# Patient Record
Sex: MALE | Race: WHITE
[De-identification: names, ages, dates, MRNs, and addresses within clinical notes are randomized per-mention and may not be internally consistent; named-entity substitution may affect disease eponyms.]

---

## 2017-01-05 ENCOUNTER — HOSPITAL ENCOUNTER (INPATIENT)
Dept: HOSPITAL 74 - YASAS | Age: 52
LOS: 4 days | Discharge: HOME | End: 2017-01-09
Attending: INTERNAL MEDICINE | Admitting: INTERNAL MEDICINE
Payer: COMMERCIAL

## 2017-01-05 VITALS — BODY MASS INDEX: 28.8 KG/M2

## 2017-01-05 DIAGNOSIS — F10.230: Primary | ICD-10-CM

## 2017-01-05 DIAGNOSIS — E78.00: ICD-10-CM

## 2017-01-05 DIAGNOSIS — F17.210: ICD-10-CM

## 2017-01-05 DIAGNOSIS — G47.00: ICD-10-CM

## 2017-01-05 DIAGNOSIS — F31.9: ICD-10-CM

## 2017-01-05 DIAGNOSIS — F12.20: ICD-10-CM

## 2017-01-05 LAB
APPEARANCE UR: CLEAR
BILIRUB UR STRIP.AUTO-MCNC: NEGATIVE MG/DL
COLOR UR: YELLOW
HIV 1 & 2 AB: NEGATIVE
HIV 1 AGP24: NEGATIVE
KETONES UR QL STRIP: NEGATIVE
LEUKOCYTE ESTERASE UR QL STRIP.AUTO: NEGATIVE
NITRITE UR QL STRIP: NEGATIVE
PH UR: 5 [PH] (ref 5–8)
PROT UR QL STRIP: NEGATIVE
PROT UR QL STRIP: NEGATIVE
RBC # UR STRIP: NEGATIVE /UL
SP GR UR: 1.03 (ref 1–1.03)
UROBILINOGEN UR STRIP-MCNC: NEGATIVE E.U./DL (ref 0.2–1)

## 2017-01-05 PROCEDURE — HZ2ZZZZ DETOXIFICATION SERVICES FOR SUBSTANCE ABUSE TREATMENT: ICD-10-PCS | Performed by: SURGERY

## 2017-01-05 RX ADMIN — Medication SCH MG: at 22:12

## 2017-01-05 RX ADMIN — ARIPIPRAZOLE SCH MG: 5 TABLET ORAL at 22:12

## 2017-01-05 RX ADMIN — NICOTINE SCH: 21 PATCH TRANSDERMAL at 13:50

## 2017-01-05 RX ADMIN — NICOTINE POLACRILEX PRN MG: 2 GUM, CHEWING ORAL at 20:36

## 2017-01-05 RX ADMIN — NICOTINE POLACRILEX PRN MG: 2 GUM, CHEWING ORAL at 13:50

## 2017-01-05 RX ADMIN — NICOTINE POLACRILEX PRN MG: 2 GUM, CHEWING ORAL at 17:20

## 2017-01-05 NOTE — CONSULT
[facility] Psychiatric Consult





- Data


Date of interview: 01/05/17


Admission source: [facility]


Identifying data: This is 51 years old male with history of Bip[olar disorder 

intpoxicated with:  Alcohol, Cannabis, Nicotine, history of Op[ipoids, Cocaine 

and Xanax abuse as well


Substance Abuse History: - Smoking Cessation.  Smoking history: Current every 

day smoker.  Have you smoked in the past 12 months: Yes.  Aproximately how many 

cigarettes per day: 10.  Hx Chewing Tobacco Use: No.  Initiated information on 

smoking cessation: Yes.  'Breaking Loose' booklet given: 01/05/17.  - Substance 

& Tx. History.  Hx Alcohol Use: Yes.  Hx Substance Use: Yes.  Substance Use Type

: Alcohol, Marijuana.  Hx Substance Use Treatment: Yes (DETOX,IOP).  - 

Substances Abused.  ** Alcohol.  Route: Oral.  Frequency: Daily.  Amount used: 

vodka(1 quart)?beer(5-6 cans cobra).  Age of first use: 11.  Date of Last Use: 

01/04/17.  ** Marijuana/Hashish.  Route: Smoking.  Frequency: Daily.  Amount 

used: $5.  Age of first use: 12.  Date of Last Use: 01/04/17


Medical History: Denies


Psychiatric History: P[atient reports to carry nBipolar disorder, reports no 

history of psychiatric admissions, reports currently stable on:  Cymbalta 60mg 

poqd.  Abilify 5mg po bid


Physical/Sexual Abuse/Trauma History: Denies


Additional Comment: Cymbalta 60mg poqd.  Abilify 5mg po bid





Mental Status Exam





- Mental Status Exam


Alert and Oriented to: Person


Cognitive Function: Fair


Patient Appearance: Well Groomed


Mood: Anxious


Affect: Mood Congruent, Normal Range


Patient Behavior: Cooperative


Speech Pattern: Appropriate


Voice Loudness: Normal


Thought Process: Goal Oriented


Thought Disorder: Being Controlled


Hallucinations: Denies


Suicidal Ideation: Denies


Homicidal Ideation: Denies


Insight/Judgement: Fair


Sleep: Difficulty falling asleep


Appetite: Fair


Muscle strength/Tone: Normal


Gait/Station: Normal


Additional Comments: Cymbalta 60mg poqd.  Abilify 5mg po bid





Psychiatric Findings





- Problem List (Axis 1, 2,3)


(1) Alcohol dependence with uncomplicated withdrawal


Current Visit: Yes   Status: Acute





(2) Cannabis dependence, uncomplicated


Current Visit: Yes   Status: Acute





(3) Cocaine dependence


Current Visit: No   Status: Acute   Qualifiers: 


     Substance use status: uncomplicated        Qualified Code(s): F14.20 - 

Cocaine dependence, uncomplicated  





(4) Crack cocaine use


Current Visit: No   Status: Acute





(5) Opioid abuse


Current Visit: No   Status: Acute





(6) Sedative abuse


Current Visit: No   Status: Acute





(7) Nicotine dependence


Current Visit: No   Status: Chronic   Qualifiers: 


     Nicotine product type: cigarettes     Substance use status: uncomplicated 

       Qualified Code(s): F17.210 - Nicotine dependence, cigarettes, 

uncomplicated  





(8) Bipolar disorder


Current Visit: No   Status: Suspected   








- Initial Treatment Plan


Initial Treatment Plan: Cymbalta 60mg poqd.  Abilify 5mg po bid

## 2017-01-05 NOTE — HP
CIWA Score





- CIWA Score


Nausea/Vomitin-Mild Nausea/No Vomiting (AND DIARRHEA)


Muscle Tremors: 4-Moderate,w/Arms Extend


Anxiety: 4-Mod. Anxious/Guarded


Agitation: 4-Moderately Restless


Paroxysmal Sweats: 3


Orientation: 0-Oriented


Tacttile Disturbances: 0-None


Auditory Disturbances: 0-None


Visual Disturbances: 0-None


Headache: 1-Very Mild


CIWA-Ar Total Score: 17





Admission ROS [facility]





- HPI


Chief Complaint: 


WITHDRAWAL SX.


Allergies/Adverse Reactions: 


 Allergies











Allergy/AdvReac Type Severity Reaction Status Date / Time


 


No Known Drug Allergies Allergy   Verified 17 10:20


 


onion Allergy  Difficulty Verified 17 10:20





   Breathing  


 


shellfish derived Allergy  Difficulty Verified 17 10:20





   Breathing  











History of Present Illness: 


50 Y/O MAN WITH A LONG HX. OF ALCOHOLISM IS ADMITTED FOR DETOX. PT. HAS BEEN IN 

PREVIOUS DETOX,


REPORTS 5 YRS. OF SOBRIETY.


Exam Limitations: No Limitations





- Ebola screening


Have you traveled outside of the country in the last 21 days: No


Have you had contact with anyone from an Ebola affected area: No


Have you been sick,other than usual withdrawal symptoms: No


Do you have a fever: No





- Review of Systems


Constitutional: Diaphoresis


EENT: reports: No Symptoms Reported


Respiratory: reports: Cough (NON-PRODUCTIVE)


Cardiac: reports: No Symptoms Reported


GI: reports: Diarrhea


: reports: No Symptoms Reported


Musculoskeletal: reports: No Symptoms Reported


Integumentary: reports: Sweating


Endocrine: reports: No Symptoms Reported


Hematology: reports: No Symptoms Reported


Psychiatric: reports: No Sypmtoms Reported


Other Systems: Reviewed and Negative





Patient History





- Patient Medical History


Hx Anemia: No


Hx Asthma: No


Hx Chronic Obstructive Pulmonary Disease (COPD): No


Hx Cancer: No


Hx Cardiac Disorders: No


Hx Congestive Heart Failure: No


Hx Hypertension: No


Hx Hypercholesterolemia: Yes (CRESTOR)


Hx Pacemaker: No


HX Cerebrovascular Accident: No


Hx Seizures: No


Hx Dementia: No


Hx Diabetes: No


Hx Gastrointestinal Disorders: No


Hx Liver Disease: No


Hx Genitourinary Disorders: No


Hx Sexually Transmitted Disorders: No


Hx Renal Disease (ESRD): No


Hx Thyroid Disease: No


Hx Human Immunodeficiency Virus (HIV): No


Hx Hepatitis C: No


Hx Depression: Yes


Hx Suicide Attempt: No


Hx Bipolar Disorder: Yes


Hx Schizophrenia: No





- Patient Surgical History


Past Surgical History: Yes


Hx Orthopedic Surgery: Yes (left knee torn ACL repair)


Anesthesia Reaction: No





- PPD History


Previous Implant?: Yes


Implanted On Prior [facility] Admission?: Yes


Date: 16


PPD to be Administered?: Yes





- Smoking Cessation


Smoking history: Current every day smoker


Have you smoked in the past 12 months: Yes


Aproximately how many cigarettes per day: 10


Hx Chewing Tobacco Use: No


Initiated information on smoking cessation: Yes


'Breaking Loose' booklet given: 17





- Substance & Tx. History


Hx Alcohol Use: Yes


Hx Substance Use: Yes


Substance Use Type: Alcohol, Marijuana


Hx Substance Use Treatment: Yes (DETOX,IOP)





- Substances Abused


  ** Alcohol


Route: Oral


Frequency: Daily


Amount used: vodka(1 quart)?beer(5-6 cans cobra)


Age of first use: 11


Date of Last Use: 17





  ** Marijuana/Hashish


Route: Smoking


Frequency: Daily


Amount used: $5


Age of first use: 12


Date of Last Use: 17





Family Disease History





- Family Disease History


Family Disease History: Diabetes: Sister, Heart Disease: Mother (, 

alcohol), CA: Father (prostate cancer, etoh, ), Other: Father, Mother





Admission Physical Exam [facility]





- Vital Signs


Vital Signs: 


 Vital Signs - 24 hr











  17





  08:55


 


Temperature 95.1 F L


 


Pulse Rate 71


 


Respiratory 18





Rate 


 


Blood Pressure 153/92














- Physical


General Appearance: Yes: Alcohol on Breath, Tremorous, Sweating, Anxious


HEENTM: Yes: Within Normal Limits


Respiratory: Yes: Chest Non-Tender, Lungs Clear, Normal Breath Sounds


Neck: Yes: Supple


Breast: Yes: Breast Exam Deferred


Cardiology: Yes: Regular Rhythm, Regular Rate, S1, S2


Abdominal: Yes: Normal Bowel Sounds, Non Tender, Soft


Genitourinary: Yes: Within Normal Limits


Back: Yes: Within Normal Limits


Musculoskeletal: Yes: Within Normal Limits


Extremities: Yes: Tremors


Neurological: Yes: Fully Oriented, Alert


Integumentary: Yes: Diaphoresis


Lymphatic: Yes: Within Normal Limits





- Diagnostic


(1) Alcohol dependence with uncomplicated withdrawal


Current Visit: Yes   Status: Acute





(2) Cannabis dependence, uncomplicated


Current Visit: Yes   Status: Acute








Cleared for Admission [facility]





- Detox or Rehab


[facility] Level of Care: Medically Managed


Detox Regimen/Protocol: Librium





[facility]HS Breath Alcohol Content


Breath Alcohol Content: 0





Urine Drug Screen





- Results


Drug Screen Negative: No


Urine Drug Screen Results: THC-Marijuana

## 2017-01-06 LAB
ALBUMIN SERPL-MCNC: 4.1 G/DL (ref 3.4–5)
ALP SERPL-CCNC: 109 U/L (ref 45–117)
ALT SERPL-CCNC: 45 U/L (ref 12–78)
ANION GAP SERPL CALC-SCNC: 9 MMOL/L (ref 8–16)
AST SERPL-CCNC: 29 U/L (ref 15–37)
BILIRUB SERPL-MCNC: 0.4 MG/DL (ref 0.2–1)
CALCIUM SERPL-MCNC: 8.9 MG/DL (ref 8.5–10.1)
CO2 SERPL-SCNC: 24 MMOL/L (ref 21–32)
CREAT SERPL-MCNC: 0.9 MG/DL (ref 0.7–1.3)
DEPRECATED RDW RBC AUTO: 13.7 % (ref 11.9–15.9)
GLUCOSE SERPL-MCNC: 106 MG/DL (ref 74–106)
MCH RBC QN AUTO: 31.4 PG (ref 25.7–33.7)
MCHC RBC AUTO-ENTMCNC: 34.1 G/DL (ref 32–35.9)
MCV RBC: 92 FL (ref 80–96)
PLATELET # BLD AUTO: 221 K/MM3 (ref 134–434)
PMV BLD: 9.5 FL (ref 7.5–11.1)
PROT SERPL-MCNC: 7.3 G/DL (ref 6.4–8.2)
WBC # BLD AUTO: 8.9 K/MM3 (ref 4–10)

## 2017-01-06 RX ADMIN — Medication SCH TAB: at 10:48

## 2017-01-06 RX ADMIN — ACETAMINOPHEN PRN MG: 325 TABLET ORAL at 13:05

## 2017-01-06 RX ADMIN — ARIPIPRAZOLE SCH MG: 5 TABLET ORAL at 10:46

## 2017-01-06 RX ADMIN — NICOTINE SCH MG: 21 PATCH TRANSDERMAL at 10:46

## 2017-01-06 RX ADMIN — Medication SCH MG: at 22:06

## 2017-01-06 RX ADMIN — ARIPIPRAZOLE SCH MG: 5 TABLET ORAL at 22:06

## 2017-01-06 RX ADMIN — DULOXETINE SCH MG: 60 CAPSULE, DELAYED RELEASE ORAL at 10:46

## 2017-01-06 RX ADMIN — NICOTINE POLACRILEX PRN MG: 2 GUM, CHEWING ORAL at 17:25

## 2017-01-06 NOTE — PN
S CIWA





- CIWA Score


Nausea/Vomiting: 3


Muscle Tremors: 3


Anxiety: 3


Agitation: 2


Paroxysmal Sweats: 1-Minimal Palms Moist


Orientation: 0-Oriented


Tacttile Disturbances: 1-Very Mild Itch/Numbness


Auditory Disturbances: 1-Very Mild


Visual Disturbances: 1-Very Mild Sensitivity


Headache: 2-Mild


CIWA-Ar Total Score: 17





BHS Progress Note (SOAP)


Subjective: 


alert,irritable,anxious,interrupted sleep,tremor


Objective: 


01/06/17 10:36


 Vital Signs











Temperature  98.5 F   01/06/17 09:56


 


Pulse Rate  81   01/06/17 09:56


 


Respiratory Rate  16   01/06/17 09:56


 


Blood Pressure  136/93   01/06/17 09:56


 


O2 Sat by Pulse Oximetry (%)      








ekg nsr,normal ecg


 Laboratory Last Values











Sodium  139 mmol/L (136-145)   01/06/17  06:00    


 


Potassium  3.6 mmol/L (3.5-5.1)   01/06/17  06:00    


 


Chloride  106 mmol/L ()   01/06/17  06:00    


 


Urine Color  Yellow   01/05/17  15:00    


 


Urine Appearance  Clear   01/05/17  15:00    


 


Urine pH  5.0  (5.0-8.0)   01/05/17  15:00    


 


Ur Specific Gravity  1.028  (1.001-1.035)   01/05/17  15:00    


 


Urine Protein  Negative  (NEGATIVE)   01/05/17  15:00    


 


Urine Glucose (UA)  Negative  (NEGATIVE)   01/05/17  15:00    


 


Urine Ketones  Negative  (NEGATIVE)   01/05/17  15:00    


 


Urine Blood  Negative  (NEGATIVE)   01/05/17  15:00    


 


Urine Nitrite  Negative  (NEGATIVE)   01/05/17  15:00    


 


Urine Bilirubin  Negative  (NEGATIVE)   01/05/17  15:00    


 


Urine Urobilinogen  Negative E.U./dl (0.2-1.0)   01/05/17  15:00    


 


Ur Leukocyte Esterase  Negative  (NEGATIVE)   01/05/17  15:00    


 


HIV 1&2 Antibody Screen  Negative   01/05/17  11:40    


 


HIV P24 Antigen  Negative   01/05/17  11:40    














01/06/17 10:38


labs pending


Assessment: 





01/06/17 10:38


withdrawal symptom


Plan: 


continue detox

## 2017-01-07 RX ADMIN — Medication SCH MG: at 22:17

## 2017-01-07 RX ADMIN — ACETAMINOPHEN PRN MG: 325 TABLET ORAL at 12:19

## 2017-01-07 RX ADMIN — NICOTINE SCH MG: 14 PATCH, EXTENDED RELEASE TRANSDERMAL at 17:27

## 2017-01-07 RX ADMIN — ARIPIPRAZOLE SCH MG: 5 TABLET ORAL at 10:32

## 2017-01-07 RX ADMIN — Medication SCH TAB: at 10:32

## 2017-01-07 RX ADMIN — NICOTINE SCH: 21 PATCH TRANSDERMAL at 10:32

## 2017-01-07 RX ADMIN — NICOTINE POLACRILEX PRN MG: 2 GUM, CHEWING ORAL at 10:35

## 2017-01-07 RX ADMIN — NICOTINE POLACRILEX PRN MG: 2 GUM, CHEWING ORAL at 15:40

## 2017-01-07 RX ADMIN — NICOTINE POLACRILEX PRN MG: 2 GUM, CHEWING ORAL at 22:49

## 2017-01-07 RX ADMIN — ARIPIPRAZOLE SCH MG: 5 TABLET ORAL at 22:17

## 2017-01-07 RX ADMIN — DULOXETINE SCH MG: 60 CAPSULE, DELAYED RELEASE ORAL at 10:32

## 2017-01-07 NOTE — PN
[id number]


3


Anxiety: 4-Mod. Anxious/Guarded


Agitation: 4-Moderately Restless


Paroxysmal Sweats: 3


Orientation: 0-Oriented


Tacttile Disturbances: 0-None


Auditory Disturbances: 0-None


Visual Disturbances: 0-None


Headache: 0-None Present


CIWA-Ar Total Score: 14





BHS Progress Note (SOAP)


Subjective: 


ANXIETY,TREMORS,SWEATING,INTERRUPTED SLEEP,RESTLESS.


Objective: 





01/07/17 22:04


 Vital Signs - 8 hr











  01/07/17 01/07/17





  14:26 20:03


 


Temperature 96.4 F L 96.4 F L


 


Pulse Rate 89 80


 


Respiratory 20 18





Rate  


 


Blood Pressure 152/92 121/77











Assessment: 





01/07/17 22:05


Withdrawal sx


Plan: 


CONTINUE DETOX

## 2017-01-08 RX ADMIN — NICOTINE SCH: 14 PATCH, EXTENDED RELEASE TRANSDERMAL at 10:09

## 2017-01-08 RX ADMIN — NICOTINE POLACRILEX PRN MG: 2 GUM, CHEWING ORAL at 05:16

## 2017-01-08 RX ADMIN — Medication SCH TAB: at 10:09

## 2017-01-08 RX ADMIN — ARIPIPRAZOLE SCH MG: 5 TABLET ORAL at 22:40

## 2017-01-08 RX ADMIN — NICOTINE POLACRILEX PRN MG: 2 GUM, CHEWING ORAL at 17:30

## 2017-01-08 RX ADMIN — Medication SCH MG: at 22:40

## 2017-01-08 RX ADMIN — DULOXETINE SCH MG: 60 CAPSULE, DELAYED RELEASE ORAL at 10:09

## 2017-01-08 RX ADMIN — NICOTINE POLACRILEX PRN MG: 2 GUM, CHEWING ORAL at 22:42

## 2017-01-08 RX ADMIN — ARIPIPRAZOLE SCH MG: 5 TABLET ORAL at 10:09

## 2017-01-08 NOTE — PN
BHS Progress Note (SOAP)


Subjective: 


Sweats, interrupted sleep (patient states benadryl ineffective), anxiety


Objective: 





01/08/17 15:05


 Last Vital Signs











Temp Pulse Resp BP Pulse Ox


 


 96.4 F L  91 H  18   123/84    


 


 01/08/17 13:36  01/08/17 13:36  01/08/17 13:36  01/08/17 13:36   








 Laboratory Tests











  01/05/17 01/05/17 01/06/17





  11:40 15:00 06:00


 


WBC    8.9  D


 


RBC    4.22


 


Hgb    13.2


 


Hct    38.8


 


MCV    92.0


 


MCHC    34.1


 


RDW    13.7


 


Plt Count    221  D


 


MPV    9.5


 


Sodium   


 


Potassium   


 


Chloride   


 


Carbon Dioxide   


 


Anion Gap   


 


BUN   


 


Creatinine   


 


Creat Clearance w eGFR   


 


Random Glucose   


 


Calcium   


 


Total Bilirubin   


 


AST   


 


ALT   


 


Alkaline Phosphatase   


 


Total Protein   


 


Albumin   


 


Urine Color   Yellow 


 


Urine Appearance   Clear 


 


Urine pH   5.0 


 


Ur Specific Gravity   1.028 


 


Urine Protein   Negative 


 


Urine Glucose (UA)   Negative 


 


Urine Ketones   Negative 


 


Urine Blood   Negative 


 


Urine Nitrite   Negative 


 


Urine Bilirubin   Negative 


 


Urine Urobilinogen   Negative 


 


Ur Leukocyte Esterase   Negative 


 


RPR Titer   


 


HIV 1&2 Antibody Screen  Negative  


 


HIV P24 Antigen  Negative  














  01/06/17 01/06/17





  06:00 06:00


 


WBC  


 


RBC  


 


Hgb  


 


Hct  


 


MCV  


 


MCHC  


 


RDW  


 


Plt Count  


 


MPV  


 


Sodium  139 


 


Potassium  3.6 


 


Chloride  106 


 


Carbon Dioxide  24 


 


Anion Gap  9 


 


BUN  21 H D 


 


Creatinine  0.9 


 


Creat Clearance w eGFR  > 60 


 


Random Glucose  106  D 


 


Calcium  8.9 


 


Total Bilirubin  0.4 


 


AST  29  D 


 


ALT  45 


 


Alkaline Phosphatase  109 


 


Total Protein  7.3 


 


Albumin  4.1 


 


Urine Color  


 


Urine Appearance  


 


Urine pH  


 


Ur Specific Gravity  


 


Urine Protein  


 


Urine Glucose (UA)  


 


Urine Ketones  


 


Urine Blood  


 


Urine Nitrite  


 


Urine Bilirubin  


 


Urine Urobilinogen  


 


Ur Leukocyte Esterase  


 


RPR Titer   Nonreactive


 


HIV 1&2 Antibody Screen  


 


HIV P24 Antigen  








Labs noted


Assessment: 





01/08/17 15:06


Withdrawal symptoms


Plan: 


Continue detox, ambien 10mg qhs prn for interrupted sleep

## 2017-01-09 VITALS — SYSTOLIC BLOOD PRESSURE: 133 MMHG | HEART RATE: 99 BPM | TEMPERATURE: 97.2 F | DIASTOLIC BLOOD PRESSURE: 91 MMHG

## 2017-01-09 NOTE — DS
B[facility] Detox Discharge Summary


Admission Date: 


01/05/17





Discharge Date: 01/09/17





- History


Present History: Alcohol Dependence, Cannabis Dependence


Additional Comments: 


follow up with after [facility] program as arrangement and pmd for medical problem


Pertinent Past History: 


insomnia





- Physical Exam Results


Vital Signs: 


 Vital Signs











Temperature  97.2 F L  01/09/17 06:12


 


Pulse Rate  99 H  01/09/17 06:12


 


Respiratory Rate  18   01/09/17 06:12


 


Blood Pressure  133/91   01/09/17 06:12


 


O2 Sat by Pulse Oximetry (%)      











Pertinent Admission Physical Exam Findings: 


withdrawal symptom





- Treatment


Hospital Course: Detox Protocol Followed, Detoxed Safely, Responded well, 

Discharged Condition Good, Rehab Referral Accepted


Patient [facility]ed [facility] Referral [facility]





- Medication


Discharge Medications: 


Ambulatory Orders





Aripiprazole [Abilify -] 5 mg PO DAILY 07/23/16 


Duloxetine HCl [Cymbalta -] 30 mg PO BID 07/23/16 


Aripiprazole [Abilify -] 5 mg PO BID #60 tablet 01/05/17 


Duloxetine HCl [Cymbalta -] 60 mg PO DAILY #30 capsule. 01/05/17 











- TODD


Did Patient Leave Against Medical Advice: No

## 2017-01-09 NOTE — PN
BHS Progress Note (SOAP)


Subjective: 


alert,no complaint


Objective: 





01/09/17 09:15


 Vital Signs











Temperature  97.2 F L  01/09/17 06:12


 


Pulse Rate  99 H  01/09/17 06:12


 


Respiratory Rate  18   01/09/17 06:12


 


Blood Pressure  133/91   01/09/17 06:12


 


O2 Sat by Pulse Oximetry (%)      











Assessment: 





01/09/17 09:15


detox completed,no withdrawal symptom


Plan: 


discharge today,follow up with after care program as arrangement

## 2017-01-11 NOTE — EKG
Test Reason : 

Blood Pressure : ***/*** mmHG

Vent. Rate : 085 BPM     Atrial Rate : 085 BPM

   P-R Int : 152 ms          QRS Dur : 102 ms

    QT Int : 372 ms       P-R-T Axes : -09 068 068 degrees

   QTc Int : 442 ms

 

NORMAL SINUS RHYTHM

NORMAL ECG

NO PREVIOUS ECGS AVAILABLE

Confirmed by HILDA ALVAREZ, ARUN ([id number]) on 1/11/2017 10:17:30 AM

 

Referred By: Gonzalez Gould           Confirmed By:ARUN STEVENS MD

## 2018-10-05 ENCOUNTER — EMERGENCY (EMERGENCY)
Facility: HOSPITAL | Age: 53
LOS: 1 days | Discharge: HOME | End: 2018-10-05
Attending: EMERGENCY MEDICINE | Admitting: EMERGENCY MEDICINE
Payer: SELF-PAY

## 2018-10-05 VITALS
WEIGHT: 190.04 LBS | HEART RATE: 88 BPM | SYSTOLIC BLOOD PRESSURE: 143 MMHG | RESPIRATION RATE: 18 BRPM | OXYGEN SATURATION: 97 % | DIASTOLIC BLOOD PRESSURE: 77 MMHG | TEMPERATURE: 98 F

## 2018-10-05 VITALS
SYSTOLIC BLOOD PRESSURE: 139 MMHG | OXYGEN SATURATION: 95 % | DIASTOLIC BLOOD PRESSURE: 60 MMHG | TEMPERATURE: 98 F | HEART RATE: 86 BPM | RESPIRATION RATE: 18 BRPM

## 2018-10-05 DIAGNOSIS — M79.672 PAIN IN LEFT FOOT: ICD-10-CM

## 2018-10-05 DIAGNOSIS — S83.512A SPRAIN OF ANTERIOR CRUCIATE LIGAMENT OF LEFT KNEE, INITIAL ENCOUNTER: Chronic | ICD-10-CM

## 2018-10-05 DIAGNOSIS — F12.10 CANNABIS ABUSE, UNCOMPLICATED: ICD-10-CM

## 2018-10-05 DIAGNOSIS — Z91.013 ALLERGY TO SEAFOOD: ICD-10-CM

## 2018-10-05 DIAGNOSIS — M72.2 PLANTAR FASCIAL FIBROMATOSIS: ICD-10-CM

## 2018-10-05 DIAGNOSIS — F17.200 NICOTINE DEPENDENCE, UNSPECIFIED, UNCOMPLICATED: ICD-10-CM

## 2018-10-05 LAB
ALBUMIN SERPL ELPH-MCNC: 4.3 G/DL — SIGNIFICANT CHANGE UP (ref 3.3–5)
ALP SERPL-CCNC: 80 U/L — SIGNIFICANT CHANGE UP (ref 40–120)
ALT FLD-CCNC: 28 U/L — SIGNIFICANT CHANGE UP (ref 10–45)
ANION GAP SERPL CALC-SCNC: 17 MMOL/L — SIGNIFICANT CHANGE UP (ref 5–17)
AST SERPL-CCNC: 25 U/L — SIGNIFICANT CHANGE UP (ref 10–40)
BASOPHILS NFR BLD AUTO: 0.2 % — SIGNIFICANT CHANGE UP (ref 0–2)
BILIRUB SERPL-MCNC: 0.3 MG/DL — SIGNIFICANT CHANGE UP (ref 0.2–1.2)
BUN SERPL-MCNC: 20 MG/DL — SIGNIFICANT CHANGE UP (ref 7–23)
CALCIUM SERPL-MCNC: 9.1 MG/DL — SIGNIFICANT CHANGE UP (ref 8.4–10.5)
CHLORIDE SERPL-SCNC: 101 MMOL/L — SIGNIFICANT CHANGE UP (ref 96–108)
CK MB CFR SERPL CALC: 5 NG/ML — SIGNIFICANT CHANGE UP (ref 0–6.7)
CK SERPL-CCNC: 522 U/L — HIGH (ref 30–200)
CO2 SERPL-SCNC: 22 MMOL/L — SIGNIFICANT CHANGE UP (ref 22–31)
CREAT SERPL-MCNC: 1.01 MG/DL — SIGNIFICANT CHANGE UP (ref 0.5–1.3)
EOSINOPHIL NFR BLD AUTO: 1.8 % — SIGNIFICANT CHANGE UP (ref 0–6)
ETHANOL SERPL-MCNC: 52 MG/DL — HIGH (ref 0–10)
GLUCOSE SERPL-MCNC: 154 MG/DL — HIGH (ref 70–99)
HCT VFR BLD CALC: 36.2 % — LOW (ref 39–50)
HGB BLD-MCNC: 12.2 G/DL — LOW (ref 13–17)
LYMPHOCYTES # BLD AUTO: 18.3 % — SIGNIFICANT CHANGE UP (ref 13–44)
MCHC RBC-ENTMCNC: 31.1 PG — SIGNIFICANT CHANGE UP (ref 27–34)
MCHC RBC-ENTMCNC: 33.7 G/DL — SIGNIFICANT CHANGE UP (ref 32–36)
MCV RBC AUTO: 92.3 FL — SIGNIFICANT CHANGE UP (ref 80–100)
MONOCYTES NFR BLD AUTO: 4.2 % — SIGNIFICANT CHANGE UP (ref 2–14)
NEUTROPHILS NFR BLD AUTO: 75.5 % — SIGNIFICANT CHANGE UP (ref 43–77)
PLATELET # BLD AUTO: 221 K/UL — SIGNIFICANT CHANGE UP (ref 150–400)
POTASSIUM SERPL-MCNC: 3.7 MMOL/L — SIGNIFICANT CHANGE UP (ref 3.5–5.3)
POTASSIUM SERPL-SCNC: 3.7 MMOL/L — SIGNIFICANT CHANGE UP (ref 3.5–5.3)
PROT SERPL-MCNC: 7 G/DL — SIGNIFICANT CHANGE UP (ref 6–8.3)
RBC # BLD: 3.92 M/UL — LOW (ref 4.2–5.8)
RBC # FLD: 13.1 % — SIGNIFICANT CHANGE UP (ref 10.3–16.9)
SODIUM SERPL-SCNC: 140 MMOL/L — SIGNIFICANT CHANGE UP (ref 135–145)
TROPONIN T SERPL-MCNC: <0.01 NG/ML — SIGNIFICANT CHANGE UP (ref 0–0.01)
WBC # BLD: 11.9 K/UL — HIGH (ref 3.8–10.5)
WBC # FLD AUTO: 11.9 K/UL — HIGH (ref 3.8–10.5)

## 2018-10-05 PROCEDURE — 73630 X-RAY EXAM OF FOOT: CPT

## 2018-10-05 PROCEDURE — 82553 CREATINE MB FRACTION: CPT

## 2018-10-05 PROCEDURE — 80053 COMPREHEN METABOLIC PANEL: CPT

## 2018-10-05 PROCEDURE — 93005 ELECTROCARDIOGRAM TRACING: CPT

## 2018-10-05 PROCEDURE — 99284 EMERGENCY DEPT VISIT MOD MDM: CPT | Mod: 25

## 2018-10-05 PROCEDURE — 36415 COLL VENOUS BLD VENIPUNCTURE: CPT

## 2018-10-05 PROCEDURE — 82550 ASSAY OF CK (CPK): CPT

## 2018-10-05 PROCEDURE — 84484 ASSAY OF TROPONIN QUANT: CPT

## 2018-10-05 PROCEDURE — 99283 EMERGENCY DEPT VISIT LOW MDM: CPT | Mod: 25

## 2018-10-05 PROCEDURE — 73630 X-RAY EXAM OF FOOT: CPT | Mod: 26

## 2018-10-05 PROCEDURE — 93010 ELECTROCARDIOGRAM REPORT: CPT

## 2018-10-05 PROCEDURE — 85025 COMPLETE CBC W/AUTO DIFF WBC: CPT

## 2018-10-05 PROCEDURE — 73630 X-RAY EXAM OF FOOT: CPT | Mod: 26,RT

## 2018-10-05 PROCEDURE — 80307 DRUG TEST PRSMV CHEM ANLYZR: CPT

## 2018-10-05 RX ORDER — SODIUM CHLORIDE 9 MG/ML
1000 INJECTION INTRAMUSCULAR; INTRAVENOUS; SUBCUTANEOUS ONCE
Qty: 0 | Refills: 0 | Status: COMPLETED | OUTPATIENT
Start: 2018-10-05 | End: 2018-10-05

## 2018-10-05 RX ADMIN — SODIUM CHLORIDE 1000 MILLILITER(S): 9 INJECTION INTRAMUSCULAR; INTRAVENOUS; SUBCUTANEOUS at 18:37

## 2018-10-05 NOTE — ED PROVIDER NOTE - MEDICAL DECISION MAKING DETAILS
Patient with plantar fasciitis chronically, suspect exacerbated this vs foot sprain  2, drug overdose, medically evaluated and stable for d/c, discussed drug cessation.

## 2018-10-05 NOTE — ED PROVIDER NOTE - OBJECTIVE STATEMENT
54 yo , 2 problems  1) yesterday was running and twisted foot, increased pain at bottom of L foot where he has chronic plantar fasciitis, pain worse w walking,   2) smoking K 2 today and was sitting on ground and reports passing out , no injury, felt unwell, drinking as well, had recently gotten out of rehab for alcohol

## 2018-10-05 NOTE — ED ADULT NURSE NOTE - OBJECTIVE STATEMENT
Pt presents to ED c/o R foot pain. Pt with PMH plantar fascitis, currently undomiciled, presents today c/o R foot pain. Pt states "I took K2 this morning and now I feel better, but when I was walking I felt a pop in my heel and now I can't walk. I'm on the street so I had to call the ambulance." On arrival to ED pt denies hallucinations/SI/HI, other drug use. Pt noted to be ambulating to and from the pantry, able to bear weight on bilateral LEs. Pt presents in NAD speaking full sentences via EMS stretcher through triage.

## 2018-10-05 NOTE — ED PROVIDER NOTE - CONSTITUTIONAL, MLM
normal... Well appearing, well nourished, awake, alert, oriented to person, place, time/situation and in no apparent distress. slightly intox clinically,

## 2018-10-05 NOTE — ED ADULT NURSE NOTE - NSIMPLEMENTINTERV_GEN_ALL_ED
Implemented All Universal Safety Interventions:  [place] to call system. Call bell, personal items and telephone within reach. Instruct patient to call for assistance. Room bathroom lighting operational. Non-slip footwear when patient is off stretcher. Physically safe environment: no spills, clutter or unnecessary equipment. Stretcher in lowest position, wheels locked, appropriate side rails in place.

## 2018-10-05 NOTE — ED PROVIDER NOTE - MUSCULOSKELETAL, MLM
Spine appears normal, range of motion is not limited, no muscle or joint tenderness exception: L  at arch, no deformity or swelling, ankle non tender, no prox leg tenderness.

## 2018-10-05 NOTE — ED ADULT NURSE REASSESSMENT NOTE - NS ED NURSE REASSESS COMMENT FT1
On evaluation with [facility] pt reports falling yesterday with LOC, returned to bedside for further questioning of patient. Pt states "I was at detox treatment yesterday [facility] and they discharged me, they gave me a medical transport back to [place]. I left the headquarters that they dropped me at and I walked out and I started drinking and then I went to sleep. I didn't fall down yesterday. Today I woke up and I drank 5 beers, then I did K2, and after I did the K2 I stumbled down. I didn't lose consciousness, I remember everything that happened." Pt denies dizziness/lightheadedness, CP/SOB prior to fall and at this time. At this time pt still denies hallucinations/SI/HI, only c/o R foot pain. IV placed, labs drawn and sent, NS bolus hung as ordered. Pt with EKG in progress, then pending foot XR. Will continue to monitor.

## 2018-12-20 ENCOUNTER — EMERGENCY (EMERGENCY)
Facility: HOSPITAL | Age: 53
LOS: 1 days | Discharge: HOME | End: 2018-12-20
Attending: EMERGENCY MEDICINE
Payer: SELF-PAY

## 2018-12-20 VITALS
SYSTOLIC BLOOD PRESSURE: 133 MMHG | HEART RATE: 85 BPM | OXYGEN SATURATION: 98 % | TEMPERATURE: 98 F | WEIGHT: 182.98 LBS | DIASTOLIC BLOOD PRESSURE: 78 MMHG | RESPIRATION RATE: 18 BRPM | HEIGHT: 69 IN

## 2018-12-20 DIAGNOSIS — S83.512A SPRAIN OF ANTERIOR CRUCIATE LIGAMENT OF LEFT KNEE, INITIAL ENCOUNTER: Chronic | ICD-10-CM

## 2018-12-20 PROCEDURE — 99284 EMERGENCY DEPT VISIT MOD MDM: CPT | Mod: 25

## 2018-12-20 PROCEDURE — 99053 MED SERV 10PM-8AM 24 HR FAC: CPT

## 2018-12-20 NOTE — ED ADULT TRIAGE NOTE - CHIEF COMPLAINT QUOTE
was IN [facility] in the [place] for 2.5 weeks for PN with chest tube, and now has a cough with weird sensation as per patient, and chest pain where tube was placed, lungs clear, on medications

## 2018-12-21 VITALS
DIASTOLIC BLOOD PRESSURE: 74 MMHG | OXYGEN SATURATION: 98 % | SYSTOLIC BLOOD PRESSURE: 133 MMHG | TEMPERATURE: 98 F | RESPIRATION RATE: 18 BRPM | HEART RATE: 74 BPM

## 2018-12-21 DIAGNOSIS — J85.0 GANGRENE AND NECROSIS OF LUNG: ICD-10-CM

## 2018-12-21 DIAGNOSIS — R05 COUGH: ICD-10-CM

## 2018-12-21 DIAGNOSIS — J86.9 PYOTHORAX WITHOUT FISTULA: ICD-10-CM

## 2018-12-21 LAB
ALBUMIN SERPL ELPH-MCNC: 3.4 G/DL — SIGNIFICANT CHANGE UP (ref 3.3–5.2)
ALP SERPL-CCNC: 152 U/L — HIGH (ref 40–120)
ALT FLD-CCNC: 25 U/L — SIGNIFICANT CHANGE UP
ANION GAP SERPL CALC-SCNC: 13 MMOL/L — SIGNIFICANT CHANGE UP (ref 5–17)
AST SERPL-CCNC: 14 U/L — SIGNIFICANT CHANGE UP
BASOPHILS # BLD AUTO: 0.1 K/UL — SIGNIFICANT CHANGE UP (ref 0–0.2)
BASOPHILS NFR BLD AUTO: 0.6 % — SIGNIFICANT CHANGE UP (ref 0–2)
BILIRUB SERPL-MCNC: 0.2 MG/DL — LOW (ref 0.4–2)
BUN SERPL-MCNC: 19 MG/DL — SIGNIFICANT CHANGE UP (ref 8–20)
CALCIUM SERPL-MCNC: 8.8 MG/DL — SIGNIFICANT CHANGE UP (ref 8.6–10.2)
CHLORIDE SERPL-SCNC: 104 MMOL/L — SIGNIFICANT CHANGE UP (ref 98–107)
CO2 SERPL-SCNC: 22 MMOL/L — SIGNIFICANT CHANGE UP (ref 22–29)
CREAT SERPL-MCNC: 0.97 MG/DL — SIGNIFICANT CHANGE UP (ref 0.5–1.3)
EOSINOPHIL # BLD AUTO: 1.7 K/UL — HIGH (ref 0–0.5)
EOSINOPHIL NFR BLD AUTO: 13.8 % — HIGH (ref 0–6)
GLUCOSE SERPL-MCNC: 103 MG/DL — SIGNIFICANT CHANGE UP (ref 70–115)
HCT VFR BLD CALC: 35.2 % — LOW (ref 42–52)
HGB BLD-MCNC: 11.2 G/DL — LOW (ref 14–18)
LYMPHOCYTES # BLD AUTO: 26.4 % — SIGNIFICANT CHANGE UP (ref 20–55)
LYMPHOCYTES # BLD AUTO: 3.3 K/UL — SIGNIFICANT CHANGE UP (ref 1–4.8)
MCHC RBC-ENTMCNC: 29.3 PG — SIGNIFICANT CHANGE UP (ref 27–31)
MCHC RBC-ENTMCNC: 31.8 G/DL — LOW (ref 32–36)
MCV RBC AUTO: 92.1 FL — SIGNIFICANT CHANGE UP (ref 80–94)
MONOCYTES # BLD AUTO: 1 K/UL — HIGH (ref 0–0.8)
MONOCYTES NFR BLD AUTO: 8.3 % — SIGNIFICANT CHANGE UP (ref 3–10)
NEUTROPHILS # BLD AUTO: 6.2 K/UL — SIGNIFICANT CHANGE UP (ref 1.8–8)
NEUTROPHILS NFR BLD AUTO: 50.3 % — SIGNIFICANT CHANGE UP (ref 37–73)
PLATELET # BLD AUTO: 558 K/UL — HIGH (ref 150–400)
POTASSIUM SERPL-MCNC: 4.4 MMOL/L — SIGNIFICANT CHANGE UP (ref 3.5–5.3)
POTASSIUM SERPL-SCNC: 4.4 MMOL/L — SIGNIFICANT CHANGE UP (ref 3.5–5.3)
PROCALCITONIN SERPL-MCNC: <0.05 NG/ML — SIGNIFICANT CHANGE UP (ref 0–0.04)
PROT SERPL-MCNC: 7.6 G/DL — SIGNIFICANT CHANGE UP (ref 6.6–8.7)
RBC # BLD: 3.82 M/UL — LOW (ref 4.6–6.2)
RBC # FLD: 12.9 % — SIGNIFICANT CHANGE UP (ref 11–15.6)
SODIUM SERPL-SCNC: 139 MMOL/L — SIGNIFICANT CHANGE UP (ref 135–145)
WBC # BLD: 12.4 K/UL — HIGH (ref 4.8–10.8)
WBC # FLD AUTO: 12.4 K/UL — HIGH (ref 4.8–10.8)

## 2018-12-21 PROCEDURE — 71250 CT THORAX DX C-: CPT | Mod: 26

## 2018-12-21 PROCEDURE — 84145 PROCALCITONIN (PCT): CPT

## 2018-12-21 PROCEDURE — 99284 EMERGENCY DEPT VISIT MOD MDM: CPT | Mod: 25

## 2018-12-21 PROCEDURE — 71250 CT THORAX DX C-: CPT

## 2018-12-21 PROCEDURE — 99284 EMERGENCY DEPT VISIT MOD MDM: CPT

## 2018-12-21 PROCEDURE — 71046 X-RAY EXAM CHEST 2 VIEWS: CPT

## 2018-12-21 PROCEDURE — 80053 COMPREHEN METABOLIC PANEL: CPT

## 2018-12-21 PROCEDURE — 36415 COLL VENOUS BLD VENIPUNCTURE: CPT

## 2018-12-21 PROCEDURE — 85027 COMPLETE CBC AUTOMATED: CPT

## 2018-12-21 PROCEDURE — 71046 X-RAY EXAM CHEST 2 VIEWS: CPT | Mod: 26

## 2018-12-21 PROCEDURE — 96374 THER/PROPH/DIAG INJ IV PUSH: CPT

## 2018-12-21 RX ORDER — KETOROLAC TROMETHAMINE 30 MG/ML
15 SYRINGE (ML) INJECTION ONCE
Qty: 0 | Refills: 0 | Status: DISCONTINUED | OUTPATIENT
Start: 2018-12-21 | End: 2018-12-21

## 2018-12-21 RX ADMIN — Medication 100 MILLIGRAM(S): at 16:09

## 2018-12-21 RX ADMIN — Medication 15 MILLIGRAM(S): at 04:02

## 2018-12-21 NOTE — CONSULT NOTE ADULT - ASSESSMENT
Complicated case,   hx necrotizing pneumonia, empyema with pleural drainage, TPA infusion, resistant pathogens  denies chest pain or dyspnea, has cough, mild leucocytosis  ? residual /resolving pleuroparenchymal infection with thick walled cavity, ? mycetoma , ? progression , minimal pleural fluid  few GG nodules upper lobes, that also need follow up in former smoker  Would contact [facility] / ID evaluation  Non toxic, check procalcitonin  needs comparison CT scan from [facility] and continued follow up CT scans  Discussed at length  with pt and ER, options discussed Complicated case,   hx necrotizing pneumonia, empyema with pleural drainage, TPA infusion, resistant pathogens  denies chest pain or dyspnea, has cough, mild leucocytosis  ? residual /resolving pleuroparenchymal infection with thick walled cavity, ? mycetoma , ? progression , minimal pleural fluid  few GG nodules upper lobes, that also need follow up in former smoker  Would contact [facility] / ID evaluation  Non toxic, check procalcitonin  eosinophilia ? drug related, ? allergic asthma component, needs rescue  inhaler, nebs and Pulmonary follow up  needs comparison CT scan from [facility] and continued follow up CT scans  Discussed at length  with pt and ER, options discussed

## 2018-12-21 NOTE — CONSULT NOTE ADULT - ASSESSMENT
52y/o  Male who was recently admitted to [facility] in the [place] with a necrotizing pneumonia, requiring catheter drainage of pleural fluid, TPA infusion, had resistant pathogens  and was discharged on Doxycycline and Levaquin to complete 30 days. Patient does not have records. Multiple attempts made by ED to obtain information from [facility] with no success. Patient reports he is well and taking his antibiotics, no fevers or chills. He had a coughing spell which woke him up at night so he came to the ER. In the ER patient was afebrile, leukocytosis to 12.4k. He was given cough suppressant. No tachypnea tachycardia hypotension or hypoxia. No O2 requirement.      Pneumonia  Former smoker    - Continue Doxycycline and Levaquin as prescribed by [facility]  - No need for IV antibiotics at this time  - CT chest with pneumonia, no prior imaging available fro [facility]  - Procalcitonin level not suggestive of infection  - Need to reimage in 6 weeks  - Should follow up with [facility] or here with records of [facility]    Thank you for allowing us to participate in your patient's care.

## 2018-12-21 NOTE — ED PROVIDER NOTE - OBJECTIVE STATEMENT
53M presents with cough that began this morning. Patient was just discharged from [facility] 2 days ago with "necrotizing pneumonia", was admitted for 2 weeks and had a pigtail in his R pleural cavity for ~1 week. Patient was discharged on clinda and levaquin. Patient notes he also has some chills today as well. The cough is non-productive, and has some pleuricity associated with it in the R side. Patient denies fevers, headaches, dizziness, lightheadedness, chest pain, shortness of breath, abdominal pain, nausea/vomiting, leg pain/swelling, hx of blood clots.

## 2018-12-21 NOTE — ED PROVIDER NOTE - PMH
Alcohol abuse    Anxiety    Depression    No pertinent past medical history    Overdose  heroine and xanax 3/10/2015

## 2018-12-21 NOTE — ED ADULT NURSE NOTE - CHIEF COMPLAINT QUOTE
was IN [facility] in the [place] for 2.5 weeks for PN with chest tube, and now has a cough with weird sensation as per patient, and chest pain where tube was placed, lungs clear, on medications

## 2018-12-21 NOTE — ED PROVIDER NOTE - ATTENDING CONTRIBUTION TO CARE
52 yo M presents to ED c/o increased non-prod cough x 1 day,.  Pt s/p recent admission to [facility] 2/1 weeks ago for pneumonia and subsequently developed necrotizing pneumonia requiring chest tube with drainage.  No recent tobacco or substance  use.  No fever, + chills.  On exam afbbrile in NAD, + occ cough, Cor Reg, Lungs with decreased RML/RLL, Abd soft, NT, Will check labs, CXR, and will prob require CT chest

## 2018-12-21 NOTE — ED PROVIDER NOTE - SKIN, MLM
Skin normal color for race, warm, dry and intact. No evidence of rash. Well healing incision site for R posterior rib cage pig-tail

## 2018-12-21 NOTE — ED PROVIDER NOTE - PROGRESS NOTE DETAILS
Kinza: patient continues to look well, non-toxic appearing; CT results show cavitary PNA, clinical picture suggests patient improving, however due to no hospital records or anything to compare to will get ID & pulm consults in the AM pt signed out to me awaiting infectious disease and pulmonary consult. It was agreed that pt looks clinically well and will continue the oral antibiotics and follow up at the clinic at [facility]

## 2018-12-21 NOTE — CONSULT NOTE ADULT - SUBJECTIVE AND OBJECTIVE BOX
[facility]  INFECTIOUS DISEASES AND INTERNAL MEDICINE at [place]  =======================================================  Mingo Dallas MD  Diplomates American Board of Internal Medicine and Infectious Diseases  =======================================================      [id number]  MOSES HANKINS     CC: Patient is a 53y old  Male who presents with a chief complaint of Cough    52y/o  Male who was recently admitted to [facility] in the [place] with a necrotizing pneumonia, requiring catheter drainage of pleural fluid, TPA infusion, had resistant pathogens  and was discharged on Doxycycline and Levaquin to complete 30 days. Patient does not have records. Multiple attempts made by ED to obtain information from [facility] with no success. Patient reports he is well and taking his antibiotics, no fevers or chills. He had a coughing spell which woke him up at night so he came to the ER. In the ER patient was afebrile, leukocytosis to 12.4k. He was given cough suppressant. No tachypnea tachycardia hypotension or hypoxia. No O2 requirement. ID input requested given recent pneumonia.         Past Medical & Surgical Hx:  No pertinent past medical history  Overdose: heroine and xanax 3/10/2015  Alcohol abuse  Anxiety  Depression  ACL (anterior cruciate ligament) tear, left, initial encounter      Social Hx:  Former smoker, recently quit      FAMILY HISTORY:  Father - Prostate Ca      Allergies  shellfish. (Hives)      ANTIBIOTICS:   Levaquin   Doxycycline       REVIEW OF SYSTEMS:  CONSTITUTIONAL:  No Fever or chills  HEENT:  No diplopia or blurred vision.  No earache, sore throat or runny nose.  CARDIOVASCULAR:  No pressure, squeezing, strangling, tightness, heaviness or aching about the chest, neck, axilla or epigastrium.  RESPIRATORY:  + cough, No shortness of breath  GASTROINTESTINAL:  No nausea, vomiting or diarrhea.  GENITOURINARY:  No dysuria, frequency or urgency.  MUSCULOSKELETAL:  no joint aches, no muscle pain  SKIN:  No change in skin, hair or nails.  NEUROLOGIC:  No paresthesias, fasciculations  PSYCHIATRIC:  No disorder of thought or mood.  ENDOCRINE:  No heat or cold intolerance  HEMATOLOGICAL:  No easy bruising or bleeding.       Physical Exam:  Vital Signs Last 24 Hrs  T(C): 36.8 (21 Dec 2018 07:31), Max: 36.8 (20 Dec 2018 23:43)  T(F): 98.3 (21 Dec 2018 07:31), Max: 98.3 (20 Dec 2018 23:43)  HR: 68 (21 Dec 2018 07:31) (68 - 85)  BP: 159/87 (21 Dec 2018 07:31) (133/78 - 159/87)  RR: 18 (21 Dec 2018 07:31) (18 - 18)  SpO2: 98% (21 Dec 2018 07:31) (97% - 98%)  Height (cm): 175.26 (12-20 @ 23:43)  Weight (kg): 83 (12-20 @ 23:43)  BMI (kg/m2): 27 (12-20 @ 23:43)  BSA (m2): 1.99 (12-20 @ 23:43)      GEN: NAD, pleasant  HEENT: normocephalic and atraumatic. EOMI. PERRL.    NECK: Supple.   LUNGS: Clear to auscultation.  HEART: Regular rate and rhythm  ABDOMEN: Soft, nontender, and nondistended.  Positive bowel sounds.    : No CVA tenderness  EXTREMITIES: Without any edema.  MSK: No joint swelling  NEUROLOGIC: No focal deficits   PSYCHIATRIC: Appropriate affect .  SKIN: No Rash      Labs:  12-21    139  |  104  |  19.0  ----------------------------<  103  4.4   |  22.0  |  0.97    Ca    8.8      21 Dec 2018 04:13    TPro  7.6  /  Alb  3.4  /  TBili  0.2<L>  /  DBili  x   /  AST  14  /  ALT  25  /  AlkPhos  152<H>  12-21                          11.2   12.4  )-----------( 558      ( 21 Dec 2018 04:13 )             35.2     LIVER FUNCTIONS - ( 21 Dec 2018 04:13 )  Alb: 3.4 g/dL / Pro: 7.6 g/dL / ALK PHOS: 152 U/L / ALT: 25 U/L / AST: 14 U/L / GGT: x             Procalcitonin, Serum (12.21.18 @ 12:52)    Procalcitonin, Serum: <0.05: Procalcitonin (PCT) Interpretation (ng/mL) - Diagnosis of systemic  bacterial infection/sepsis  PCT < 0.5: Systemic infection (sepsis) is not likely and risk for  progression to severe systemic infection is low. Local bacterial  infection is possible. If early sepsis is suspected clinically, PCT  should be re-assessed in 6-24 hours.  PCT >/= 0.5 but < 2.0: Systemic infection (sepsis) is possible, but other  conditions are known to elevate PCT as well. Moderate risk for  progression to severe systemic infection. The patient should be closely  monitored both clinically and by re-assessing PCT within 6-24 hours.  PCT >/= 2.0 but < 10.0: Systemic infection (sepsis) is likely, unless  other causes are known. High risk of progression to severe systemic  infection (severe sepsis/septic shock).  PCT >/= 10.0: Important systemic inflammatory response, almost  exclusively due to severe bacterial sepsis or septic shock. High  likelihood of severe sepsis or septic shock. ng/mL          EXAM:  CT CHEST                        PROCEDURE DATE:  12/21/2018    INTERPRETATION:  CLINICAL INFORMATION: Pleural effusion.  Cough.  Pneumonia.  TECHNIQUE:   Axial CT images were acquired through the chest.  Intravenous contrast: None.  Two-dimensional and MIP reformats were generated.  COMPARISON STUDY:Chest radiographs from 03/29/2011 and 12/21/2018.  FINDINGS:   Lungs: Elevation of the right hemidiaphragm.  Right lower lobe   consolidation with 6.2 x 2.6 cm area of cavitation in the right lung base   (3:130).  Nonspecific patchy groundglass opacity in the right upper lobe.    7 x 5 mm subsolid nodule in the left upper lobe (3:42).  Pleura: Right pleural effusion.  Airways: Central airways are clear.  Heart: Normalheart size.  No pericardial effusion.  Atherosclerotic   calcifications of the coronary arteries.  Mediastinum/Lexis: Nonspecific lymph nodes measure up to 1.1 cm short   access in the subcarinal region (3:89).  Nonspecific superior mediastinal   lymph nodes measure up to 9 x 6 mm in the right tracheoesophageal region   (3:32).  There are indeterminant nodular foci in the anterior mediastinum   measuring up to 14 x 9 mm (3:59).  Vasculature: Moderate atherosclerotic calcification of the thoracic aorta   and arch vessels.  Chest wall/lower neck: Within normal limits  Bones: Disc osteophyte complexes at C5-C6 and C6-C7.  Mild degenerative   changes and small Schmorl's nodes in the thoracic spine.  Visualized upper abdomen: Within normal limits.  IMPRESSION:    Elevation of the right hemidiaphragm.  Right lower lobe consolidation   with area of 6.2 x 2.6 cm area of cavitation.  Small right pleural   effusion.  The findings are suboptimally assessed without contrast but   likely represent cavitary pneumonia.  Follow-up chest CT with contrast is   recommended in approximately one month after antibiotic therapy and   resolution of acute symptoms.  Nonspecific patchy groundglass opacity in the right upper lobe.  7 x 5   subsolid nodule in the left upper lobe.  These findings can be reassessed   on follow-up chest CT scan.  Indeterminant nodular foci in the anterior mediastinum measuring 14 x 9   mm could represent lymph nodes, thymic hyperplasia or thymic malignancy.

## 2018-12-21 NOTE — ED ADULT NURSE REASSESSMENT NOTE - NS ED NURSE REASSESS COMMENT FT1
I&D at bedside, reports pt may continue on PO abx at home. MD Salcido aware, plan to discharge. Will continue to monitor and reassess.
Pt resting comfortably, pending I&D consult. Dr. Palomino at bedside for pulmonary eval. New order for procalcitonin level, will add onto labs, pending results. Pt updated on plan of care going forward, offers no further questions or complaints at this time.
Pt care assumed from off-going RN at 0715, rec'd pt sleeping on stretcher, easy to arouse to verbal and tactile stimuli, A&Ox3. Pt c/o continuous cough, states " the cough medicine I wearing off." Pt denies any pain or discomfort at this time, respirations are even and unlabored. Pt rec'd with #20g in place to left forearm, intact and patent with ns flush. Pt in no apparent distress, updated on plan of care going forward, awaiting I&D consult to determine further dispo. RN to continue to monitor and reassess closely.

## 2018-12-21 NOTE — CONSULT NOTE ADULT - SUBJECTIVE AND OBJECTIVE BOX
PULMONARY CONSULT NOTE      MOSES HANKINSN-[id number]    Patient is a 53y old  Male who presents with a chief complaint of   s/p prolonged   hospital admission [facility] for necrotizing pneumonia  empyema , requiring  catheter drainage , TPA infusion   had resistant pathogens , per pt followed by [facility] and [facility]  no fever, chest pain  denies any   HISTORY OF PRESENT ILLNESS:  had worsening cough  requesting cough medicine  no abd pain or diarrhea  no SOB  appetite good  in recovery ETOH per pt  quit smoking few weeks ago, denies any COPD/asthma  MEDICATIONS  (STANDING):      MEDICATIONS  (PRN):      Allergies    shellfish. (Hives)    Intolerances        PAST MEDICAL & SURGICAL HISTORY:  No pertinent past medical history  Overdose: heroine and xanax 3/10/2015  Alcohol abuse  Anxiety  Depression  ACL (anterior cruciate ligament) tear, left, initial encounter      FAMILY HISTORY:      SOCIAL HISTORY  Smoking History:     REVIEW OF SYSTEMS:    CONSTITUTIONAL:  No fevers, chills, sweats    HEENT:  Eyes:  No diplopia or blurred vision. ENT:  No earache, sore throat or runny nose.    CARDIOVASCULAR:  No pressure, squeezing, tightness, or heaviness about the chest; no palpitations.    RESPIRATORY:  see HPI    GASTROINTESTINAL:  No abdominal pain, nausea, vomiting or diarrhea.    GENITOURINARY:  No dysuria, frequency or urgency.    NEUROLOGIC:  No paresthesias, fasciculations, seizures or weakness.    PSYCHIATRIC:  No disorder of thought or mood.    Vital Signs Last 24 Hrs  T(C): 36.8 (21 Dec 2018 07:31), Max: 36.8 (20 Dec 2018 23:43)  T(F): 98.3 (21 Dec 2018 07:31), Max: 98.3 (20 Dec 2018 23:43)  HR: 68 (21 Dec 2018 07:31) (68 - 85)  BP: 159/87 (21 Dec 2018 07:31) (133/78 - 159/87)  BP(mean): --  RR: 18 (21 Dec 2018 07:31) (18 - 18)  SpO2: 98% (21 Dec 2018 07:31) (97% - 98%)    PHYSICAL EXAMINATION:    GENERAL: The patient is a well-developed, well-nourished in no apparent distress.     HEENT: Head is normocephalic and atraumatic. Extraocular muscles are intact. Mucous membranes are moist.     NECK: Supple.     LUNGS: moderate air entry bilat  without wheezing, rales, or rhonchi. Respirations unlabored    HEART: Regular rate and rhythm without murmur.    ABDOMEN: Soft, nontender, and nondistended.  No hepatosplenomegaly is noted.    EXTREMITIES: Without any cyanosis, clubbing, rash, lesions or edema.    NEUROLOGIC: Grossly intact.      LABS:                        11.2   12.4  )-----------( 558      ( 21 Dec 2018 04:13 )             35.2     12-21    139  |  104  |  19.0  ----------------------------<  103  4.4   |  22.0  |  0.97    Ca    8.8      21 Dec 2018 04:13    TPro  7.6  /  Alb  3.4  /  TBili  0.2<L>  /  DBili  x   /  AST  14  /  ALT  25  /  AlkPhos  152<H>  12-21                        MICROBIOLOGY:    RADIOLOGY & ADDITIONAL STUDIES:  CXR and CT scan reviewed

## 2019-02-02 ENCOUNTER — HOSPITAL ENCOUNTER (INPATIENT)
Dept: HOSPITAL 74 - YASAS | Age: 54
LOS: 5 days | Discharge: HOME | DRG: 773 | End: 2019-02-07
Attending: NEUROMUSCULOSKELETAL MEDICINE & OMM | Admitting: NEUROMUSCULOSKELETAL MEDICINE & OMM
Payer: COMMERCIAL

## 2019-02-02 VITALS — BODY MASS INDEX: 26.7 KG/M2

## 2019-02-02 DIAGNOSIS — F11.23: Primary | ICD-10-CM

## 2019-02-02 DIAGNOSIS — Z91.013: ICD-10-CM

## 2019-02-02 DIAGNOSIS — F19.24: ICD-10-CM

## 2019-02-02 DIAGNOSIS — F12.20: ICD-10-CM

## 2019-02-02 DIAGNOSIS — F14.20: ICD-10-CM

## 2019-02-02 DIAGNOSIS — F17.213: ICD-10-CM

## 2019-02-02 DIAGNOSIS — F10.230: ICD-10-CM

## 2019-02-02 PROCEDURE — HZ2ZZZZ DETOXIFICATION SERVICES FOR SUBSTANCE ABUSE TREATMENT: ICD-10-PCS | Performed by: SURGERY

## 2019-02-02 RX ADMIN — Medication SCH MG: at 22:22

## 2019-02-02 RX ADMIN — CYCLOBENZAPRINE HYDROCHLORIDE PRN MG: 10 TABLET, FILM COATED ORAL at 22:22

## 2019-02-02 NOTE — HP
COWS





- Scale


Resting Pulse: 0= [place] 80 or Below


Sweatin= Chills/Flushing


Restless Observation: 3= Extraneous Movement


Pupil Size: 1= Pupils >than Normal


Bone or Joint Aches: 2= Severe Diffuse Aches


Runny Nose/ Eye Tearin= Runny Nose/Eyes


GI Upset > 30mins: 3= Vomiting/Diarrhea


Tremor Observation: 2= Slight Tremor Visible


Yawning Observation: 2= >3x During Session


Anxiety or Irritability: 2=Irritable/Anxious


Goose Flesh Skin: 0=Smooth Skin


COWS Score: 18





CIWA Score


Nausea/Vomiting: 3


Muscle Tremors: 3


Anxiety: 3


Agitation: 2


Paroxysmal Sweats: 1-Minimal Palms Moist


Orientation: 0-Oriented


Tacttile Disturbances: 1-Very Mild Itch/Numbness


Auditory Disturbances: 1-Very Mild


Visual Disturbances: 0-None


Headache: 2-Mild


CIWA-Ar Total Score: 16





- Admission Criteria


[facility] Guidelines: Admission for Medically Managed Detox: 


Requires at least one of the followin. CIWA greater than 12


2. Seizures within the past 24 hours


3. Delirium tremens within the past 24 hours


4. Hallucinations within the past 24 hours


5. Acute intervention needed for co  occurring medical disorder


6. Acute intervention needed for co  occurring psychiatric disorder


7. Severe withdrawal that cannot be handled at a lower level of care (continued


    vomiting, continued diarrhea, abnormal vital signs) requiring intravenous


    medication and/or fluids


8. Pregnancy





Patient presents the following: CIWA greater than 12


Admission Criteria Met: Admission criteria met





Admission ROS S





- hospitals


Chief Complaint: 





i need help to stop using heroin and alcohol


Allergies/Adverse Reactions: 


 Allergies











Allergy/AdvReac Type Severity Reaction Status Date / Time


 


No Known Drug Allergies Allergy   Verified 17 10:20


 


onion Allergy  Difficulty Verified 17 10:20





   Breathing  


 


shellfish derived Allergy  Difficulty Verified 17 10:20





   Breathing  











History of Present Illness: 





this 53 years old male with heroin and alcohol dependence,seeking detox,

withdrawal symptom,last treatment [facility] 7 to 17


history of hypertension,no med


nicotine dependence


longest period of sobriety 5 years


seen in [facility] last night


history of frost bite 1 month still have numbness of finger tips


Exam Limitations: No Limitations





- Ebola screening


Have you traveled outside of the country in the last 21 days: No (N)


Have you had contact with anyone from an Ebola affected area: No


Have you been sick,other than usual withdrawal symptoms: No


Do you have a fever: No





- Review of Systems


Constitutional: Chills, Loss of Appetite, Malaise, Night Sweats, Changes in 

sleep, Weakness


EENT: reports: Tearing, Nose Congestion


Respiratory: reports: No Symptoms reported


Cardiac: reports: No Symptoms Reported


GI: reports: Diarrhea, Nausea, Vomiting, Abdominal cramping


: reports: No Symptoms Reported


Musculoskeletal: reports: Back Pain, Joint Pain, Muscle Pain, Joint Stiffness


Integumentary: reports: Dryness


Neuro: reports: Headache, Tremors


Endocrine: reports: No Symptoms Reported


Hematology: reports: No Symptoms Reported


Psychiatric: reports: No Sypmtoms Reported, Judgement Intact, Mood/Affect 

Appropiate, Orientated x3


Other Systems: Reviewed and Negative





Patient History





- Patient Medical History


Hx Anemia: No


Hx Asthma: No


Hx Chronic Obstructive Pulmonary Disease (COPD): No


Hx Cancer: No


Hx Cardiac Disorders: No


Hx Congestive Heart Failure: No


Hx Hypertension: No


Hx Hypercholesterolemia: Yes (no med)


Hx Pacemaker: No


HX Cerebrovascular Accident: No


Hx Seizures: No


Hx Dementia: No


Hx Diabetes: No


Hx Gastrointestinal Disorders: No


Hx Liver Disease: No


Hx Genitourinary Disorders: No


Hx Sexually Transmitted Disorders: No


Hx Renal Disease (ESRD): No


Hx Thyroid Disease: No


Hx Human Immunodeficiency Virus (HIV): No (last  negative)


Hx Hepatitis C: No


Hx Depression: Yes


Hx Suicide Attempt: No


Hx Bipolar Disorder: Yes (no meds)


Hx Schizophrenia: No


Other Medical History: no suicidal,no homicidal,





- Patient Surgical History


Past Surgical History: Yes


Hx Orthopedic Surgery: Yes (left knee torn ACL repair in  )


Anesthesia Reaction: No





- PPD History


Previous Implant?: Yes


Documented Results: Negative w/o proof


Date: 17


PPD to be Administered?: Yes





- Smoking Cessation


Smoking history: Current every day smoker


Have you smoked in the past 12 months: Yes


Aproximately how many cigarettes per day: 10


Hx Chewing Tobacco Use: No


Initiated information on smoking cessation: Yes


'Breaking Loose' booklet given: 19





- Substance & Tx. History


Hx Alcohol Use: Yes


Hx Substance Use: Yes


Substance Use Type: Alcohol, Cocaine, Heroin, Marijuana


Hx Substance Use Treatment: Yes ([facility] 17 to 17)





- Substances Abused


  ** Heroin


Route: Injection


Frequency: Daily


Amount used: 4 bags


Age of first use: 24


Date of Last Use: 19





  ** Alcohol


Route: Oral


Frequency: Daily


Amount used: 10 of 24 ozs of beer


Age of first use: 11


Date of Last Use: 19





  ** Cocaine


Route: Smoking


Frequency: Daily


Amount used: 100$


Age of first use: 24


Date of Last Use: 19





  ** Marijuana/Hashish


Route: Smoking


Frequency: 1-2 times per week


Amount used: 10$


Age of first use: 12


Date of Last Use: 19





Family Disease History





- Family Disease History


Family Disease History: Diabetes: Sister, Heart Disease: Mother (, 

alcohol), CA: Father (prostate cancer, etoh, ), Other: Father, Mother





Admission Physical Exam [facility]





- Vital Signs


Vital Signs: 


 Vital Signs - 24 hr











  19





  09:21


 


Temperature 97.0 F L


 


Pulse Rate 70


 


Respiratory 18





Rate 


 


Blood Pressure 147/86














- Physical


General Appearance: Yes: Moderate Distress, Tremorous, Irritable, Sweating, 

Anxious


HEENTM: Yes: Normal ENT Inspection, CELINA, Pharynx Normal


Respiratory: Yes: Within Normal Limits, Lungs Clear, Normal Breath Sounds


Neck: Yes: Within Normal Limits, Supple, Trachea in good position


Breast: Yes: Within Normal Limits


Cardiology: Yes: Within Normal Limits, Regular Rhythm, Regular Rate


Abdominal: Yes: Within Normal Limits, Normal Bowel Sounds, Non Tender, Soft


Genitourinary: Yes: Within Normal Limits


Back: Yes: Normal Inspection, Muscle Spasm


Musculoskeletal: Yes: Back pain, Muscle Pain


Extremities: Yes: Tremors


Neurological: Yes: CNs II-XII NML intact, Fully Oriented, Alert, Motor Strength 

5/5


Integumentary: Yes: Dry


Lymphatic: Yes: Within Normal Limits





- Diagnostic


(1) Opioid dependence with withdrawal


Current Visit: Yes   Status: Acute   





(2) Cannabis dependence, uncomplicated


Current Visit: No   Status: Acute   





(3) Cocaine dependence


Current Visit: No   Status: Acute   


Qualifiers: 


   Substance use status: uncomplicated   Qualified Code(s): F14.20 - Cocaine 

dependence, uncomplicated   





(4) Nicotine dependence


Current Visit: No   Status: Chronic   


Qualifiers: 


   Nicotine product type: cigarettes   Substance use status: uncomplicated   

Qualified Code(s): F17.210 - Nicotine dependence, cigarettes, uncomplicated   





(5) Alcohol dependence with uncomplicated withdrawal


Current Visit: No   Status: Acute   





(6) History of knee surgery


Current Visit: Yes   Status: Chronic   





Cleared for Admission [facility]





- Detox or Rehab


[facility] Level of Care: Medically Managed


Detox Regimen/Protocol: Methadone/Librium





[facility] Breath Alcohol Content


Breath Alcohol Content: 0.033





Urine Drug Screen





- Results


Drug Screen Negative: No


Urine Drug Screen Results: THC-Marijuana, JOSE-Cocaine, OPI-Opiates, MTD-

Methadone, OXY-Oxycodone

## 2019-02-03 LAB
ALBUMIN SERPL-MCNC: 3.2 G/DL (ref 3.4–5)
ALP SERPL-CCNC: 92 U/L (ref 45–117)
ALT SERPL-CCNC: 18 U/L (ref 13–61)
ANION GAP SERPL CALC-SCNC: 10 MMOL/L (ref 8–16)
AST SERPL-CCNC: 13 U/L (ref 15–37)
BILIRUB SERPL-MCNC: 0.3 MG/DL (ref 0.2–1)
BUN SERPL-MCNC: 15 MG/DL (ref 7–18)
CALCIUM SERPL-MCNC: 8.4 MG/DL (ref 8.5–10.1)
CHLORIDE SERPL-SCNC: 108 MMOL/L (ref 98–107)
CO2 SERPL-SCNC: 24 MMOL/L (ref 21–32)
CREAT SERPL-MCNC: 0.9 MG/DL (ref 0.55–1.3)
DEPRECATED RDW RBC AUTO: 16.8 % (ref 11.9–15.9)
GLUCOSE SERPL-MCNC: 121 MG/DL (ref 74–106)
HCT VFR BLD CALC: 36.3 % (ref 35.4–49)
HGB BLD-MCNC: 12.4 GM/DL (ref 11.7–16.9)
MCH RBC QN AUTO: 31.9 PG (ref 25.7–33.7)
MCHC RBC AUTO-ENTMCNC: 34.1 G/DL (ref 32–35.9)
MCV RBC: 93.5 FL (ref 80–96)
PLATELET # BLD AUTO: 262 K/MM3 (ref 134–434)
PMV BLD: 8.2 FL (ref 7.5–11.1)
POTASSIUM SERPLBLD-SCNC: 3.8 MMOL/L (ref 3.5–5.1)
PROT SERPL-MCNC: 6.4 G/DL (ref 6.4–8.2)
RBC # BLD AUTO: 3.89 M/MM3 (ref 4–5.6)
SODIUM SERPL-SCNC: 141 MMOL/L (ref 136–145)
WBC # BLD AUTO: 5.8 K/MM3 (ref 4–10)

## 2019-02-03 RX ADMIN — Medication SCH TAB: at 10:06

## 2019-02-03 RX ADMIN — CYCLOBENZAPRINE HYDROCHLORIDE PRN MG: 10 TABLET, FILM COATED ORAL at 22:04

## 2019-02-03 RX ADMIN — Medication SCH MG: at 22:04

## 2019-02-03 NOTE — PN
S CIWA





- CIWA Score


Nausea/Vomitin-Mild Nausea/No Vomiting


Muscle Tremors: 3


Anxiety: 2


Agitation: 2


Paroxysmal Sweats: 1-Minimal Palms Moist


Orientation: 1-Uncertain about Date


Tacttile Disturbances: 0-None


Auditory Disturbances: 0-None


Visual Disturbances: 0-None


Headache: 2-Mild


CIWA-Ar Total Score: 12





BHS COWS





- Scale


Resting Pulse: 0= [place] 80 or Below


Sweatin= Chills/Flushing


Restless Observation: 1= Difficult to Sit Still


Pupil Size: 0= Normal to Room Light


Bone or Joint Aches: 2= Severe Diffuse Aches


Runny Nose/ Eye Tearin= Nasal Congestion


GI Upset > 30mins: 2= Nausea/Diarrhea


Tremor Observation of Outstretched Hands: 2= Slight Tremor Visible


Yawning Observation: 1= 1-2x During Session


Anxiety or Irritability: 2=Irritable/Anxious


Goose Flesh Skin: 0=Smooth Skin


COWS Score: 12





BHS Progress Note (SOAP)


Subjective: 





body aches tremor joints pain sweating 


writer called [id number] last filled  no further information


discuss with the patient for recent pharmacy and/or provider location and/or 

name





Objective: 





19 11:17


 Vital Signs











Temperature  95.9 F L  19 09:27


 


Pulse Rate  82   19 09:27


 


Respiratory Rate  18   19 09:27


 


Blood Pressure  113/69   19 09:27


 


O2 Sat by Pulse Oximetry (%)      








 Laboratory Last Values











WBC  5.8 K/mm3 (4.0-10.0)   19  07:50    


 


RBC  3.89 M/mm3 (4.00-5.60)  L  19  07:50    


 


Hgb  12.4 GM/dL (11.7-16.9)   19  07:50    


 


Hct  36.3 % (35.4-49)   19  07:50    


 


MCV  93.5 fl (80-96)   19  07:50    


 


MCH  31.9 pg (25.7-33.7)   19  07:50    


 


MCHC  34.1 g/dl (32.0-35.9)   19  07:50    


 


RDW  16.8 % (11.9-15.9)  H  19  07:50    


 


Plt Count  262 K/MM3 (134-434)   19  07:50    


 


MPV  8.2 fl (7.5-11.1)  D 19  07:50    


 


Sodium  141 mmol/L (136-145)   19  07:50    


 


Potassium  3.8 mmol/L (3.5-5.1)   19  07:50    


 


Chloride  108 mmol/L ()  H  19  07:50    


 


Carbon Dioxide  24 mmol/L (21-32)   19  07:50    


 


Anion Gap  10 MMOL/L (8-16)   19  07:50    


 


BUN  15 mg/dL (7-18)   19  07:50    


 


Creatinine  0.9 mg/dL (0.55-1.3)   19  07:50    


 


Creat Clearance w eGFR  > 60  (>60)   19  07:50    


 


Random Glucose  121 mg/dL ()  H  19  07:50    


 


Calcium  8.4 mg/dL (8.5-10.1)  L  19  07:50    


 


Total Bilirubin  0.3 mg/dL (0.2-1)   19  07:50    


 


AST  13 U/L (15-37)  L  19  07:50    


 


ALT  18 U/L (13-61)   19  07:50    


 


Alkaline Phosphatase  92 U/L ()   19  07:50    


 


Total Protein  6.4 g/dl (6.4-8.2)   19  07:50    


 


Albumin  3.2 g/dl (3.4-5.0)  L  19  07:50    








lab noted


Assessment: 





19 11:17


withdrawal sx


Plan: 





continue detox

## 2019-02-04 LAB
APPEARANCE UR: CLEAR
BILIRUB UR STRIP.AUTO-MCNC: NEGATIVE MG/DL
COLOR UR: (no result)
KETONES UR QL STRIP: NEGATIVE
LEUKOCYTE ESTERASE UR QL STRIP.AUTO: NEGATIVE
NITRITE UR QL STRIP: NEGATIVE
PH UR: 7 [PH] (ref 5–8)
PROT UR QL STRIP: NEGATIVE
PROT UR QL STRIP: NEGATIVE
SP GR UR: 1.01 (ref 1.01–1.03)
UROBILINOGEN UR STRIP-MCNC: NEGATIVE MG/DL (ref 0.2–1)

## 2019-02-04 RX ADMIN — Medication PRN MG: at 22:28

## 2019-02-04 RX ADMIN — Medication SCH TAB: at 10:07

## 2019-02-04 RX ADMIN — Medication SCH MG: at 22:27

## 2019-02-04 RX ADMIN — METHADONE HYDROCHLORIDE SCH MG: 5 TABLET ORAL at 10:05

## 2019-02-04 NOTE — PN
[facility] CIWA





- CIWA Score


Nausea/Vomitin-No Nausea/No Vomiting


Muscle Tremors: 3


Anxiety: 2


Agitation: 2


Paroxysmal Sweats: 1-Minimal Palms Moist


Orientation: 0-Oriented


Tacttile Disturbances: 0-None


Auditory Disturbances: 0-None


Visual Disturbances: 0-None


Headache: 2-Mild


CIWA-Ar Total Score: 10





BHS COWS





- Scale


Resting Pulse: 0= [place] 80 or Below


Sweatin= Chills/Flushing


Restless Observation: 0= Sits Still


Pupil Size: 0= Normal to Room Light


Bone or Joint Aches: 1= Mild Discomfort


Runny Nose/ Eye Tearin= Nasal Congestion


GI Upset > 30mins: 1= Stomach Cramp


Tremor Observation of Outstretched Hands: 1= Tremor Felt, Not Seen


Yawning Observation: 1= 1-2x During Session


Anxiety or Irritability: 1=Feels Anxious/Irritable


Goose Flesh Skin: 0=Smooth Skin


COWS Score: 7





BHS Progress Note (SOAP)


Subjective: 





mild body aches less tremor otherwise feeling ok


Objective: 





19 10:04


 Vital Signs











Temperature  96.4 F L  19 09:04


 


Pulse Rate  64   19 09:04


 


Respiratory Rate  16   19 09:04


 


Blood Pressure  102/56 L  19 09:04


 


O2 Sat by Pulse Oximetry (%)      








 Laboratory Last Values











WBC  5.8 K/mm3 (4.0-10.0)   19  07:50    


 


RBC  3.89 M/mm3 (4.00-5.60)  L  19  07:50    


 


Hgb  12.4 GM/dL (11.7-16.9)   19  07:50    


 


Hct  36.3 % (35.4-49)   19  07:50    


 


MCV  93.5 fl (80-96)   19  07:50    


 


MCH  31.9 pg (25.7-33.7)   19  07:50    


 


MCHC  34.1 g/dl (32.0-35.9)   19  07:50    


 


RDW  16.8 % (11.9-15.9)  H  19  07:50    


 


Plt Count  262 K/MM3 (134-434)   19  07:50    


 


MPV  8.2 fl (7.5-11.1)  D 19  07:50    


 


Sodium  141 mmol/L (136-145)   19  07:50    


 


Potassium  3.8 mmol/L (3.5-5.1)   19  07:50    


 


Chloride  108 mmol/L ()  H  19  07:50    


 


Carbon Dioxide  24 mmol/L (21-32)   19  07:50    


 


Anion Gap  10 MMOL/L (8-16)   19  07:50    


 


BUN  15 mg/dL (7-18)   19  07:50    


 


Creatinine  0.9 mg/dL (0.55-1.3)   19  07:50    


 


Creat Clearance w eGFR  > 60  (>60)   19  07:50    


 


Random Glucose  121 mg/dL ()  H  19  07:50    


 


Calcium  8.4 mg/dL (8.5-10.1)  L  19  07:50    


 


Total Bilirubin  0.3 mg/dL (0.2-1)   19  07:50    


 


AST  13 U/L (15-37)  L  19  07:50    


 


ALT  18 U/L (13-61)   19  07:50    


 


Alkaline Phosphatase  92 U/L ()   19  07:50    


 


Total Protein  6.4 g/dl (6.4-8.2)   19  07:50    


 


Albumin  3.2 g/dl (3.4-5.0)  L  19  07:50    


 


RPR Titer  Nonreactive  (NONREACTIVE)   19  07:50    








lab noted


Assessment: 





19 10:05


withdrawal sx


Plan: 





continue detox

## 2019-02-05 RX ADMIN — Medication PRN MG: at 22:22

## 2019-02-05 RX ADMIN — METHADONE HYDROCHLORIDE SCH MG: 5 TABLET ORAL at 10:36

## 2019-02-05 RX ADMIN — Medication SCH MG: at 22:22

## 2019-02-05 RX ADMIN — Medication SCH TAB: at 10:36

## 2019-02-05 NOTE — PN
BHS Progress Note (SOAP)


Subjective: 





body aches muscle cramping tremor sweating 


Objective: 





02/05/19 14:41


 Vital Signs











Temperature  97.6 F   02/05/19 13:40


 


Pulse Rate  92 H  02/05/19 13:40


 


Respiratory Rate  18   02/05/19 13:40


 


Blood Pressure  119/70   02/05/19 13:40


 


O2 Sat by Pulse Oximetry (%)      








 Laboratory Last Values











WBC  5.8 K/mm3 (4.0-10.0)   02/03/19  07:50    


 


RBC  3.89 M/mm3 (4.00-5.60)  L  02/03/19  07:50    


 


Hgb  12.4 GM/dL (11.7-16.9)   02/03/19  07:50    


 


Hct  36.3 % (35.4-49)   02/03/19  07:50    


 


MCV  93.5 fl (80-96)   02/03/19  07:50    


 


MCH  31.9 pg (25.7-33.7)   02/03/19  07:50    


 


MCHC  34.1 g/dl (32.0-35.9)   02/03/19  07:50    


 


RDW  16.8 % (11.9-15.9)  H  02/03/19  07:50    


 


Plt Count  262 K/MM3 (134-434)   02/03/19  07:50    


 


MPV  8.2 fl (7.5-11.1)  D 02/03/19  07:50    


 


Sodium  141 mmol/L (136-145)   02/03/19  07:50    


 


Potassium  3.8 mmol/L (3.5-5.1)   02/03/19  07:50    


 


Chloride  108 mmol/L ()  H  02/03/19  07:50    


 


Carbon Dioxide  24 mmol/L (21-32)   02/03/19  07:50    


 


Anion Gap  10 MMOL/L (8-16)   02/03/19  07:50    


 


BUN  15 mg/dL (7-18)   02/03/19  07:50    


 


Creatinine  0.9 mg/dL (0.55-1.3)   02/03/19  07:50    


 


Creat Clearance w eGFR  > 60  (>60)   02/03/19  07:50    


 


Random Glucose  121 mg/dL ()  H  02/03/19  07:50    


 


Calcium  8.4 mg/dL (8.5-10.1)  L  02/03/19  07:50    


 


Total Bilirubin  0.3 mg/dL (0.2-1)   02/03/19  07:50    


 


AST  13 U/L (15-37)  L  02/03/19  07:50    


 


ALT  18 U/L (13-61)   02/03/19  07:50    


 


Alkaline Phosphatase  92 U/L ()   02/03/19  07:50    


 


Total Protein  6.4 g/dl (6.4-8.2)   02/03/19  07:50    


 


Albumin  3.2 g/dl (3.4-5.0)  L  02/03/19  07:50    


 


Urine Color  Straw   02/04/19  07:00    


 


Urine Appearance  Clear   02/04/19  07:00    


 


Urine pH  7.0  (5.0-8.0)  D 02/04/19  07:00    


 


Ur Specific Gravity  1.012  (1.010-1.035)   02/04/19  07:00    


 


Urine Protein  Negative  (NEGATIVE)   02/04/19  07:00    


 


Urine Glucose (UA)  Negative  (NEGATIVE)   02/04/19  07:00    


 


Urine Ketones  Negative  (NEGATIVE)   02/04/19  07:00    


 


Urine Blood  Negative  (NEGATIVE)   02/04/19  07:00    


 


Urine Nitrite  Negative  (NEGATIVE)   02/04/19  07:00    


 


Urine Bilirubin  Negative  (<2.0 mg/dL)   02/04/19  07:00    


 


Urine Urobilinogen  Negative mg/dL (0.2-1.0)   02/04/19  07:00    


 


Ur Leukocyte Esterase  Negative  (NEGATIVE)   02/04/19  07:00    


 


RPR Titer  Nonreactive  (NONREACTIVE)   02/03/19  07:50    








lab noted


Assessment: 





02/05/19 14:42


withdrawal sx


Plan: 





continue detox

## 2019-02-06 RX ADMIN — Medication PRN MG: at 22:12

## 2019-02-06 RX ADMIN — Medication SCH MG: at 22:12

## 2019-02-06 RX ADMIN — Medication SCH TAB: at 10:19

## 2019-02-06 NOTE — PN
[facility]HS Progress Note (SOAP)


Subjective: 





feeling better less tremor mild body aches sleep better at night


Objective: 





02/06/19 11:33


 Vital Signs











Temperature  96.8 F L  02/06/19 09:15


 


Pulse Rate  84   02/06/19 09:15


 


Respiratory Rate  18   02/06/19 09:15


 


Blood Pressure  117/79   02/06/19 09:15


 


O2 Sat by Pulse Oximetry (%)      








 Laboratory Last Values











WBC  5.8 K/mm3 (4.0-10.0)   02/03/19  07:50    


 


RBC  3.89 M/mm3 (4.00-5.60)  L  02/03/19  07:50    


 


Hgb  12.4 GM/dL (11.7-16.9)   02/03/19  07:50    


 


Hct  36.3 % (35.4-49)   02/03/19  07:50    


 


MCV  93.5 fl (80-96)   02/03/19  07:50    


 


MCH  31.9 pg (25.7-33.7)   02/03/19  07:50    


 


MCHC  34.1 g/dl (32.0-35.9)   02/03/19  07:50    


 


RDW  16.8 % (11.9-15.9)  H  02/03/19  07:50    


 


Plt Count  262 K/MM3 (134-434)   02/03/19  07:50    


 


MPV  8.2 fl (7.5-11.1)  D 02/03/19  07:50    


 


Sodium  141 mmol/L (136-145)   02/03/19  07:50    


 


Potassium  3.8 mmol/L (3.5-5.1)   02/03/19  07:50    


 


Chloride  108 mmol/L ()  H  02/03/19  07:50    


 


Carbon Dioxide  24 mmol/L (21-32)   02/03/19  07:50    


 


Anion Gap  10 MMOL/L (8-16)   02/03/19  07:50    


 


BUN  15 mg/dL (7-18)   02/03/19  07:50    


 


Creatinine  0.9 mg/dL (0.55-1.3)   02/03/19  07:50    


 


Creat Clearance w eGFR  > 60  (>60)   02/03/19  07:50    


 


Random Glucose  121 mg/dL ()  H  02/03/19  07:50    


 


Calcium  8.4 mg/dL (8.5-10.1)  L  02/03/19  07:50    


 


Total Bilirubin  0.3 mg/dL (0.2-1)   02/03/19  07:50    


 


AST  13 U/L (15-37)  L  02/03/19  07:50    


 


ALT  18 U/L (13-61)   02/03/19  07:50    


 


Alkaline Phosphatase  92 U/L ()   02/03/19  07:50    


 


Total Protein  6.4 g/dl (6.4-8.2)   02/03/19  07:50    


 


Albumin  3.2 g/dl (3.4-5.0)  L  02/03/19  07:50    


 


Urine Color  Straw   02/04/19  07:00    


 


Urine Appearance  Clear   02/04/19  07:00    


 


Urine pH  7.0  (5.0-8.0)  D 02/04/19  07:00    


 


Ur Specific Gravity  1.012  (1.010-1.035)   02/04/19  07:00    


 


Urine Protein  Negative  (NEGATIVE)   02/04/19  07:00    


 


Urine Glucose (UA)  Negative  (NEGATIVE)   02/04/19  07:00    


 


Urine Ketones  Negative  (NEGATIVE)   02/04/19  07:00    


 


Urine Blood  Negative  (NEGATIVE)   02/04/19  07:00    


 


Urine Nitrite  Negative  (NEGATIVE)   02/04/19  07:00    


 


Urine Bilirubin  Negative  (<2.0 mg/dL)   02/04/19  07:00    


 


Urine Urobilinogen  Negative mg/dL (0.2-1.0)   02/04/19  07:00    


 


Ur Leukocyte Esterase  Negative  (NEGATIVE)   02/04/19  07:00    


 


RPR Titer  Nonreactive  (NONREACTIVE)   02/03/19  07:50    








lab noted


Assessment: 





02/06/19 11:33


mild withdrawal sx


Plan: 





continue detox

## 2019-02-07 VITALS — DIASTOLIC BLOOD PRESSURE: 64 MMHG | HEART RATE: 68 BPM | TEMPERATURE: 97.8 F | SYSTOLIC BLOOD PRESSURE: 122 MMHG

## 2019-02-07 NOTE — DS
[facility] Detox Discharge Summary


Admission Date: 


02/02/19





Discharge Date: 02/07/19





- History


Present History: Alcohol Dependence, Opioid Dependence


Additional Comments: 





53 years old male admitted on 02/02/19 for alcohol and opiate withdrawal 

stabilization


completed alcohol and opiate detox regiment aftercare as per counselor 

arrangement


Pertinent Past History: 





discuss medication assisted treatment program and narcan kit  from 

pharmacy


encourage list of medication in wallet update the list when changes of 

medication


bring in bottles of medication to follow up and aftercare appointments





- Physical Exam Results


Vital Signs: 


 Vital Signs











Temperature  97.8 F   02/07/19 06:11


 


Pulse Rate  68   02/07/19 06:11


 


Respiratory Rate  18   02/07/19 06:30


 


Blood Pressure  122/64   02/07/19 06:11


 


O2 Sat by Pulse Oximetry (%)      











Pertinent Admission Physical Exam Findings: 





alcohol and opiate withdrawal sx


 Laboratory Last Values











WBC  5.8 K/mm3 (4.0-10.0)   02/03/19  07:50    


 


RBC  3.89 M/mm3 (4.00-5.60)  L  02/03/19  07:50    


 


Hgb  12.4 GM/dL (11.7-16.9)   02/03/19  07:50    


 


Hct  36.3 % (35.4-49)   02/03/19  07:50    


 


MCV  93.5 fl (80-96)   02/03/19  07:50    


 


MCH  31.9 pg (25.7-33.7)   02/03/19  07:50    


 


MCHC  34.1 g/dl (32.0-35.9)   02/03/19  07:50    


 


RDW  16.8 % (11.9-15.9)  H  02/03/19  07:50    


 


Plt Count  262 K/MM3 (134-434)   02/03/19  07:50    


 


MPV  8.2 fl (7.5-11.1)  D 02/03/19  07:50    


 


Sodium  141 mmol/L (136-145)   02/03/19  07:50    


 


Potassium  3.8 mmol/L (3.5-5.1)   02/03/19  07:50    


 


Chloride  108 mmol/L ()  H  02/03/19  07:50    


 


Carbon Dioxide  24 mmol/L (21-32)   02/03/19  07:50    


 


Anion Gap  10 MMOL/L (8-16)   02/03/19  07:50    


 


BUN  15 mg/dL (7-18)   02/03/19  07:50    


 


Creatinine  0.9 mg/dL (0.55-1.3)   02/03/19  07:50    


 


Creat Clearance w eGFR  > 60  (>60)   02/03/19  07:50    


 


Random Glucose  121 mg/dL ()  H  02/03/19  07:50    


 


Calcium  8.4 mg/dL (8.5-10.1)  L  02/03/19  07:50    


 


Total Bilirubin  0.3 mg/dL (0.2-1)   02/03/19  07:50    


 


AST  13 U/L (15-37)  L  02/03/19  07:50    


 


ALT  18 U/L (13-61)   02/03/19  07:50    


 


Alkaline Phosphatase  92 U/L ()   02/03/19  07:50    


 


Total Protein  6.4 g/dl (6.4-8.2)   02/03/19  07:50    


 


Albumin  3.2 g/dl (3.4-5.0)  L  02/03/19  07:50    


 


Urine Color  Straw   02/04/19  07:00    


 


Urine Appearance  Clear   02/04/19  07:00    


 


Urine pH  7.0  (5.0-8.0)  D 02/04/19  07:00    


 


Ur Specific Gravity  1.012  (1.010-1.035)   02/04/19  07:00    


 


Urine Protein  Negative  (NEGATIVE)   02/04/19  07:00    


 


Urine Glucose (UA)  Negative  (NEGATIVE)   02/04/19  07:00    


 


Urine Ketones  Negative  (NEGATIVE)   02/04/19  07:00    


 


Urine Blood  Negative  (NEGATIVE)   02/04/19  07:00    


 


Urine Nitrite  Negative  (NEGATIVE)   02/04/19  07:00    


 


Urine Bilirubin  Negative  (<2.0 mg/dL)   02/04/19  07:00    


 


Urine Urobilinogen  Negative mg/dL (0.2-1.0)   02/04/19  07:00    


 


Ur Leukocyte Esterase  Negative  (NEGATIVE)   02/04/19  07:00    


 


RPR Titer  Nonreactive  (NONREACTIVE)   02/03/19  07:50    








lab noted





- Treatment


Hospital Course: Detox Protocol Followed, Detoxed Safely, Responded well, 

Discharged Condition Good, Rehab Referral Accepted


Patient has Accepted a Rehab Referral to: as per counselor arrangement





- Medication


Discharge Medications: 


Ambulatory Orders





Aripiprazole [Abilify -] 5 mg PO DAILY 07/23/16 


Duloxetine HCl [Cymbalta -] 30 mg PO BID 07/23/16 


Aripiprazole [Abilify -] 5 mg PO BID #60 tablet 01/05/17 


Duloxetine HCl [Cymbalta -] 60 mg PO DAILY #30 capsule. 01/05/17 


Naloxone HCl [Narcan] 4 mg NS ASDIR PRN #1 spray 02/06/19 


cloNIDine HCL [Catapres -] 0.1 mg PO BID #30 tablet 02/06/19 











- Diagnosis


(1) Substance induced mood disorder


Status: Suspected   





(2) Alcohol dependence with uncomplicated withdrawal


Status: Acute   





(3) Opioid dependence with withdrawal


Status: Acute   





(4) Nicotine dependence


Status: Acute   


Qualifiers: 


   Nicotine product type: cigarettes   Substance use status: in withdrawal   

Qualified Code(s): F17.213 - Nicotine dependence, cigarettes, with withdrawal   





- AMA


Did Patient Leave Against Medical Advice: No

## 2019-10-28 NOTE — ED ADULT TRIAGE NOTE - AS TEMP SITE
Unna Boot Text: An Unna boot was placed to help immobilize the limb and facilitate more rapid healing. oral

## 2020-02-29 ENCOUNTER — EMERGENCY (EMERGENCY)
Facility: HOSPITAL | Age: 55
LOS: 1 days | Discharge: HOME | End: 2020-02-29
Attending: STUDENT IN AN ORGANIZED HEALTH CARE EDUCATION/TRAINING PROGRAM
Payer: MEDICAID

## 2020-02-29 VITALS
WEIGHT: 175.05 LBS | SYSTOLIC BLOOD PRESSURE: 120 MMHG | TEMPERATURE: 98 F | OXYGEN SATURATION: 97 % | RESPIRATION RATE: 16 BRPM | HEART RATE: 69 BPM | DIASTOLIC BLOOD PRESSURE: 60 MMHG | HEIGHT: 68 IN

## 2020-02-29 DIAGNOSIS — S83.512A SPRAIN OF ANTERIOR CRUCIATE LIGAMENT OF LEFT KNEE, INITIAL ENCOUNTER: Chronic | ICD-10-CM

## 2020-02-29 PROCEDURE — 73630 X-RAY EXAM OF FOOT: CPT

## 2020-02-29 PROCEDURE — 99284 EMERGENCY DEPT VISIT MOD MDM: CPT

## 2020-02-29 PROCEDURE — 90471 IMMUNIZATION ADMIN: CPT

## 2020-02-29 PROCEDURE — 90715 TDAP VACCINE 7 YRS/> IM: CPT

## 2020-02-29 PROCEDURE — 73630 X-RAY EXAM OF FOOT: CPT | Mod: 26,LT

## 2020-02-29 PROCEDURE — 99283 EMERGENCY DEPT VISIT LOW MDM: CPT | Mod: 25

## 2020-02-29 RX ORDER — CIPROFLOXACIN LACTATE 400MG/40ML
1 VIAL (ML) INTRAVENOUS
Qty: 10 | Refills: 0
Start: 2020-02-29 | End: 2020-03-04

## 2020-02-29 RX ORDER — CIPROFLOXACIN LACTATE 400MG/40ML
500 VIAL (ML) INTRAVENOUS ONCE
Refills: 0 | Status: COMPLETED | OUTPATIENT
Start: 2020-02-29 | End: 2020-02-29

## 2020-02-29 RX ORDER — TETANUS TOXOID, REDUCED DIPHTHERIA TOXOID AND ACELLULAR PERTUSSIS VACCINE, ADSORBED 5; 2.5; 8; 8; 2.5 [IU]/.5ML; [IU]/.5ML; UG/.5ML; UG/.5ML; UG/.5ML
0.5 SUSPENSION INTRAMUSCULAR ONCE
Refills: 0 | Status: COMPLETED | OUTPATIENT
Start: 2020-02-29 | End: 2020-02-29

## 2020-02-29 RX ADMIN — TETANUS TOXOID, REDUCED DIPHTHERIA TOXOID AND ACELLULAR PERTUSSIS VACCINE, ADSORBED 0.5 MILLILITER(S): 5; 2.5; 8; 8; 2.5 SUSPENSION INTRAMUSCULAR at 23:58

## 2020-02-29 RX ADMIN — Medication 500 MILLIGRAM(S): at 23:58

## 2020-02-29 NOTE — ED ADULT TRIAGE NOTE - CHIEF COMPLAINT QUOTE
Pt A&Ox4 states "I stepped on a anabel nail at work about 3 hours ago."  Patient has pain to left foot right in the middle of arch.

## 2020-02-29 NOTE — ED PROVIDER NOTE - ATTENDING CONTRIBUTION TO CARE
55 yo well appearing male presenting after acute puncture to foot after stepping on metal stick in his basement. I personally saw the patient with the PA, and completed the key components of the history and physical exam. I then discussed the management plan with the PA.

## 2020-02-29 NOTE — ED PROVIDER NOTE - PROGRESS NOTE DETAILS
area cleaned, local wound care performed. Xray negative for FB, stable for dc. Return precautions given

## 2020-02-29 NOTE — ED PROVIDER NOTE - NSFOLLOWUPINSTRUCTIONS_ED_ALL_ED_FT
- Follow up with your doctor within 2-3 days.   - Return to the ED for any new or worsening symptoms.   - Complete course of antibiotics as directed  - Keep area clean and dry  - Follow-up with Podiatry for further evaluation

## 2020-02-29 NOTE — ED PROVIDER NOTE - OBJECTIVE STATEMENT
53yo M pmhx anxiety, depression presents to ED c/o puncture wound to L foot. Pt states he was walking in his basement cleaning, wearing rubber soled slippers when he stepped on a metal object, punctured through slippers into plantar aspect of L foot. Pt removed object (has with him) and noted some rust on it so came to ED. Pt noting pain to area surrounding puncture site. No further complaints. Unsure of last tetanus, thinks more than 5 yrs ago. NKDA. Denies fever, chills, erythema, warmth, purulent drainage, numbness, tingling, difficulty ambulating.

## 2020-02-29 NOTE — ED PROVIDER NOTE - PHYSICAL EXAMINATION
Const: Awake, alert and oriented. In no acute distress. Well appearing.  Head: NC/AT  Cardiac: Regular rate and regular rhythm. +S1/S2. Peripheral pulses 2+ and symmetric.   Resp: Speaking in full sentences. No evidence of respiratory distress. No wheezes, rales or rhonchi.  MSK: No obvious deformity, FROM b/l LE. Minimal ttp over puncture site. Distal pulses 2+ b/l.   Skin: +Puncture wound to plantar aspect of mid L foot. No surrounding erythema, no warmth, no purulent drainage.   Neuro: Awake, alert & oriented x 3. Moves all extremities symmetrically. Sensorimotor intact x 4

## 2020-02-29 NOTE — ED PROVIDER NOTE - PATIENT PORTAL LINK FT
You can access the FollowMyHealth Patient Portal offered by [facility] by registering at the following website: [email or web address]. By joining [facility]’s FollowMyHealth portal, you will also be able to view your health information using other applications (apps) compatible with our system.

## 2020-02-29 NOTE — ED PROVIDER NOTE - CARE PROVIDER_API CALL
Gerry Roe (DPM)  Podiatric Medicine and Surgery  [address] [place] [address]  Phone: [phone]  Fax: [phone]  Follow Up Time:

## 2020-02-29 NOTE — ED PROVIDER NOTE - CLINICAL SUMMARY MEDICAL DECISION MAKING FREE TEXT BOX
55yo M presenting to ED after stepping on anabel metal object through rubber soled shoe. NO s/s cellulitis at this time. Will update tetanus, provide abx to prophylax against infection/pseudomonas, and check xray to eval FB. F/u podiatry, referral provided.

## 2020-06-05 ENCOUNTER — EMERGENCY (EMERGENCY)
Facility: HOSPITAL | Age: 55
LOS: 1 days | Discharge: OTHER INSTITUTION | End: 2020-06-05
Attending: EMERGENCY MEDICINE
Payer: COMMERCIAL

## 2020-06-05 VITALS
RESPIRATION RATE: 22 BRPM | OXYGEN SATURATION: 93 % | SYSTOLIC BLOOD PRESSURE: 130 MMHG | DIASTOLIC BLOOD PRESSURE: 56 MMHG | HEART RATE: 82 BPM | TEMPERATURE: 98 F | WEIGHT: 190.04 LBS | HEIGHT: 68 IN

## 2020-06-05 DIAGNOSIS — S83.512A SPRAIN OF ANTERIOR CRUCIATE LIGAMENT OF LEFT KNEE, INITIAL ENCOUNTER: Chronic | ICD-10-CM

## 2020-06-05 DIAGNOSIS — F19.10 OTHER PSYCHOACTIVE SUBSTANCE ABUSE, UNCOMPLICATED: ICD-10-CM

## 2020-06-05 DIAGNOSIS — F11.20 OPIOID DEPENDENCE, UNCOMPLICATED: ICD-10-CM

## 2020-06-05 DIAGNOSIS — F32.9 MAJOR DEPRESSIVE DISORDER, SINGLE EPISODE, UNSPECIFIED: ICD-10-CM

## 2020-06-05 LAB
ALBUMIN SERPL ELPH-MCNC: 3.6 G/DL — SIGNIFICANT CHANGE UP (ref 3.3–5.2)
ALP SERPL-CCNC: 96 U/L — SIGNIFICANT CHANGE UP (ref 40–120)
ALT FLD-CCNC: 21 U/L — SIGNIFICANT CHANGE UP
AMPHET UR-MCNC: NEGATIVE — SIGNIFICANT CHANGE UP
ANION GAP SERPL CALC-SCNC: 15 MMOL/L — SIGNIFICANT CHANGE UP (ref 5–17)
APAP SERPL-MCNC: <7.5 UG/ML — LOW (ref 10–26)
APPEARANCE UR: CLEAR — SIGNIFICANT CHANGE UP
AST SERPL-CCNC: 25 U/L — SIGNIFICANT CHANGE UP
BARBITURATES UR SCN-MCNC: NEGATIVE — SIGNIFICANT CHANGE UP
BASOPHILS # BLD AUTO: 0.04 K/UL — SIGNIFICANT CHANGE UP (ref 0–0.2)
BASOPHILS NFR BLD AUTO: 0.4 % — SIGNIFICANT CHANGE UP (ref 0–2)
BENZODIAZ UR-MCNC: POSITIVE
BILIRUB SERPL-MCNC: <0.2 MG/DL — LOW (ref 0.4–2)
BILIRUB UR-MCNC: NEGATIVE — SIGNIFICANT CHANGE UP
BUN SERPL-MCNC: 18 MG/DL — SIGNIFICANT CHANGE UP (ref 8–20)
CALCIUM SERPL-MCNC: 8.2 MG/DL — LOW (ref 8.6–10.2)
CHLORIDE SERPL-SCNC: 100 MMOL/L — SIGNIFICANT CHANGE UP (ref 98–107)
CO2 SERPL-SCNC: 25 MMOL/L — SIGNIFICANT CHANGE UP (ref 22–29)
COCAINE METAB.OTHER UR-MCNC: POSITIVE
COLOR SPEC: YELLOW — SIGNIFICANT CHANGE UP
CREAT SERPL-MCNC: 0.99 MG/DL — SIGNIFICANT CHANGE UP (ref 0.5–1.3)
DIFF PNL FLD: NEGATIVE — SIGNIFICANT CHANGE UP
EOSINOPHIL # BLD AUTO: 0.73 K/UL — HIGH (ref 0–0.5)
EOSINOPHIL NFR BLD AUTO: 7.6 % — HIGH (ref 0–6)
ETHANOL SERPL-MCNC: 88 MG/DL — SIGNIFICANT CHANGE UP
GLUCOSE SERPL-MCNC: 91 MG/DL — SIGNIFICANT CHANGE UP (ref 70–99)
GLUCOSE UR QL: NEGATIVE MG/DL — SIGNIFICANT CHANGE UP
HCT VFR BLD CALC: 34 % — LOW (ref 39–50)
HGB BLD-MCNC: 11.4 G/DL — LOW (ref 13–17)
IMM GRANULOCYTES NFR BLD AUTO: 0.4 % — SIGNIFICANT CHANGE UP (ref 0–1.5)
KETONES UR-MCNC: NEGATIVE — SIGNIFICANT CHANGE UP
LEUKOCYTE ESTERASE UR-ACNC: NEGATIVE — SIGNIFICANT CHANGE UP
LYMPHOCYTES # BLD AUTO: 2.22 K/UL — SIGNIFICANT CHANGE UP (ref 1–3.3)
LYMPHOCYTES # BLD AUTO: 23.1 % — SIGNIFICANT CHANGE UP (ref 13–44)
MCHC RBC-ENTMCNC: 31.1 PG — SIGNIFICANT CHANGE UP (ref 27–34)
MCHC RBC-ENTMCNC: 33.5 GM/DL — SIGNIFICANT CHANGE UP (ref 32–36)
MCV RBC AUTO: 92.9 FL — SIGNIFICANT CHANGE UP (ref 80–100)
METHADONE UR-MCNC: NEGATIVE — SIGNIFICANT CHANGE UP
MONOCYTES # BLD AUTO: 0.85 K/UL — SIGNIFICANT CHANGE UP (ref 0–0.9)
MONOCYTES NFR BLD AUTO: 8.8 % — SIGNIFICANT CHANGE UP (ref 2–14)
NEUTROPHILS # BLD AUTO: 5.74 K/UL — SIGNIFICANT CHANGE UP (ref 1.8–7.4)
NEUTROPHILS NFR BLD AUTO: 59.7 % — SIGNIFICANT CHANGE UP (ref 43–77)
NITRITE UR-MCNC: NEGATIVE — SIGNIFICANT CHANGE UP
OPIATES UR-MCNC: POSITIVE
PCP SPEC-MCNC: SIGNIFICANT CHANGE UP
PCP UR-MCNC: NEGATIVE — SIGNIFICANT CHANGE UP
PH UR: 6 — SIGNIFICANT CHANGE UP (ref 5–8)
PLATELET # BLD AUTO: 257 K/UL — SIGNIFICANT CHANGE UP (ref 150–400)
POTASSIUM SERPL-MCNC: 3.7 MMOL/L — SIGNIFICANT CHANGE UP (ref 3.5–5.3)
POTASSIUM SERPL-SCNC: 3.7 MMOL/L — SIGNIFICANT CHANGE UP (ref 3.5–5.3)
PROT SERPL-MCNC: 6.5 G/DL — LOW (ref 6.6–8.7)
PROT UR-MCNC: NEGATIVE MG/DL — SIGNIFICANT CHANGE UP
RBC # BLD: 3.66 M/UL — LOW (ref 4.2–5.8)
RBC # FLD: 13 % — SIGNIFICANT CHANGE UP (ref 10.3–14.5)
SALICYLATES SERPL-MCNC: 1.2 MG/DL — LOW (ref 10–20)
SODIUM SERPL-SCNC: 139 MMOL/L — SIGNIFICANT CHANGE UP (ref 135–145)
SP GR SPEC: 1.01 — SIGNIFICANT CHANGE UP (ref 1.01–1.02)
THC UR QL: NEGATIVE — SIGNIFICANT CHANGE UP
UROBILINOGEN FLD QL: NEGATIVE MG/DL — SIGNIFICANT CHANGE UP
WBC # BLD: 9.62 K/UL — SIGNIFICANT CHANGE UP (ref 3.8–10.5)
WBC # FLD AUTO: 9.62 K/UL — SIGNIFICANT CHANGE UP (ref 3.8–10.5)

## 2020-06-05 PROCEDURE — 99285 EMERGENCY DEPT VISIT HI MDM: CPT

## 2020-06-05 PROCEDURE — 93010 ELECTROCARDIOGRAM REPORT: CPT

## 2020-06-05 PROCEDURE — 72125 CT NECK SPINE W/O DYE: CPT | Mod: 26

## 2020-06-05 PROCEDURE — 71045 X-RAY EXAM CHEST 1 VIEW: CPT | Mod: 26

## 2020-06-05 PROCEDURE — 99284 EMERGENCY DEPT VISIT MOD MDM: CPT

## 2020-06-05 PROCEDURE — 70450 CT HEAD/BRAIN W/O DYE: CPT | Mod: 26

## 2020-06-05 RX ORDER — SODIUM CHLORIDE 9 MG/ML
1000 INJECTION INTRAMUSCULAR; INTRAVENOUS; SUBCUTANEOUS ONCE
Refills: 0 | Status: COMPLETED | OUTPATIENT
Start: 2020-06-05 | End: 2020-06-05

## 2020-06-05 RX ORDER — BUPRENORPHINE AND NALOXONE 2; .5 MG/1; MG/1
1 TABLET SUBLINGUAL ONCE
Refills: 0 | Status: DISCONTINUED | OUTPATIENT
Start: 2020-06-05 | End: 2020-06-05

## 2020-06-05 RX ORDER — FOLIC ACID 0.8 MG
1 TABLET ORAL DAILY
Refills: 0 | Status: DISCONTINUED | OUTPATIENT
Start: 2020-06-05 | End: 2020-06-10

## 2020-06-05 RX ORDER — OLANZAPINE 15 MG/1
10 TABLET, FILM COATED ORAL DAILY
Refills: 0 | Status: DISCONTINUED | OUTPATIENT
Start: 2020-06-05 | End: 2020-06-10

## 2020-06-05 RX ORDER — AZITHROMYCIN 500 MG/1
500 TABLET, FILM COATED ORAL ONCE
Refills: 0 | Status: COMPLETED | OUTPATIENT
Start: 2020-06-05 | End: 2020-06-05

## 2020-06-05 RX ORDER — THIAMINE MONONITRATE (VIT B1) 100 MG
100 TABLET ORAL ONCE
Refills: 0 | Status: COMPLETED | OUTPATIENT
Start: 2020-06-05 | End: 2020-06-05

## 2020-06-05 RX ADMIN — SODIUM CHLORIDE 1000 MILLILITER(S): 9 INJECTION INTRAMUSCULAR; INTRAVENOUS; SUBCUTANEOUS at 12:26

## 2020-06-05 RX ADMIN — AZITHROMYCIN 500 MILLIGRAM(S): 500 TABLET, FILM COATED ORAL at 14:27

## 2020-06-05 RX ADMIN — Medication 100 MILLIGRAM(S): at 11:52

## 2020-06-05 RX ADMIN — SODIUM CHLORIDE 1000 MILLILITER(S): 9 INJECTION INTRAMUSCULAR; INTRAVENOUS; SUBCUTANEOUS at 11:26

## 2020-06-05 RX ADMIN — Medication 1 MILLIGRAM(S): at 11:52

## 2020-06-05 RX ADMIN — AZITHROMYCIN 255 MILLIGRAM(S): 500 TABLET, FILM COATED ORAL at 13:27

## 2020-06-05 NOTE — ED BEHAVIORAL HEALTH ASSESSMENT NOTE - SUICIDE RISK FACTORS
Hopelessness or despair/Alcohol/Substance abuse disorders/History of abuse/trauma/Conduct problems current/past/Mood Disorder current/past/PTSD current/past/Current mood episode/Family History of psychiatric diagnoses requiring hospitalization

## 2020-06-05 NOTE — ED ADULT NURSE NOTE - NSIMPLEMENTINTERV_GEN_ALL_ED
Implemented All Fall Risk Interventions:  [place] to call system. Call bell, personal items and telephone within reach. Instruct patient to call for assistance. Room bathroom lighting operational. Non-slip footwear when patient is off stretcher. Physically safe environment: no spills, clutter or unnecessary equipment. Stretcher in lowest position, wheels locked, appropriate side rails in place. Provide visual cue, wrist band, yellow gown, etc. Monitor gait and stability. Monitor for mental status changes and reorient to person, place, and time. Review medications for side effects contributing to fall risk. Reinforce activity limits and safety measures with patient and family.

## 2020-06-05 NOTE — ED BEHAVIORAL HEALTH ASSESSMENT NOTE - RISK ASSESSMENT
Currently experiencing suicidal ideations  Past attempts at suicide with last attempt 3 weeks ago. Moderate Acute Suicide Risk

## 2020-06-05 NOTE — ED PROVIDER NOTE - ATTENDING CONTRIBUTION TO CARE
David: I performed a face to face bedside interview with patient regarding history of present illness, review of symptoms and past medical history. I completed an independent physical exam.  I have discussed patient's plan of care with advanced care provider.   I agree with note as stated above including HISTORY OF PRESENT ILLNESS, HIV, PAST MEDICAL/SURGICAL/FAMILY/SOCIAL HISTORY, ALLERGIES AND HOME MEDICATIONS, REVIEW OF SYSTEMS, PHYSICAL EXAM, MEDICAL DECISION MAKING and any PROGRESS NOTES during the time I functioned as the attending physician for this patient  unless otherwise noted. My brief assessment is as follows: pt with 1 week substance abuse binge,  found outside, ?+fall with loc. with some SI, plans to jump in front of car per pt. denies significant physicalc omplaints. slightly slurring words, ncat, no midline neck ttp, ctab nl rate/effort, rrr, abd benign, no gross deformity. 1:1, labs, ct head/cspine, medical clearance and then psych eval.

## 2020-06-05 NOTE — ED BEHAVIORAL HEALTH ASSESSMENT NOTE - SUMMARY
53 YO M w/ PMH of PTSD, anxiety and depression c/o hopelessness and thoughts of suicide. Pt admits to using cocaine, alcohol, heroin and benzodiazepines. Pt is presently homeless under influence of multiple substances and hoping to reside in [facility] soon. Reports that his medications have been stolen and is looking for a refill. Pt believes that his medication will help him to feel better right now. Will medicate and  monitor for response

## 2020-06-05 NOTE — ED BEHAVIORAL HEALTH ASSESSMENT NOTE - DESCRIPTION
Pt BIB EMS and told providers that he wants to jump in front of a car. Labs performed and found positive cocaine, benzodiazepines, and opiates. Physical exam was normal. Pt given IVF, thiamine and folic acid. Psych consult requested.  Vital Signs Last 24 Hrs  T(C): 36.7 (05 Jun 2020 10:55), Max: 36.7 (05 Jun 2020 10:55)  T(F): 98 (05 Jun 2020 10:55), Max: 98 (05 Jun 2020 10:55)  HR: 82 (05 Jun 2020 10:55) (82 - 82)  BP: 130/56 (05 Jun 2020 10:55) (130/56 - 130/56)  RR: 22 (05 Jun 2020 10:55) (22 - 22)  SpO2: 93% (05 Jun 2020 10:55) (93% - 93%) Diverticulitis Single, unemployed, currently homeless and hoping to be living at [facility] soon.

## 2020-06-05 NOTE — ED PROVIDER NOTE - OBJECTIVE STATEMENT
55 y/o male with PMHx substance abuse, PTSD, Bipolar, Anxiety, Depression (Zyprexa) BIBA after being found on the side of the road with + LOC. Patient unable to recall event and admits to being on a binge for the past week using crack, cocaine, heroin, K2, and drinking 1 quart of Vodka per day with the last drink 2 hours PTA. He admits to wanted to kill himself and has a plan stating "he will jump in front of a bus if he gets out of here". He was scheduled to [facility] [place]. His coordinator is Erika. He had completed an intake for [facility] yesterday. Denies any HI, HA, pain, chest pain, sob, n/v/d, fevers, hallucinations, changes in hearing, pruritis, rhinorrhea, tremors, or seizures in the past from ETOH withdrawal.

## 2020-06-05 NOTE — ED BEHAVIORAL HEALTH ASSESSMENT NOTE - DETAILS
Currently experiencing suicidal ideations. Threatened to throw himself in front of a bus. H/o suicidal attempts via OD in the past. Mother and father with anxiety and depression. 4 sisters with alcohol abuse, 1 sister passed from heroin overdose. Mother and father alcohol abuse trauma from 30 years in [employer] self NP aware

## 2020-06-05 NOTE — ED BEHAVIORAL HEALTH ASSESSMENT NOTE - ACTIVATING EVENTS/STRESSORS
Substance intoxication or withdrawal/Inadequate social supports/Non-compliant or not receiving treatment

## 2020-06-05 NOTE — ED PROVIDER NOTE - CLINICAL SUMMARY MEDICAL DECISION MAKING FREE TEXT BOX
53 y/o male with sig PMHx for bipolar, PTSD, depression, anxiety, substance abuse BIBA found outside on ground with +LOC on a 1 week binge of ETOH and drug use with SI. Will obtain CT head, C-Spine, U-tox, UA, cbc, cmp, cxray, ekg, and obtain  consult.

## 2020-06-05 NOTE — ED ADULT TRIAGE NOTE - CHIEF COMPLAINT QUOTE
found on street c/o drinking and taking heroin cocaine and crack since yesterday. pt states " Im going to kill myself by taking as many drugs as I can get". pt c/o LL back pain. respirations even unlabored. MD called to bedside

## 2020-06-05 NOTE — ED BEHAVIORAL HEALTH ASSESSMENT NOTE - DESCRIPTION (FIRST USE, LAST USE, QUANTITY, FREQUENCY, DURATION)
Last use today. Has been drinking about a pint of vodka every day this week Last use today. "$100 worth" Last use today. "a couple of bags" Believes it could have been in the cocaine

## 2020-06-05 NOTE — ED BEHAVIORAL HEALTH ASSESSMENT NOTE - OTHER PAST PSYCHIATRIC HISTORY (INCLUDE DETAILS REGARDING ONSET, COURSE OF ILLNESS, INPATIENT/OUTPATIENT TREATMENT)
Depression, anxiety and PTSD  Rx for Suboxone and Zyprexa  Admissions to [facility] addiction treatment center. Last admitted for 6 mos in 2017  [facility]  H/o suicidal attempts via overdose

## 2020-06-05 NOTE — ED BEHAVIORAL HEALTH ASSESSMENT NOTE - HPI (INCLUDE ILLNESS QUALITY, SEVERITY, DURATION, TIMING, CONTEXT, MODIFYING FACTORS, ASSOCIATED SIGNS AND SYMPTOMS)
Patient is a 55 YO M BIB EMS after being found on the side of the road. He is c/o feeling hopeless, depressed and suicidal. Patient states that he is ready to give up and end it all because he is "going nowhere in life". Pt admits to drinking half a pint of vodka a day for the past week, using a couple bags of heroin via inhalation today and smoking crack today. Pt believes that benzos could have been in the cocaine. He was previously living in [facility] sober house but recently left and is looking to move into the [facility]. Patient has been on [facility]one treatment [facility], but reports that his medication has been stolen. He is looking to receive his medication since he has not taken it today and typically feels a lot better on it. Pt denies any homicidal ideations or psychotic symptoms.  I-STOP query indicates he filled Rx for Suboxone on 6/2/2020 #[id number]

## 2020-06-05 NOTE — ED ADULT NURSE NOTE - OBJECTIVE STATEMENT
Patient with suicidal thoughts, multiple substance abuse, intox.  Patient changed into yellow gown, belongings out of reach. Patient on 1:1 observation.  Patient is alert and oriented with slurred speech.  C/o left lower back pain related to falling.

## 2020-06-06 VITALS
HEART RATE: 72 BPM | TEMPERATURE: 98 F | SYSTOLIC BLOOD PRESSURE: 152 MMHG | RESPIRATION RATE: 20 BRPM | DIASTOLIC BLOOD PRESSURE: 78 MMHG | OXYGEN SATURATION: 97 %

## 2020-06-06 LAB — SARS-COV-2 RNA SPEC QL NAA+PROBE: SIGNIFICANT CHANGE UP

## 2020-06-06 PROCEDURE — 99213 OFFICE O/P EST LOW 20 MIN: CPT

## 2020-06-06 PROCEDURE — 96361 HYDRATE IV INFUSION ADD-ON: CPT

## 2020-06-06 PROCEDURE — 85027 COMPLETE CBC AUTOMATED: CPT

## 2020-06-06 PROCEDURE — U0003: CPT

## 2020-06-06 PROCEDURE — 70450 CT HEAD/BRAIN W/O DYE: CPT

## 2020-06-06 PROCEDURE — 80053 COMPREHEN METABOLIC PANEL: CPT

## 2020-06-06 PROCEDURE — 99285 EMERGENCY DEPT VISIT HI MDM: CPT | Mod: 25

## 2020-06-06 PROCEDURE — 80307 DRUG TEST PRSMV CHEM ANLYZR: CPT

## 2020-06-06 PROCEDURE — 96365 THER/PROPH/DIAG IV INF INIT: CPT

## 2020-06-06 PROCEDURE — 36415 COLL VENOUS BLD VENIPUNCTURE: CPT

## 2020-06-06 PROCEDURE — 72125 CT NECK SPINE W/O DYE: CPT

## 2020-06-06 PROCEDURE — 71045 X-RAY EXAM CHEST 1 VIEW: CPT

## 2020-06-06 PROCEDURE — 81003 URINALYSIS AUTO W/O SCOPE: CPT

## 2020-06-06 PROCEDURE — 93005 ELECTROCARDIOGRAM TRACING: CPT

## 2020-06-06 RX ORDER — AZITHROMYCIN 500 MG/1
250 TABLET, FILM COATED ORAL ONCE
Refills: 0 | Status: COMPLETED | OUTPATIENT
Start: 2020-06-06 | End: 2020-06-06

## 2020-06-06 RX ADMIN — AZITHROMYCIN 250 MILLIGRAM(S): 500 TABLET, FILM COATED ORAL at 07:46

## 2020-06-06 RX ADMIN — Medication 1 MILLIGRAM(S): at 07:46

## 2020-06-06 RX ADMIN — OLANZAPINE 10 MILLIGRAM(S): 15 TABLET, FILM COATED ORAL at 07:46

## 2020-06-06 NOTE — ED ADULT NURSE REASSESSMENT NOTE - GENERAL PATIENT STATE
resting/sleeping
resting/sleeping
comfortable appearance/cooperative
resting/sleeping/comfortable appearance

## 2020-06-06 NOTE — CHART NOTE - NSCHARTNOTEFT_GEN_A_CORE
As per MD, patient is medically stable for discharge. As per Dr. Carey, patient is to attend inpatient psychiatry on 9.27 status. [facility] placed call to [place] and spoke with employee, Real. Real reported there is bed availability. Real requested [facility] fax BH assessment, labs, and meds to be reviewed by MD. [facility] completed fax and continues to follow for safe and appropriate discharge.

## 2020-06-06 NOTE — ED BEHAVIORAL HEALTH NOTE - BEHAVIORAL HEALTH NOTE
PROGRESS NOTE: 20 @ 08:51  	  • Reason for Ongoing Consultation: 	    ID: [id number] Male with HEALTH ISSUES - PROBLEM Dx:  Opioid use disorder, severe, on maintenance therapy  Polysubstance abuse  Depression with suicidal ideation      INTERVAL DATA:   • Interval Chief Complaint: things haven't been good   • Interval History:   Patient is a 53 YO M BIB EMS after being found on the side of the road. He is c/o feeling hopeless, depressed and suicidal  Patient seen and re evaluated today and found to be calm and cooperative. Patient states he has not been doing well elaborating by saying he is sick of life and not working, not having money and has been having thoughts to cut his wrist or jump in front of a truck. Patient states he is not sure if he could do it sober but knows if he has a few drinks, he could do it. Patient denies any homicidal ideation as well as any A/ V hallucinations.       REVIEW OF SYSTEMS:   • Constitutional Symptoms	No complaints  • Eyes	No complaints  • Ears / Nose / Throat / Mouth	No complaints  • Cardiovascular	No complaints  • Respiratory	No complaints  • Gastrointestinal	No complaints  • Genitourinary	No complaints  • Musculoskeletal	No complaints  • Skin	No complaints  • Neurological	No complaints  • Psychiatric (see HPI)	See HPI  • Endocrine	No complaints  • Hematologic / Lymphatic	No complaints  • Allergic / Immunologic	No complaints    REVIEW OF VITALS/LABS/IMAGING/INVESTIGATIONS:   • Vital signs reviewed: Yes  • Vital Signs:	    T(C): 36.5 (20 @ 07:31), Max: 36.9 (20 @ 17:27)  HR: 78 (20 @ 07:31) (71 - 82)  BP: 166/104 (20 @ 07:31) (112/76 - 166/104)  RR: 20 (20 @ 07:31) (17 - 22)  SpO2: 96% (20 @ 07:31) (92% - 98%)    • Available labs reviewed: Yes  • Available Lab Results:                           11.4   9.62  )-----------( 257      ( 2020 11:29 )             34.0     -    139  |  100  |  18.0  ----------------------------<  91  3.7   |  25.0  |  0.99    Ca    8.2<L>      2020 11:29    TPro  6.5<L>  /  Alb  3.6  /  TBili  <0.2<L>  /  DBili  x   /  AST  25  /  ALT  21  /  AlkPhos  96  06-05    LIVER FUNCTIONS - ( 2020 11:29 )  Alb: 3.6 g/dL / Pro: 6.5 g/dL / ALK PHOS: 96 U/L / ALT: 21 U/L / AST: 25 U/L / GGT: x             Urinalysis Basic - ( 2020 12:08 )    Color: Yellow / Appearance: Clear / S.010 / pH: x  Gluc: x / Ketone: Negative  / Bili: Negative / Urobili: Negative mg/dL   Blood: x / Protein: Negative mg/dL / Nitrite: Negative   Leuk Esterase: Negative / RBC: x / WBC x   Sq Epi: x / Non Sq Epi: x / Bacteria: x          MEDICATIONS:      PRN Medications:  • PRN Medications since last evaluation	  • PRN Details	    Current Medications:   folic acid 1 milliGRAM(s) Oral daily  OLANZapine 10 milliGRAM(s) Oral daily     Medication Side Effects:  • Medication Side Effects or Adverse Reactions (new or ongoing)	None known    MENTAL STATUS EXAM:   • Level of Consciousness	Alert  • General Appearance	Well developed  • Body Habitus	Well nourished  • Hygiene	poor  • Grooming	poor   • Behavior	Cooperative  • Eye Contact	poor  • Relatedness	Good  • Impulse Control	Questionable   • Muscle Tone / Strength	Normal muscle tone/strength  • Abnormal Movements	No abnormal movements  • Gait / Station	Normal gait / station  • Speech Volume	low  • Speech Rate	Normal  • Speech Spontaneity	Normal  • Speech Articulation	Normal  • Mood	Depressed  • Affect Quality	Constricted   • Affect Range	Full  • Affect Congruence	Congruent  • Thought Process	Linear  • Thought Associations	Normal  • Thought Content	Unremarkable  • Perceptions	No abnormalities  • Oriented to Time	Yes  • Oriented to Place	Yes  • Oriented to Situation	Yes  • Oriented to Person	Yes  • Attention / Concentration	Normal  • Estimated Intelligence	Average  • Recent Memory	Normal  • Remote Memory	Normal  • Fund of Knowledge	Normal  • Language	No abnormalities noted  • Judgment (regarding everyday events)	Fair  • Insight (regarding psychiatric illness)	Fair    SUICIDALITY:   • Suicidality (Interval)	Suicidal thoughts to cut self or jump in front of a taqueria,     HOMICIDALITY/AGGRESSION:   • Homicidality/Aggression	none known    DIAGNOSIS DSM-V:    Psychiatric Diagnosis (Corresponds to DSM-IV Axis I, II):   HEALTH ISSUES - PROBLEM Dx:  Opioid use disorder, severe, on maintenance therapy  Polysubstance abuse  Depression with suicidal ideation           Medical Diagnosis (Corresponds to DSM-IV Axis III):  • Axis III	      ASSESSMENT OF CURRENT CONDITION:   Summary (include case differential, formulation and patient response to therapy): Patient is a 53 YO M previously living in a sober house with a history of depression as well as alcohol, crack/cocaine and opioid use disorder who is currently on Suboxone was BIB EMS after being found on the side of the road. He is c/o feeling hopeless, depressed and suicidal. Patient has been evaluated and currently presents with depressed mood, with feelings of hopelessness and helplessness expressing suicidal ideations with thought to jump in front of a truck . Patient currently is a danger to self and will be admitted to Inpatient psychiatry.     Risk Assessment (consider static vs modifiable risk factors and protective factors; comment on level of risk for dangerous behavior):   Moderate risk     PLAN'  Admit to inpatient psych  Monitor for opioid withdrawal, previously on Suboxone, Istop noted

## 2020-06-06 NOTE — ED ADULT NURSE REASSESSMENT NOTE - NS ED NURSE REASSESS COMMENT FT1
pt changed into blue gown red socks, belongings secured to 
received report received pt sleeping soundly in room.  prior rn held med zypTapMe/sub.  sleeping soundly will monitor.  awaiting covid test.
report given to on coming rn
report given to on coming rn
sleeping through this time
continues to sleep through this time.
Assumed care of patient at 0715.  Patient resting in bed asleep with no distress.  Safety of patient maintained.
Assumed care of patient ate 100% of his breakfast and lunch.  Patient intermittently sleeping in bed with no distress.  Safety of patient maintained.

## 2020-06-06 NOTE — CHART NOTE - NSCHARTNOTEFT_GEN_A_CORE
As per MD, patient is medically stable. As per Dr. Delgado, patient is to attend inpatient psychiatric facility on 9.27 legal status. Dr. Young, Dr. Delgado, and Assistant Director of Nursing, Natali completed patient's 9.27 legals. Dr. Delgado completed patient's Certificate of Transfer form. TEMI spoke with Real from [facility] whom requested Dr. Young provide clinical for MD at [facility]. Dr Alejandra spokr with Dr. Cedeño at [facility] to provide clinical. TEMI then spoke with Real from [facility] whom reported patient is accepted and can come to the facility. Real reported [facility] will follow up to obtain authorization (patient has [facility] Medicaid). TEMI the requested ambulance transportation with [facility] and spoke with sidney Ventura. TASHIA Rao provided clinical over the phone. Patient's ambulance arranged to take patient to [facility] today 6/6/2020. TEMI completed transfer packet with  assessment, 9.27 legals, certificate of transfer form, facesheet, and EKG.

## 2021-10-18 NOTE — CONSULT NOTE ADULT - PROBLEM SELECTOR PROBLEM 3

## 2022-03-07 ENCOUNTER — EMERGENCY (EMERGENCY)
Facility: HOSPITAL | Age: 57
LOS: 1 days | Discharge: HOME | End: 2022-03-07
Attending: STUDENT IN AN ORGANIZED HEALTH CARE EDUCATION/TRAINING PROGRAM
Payer: COMMERCIAL

## 2022-03-07 VITALS
WEIGHT: 220.9 LBS | HEART RATE: 98 BPM | TEMPERATURE: 98 F | OXYGEN SATURATION: 99 % | SYSTOLIC BLOOD PRESSURE: 175 MMHG | RESPIRATION RATE: 17 BRPM | DIASTOLIC BLOOD PRESSURE: 85 MMHG | HEIGHT: 68 IN

## 2022-03-07 DIAGNOSIS — S83.512A SPRAIN OF ANTERIOR CRUCIATE LIGAMENT OF LEFT KNEE, INITIAL ENCOUNTER: Chronic | ICD-10-CM

## 2022-03-07 PROCEDURE — 99283 EMERGENCY DEPT VISIT LOW MDM: CPT | Mod: 25

## 2022-03-07 PROCEDURE — 96372 THER/PROPH/DIAG INJ SC/IM: CPT

## 2022-03-07 PROCEDURE — 99284 EMERGENCY DEPT VISIT MOD MDM: CPT

## 2022-03-07 RX ORDER — KETOROLAC TROMETHAMINE 30 MG/ML
30 SYRINGE (ML) INJECTION ONCE
Refills: 0 | Status: DISCONTINUED | OUTPATIENT
Start: 2022-03-07 | End: 2022-03-07

## 2022-03-07 RX ORDER — ACETAMINOPHEN 500 MG
975 TABLET ORAL ONCE
Refills: 0 | Status: COMPLETED | OUTPATIENT
Start: 2022-03-07 | End: 2022-03-07

## 2022-03-07 RX ADMIN — Medication 30 MILLIGRAM(S): at 20:23

## 2022-03-07 RX ADMIN — Medication 975 MILLIGRAM(S): at 20:17

## 2022-03-07 NOTE — ED PROVIDER NOTE - PHYSICAL EXAMINATION
Gen: Nontoxic, well appearing, in NAD.  Skin: Warm and dry as visualized.  Head: NC/AT.  Eyes: PERRLA. EOMI.  Neck: Supple, FROM. Trachea midline.   Resp: No distress.  Cardio: Well perfused.  Ext: No deformities. MAEx4. Ambulates with limp.   Neuro: A&Ox3. Appears nonfocal.

## 2022-03-07 NOTE — ED PROVIDER NOTE - CLINICAL SUMMARY MEDICAL DECISION MAKING FREE TEXT BOX
57 yo male PMHx alcohol abuse, opoid abuse, anxiety/depression presents to ED c/o right knee pain in setting of known ligamentous injury. Scheduled for surgery next week. Advised to continue OTC medications and outpatient follow up. Patient to be discharged. Patient provided verbal/written discharge instructions and return precautions. Patient expresses understanding and agreement.

## 2022-03-07 NOTE — ED PROVIDER NOTE - NSFOLLOWUPINSTRUCTIONS_ED_ALL_ED_FT
- Ibuprofen 600mg every 6 hours as needed for pain.  - Acetaminophen 650mg every 6 hours as needed for pain.   - Please bring all documentation from your ED visit to any related future follow up appointment.  - Please call to schedule follow up appointment with your primary care physician within 24-48 hours.  - Please seek immediate medical attention or return to the ED for any new/worsening, signs/symptoms, or concerns.    Feel better!       Knee Pain    WHAT YOU NEED TO KNOW:    What do I need to know about knee pain? Knee pain may start suddenly, or it may be a long-term problem. You may have pain on the side, front, or back of your knee. You may have knee stiffness and swelling. You may hear popping sounds or feel like your knee is giving way or locking up as you walk. You may feel pain when you sit, stand, walk, or climb up and down stairs.    What increases my risk for knee pain?   •Obesity      •A strain or tear in ligaments or tendons      •A leg fracture or knee dislocation      •Overuse of your knee      •Osteoarthritis, rheumatoid arthritis, or gout      •An infection, tumor, or cyst in your knee      •Shoes that are not supportive, or training on a hard surface      •Sports that involve jumping or pivoting on your knee      How is the cause of knee pain diagnosed? Your healthcare provider will examine your knee and ask about your symptoms. Tell your provider when the pain started and what you were doing at the time. Describe the pain, such as sharp, throbbing, or achy. Tell your provider about any knee injury or surgery you had. You may need any of the following:   •MRI, CT, or ultrasound pictures may show an injury, fracture, or tumor.       •Blood tests may be used to check the level of inflammation in your blood. The tests may also show signs of infection.      •Arthroscopy is a procedure to look inside your knee joint with an arthroscope. An arthroscope is a flexible tube with a light and camera on the end. A knee arthroscopy is usually done to check for disease or damage inside your knee. These problems may be fixed during the procedure.      How is knee pain treated? Treatment will depend on the cause of your pain. You may need any of the following:   •NSAIDs help decrease swelling and pain or fever. This medicine is available with or without a doctor's order. NSAIDs can cause stomach bleeding or kidney problems in certain people. If you take blood thinner medicine, always ask your healthcare provider if NSAIDs are safe for you. Always read the medicine label and follow directions.      •Acetaminophen decreases pain and fever. It is available without a doctor's order. Ask how much to take and how often to take it. Follow directions. Read the labels of all other medicines you are using to see if they also contain acetaminophen, or ask your doctor or pharmacist. Acetaminophen can cause liver damage if not taken correctly. Do not use more than 4 grams (4,000 milligrams) total of acetaminophen in one day.       •Prescription pain medicine may be given. Ask your healthcare provider how to take this medicine safely. Some prescription pain medicines contain acetaminophen. Do not take other medicines that contain acetaminophen without talking to your healthcare provider. Too much acetaminophen may cause liver damage. Prescription pain medicine may cause constipation. Ask your healthcare provider how to prevent or treat constipation.       •Steroid injections may be given into your knee. Steroids reduce inflammation and pain.      •Surgery may be used for some injuries, such as to repair a torn ACL.      What can I do to manage my symptoms?   •Rest your knee so it can heal. Limit activities that increase your pain. Do low-impact exercises, such as walking or swimming.       •Apply ice to help reduce swelling and pain. Use an ice pack, or put crushed ice in a plastic bag. Cover it with a towel before you apply it to your knee. Apply ice for 15 to 20 minutes every hour, or as directed.      •Apply compression to help reduce swelling. Use a brace or bandage only as directed.      •Elevate your knee to help decrease pain and swelling. Elevate your knee while you are sitting or lying down. Prop your leg on pillows to keep your knee above the level of your heart.      •Prevent your knee from moving as directed. Your healthcare provider may put on a cast or splint. You may need to wear a leg brace to stabilize your knee. A leg brace can be adjusted to increase your range of motion as your knee heals.  Hinged Knee Braces            What can I do to prevent knee pain?   •Maintain a healthy weight. Extra weight increases your risk for knee pain. Ask your healthcare provider how much you should weigh. He or she can help you create a safe weight loss plan if you need to lose weight.      •Exercise or train properly. Use the correct equipment for sports. Wear shoes that provide good support. Check your posture often as you exercise, play sports, or train for an event. This can help prevent stress and strain on your knees. Rest between sessions so you do not overwork your knees.      When should I seek immediate care?   •Your pain is worse, even after treatment.       •You cannot bend or straighten your leg completely.       •The swelling around your knee does not go down even with treatment.      •Your knee is painful and hot to the touch.       When should I contact my healthcare provider?   •You have questions or concerns about your condition or care.           CARE AGREEMENT:    You have the right to help plan your care. Learn about your health condition and how it may be treated. Discuss treatment options with your healthcare providers to decide what care you want to receive. You always have the right to refuse treatment.

## 2022-03-07 NOTE — ED PROVIDER NOTE - OBJECTIVE STATEMENT
57 yo male PMHx alcohol abuse, opoid abuse, anxiety/depression presents to ED c/o right knee pain in setting of known ligamentous injury. Scheduled for knee replacement Wednesday with Dr. Harrison. Self medicated with OTC medications without relief; lasted taken yesterday. No further complaints at this time.

## 2022-03-07 NOTE — ED ADULT NURSE REASSESSMENT NOTE - NS ED NURSE REASSESS COMMENT FT1
Patient discharged by provider prior to RN assessment. No sings of acute distress noted, respirations even and unlabored. Refer to provider notes.

## 2022-03-07 NOTE — ED PROVIDER NOTE - ATTENDING CONTRIBUTION TO CARE
57 yo male PMHx alcohol abuse, opoid abuse, anxiety/depression presents to ED c/o right knee pain in setting of known ligamentous injury. Scheduled for knee replacement Wednesday with Dr. Harrison. Self medicated with OTC medications without relief.  Ap - well appearing, ambulating with cane. supportive care, need to follow with ortho outpt

## 2022-03-07 NOTE — ED PROVIDER NOTE - NSICDXPASTMEDICALHX_GEN_ALL_CORE_FT
PAST MEDICAL HISTORY:  Alcohol abuse     Anxiety     Depression     Overdose heroine and xanax 3/10/2015

## 2022-04-21 NOTE — ED PROVIDER NOTE - NS ED ATTENDING STATEMENT MOD
Spoke to Inocencio Irvin at [place] [address] regarding the PA for the pt Jhonathan De Paz stated that the pt has to get the TB test done so that the PA can be completed and the pt has been made aware that the TB test is needed, I verbally acknowledged understanding I have personally seen and examined this patient.  I have fully participated in the care of this patient. I have reviewed all pertinent clinical information, including history, physical exam, plan and the Resident’s note and agree except as noted.

## 2022-05-10 ENCOUNTER — INPATIENT (INPATIENT)
Facility: HOSPITAL | Age: 57
LOS: 2 days | Discharge: HOME | DRG: 488 | End: 2022-05-13
Attending: ORTHOPAEDIC SURGERY | Admitting: ORTHOPAEDIC SURGERY
Payer: COMMERCIAL

## 2022-05-10 VITALS
RESPIRATION RATE: 18 BRPM | HEART RATE: 63 BPM | DIASTOLIC BLOOD PRESSURE: 62 MMHG | TEMPERATURE: 98 F | OXYGEN SATURATION: 95 % | WEIGHT: 300.05 LBS | HEIGHT: 68 IN | SYSTOLIC BLOOD PRESSURE: 101 MMHG

## 2022-05-10 DIAGNOSIS — S83.512A SPRAIN OF ANTERIOR CRUCIATE LIGAMENT OF LEFT KNEE, INITIAL ENCOUNTER: Chronic | ICD-10-CM

## 2022-05-10 PROCEDURE — 99285 EMERGENCY DEPT VISIT HI MDM: CPT

## 2022-05-10 PROCEDURE — 73564 X-RAY EXAM KNEE 4 OR MORE: CPT | Mod: 26,RT

## 2022-05-10 PROCEDURE — 73590 X-RAY EXAM OF LOWER LEG: CPT | Mod: 26,RT

## 2022-05-10 PROCEDURE — 73552 X-RAY EXAM OF FEMUR 2/>: CPT | Mod: 26,RT

## 2022-05-10 NOTE — ED PROVIDER NOTE - OBJECTIVE STATEMENT
57 y/o male wit PMHx of Alcohol abuse (clean x2 years), Anxiety, and Depression s/p right knee replacement presents to ED c/o right knee pain. Patient reports he was walking down the steps in his backyard and slipped, twisting his right knee.     Denies hitting head, LOC,

## 2022-05-10 NOTE — ED ADULT TRIAGE NOTE - CHIEF COMPLAINT QUOTE
Patient BIBEMS s/p trip and fall today. Hx of knee replacement on 3/15/21. Patient took morphine and Subaxone prior to arrival.

## 2022-05-10 NOTE — ED PROVIDER NOTE - PROGRESS NOTE DETAILS
Anjel CYR: seen by ortho, placed in knee immobilizer. Patient unable to tolerate crutches. Will place in CDU for PT eval. possible surgery thurs or friday. Ortho to re-eval in AM.

## 2022-05-10 NOTE — ED ADULT NURSE NOTE - CHPI ED NUR SYMPTOMS POS
The DP pulses are 2+ bilaterally. The PT pulses are 2+ bilaterally. The radial pulses are 2+ bilaterally.  DIFFICULTY BEARING WEIGHT/JOINT SWELLING/PAIN/TENDERNESS

## 2022-05-10 NOTE — ED ADULT NURSE NOTE - OBJECTIVE STATEMENT
Patient reports he was walking down the steps in his backyard and slipped, twisting his right knee.   Total R knee replacement 03/15/22

## 2022-05-10 NOTE — ED ADULT NURSE NOTE - NSIMPLEMENTINTERV_GEN_ALL_ED
Implemented All Fall Risk Interventions:  [place] to call system. Call bell, personal items and telephone within reach. Instruct patient to call for assistance. Room bathroom lighting operational. Non-slip footwear when patient is off stretcher. Physically safe environment: no spills, clutter or unnecessary equipment. Stretcher in lowest position, wheels locked, appropriate side rails in place. Provide visual cue, wrist band, yellow gown, etc. Monitor gait and stability. Monitor for mental status changes and reorient to person, place, and time. Review medications for side effects contributing to fall risk. Reinforce activity limits and safety measures with patient and family.

## 2022-05-10 NOTE — ED PROVIDER NOTE - PHYSICAL EXAMINATION
Gen: Well appearing in NAD  Head: NC/AT  Neck: trachea midline  Cardiac: RRR  Resp:  No distress; CTAB  Ext: Pelvis stable, no deformities; (+) edema to right knee, TTP @ anterior right knee with TTP  Neuro:  A&O appears non focal  Skin:  Warm and dry as visualized  Psych:  Normal affect and mood

## 2022-05-10 NOTE — ED ADULT TRIAGE NOTE - AS HEIGHT TYPE
EMERGENCY DEPARTMENT HISTORY AND PHYSICAL EXAM    11:40 PM      Date: 11/10/2019  Patient Name: Kimani Whitten    History of Presenting Illness     Chief Complaint   Patient presents with    Cough         History Provided By: Patient  Location/Duration/Severity/Modifying factors   Kimani Whitten is a 46year old female with PMH of asthma, HLD, eczema, lumbar stenosis s/p lumbar fusion, migraines, CVA, chronic pain syndrome and obesity who present with cough. Patient developed URI symptoms, cough, chills and congestion on Sunday and went to [place] on Friday where she was given pills for her cough. Patient says that despite treatment her cough is worse, to the point where she vomits with the cough. She endorses wheezing and use of her albuterol inhaler 2 puffs every 4 hours for the past week. Patient reports that her daughter and other family members had viral illnesses. Patient reports compliance with her Symbicort. She has never been intubated or hospitalized for asthma. PCP: Chichi Wilson MD    Current Outpatient Medications   Medication Sig Dispense Refill    predniSONE (DELTASONE) 50 mg tablet Take 1 Tab by mouth daily for 5 days. Indications: worsening asthma 5 Tab 0    benzonatate (TESSALON PERLES) 100 mg capsule Take 2 Caps by mouth three (3) times daily as needed for Cough for up to 7 days. 30 Cap 0    guaiFENesin-codeine (ROBITUSSIN AC) 100-10 mg/5 mL solution Take 5 mL by mouth three (3) times daily as needed for Cough for up to 8 days. Max Daily Amount: 15 mL. 120 mL 0    acetaminophen (TYLENOL) 500 mg tablet Take 2 Tabs by mouth every six to eight (6-8) hours as needed for Pain for up to 30 days. 180 Tab 2    gabapentin (NEURONTIN) 300 mg capsule TAKE 1 CAPSULE BY MOUTH EVERY 12 HOURS FOR 30 DAYS 60 Cap 3    propranolol (INDERAL) 20 mg tablet Take 1 Tab by mouth three (3) times daily. Indications: MIGRAINE PREVENTION 90 Tab 3    aspirin (ASPIRIN) 325 mg tablet Take 1 Tab by mouth daily. 30 Tab 0    atorvastatin (LIPITOR) 80 mg tablet Take 1 Tab by mouth nightly. 30 Tab 0    furosemide (LASIX) 20 mg tablet Take 40 mg by mouth daily.  budesonide-formoterol (SYMBICORT) 160-4.5 mcg/actuation HFA inhaler Take 2 Puffs by inhalation two (2) times a day.  naloxone (NARCAN) 4 mg/actuation nasal spray Use 1 spray intranasally into 1 nostril. Use a new Narcan nasal spray for subsequent doses and administer into alternating nostrils. May repeat every 2 to 3 minutes as needed. 1 Each 0    albuterol (PROVENTIL HFA, VENTOLIN HFA, PROAIR HFA) 90 mcg/actuation inhaler Take 1 Puff by inhalation every four (4) hours as needed for Wheezing. 1 Inhaler 0    FERROUS SULFATE PO Take 325 mg by mouth.  potassium chloride SR (K-TAB) 20 mEq tablet Take 20 mEq by mouth daily.  levothyroxine (SYNTHROID) 50 mcg tablet TAKE 1 TABLET BY MOUTH EVERY DAY  3    cholecalciferol (VITAMIN D3) 1,000 unit cap Take 2,000 Units by mouth daily.  meclizine (ANTIVERT) 25 mg tablet Take 25 mg by mouth daily as needed. 3    DULoxetine (CYMBALTA) 60 mg capsule Take 60 mg by mouth nightly. Past History     Past Medical History:  Past Medical History:   Diagnosis Date    Asthma     Back pain with radiation     Cardiac echocardiogram, [facility] study 12/29/2016    EF 55-60%. No WMA. Right to left atrial septal shunt suggests PFO. Similar to study of 10/18/16.  Cardiovascular LE venous duplex 10/18/2016    No DVT bilaterally.  Contact dermatitis and other eczema, due to unspecified cause     CTS (carpal tunnel syndrome)     HSV-2 (herpes simplex virus 2) infection     Hypercholesterolemia     Hypothyroidism     Ill-defined condition     Vertigo    L4-L5 disc bulge     Leg swelling     Migraines     Positive PPD, treated     S/P lumbar fusion 1/13/2016    1.  L4-5 bilateral hemilaminotomy, medial facetectomy, foraminotomy, diskectomy L4-5. 2. Transforaminal lumbar interbody fusion with PEEK cage and demineralized bone graft L4-L5 posterolateral fusion. 3. Segmental spinal instrumentation, DePuy Expedium type L4-L5. 01/13/2016 By Dr. Xiomara Rondon     EMG '17 , mild sciatic EMG findings    Stroke [facility])     12/2016    Vertigo        Past Surgical History:  Past Surgical History:   Procedure Laterality Date    HX GYN      partial hysterectomy due to abnormal pap    HX LUMBAR LAMINECTOMY  01-13-16    L4/5       Family History:  Family History   Problem Relation Age of Onset    Diabetes Mother     Elevated Lipids Mother     Hypertension Mother     Cancer Father         pancreatic    Diabetes Sister     Hypertension Sister     Diabetes Brother     Diabetes Daughter     Hypertension Daughter        Social History:  Social History     Tobacco Use    Smoking status: Never Smoker    Smokeless tobacco: Never Used   Substance Use Topics    Alcohol use: No    Drug use: No       Allergies: Allergies   Allergen Reactions    Skelaxin [Metaxalone] Other (comments)     jittery    Tramadol Other (comments)     Halluctations         Review of Systems     Review of Systems   Constitutional: Positive for chills. Negative for fever. HENT: Positive for congestion. Respiratory: Positive for cough, shortness of breath and wheezing. Negative for chest tightness. Cardiovascular: Positive for palpitations. Negative for chest pain and leg swelling. All other systems reviewed and are negative. Physical Exam     Visit Vitals  /90 (BP 1 Location: Left arm)   Pulse 91   Temp 98.3 °F (36.8 °C)   Resp 18   Wt 108.9 kg (240 lb)   SpO2 98%   BMI 38.74 kg/m²       Physical Exam   Constitutional: She is oriented to person, place, and time. She appears well-developed and well-nourished. HENT:   Head: Normocephalic and atraumatic. Eyes: Conjunctivae and EOM are normal.   Neck: Normal range of motion. Neck supple. Cardiovascular: Normal rate, regular rhythm and intact distal pulses. Pulmonary/Chest: Effort normal. She has no wheezes. She has no rales. Decreased breath sounds at bases bilaterally   Abdominal: Soft. Bowel sounds are normal.   Musculoskeletal: Normal range of motion. She exhibits no edema. Neurological: She is alert and oriented to person, place, and time. Skin: Skin is warm and dry. Diagnostic Study Results     Labs -  Recent Results (from the past 12 hour(s))   CBC WITH AUTOMATED DIFF    Collection Time: 11/10/19 11:39 PM   Result Value Ref Range    WBC 9.1 4.6 - 13.2 K/uL    RBC 4.99 4.20 - 5.30 M/uL    HGB 11.8 (L) 12.0 - 16.0 g/dL    HCT 36.0 35.0 - 45.0 %    MCV 72.1 (L) 74.0 - 97.0 FL    MCH 23.6 (L) 24.0 - 34.0 PG    MCHC 32.8 31.0 - 37.0 g/dL    RDW 14.6 (H) 11.6 - 14.5 %    PLATELET 903 005 - 145 K/uL    MPV 10.2 9.2 - 11.8 FL    NEUTROPHILS 32 (L) 42 - 75 %    LYMPHOCYTES 63 (H) 20 - 51 %    MONOCYTES 4 2 - 9 %    EOSINOPHILS 0 0 - 5 %    BASOPHILS 1 0 - 3 %    ABS. NEUTROPHILS 2.9 1.8 - 8.0 K/UL    ABS. LYMPHOCYTES 5.7 (H) 0.8 - 3.5 K/UL    ABS. MONOCYTES 0.4 0 - 1.0 K/UL    ABS. EOSINOPHILS 0.0 0.0 - 0.4 K/UL    ABS. BASOPHILS 0.1 (H) 0.0 - 0.06 K/UL    DF MANUAL      PLATELET COMMENTS ADEQUATE PLATELETS      RBC COMMENTS ANISOCYTOSIS  1+        RBC COMMENTS HYPOCHROMIA  1+        RBC COMMENTS POIKILOCYTOSIS  1+       METABOLIC PANEL, COMPREHENSIVE    Collection Time: 11/10/19 11:39 PM   Result Value Ref Range    Sodium 139 136 - 145 mmol/L    Potassium 3.7 3.5 - 5.5 mmol/L    Chloride 108 100 - 111 mmol/L    CO2 27 21 - 32 mmol/L    Anion gap 4 3.0 - 18 mmol/L    Glucose 132 (H) 74 - 99 mg/dL    BUN 17 7.0 - 18 MG/DL    Creatinine 1.01 0.6 - 1.3 MG/DL    BUN/Creatinine ratio 17 12 - 20      GFR est AA >60 >60 ml/min/1.73m2    GFR est non-AA 57 (L) >60 ml/min/1.73m2    Calcium 8.9 8.5 - 10.1 MG/DL    Bilirubin, total 0.3 0.2 - 1.0 MG/DL    ALT (SGPT) 50 13 - 56 U/L    AST (SGOT) 34 10 - 38 U/L    Alk.  phosphatase 154 (H) 45 - 117 U/L    Protein, total stated 7.6 6.4 - 8.2 g/dL    Albumin 3.3 (L) 3.4 - 5.0 g/dL    Globulin 4.3 (H) 2.0 - 4.0 g/dL    A-G Ratio 0.8 0.8 - 1.7         Radiologic Studies -   XR CHEST PORT    (Results Pending)         Medical Decision Making   I am the first provider for this patient. I reviewed the vital signs, available nursing notes, past medical history, past surgical history, family history and social history. Vital Signs-Reviewed the patient's vital signs. Records Reviewed: Old Medical Records (Time of Review: 11:40 PM)    ED Course: Progress Notes, Reevaluation, and Consults:    Provider Notes (Medical Decision Making): Asthma exacerbation 2/2 URI  DDX: URI, Bronchitis, Asthma exacerbation, Allergies, PNA  MDM  46year old female with PMH of asthma, HLD, eczema, lumbar stenosis s/p lumbar fusion, migraines, CVA, chronic pain syndrome and obesity who present with cough. Patient has had URI symptoms, wheezing and SOB for the past week. She had elevated lymphocytes 63, mild anemia Hgb 11.8 and elevated alk phos 154 on lab work. CXR, per my read, was negative for PNA. Patient given duo-neb, atrovent and solumedrol with improvement in symptoms. Tdap given before discharge. Testing: CXR, CBC, BMP  Plan: Home with 5 day course of prednisone, tessalon and robitussin AC    Discussed results and plan with patient, who expressed understanding and agreement. Patient stable upon discharge. Recommended outpatient follow up and to return to ED if symptoms worsen. Diagnosis     Clinical Impression:   1.  Mild persistent asthma with acute exacerbation        Disposition: Home    Follow-up Information     Follow up With Specialties Details Why Contact Emmanuel Oviedo MD Family Practice Schedule an appointment as soon as possible for a visit  [place] [id number]  [phone]      [facility] EMERGENCY DEPT Emergency Medicine  If symptoms worsen [address] [place] [id number] [phone] Patient's Medications   Start Taking    BENZONATATE (TESSALON PERLES) 100 MG CAPSULE    Take 2 Caps by mouth three (3) times daily as needed for Cough for up to 7 days. GUAIFENESIN-CODEINE (ROBITUSSIN AC) 100-10 MG/5 ML SOLUTION    Take 5 mL by mouth three (3) times daily as needed for Cough for up to 8 days. Max Daily Amount: 15 mL. PREDNISONE (DELTASONE) 50 MG TABLET    Take 1 Tab by mouth daily for 5 days. Indications: worsening asthma   Continue Taking    ACETAMINOPHEN (TYLENOL) 500 MG TABLET    Take 2 Tabs by mouth every six to eight (6-8) hours as needed for Pain for up to 30 days. ALBUTEROL (PROVENTIL HFA, VENTOLIN HFA, PROAIR HFA) 90 MCG/ACTUATION INHALER    Take 1 Puff by inhalation every four (4) hours as needed for Wheezing. ASPIRIN (ASPIRIN) 325 MG TABLET    Take 1 Tab by mouth daily. ATORVASTATIN (LIPITOR) 80 MG TABLET    Take 1 Tab by mouth nightly. BUDESONIDE-FORMOTEROL (SYMBICORT) 160-4.5 MCG/ACTUATION HFA INHALER    Take 2 Puffs by inhalation two (2) times a day. CHOLECALCIFEROL (VITAMIN D3) 1,000 UNIT CAP    Take 2,000 Units by mouth daily. DULOXETINE (CYMBALTA) 60 MG CAPSULE    Take 60 mg by mouth nightly. FERROUS SULFATE PO    Take 325 mg by mouth. FUROSEMIDE (LASIX) 20 MG TABLET    Take 40 mg by mouth daily. GABAPENTIN (NEURONTIN) 300 MG CAPSULE    TAKE 1 CAPSULE BY MOUTH EVERY 12 HOURS FOR 30 DAYS    LEVOTHYROXINE (SYNTHROID) 50 MCG TABLET    TAKE 1 TABLET BY MOUTH EVERY DAY    MECLIZINE (ANTIVERT) 25 MG TABLET    Take 25 mg by mouth daily as needed. NALOXONE (NARCAN) 4 MG/ACTUATION NASAL SPRAY    Use 1 spray intranasally into 1 nostril. Use a new Narcan nasal spray for subsequent doses and administer into alternating nostrils. May repeat every 2 to 3 minutes as needed. POTASSIUM CHLORIDE SR (K-TAB) 20 MEQ TABLET    Take 20 mEq by mouth daily. PROPRANOLOL (INDERAL) 20 MG TABLET    Take 1 Tab by mouth three (3) times daily.  Indications: MIGRAINE PREVENTION   These Medications have changed    No medications on file   Stop Taking    No medications on file     Disclaimer: Sections of this note are dictated using utilizing voice recognition software. Minor typographical errors may be present. If questions arise, please do not hesitate to contact me or call our department.       Carli Mayfield MD   [address] Medicine  PGY-2  11/11/19

## 2022-05-11 ENCOUNTER — TRANSCRIPTION ENCOUNTER (OUTPATIENT)
Age: 57
End: 2022-05-11

## 2022-05-11 DIAGNOSIS — S76.119A STRAIN OF UNSPECIFIED QUADRICEPS MUSCLE, FASCIA AND TENDON, INITIAL ENCOUNTER: ICD-10-CM

## 2022-05-11 DIAGNOSIS — S76.111A STRAIN OF RIGHT QUADRICEPS MUSCLE, FASCIA AND TENDON, INITIAL ENCOUNTER: ICD-10-CM

## 2022-05-11 LAB
ALBUMIN SERPL ELPH-MCNC: 3.9 G/DL — SIGNIFICANT CHANGE UP (ref 3.3–5.2)
ALP SERPL-CCNC: 105 U/L — SIGNIFICANT CHANGE UP (ref 40–120)
ALT FLD-CCNC: 14 U/L — SIGNIFICANT CHANGE UP
ANION GAP SERPL CALC-SCNC: 13 MMOL/L — SIGNIFICANT CHANGE UP (ref 5–17)
APTT BLD: 29.6 SEC — SIGNIFICANT CHANGE UP (ref 27.5–35.5)
AST SERPL-CCNC: 18 U/L — SIGNIFICANT CHANGE UP
BASOPHILS # BLD AUTO: 0.05 K/UL — SIGNIFICANT CHANGE UP (ref 0–0.2)
BASOPHILS NFR BLD AUTO: 0.6 % — SIGNIFICANT CHANGE UP (ref 0–2)
BILIRUB SERPL-MCNC: <0.2 MG/DL — LOW (ref 0.4–2)
BLD GP AB SCN SERPL QL: SIGNIFICANT CHANGE UP
BUN SERPL-MCNC: 22.6 MG/DL — HIGH (ref 8–20)
CALCIUM SERPL-MCNC: 8.7 MG/DL — SIGNIFICANT CHANGE UP (ref 8.6–10.2)
CHLORIDE SERPL-SCNC: 101 MMOL/L — SIGNIFICANT CHANGE UP (ref 98–107)
CO2 SERPL-SCNC: 23 MMOL/L — SIGNIFICANT CHANGE UP (ref 22–29)
CREAT SERPL-MCNC: 0.85 MG/DL — SIGNIFICANT CHANGE UP (ref 0.5–1.3)
EGFR: 101 ML/MIN/1.73M2 — SIGNIFICANT CHANGE UP
EOSINOPHIL # BLD AUTO: 0.94 K/UL — HIGH (ref 0–0.5)
EOSINOPHIL NFR BLD AUTO: 11.8 % — HIGH (ref 0–6)
GLUCOSE SERPL-MCNC: 102 MG/DL — HIGH (ref 70–99)
HCT VFR BLD CALC: 36.1 % — LOW (ref 39–50)
HGB BLD-MCNC: 11.6 G/DL — LOW (ref 13–17)
IMM GRANULOCYTES NFR BLD AUTO: 0.3 % — SIGNIFICANT CHANGE UP (ref 0–1.5)
INR BLD: 1.07 RATIO — SIGNIFICANT CHANGE UP (ref 0.88–1.16)
LYMPHOCYTES # BLD AUTO: 1.82 K/UL — SIGNIFICANT CHANGE UP (ref 1–3.3)
LYMPHOCYTES # BLD AUTO: 22.9 % — SIGNIFICANT CHANGE UP (ref 13–44)
MCHC RBC-ENTMCNC: 30.6 PG — SIGNIFICANT CHANGE UP (ref 27–34)
MCHC RBC-ENTMCNC: 32.1 GM/DL — SIGNIFICANT CHANGE UP (ref 32–36)
MCV RBC AUTO: 95.3 FL — SIGNIFICANT CHANGE UP (ref 80–100)
MONOCYTES # BLD AUTO: 0.66 K/UL — SIGNIFICANT CHANGE UP (ref 0–0.9)
MONOCYTES NFR BLD AUTO: 8.3 % — SIGNIFICANT CHANGE UP (ref 2–14)
NEUTROPHILS # BLD AUTO: 4.47 K/UL — SIGNIFICANT CHANGE UP (ref 1.8–7.4)
NEUTROPHILS NFR BLD AUTO: 56.1 % — SIGNIFICANT CHANGE UP (ref 43–77)
PLATELET # BLD AUTO: 280 K/UL — SIGNIFICANT CHANGE UP (ref 150–400)
POTASSIUM SERPL-MCNC: 4.6 MMOL/L — SIGNIFICANT CHANGE UP (ref 3.5–5.3)
POTASSIUM SERPL-SCNC: 4.6 MMOL/L — SIGNIFICANT CHANGE UP (ref 3.5–5.3)
PROT SERPL-MCNC: 7.4 G/DL — SIGNIFICANT CHANGE UP (ref 6.6–8.7)
PROTHROM AB SERPL-ACNC: 12.4 SEC — SIGNIFICANT CHANGE UP (ref 10.5–13.4)
RBC # BLD: 3.79 M/UL — LOW (ref 4.2–5.8)
RBC # FLD: 13.5 % — SIGNIFICANT CHANGE UP (ref 10.3–14.5)
SARS-COV-2 RNA SPEC QL NAA+PROBE: SIGNIFICANT CHANGE UP
SODIUM SERPL-SCNC: 137 MMOL/L — SIGNIFICANT CHANGE UP (ref 135–145)
WBC # BLD: 7.96 K/UL — SIGNIFICANT CHANGE UP (ref 3.8–10.5)
WBC # FLD AUTO: 7.96 K/UL — SIGNIFICANT CHANGE UP (ref 3.8–10.5)

## 2022-05-11 PROCEDURE — 27524 TREAT KNEECAP FRACTURE: CPT | Mod: RT

## 2022-05-11 PROCEDURE — 99222 1ST HOSP IP/OBS MODERATE 55: CPT

## 2022-05-11 PROCEDURE — 73030 X-RAY EXAM OF SHOULDER: CPT | Mod: 26,LT

## 2022-05-11 PROCEDURE — 27385 REPAIR OF THIGH MUSCLE: CPT | Mod: RT

## 2022-05-11 PROCEDURE — 93010 ELECTROCARDIOGRAM REPORT: CPT

## 2022-05-11 RX ORDER — ENOXAPARIN SODIUM 100 MG/ML
40 INJECTION SUBCUTANEOUS ONCE
Refills: 0 | Status: COMPLETED | OUTPATIENT
Start: 2022-05-11 | End: 2022-05-11

## 2022-05-11 RX ORDER — CEFAZOLIN SODIUM 1 G
3000 VIAL (EA) INJECTION ONCE
Refills: 0 | Status: DISCONTINUED | OUTPATIENT
Start: 2022-05-11 | End: 2022-05-12

## 2022-05-11 RX ORDER — MUPIROCIN 20 MG/G
1 OINTMENT TOPICAL
Refills: 0 | Status: DISCONTINUED | OUTPATIENT
Start: 2022-05-11 | End: 2022-05-12

## 2022-05-11 RX ORDER — LAMOTRIGINE 25 MG/1
100 TABLET, ORALLY DISINTEGRATING ORAL DAILY
Refills: 0 | Status: DISCONTINUED | OUTPATIENT
Start: 2022-05-11 | End: 2022-05-11

## 2022-05-11 RX ORDER — BUPROPION HYDROCHLORIDE 150 MG/1
300 TABLET, EXTENDED RELEASE ORAL DAILY
Refills: 0 | Status: DISCONTINUED | OUTPATIENT
Start: 2022-05-11 | End: 2022-05-12

## 2022-05-11 RX ORDER — ESCITALOPRAM OXALATE 10 MG/1
20 TABLET, FILM COATED ORAL DAILY
Refills: 0 | Status: DISCONTINUED | OUTPATIENT
Start: 2022-05-11 | End: 2022-05-11

## 2022-05-11 RX ORDER — CHLORHEXIDINE GLUCONATE 213 G/1000ML
1 SOLUTION TOPICAL EVERY 12 HOURS
Refills: 0 | Status: COMPLETED | OUTPATIENT
Start: 2022-05-11 | End: 2022-05-12

## 2022-05-11 RX ORDER — OXYCODONE HYDROCHLORIDE 5 MG/1
10 TABLET ORAL EVERY 8 HOURS
Refills: 0 | Status: DISCONTINUED | OUTPATIENT
Start: 2022-05-11 | End: 2022-05-12

## 2022-05-11 RX ORDER — ESCITALOPRAM OXALATE 10 MG/1
10 TABLET, FILM COATED ORAL DAILY
Refills: 0 | Status: DISCONTINUED | OUTPATIENT
Start: 2022-05-11 | End: 2022-05-12

## 2022-05-11 RX ORDER — SODIUM CHLORIDE 0.65 %
1 AEROSOL, SPRAY (ML) NASAL
Refills: 0 | Status: DISCONTINUED | OUTPATIENT
Start: 2022-05-11 | End: 2022-05-12

## 2022-05-11 RX ORDER — TRAZODONE HCL 50 MG
50 TABLET ORAL AT BEDTIME
Refills: 0 | Status: DISCONTINUED | OUTPATIENT
Start: 2022-05-11 | End: 2022-05-12

## 2022-05-11 RX ORDER — SODIUM CHLORIDE 9 MG/ML
1000 INJECTION INTRAMUSCULAR; INTRAVENOUS; SUBCUTANEOUS
Refills: 0 | Status: DISCONTINUED | OUTPATIENT
Start: 2022-05-11 | End: 2022-05-12

## 2022-05-11 RX ORDER — POVIDONE-IODINE 5 %
1 AEROSOL (ML) TOPICAL ONCE
Refills: 0 | Status: DISCONTINUED | OUTPATIENT
Start: 2022-05-11 | End: 2022-05-12

## 2022-05-11 RX ADMIN — BUPROPION HYDROCHLORIDE 300 MILLIGRAM(S): 150 TABLET, EXTENDED RELEASE ORAL at 11:22

## 2022-05-11 RX ADMIN — ENOXAPARIN SODIUM 40 MILLIGRAM(S): 100 INJECTION SUBCUTANEOUS at 19:19

## 2022-05-11 RX ADMIN — OXYCODONE HYDROCHLORIDE 10 MILLIGRAM(S): 5 TABLET ORAL at 14:39

## 2022-05-11 RX ADMIN — OXYCODONE HYDROCHLORIDE 10 MILLIGRAM(S): 5 TABLET ORAL at 19:19

## 2022-05-11 RX ADMIN — OXYCODONE HYDROCHLORIDE 10 MILLIGRAM(S): 5 TABLET ORAL at 20:20

## 2022-05-11 RX ADMIN — Medication 50 MILLIGRAM(S): at 22:20

## 2022-05-11 RX ADMIN — SODIUM CHLORIDE 75 MILLILITER(S): 9 INJECTION INTRAMUSCULAR; INTRAVENOUS; SUBCUTANEOUS at 13:58

## 2022-05-11 RX ADMIN — Medication 5 MILLIGRAM(S): at 19:19

## 2022-05-11 RX ADMIN — MUPIROCIN 1 APPLICATION(S): 20 OINTMENT TOPICAL at 19:21

## 2022-05-11 RX ADMIN — ESCITALOPRAM OXALATE 10 MILLIGRAM(S): 10 TABLET, FILM COATED ORAL at 11:22

## 2022-05-11 RX ADMIN — OXYCODONE HYDROCHLORIDE 10 MILLIGRAM(S): 5 TABLET ORAL at 09:46

## 2022-05-11 RX ADMIN — CHLORHEXIDINE GLUCONATE 1 APPLICATION(S): 213 SOLUTION TOPICAL at 19:28

## 2022-05-11 RX ADMIN — Medication 0.1 MILLIGRAM(S): at 20:31

## 2022-05-11 RX ADMIN — Medication 1 SPRAY(S): at 22:19

## 2022-05-11 NOTE — ED CDU PROVIDER INITIAL DAY NOTE - OBJECTIVE STATEMENT
57 yo female PMHx of alcohol abuse (clean x2 years), anxiety, and depression s/p right knee replacement presents to ED c/o right knee pain. Patient reports he was walking down the steps in his backyard and slipped, twisting his right knee. No further complaints at this time.   Denies hitting head, LOC,

## 2022-05-11 NOTE — PHYSICAL THERAPY INITIAL EVALUATION ADULT - PASSIVE RANGE OF MOTION EXAMINATION, REHAB EVAL
left shoulder limited by pain from ?reaggrevation of injury, elbow WFL; right hip/ankle WFL, knee NT due to injury/immobilizer/Right UE Passive ROM was WFL (within functional limits)/Left LE Passive ROM was WFL (within functional limits)

## 2022-05-11 NOTE — ED ADULT NURSE REASSESSMENT NOTE - NS ED NURSE REASSESS COMMENT FT1
Report received from offgoing RN, charting as noted. patient without complaints at this time, eating breakfast. awaiting PT consult.
physical therapy at bedside
pt reports pain is tolerable after po med. requesting urinal. patient provided with urinal, educated on plan of care, awaiting ortho consult.
pt c/o pain, med administered as ordered. fall precautions in place, side rails up, call bell within reach, urinal at bedside.
pt is calm and cooperative resting comfortably showing no signs of respiratory distress or pain
pt is resting comfortably in bed showing no signs of respiratory distress or pain, pt is calm and cooperative
Assumed care of pt at 2330 as stated in report from TASHIA Longo. Charting as noted. Patient A&O x4, PT complains of knee/leg pain/discomfort after falling, denies CP/SOB. Updated on the plan of care. Call bell within reach, bed locked in lowest position. No signs of acute distress noted, safety maintained.
pt resting comfortably in bed showing no signs of respiratory distress or pain, pt is calm and cooperative

## 2022-05-11 NOTE — CONSULT NOTE ADULT - SUBJECTIVE AND OBJECTIVE BOX
Patient is a 56y old  Male who presents with a chief complaint of right leg pain    HPI:  The patient is a 56 year old male with a past medical history of anxiety, depression and hyperlipidemia status post recent Right total knee replacement in 03/15/22 at [facility] who presented to the ER after a fall at home. Patient states he was going down the steps to his yard when his right foot got stuck in in older wood plank and he fell. Presented to the Er with right leg pain and left shoulder pain. Was admitted to CDU for observation. Seen by ortho and PT. Placed in a right knee immobilizer for right quad tendon rupture. Admitted for surgical repair in AM by ortho. Medicine team consulted for optimization.       PAST MEDICAL & SURGICAL HISTORY:  Depression  Anxiety  Alcohol abuse  Overdose heroine and xanax 3/10/2015  Hyperlipidemia    Social History:  Tabacco -  Non smoker  ETOH - NO Etoh for 2 years   Illicit drug abuse - denies    FAMILY HISTORY:  No pertinent family history in first degree relatives    Allergies    shellfish. (Hives)    HOME MEDICATIONS :   Lexapro 10mg PO OD  Oxycodone 10mg PO Q6 hours prn  Atorvastatin 40mg PO QHS  Wellbutrin XL 300mg PO OD  Trazodone PO QHS   Buspar 5mg PO BID    REVIEW OF SYSTEMS:    CONSTITUTIONAL: No fever, weight loss, or fatigue  EYES: No eye pain, visual disturbances, or discharge  NECK: No pain or stiffness  RESPIRATORY: No cough, wheezing, chills or hemoptysis; No shortness of breath  CARDIOVASCULAR: No chest pain, palpitations, dizziness, or leg swelling  GASTROINTESTINAL: No abdominal or epigastric pain. No nausea, vomiting, or hematemesis; No diarrhea or constipation. No melena or hematochezia.  GENITOURINARY: No dysuria, frequency, hematuria, or incontinence  NEUROLOGICAL: No headaches, memory loss, loss of strength, numbness, or tremors  SKIN: No itching, burning, rashes, or lesions   LYMPH NODES: No enlarged glands  ENDOCRINE: No heat or cold intolerance; No hair loss  MUSCULOSKELETAL: +++ RIGHT LEG PAIN +++ LEFT SHOULDER PAIN   PSYCHIATRIC: No depression, anxiety, mood swings, or difficulty sleeping      MEDICATIONS  (STANDING):  buPROPion XL (24-Hour) 300 milliGRAM(s) Oral daily  busPIRone 5 milliGRAM(s) Oral every 12 hours  chlorhexidine 2% Cloths 1 Application(s) Topical every 12 hours  cloNIDine 0.1 milliGRAM(s) Oral every 12 hours  enoxaparin Injectable 40 milliGRAM(s) SubCutaneous once  escitalopram 10 milliGRAM(s) Oral daily  mupirocin 2% Ointment 1 Application(s) Both Nostrils two times a day  povidone iodine 5% Nasal Swab 1 Application(s) Both Nostrils once  sodium chloride 0.9%. 1000 milliLiter(s) (75 mL/Hr) IV Continuous <Continuous>  traZODone 50 milliGRAM(s) Oral at bedtime    MEDICATIONS  (PRN):  oxyCODONE    IR 10 milliGRAM(s) Oral every 8 hours PRN Severe Pain (7 - 10)      Vital Signs Last 24 Hrs  T(C): 36.7 (11 May 2022 11:14), Max: 36.7 (11 May 2022 11:14)  T(F): 98.1 (11 May 2022 11:14), Max: 98.1 (11 May 2022 11:14)  HR: 62 (11 May 2022 11:14) (60 - 63)  BP: 158/79 (11 May 2022 11:14) (101/62 - 158/79)  BP(mean): --  RR: 19 (11 May 2022 11:14) (18 - 19)  SpO2: 95% (11 May 2022 11:14) (95% - 95%)    PHYSICAL EXAM:    GENERAL: NAD, AOX3  HEAD:  Atraumatic, Normocephalic  EYES:  conjunctiva and sclera clear  NECK: Supple, No JVD  CHEST/LUNG: CTA  b/l,  no rales, rhonchi, wheezing, or rubs  HEART: Regular rate and rhythm; No murmurs, rubs, or gallops  ABDOMEN: Soft, Nontender, Nondistended; Bowel sounds present  EXTREMITIES:  2+ Peripheral Pulses, No clubbing, cyanosis, or edema ,   Right knee immobilizer  Left shoulder decreased ROM due to pain   LYMPH: No lymphadenopathy noted      LABS:                        11.6   7.96  )-----------( 280      ( 11 May 2022 00:50 )             36.1     05-11    137  |  101  |  22.6<H>  ----------------------------<  102<H>  4.6   |  23.0  |  0.85    Ca    8.7      11 May 2022 00:50    TPro  7.4  /  Alb  3.9  /  TBili  <0.2<L>  /  DBili  x   /  AST  18  /  ALT  14  /  AlkPhos  105  05-11    PT/INR - ( 11 May 2022 00:50 )   PT: 12.4 sec;   INR: 1.07 ratio         PTT - ( 11 May 2022 00:50 )  PTT:29.6 sec

## 2022-05-11 NOTE — ED CDU PROVIDER INITIAL DAY NOTE - MEDICAL DECISION MAKING DETAILS
57 yo female PMHx of alcohol abuse (clean x2 years), anxiety, and depression s/p right knee replacement with right quadriceps tendon rupture following mechanical fall. Evaluated by orthopedic team, WBAT. Pending PT evaluation to determine further management.

## 2022-05-11 NOTE — ED CDU PROVIDER INITIAL DAY NOTE - NS ED ATTENDING STATEMENT MOD
This was a shared visit with the KELSEY. I reviewed and verified the documentation and independently performed the documented:

## 2022-05-11 NOTE — ED CDU PROVIDER DISPOSITION NOTE - NS_CDULENGTHOFSTAY_ED_A_ED
"To manage your phosphorus intake, limit/avoid the following:  Milk and milk products (including cottage cheese, ice cream, yogurt, cheese, custard, and pudding), whole-grain breads, bran, whole-grain cereal, wheat germ, hernandez's yeast, legumes (dried or canned beans, baked beans, split peas, lentils), nuts, nut butters (peanut or almond butter), seeds, chocolate and cocoa, cola (Coke, Pepsi, Dr Pepper), beer, jillian, soy products, and processed/packaged/convenience foods with added phosphorus    To manage your potassium, choose more \"low\" potassium foods and avoid/limit \"high\" potassium foods.   - Low potassium foods (1/2 cup serving): asparagus, beets (canned or pickled), broccoli (raw), cabbage, carrots (cooked), cauliflower, celery, corn, cucumber, eggplant, green or wax beans, lettuce (iceberg), mixed vegetables, mushrooms (fresh), onion, peas, peppers, spinach (raw), zucchini, apricot (canned), apples and apple juice, blueberries, cherries, cranberries, fruit cocktail, grapefruit, grapes and grape juice, mandarin oranges, peaches, pears, plums, pineapple, raspberries, rhubarb, strawberries, tangerines, watermelon  - High potassium foods: artichoke, Bamboo shoots, beets (cooked), bok shawn, broccoli (cooked), [place] sprouts, carrots (raw), mushrooms (canned), potato (sweet, white, yams), potato chips or French fries, squash or pumpkin, spinach and other greens (cooked), tomatoes and tomato products, vegetable juice, apricots (fresh or dried), avocado, banana, cantaloupe or honeydew, dates and figs, dried fruit, grapefruit juice, kiwi, chris, nectarine, orange or orange juice, papaya, pomegranate juice, prunes or prune juice, raisins     Xiomara Kondziolka RD, LD  Outpatient Solid Organ Transplant Dietitian  [phone]      "
10 Hour(s) 6 Minute(s)

## 2022-05-11 NOTE — H&P ADULT - NS ATTEND AMEND GEN_ALL_CORE FT
Orthopaedic Trauma Surgeon Addendum:    I have personally performed a face-to-face diagnostic evaluation on this patient.  I have reviewed the physician assistant note and agree with the history, exam, and plan of care, except as noted.    Planning for quad tendon repair on 5/12/22    Timi Huerta MD  Orthopaedic Trauma Surgeon  [facility] [place]

## 2022-05-11 NOTE — PROVIDER CONTACT NOTE (OTHER) - ASSESSMENT
pt ok for PT per kiana NELSON,and nursebyron. pt awake on stretcher on room air, +right knee immobilizer. AAOx4. right knee pain 6/10 rest, 8/10 with mobility. agreeable to eval. eval completed and charted. mobility limited by weakness and pain. pt returned to stretcher. left in nad w/all needs, all lines intact. will follow. handoff to nursebyron. discussed with kiana NELSON, who phoned ortho, as well.

## 2022-05-11 NOTE — ED CDU PROVIDER DISPOSITION NOTE - CLINICAL COURSE
57 yo female PMHx of alcohol abuse (clean x2 years), anxiety, and depression s/p right knee replacement presents to ED c/o right knee pain. Patient reports he was walking down the steps in his backyard and slipped, twisting his right knee. pt is kept on obs for PT  eval that he fail PT . spoke WIth ortho they admit the pt for surgery

## 2022-05-11 NOTE — CONSULT NOTE ADULT - SUBJECTIVE AND OBJECTIVE BOX
Pt Name: PAUL HANKINS    MRN: [id number]      Patient is a 56y Male s/p Right total knee arthroplasty performed by Dr. Harrison(?) at [email or web address] on 4/15/22 presenting to the emergency department with a chief complaint of right superior knee pain s/p fall while walking down the stairs. Pt also admits to mild left shoulder pain, but states that he has had chronic left shoulder pain for years. Pt otherwise denies fever/chills, c/p, sob, abd pain, n/v/c/d, dysuria, numbness/tingling/weakness and has no other complaints at this time.       REVIEW OF SYSTEMS    General: Alert, responsive, in NAD    Skin/Breast: No rashes, no pruritis   	  Ophthalmologic: No visual changes. No redness.   	  ENMT:	No discharge. No swelling.    Respiratory and Thorax: No difficulty breathing. No cough.  	   Cardiovascular:	No chest pain. No palpitations.    Gastrointestinal:	 No abdominal pain. No diarrhea.     Genitourinary: No dysuria. No bleeding.    Musculoskeletal: SEE HPI.    Neurological: No sensory or motor changes.     Psychiatric: No anxiety or depression.    Hematology/Lymphatics: No swelling.    Endocrine: No Hx of diabetes.    ROS is otherwise negative.    PAST MEDICAL & SURGICAL HISTORY:  PAST MEDICAL & SURGICAL HISTORY:  Depression      Anxiety      Alcohol abuse      Overdose  heroine and xanax 3/10/2015      ACL (anterior cruciate ligament) tear, left, initial encounter          Allergies: shellfish. (Hives)      Medications:     FAMILY HISTORY:  : non-contributory    Social History: denies ETOH abuse    Ambulation: Walking independently [ x ] With Cane [ ] With Walker [ ]  Bedbound [ ]                 Vital Signs Last 24 Hrs  T(C): 36.6 (10 May 2022 21:25), Max: 36.6 (10 May 2022 21:25)  T(F): 97.8 (10 May 2022 21:25), Max: 97.8 (10 May 2022 21:25)  HR: 63 (10 May 2022 21:25) (63 - 63)  BP: 101/62 (10 May 2022 21:25) (101/62 - 101/62)  BP(mean): --  RR: 18 (10 May 2022 21:25) (18 - 18)  SpO2: 95% (10 May 2022 21:25) (95% - 95%)    Daily Height in cm: 172.72 (10 May 2022 21:25)    Daily       PHYSICAL EXAM:      Appearance: Alert, responsive, in no acute distress.    Neurological: Sensation is grossly intact to light touch. 5/5 motor function of all extremities. No focal deficits or weaknesses found.    Vascular: 2+ distal pulses. Cap refill < 2 sec. No signs of venous insufficiency or stasis. No extremity ulcerations. No cyanosis.    Musculoskeletal:         Left Upper Extremity: + diffuse TTP of the left shoulder with limited active ROM (pt states that is his baseline) 2+ radial pulse, SILT. Compartments soft and compressible. FROM of elbow/wrist/digits.       Right Upper Extremity:  + NROM. Non-tender. No signs of trauma.        Left Lower Extremity:  + NROM. Non-tender. No signs of trauma.        Right Lower Extremity:+ deficit palpated at the quad tendon and patient unable to straight leg raise, well healing incision of the anterior knee s/p total knee arthroplasty 4/15/22, SILT, +plantar/dorsiflexion/EHL/FHL intact, compartments soft and compressible, no TTP throughout the rest of the RLE. 2+ dp pulse.    Imaging Studies: All imaging reviewed by myself and Dr. Huerta.    Procedure: SPLINTING   PROCEDURE NOTE: Splinting    Performed by: Paul Mendoza PA-C     Indication: right quad tendon rupture    The RLE was appropriately positioned. A knee immobilizer was applied. Distally, the extremity was neurovascular intact following the procedure. The patient tolerated the procedure well.     A/P:  Pt is a  56y Male with a right quad tendon rupture s/p fall while going down stairs tonight.    PLAN d/w Dr. Huerta:   * WBAT of the RLE in knee immobilizer with use of crutches for assistance  * If patient is able to safely ambulate with knee immobilizer, he may be discharged home and follow up with Dr. Huerta in the office within 1 week  * If patient is unable to tolerate ambulation safely, will consider surgery thursday vs. friday with Dr. Huerta  * Recommend PT eval in the a.m. if patient was unable to go home tonight  * Ortho to follow Pt Name: PAUL HANKINS    MRN: [id number]      Patient is a 56y Male s/p Right total knee arthroplasty performed by Dr. Harrison(?) at [facility] on 4/15/22 presenting to the emergency department with a chief complaint of right superior knee pain s/p fall while walking down the stairs. Pt also admits to mild left shoulder pain, but states that he has had chronic left shoulder pain for years. Pt otherwise denies fever/chills, c/p, sob, abd pain, n/v/c/d, dysuria, numbness/tingling/weakness and has no other complaints at this time.       REVIEW OF SYSTEMS    General: Alert, responsive, in NAD    Skin/Breast: No rashes, no pruritis   	  Ophthalmologic: No visual changes. No redness.   	  ENMT:	No discharge. No swelling.    Respiratory and Thorax: No difficulty breathing. No cough.  	   Cardiovascular:	No chest pain. No palpitations.    Gastrointestinal:	 No abdominal pain. No diarrhea.     Genitourinary: No dysuria. No bleeding.    Musculoskeletal: SEE HPI.    Neurological: No sensory or motor changes.     Psychiatric: No anxiety or depression.    Hematology/Lymphatics: No swelling.    Endocrine: No Hx of diabetes.    ROS is otherwise negative.    PAST MEDICAL & SURGICAL HISTORY:  PAST MEDICAL & SURGICAL HISTORY:  Depression      Anxiety      Alcohol abuse      Overdose  heroine and xanax 3/10/2015      ACL (anterior cruciate ligament) tear, left, initial encounter          Allergies: shellfish. (Hives)      Medications:     FAMILY HISTORY:  : non-contributory    Social History: denies ETOH abuse    Ambulation: Walking independently [ x ] With Cane [ ] With Walker [ ]  Bedbound [ ]                 Vital Signs Last 24 Hrs  T(C): 36.6 (10 May 2022 21:25), Max: 36.6 (10 May 2022 21:25)  T(F): 97.8 (10 May 2022 21:25), Max: 97.8 (10 May 2022 21:25)  HR: 63 (10 May 2022 21:25) (63 - 63)  BP: 101/62 (10 May 2022 21:25) (101/62 - 101/62)  BP(mean): --  RR: 18 (10 May 2022 21:25) (18 - 18)  SpO2: 95% (10 May 2022 21:25) (95% - 95%)    Daily Height in cm: 172.72 (10 May 2022 21:25)    Daily       PHYSICAL EXAM:      Appearance: Alert, responsive, in no acute distress.    Neurological: Sensation is grossly intact to light touch. 5/5 motor function of all extremities. No focal deficits or weaknesses found.    Vascular: 2+ distal pulses. Cap refill < 2 sec. No signs of venous insufficiency or stasis. No extremity ulcerations. No cyanosis.    Musculoskeletal:         Left Upper Extremity: + diffuse TTP of the left shoulder with limited active ROM (pt states that is his baseline) 2+ radial pulse, SILT. Compartments soft and compressible. FROM of elbow/wrist/digits.       Right Upper Extremity:  + NROM. Non-tender. No signs of trauma.        Left Lower Extremity:  + NROM. Non-tender. No signs of trauma.        Right Lower Extremity:+ deficit palpated at the quad tendon and patient unable to straight leg raise, well healing incision of the anterior knee s/p total knee arthroplasty 4/15/22, SILT, +plantar/dorsiflexion/EHL/FHL intact, compartments soft and compressible, no TTP throughout the rest of the RLE. 2+ dp pulse.    Imaging Studies: All imaging reviewed by myself and Dr. Huerta.    Procedure: SPLINTING   PROCEDURE NOTE: Splinting    Performed by: Paul Mendoza PA-C     Indication: right quad tendon rupture    The RLE was appropriately positioned. A knee immobilizer was applied. Distally, the extremity was neurovascular intact following the procedure. The patient tolerated the procedure well.     A/P:  Pt is a  56y Male with a right quad tendon rupture s/p fall while going down stairs tonight.    PLAN d/w Dr. Huerta:   * WBAT of the RLE in knee immobilizer with use of crutches for assistance  * If patient is able to safely ambulate with knee immobilizer, he may be discharged home and follow up with Dr. Huerta in the office within 1 week  * If patient is unable to tolerate ambulation safely, will consider surgery thursday vs. friday with Dr. Huerta  * Recommend PT eval in the a.m. if patient was unable to go home tonight  * Ortho to follow  * Will reach out to Dr. Harrison ([facility]- pts primary surgeon) this a.m. to discuss if he wants patient to follow up with him or is okay with Dr. Huerta to perform the repair

## 2022-05-11 NOTE — PROVIDER CONTACT NOTE (OTHER) - ACTION/TREATMENT ORDERED:
Goals:1. independent bed mobility,2. Modified Independent transfers w/RW/immobilizer,3. Modified Independent amb 50' w/RW/immobilizer,4. Modified Independent 10 steps with rail. Plan:mobility training

## 2022-05-11 NOTE — ED CDU PROVIDER INITIAL DAY NOTE - PROGRESS NOTE DETAILS
Pt seen by PT reuben , unable to performed walking with walker .   called Orthopedic PA and PT is presents , explained finding By PT . ortho will get back to us

## 2022-05-11 NOTE — CONSULT NOTE ADULT - NS ATTEND AMEND GEN_ALL_CORE FT
Orthopaedic Trauma Surgeon Addendum:    I have personally performed a face-to-face diagnostic evaluation on this patient.  I have reviewed the physician assistant note and agree with the history, exam, and plan of care, except as noted.    Will follow up with Primary surgeon to discuss.    Timi Huerta MD  Orthopaedic Trauma Surgeon  [facility] [place]

## 2022-05-11 NOTE — CONSULT NOTE ADULT - ASSESSMENT
The patient is a 56 year old male with a past medical history of anxiety, depression and hyperlipidemia status post recent Right total knee replacement in 03/15/22 at [facility] who presented to the ER after a fall at home. Admitted for surgical repair of right quad tendon rupture in AM by ortho. Medicine team consulted for optimization.     Assessment/Plan:    1. Tendon rupture Right  - Recent TKR 03/22  - Pain controlled  - Knee Immobilizer  - Pre-op EKG ordered   - NPO for surgical repair in AM by ortho    2. Left shoulder pain/decreased ROm  - Xray of the left shoulder    3. Hyperlipidemia  - On Statin    4. Anxiety/Depression  - Trazodone  - Wellbutrin  - Lexapro  - Buspar    VTe- Held for the OR    Patient is of moderate risk, medically optimized for planned surgery with no absolute medical contraindications.

## 2022-05-11 NOTE — ED CDU PROVIDER DISPOSITION NOTE - ATTENDING CONTRIBUTION TO CARE
I agree with the PA's note and was available for any issues/concerns. I was directly involved in patient care. My brief overall assessment is as follows:    orthopedics recommend admission

## 2022-05-12 ENCOUNTER — TRANSCRIPTION ENCOUNTER (OUTPATIENT)
Age: 57
End: 2022-05-12

## 2022-05-12 LAB — ABO RH CONFIRMATION: SIGNIFICANT CHANGE UP

## 2022-05-12 PROCEDURE — 99232 SBSQ HOSP IP/OBS MODERATE 35: CPT

## 2022-05-12 RX ORDER — ASPIRIN/CALCIUM CARB/MAGNESIUM 324 MG
1 TABLET ORAL
Qty: 60 | Refills: 0
Start: 2022-05-12 | End: 2022-06-10

## 2022-05-12 RX ORDER — OXYCODONE HYDROCHLORIDE 5 MG/1
1 TABLET ORAL
Qty: 0 | Refills: 0 | DISCHARGE

## 2022-05-12 RX ORDER — BUPROPION HYDROCHLORIDE 150 MG/1
150 TABLET, EXTENDED RELEASE ORAL DAILY
Refills: 0 | Status: DISCONTINUED | OUTPATIENT
Start: 2022-05-12 | End: 2022-05-13

## 2022-05-12 RX ORDER — TRAZODONE HCL 50 MG
150 TABLET ORAL AT BEDTIME
Refills: 0 | Status: DISCONTINUED | OUTPATIENT
Start: 2022-05-12 | End: 2022-05-13

## 2022-05-12 RX ORDER — HYDRALAZINE HCL 50 MG
10 TABLET ORAL ONCE
Refills: 0 | Status: COMPLETED | OUTPATIENT
Start: 2022-05-12 | End: 2022-05-12

## 2022-05-12 RX ORDER — SODIUM CHLORIDE 9 MG/ML
1000 INJECTION INTRAMUSCULAR; INTRAVENOUS; SUBCUTANEOUS
Refills: 0 | Status: DISCONTINUED | OUTPATIENT
Start: 2022-05-13 | End: 2022-05-13

## 2022-05-12 RX ORDER — OXYCODONE HYDROCHLORIDE 5 MG/1
5 TABLET ORAL EVERY 4 HOURS
Refills: 0 | Status: DISCONTINUED | OUTPATIENT
Start: 2022-05-12 | End: 2022-05-13

## 2022-05-12 RX ORDER — TRAMADOL HYDROCHLORIDE 50 MG/1
50 TABLET ORAL EVERY 4 HOURS
Refills: 0 | Status: DISCONTINUED | OUTPATIENT
Start: 2022-05-12 | End: 2022-05-13

## 2022-05-12 RX ORDER — ASPIRIN/CALCIUM CARB/MAGNESIUM 324 MG
325 TABLET ORAL
Refills: 0 | Status: DISCONTINUED | OUTPATIENT
Start: 2022-05-12 | End: 2022-05-13

## 2022-05-12 RX ORDER — SODIUM CHLORIDE 9 MG/ML
1000 INJECTION, SOLUTION INTRAVENOUS
Refills: 0 | Status: DISCONTINUED | OUTPATIENT
Start: 2022-05-12 | End: 2022-05-12

## 2022-05-12 RX ORDER — HYDROMORPHONE HYDROCHLORIDE 2 MG/ML
0.5 INJECTION INTRAMUSCULAR; INTRAVENOUS; SUBCUTANEOUS
Refills: 0 | Status: DISCONTINUED | OUTPATIENT
Start: 2022-05-12 | End: 2022-05-13

## 2022-05-12 RX ORDER — HYDROMORPHONE HYDROCHLORIDE 2 MG/ML
0.5 INJECTION INTRAMUSCULAR; INTRAVENOUS; SUBCUTANEOUS
Refills: 0 | Status: DISCONTINUED | OUTPATIENT
Start: 2022-05-12 | End: 2022-05-12

## 2022-05-12 RX ORDER — MELOXICAM 15 MG/1
1 TABLET ORAL
Qty: 0 | Refills: 0 | DISCHARGE

## 2022-05-12 RX ORDER — ACETAMINOPHEN 500 MG
975 TABLET ORAL EVERY 8 HOURS
Refills: 0 | Status: DISCONTINUED | OUTPATIENT
Start: 2022-05-12 | End: 2022-05-13

## 2022-05-12 RX ORDER — ATORVASTATIN CALCIUM 80 MG/1
40 TABLET, FILM COATED ORAL AT BEDTIME
Refills: 0 | Status: DISCONTINUED | OUTPATIENT
Start: 2022-05-12 | End: 2022-05-13

## 2022-05-12 RX ORDER — ACETAMINOPHEN 500 MG
3 TABLET ORAL
Qty: 0 | Refills: 0 | DISCHARGE
Start: 2022-05-12

## 2022-05-12 RX ORDER — FENTANYL CITRATE 50 UG/ML
50 INJECTION INTRAVENOUS
Refills: 0 | Status: DISCONTINUED | OUTPATIENT
Start: 2022-05-12 | End: 2022-05-12

## 2022-05-12 RX ORDER — OXYCODONE HYDROCHLORIDE 5 MG/1
10 TABLET ORAL EVERY 4 HOURS
Refills: 0 | Status: DISCONTINUED | OUTPATIENT
Start: 2022-05-12 | End: 2022-05-13

## 2022-05-12 RX ORDER — ONDANSETRON 8 MG/1
4 TABLET, FILM COATED ORAL ONCE
Refills: 0 | Status: DISCONTINUED | OUTPATIENT
Start: 2022-05-12 | End: 2022-05-12

## 2022-05-12 RX ORDER — OXYCODONE HYDROCHLORIDE 5 MG/1
1 TABLET ORAL
Qty: 28 | Refills: 0
Start: 2022-05-12 | End: 2022-05-18

## 2022-05-12 RX ADMIN — HYDROMORPHONE HYDROCHLORIDE 0.5 MILLIGRAM(S): 2 INJECTION INTRAMUSCULAR; INTRAVENOUS; SUBCUTANEOUS at 11:26

## 2022-05-12 RX ADMIN — Medication 5 MILLIGRAM(S): at 05:29

## 2022-05-12 RX ADMIN — HYDROMORPHONE HYDROCHLORIDE 0.5 MILLIGRAM(S): 2 INJECTION INTRAMUSCULAR; INTRAVENOUS; SUBCUTANEOUS at 11:41

## 2022-05-12 RX ADMIN — ATORVASTATIN CALCIUM 40 MILLIGRAM(S): 80 TABLET, FILM COATED ORAL at 21:54

## 2022-05-12 RX ADMIN — OXYCODONE HYDROCHLORIDE 10 MILLIGRAM(S): 5 TABLET ORAL at 04:14

## 2022-05-12 RX ADMIN — MUPIROCIN 1 APPLICATION(S): 20 OINTMENT TOPICAL at 05:30

## 2022-05-12 RX ADMIN — OXYCODONE HYDROCHLORIDE 10 MILLIGRAM(S): 5 TABLET ORAL at 17:44

## 2022-05-12 RX ADMIN — Medication 150 MILLIGRAM(S): at 21:54

## 2022-05-12 RX ADMIN — OXYCODONE HYDROCHLORIDE 10 MILLIGRAM(S): 5 TABLET ORAL at 14:40

## 2022-05-12 RX ADMIN — OXYCODONE HYDROCHLORIDE 10 MILLIGRAM(S): 5 TABLET ORAL at 13:42

## 2022-05-12 RX ADMIN — FENTANYL CITRATE 50 MICROGRAM(S): 50 INJECTION INTRAVENOUS at 10:30

## 2022-05-12 RX ADMIN — OXYCODONE HYDROCHLORIDE 10 MILLIGRAM(S): 5 TABLET ORAL at 05:15

## 2022-05-12 RX ADMIN — HYDROMORPHONE HYDROCHLORIDE 0.5 MILLIGRAM(S): 2 INJECTION INTRAMUSCULAR; INTRAVENOUS; SUBCUTANEOUS at 21:25

## 2022-05-12 RX ADMIN — Medication 10 MILLIGRAM(S): at 19:35

## 2022-05-12 RX ADMIN — OXYCODONE HYDROCHLORIDE 10 MILLIGRAM(S): 5 TABLET ORAL at 22:20

## 2022-05-12 RX ADMIN — Medication 975 MILLIGRAM(S): at 21:54

## 2022-05-12 RX ADMIN — FENTANYL CITRATE 50 MICROGRAM(S): 50 INJECTION INTRAVENOUS at 10:31

## 2022-05-12 RX ADMIN — Medication 975 MILLIGRAM(S): at 22:51

## 2022-05-12 RX ADMIN — Medication 975 MILLIGRAM(S): at 14:40

## 2022-05-12 RX ADMIN — Medication 325 MILLIGRAM(S): at 17:45

## 2022-05-12 RX ADMIN — FENTANYL CITRATE 50 MICROGRAM(S): 50 INJECTION INTRAVENOUS at 10:25

## 2022-05-12 RX ADMIN — OXYCODONE HYDROCHLORIDE 10 MILLIGRAM(S): 5 TABLET ORAL at 18:40

## 2022-05-12 RX ADMIN — OXYCODONE HYDROCHLORIDE 10 MILLIGRAM(S): 5 TABLET ORAL at 21:53

## 2022-05-12 RX ADMIN — CHLORHEXIDINE GLUCONATE 1 APPLICATION(S): 213 SOLUTION TOPICAL at 05:27

## 2022-05-12 RX ADMIN — Medication 0.1 MILLIGRAM(S): at 17:45

## 2022-05-12 RX ADMIN — HYDROMORPHONE HYDROCHLORIDE 0.5 MILLIGRAM(S): 2 INJECTION INTRAMUSCULAR; INTRAVENOUS; SUBCUTANEOUS at 11:00

## 2022-05-12 RX ADMIN — SODIUM CHLORIDE 75 MILLILITER(S): 9 INJECTION INTRAMUSCULAR; INTRAVENOUS; SUBCUTANEOUS at 00:22

## 2022-05-12 RX ADMIN — Medication 5 MILLIGRAM(S): at 17:45

## 2022-05-12 RX ADMIN — HYDROMORPHONE HYDROCHLORIDE 0.5 MILLIGRAM(S): 2 INJECTION INTRAMUSCULAR; INTRAVENOUS; SUBCUTANEOUS at 20:56

## 2022-05-12 RX ADMIN — FENTANYL CITRATE 50 MICROGRAM(S): 50 INJECTION INTRAVENOUS at 10:45

## 2022-05-12 RX ADMIN — Medication 975 MILLIGRAM(S): at 13:42

## 2022-05-12 RX ADMIN — Medication 0.1 MILLIGRAM(S): at 05:28

## 2022-05-12 NOTE — PHYSICAL THERAPY INITIAL EVALUATION ADULT - MANUAL MUSCLE TESTING RESULTS, REHAB EVAL
right shoulder flex,elbow flex WFL; left shoulder 3-/5, elbow flex 4/5; left hip flex,knee ext, ankle df WFL; right hip/knee NT due to injury, ankle df WFL
LLE and b/l UE grossly 5/5

## 2022-05-12 NOTE — DISCHARGE NOTE PROVIDER - CARE PROVIDER_API CALL
Timi Huerta)  Orthopaedic Surgery  [address] [place] [address]  Phone: [phone]  Fax: [phone]  Follow Up Time:

## 2022-05-12 NOTE — DISCHARGE NOTE PROVIDER - HOSPITAL COURSE
The patient underwent a R quad tendon repair on 5/12/22.  The patient underwent the procedure and had no intra-operative complications. Post-operatively, the patient was seen by PT. The patient received ASPIRIN 325mg BID for DVTP. The patient received pain medications per orthopedic pain management protocol and the pain was appropriately controlled. The patient did not have any post-operative medical complications. The patient was discharged home in stable condition.

## 2022-05-12 NOTE — PRE-OP CHECKLIST - HEIGHT IN INCHES

## 2022-05-12 NOTE — PHYSICAL THERAPY INITIAL EVALUATION ADULT - GENERAL OBSERVATIONS, REHAB EVAL
Pt received in bed supine, + right KI and IV.
awake on stretcher on room air, +right knee immobilizer

## 2022-05-12 NOTE — PHYSICAL THERAPY INITIAL EVALUATION ADULT - IMPAIRMENTS FOUND, PT EVAL
gait, locomotion, and balance/muscle strength
aerobic capacity/endurance/gait, locomotion, and balance/ROM

## 2022-05-12 NOTE — PATIENT PROFILE ADULT - FALL HARM RISK - HARM RISK INTERVENTIONS
Assistance with ambulation/Assistance OOB with selected safe patient handling equipment/Communicate Risk of Fall with Harm to all staff/Discuss with provider need for PT consult/Monitor gait and stability/Reinforce activity limits and safety measures with patient and family/Tailored Fall Risk Interventions/Visual Cue: Yellow wristband and red socks/Bed in lowest position, wheels locked, appropriate side rails in place/Call bell, personal items and telephone in reach/Instruct patient to call for assistance before getting out of bed or chair/Non-slip footwear when patient is out of bed/[place] to call system/Physically safe environment - no spills, clutter or unnecessary equipment/Purposeful Proactive Rounding/Room/bathroom lighting operational, light cord in reach

## 2022-05-12 NOTE — BRIEF OPERATIVE NOTE - NSICDXBRIEFPROCEDURE_GEN_ALL_CORE_FT
PROCEDURES:  Arthrotomy of knee with washout 12-May-2022 09:32:58  Timi Huerta  Repair of right quadriceps 12-May-2022 09:33:19  Timi Huerta

## 2022-05-12 NOTE — PHYSICAL THERAPY INITIAL EVALUATION ADULT - ADDITIONAL COMMENTS
pt states he lives with 2 friends in a 2-story house with 2 platform steps to enter (no rail) then 10 to second floor (+rail) bedroom. pt was independent amb without device. has RW from recent TKR. Pt states he can stay on the first floor when he goes home.
pt states he lives with 2 friends in a 2-story house with 2 platform steps to enter (no rail) then 10 to second floor (+rail) bedroom. pt was independent amb without device. has RW from recent TKR.

## 2022-05-12 NOTE — PHYSICAL THERAPY INITIAL EVALUATION ADULT - ACTIVE RANGE OF MOTION EXAMINATION, REHAB EVAL
left shoulder limited by pain from ?reaggrevation of injury, elbow WFL; right hip/ankle WFL, knee NT due to injury/immobilizer/Right UE Active ROM was WFL (within functional limits)/Left LE Active ROM was WFL (within functional limits)
RLE NT/bilateral upper extremity Active ROM was WFL (within functional limits)/bilateral  lower extremity Active ROM was WFL (within functional limits)/deficits as listed below

## 2022-05-12 NOTE — DISCHARGE NOTE PROVIDER - NSDCFUADDINST_GEN_ALL_CORE_FT
The patient will be seen in the office between 2-3 weeks for wound check. Sutures/Staples/Tape will be removed at that time. Patient may shower after post-op day #5. The dressing is to be removed on day # 3. The patient will contact the office if the wound becomes red, has increasing pain, develops bleeding or discharge, an injury occurs, or has other concerns. The patient will continue PT consistent with total knee replacement. The patient will continue ASPIRIN 325 BID x 6 weeks for DVTP. The patient will take oxycodone for pain control and titrate according to prescription and patient needs. The patient is FULL weight bearing in a knee immobilizer at all times. Elevation of the lower leg is recommended to reduce swelling.

## 2022-05-12 NOTE — BRIEF OPERATIVE NOTE - COMMENTS
post-op plan: WBAT in immobilizer, PT/OT, ancef per protocol, contralateral SCD, ASA x 4 weeks post-op

## 2022-05-12 NOTE — PHYSICAL THERAPY INITIAL EVALUATION ADULT - PERTINENT HX OF CURRENT PROBLEM, REHAB EVAL
55 y/o male wit PMHx of Alcohol abuse (clean x2 years), Anxiety, and Depression s/p right knee replacement presents to ED c/o right knee pain. Patient reports he was walking down the steps in his backyard and slipped, twisting his right knee. pt with right quad tendon rupture.
57 y/o male wit PMHx of Alcohol abuse (clean x2 years), Anxiety, and Depression s/p right knee replacement presents to ED c/o right knee pain. Patient reports he was walking down the steps in his backyard and slipped, twisting his right knee. pt with right quad tendon rupture.

## 2022-05-12 NOTE — DISCHARGE NOTE PROVIDER - NSDCMRMEDTOKEN_GEN_ALL_CORE_FT
acetaminophen 325 mg oral tablet: 3 tab(s) orally every 8 hours  aspirin 325 mg oral tablet: 1 tab(s) orally 2 times a day for DVT ppx  atorvastatin 40 mg oral tablet: 1 tab(s) orally once a day  buPROPion 300 mg/24 hours (XL) oral tablet, extended release: 1 tab(s) orally once a day (in the morning)  busPIRone 5 mg oral tablet: 1 tab(s) orally 2 times a day  cloNIDine 0.1 mg oral tablet: 1 tab(s) orally 2 times a day  oxyCODONE 5 mg oral tablet: 1-2 tab(s) orally every 6 hours, As Needed MDD:4  traZODone 150 mg oral tablet: 1 tab(s) orally once a day (at bedtime)

## 2022-05-12 NOTE — PHYSICAL THERAPY INITIAL EVALUATION ADULT - WEIGHT-BEARING RESTRICTIONS: SIT/STAND, REHAB EVAL
RLE KI/weight-bearing as tolerated
right LE with knee immobilizer/weight-bearing as tolerated
Thrombocytopenia

## 2022-05-12 NOTE — PHYSICAL THERAPY INITIAL EVALUATION ADULT - PLANNED THERAPY INTERVENTIONS, PT EVAL
bed mobility training/gait training/strengthening/transfer training
stairs/balance training/bed mobility training/gait training/transfer training

## 2022-05-13 ENCOUNTER — TRANSCRIPTION ENCOUNTER (OUTPATIENT)
Age: 57
End: 2022-05-13

## 2022-05-13 VITALS
HEART RATE: 78 BPM | TEMPERATURE: 98 F | OXYGEN SATURATION: 94 % | DIASTOLIC BLOOD PRESSURE: 88 MMHG | SYSTOLIC BLOOD PRESSURE: 152 MMHG | RESPIRATION RATE: 18 BRPM

## 2022-05-13 LAB
ANION GAP SERPL CALC-SCNC: 14 MMOL/L — SIGNIFICANT CHANGE UP (ref 5–17)
BASOPHILS # BLD AUTO: 0.03 K/UL — SIGNIFICANT CHANGE UP (ref 0–0.2)
BASOPHILS NFR BLD AUTO: 0.3 % — SIGNIFICANT CHANGE UP (ref 0–2)
BUN SERPL-MCNC: 17.9 MG/DL — SIGNIFICANT CHANGE UP (ref 8–20)
CALCIUM SERPL-MCNC: 8.7 MG/DL — SIGNIFICANT CHANGE UP (ref 8.6–10.2)
CHLORIDE SERPL-SCNC: 98 MMOL/L — SIGNIFICANT CHANGE UP (ref 98–107)
CO2 SERPL-SCNC: 24 MMOL/L — SIGNIFICANT CHANGE UP (ref 22–29)
CREAT SERPL-MCNC: 0.73 MG/DL — SIGNIFICANT CHANGE UP (ref 0.5–1.3)
EGFR: 106 ML/MIN/1.73M2 — SIGNIFICANT CHANGE UP
EOSINOPHIL # BLD AUTO: 0.17 K/UL — SIGNIFICANT CHANGE UP (ref 0–0.5)
EOSINOPHIL NFR BLD AUTO: 1.7 % — SIGNIFICANT CHANGE UP (ref 0–6)
GLUCOSE SERPL-MCNC: 159 MG/DL — HIGH (ref 70–99)
HCT VFR BLD CALC: 36.1 % — LOW (ref 39–50)
HCV AB S/CO SERPL IA: 0.13 S/CO — SIGNIFICANT CHANGE UP (ref 0–0.99)
HCV AB SERPL-IMP: SIGNIFICANT CHANGE UP
HGB BLD-MCNC: 11.5 G/DL — LOW (ref 13–17)
IMM GRANULOCYTES NFR BLD AUTO: 0.3 % — SIGNIFICANT CHANGE UP (ref 0–1.5)
LYMPHOCYTES # BLD AUTO: 1.99 K/UL — SIGNIFICANT CHANGE UP (ref 1–3.3)
LYMPHOCYTES # BLD AUTO: 19.5 % — SIGNIFICANT CHANGE UP (ref 13–44)
MCHC RBC-ENTMCNC: 29.9 PG — SIGNIFICANT CHANGE UP (ref 27–34)
MCHC RBC-ENTMCNC: 31.9 GM/DL — LOW (ref 32–36)
MCV RBC AUTO: 93.8 FL — SIGNIFICANT CHANGE UP (ref 80–100)
MONOCYTES # BLD AUTO: 0.95 K/UL — HIGH (ref 0–0.9)
MONOCYTES NFR BLD AUTO: 9.3 % — SIGNIFICANT CHANGE UP (ref 2–14)
NEUTROPHILS # BLD AUTO: 7.02 K/UL — SIGNIFICANT CHANGE UP (ref 1.8–7.4)
NEUTROPHILS NFR BLD AUTO: 68.9 % — SIGNIFICANT CHANGE UP (ref 43–77)
PLATELET # BLD AUTO: 292 K/UL — SIGNIFICANT CHANGE UP (ref 150–400)
POTASSIUM SERPL-MCNC: 3.8 MMOL/L — SIGNIFICANT CHANGE UP (ref 3.5–5.3)
POTASSIUM SERPL-SCNC: 3.8 MMOL/L — SIGNIFICANT CHANGE UP (ref 3.5–5.3)
RBC # BLD: 3.85 M/UL — LOW (ref 4.2–5.8)
RBC # FLD: 13.6 % — SIGNIFICANT CHANGE UP (ref 10.3–14.5)
SODIUM SERPL-SCNC: 136 MMOL/L — SIGNIFICANT CHANGE UP (ref 135–145)
WBC # BLD: 10.19 K/UL — SIGNIFICANT CHANGE UP (ref 3.8–10.5)
WBC # FLD AUTO: 10.19 K/UL — SIGNIFICANT CHANGE UP (ref 3.8–10.5)

## 2022-05-13 PROCEDURE — 99232 SBSQ HOSP IP/OBS MODERATE 35: CPT

## 2022-05-13 RX ADMIN — HYDROMORPHONE HYDROCHLORIDE 0.5 MILLIGRAM(S): 2 INJECTION INTRAMUSCULAR; INTRAVENOUS; SUBCUTANEOUS at 10:03

## 2022-05-13 RX ADMIN — BUPROPION HYDROCHLORIDE 150 MILLIGRAM(S): 150 TABLET, EXTENDED RELEASE ORAL at 08:53

## 2022-05-13 RX ADMIN — Medication 325 MILLIGRAM(S): at 05:38

## 2022-05-13 RX ADMIN — Medication 975 MILLIGRAM(S): at 13:41

## 2022-05-13 RX ADMIN — Medication 975 MILLIGRAM(S): at 06:09

## 2022-05-13 RX ADMIN — Medication 0.1 MILLIGRAM(S): at 05:38

## 2022-05-13 RX ADMIN — OXYCODONE HYDROCHLORIDE 10 MILLIGRAM(S): 5 TABLET ORAL at 08:52

## 2022-05-13 RX ADMIN — OXYCODONE HYDROCHLORIDE 10 MILLIGRAM(S): 5 TABLET ORAL at 03:58

## 2022-05-13 RX ADMIN — Medication 5 MILLIGRAM(S): at 05:38

## 2022-05-13 RX ADMIN — OXYCODONE HYDROCHLORIDE 10 MILLIGRAM(S): 5 TABLET ORAL at 03:30

## 2022-05-13 RX ADMIN — OXYCODONE HYDROCHLORIDE 10 MILLIGRAM(S): 5 TABLET ORAL at 12:35

## 2022-05-13 RX ADMIN — HYDROMORPHONE HYDROCHLORIDE 0.5 MILLIGRAM(S): 2 INJECTION INTRAMUSCULAR; INTRAVENOUS; SUBCUTANEOUS at 13:41

## 2022-05-13 RX ADMIN — Medication 975 MILLIGRAM(S): at 05:38

## 2022-05-13 NOTE — DISCHARGE NOTE NURSING/CASE MANAGEMENT/SOCIAL WORK - NSDCPEFALRISK_GEN_ALL_CORE
For information on Fall & Injury Prevention, visit: [email or web address] OR  [email or web address] OR  https://www.cdc.gov/steadi/patient.html

## 2022-05-13 NOTE — DISCHARGE NOTE NURSING/CASE MANAGEMENT/SOCIAL WORK - PATIENT PORTAL LINK FT
You can access the FollowMyHealth Patient Portal offered by [facility] by registering at the following website: [email or web address]. By joining [facility]’s FollowMyHealth portal, you will also be able to view your health information using other applications (apps) compatible with our system.

## 2022-05-13 NOTE — PROGRESS NOTE ADULT - SUBJECTIVE AND OBJECTIVE BOX
no acute complaints  no chest pain/sob  pain controlled  ros negative     MEDICATIONS  (STANDING):  acetaminophen     Tablet .. 975 milliGRAM(s) Oral every 8 hours  aspirin 325 milliGRAM(s) Oral two times a day  atorvastatin 40 milliGRAM(s) Oral at bedtime  buPROPion XL (24-Hour) . 150 milliGRAM(s) Oral daily  busPIRone 5 milliGRAM(s) Oral two times a day  cloNIDine 0.1 milliGRAM(s) Oral two times a day  traZODone 150 milliGRAM(s) Oral at bedtime    MEDICATIONS  (PRN):  HYDROmorphone  Injectable 0.5 milliGRAM(s) IV Push every 3 hours PRN Breakthrough  oxyCODONE    IR 5 milliGRAM(s) Oral every 4 hours PRN Moderate Pain (4 - 6)  oxyCODONE    IR 10 milliGRAM(s) Oral every 4 hours PRN Severe Pain (7 - 10)  traMADol 50 milliGRAM(s) Oral every 4 hours PRN Mild Pain (1 - 3)      Allergies    shellfish. (Hives)      Vital Signs Last 24 Hrs  T(C): 36.6 (13 May 2022 04:40), Max: 36.7 (12 May 2022 12:07)  T(F): 97.8 (13 May 2022 04:40), Max: 98.1 (12 May 2022 16:59)  HR: 73 (13 May 2022 04:40) (69 - 87)  BP: 154/73 (13 May 2022 04:40) (154/73 - 183/94)  BP(mean): 100 (12 May 2022 11:41) (100 - 103)  RR: 16 (13 May 2022 04:40) (14 - 18)  SpO2: 94% (13 May 2022 04:40) (93% - 95%)    PHYSICAL EXAM:    GENERAL: NAD  CHEST/LUNG: Clear to ausculation bilaterally;  HEART: Regular rate and rhythm; S1 S2  ABDOMEN: Soft, Bowel sounds present  EXTREMITIES:  right LE in immobilizer and bandaged   NERVOUS SYSTEM:  Alert & Oriented X3  nonfocal neuro.  RLE limited exam 2/2 device. wiggles toes, sensation foot intact     LABS:                        11.5   10.19 )-----------( 292      ( 13 May 2022 10:15 )             36.1                 CAPILLARY BLOOD GLUCOSE            RADIOLOGY & ADDITIONAL TESTS:  
MOSES HANKINS  [id number]    Post-Op Check    History: 56y Male is status post R quad tendon repair POD # 1. Patient is doing well and is comfortable. The patient's pain is controlled using the prescribed pain medications. Denies nausea, vomiting, chest pain, shortness of breath, abdominal pain or fever. No new complaints.    Physical exam: The dressing is clean, dry and intact. No drainage or discharge. Knee immobilizer in place. The calf is supple nontender. Sensation to light touch is grossly intact distally. Extensor hallucis longus and flexor hallucis longus are intact. 2+ dorsalis pedis pulse. Capillary refill is less than 2 seconds.    Vital Signs Last 24 Hrs  T(C): 36.6 (13 May 2022 04:40), Max: 36.7 (12 May 2022 10:15)  T(F): 97.8 (13 May 2022 04:40), Max: 98.1 (12 May 2022 16:59)  HR: 73 (13 May 2022 04:40) (69 - 87)  BP: 154/73 (13 May 2022 04:40) (119/103 - 183/94)  BP(mean): 100 (12 May 2022 11:41) (90 - 117)  RR: 16 (13 May 2022 04:40) (14 - 18)  SpO2: 94% (13 May 2022 04:40) (93% - 95%)        Primary Orthopedic Assessment:  • 56y Male is status post R quad tendon repair POD # 1      Plan:   • Pain control as clinically indicated  • DVT prophylaxis as prescribed, including use of compression devices and ankle pumps  • PT eval WBAT in KI  • Weightbearing as tolerated of the right lower extremity in knee immobilizer at all times with assistance of a walker or crutches  • Incentive spirometry encouraged  • Discharge planning – anticipated discharge is for home today    
Anesthesia chart review    56y Male s/f right quadriceps tendon repair    shellfish. (Hives)      Strain of unspecified quadriceps muscle, fascia and tendon, initial encounter    No pertinent family history in first degree relatives    MEWS Score    Depression    Anxiety    Alcohol abuse    Overdose    No pertinent past medical history    Quadriceps tendon rupture    Rupture of right quadriceps tendon    ACL (anterior cruciate ligament) tear, left, initial encounter    FALL    63    [email or web address]        HPI:  see consult from earlier today (11 May 2022 11:43)      PAST MEDICAL & SURGICAL HISTORY:  Depression      Anxiety      Alcohol abuse      Overdose  heroine and xanax 3/10/2015      ACL (anterior cruciate ligament) tear, left, initial encounter          MEDICATIONS  (STANDING):  buPROPion XL (24-Hour) 300 milliGRAM(s) Oral daily  busPIRone 5 milliGRAM(s) Oral every 12 hours  chlorhexidine 2% Cloths 1 Application(s) Topical every 12 hours  cloNIDine 0.1 milliGRAM(s) Oral every 12 hours  enoxaparin Injectable 40 milliGRAM(s) SubCutaneous once  escitalopram 10 milliGRAM(s) Oral daily  mupirocin 2% Ointment 1 Application(s) Both Nostrils two times a day  povidone iodine 5% Nasal Swab 1 Application(s) Both Nostrils once  sodium chloride 0.9%. 1000 milliLiter(s) (75 mL/Hr) IV Continuous <Continuous>  traZODone 50 milliGRAM(s) Oral at bedtime    MEDICATIONS  (PRN):  oxyCODONE    IR 10 milliGRAM(s) Oral every 8 hours PRN Severe Pain (7 - 10)      Allergies    shellfish. (Hives)    Intolerances        SOCIAL HISTORY:    FAMILY HISTORY:  No pertinent family history in first degree relatives        Vital Signs Last 24 Hrs  T(C): 36.7 (11 May 2022 11:14), Max: 36.7 (11 May 2022 11:14)  T(F): 98.1 (11 May 2022 11:14), Max: 98.1 (11 May 2022 11:14)  HR: 62 (11 May 2022 11:14) (60 - 63)  BP: 158/79 (11 May 2022 11:14) (101/62 - 158/79)  BP(mean): --  RR: 19 (11 May 2022 11:14) (18 - 19)  SpO2: 95% (11 May 2022 11:14) (95% - 95%)          LABS:                        11.6   7.96  )-----------( 280      ( 11 May 2022 00:50 )             36.1     05-11    137  |  101  |  22.6<H>  ----------------------------<  102<H>  4.6   |  23.0  |  0.85    Ca    8.7      11 May 2022 00:50    TPro  7.4  /  Alb  3.9  /  TBili  <0.2<L>  /  DBili  x   /  AST  18  /  ALT  14  /  AlkPhos  105  05-11    PT/INR - ( 11 May 2022 00:50 )   PT: 12.4 sec;   INR: 1.07 ratio         PTT - ( 11 May 2022 00:50 )  PTT:29.6 sec      RADIOLOGY & ADDITIONAL STUDIES:    Patient is an ASA class  3      plan: GA    final plan per anesthesia attending on day of surgery    
MOSES HANKINS    [id number]    Post-Op Check    History: 56y Male is status post R quad tendon repair POD # 0. Patient is doing well and is comfortable. The patient's pain is controlled using the prescribed pain medications. The patient is awaiting post-op physical therapy eval. Denies nausea, vomiting, chest pain, shortness of breath, abdominal pain or fever. No new complaints.                            11.6   7.96  )-----------( 280      ( 11 May 2022 00:50 )             36.1     05-11    137  |  101  |  22.6<H>  ----------------------------<  102<H>  4.6   |  23.0  |  0.85    Ca    8.7      11 May 2022 00:50    TPro  7.4  /  Alb  3.9  /  TBili  <0.2<L>  /  DBili  x   /  AST  18  /  ALT  14  /  AlkPhos  105  05-11      MEDICATIONS  (STANDING):  acetaminophen     Tablet .. 975 milliGRAM(s) Oral every 8 hours  aspirin 325 milliGRAM(s) Oral two times a day    MEDICATIONS  (PRN):  oxyCODONE    IR 5 milliGRAM(s) Oral every 4 hours PRN Moderate Pain (4 - 6)  oxyCODONE    IR 10 milliGRAM(s) Oral every 4 hours PRN Severe Pain (7 - 10)  traMADol 50 milliGRAM(s) Oral every 4 hours PRN Mild Pain (1 - 3)      Physical exam: The dressing is clean, dry and intact. No drainage or discharge. Knee immobilizer in place. The calf is supple nontender. Sensation to light touch is grossly intact distally. Extensor hallucis longus and flexor hallucis longus are intact. 2+ dorsalis pedis pulse. Capillary refill is less than 2 seconds.    Primary Orthopedic Assessment:  • 56y Male is status post R quad tendon repair POD # 0    Secondary  Orthopedic Assessment(s):   •     Secondary  Medical Assessment(s):   •     Plan:   • Pain control as clinically indicated  • DVT prophylaxis as prescribed, including use of compression devices and ankle pumps  • Follow-up PT eval.  • Weightbearing as tolerated of the right lower extremity in knee immobilizer at all times with assistance of a walker or crutches  • Incentive spirometry encouraged  • Discharge planning – anticipated discharge is for home today
  seen in pacu post op  complaining of pain.   no chest pain/sob  ros negative      MEDICATIONS  (STANDING):  HYDROmorphone  Injectable 0.5 milliGRAM(s) IV Push every 15 minutes  lactated ringers. 1000 milliLiter(s) (75 mL/Hr) IV Continuous <Continuous>    MEDICATIONS  (PRN):  fentaNYL    Injectable 50 MICROGram(s) IV Push every 5 minutes PRN Severe Pain (7 - 10)  ondansetron Injectable 4 milliGRAM(s) IV Push once PRN Nausea and/or Vomiting      Allergies    shellfish. (Hives)      Vital Signs Last 24 Hrs  T(C): 36.7 (12 May 2022 10:15), Max: 37.1 (11 May 2022 17:50)  T(F): 98 (12 May 2022 10:15), Max: 98.8 (11 May 2022 17:50)  HR: 70 (12 May 2022 11:00) (66 - 73)  BP: 166/82 (12 May 2022 11:00) (112/67 - 166/82)  BP(mean): 103 (12 May 2022 11:00) (90 - 117)  RR: 14 (12 May 2022 11:00) (14 - 20)  SpO2: 95% (12 May 2022 11:00) (92% - 98%)    PHYSICAL EXAM:    GENERAL: NAD  CHEST/LUNG: Clear to ausculation bilaterally  HEART: Regular rate and rhythm; S1 S2  ABDOMEN: Soft, Bowel sounds present  EXTREMITIES:  RLE immobilizer in place   NERVOUS SYSTEM:  Alert & Oriented X3, sensation intact b/l LE, wiggles toes    LABS:                        11.6   7.96  )-----------( 280      ( 11 May 2022 00:50 )             36.1     05-11    137  |  101  |  22.6<H>  ----------------------------<  102<H>  4.6   |  23.0  |  0.85    Ca    8.7      11 May 2022 00:50    TPro  7.4  /  Alb  3.9  /  TBili  <0.2<L>  /  DBili  x   /  AST  18  /  ALT  14  /  AlkPhos  105  05-11    PT/INR - ( 11 May 2022 00:50 )   PT: 12.4 sec;   INR: 1.07 ratio         PTT - ( 11 May 2022 00:50 )  PTT:29.6 sec      CAPILLARY BLOOD GLUCOSE            RADIOLOGY & ADDITIONAL TESTS:

## 2022-05-13 NOTE — DISCHARGE NOTE NURSING/CASE MANAGEMENT/SOCIAL WORK - NSDCVIVACCINE_GEN_ALL_CORE_FT
Tdap; 29-Feb-2020 23:58; Lauren Delacruz (TASHIA); [employer]; [id number] (Exp. Date: 19-Mar-2022); IntraMuscular; Deltoid Right.; 0.5 milliLiter(s); VIS (VIS Published: 09-May-2013, VIS Presented: 29-Feb-2020);

## 2022-05-13 NOTE — PROGRESS NOTE ADULT - ASSESSMENT
56 year old male with a past medical history of anxiety, depression and hyperlipidemia status post recent Right total knee replacement in 03/15/22 at [facility] who presented to the ER after a fall at home. Admitted for surgical repair of right quad tendon rupture in AM by ortho. Medicine team consulted for optimization.     1. Tendon rupture Right  - Recent TKR 03/22  - 5/12 s/p Arthrotomy of knee with washout and Repair of right quadriceps  - Knee Immobilizer  - pain control and dvt ppx per primary orthopedics service    2. Left shoulder pain/decreased ROm  - Xray of the left shoulder: Widening of the AC joint to 2 cm, unchanged since 2020.    3. Hyperlipidemia  - On Statin    4. Anxiety/Depression  - Trazodone  - Wellbutrin  - Lexapro  - Buspar    daily labs  will follow 
56 year old male with a past medical history of anxiety, depression and hyperlipidemia status post recent Right total knee replacement in 03/15/22 at [facility] who presented to the ER after a fall at home. Admitted for surgical repair of right quad tendon rupture in AM by ortho. Medicine team consulted for optimization.     1. Tendon rupture Right  - Recent TKR 03/22  - 5/12 s/p Arthrotomy of knee with washout and Repair of right quadriceps  - Knee Immobilizer  - pain control and dvt ppx per primary orthopedics service    2. Left shoulder pain/decreased ROm  - Xray of the left shoulder: Widening of the AC joint to 2 cm, unchanged since 2020.    3. Hyperlipidemia  - On Statin    4. Anxiety/Depression  - Trazodone  - Wellbutrin  - Lexapro  - Buspar    HTN: clonidine.     medically stable for dc.

## 2022-05-18 DIAGNOSIS — M25.569 PAIN IN UNSPECIFIED KNEE: ICD-10-CM

## 2022-05-24 ENCOUNTER — APPOINTMENT (OUTPATIENT)
Dept: ORTHOPEDIC SURGERY | Facility: CLINIC | Age: 57
End: 2022-05-24
Payer: MEDICAID

## 2022-05-24 VITALS — BODY MASS INDEX: 32.43 KG/M2 | WEIGHT: 214 LBS | HEIGHT: 68 IN | TEMPERATURE: 97.9 F

## 2022-05-24 PROCEDURE — 73560 X-RAY EXAM OF KNEE 1 OR 2: CPT | Mod: RT

## 2022-05-24 PROCEDURE — 99024 POSTOP FOLLOW-UP VISIT: CPT

## 2022-05-24 NOTE — HISTORY OF PRESENT ILLNESS
[5] : the patient reports pain that is 5/10 in severity [Chills] : no chills [Constipation] : no constipation [Diarrhea] : no diarrhea [Dysuria] : no dysuria [Fever] : no fever [Nausea] : no nausea [Vomiting] : no vomiting [Clean/Dry/Intact] : clean, dry and intact [Healed] : healed [Erythema] : not erythematous [Discharge] : absent of discharge [Swelling] : swollen [Neuro Intact] : an unremarkable neurological exam [Vascular Intact] : ~T peripheral vascular exam normal [Slow Progress] : is progressing slowly [No Sign of Infection] : is showing no signs of infection [Adequate Pain Control] : has adequate pain control [de-identified] : s/p right quadriceps tendon repair. Open reduction and internal fixation of right patellar fracture. 5/12/22 [de-identified] : Patient is a 56-year-old gentleman who presents today s/p right quadriceps tendon repair. Open reduction and internal fixation of right patellar fracture. 5/12/22.  He has been having issues with brace wear and inserts to keep sliding down.  He is having significant knee pain which he attributes to multiple recent surgeries on the knee. [de-identified] : Physical Exam:\par General: Well appearing, no acute distress, A&O\par Neurologic: A&Ox3, No focal deficits\par Head: NCAT without abrasions, lacerations, or ecchymosis to head, face, or scalp\par Respiratory: Equal chest wall expansion bilaterally, no accessory muscle use\par Lymphatic: No lymphadenopathy palpated\par Skin: Warm and dry\par Psychiatric: Normal mood and affect\par \par RLE:\par SILT s/s/sp/dp/t\par Fires EHL/FHL/GS/TA/quads\par 2+ DP/PT pulse\par brisk capillary refill [de-identified] : Plain films of the right knee today show no change in alignment from postoperative imaging [de-identified] : The patient's had multiple surgeries in the last year on his knee including a total knee replacement and this recent quad tendon/patella fracture repair.  His extensor mechanism appears intact but he is having some pain so I am concerned.  Due to this we will go quite slowly with the range of motion protocol and we will start moving him in 2 weeks per the protocol.  He will come back in a month with repeat x-rays.

## 2022-05-30 PROCEDURE — 80053 COMPREHEN METABOLIC PANEL: CPT

## 2022-05-30 PROCEDURE — 80048 BASIC METABOLIC PNL TOTAL CA: CPT

## 2022-05-30 PROCEDURE — 86900 BLOOD TYPING SEROLOGIC ABO: CPT

## 2022-05-30 PROCEDURE — 86901 BLOOD TYPING SEROLOGIC RH(D): CPT

## 2022-05-30 PROCEDURE — 73030 X-RAY EXAM OF SHOULDER: CPT

## 2022-05-30 PROCEDURE — 85610 PROTHROMBIN TIME: CPT

## 2022-05-30 PROCEDURE — 99285 EMERGENCY DEPT VISIT HI MDM: CPT

## 2022-05-30 PROCEDURE — U0003: CPT

## 2022-05-30 PROCEDURE — 73564 X-RAY EXAM KNEE 4 OR MORE: CPT

## 2022-05-30 PROCEDURE — 36415 COLL VENOUS BLD VENIPUNCTURE: CPT

## 2022-05-30 PROCEDURE — 85025 COMPLETE CBC W/AUTO DIFF WBC: CPT

## 2022-05-30 PROCEDURE — 73590 X-RAY EXAM OF LOWER LEG: CPT

## 2022-05-30 PROCEDURE — G0378: CPT

## 2022-05-30 PROCEDURE — 85730 THROMBOPLASTIN TIME PARTIAL: CPT

## 2022-05-30 PROCEDURE — 93005 ELECTROCARDIOGRAM TRACING: CPT

## 2022-05-30 PROCEDURE — 73552 X-RAY EXAM OF FEMUR 2/>: CPT

## 2022-05-30 PROCEDURE — 97163 PT EVAL HIGH COMPLEX 45 MIN: CPT

## 2022-05-30 PROCEDURE — U0005: CPT

## 2022-05-30 PROCEDURE — 86850 RBC ANTIBODY SCREEN: CPT

## 2022-05-30 PROCEDURE — 86803 HEPATITIS C AB TEST: CPT

## 2022-06-13 ENCOUNTER — TRANSCRIPTION ENCOUNTER (OUTPATIENT)
Age: 57
End: 2022-06-13

## 2022-06-13 ENCOUNTER — INPATIENT (INPATIENT)
Facility: HOSPITAL | Age: 57
LOS: 8 days | Discharge: NURSING, REHAB OR LONG-TERM CARE | DRG: 464 | End: 2022-06-22
Attending: ORTHOPAEDIC SURGERY | Admitting: ORTHOPAEDIC SURGERY
Payer: COMMERCIAL

## 2022-06-13 VITALS
OXYGEN SATURATION: 95 % | SYSTOLIC BLOOD PRESSURE: 114 MMHG | HEIGHT: 68 IN | TEMPERATURE: 99 F | RESPIRATION RATE: 18 BRPM | DIASTOLIC BLOOD PRESSURE: 68 MMHG | HEART RATE: 95 BPM | WEIGHT: 214.07 LBS

## 2022-06-13 DIAGNOSIS — M00.9 PYOGENIC ARTHRITIS, UNSPECIFIED: ICD-10-CM

## 2022-06-13 DIAGNOSIS — S83.512A SPRAIN OF ANTERIOR CRUCIATE LIGAMENT OF LEFT KNEE, INITIAL ENCOUNTER: Chronic | ICD-10-CM

## 2022-06-13 DIAGNOSIS — M25.569 PAIN IN UNSPECIFIED KNEE: ICD-10-CM

## 2022-06-13 LAB
ANION GAP SERPL CALC-SCNC: 11 MMOL/L — SIGNIFICANT CHANGE UP (ref 5–17)
B PERT IGG+IGM PNL SER: ABNORMAL
BUN SERPL-MCNC: 20.1 MG/DL — HIGH (ref 8–20)
CALCIUM SERPL-MCNC: 8.4 MG/DL — LOW (ref 8.6–10.2)
CHLORIDE SERPL-SCNC: 100 MMOL/L — SIGNIFICANT CHANGE UP (ref 98–107)
CO2 SERPL-SCNC: 25 MMOL/L — SIGNIFICANT CHANGE UP (ref 22–29)
COLOR FLD: ABNORMAL
CREAT SERPL-MCNC: 0.86 MG/DL — SIGNIFICANT CHANGE UP (ref 0.5–1.3)
CRP SERPL-MCNC: 72 MG/L — HIGH
EGFR: 101 ML/MIN/1.73M2 — SIGNIFICANT CHANGE UP
FLUID INTAKE SUBSTANCE CLASS: SIGNIFICANT CHANGE UP
GLUCOSE SERPL-MCNC: 105 MG/DL — HIGH (ref 70–99)
HCT VFR BLD CALC: 34.4 % — LOW (ref 39–50)
HGB BLD-MCNC: 11 G/DL — LOW (ref 13–17)
INR BLD: 1.11 RATIO — SIGNIFICANT CHANGE UP (ref 0.88–1.16)
LYMPHOCYTES # FLD: 7 % — SIGNIFICANT CHANGE UP
MCHC RBC-ENTMCNC: 30.1 PG — SIGNIFICANT CHANGE UP (ref 27–34)
MCHC RBC-ENTMCNC: 32 GM/DL — SIGNIFICANT CHANGE UP (ref 32–36)
MCV RBC AUTO: 94.2 FL — SIGNIFICANT CHANGE UP (ref 80–100)
MONOS+MACROS # FLD: 4 % — SIGNIFICANT CHANGE UP
NEUTROPHILS-BODY FLUID: 89 % — SIGNIFICANT CHANGE UP
PLATELET # BLD AUTO: 268 K/UL — SIGNIFICANT CHANGE UP (ref 150–400)
POTASSIUM SERPL-MCNC: 4.3 MMOL/L — SIGNIFICANT CHANGE UP (ref 3.5–5.3)
POTASSIUM SERPL-SCNC: 4.3 MMOL/L — SIGNIFICANT CHANGE UP (ref 3.5–5.3)
PROTHROM AB SERPL-ACNC: 12.9 SEC — SIGNIFICANT CHANGE UP (ref 10.5–13.4)
RBC # BLD: 3.65 M/UL — LOW (ref 4.2–5.8)
RBC # FLD: 13.2 % — SIGNIFICANT CHANGE UP (ref 10.3–14.5)
RCV VOL RI: HIGH /UL (ref 0–0)
SODIUM SERPL-SCNC: 136 MMOL/L — SIGNIFICANT CHANGE UP (ref 135–145)
SYNOVIAL CRYSTALS CLARITY: ABNORMAL
SYNOVIAL CRYSTALS COLOR: ABNORMAL
SYNOVIAL CRYSTALS ID: SIGNIFICANT CHANGE UP
SYNOVIAL CRYSTALS TUBE: SIGNIFICANT CHANGE UP
TOTAL NUCLEATED CELL COUNT, BODY FLUID: SIGNIFICANT CHANGE UP /UL
TUBE TYPE: SIGNIFICANT CHANGE UP
WBC # BLD: 11.34 K/UL — HIGH (ref 3.8–10.5)
WBC # FLD AUTO: 11.34 K/UL — HIGH (ref 3.8–10.5)

## 2022-06-13 PROCEDURE — 73562 X-RAY EXAM OF KNEE 3: CPT | Mod: 26,RT

## 2022-06-13 PROCEDURE — 71045 X-RAY EXAM CHEST 1 VIEW: CPT | Mod: 26,59

## 2022-06-13 PROCEDURE — 99284 EMERGENCY DEPT VISIT MOD MDM: CPT

## 2022-06-13 RX ORDER — CHLORHEXIDINE GLUCONATE 213 G/1000ML
1 SOLUTION TOPICAL
Refills: 0 | Status: DISCONTINUED | OUTPATIENT
Start: 2022-06-13 | End: 2022-06-14

## 2022-06-13 RX ORDER — POVIDONE-IODINE 5 %
1 AEROSOL (ML) TOPICAL ONCE
Refills: 0 | Status: COMPLETED | OUTPATIENT
Start: 2022-06-13 | End: 2022-06-14

## 2022-06-13 RX ORDER — MUPIROCIN 20 MG/G
1 OINTMENT TOPICAL
Refills: 0 | Status: DISCONTINUED | OUTPATIENT
Start: 2022-06-13 | End: 2022-06-14

## 2022-06-13 RX ORDER — MORPHINE SULFATE 50 MG/1
4 CAPSULE, EXTENDED RELEASE ORAL EVERY 4 HOURS
Refills: 0 | Status: DISCONTINUED | OUTPATIENT
Start: 2022-06-13 | End: 2022-06-14

## 2022-06-13 NOTE — ED STATDOCS - ATTENDING APP SHARED VISIT CONTRIBUTION OF CARE
I, Jose Perdomo, performed the initial face to face bedside interview with this patient regarding history of present illness, review of symptoms and relevant past medical, social and family history.  I completed an independent physical examination.  I was the initial provider who evaluated this patient. I have signed out the follow up of any pending tests (i.e. labs, radiological studies) to the KELSEY.  I have communicated the patient’s plan of care and disposition with the KELSEY.  The history, relevant review of systems, past medical and surgical history, medical decision making, and physical examination was documented by the scribe in my presence and I attest to the accuracy of the documentation.

## 2022-06-13 NOTE — H&P ADULT - NSHPPHYSICALEXAM_GEN_ALL_CORE
Appearance: Alert, responsive, in no acute distress.    Neurological: Sensation is grossly intact to light touch. 5/5 motor function of all extremities. No focal deficits or weaknesses found.    Skin: no rash on visible skin. Skin is clean, dry and intact. No bleeding. No abrasions. No ulcerations.    Vascular: 2+ distal pulses. Cap refill < 2 sec. No signs of venous insufficiency or stasis. No extremity ulcerations. No cyanosis.    Musculoskeletal:      Right Lower Extremity: positive healing incision with distal dehiscence and small amount of yellow discharge.  positive swelling to knee. ROM 10-90  mild erythema surrounding incision.

## 2022-06-13 NOTE — ED STATDOCS - OBJECTIVE STATEMENT
55 y/o male with PMHx of HTN, Depression, Anxiety, High cholesterol and right knee replacement 3x c/o right knee pain. Pt had recent right knee replaced 1 month ago by Dr. Timi Huerat (ortho).  States having infection since which has worsened with new brown discharge from site. Pt went to a wound care center and was given bactrim. States finished antibiotics course 3 days ago.

## 2022-06-13 NOTE — ED ADULT NURSE NOTE - ED STAT RN HANDOFF DETAILS
handoff and report given to Marjan at pt brought to observation unit at this time. pt handed off to observation nurse in stable condition. TENT 1

## 2022-06-13 NOTE — ED STATDOCS - PROGRESS NOTE DETAILS
ortho consulted reviewed lab work will admit to ortho pt again provided with information for [facility]; pt declines referrals for substance abuse treatment

## 2022-06-13 NOTE — H&P ADULT - HISTORY OF PRESENT ILLNESS
Patient is a 56y Male presenting to the emergency department with a chief complaint of right knee pain and swelling. Patient is s/p right quad tendon repair after total knee arthroplasty on 5/11/22 by Dr. Huerta.  patient has been seeing wound care, finished bactrim and states discharge to distal wound continues.  patient states no fevers or chills however today knee became more swollen and painful

## 2022-06-13 NOTE — H&P ADULT - NSHPREVIEWOFSYSTEMS_GEN_ALL_CORE
REVIEW OF SYSTEMS    General: Alert, responsive, in NAD    Skin/Breast: No rashes, no pruritis   	  Ophthalmologic: No visual changes. No redness.   	  ENMT:	No discharge. No swelling.    Respiratory and Thorax: No difficulty breathing. No cough.  	   Cardiovascular:	No chest pain. No palpitations.    Gastrointestinal:	 No abdominal pain. No diarrhea.     Genitourinary: No dysuria. No bleeding.    Musculoskeletal: SEE HPI.    Neurological: No sensory or motor changes.     Psychiatric: No anxiety or depression.    Hematology/Lymphatics: No swelling.    Endocrine: No Hx of diabetes.    ROS is otherwise negative.

## 2022-06-13 NOTE — H&P ADULT - NS ATTEND AMEND GEN_ALL_CORE FT
Orthopaedic Trauma Surgeon Addendum:    I have personally performed a face-to-face diagnostic evaluation on this patient.  I have reviewed the physician assistant note and agree with the history, exam, and plan of care, except as noted.  Knee is infected and needs wash out at a minimum.  If extensor mechanism is out again, will possible need full explant and static spacer.  Patient is considering his options.  Will update plan.     Timi Huerta MD  Orthopaedic Trauma Surgeon  [facility]

## 2022-06-13 NOTE — ED STATDOCS - NS_ATTENDINGSCRIBE_ED_ALL_ED
I have reviewed and confirmed nurses' notes...
I personally performed the service described in the documentation recorded by the scribe in my presence, and it accurately and completely records my words and actions.
Crescentic Advancement Flap Text: The defect edges were debeveled with a #15 scalpel blade.  Given the location of the defect and the proximity to free margins a crescentic advancement flap was deemed most appropriate.  Using a sterile surgical marker, the appropriate advancement flap was drawn incorporating the defect and placing the expected incisions within the relaxed skin tension lines where possible.    The area thus outlined was incised deep to adipose tissue with a #15 scalpel blade.  The skin margins were undermined to an appropriate distance in all directions utilizing iris scissors.

## 2022-06-14 ENCOUNTER — RESULT REVIEW (OUTPATIENT)
Age: 57
End: 2022-06-14

## 2022-06-14 ENCOUNTER — TRANSCRIPTION ENCOUNTER (OUTPATIENT)
Age: 57
End: 2022-06-14

## 2022-06-14 LAB
BLD GP AB SCN SERPL QL: SIGNIFICANT CHANGE UP
ERYTHROCYTE [SEDIMENTATION RATE] IN BLOOD: 39 MM/HR — HIGH (ref 0–20)
FLUAV AG NPH QL: SIGNIFICANT CHANGE UP
FLUBV AG NPH QL: SIGNIFICANT CHANGE UP
GRAM STN FLD: SIGNIFICANT CHANGE UP
RSV RNA NPH QL NAA+NON-PROBE: SIGNIFICANT CHANGE UP
SARS-COV-2 RNA SPEC QL NAA+PROBE: SIGNIFICANT CHANGE UP
SARS-COV-2 RNA SPEC QL NAA+PROBE: SIGNIFICANT CHANGE UP
SPECIMEN SOURCE: SIGNIFICANT CHANGE UP

## 2022-06-14 PROCEDURE — 99222 1ST HOSP IP/OBS MODERATE 55: CPT

## 2022-06-14 PROCEDURE — 99223 1ST HOSP IP/OBS HIGH 75: CPT

## 2022-06-14 PROCEDURE — 27386 REPAIR/GRAFT OF THIGH MUSCLE: CPT | Mod: 78,RT

## 2022-06-14 PROCEDURE — 11981 INSERTION DRUG DLVR IMPLANT: CPT | Mod: 78

## 2022-06-14 PROCEDURE — 11981 INSERTION DRUG DLVR IMPLANT: CPT | Mod: AS,78

## 2022-06-14 PROCEDURE — 88300 SURGICAL PATH GROSS: CPT | Mod: 26

## 2022-06-14 PROCEDURE — 27488 REMOVAL OF KNEE PROSTHESIS: CPT | Mod: 78,RT

## 2022-06-14 PROCEDURE — 12345: CPT | Mod: NC

## 2022-06-14 PROCEDURE — 27488 REMOVAL OF KNEE PROSTHESIS: CPT | Mod: AS,78,RT

## 2022-06-14 PROCEDURE — 27386 REPAIR/GRAFT OF THIGH MUSCLE: CPT | Mod: AS,78,RT

## 2022-06-14 DEVICE — CEMENT BONE PALACOS R W/O GENTAMICIN 1X40: Type: IMPLANTABLE DEVICE | Status: FUNCTIONAL

## 2022-06-14 DEVICE — IMPLANTABLE DEVICE: Type: IMPLANTABLE DEVICE | Status: FUNCTIONAL

## 2022-06-14 RX ORDER — LABETALOL HCL 100 MG
10 TABLET ORAL
Refills: 0 | Status: COMPLETED | OUTPATIENT
Start: 2022-06-14 | End: 2022-06-14

## 2022-06-14 RX ORDER — ACETAMINOPHEN 500 MG
975 TABLET ORAL EVERY 8 HOURS
Refills: 0 | Status: DISCONTINUED | OUTPATIENT
Start: 2022-06-14 | End: 2022-06-22

## 2022-06-14 RX ORDER — VANCOMYCIN HCL 1 G
1250 VIAL (EA) INTRAVENOUS EVERY 12 HOURS
Refills: 0 | Status: DISCONTINUED | OUTPATIENT
Start: 2022-06-15 | End: 2022-06-16

## 2022-06-14 RX ORDER — KETOROLAC TROMETHAMINE 30 MG/ML
15 SYRINGE (ML) INJECTION EVERY 6 HOURS
Refills: 0 | Status: DISCONTINUED | OUTPATIENT
Start: 2022-06-14 | End: 2022-06-15

## 2022-06-14 RX ORDER — TRAZODONE HCL 50 MG
150 TABLET ORAL AT BEDTIME
Refills: 0 | Status: DISCONTINUED | OUTPATIENT
Start: 2022-06-14 | End: 2022-06-22

## 2022-06-14 RX ORDER — ONDANSETRON 8 MG/1
4 TABLET, FILM COATED ORAL ONCE
Refills: 0 | Status: DISCONTINUED | OUTPATIENT
Start: 2022-06-14 | End: 2022-06-14

## 2022-06-14 RX ORDER — ACETAMINOPHEN 500 MG
1000 TABLET ORAL ONCE
Refills: 0 | Status: COMPLETED | OUTPATIENT
Start: 2022-06-14 | End: 2022-06-14

## 2022-06-14 RX ORDER — PANTOPRAZOLE SODIUM 20 MG/1
40 TABLET, DELAYED RELEASE ORAL
Refills: 0 | Status: DISCONTINUED | OUTPATIENT
Start: 2022-06-14 | End: 2022-06-22

## 2022-06-14 RX ORDER — ATORVASTATIN CALCIUM 80 MG/1
40 TABLET, FILM COATED ORAL AT BEDTIME
Refills: 0 | Status: DISCONTINUED | OUTPATIENT
Start: 2022-06-14 | End: 2022-06-22

## 2022-06-14 RX ORDER — SODIUM CHLORIDE 9 MG/ML
1000 INJECTION INTRAMUSCULAR; INTRAVENOUS; SUBCUTANEOUS
Refills: 0 | Status: DISCONTINUED | OUTPATIENT
Start: 2022-06-14 | End: 2022-06-14

## 2022-06-14 RX ORDER — BUPROPION HYDROCHLORIDE 150 MG/1
300 TABLET, EXTENDED RELEASE ORAL DAILY
Refills: 0 | Status: DISCONTINUED | OUTPATIENT
Start: 2022-06-14 | End: 2022-06-22

## 2022-06-14 RX ORDER — SODIUM CHLORIDE 9 MG/ML
500 INJECTION INTRAMUSCULAR; INTRAVENOUS; SUBCUTANEOUS ONCE
Refills: 0 | Status: COMPLETED | OUTPATIENT
Start: 2022-06-14 | End: 2022-06-14

## 2022-06-14 RX ORDER — SODIUM CHLORIDE 9 MG/ML
1000 INJECTION INTRAMUSCULAR; INTRAVENOUS; SUBCUTANEOUS
Refills: 0 | Status: DISCONTINUED | OUTPATIENT
Start: 2022-06-14 | End: 2022-06-15

## 2022-06-14 RX ORDER — VANCOMYCIN HCL 1 G
1250 VIAL (EA) INTRAVENOUS ONCE
Refills: 0 | Status: COMPLETED | OUTPATIENT
Start: 2022-06-14 | End: 2022-06-14

## 2022-06-14 RX ORDER — HYDROMORPHONE HYDROCHLORIDE 2 MG/ML
1 INJECTION INTRAMUSCULAR; INTRAVENOUS; SUBCUTANEOUS
Refills: 0 | Status: DISCONTINUED | OUTPATIENT
Start: 2022-06-14 | End: 2022-06-14

## 2022-06-14 RX ORDER — ASPIRIN/CALCIUM CARB/MAGNESIUM 324 MG
325 TABLET ORAL
Refills: 0 | Status: DISCONTINUED | OUTPATIENT
Start: 2022-06-15 | End: 2022-06-22

## 2022-06-14 RX ORDER — MAGNESIUM HYDROXIDE 400 MG/1
30 TABLET, CHEWABLE ORAL DAILY
Refills: 0 | Status: DISCONTINUED | OUTPATIENT
Start: 2022-06-14 | End: 2022-06-22

## 2022-06-14 RX ORDER — HYDRALAZINE HCL 50 MG
10 TABLET ORAL ONCE
Refills: 0 | Status: COMPLETED | OUTPATIENT
Start: 2022-06-14 | End: 2022-06-14

## 2022-06-14 RX ORDER — VANCOMYCIN HCL 1 G
VIAL (EA) INTRAVENOUS
Refills: 0 | Status: DISCONTINUED | OUTPATIENT
Start: 2022-06-14 | End: 2022-06-14

## 2022-06-14 RX ORDER — CEFAZOLIN SODIUM 1 G
2000 VIAL (EA) INJECTION EVERY 8 HOURS
Refills: 0 | Status: DISCONTINUED | OUTPATIENT
Start: 2022-06-14 | End: 2022-06-15

## 2022-06-14 RX ORDER — OXYCODONE HYDROCHLORIDE 5 MG/1
10 TABLET ORAL
Refills: 0 | Status: DISCONTINUED | OUTPATIENT
Start: 2022-06-14 | End: 2022-06-15

## 2022-06-14 RX ORDER — CELECOXIB 200 MG/1
200 CAPSULE ORAL EVERY 12 HOURS
Refills: 0 | Status: DISCONTINUED | OUTPATIENT
Start: 2022-06-16 | End: 2022-06-22

## 2022-06-14 RX ORDER — CEFAZOLIN SODIUM 1 G
2000 VIAL (EA) INJECTION ONCE
Refills: 0 | Status: COMPLETED | OUTPATIENT
Start: 2022-06-14 | End: 2022-06-14

## 2022-06-14 RX ORDER — ONDANSETRON 8 MG/1
4 TABLET, FILM COATED ORAL EVERY 6 HOURS
Refills: 0 | Status: DISCONTINUED | OUTPATIENT
Start: 2022-06-14 | End: 2022-06-22

## 2022-06-14 RX ORDER — OXYCODONE HYDROCHLORIDE 5 MG/1
5 TABLET ORAL
Refills: 0 | Status: DISCONTINUED | OUTPATIENT
Start: 2022-06-14 | End: 2022-06-15

## 2022-06-14 RX ORDER — CEFAZOLIN SODIUM 1 G
VIAL (EA) INJECTION
Refills: 0 | Status: DISCONTINUED | OUTPATIENT
Start: 2022-06-14 | End: 2022-06-15

## 2022-06-14 RX ORDER — HYDROMORPHONE HYDROCHLORIDE 2 MG/ML
0.5 INJECTION INTRAMUSCULAR; INTRAVENOUS; SUBCUTANEOUS ONCE
Refills: 0 | Status: DISCONTINUED | OUTPATIENT
Start: 2022-06-14 | End: 2022-06-17

## 2022-06-14 RX ORDER — HYDRALAZINE HCL 50 MG
5 TABLET ORAL ONCE
Refills: 0 | Status: COMPLETED | OUTPATIENT
Start: 2022-06-14 | End: 2022-06-14

## 2022-06-14 RX ORDER — LABETALOL HCL 100 MG
10 TABLET ORAL
Refills: 0 | Status: DISCONTINUED | OUTPATIENT
Start: 2022-06-14 | End: 2022-06-22

## 2022-06-14 RX ORDER — VANCOMYCIN HCL 1 G
VIAL (EA) INTRAVENOUS
Refills: 0 | Status: DISCONTINUED | OUTPATIENT
Start: 2022-06-14 | End: 2022-06-16

## 2022-06-14 RX ORDER — SENNA PLUS 8.6 MG/1
2 TABLET ORAL AT BEDTIME
Refills: 0 | Status: DISCONTINUED | OUTPATIENT
Start: 2022-06-14 | End: 2022-06-22

## 2022-06-14 RX ADMIN — Medication 15 MILLIGRAM(S): at 00:00

## 2022-06-14 RX ADMIN — OXYCODONE HYDROCHLORIDE 10 MILLIGRAM(S): 5 TABLET ORAL at 23:59

## 2022-06-14 RX ADMIN — ATORVASTATIN CALCIUM 40 MILLIGRAM(S): 80 TABLET, FILM COATED ORAL at 21:37

## 2022-06-14 RX ADMIN — Medication 10 MILLIGRAM(S): at 15:46

## 2022-06-14 RX ADMIN — Medication 1000 MILLIGRAM(S): at 16:44

## 2022-06-14 RX ADMIN — HYDROMORPHONE HYDROCHLORIDE 1 MILLIGRAM(S): 2 INJECTION INTRAMUSCULAR; INTRAVENOUS; SUBCUTANEOUS at 17:05

## 2022-06-14 RX ADMIN — Medication 400 MILLIGRAM(S): at 16:04

## 2022-06-14 RX ADMIN — SENNA PLUS 2 TABLET(S): 8.6 TABLET ORAL at 21:37

## 2022-06-14 RX ADMIN — Medication 10 MILLIGRAM(S): at 15:56

## 2022-06-14 RX ADMIN — Medication 10 MILLIGRAM(S): at 16:17

## 2022-06-14 RX ADMIN — Medication 166.67 MILLIGRAM(S): at 18:55

## 2022-06-14 RX ADMIN — SODIUM CHLORIDE 500 MILLILITER(S): 9 INJECTION INTRAMUSCULAR; INTRAVENOUS; SUBCUTANEOUS at 16:27

## 2022-06-14 RX ADMIN — Medication 975 MILLIGRAM(S): at 22:00

## 2022-06-14 RX ADMIN — Medication 1 APPLICATION(S): at 10:29

## 2022-06-14 RX ADMIN — Medication 100 MILLIGRAM(S): at 16:09

## 2022-06-14 RX ADMIN — Medication 15 MILLIGRAM(S): at 16:26

## 2022-06-14 RX ADMIN — HYDROMORPHONE HYDROCHLORIDE 1 MILLIGRAM(S): 2 INJECTION INTRAMUSCULAR; INTRAVENOUS; SUBCUTANEOUS at 17:26

## 2022-06-14 RX ADMIN — Medication 15 MILLIGRAM(S): at 16:41

## 2022-06-14 RX ADMIN — Medication 5 MILLIGRAM(S): at 18:15

## 2022-06-14 RX ADMIN — Medication 150 MILLIGRAM(S): at 21:36

## 2022-06-14 RX ADMIN — MUPIROCIN 1 APPLICATION(S): 20 OINTMENT TOPICAL at 05:57

## 2022-06-14 RX ADMIN — Medication 5 MILLIGRAM(S): at 19:19

## 2022-06-14 RX ADMIN — HYDROMORPHONE HYDROCHLORIDE 1 MILLIGRAM(S): 2 INJECTION INTRAMUSCULAR; INTRAVENOUS; SUBCUTANEOUS at 17:20

## 2022-06-14 RX ADMIN — OXYCODONE HYDROCHLORIDE 10 MILLIGRAM(S): 5 TABLET ORAL at 16:26

## 2022-06-14 RX ADMIN — Medication 975 MILLIGRAM(S): at 21:36

## 2022-06-14 RX ADMIN — CHLORHEXIDINE GLUCONATE 1 APPLICATION(S): 213 SOLUTION TOPICAL at 05:56

## 2022-06-14 RX ADMIN — OXYCODONE HYDROCHLORIDE 10 MILLIGRAM(S): 5 TABLET ORAL at 16:56

## 2022-06-14 RX ADMIN — Medication 100 MILLIGRAM(S): at 23:56

## 2022-06-14 RX ADMIN — SODIUM CHLORIDE 100 MILLILITER(S): 9 INJECTION INTRAMUSCULAR; INTRAVENOUS; SUBCUTANEOUS at 09:30

## 2022-06-14 RX ADMIN — Medication 15 MILLIGRAM(S): at 21:35

## 2022-06-14 RX ADMIN — HYDROMORPHONE HYDROCHLORIDE 1 MILLIGRAM(S): 2 INJECTION INTRAMUSCULAR; INTRAVENOUS; SUBCUTANEOUS at 18:11

## 2022-06-14 NOTE — DISCHARGE NOTE PROVIDER - NSDCCPCAREPLAN_GEN_ALL_CORE_FT
PRINCIPAL DISCHARGE DIAGNOSIS  Diagnosis: Arthritis, septic, knee  Assessment and Plan of Treatment:

## 2022-06-14 NOTE — PROGRESS NOTE ADULT - ASSESSMENT
55 y/o male with PMH of depression, anxiety, HLD came to the ED complaining of right knee pain and swelling. Patient had total knee arthroplasty on 5/11/22;  he has been seeing wound care after that; placed on Bactrim which he completed. He noted pain that is worsened and swelling.     Septic knee   S/p arthrocentesis by orthopedic in the ED, fluid sent   s/p   Total knee revision  , Repair of right quadriceps    continue iv Abx , follow cultures , consider ID consult  PT/OT/pain mgmt- consider pain mgmt eval if pain not well controlled,   patient with hx of heroin/ xanax overdose in the past    DVT prophylaxis- as per ortho  Incentive spirometry  Prophylaxis of opioid  induced constipation.        Depression/Anxiety   Continue Bupropion XL 300mg   Escitalopram 10mg   Trazodone 200mg HS   Buspirone 5mg bid     HLD  Atorvastatin 40mg     Acute postop R knee pain on chronic pain - as above     Postoperative HTN urgency - no Hx of HTN- likely due to severe acute R knee pain -   pain control . Given Labetalol 10 mg ivp x2 , given Hydralazine 10 mg ivpx1 - now /95 -   Patient is asymptomatic , no chest pain , no headache - will monitor . Continue Hydralazine 10 mg IVP Q6hrs and   Labetalol 10 mg ivp q2 hrs for SBP > 170 PRN     Spoke to ortho PA ,   will follow along with you .

## 2022-06-14 NOTE — DISCHARGE NOTE PROVIDER - NSDCMRMEDTOKEN_GEN_ALL_CORE_FT
acetaminophen 325 mg oral tablet: 3 tab(s) orally every 8 hours  aspirin 325 mg oral tablet: 1 tab(s) orally 2 times a day for DVT ppx  atorvastatin 40 mg oral tablet: 1 tab(s) orally once a day  buPROPion 300 mg/24 hours (XL) oral tablet, extended release: 1 tab(s) orally once a day (in the morning)  busPIRone 5 mg oral tablet: 1 tab(s) orally 2 times a day  cloNIDine 0.1 mg oral tablet: 1 tab(s) orally 2 times a day  oxyCODONE 5 mg oral tablet: 1-2 tab(s) orally every 6 hours, As Needed MDD:4  traZODone 150 mg oral tablet: 1 tab(s) orally once a day (at bedtime)   acetaminophen 325 mg oral tablet: 3 tab(s) orally every 8 hours  aspirin 325 mg oral tablet: 1 tab(s) orally 2 times a day for DVT ppx  atorvastatin 40 mg oral tablet: 1 tab(s) orally once a day  buPROPion 300 mg/24 hours (XL) oral tablet, extended release: 1 tab(s) orally once a day (in the morning)  busPIRone 5 mg oral tablet: 1 tab(s) orally 2 times a day  ceFAZolin 2 g/50 mL-iso-osmotic dextrose intravenous solution: 2 gram(s) intravenously every 8 hours   through 7/26/22  weekly cbc cmp sed rate     celecoxib 200 mg oral capsule: 1 cap(s) orally every 12 hours  cloNIDine 0.1 mg oral tablet: 1 tab(s) orally 2 times a day  gabapentin 300 mg oral capsule: 1 cap(s) orally 3 times a day  methocarbamol 750 mg oral tablet: 1 tab(s) orally 4 times a day  oxyCODONE 5 mg oral tablet: 1-2 tab(s) orally every 6 hours, As Needed MDD:4  pantoprazole 40 mg oral delayed release tablet: 1 tab(s) orally once a day (before a meal)  senna oral tablet: 2 tab(s) orally once a day (at bedtime)  Suboxone 8 mg-2 mg sublingual film: 1 film(s) sublingually 3 times a day MDD:3 films per 24 hrs [id number]  traZODone 150 mg oral tablet: 1 tab(s) orally once a day (at bedtime)

## 2022-06-14 NOTE — PROGRESS NOTE ADULT - SUBJECTIVE AND OBJECTIVE BOX
MOSES HANKINS  [id number]    History: 56y Male is status post right knee removal of hardware, incision and drainage with antibiotic cement spacer POD #0. The patient's pain is controlled using the prescribed pain medications. The patient will be participating in physical therapy. Denies numbness or tingling. No new complaints.    Vital Signs Last 24 Hrs  T(C): 37.7 (14 Jun 2022 15:35), Max: 37.7 (14 Jun 2022 15:35)  T(F): 99.8 (14 Jun 2022 15:35), Max: 99.8 (14 Jun 2022 15:35)  HR: 81 (14 Jun 2022 16:30) (75 - 110)  BP: 175/96 (14 Jun 2022 16:30) (104/62 - 203/91)  BP(mean): 115 (14 Jun 2022 16:30) (113 - 126)  RR: 20 (14 Jun 2022 16:30) (11 - 20)  SpO2: 95% (14 Jun 2022 16:30) (92% - 98%)                          11.0   11.34 )-----------( 268      ( 13 Jun 2022 22:58 )             34.4     06-13    136  |  100  |  20.1<H>  ----------------------------<  105<H>  4.3   |  25.0  |  0.86    Ca    8.4<L>      13 Jun 2022 22:58        General: Alert, awake, NAD  Physical exam: KI to RLE in place. The right knee dressing is clean, dry and intact. No drainage, discharge, erythema, blistering or ecchymosis noted.   The calf is supple nontender b/l. prevena functioning properly, tube patent-no drainage in cannister.   SILT. +AT/GC/EHL/FHL.   2+ DP pulse. BCR. No cyanosis.    Plan:   - DVT prophylaxis as prescribed, including use of compression devices and ankle pumps- ASA 325mg BID x 4 weeks  - Continue physical therapy  - Weight bearing status of surgical extremity: TTWB with KI at all times.   - Incentive spirometry encouraged  - Pain control as clinically indicated  - ID consulted  - IV abx  - f/u OR cultures  - f/u AM labs

## 2022-06-14 NOTE — BRIEF OPERATIVE NOTE - OPERATION/FINDINGS
murky fluid in joint, complete quad rupture     all implants and cement removed    S&N metatan nail 10x320 mm with vanco/tobra cement spacer

## 2022-06-14 NOTE — BRIEF OPERATIVE NOTE - COMMENTS
post-op plan: TTWB, PT/OT, abx per ID, contralateral SCD, ASA x 4 weeks post-op, preveena x 5-7 days, staple removal 2-3 weeks

## 2022-06-14 NOTE — PROGRESS NOTE ADULT - SUBJECTIVE AND OBJECTIVE BOX
Ortho Preop Note    Patient is a 56y old  Male familiar to Ortho service here with infection of Right total knee arthroplasty. Patient had total knee arthroplasty done at [facility] with Dr Pena on March 15, 2022. Fell May 10, 2022. Had Quad tendon repair with Dr Huerta May 12,2022.   Diagnosis: Right knee infection   Procedure: I&D with likely explant vs poly exchange right knee  Surgeon: Dr Huerta        Vital Signs Last 24 Hrs  T(C): 37.6 (14 Jun 2022 08:55), Max: 37.6 (14 Jun 2022 08:55)  T(F): 99.7 (14 Jun 2022 08:55), Max: 99.7 (14 Jun 2022 08:55)  HR: 88 (14 Jun 2022 08:55) (75 - 95)  BP: 110/67 (14 Jun 2022 08:55) (104/62 - 123/71)  BP(mean): --  RR: 18 (14 Jun 2022 08:55) (18 - 18)  SpO2: 92% (14 Jun 2022 08:55) (92% - 98%)                        11.0   11.34 )-----------( 268      ( 13 Jun 2022 22:58 )             34.4     06-13    136  |  100  |  20.1<H>  ----------------------------<  105<H>  4.3   |  25.0  |  0.86    Ca    8.4<L>      13 Jun 2022 22:58      PT/INR - ( 13 Jun 2022 22:58 )   PT: 12.9 sec;   INR: 1.11 ratio                      Assessment & Plan:  56yMale with Right total knee arthroplasty infection   -For OR today   - Medical note reviewed and appreciated  - Remain NPO

## 2022-06-14 NOTE — PROGRESS NOTE ADULT - SUBJECTIVE AND OBJECTIVE BOX
Called by ortho PA to re eval patient postop due to SBP - 210 .   Patient seen and examined . S/p Total knee revision , s/p Repair of right quadriceps  , POD # 0 .  C/O severe R knee pain post op , denies sob , denies chest pain . given labetalol 10 mg x2 , hydralazine 10 mg ivp x1 - BP better 175/ 95.     CC : R knee swelling and drainage from wound           MEDICATIONS  (STANDING):  acetaminophen     Tablet .. 975 milliGRAM(s) Oral every 8 hours  atorvastatin 40 milliGRAM(s) Oral at bedtime  buPROPion XL (24-Hour) . 300 milliGRAM(s) Oral daily  busPIRone 5 milliGRAM(s) Oral two times a day  ceFAZolin   IVPB      ceFAZolin   IVPB 2000 milliGRAM(s) IV Intermittent every 8 hours  ketorolac   Injectable 15 milliGRAM(s) IV Push every 6 hours  labetalol Injectable 10 milliGRAM(s) IV Push every 10 minutes  pantoprazole    Tablet 40 milliGRAM(s) Oral before breakfast  senna 2 Tablet(s) Oral at bedtime  sodium chloride 0.9%. 1000 milliLiter(s) (150 mL/Hr) IV Continuous <Continuous>  traZODone 150 milliGRAM(s) Oral at bedtime  vancomycin  IVPB        MEDICATIONS  (PRN):  HYDROmorphone  Injectable 0.5 milliGRAM(s) IV Push once PRN breakthrough pain  HYDROmorphone  Injectable 1 milliGRAM(s) IV Push every 10 minutes PRN Severe Pain (7 - 10)  magnesium hydroxide Suspension 30 milliLiter(s) Oral daily PRN Constipation  ondansetron Injectable 4 milliGRAM(s) IV Push once PRN Nausea and/or Vomiting  ondansetron Injectable 4 milliGRAM(s) IV Push every 6 hours PRN Nausea and/or Vomiting  oxyCODONE    IR 5 milliGRAM(s) Oral every 3 hours PRN Moderate Pain (4 - 6)  oxyCODONE    IR 10 milliGRAM(s) Oral every 3 hours PRN Severe Pain (7 - 10)      LABS:                          11.0   11.34 )-----------( 268      ( 13 Jun 2022 22:58 )             34.4     06-13    136  |  100  |  20.1<H>  ----------------------------<  105<H>  4.3   |  25.0  |  0.86    Ca    8.4<L>      13 Jun 2022 22:58      PT/INR - ( 13 Jun 2022 22:58 )   PT: 12.9 sec;   INR: 1.11 ratio               RADIOLOGY & ADDITIONAL TESTS:      REVIEW OF SYSTEMS:    Severe R knee postop pain , all other systems are reviewed and are negative     Vital Signs Last 24 Hrs  T(C): 37.7 (14 Jun 2022 15:35), Max: 37.7 (14 Jun 2022 15:35)  T(F): 99.8 (14 Jun 2022 15:35), Max: 99.8 (14 Jun 2022 15:35)  HR: 81 (14 Jun 2022 16:30) (75 - 110)  BP: 175/96 (14 Jun 2022 16:30) (104/62 - 203/91)  BP(mean): 115 (14 Jun 2022 16:30) (113 - 126)  RR: 20 (14 Jun 2022 16:30) (11 - 20)  SpO2: 95% (14 Jun 2022 16:30) (92% - 98%)  PHYSICAL EXAM:    GENERAL: NAD, well-groomed, well-developed  HEAD:  Atraumatic, Normocephalic  EYES: EOMI, PERRLA, conjunctiva and sclera clear  NECK: Supple, No JVD, Normal thyroid  NERVOUS SYSTEM:  Alert & Oriented X3, no focal deficit  CHEST/LUNG: CTA b/l ,  no  rales, rhonchi, wheezing, or rubs  HEART: Regular rate and rhythm; No murmurs, rubs, or gallops  ABDOMEN: Soft, Nontender, Nondistended; Bowel sounds present  EXTREMITIES:  2+ Peripheral Pulses, No clubbing, cyanosis, or edema,   R knee dry dressing + , clean dressing +   LYMPH: No lymphadenopathy noted  SKIN: No rashes or lesions

## 2022-06-14 NOTE — DISCHARGE NOTE PROVIDER - NSDCFUSCHEDAPPT_GEN_ALL_CORE_FT
Timi Huerta  [facility] Physician [facility]  ORTHOSURG [address]  Scheduled Appointment: 06/21/2022

## 2022-06-14 NOTE — DISCHARGE NOTE PROVIDER - HOSPITAL COURSE
Patient is a 56y Male presenting to the emergency department with a chief complaint of right knee pain and swelling. Patient is s/p right quad tendon repair after total knee arthroplasty on 5/11/22 by Dr. Huerta.  patient has been seeing wound care, finished bactrim and states discharge to distal wound continues. Right knee fluid was aspirated and indicated infection. Patient was taken to operating room for removal of hardware, incision and drainage with antibiotic cement spacer placement on 6/14/22. Patient was followed by infectious disease team while inhouse. Patient is a 56y Male presenting to the emergency department with a chief complaint of right knee pain and swelling. Patient is s/p right quad tendon repair after total knee arthroplasty on 5/11/22 by Dr. Huerta.  patient has been seeing wound care, finished bactrim and states discharge to distal wound continues. Right knee fluid was aspirated and indicated infection. Patient was taken to operating room for removal of hardware, incision and drainage with antibiotic cement spacer placement on 6/14/22. Patient was followed by infectious disease team while inhouse. PICC line placed for IV antibiotics, will be discharge to rehab. Patient is a 56y Male presenting to the emergency department with a chief complaint of right knee pain and swelling. Patient is s/p right quad tendon repair after total knee arthroplasty on 5/11/22 by Dr. Huerta.  Patient has been seeing wound care, finished bactrim and states discharge to distal wound continues. Right knee fluid was aspirated and indicated infection. Patient was taken to operating room for removal of hardware, incision and drainage with antibiotic cement spacer placement on 6/14/22. Patient was seen by Pain Management post operatively and pain was adequately controlled. Patient was followed by infectious disease team while inhouse. PICC line placed for IV antibiotics, will be discharged to rehab.

## 2022-06-14 NOTE — DISCHARGE NOTE PROVIDER - CARE PROVIDER_API CALL
Timi Huerta)  Orthopaedic Surgery  [address] [place] [address]  Phone: [phone]  Fax: [phone]  Follow Up Time:

## 2022-06-14 NOTE — DISCHARGE NOTE PROVIDER - NSDCFUADDINST_GEN_ALL_CORE_FT
The patient will be seen in the office between 2-3 weeks for wound check.   **Your first post-operative visit has been scheduled prior to your admission. PLEASE CONTACT OFFICE TO CONFIRM THE APPOINTMENT DATE. Staples will be removed at that time.  The prevena dressing will be removed on POD #7 (6/21) and then dry dressings with gauze and tegaderm placed.   ** CONTACT THE OFFICE IF THE FOLLOWING DEVELOP:  - the dressing becomes soiled or saturated  - you develop a fever greater that 101F  - the wound becomes red or you develop blistering around the wound  * Patient may shower after post-op day #3.    * The patient is TOE TOUCH weight bearing with knee immobilizer at all times to right lower extremity.   * The patient will continue ASPIRIN 325mg twice daily for 4 weeks after surgery for blood clot prevention.  *** While on aspirin, the patient will take daily omeprazole or other similar medication to protect the stomach from irritation.   * The patient will take OXYCODONE AND TYLENOL for pain control and adjust according to prescription and patient needs. Contact the office if pain increases while taking prescribed pain medications or related concerns develop.   * The patient will take Senna S while taking oxycodone to prevent narcotic associated constipation.  Additionally, increase water intake (drink at least 8 glasses of water daily) and try adding fiber to the diet by eating fruits, vegetables and foods that are rich in grains. If constipation is experienced, contact the medical/primary care provider to discuss further treatment options. Continue antibiotics as per infectious disease.   * To avoid injury at home:  - continue use of rolling walker until cleared by physical therapist  - have family or friend remove all throw rug or objects in hallways that may present a trip hazard.  - if you experience any dizziness or medical concerns, call your medical doctor or  911.  * The implant may activate metal detection devices.  The patient will be seen in the office between 1-2 weeks for wound check.   **Your first post-operative visit has been scheduled prior to your admission. PLEASE CONTACT OFFICE TO CONFIRM THE APPOINTMENT DATE. Staples will be removed at that time.  Dry dressings with gauze and tegaderm as needed. If dressing remains clean and dry may leave in place until follow up with Dr. Huerta  ** CONTACT THE OFFICE IF THE FOLLOWING DEVELOP:  - the dressing becomes soiled or saturated  - you develop a fever greater that 101F  - the wound becomes red or you develop blistering around the wound  * Patient may shower after post-op day #3.    * The patient is TOE TOUCH weight bearing with knee immobilizer at all times to right lower extremity.   * The patient will continue ASPIRIN 325mg twice daily for 4 weeks after surgery for blood clot prevention.  *** While on aspirin, the patient will take daily omeprazole or other similar medication to protect the stomach from irritation.   * The patient will take Suboxone AND TYLENOL for pain control and adjust according to prescription and patient needs. Contact the office if pain increases while taking prescribed pain medications or related concerns develop.   * The patient will take Senna S while taking oxycodone to prevent narcotic associated constipation.  Additionally, increase water intake (drink at least 8 glasses of water daily) and try adding fiber to the diet by eating fruits, vegetables and foods that are rich in grains. If constipation is experienced, contact the medical/primary care provider to discuss further treatment options. Continue antibiotics as per infectious disease.   * To avoid injury at home:  - continue use of rolling walker until cleared by physical therapist  - have family or friend remove all throw rug or objects in hallways that may present a trip hazard.  - if you experience any dizziness or medical concerns, call your medical doctor or  911.  * The implant may activate metal detection devices.

## 2022-06-14 NOTE — PROGRESS NOTE ADULT - NS ATTEND AMEND GEN_ALL_CORE FT
Orthopaedic Trauma Surgeon Addendum:    I have personally performed a face-to-face diagnostic evaluation on this patient.  I have reviewed the physician assistant note and agree with the history, exam, and plan of care, except as noted.    Cell count concerning for infection.  Will plan on OR today.  Have discussed that explantation will allow for best chance of infection control as well as repair of the extensor mechanism.  Patient is understandable and willing to proceed.    Timi Huerta MD  Orthopaedic Trauma Surgeon  [facility]

## 2022-06-14 NOTE — BRIEF OPERATIVE NOTE - NSICDXBRIEFPROCEDURE_GEN_ALL_CORE_FT
PROCEDURES:  Total knee revision 14-Jun-2022 15:49:10  Timi Huerta  Preparation and insertion, drug delivery device, intra-articular 14-Jun-2022 15:50:13  Timi Huerta  Repair of right quadriceps 14-Jun-2022 15:50:33  Timi Huerta

## 2022-06-15 LAB
ANION GAP SERPL CALC-SCNC: 12 MMOL/L — SIGNIFICANT CHANGE UP (ref 5–17)
BUN SERPL-MCNC: 10.3 MG/DL — SIGNIFICANT CHANGE UP (ref 8–20)
CALCIUM SERPL-MCNC: 8.3 MG/DL — LOW (ref 8.6–10.2)
CHLORIDE SERPL-SCNC: 103 MMOL/L — SIGNIFICANT CHANGE UP (ref 98–107)
CO2 SERPL-SCNC: 23 MMOL/L — SIGNIFICANT CHANGE UP (ref 22–29)
CREAT SERPL-MCNC: 0.67 MG/DL — SIGNIFICANT CHANGE UP (ref 0.5–1.3)
EGFR: 109 ML/MIN/1.73M2 — SIGNIFICANT CHANGE UP
GLUCOSE SERPL-MCNC: 127 MG/DL — HIGH (ref 70–99)
GRAM STN FLD: SIGNIFICANT CHANGE UP
GRAM STN FLD: SIGNIFICANT CHANGE UP
HCT VFR BLD CALC: 30.1 % — LOW (ref 39–50)
HGB BLD-MCNC: 9.7 G/DL — LOW (ref 13–17)
MCHC RBC-ENTMCNC: 29.8 PG — SIGNIFICANT CHANGE UP (ref 27–34)
MCHC RBC-ENTMCNC: 32.2 GM/DL — SIGNIFICANT CHANGE UP (ref 32–36)
MCV RBC AUTO: 92.3 FL — SIGNIFICANT CHANGE UP (ref 80–100)
PLATELET # BLD AUTO: 247 K/UL — SIGNIFICANT CHANGE UP (ref 150–400)
POTASSIUM SERPL-MCNC: 3.7 MMOL/L — SIGNIFICANT CHANGE UP (ref 3.5–5.3)
POTASSIUM SERPL-SCNC: 3.7 MMOL/L — SIGNIFICANT CHANGE UP (ref 3.5–5.3)
RBC # BLD: 3.26 M/UL — LOW (ref 4.2–5.8)
RBC # FLD: 13.4 % — SIGNIFICANT CHANGE UP (ref 10.3–14.5)
SODIUM SERPL-SCNC: 138 MMOL/L — SIGNIFICANT CHANGE UP (ref 135–145)
SPECIMEN SOURCE: SIGNIFICANT CHANGE UP
SPECIMEN SOURCE: SIGNIFICANT CHANGE UP
WBC # BLD: 10.34 K/UL — SIGNIFICANT CHANGE UP (ref 3.8–10.5)
WBC # FLD AUTO: 10.34 K/UL — SIGNIFICANT CHANGE UP (ref 3.8–10.5)

## 2022-06-15 PROCEDURE — 99233 SBSQ HOSP IP/OBS HIGH 50: CPT

## 2022-06-15 RX ORDER — CEFTRIAXONE 500 MG/1
1000 INJECTION, POWDER, FOR SOLUTION INTRAMUSCULAR; INTRAVENOUS EVERY 24 HOURS
Refills: 0 | Status: DISCONTINUED | OUTPATIENT
Start: 2022-06-15 | End: 2022-06-16

## 2022-06-15 RX ORDER — METHOCARBAMOL 500 MG/1
750 TABLET, FILM COATED ORAL
Refills: 0 | Status: DISCONTINUED | OUTPATIENT
Start: 2022-06-15 | End: 2022-06-22

## 2022-06-15 RX ORDER — MORPHINE SULFATE 50 MG/1
15 CAPSULE, EXTENDED RELEASE ORAL EVERY 4 HOURS
Refills: 0 | Status: DISCONTINUED | OUTPATIENT
Start: 2022-06-15 | End: 2022-06-17

## 2022-06-15 RX ORDER — HYDROMORPHONE HYDROCHLORIDE 2 MG/ML
0.5 INJECTION INTRAMUSCULAR; INTRAVENOUS; SUBCUTANEOUS
Refills: 0 | Status: DISCONTINUED | OUTPATIENT
Start: 2022-06-15 | End: 2022-06-15

## 2022-06-15 RX ORDER — GABAPENTIN 400 MG/1
300 CAPSULE ORAL THREE TIMES A DAY
Refills: 0 | Status: DISCONTINUED | OUTPATIENT
Start: 2022-06-15 | End: 2022-06-22

## 2022-06-15 RX ORDER — HYDROMORPHONE HYDROCHLORIDE 2 MG/ML
4 INJECTION INTRAMUSCULAR; INTRAVENOUS; SUBCUTANEOUS
Refills: 0 | Status: DISCONTINUED | OUTPATIENT
Start: 2022-06-15 | End: 2022-06-15

## 2022-06-15 RX ORDER — MORPHINE SULFATE 50 MG/1
30 CAPSULE, EXTENDED RELEASE ORAL EVERY 4 HOURS
Refills: 0 | Status: DISCONTINUED | OUTPATIENT
Start: 2022-06-15 | End: 2022-06-17

## 2022-06-15 RX ADMIN — GABAPENTIN 300 MILLIGRAM(S): 400 CAPSULE ORAL at 21:33

## 2022-06-15 RX ADMIN — Medication 5 MILLIGRAM(S): at 18:15

## 2022-06-15 RX ADMIN — Medication 975 MILLIGRAM(S): at 16:35

## 2022-06-15 RX ADMIN — MORPHINE SULFATE 15 MILLIGRAM(S): 50 CAPSULE, EXTENDED RELEASE ORAL at 16:35

## 2022-06-15 RX ADMIN — Medication 975 MILLIGRAM(S): at 17:34

## 2022-06-15 RX ADMIN — OXYCODONE HYDROCHLORIDE 10 MILLIGRAM(S): 5 TABLET ORAL at 12:12

## 2022-06-15 RX ADMIN — Medication 975 MILLIGRAM(S): at 21:33

## 2022-06-15 RX ADMIN — Medication 975 MILLIGRAM(S): at 05:31

## 2022-06-15 RX ADMIN — PANTOPRAZOLE SODIUM 40 MILLIGRAM(S): 20 TABLET, DELAYED RELEASE ORAL at 05:05

## 2022-06-15 RX ADMIN — Medication 100 MILLIGRAM(S): at 08:28

## 2022-06-15 RX ADMIN — Medication 0.1 MILLIGRAM(S): at 17:06

## 2022-06-15 RX ADMIN — OXYCODONE HYDROCHLORIDE 10 MILLIGRAM(S): 5 TABLET ORAL at 06:44

## 2022-06-15 RX ADMIN — HYDROMORPHONE HYDROCHLORIDE 0.5 MILLIGRAM(S): 2 INJECTION INTRAMUSCULAR; INTRAVENOUS; SUBCUTANEOUS at 08:43

## 2022-06-15 RX ADMIN — Medication 15 MILLIGRAM(S): at 05:04

## 2022-06-15 RX ADMIN — Medication 150 MILLIGRAM(S): at 21:35

## 2022-06-15 RX ADMIN — BUPROPION HYDROCHLORIDE 300 MILLIGRAM(S): 150 TABLET, EXTENDED RELEASE ORAL at 17:06

## 2022-06-15 RX ADMIN — Medication 325 MILLIGRAM(S): at 18:11

## 2022-06-15 RX ADMIN — MORPHINE SULFATE 15 MILLIGRAM(S): 50 CAPSULE, EXTENDED RELEASE ORAL at 17:34

## 2022-06-15 RX ADMIN — Medication 975 MILLIGRAM(S): at 05:06

## 2022-06-15 RX ADMIN — HYDROMORPHONE HYDROCHLORIDE 0.5 MILLIGRAM(S): 2 INJECTION INTRAMUSCULAR; INTRAVENOUS; SUBCUTANEOUS at 05:14

## 2022-06-15 RX ADMIN — Medication 166.67 MILLIGRAM(S): at 18:11

## 2022-06-15 RX ADMIN — MORPHINE SULFATE 30 MILLIGRAM(S): 50 CAPSULE, EXTENDED RELEASE ORAL at 21:34

## 2022-06-15 RX ADMIN — HYDROMORPHONE HYDROCHLORIDE 0.5 MILLIGRAM(S): 2 INJECTION INTRAMUSCULAR; INTRAVENOUS; SUBCUTANEOUS at 08:28

## 2022-06-15 RX ADMIN — CEFTRIAXONE 100 MILLIGRAM(S): 500 INJECTION, POWDER, FOR SOLUTION INTRAMUSCULAR; INTRAVENOUS at 16:38

## 2022-06-15 RX ADMIN — OXYCODONE HYDROCHLORIDE 10 MILLIGRAM(S): 5 TABLET ORAL at 13:11

## 2022-06-15 RX ADMIN — Medication 5 MILLIGRAM(S): at 05:05

## 2022-06-15 RX ADMIN — Medication 15 MILLIGRAM(S): at 05:31

## 2022-06-15 RX ADMIN — OXYCODONE HYDROCHLORIDE 10 MILLIGRAM(S): 5 TABLET ORAL at 03:06

## 2022-06-15 RX ADMIN — METHOCARBAMOL 750 MILLIGRAM(S): 500 TABLET, FILM COATED ORAL at 18:12

## 2022-06-15 RX ADMIN — OXYCODONE HYDROCHLORIDE 10 MILLIGRAM(S): 5 TABLET ORAL at 01:01

## 2022-06-15 RX ADMIN — OXYCODONE HYDROCHLORIDE 10 MILLIGRAM(S): 5 TABLET ORAL at 03:36

## 2022-06-15 RX ADMIN — HYDROMORPHONE HYDROCHLORIDE 0.5 MILLIGRAM(S): 2 INJECTION INTRAMUSCULAR; INTRAVENOUS; SUBCUTANEOUS at 04:44

## 2022-06-15 RX ADMIN — Medication 10 MILLIGRAM(S): at 06:10

## 2022-06-15 RX ADMIN — Medication 166.67 MILLIGRAM(S): at 06:13

## 2022-06-15 RX ADMIN — SODIUM CHLORIDE 75 MILLILITER(S): 9 INJECTION INTRAMUSCULAR; INTRAVENOUS; SUBCUTANEOUS at 04:44

## 2022-06-15 RX ADMIN — Medication 15 MILLIGRAM(S): at 12:25

## 2022-06-15 RX ADMIN — ATORVASTATIN CALCIUM 40 MILLIGRAM(S): 80 TABLET, FILM COATED ORAL at 21:34

## 2022-06-15 RX ADMIN — OXYCODONE HYDROCHLORIDE 10 MILLIGRAM(S): 5 TABLET ORAL at 06:14

## 2022-06-15 RX ADMIN — SENNA PLUS 2 TABLET(S): 8.6 TABLET ORAL at 21:33

## 2022-06-15 RX ADMIN — Medication 325 MILLIGRAM(S): at 05:05

## 2022-06-15 RX ADMIN — MORPHINE SULFATE 30 MILLIGRAM(S): 50 CAPSULE, EXTENDED RELEASE ORAL at 23:00

## 2022-06-15 RX ADMIN — Medication 15 MILLIGRAM(S): at 12:12

## 2022-06-15 RX ADMIN — Medication 975 MILLIGRAM(S): at 22:12

## 2022-06-15 NOTE — PHYSICAL THERAPY INITIAL EVALUATION ADULT - GENERAL OBSERVATIONS, REHAB EVAL
Patient received lying in bed, NAD, breathing RA, +KI, +prevena, +tele. Pt agreeable to Physical Therapy evaluation.

## 2022-06-15 NOTE — PHYSICAL THERAPY INITIAL EVALUATION ADULT - DID THE PATIENT HAVE SURGERY?
s/p right knee removal of hardware, incision and drainage with antibiotic cement spacer application, repair of quad tendon/yes

## 2022-06-15 NOTE — PHYSICAL THERAPY INITIAL EVALUATION ADULT - ADDITIONAL COMMENTS
Patient lives in a house with 2 YESICA and has the ability to stay on the main level. He lives with friends, states at least one will be home and able to help if needed. He was independent prior to admission with use of a cane for ambulation, states he also has a RW and shower chair.

## 2022-06-15 NOTE — OCCUPATIONAL THERAPY INITIAL EVALUATION ADULT - PERTINENT HX OF CURRENT PROBLEM, REHAB EVAL
Quality 131: Pain Assessment And Follow-Up: Pain assessment using a standardized tool is documented as negative, no follow-up plan required
Quality 130: Documentation Of Current Medications In The Medical Record: Current Medications Documented
Detail Level: Detailed
Quality 111:Pneumonia Vaccination Status For Older Adults: Pneumococcal Vaccination Previously Received
Quality 226: Preventive Care And Screening: Tobacco Use: Screening And Cessation Intervention: Patient screened for tobacco use and is an ex/non-smoker
Quality 110: Preventive Care And Screening: Influenza Immunization: Influenza Immunization previously received during influenza season
Presenting to ED with chief c/o R knee pain and swelling septic knee joint prosthesis

## 2022-06-15 NOTE — OCCUPATIONAL THERAPY INITIAL EVALUATION ADULT - AMBULATORY DEVICES NEEDED
Hydroquinone Counseling:  Patient advised that medication may result in skin irritation, lightening (hypopigmentation), dryness, and burning.  In the event of skin irritation, the patient was advised to reduce the amount of the drug applied or use it less frequently.  Rarely, spots that are treated with hydroquinone can become darker (pseudoochronosis).  Should this occur, patient instructed to stop medication and call the office. The patient verbalized understanding of the proper use and possible adverse effects of hydroquinone.  All of the patient's questions and concerns were addressed. yes

## 2022-06-15 NOTE — PROGRESS NOTE ADULT - SUBJECTIVE AND OBJECTIVE BOX
INFECTIOUS DISEASES AND INTERNAL MEDICINE at [place]  =======================================================  Mingo Delgado MD  Diplomates American Board of Internal Medicine and Infectious Diseases  Telephone [phone]  Fax            [phone]  =======================================================    MOSES HANKINS [id number]    Follow up: right knee infection prosthetic joint    Allergies:  shellfish. (Hives)      Medications:  acetaminophen     Tablet .. 975 milliGRAM(s) Oral every 8 hours  aspirin enteric coated 325 milliGRAM(s) Oral two times a day  atorvastatin 40 milliGRAM(s) Oral at bedtime  buPROPion XL (24-Hour) . 300 milliGRAM(s) Oral daily  busPIRone 5 milliGRAM(s) Oral two times a day  ceFAZolin   IVPB      ceFAZolin   IVPB 2000 milliGRAM(s) IV Intermittent every 8 hours  HYDROmorphone  Injectable 0.5 milliGRAM(s) IV Push every 3 hours PRN  HYDROmorphone  Injectable 0.5 milliGRAM(s) IV Push once PRN  ketorolac   Injectable 15 milliGRAM(s) IV Push every 6 hours  labetalol Injectable 10 milliGRAM(s) IV Push every 2 hours PRN  magnesium hydroxide Suspension 30 milliLiter(s) Oral daily PRN  ondansetron Injectable 4 milliGRAM(s) IV Push every 6 hours PRN  oxyCODONE    IR 5 milliGRAM(s) Oral every 3 hours PRN  oxyCODONE    IR 10 milliGRAM(s) Oral every 3 hours PRN  pantoprazole    Tablet 40 milliGRAM(s) Oral before breakfast  senna 2 Tablet(s) Oral at bedtime  traZODone 150 milliGRAM(s) Oral at bedtime  vancomycin  IVPB      vancomycin  IVPB 1250 milliGRAM(s) IV Intermittent every 12 hours    SOCIAL       FAMILY   FAMILY HISTORY:  No pertinent family history in first degree relatives      REVIEW OF SYSTEMS:  CONSTITUTIONAL:  No Fever or chills  HEENT:   No diplopia or blurred vision.  No earache, sore throat or runny nose.  CARDIOVASCULAR:  No pressure, squeezing, strangling, tightness, heaviness or aching about the chest, neck, axilla or epigastrium.  RESPIRATORY:  No cough, shortness of breath, PND or orthopnea.  GASTROINTESTINAL:  No nausea, vomiting or diarrhea.  GENITOURINARY:  No dysuria, frequency or urgency. No Blood in urine  MUSCULOSKELETAL:   as per HPI  SKIN:  No change in skin, hair or nails.  NEUROLOGIC:  No paresthesias, fasciculations, seizures or weakness.  PSYCHIATRIC:  No disorder of thought or mood.  ENDOCRINE:  No heat or cold intolerance, polyuria or polydipsia.  HEMATOLOGICAL:  No easy bruising or bleeding.            Physical Exam:  ICU Vital Signs Last 24 Hrs  T(C): 36.7 (15 Stuart 2022 06:00), Max: 37.7 (14 Jun 2022 15:35)  T(F): 98 (15 Stuart 2022 06:00), Max: 99.8 (14 Jun 2022 15:35)  HR: 90 (15 Stuart 2022 06:00) (79 - 110)  BP: 178/85 (15 Stuart 2022 06:00) (110/67 - 203/91)  BP(mean): 178 (15 Stuart 2022 06:00) (87 - 178)  ABP: --  ABP(mean): --  RR: 18 (15 Stuart 2022 06:00) (11 - 20)  SpO2: 93% (15 Stuart 2022 06:00) (92% - 98%)    GEN: NAD,   HEENT: normocephalic and atraumatic. EOMI. GOLDIE.    NECK: Supple. No carotid bruits.  No lymphadenopathy or thyromegaly.  LUNGS: Clear to auscultation.  HEART: Regular rate and rhythm without murmur.  ABDOMEN: Soft, nontender, and nondistended.  Positive bowel sounds.    : No CVA tenderness  EXTREMITIES: right knee dressed   MSK: no joint swelling  NEUROLOGIC: Cranial nerves II through XII are grossly intact.  PSYCHIATRIC: Appropriate affect .  SKIN: No ulceration or induration present.        Labs:  Vitals:  ============  T(F): 98 (15 Stuart 2022 06:00), Max: 99.8 (14 Jun 2022 15:35)  HR: 90 (15 Stuart 2022 06:00)  BP: 178/85 (15 Stuart 2022 06:00)  RR: 18 (15 Stuart 2022 06:00)  SpO2: 93% (15 Stuart 2022 06:00) (92% - 98%)  temp max in last 48H T(F): , Max: 99.8 (06-14-22 @ 15:35)    =======================================================  Current Antibiotics:  ceFAZolin   IVPB      ceFAZolin   IVPB 2000 milliGRAM(s) IV Intermittent every 8 hours  vancomycin  IVPB      vancomycin  IVPB 1250 milliGRAM(s) IV Intermittent every 12 hours    Other medications:  acetaminophen     Tablet .. 975 milliGRAM(s) Oral every 8 hours  aspirin enteric coated 325 milliGRAM(s) Oral two times a day  atorvastatin 40 milliGRAM(s) Oral at bedtime  buPROPion XL (24-Hour) . 300 milliGRAM(s) Oral daily  busPIRone 5 milliGRAM(s) Oral two times a day  ketorolac   Injectable 15 milliGRAM(s) IV Push every 6 hours  pantoprazole    Tablet 40 milliGRAM(s) Oral before breakfast  senna 2 Tablet(s) Oral at bedtime  traZODone 150 milliGRAM(s) Oral at bedtime      =======================================================  Labs:                        9.7    10.34 )-----------( 247      ( 15 Stuart 2022 06:12 )             30.1     06-15    138  |  103  |  10.3  ----------------------------<  127<H>  3.7   |  23.0  |  0.67    Ca    8.3<L>      15 Stuart 2022 06:12        Culture - Joint Fluid (collected 06-14-22 @ 22:59)  Source: Joint Fl Right Knee Synovial Fluid  Gram Stain (06-15-22 @ 02:16):    polymorphonuclear leukocytes seen    No organisms seen    by cytocentrifuge    Culture - Tissue with Gram Stain (collected 06-14-22 @ 22:33)  Source: .Tissue Femoral Tissue  Gram Stain (06-15-22 @ 03:15):    Rare polymorphonuclear leukocytes seen per low power field    No organisms seen per oil power field    Culture - Joint Fluid (collected 06-14-22 @ 02:21)  Source: Knee Synovial Fluid  Gram Stain (06-14-22 @ 04:04):    polymorphonuclear leukocytes seen    No organisms seen    by cytocentrifuge      Creatinine, Serum: 0.67 mg/dL (06-15-22 @ 06:12)  Creatinine, Serum: 0.86 mg/dL (06-13-22 @ 22:58)          C-Reactive Protein, Serum: 72 mg/L (06-13-22 @ 22:58)    WBC Count: 10.34 K/uL (06-15-22 @ 06:12)  WBC Count: 11.34 K/uL (06-13-22 @ 22:58)    SARS-CoV-2 Result: NotDetec (06-14-22 @ 09:57)  COVID-19 PCR: NotDetec (06-13-22 @ 23:00)

## 2022-06-15 NOTE — PHYSICAL THERAPY INITIAL EVALUATION ADULT - WEIGHT-BEARING RESTRICTIONS: SIT/STAND, REHAB EVAL
[facility] TL called for bed assignment. Room:196, General Peds floor.    MD: Dr. Marietta Valente to call report to [facility] RN at [facility]: [phone]
[facility] Hospitalist called x [phone] connected call to [facility] NP. CRN at [facility] also notified of request for transfer.
[facility] peds called @ [phone] awaiting bed assignment.
[facility] called for BLS ambulance Peds transfer 1l/nc of o2 at this time with pulse ox. 13.5 lbs. ETA: 30 min.
toe touch weight-bearing

## 2022-06-15 NOTE — PHYSICAL THERAPY INITIAL EVALUATION ADULT - PERTINENT HX OF CURRENT PROBLEM, REHAB EVAL
57 y/o male presenting with septic knee joint prosthesis, now s/p removal of hardware, incision and drainage with antibiotic cement spacer application, repair of quad tendon

## 2022-06-15 NOTE — PROGRESS NOTE ADULT - SUBJECTIVE AND OBJECTIVE BOX
Ortho Progress note    Name: MOSES HANKINS    MR #: [id number]    Procedure: s/p right knee removal of hardware, incision and drainage with antibiotic cement spacer application/ repair of quad tendon   Surgeon: Dr. Huerta    Pt seen resting in bed c/o pain to operative site  Denies CP, SOB, N/V, numbness/tingling   No other complaints    General Exam:  Vital Signs Last 24 Hrs  T(C): 37.1 (06-15-22 @ 08:27), Max: 37.1 (06-15-22 @ 08:27)  T(F): 98.7 (06-15-22 @ 08:27), Max: 98.7 (06-15-22 @ 08:27)  HR: 79 (06-15-22 @ 08:27) (79 - 90)  BP: 173/93 (06-15-22 @ 08:27) (173/93 - 178/85)  BP(mean): 178 (06-15-22 @ 06:00) (178 - 178)  RR: 18 (06-15-22 @ 08:27) (18 - 18)  SpO2: 95% (06-15-22 @ 08:27) (93% - 95%)    General: Pt Alert and oriented, NAD, controlled pain.  Knee immobilizer in place  Prevena Dressing intact and functioning  Pulses: 2+ dorsalis pedis pulse. Cap refill < 2 sec.  Sensation: Grossly intact to light touch without deficit.  Motor: + EHL/FHL/TA/GS    Post-op X-Ray: pending    A/P: 56y Male POD#1 s/p right knee removal of hardware, incision and drainage with antibiotic cement spacer application/ repair of quad tendon     - Elevation encouraged  - Pain Control  - DVT ppx:  BID  - Maintain Prevena Dressing 7 days  - Continue IV abx per ID, Blood cultures ordered  - PT  - Weight bearing status: TTWB in knee immobilizer at all times  - Medical care per Hospitalist

## 2022-06-15 NOTE — CONSULT NOTE ADULT - SUBJECTIVE AND OBJECTIVE BOX
Patient is a 56y old  Male who presents with a chief complaint of Infected right total knee  (14 Jun 2022 16:57)      HPI:  Patient is a 56y Male presenting to the emergency department with a chief complaint of right knee pain and swelling. Patient is s/p right quad tendon repair after total knee arthroplasty on 5/11/22 by Dr. Huerta.  patient has been seeing wound care, finished bactrim and states discharge to distal wound continues.  patient states no fevers or chills however today knee became more swollen and painful (13 Jun 2022 23:27)      Interval Hx:  Patient seen during rounds  Patient reports pain to be mod controlled on current medications  endorses minimal relief with oxy or dilaudid  discussed multimodal analgesic therapy with opioid rotation to morphine po  will plan for nerve block with ID clearance  Patient denies sedation with medications     Analgesic Dosing for past 24 hours reviewed as below:  acetaminophen     Tablet ..   975 milliGRAM(s) Oral (06-15-22 @ 05:06)   975 milliGRAM(s) Oral (06-14-22 @ 21:36)    acetaminophen   IVPB ..   400 mL/Hr IV Intermittent (06-14-22 @ 16:04)    busPIRone   5 milliGRAM(s) Oral (06-15-22 @ 05:05)   5 milliGRAM(s) Oral (06-14-22 @ 19:19)    HYDROmorphone  Injectable   0.5 milliGRAM(s) IV Push (06-15-22 @ 08:28)   0.5 milliGRAM(s) IV Push (06-15-22 @ 04:44)    HYDROmorphone  Injectable   1 milliGRAM(s) IV Push (06-14-22 @ 17:26)   1 milliGRAM(s) IV Push (06-14-22 @ 17:05)    ketorolac   Injectable   15 milliGRAM(s) IV Push (06-15-22 @ 12:12)   15 milliGRAM(s) IV Push (06-15-22 @ 05:04)   60 milliGRAM(s) IV Push (06-14-22 @ 21:35)   15 milliGRAM(s) IV Push (06-14-22 @ 16:26)    oxyCODONE    IR   10 milliGRAM(s) Oral (06-15-22 @ 12:12)   10 milliGRAM(s) Oral (06-15-22 @ 06:14)   10 milliGRAM(s) Oral (06-15-22 @ 03:06)   10 milliGRAM(s) Oral (06-14-22 @ 23:59)   10 milliGRAM(s) Oral (06-14-22 @ 16:26)    traZODone   150 milliGRAM(s) Oral (06-14-22 @ 21:36)          T(C): 37.1 (06-15-22 @ 08:27), Max: 37.7 (06-14-22 @ 15:35)  HR: 79 (06-15-22 @ 08:27) (79 - 110)  BP: 173/93 (06-15-22 @ 08:27) (146/69 - 203/91)  RR: 18 (06-15-22 @ 08:27) (11 - 20)  SpO2: 95% (06-15-22 @ 08:27) (93% - 98%)      06-14-22 @ 07:01  -  06-15-22 @ 07:00  --------------------------------------------------------  IN: 150 mL / OUT: 2650 mL / NET: -2500 mL    06-15-22 @ 07:01  -  06-15-22 @ 15:06  --------------------------------------------------------  IN: 480 mL / OUT: 900 mL / NET: -420 mL        acetaminophen     Tablet .. 975 milliGRAM(s) Oral every 8 hours  aspirin enteric coated 325 milliGRAM(s) Oral two times a day  atorvastatin 40 milliGRAM(s) Oral at bedtime  buPROPion XL (24-Hour) . 300 milliGRAM(s) Oral daily  busPIRone 5 milliGRAM(s) Oral two times a day  cefTRIAXone   IVPB 1000 milliGRAM(s) IV Intermittent every 24 hours  cloNIDine 0.1 milliGRAM(s) Oral once  gabapentin 300 milliGRAM(s) Oral three times a day  HYDROmorphone  Injectable 0.5 milliGRAM(s) IV Push once PRN  labetalol Injectable 10 milliGRAM(s) IV Push every 2 hours PRN  magnesium hydroxide Suspension 30 milliLiter(s) Oral daily PRN  methocarbamol 750 milliGRAM(s) Oral four times a day  morphine  IR 15 milliGRAM(s) Oral every 4 hours PRN  morphine  IR 30 milliGRAM(s) Oral every 4 hours PRN  ondansetron Injectable 4 milliGRAM(s) IV Push every 6 hours PRN  pantoprazole    Tablet 40 milliGRAM(s) Oral before breakfast  senna 2 Tablet(s) Oral at bedtime  traZODone 150 milliGRAM(s) Oral at bedtime  vancomycin  IVPB      vancomycin  IVPB 1250 milliGRAM(s) IV Intermittent every 12 hours                          9.7    10.34 )-----------( 247      ( 15 Stuart 2022 06:12 )             30.1     06-15    138  |  103  |  10.3  ----------------------------<  127<H>  3.7   |  23.0  |  0.67    Ca    8.3<L>      15 Stuart 2022 06:12      PT/INR - ( 13 Jun 2022 22:58 )   PT: 12.9 sec;   INR: 1.11 ratio               Pain Service   [phone]  
INFECTIOUS DISEASES AND INTERNAL MEDICINE at [place]  =======================================================  Mingo Delgado MD  Diplomates American Board of Internal Medicine and Infectious Diseases  Telephone [phone]  Fax            [phone]  =======================================================    MOSES [id number]      HPI:  Patient is a 56y Male presenting to the emergency department with a chief complaint of right knee pain and swelling. Patient is s/p right quad tendon repair after total knee arthroplasty on 5/11/22 by Dr. Huerta.  patient has been seeing wound care, finished bactrim and states discharge to distal wound continues.  patient states no fevers or chills however today knee became more swollen and painful    PT S/P ASPIRATION OF KNEE AND NOW POST OP IN RECOVERY ROOM  ASKED TO EVALUATE FROM ID STANDPOINT       PAST MEDICAL & SURGICAL HISTORY:  Depression      Anxiety      Alcohol abuse      Overdose  heroine and xanax 3/10/2015      ACL (anterior cruciate ligament) tear, left, initial encounter          ANTIBIOTICS  ceFAZolin   IVPB      ceFAZolin   IVPB 2000 milliGRAM(s) IV Intermittent every 8 hours  vancomycin  IVPB          Allergies    shellfish. (Hives)    Intolerances        SOCIAL HISTORY:     FAMILY HX   FAMILY HISTORY:  No pertinent family history in first degree relatives        Vital Signs Last 24 Hrs  T(C): 37.7 (14 Jun 2022 15:35), Max: 37.7 (14 Jun 2022 15:35)  T(F): 99.8 (14 Jun 2022 15:35), Max: 99.8 (14 Jun 2022 15:35)  HR: 79 (14 Jun 2022 16:45) (75 - 110)  BP: 178/85 (14 Jun 2022 16:45) (104/62 - 203/91)  BP(mean): 106 (14 Jun 2022 16:45) (106 - 126)  RR: 16 (14 Jun 2022 16:45) (11 - 20)  SpO2: 97% (14 Jun 2022 16:45) (92% - 98%)  Drug Dosing Weight  Height (cm): 172.7 (14 Jun 2022 10:06)  Weight (kg): 97.1 (14 Jun 2022 10:06)  BMI (kg/m2): 32.6 (14 Jun 2022 10:06)  BSA (m2): 2.1 (14 Jun 2022 10:06)      REVIEW OF SYSTEMS:    CONSTITUTIONAL:  As per HPI.    HEENT:  Eyes:  No diplopia or blurred vision. ENT:  No earache, sore throat or runny nose.    CARDIOVASCULAR:  No pressure, squeezing, strangling, tightness, heaviness or aching about the chest, neck, axilla or epigastrium.    RESPIRATORY:  No cough, shortness of breath, PND or orthopnea.    GASTROINTESTINAL:  No nausea, vomiting or diarrhea.    GENITOURINARY:  No dysuria, frequency or urgency.    MUSCULOSKELETAL:  As per HPI.    SKIN:  No change in skin, hair or nails.                       PHYSICAL EXAMINATION:    GENERAL: The patient is a _____in no apparent distress. ___     VITAL SIGNS: T(C): 37.7 (06-14-22 @ 15:35), Max: 37.7 (06-14-22 @ 15:35)  HR: 79 (06-14-22 @ 16:45) (75 - 110)  BP: 178/85 (06-14-22 @ 16:45) (104/62 - 203/91)  RR: 16 (06-14-22 @ 16:45) (11 - 20)  SpO2: 97% (06-14-22 @ 16:45) (92% - 98%)  Wt(kg): --    HEENT: Head is normocephalic and atraumatic.  ANICTERIC  NECK: Supple. No carotid bruits.  No lymphadenopathy or thyromegaly.    LUNGS:COARSE BREATH SOUNDS    HEART: Regular rate and rhythm without murmur.    ABDOMEN: Soft, nontender, and nondistended.  Positive bowel sounds.  No hepatosplenomegaly was noted. NO REBOUND NO GUARDING    EXTREMITIES: R KNEE WRAPPED     NEUROLOGIC: NON FOCAL      SKIN: No ulceration or induration present. NO RASH        BLOOD CULTURES       URINE CX          LABS:                        11.0   11.34 )-----------( 268      ( 13 Jun 2022 22:58 )             34.4     06-13    136  |  100  |  20.1<H>  ----------------------------<  105<H>  4.3   |  25.0  |  0.86    Ca    8.4<L>      13 Jun 2022 22:58      PT/INR - ( 13 Jun 2022 22:58 )   PT: 12.9 sec;   INR: 1.11 ratio               RADIOLOGY & ADDITIONAL STUDIES:      ASSESSMENT/PLAN  Patient is a 56y Male presenting to the emergency department with a chief complaint of right knee pain and swelling. Patient is s/p right quad tendon repair after total knee arthroplasty on 5/11/22 by Dr. Huerta.  patient has been seeing wound care, finished bactrim and states discharge to distal wound continues.  patient states no fevers or chills however today knee became more swollen and painful    PT S/P ASPIRATION OF KNEE AND NOW POST OP IN RECOVERY ROOM  WILL FOLLOWUP OPERATIVE CX  BLOOD CX X 1 SETS SENT  WILL END 2ND SET  PT PLACED ON VANCO /CEFAZOLIN WILL CHANGE TO VANCO/ROCEPHIN  UNITL CX ARE FINALIZED  WILL FOLLOW UP WITH FURTHER RECOMMENDATIONS                   HSAWN GONZALEZ MD
Pt Name: MOSES HANKINS    MRN: [id number]      Patient is a 56y Male presenting to the emergency department with a chief complaint of right knee pain and swelling. Patient is s/p right quad tendon repair after total knee arthroplasty on 5/11/22 by Dr. Huerta.  patient has been seeing wound care, finished bactrim and states discharge to distal wound continues.  patient states no fevers or chills however today knee became more swollen and painful       REVIEW OF SYSTEMS    General: Alert, responsive, in NAD    Skin/Breast: No rashes, no pruritis   	  Ophthalmologic: No visual changes. No redness.   	  ENMT:	No discharge. No swelling.    Respiratory and Thorax: No difficulty breathing. No cough.  	   Cardiovascular:	No chest pain. No palpitations.    Gastrointestinal:	 No abdominal pain. No diarrhea.     Genitourinary: No dysuria. No bleeding.    Musculoskeletal: SEE HPI.    Neurological: No sensory or motor changes.     Psychiatric: No anxiety or depression.    Hematology/Lymphatics: No swelling.    Endocrine: No Hx of diabetes.    ROS is otherwise negative.    PAST MEDICAL & SURGICAL HISTORY:  PAST MEDICAL & SURGICAL HISTORY:  Depression      Anxiety      Alcohol abuse      Overdose  heroine and xanax 3/10/2015      ACL (anterior cruciate ligament) tear, left, initial encounter          Allergies: shellfish. (Hives)      Medications:     FAMILY HISTORY:  No pertinent family history in first degree relatives    : non-contributory    y:     Ambulation: Walking independently                Vital Signs Last 24 Hrs  T(C): 37.1 (13 Jun 2022 18:38), Max: 37.1 (13 Jun 2022 18:38)  T(F): 98.8 (13 Jun 2022 18:38), Max: 98.8 (13 Jun 2022 18:38)  HR: 95 (13 Jun 2022 18:38) (95 - 95)  BP: 114/68 (13 Jun 2022 18:38) (114/68 - 114/68)  BP(mean): --  RR: 18 (13 Jun 2022 18:38) (18 - 18)  SpO2: 95% (13 Jun 2022 18:38) (95% - 95%)    Daily Height in cm: 172.72 (13 Jun 2022 18:38)    Daily       PHYSICAL EXAM:      Appearance: Alert, responsive, in no acute distress.    Neurological: Sensation is grossly intact to light touch. 5/5 motor function of all extremities. No focal deficits or weaknesses found.    Skin: no rash on visible skin. Skin is clean, dry and intact. No bleeding. No abrasions. No ulcerations.    Vascular: 2+ distal pulses. Cap refill < 2 sec. No signs of venous insufficiency or stasis. No extremity ulcerations. No cyanosis.    Musculoskeletal:      Right Lower Extremity: positive healing incision with distal dehiscence and small amount of yellow discharge.  positive swelling to knee. ROM 10-90  mild erythema surrounding incision.      Imaging Studies:pending    ARTHROCENTSIS  PROCEDURE NOTE: Arthrocentesis    Performed by:  Yosvany Davis PA-C    Indication: Effusion / rule out septic joint    Consent: the risks and benefits of arthrocentesis discussed with patient, including the risk of bleeding, infection, and technical failure. The risks of not performing the procedure, failure to diagnose septic joint with resultant systemic infection, discussed with the patient. The alternatives of performing the procedure also discussed. Written consent was obtained following the discussion.    Universal Protocol: A time out was performed and the correct patient and site were verified.    The Right joint was prepped and draped in proper sterile fashion.  A 18 gauge needle was used to aspirate fluid from the joint using appropriate anatomic landmarks. [  ] fluid was obtained from the joint.. The fluid was then sent to the lab for appropriate studies. The site was bandaged and no complications were noted. The patient tolerated the procedure well.    Post-Procedure Diagnosis: Effusion  Complications: None  Specimens Removed: fluid only  Prosthetic devices/implants:  total joint      A/P:  Pt is a  56y Male found to have post op wound complication       PLAN:   * knee fluid sent for analysis   *needs labs and xray   further plan pending results    Case discussed with Dr. Huerta
55 y/o male with PMH of depression, anxiety, HLD came to the ED complaining of right knee pain and swelling. Patient had total knee arthroplasty on 5/11/22;  he has been seeing wound care after that; placed on Bactrim which he completed. He noted pain that is worsened and swelling. He has no fever, chills, nausea, vomiting, chest pain, shortness of breath, palpitation, abdominal pain, change in bowel/urinary habit.       PAST MEDICAL & SURGICAL HISTORY:  Depression  Anxiety  HLD  Alcohol abuse  Overdose  heroine and xanax 3/10/2015  ACL (anterior cruciate ligament) tear, left, initial encounter          REVIEW OF SYSTEMS: as per PHI     MEDICATIONS  (STANDING):  chlorhexidine 2% Cloths 1 Application(s) Topical <User Schedule>  mupirocin 2% Ointment 1 Application(s) Both Nostrils two times a day  povidone iodine 5% Nasal Swab 1 Application(s) Both Nostrils once    MEDICATIONS  (PRN):  morphine  - Injectable 4 milliGRAM(s) IV Push every 4 hours PRN Moderate Pain (4 - 6)      Allergies: shellfish. (Hives)    SOCIAL HISTORY: no cigarette, prior hx of EtOH and illicit drug use    FAMILY HISTORY:  No pertinent family history in first degree relatives    Vital Signs Last 24 Hrs  T(C): 36.6 (14 Jun 2022 00:18), Max: 37.1 (13 Jun 2022 18:38)  T(F): 97.8 (14 Jun 2022 00:18), Max: 98.8 (13 Jun 2022 18:38)  HR: 75 (14 Jun 2022 00:18) (75 - 95)  BP: 104/62 (14 Jun 2022 00:18) (104/62 - 114/68)  BP(mean): --  RR: 18 (14 Jun 2022 00:18) (18 - 18)  SpO2: 93% (14 Jun 2022 00:18) (93% - 95%)      PHYSICAL EXAM:    Constitutional: well groomed, not in acute distress    Eyes: PERRLA     Respiratory: CTA b/l, able to speak in full sentence without respiratory distress     Cardiovascular: s1, s2, RRR    Gastrointestinal: soft, non-distended, non-tender, BS+    Extremities: RLE knee wrapped, no edema on LLE     Neurological: awake, alert and oriented x 3, respond well to verbal command     Skin: no rash, well perfused     Musculoskeletal: RLE with decrease ROM due to pain     Psychiatric: normal mood and affect         LABS:                        11.0   11.34 )-----------( 268      ( 13 Jun 2022 22:58 )             34.4     06-13    136  |  100  |  20.1<H>  ----------------------------<  105<H>  4.3   |  25.0  |  0.86    Ca    8.4<L>      13 Jun 2022 22:58      PT/INR - ( 13 Jun 2022 22:58 )   PT: 12.9 sec;   INR: 1.11 ratio

## 2022-06-15 NOTE — OCCUPATIONAL THERAPY INITIAL EVALUATION ADULT - LIVES WITH, PROFILE
Pt reports lives in  a private house with 2 YESICA, no hand rail and bed/bath on 2nd floor. Pt states can stay main level if needed.

## 2022-06-15 NOTE — CONSULT NOTE ADULT - ASSESSMENT
55 y/o male with PMH of depression, anxiety, HLD came to the ED complaining of right knee pain and swelling. Patient had total knee arthroplasty on 5/11/22;  he has been seeing wound care after that; placed on Bactrim which he completed. He noted pain that is worsened and swelling.     Septic knee   S/p arthrocentesis by orthopedic in the ED, fluid sent   Antibiotic on hold, patient scheduled for wash out in AM   Pain control   ECG: NSR, labs reviewed   Patient with MET > 4    Patient medically optimized for the plan procedure     Depression/Anxiety   Continue Bupropion XL 300mg   Escitalopram 10mg   Trazodone 200mg HS   Buspirone 5mg bid     HLD  Atorvastatin 40mg     Aspirin 325mg bid for DVT prophylaxis on hold, resume as needed.     Rest of the plan of as per primary team   
56M  hx of heroin abuse - was previously on suboxone 8mg BID  multiple knee surgeries c/b infected joint hardware   POD#1 s/p right knee removal of hardware, incision and drainage with antibiotic cement spacer application/ repair of quad tendon     acetaminophen     Tablet .. 975 milliGRAM(s) Oral every 8 hours  gabapentin 300 milliGRAM(s) Oral three times a day  HYDROmorphone  Injectable 0.5 milliGRAM(s) IV Push once PRN BTP  methocarbamol 750 milliGRAM(s) Oral four times a day  morphine  IR 15 milliGRAM(s) Oral every 4 hours PRN mod pain  morphine  IR 30 milliGRAM(s) Oral every 4 hours PRN sev pain    will plan for adductor canal nerve block in the AM  will plan to transition to suboxone 8mg BID or TID in place of full opioid agonists when due for discharge

## 2022-06-15 NOTE — OCCUPATIONAL THERAPY INITIAL EVALUATION ADULT - GENERAL OBSERVATIONS, REHAB EVAL
Received pt semifowler in bed, NAD, +IV lock, +Tele, +Prevena, +RLE Knee immobilizer, +on RA, A&Ox4. Pre/post pain level 8/10, R knee; RN aware. Pain meds on board. Patient agreeable to OT evaluation

## 2022-06-15 NOTE — PROGRESS NOTE ADULT - ASSESSMENT
Patient is a 56y Male presenting to the emergency department with a chief complaint of right knee pain and swelling. Patient is s/p right quad tendon repair after total knee arthroplasty on 5/11/22 by Dr. Huerta.  patient has been seeing wound care, finished bactrim and states discharge to distal wound continues.  patient states no fevers or chills however today knee became more swollen and painful    PT S/P ASPIRATION OF KNEE AND NOW POST OP IN RECOVERY ROOM  WILL FOLLOWUP OPERATIVE CX  BLOOD CX X 2 PENDING      PT PLACED ON VANCO /CEFAZOLIN  CAN  CHANGE TO VANCO/ROCEPHIN  UNTIL CX ARE FINALIZED  WILL FOLLOW UP WITH FURTHER RECOMMENDATIONS

## 2022-06-15 NOTE — PROGRESS NOTE ADULT - SUBJECTIVE AND OBJECTIVE BOX
Patient seen and examined . S/p  right knee removal of hardware, incision and drainage with antibiotic cement spacer application/ repair of quad tendon  , POD # 1. C/O severe R knee pain shouting down the leg 9/10 ,no n/v ,   voiding with out difficulty .     CC : R knee pain         MEDICATIONS  (STANDING):  acetaminophen     Tablet .. 975 milliGRAM(s) Oral every 8 hours  aspirin enteric coated 325 milliGRAM(s) Oral two times a day  atorvastatin 40 milliGRAM(s) Oral at bedtime  buPROPion XL (24-Hour) . 300 milliGRAM(s) Oral daily  busPIRone 5 milliGRAM(s) Oral two times a day  cefTRIAXone   IVPB 1000 milliGRAM(s) IV Intermittent every 24 hours  cloNIDine 0.1 milliGRAM(s) Oral once  pantoprazole    Tablet 40 milliGRAM(s) Oral before breakfast  senna 2 Tablet(s) Oral at bedtime  traZODone 150 milliGRAM(s) Oral at bedtime  vancomycin  IVPB      vancomycin  IVPB 1250 milliGRAM(s) IV Intermittent every 12 hours    MEDICATIONS  (PRN):  HYDROmorphone   Tablet 4 milliGRAM(s) Oral every 3 hours PRN Severe Pain (7 - 10)  HYDROmorphone  Injectable 0.5 milliGRAM(s) IV Push every 3 hours PRN breakthrough pain  HYDROmorphone  Injectable 0.5 milliGRAM(s) IV Push once PRN breakthrough pain  labetalol Injectable 10 milliGRAM(s) IV Push every 2 hours PRN Systolic blood pressure > 170  magnesium hydroxide Suspension 30 milliLiter(s) Oral daily PRN Constipation  ondansetron Injectable 4 milliGRAM(s) IV Push every 6 hours PRN Nausea and/or Vomiting  oxyCODONE    IR 5 milliGRAM(s) Oral every 3 hours PRN Moderate Pain (4 - 6)  oxyCODONE    IR 10 milliGRAM(s) Oral every 3 hours PRN Severe Pain (7 - 10)      LABS:                          9.7    10.34 )-----------( 247      ( 15 Stuart 2022 06:12 )             30.1     06-15    138  |  103  |  10.3  ----------------------------<  127<H>  3.7   |  23.0  |  0.67    Ca    8.3<L>      15 Stuart 2022 06:12      PT/INR - ( 13 Jun 2022 22:58 )   PT: 12.9 sec;   INR: 1.11 ratio      Culture - Joint Fluid (06.14.22 @ 22:59)    Gram Stain:   polymorphonuclear leukocytes seen  No organisms seen  by cytocentrifuge    Specimen Source: Joint Fl Right Knee Synovial Fluid    Culture - Tissue with Gram Stain (06.14.22 @ 22:33)    Gram Stain:   Rare polymorphonuclear leukocytes seen per low power field  No organisms seen per oil power field    Specimen Source: .Tissue Femoral Tissue    Culture - Joint Fluid (06.14.22 @ 02:21)    Gram Stain:   polymorphonuclear leukocytes seen  No organisms seen  by cytocentrifuge    Specimen Source: Knee Synovial Fluid    Culture Results:   No growth      RADIOLOGY & ADDITIONAL TESTS:    < from: Xray Chest 1 View AP/PA. (06.13.22 @ 23:25) >    ACC: [id number] EXAM:  XR CHEST AP OR PA 1V                          PROCEDURE DATE:  06/13/2022          INTERPRETATION:  Clinical history: 56-year-old male, position.    Single view of the chest is compared to 6/5/2020.    FINDINGS: Normal cardiac silhouette and normal pulmonary vasculature with   no consolidation, effusion, pneumothorax or acute osseous finding.    IMPRESSION:  No acute radiographic findings and no change    --- End of Report ---                REVIEW OF SYSTEMS:    Acute postop pain on chronic R knee pain , all other systems are reviewed and are negative .    Vital Signs Last 24 Hrs  T(C): 37.1 (15 Stuart 2022 08:27), Max: 37.7 (14 Jun 2022 15:35)  T(F): 98.7 (15 Stuart 2022 08:27), Max: 99.8 (14 Jun 2022 15:35)  HR: 79 (15 Stuart 2022 08:27) (79 - 110)  BP: 173/93 (15 Stuart 2022 08:27) (146/69 - 203/91)  BP(mean): 178 (15 Stuart 2022 06:00) (87 - 178)  RR: 18 (15 Stuart 2022 08:27) (11 - 20)  SpO2: 95% (15 Stuart 2022 08:27) (93% - 98%)  PHYSICAL EXAM:    GENERAL: NAD, well-groomed, well-developed  HEAD:  Atraumatic, Normocephalic  EYES: EOMI, PERRLA, conjunctiva and sclera clear  NECK: Supple, No JVD, Normal thyroid  NERVOUS SYSTEM:  Alert & Oriented X3, no focal deficit  CHEST/LUNG: CTA b/l ,  no  rales, rhonchi, wheezing, or rubs  HEART: Regular rate and rhythm; No murmurs, rubs, or gallops  ABDOMEN: Soft, Nontender, Nondistended; Bowel sounds present  EXTREMITIES:  2+ Peripheral Pulses, No clubbing, cyanosis, or edema,   R knee with immobilizer, dry dressing + , clean and dry , Prevena +    LYMPH: No lymphadenopathy noted  SKIN: No rashes or lesions

## 2022-06-15 NOTE — OCCUPATIONAL THERAPY INITIAL EVALUATION ADULT - SENSORY TESTS
Patient with +capillary refill in Right toes. Patient +Right dorsal pedal pulse. Patient does not offer any complaints or changes in sensation.

## 2022-06-15 NOTE — PROGRESS NOTE ADULT - ASSESSMENT
57 y/o male with PMH of depression, anxiety, HLD came to the ED complaining of right knee pain and swelling. Patient had total knee arthroplasty on 5/11/22;  he has been seeing wound care after that; placed on Bactrim which he completed. He noted pain that is worsened and swelling.     Septic prosthetic R knee   S/p arthrocentesis by orthopedic in the ED- fluid sent   s/p  right knee  hardware removal , incision and drainage with antibiotic cement spacer application Repair of right quadriceps  , ID noted and appreciated   continue iv Abx , follow cultures , still with severe pain 9/10   PT/OT/pain mgmt- consider pain mgmt   patient with hx of heroin/ xanax overdose in the past    DVT prophylaxis- as per ortho  Incentive spirometry  Prophylaxis of opioid  induced constipation.    Depression/Anxiety   Continue Bupropion XL 300mg   Escitalopram 10mg   Trazodone 200mg HS   Buspirone 5mg bid     HLD  Atorvastatin 40mg     Acute postop R knee pain on chronic pain - as above     Postoperative HTN urgency - no Hx of HTN but admits takes Clonidine 0.1 mg BID ,   BP still in 's - asymptomatic , continue Clonidine PO , continue Labetalol ivp for SBP > 170 ,   D/C IVF , needs better pain control . No sob , no cp , no palpitations , no headache . Monitor BP .     Anemia - acute blood lost anemia - secondary to surgical blood lost - patient is  asymptomatic.     Spoke to ortho PA ,     DVT prophylaxis  - as per ortho protocol- on ASA BID   Opioid induced constipation  prophylaxis - bowel regimen     Will follow along with you .

## 2022-06-15 NOTE — OCCUPATIONAL THERAPY INITIAL EVALUATION ADULT - ADDITIONAL COMMENTS
Pt has a shower with curtains and grab bars on main level. Pt owns RW, cane and shower chair. Pt is right handed and does not drive. Pt states lives with friends who can assist as needed, someone is home 24/7

## 2022-06-15 NOTE — OCCUPATIONAL THERAPY INITIAL EVALUATION ADULT - VISUAL ACUITY
+reading glasses; no complaints in vision offered. Pt able to correctly identify # of digits held in central and peripheral fields bilaterally

## 2022-06-15 NOTE — OCCUPATIONAL THERAPY INITIAL EVALUATION ADULT - DIAGNOSIS, OT EVAL
56 year old Male POD#1 s/p right knee removal of hardware, incision and drainage with antibiotic cement spacer application/ repair of quad tendon.

## 2022-06-16 LAB
-  AMPICILLIN/SULBACTAM: SIGNIFICANT CHANGE UP
-  AMPICILLIN/SULBACTAM: SIGNIFICANT CHANGE UP
-  CEFAZOLIN: SIGNIFICANT CHANGE UP
-  CEFAZOLIN: SIGNIFICANT CHANGE UP
-  CLINDAMYCIN: SIGNIFICANT CHANGE UP
-  CLINDAMYCIN: SIGNIFICANT CHANGE UP
-  ERYTHROMYCIN: SIGNIFICANT CHANGE UP
-  ERYTHROMYCIN: SIGNIFICANT CHANGE UP
-  GENTAMICIN: SIGNIFICANT CHANGE UP
-  GENTAMICIN: SIGNIFICANT CHANGE UP
-  OXACILLIN: SIGNIFICANT CHANGE UP
-  OXACILLIN: SIGNIFICANT CHANGE UP
-  RIFAMPIN: SIGNIFICANT CHANGE UP
-  RIFAMPIN: SIGNIFICANT CHANGE UP
-  TETRACYCLINE: SIGNIFICANT CHANGE UP
-  TETRACYCLINE: SIGNIFICANT CHANGE UP
-  TRIMETHOPRIM/SULFAMETHOXAZOLE: SIGNIFICANT CHANGE UP
-  TRIMETHOPRIM/SULFAMETHOXAZOLE: SIGNIFICANT CHANGE UP
-  VANCOMYCIN: SIGNIFICANT CHANGE UP
-  VANCOMYCIN: SIGNIFICANT CHANGE UP
METHOD TYPE: SIGNIFICANT CHANGE UP
METHOD TYPE: SIGNIFICANT CHANGE UP
VANCOMYCIN TROUGH SERPL-MCNC: 9.2 UG/ML — LOW (ref 10–20)

## 2022-06-16 PROCEDURE — 99232 SBSQ HOSP IP/OBS MODERATE 35: CPT

## 2022-06-16 PROCEDURE — 73560 X-RAY EXAM OF KNEE 1 OR 2: CPT | Mod: 26,RT

## 2022-06-16 RX ORDER — CEFAZOLIN SODIUM 1 G
2000 VIAL (EA) INJECTION EVERY 8 HOURS
Refills: 0 | Status: DISCONTINUED | OUTPATIENT
Start: 2022-06-16 | End: 2022-06-22

## 2022-06-16 RX ORDER — BUPRENORPHINE AND NALOXONE 2; .5 MG/1; MG/1
1 TABLET SUBLINGUAL
Refills: 0 | Status: DISCONTINUED | OUTPATIENT
Start: 2022-06-17 | End: 2022-06-22

## 2022-06-16 RX ADMIN — METHOCARBAMOL 750 MILLIGRAM(S): 500 TABLET, FILM COATED ORAL at 00:52

## 2022-06-16 RX ADMIN — METHOCARBAMOL 750 MILLIGRAM(S): 500 TABLET, FILM COATED ORAL at 23:10

## 2022-06-16 RX ADMIN — CELECOXIB 200 MILLIGRAM(S): 200 CAPSULE ORAL at 05:39

## 2022-06-16 RX ADMIN — Medication 325 MILLIGRAM(S): at 17:59

## 2022-06-16 RX ADMIN — ATORVASTATIN CALCIUM 40 MILLIGRAM(S): 80 TABLET, FILM COATED ORAL at 21:24

## 2022-06-16 RX ADMIN — MORPHINE SULFATE 15 MILLIGRAM(S): 50 CAPSULE, EXTENDED RELEASE ORAL at 21:22

## 2022-06-16 RX ADMIN — GABAPENTIN 300 MILLIGRAM(S): 400 CAPSULE ORAL at 21:29

## 2022-06-16 RX ADMIN — Medication 5 MILLIGRAM(S): at 05:41

## 2022-06-16 RX ADMIN — Medication 325 MILLIGRAM(S): at 05:40

## 2022-06-16 RX ADMIN — MORPHINE SULFATE 15 MILLIGRAM(S): 50 CAPSULE, EXTENDED RELEASE ORAL at 07:00

## 2022-06-16 RX ADMIN — SENNA PLUS 2 TABLET(S): 8.6 TABLET ORAL at 21:24

## 2022-06-16 RX ADMIN — Medication 100 MILLIGRAM(S): at 21:30

## 2022-06-16 RX ADMIN — Medication 166.67 MILLIGRAM(S): at 06:18

## 2022-06-16 RX ADMIN — MORPHINE SULFATE 30 MILLIGRAM(S): 50 CAPSULE, EXTENDED RELEASE ORAL at 11:16

## 2022-06-16 RX ADMIN — METHOCARBAMOL 750 MILLIGRAM(S): 500 TABLET, FILM COATED ORAL at 18:00

## 2022-06-16 RX ADMIN — Medication 975 MILLIGRAM(S): at 23:04

## 2022-06-16 RX ADMIN — BUPROPION HYDROCHLORIDE 300 MILLIGRAM(S): 150 TABLET, EXTENDED RELEASE ORAL at 11:15

## 2022-06-16 RX ADMIN — GABAPENTIN 300 MILLIGRAM(S): 400 CAPSULE ORAL at 16:04

## 2022-06-16 RX ADMIN — CELECOXIB 200 MILLIGRAM(S): 200 CAPSULE ORAL at 06:49

## 2022-06-16 RX ADMIN — MORPHINE SULFATE 30 MILLIGRAM(S): 50 CAPSULE, EXTENDED RELEASE ORAL at 16:05

## 2022-06-16 RX ADMIN — Medication 150 MILLIGRAM(S): at 21:23

## 2022-06-16 RX ADMIN — PANTOPRAZOLE SODIUM 40 MILLIGRAM(S): 20 TABLET, DELAYED RELEASE ORAL at 05:40

## 2022-06-16 RX ADMIN — METHOCARBAMOL 750 MILLIGRAM(S): 500 TABLET, FILM COATED ORAL at 11:16

## 2022-06-16 RX ADMIN — Medication 5 MILLIGRAM(S): at 18:00

## 2022-06-16 RX ADMIN — GABAPENTIN 300 MILLIGRAM(S): 400 CAPSULE ORAL at 05:40

## 2022-06-16 RX ADMIN — Medication 0.1 MILLIGRAM(S): at 18:00

## 2022-06-16 RX ADMIN — CELECOXIB 200 MILLIGRAM(S): 200 CAPSULE ORAL at 17:59

## 2022-06-16 RX ADMIN — Medication 975 MILLIGRAM(S): at 06:49

## 2022-06-16 RX ADMIN — Medication 100 MILLIGRAM(S): at 18:00

## 2022-06-16 RX ADMIN — Medication 975 MILLIGRAM(S): at 05:40

## 2022-06-16 RX ADMIN — MORPHINE SULFATE 15 MILLIGRAM(S): 50 CAPSULE, EXTENDED RELEASE ORAL at 22:30

## 2022-06-16 RX ADMIN — MORPHINE SULFATE 15 MILLIGRAM(S): 50 CAPSULE, EXTENDED RELEASE ORAL at 05:48

## 2022-06-16 RX ADMIN — MORPHINE SULFATE 30 MILLIGRAM(S): 50 CAPSULE, EXTENDED RELEASE ORAL at 12:16

## 2022-06-16 RX ADMIN — Medication 975 MILLIGRAM(S): at 14:04

## 2022-06-16 RX ADMIN — METHOCARBAMOL 750 MILLIGRAM(S): 500 TABLET, FILM COATED ORAL at 05:39

## 2022-06-16 RX ADMIN — Medication 975 MILLIGRAM(S): at 21:23

## 2022-06-16 NOTE — PROGRESS NOTE ADULT - ASSESSMENT
Patient is a 56y Male presenting to the emergency department with a chief complaint of right knee pain and swelling. Patient is s/p right quad tendon repair after total knee arthroplasty on 5/11/22 by Dr. Huerta.  patient has been seeing wound care, finished bactrim and states discharge to distal wound continues.  patient states no fevers or chills however today knee became more swollen and painful    PT S/P ASPIRATION OF KNEE AND NOW POST OP IN RECOVERY ROOM  WILL FOLLOWUP OPERATIVE CX  BLOOD CX X 2 PENDING    ON VANCO ROCEPHIN  PRELIM CX ARE [facility] AURUES WILL AWAIT    FINAL SENSITIVITIES   OK FOR PAIN PROCEDURE FROM ID STANDPOINT  WILL PLAN PICC LINE IF  BLOOD CX REMAIN NEGATIVE

## 2022-06-16 NOTE — PROGRESS NOTE ADULT - SUBJECTIVE AND OBJECTIVE BOX
Interval Hx:  Patient seen during rounds  Patient reports pain to be controlled on current medications  plan for nerve block today  discussed transition to suboxone tomorrow  Patient denies sedation with medications     Analgesic Dosing for past 24 hours reviewed as below:  acetaminophen     Tablet ..   975 milliGRAM(s) Oral (06-16-22 @ 05:40)   975 milliGRAM(s) Oral (06-15-22 @ 21:33)   975 milliGRAM(s) Oral (06-15-22 @ 16:35)    buPROPion XL (24-Hour) .   300 milliGRAM(s) Oral (06-16-22 @ 11:15)   300 milliGRAM(s) Oral (06-15-22 @ 17:06)    busPIRone   5 milliGRAM(s) Oral (06-16-22 @ 05:41)   5 milliGRAM(s) Oral (06-15-22 @ 18:15)    celecoxib   200 milliGRAM(s) Oral (06-16-22 @ 05:39)    gabapentin   300 milliGRAM(s) Oral (06-16-22 @ 05:40)   300 milliGRAM(s) Oral (06-15-22 @ 21:33)    methocarbamol   750 milliGRAM(s) Oral (06-16-22 @ 11:16)   750 milliGRAM(s) Oral (06-16-22 @ 05:39)   750 milliGRAM(s) Oral (06-16-22 @ 00:52)   750 milliGRAM(s) Oral (06-15-22 @ 18:12)    morphine  IR   15 milliGRAM(s) Oral (06-16-22 @ 05:48)   15 milliGRAM(s) Oral (06-15-22 @ 16:35)    morphine  IR   30 milliGRAM(s) Oral (06-16-22 @ 11:16)   30 milliGRAM(s) Oral (06-15-22 @ 21:34)    traZODone   150 milliGRAM(s) Oral (06-15-22 @ 21:35)          T(C): 36.9 (06-16-22 @ 08:48), Max: 36.9 (06-16-22 @ 08:48)  HR: 74 (06-16-22 @ 08:48) (72 - 93)  BP: 147/82 (06-16-22 @ 10:10) (104/70 - 189/95)  RR: 18 (06-16-22 @ 08:48) (18 - 18)  SpO2: 94% (06-16-22 @ 08:48) (93% - 96%)      06-15-22 @ 07:01  -  06-16-22 @ 07:00  --------------------------------------------------------  IN: 480 mL / OUT: 1600 mL / NET: -1120 mL        acetaminophen     Tablet .. 975 milliGRAM(s) Oral every 8 hours  aspirin enteric coated 325 milliGRAM(s) Oral two times a day  atorvastatin 40 milliGRAM(s) Oral at bedtime  bisacodyl Suppository 10 milliGRAM(s) Rectal once PRN  buPROPion XL (24-Hour) . 300 milliGRAM(s) Oral daily  busPIRone 5 milliGRAM(s) Oral two times a day  celecoxib 200 milliGRAM(s) Oral every 12 hours  cloNIDine 0.1 milliGRAM(s) Oral two times a day  gabapentin 300 milliGRAM(s) Oral three times a day  HYDROmorphone  Injectable 0.5 milliGRAM(s) IV Push once PRN  labetalol Injectable 10 milliGRAM(s) IV Push every 2 hours PRN  magnesium hydroxide Suspension 30 milliLiter(s) Oral daily PRN  methocarbamol 750 milliGRAM(s) Oral four times a day  morphine  IR 15 milliGRAM(s) Oral every 4 hours PRN  morphine  IR 30 milliGRAM(s) Oral every 4 hours PRN  ondansetron Injectable 4 milliGRAM(s) IV Push every 6 hours PRN  pantoprazole    Tablet 40 milliGRAM(s) Oral before breakfast  senna 2 Tablet(s) Oral at bedtime  traZODone 150 milliGRAM(s) Oral at bedtime  vancomycin  IVPB      vancomycin  IVPB 1250 milliGRAM(s) IV Intermittent every 12 hours                          9.7    10.34 )-----------( 247      ( 15 Stuart 2022 06:12 )             30.1     06-15    138  |  103  |  10.3  ----------------------------<  127<H>  3.7   |  23.0  |  0.67    Ca    8.3<L>      15 Stuart 2022 06:12            Pain Service   [phone]

## 2022-06-16 NOTE — PHARMACOTHERAPY INTERVENTION NOTE - COMMENTS
Cultures growing MSSA, discussed with ID - de-escalated to cefazolin
Vancomycin trough level entered per pharmacy policy, 6/17 at 0500, prior to 0600 dose 
Vancomycin trough level entered per pharmacy policy, 6/16 at 0500, prior to 0600 dose

## 2022-06-16 NOTE — PROGRESS NOTE ADULT - SUBJECTIVE AND OBJECTIVE BOX
Patient seen and examined . S/p  right knee  hardware removal , incision and drainage with antibiotic cement spacer application Repair of right quadriceps ,  POD # 3. Pain much better controlled this am , no n/v , voiding without difficulty       CC : R knee pain better controlled this am       MEDICATIONS  (STANDING):  acetaminophen     Tablet .. 975 milliGRAM(s) Oral every 8 hours  aspirin enteric coated 325 milliGRAM(s) Oral two times a day  atorvastatin 40 milliGRAM(s) Oral at bedtime  buPROPion XL (24-Hour) . 300 milliGRAM(s) Oral daily  busPIRone 5 milliGRAM(s) Oral two times a day  celecoxib 200 milliGRAM(s) Oral every 12 hours  cloNIDine 0.1 milliGRAM(s) Oral two times a day  gabapentin 300 milliGRAM(s) Oral three times a day  methocarbamol 750 milliGRAM(s) Oral four times a day  pantoprazole    Tablet 40 milliGRAM(s) Oral before breakfast  senna 2 Tablet(s) Oral at bedtime  traZODone 150 milliGRAM(s) Oral at bedtime  vancomycin  IVPB      vancomycin  IVPB 1250 milliGRAM(s) IV Intermittent every 12 hours    MEDICATIONS  (PRN):  bisacodyl Suppository 10 milliGRAM(s) Rectal once PRN Constipation  HYDROmorphone  Injectable 0.5 milliGRAM(s) IV Push once PRN breakthrough pain  labetalol Injectable 10 milliGRAM(s) IV Push every 2 hours PRN Systolic blood pressure > 170  magnesium hydroxide Suspension 30 milliLiter(s) Oral daily PRN Constipation  morphine  IR 15 milliGRAM(s) Oral every 4 hours PRN Moderate Pain (4 - 6)  morphine  IR 30 milliGRAM(s) Oral every 4 hours PRN Severe Pain (7 - 10)  ondansetron Injectable 4 milliGRAM(s) IV Push every 6 hours PRN Nausea and/or Vomiting      LABS:                          9.7    10.34 )-----------( 247      ( 15 Stuart 2022 06:12 )             30.1     06-15    138  |  103  |  10.3  ----------------------------<  127<H>  3.7   |  23.0  |  0.67    Ca    8.3<L>      15 Stuart 2022 06:12    Culture - Blood (06.14.22 @ 16:30)    Specimen Source: .Blood Blood    Culture Results:   No growth to date.    Culture - Blood (06.15.22 @ 00:18)    Specimen Source: .Blood Blood    Culture Results:   No growth to date.    Culture - Joint Fluid (06.14.22 @ 22:59)    Gram Stain:   polymorphonuclear leukocytes seen  No organisms seen  by cytocentrifuge    Specimen Source: Joint Fl Right Knee Synovial Fluid    Culture Results:   Rare Staphylococcus aureus    Culture - Tissue with Gram Stain (06.14.22 @ 22:33)    Gram Stain:   Rare polymorphonuclear leukocytes seen per low power field  No organisms seen per oil power field    Specimen Source: .Tissue Femoral Tissue    Culture Results:   No growth    Culture - Joint Fluid (06.14.22 @ 02:21)    Gram Stain:   polymorphonuclear leukocytes seen  No organisms seen  by cytocentrifuge    Specimen Source: Knee Synovial Fluid    Culture Results:   Rare Staphylococcus aureus                REVIEW OF SYSTEMS:    CONSTITUTIONAL: No fever, weight loss, or fatigue  EYES: No eye pain, visual disturbances, or discharge  ENMT:  No difficulty hearing, tinnitus, vertigo; No sinus or throat pain  NECK: No pain or stiffness  RESPIRATORY: No cough, wheezing, chills or hemoptysis; No shortness of breath  CARDIOVASCULAR: No chest pain, palpitations, dizziness, or leg swelling  GASTROINTESTINAL: No abdominal or epigastric pain. No nausea, vomiting, or hematemesis; No diarrhea or constipation. No melena or hematochezia.  GENITOURINARY: No dysuria, frequency, hematuria, or incontinence  NEUROLOGICAL: No headaches, memory loss, loss of strength, numbness, or tremors  SKIN: No itching, burning, rashes, or lesions   LYMPH NODES: No enlarged glands  ENDOCRINE: No heat or cold intolerance; No hair loss  MUSCULOSKELETAL:   PSYCHIATRIC: No depression, anxiety, mood swings, or difficulty sleeping  HEME/LYMPH: No easy bruising, or bleeding gums  ALLERGY AND IMMUNOLOGIC: No hives or eczema    Vital Signs Last 24 Hrs  T(C): 36.9 (16 Jun 2022 08:48), Max: 36.9 (16 Jun 2022 08:48)  T(F): 98.4 (16 Jun 2022 08:48), Max: 98.4 (16 Jun 2022 08:48)  HR: 74 (16 Jun 2022 08:48) (72 - 93)  BP: 147/82 (16 Jun 2022 10:10) (104/70 - 189/95)  BP(mean): --  RR: 18 (16 Jun 2022 08:48) (18 - 18)  SpO2: 94% (16 Jun 2022 08:48) (93% - 96%)  PHYSICAL EXAM:    GENERAL: NAD, well-groomed, well-developed  HEAD:  Atraumatic, Normocephalic  EYES: EOMI, PERRLA, conjunctiva and sclera clear  NECK: Supple, No JVD, Normal thyroid  NERVOUS SYSTEM:  Alert & Oriented X3, no focal deficit  CHEST/LUNG: CTA b/l ,  no  rales, rhonchi, wheezing, or rubs  HEART: Regular rate and rhythm; No murmurs, rubs, or gallops  ABDOMEN: Soft, Nontender, Nondistended; Bowel sounds present  EXTREMITIES:  2+ Peripheral Pulses, No clubbing, cyanosis, or edema  LYMPH: No lymphadenopathy noted  SKIN: No rashes or lesions

## 2022-06-16 NOTE — PROGRESS NOTE ADULT - ASSESSMENT
56M  hx of heroin abuse - was previously on suboxone 8mg BID  multiple knee surgeries c/b infected joint hardware  POD#2 s/p right knee removal of hardware, incision and drainage with antibiotic cement spacer application/ repair of quad tendon       Patient interested in adductor canal nerve block   r/b/a discussed  written consent obtained and witnessed  Please see procedure note for details     acetaminophen     Tablet .. 975 milliGRAM(s) Oral every 8 hours  gabapentin 300 milliGRAM(s) Oral three times a day  HYDROmorphone  Injectable 0.5 milliGRAM(s) IV Push once PRN BTP  methocarbamol 750 milliGRAM(s) Oral four times a day  morphine  IR 15 milliGRAM(s) Oral every 4 hours PRN mod pain  morphine  IR 30 milliGRAM(s) Oral every 4 hours PRN sev pain    will plan to transition to suboxone 8mg TID tomorrow - first dose at 1300  last dose of morphine will be 0600 tomorrow 35 56M  hx of heroin abuse - was previously on suboxone 8mg BID  multiple knee surgeries c/b infected joint hardware  POD#2 s/p right knee removal of hardware, incision and drainage with antibiotic cement spacer application/ repair of quad tendon       Patient interested in adductor canal nerve block   r/b/a discussed  written consent obtained and witnessed  Please see procedure note for details     acetaminophen     Tablet .. 975 milliGRAM(s) Oral every 8 hours  gabapentin 300 milliGRAM(s) Oral three times a day  HYDROmorphone  Injectable 0.5 milliGRAM(s) IV Push once PRN BTP  methocarbamol 750 milliGRAM(s) Oral four times a day  morphine  IR 15 milliGRAM(s) Oral every 4 hours PRN mod pain  morphine  IR 30 milliGRAM(s) Oral every 4 hours PRN sev pain    will plan to transition to suboxone 8mg TID tomorrow - first dose at 1300  last dose of morphine will be 0600 tomorrow    Rec psych consult for suboxone rx and outpt followup

## 2022-06-16 NOTE — PROGRESS NOTE ADULT - ASSESSMENT
57 y/o male with PMH of depression, anxiety, HLD came to the ED complaining of right knee pain and swelling. Patient had total knee arthroplasty on 5/11/22;  he has been seeing wound care after that; placed on Bactrim which he completed. He noted pain that is worsened and swelling.     Septic prosthetic R knee   S/p arthrocentesis by orthopedic in the ED- fluid sent - rare staph +   s/p  right knee  hardware removal , incision and drainage with antibiotic cement spacer application Repair of right quadriceps  , blood cultures x2 negative , OR culture so far negative    ID noted and appreciated   continue iv Abx , follow cultures ,   PT/OT/pain mgmt-  pain mgmt noted and appreciated ,   s/p R adductor canal nerve block   patient with hx of heroin/ xanax overdose in the past  -   plan as per pain mgmt   DVT prophylaxis- as per ortho  Incentive spirometry  Prophylaxis of opioid  induced constipation.    Depression/Anxiety   Continue Bupropion XL 300mg   Escitalopram 10mg   Trazodone 200mg HS   Buspirone 5mg bid     HLD  Atorvastatin 40mg     Acute postop R knee pain on chronic pain - as above     Postoperative HTN urgency - no Hx of HTN but admits takes Clonidine 0.1 mg BID ,   BP well controlled this am  - asymptomatic , continue Clonidine PO 0.1 mg BID  , continue Labetalol ivp for SBP > 170 ,    No sob , no cp , no palpitations , no headache . Monitor BP .     Anemia - acute blood lost anemia - secondary to surgical blood lost - patient is  asymptomatic.     Spoke to ortho PA ,     DVT prophylaxis  - as per ortho protocol- on ASA BID   Opioid induced constipation  prophylaxis - bowel regimen     Will no follow up daily , please call if any issue .

## 2022-06-16 NOTE — PROGRESS NOTE ADULT - SUBJECTIVE AND OBJECTIVE BOX
MOSES HANKINS    [id number]    History: 56y Male is status post right knee explant, placement of ABx spacer, and quad tendon repair - POD # 02. Patient is doing well and is comfortable. The patient's pain is controlled using the prescribed pain medications. The patient is participating in physical therapy. Denies nausea, vomiting, chest pain, shortness of breath, abdominal pain or fever. No new complaints. Patient has voided post-op and is tolerating diet.                             9.7    10.34 )-----------( 247      ( 15 Stuart 2022 06:12 )             30.1     06-15    138  |  103  |  10.3  ----------------------------<  127<H>  3.7   |  23.0  |  0.67    Ca    8.3<L>      15 Stuart 2022 06:12        MEDICATIONS  (STANDING):  acetaminophen     Tablet .. 975 milliGRAM(s) Oral every 8 hours  aspirin enteric coated 325 milliGRAM(s) Oral two times a day  atorvastatin 40 milliGRAM(s) Oral at bedtime  buPROPion XL (24-Hour) . 300 milliGRAM(s) Oral daily  busPIRone 5 milliGRAM(s) Oral two times a day  ceFAZolin   IVPB 2000 milliGRAM(s) IV Intermittent every 8 hours  celecoxib 200 milliGRAM(s) Oral every 12 hours  cloNIDine 0.1 milliGRAM(s) Oral two times a day  gabapentin 300 milliGRAM(s) Oral three times a day  methocarbamol 750 milliGRAM(s) Oral four times a day  pantoprazole    Tablet 40 milliGRAM(s) Oral before breakfast  senna 2 Tablet(s) Oral at bedtime  traZODone 150 milliGRAM(s) Oral at bedtime    MEDICATIONS  (PRN):  bisacodyl Suppository 10 milliGRAM(s) Rectal once PRN Constipation  HYDROmorphone  Injectable 0.5 milliGRAM(s) IV Push once PRN breakthrough pain  labetalol Injectable 10 milliGRAM(s) IV Push every 2 hours PRN Systolic blood pressure > 170  magnesium hydroxide Suspension 30 milliLiter(s) Oral daily PRN Constipation  morphine  IR 15 milliGRAM(s) Oral every 4 hours PRN Moderate Pain (4 - 6)  morphine  IR 30 milliGRAM(s) Oral every 4 hours PRN Severe Pain (7 - 10)  ondansetron Injectable 4 milliGRAM(s) IV Push every 6 hours PRN Nausea and/or Vomiting      Physical exam: The right knee prevena dressing is clean, dry and intact and functioning appropriately. No drainage or discharge. No erythema is noted. No blistering. No ecchymosis. The calf is supple/nontender. Sensation to light touch is grossly intact distally. Motor function distally is grossly intact. Extensor hallucis longus and flexor hallucis longus are intact. No foot drop. 2+ dorsalis pedis pulse. Capillary refill is less than 2 seconds. No cyanosis.    Primary Orthopedic Assessment:  • s/p RIGHT knee explant and quad tendon repair.       Plan:   -Pain control.  -TTWB in KI.  -ASA for DVT PPx.  -PT.  -F/U Cx sensitivities.  -IV Abx as per ID.  -Diet/GI PPx.

## 2022-06-16 NOTE — PROGRESS NOTE ADULT - SUBJECTIVE AND OBJECTIVE BOX
INFECTIOUS DISEASES AND INTERNAL MEDICINE at [place]  =======================================================  Mingo Delgado MD  Diplomates American Board of Internal Medicine and Infectious Diseases  Telephone [phone]  Fax            [phone]  =======================================================    MOSES HANKINS [id number]    Follow up: right knee infection prosthetic joint    Allergies:  shellfish. (Hives)      Medications:  acetaminophen     Tablet .. 975 milliGRAM(s) Oral every 8 hours  aspirin enteric coated 325 milliGRAM(s) Oral two times a day  atorvastatin 40 milliGRAM(s) Oral at bedtime  buPROPion XL (24-Hour) . 300 milliGRAM(s) Oral daily  busPIRone 5 milliGRAM(s) Oral two times a day  ceFAZolin   IVPB      ceFAZolin   IVPB 2000 milliGRAM(s) IV Intermittent every 8 hours  HYDROmorphone  Injectable 0.5 milliGRAM(s) IV Push every 3 hours PRN  HYDROmorphone  Injectable 0.5 milliGRAM(s) IV Push once PRN  ketorolac   Injectable 15 milliGRAM(s) IV Push every 6 hours  labetalol Injectable 10 milliGRAM(s) IV Push every 2 hours PRN  magnesium hydroxide Suspension 30 milliLiter(s) Oral daily PRN  ondansetron Injectable 4 milliGRAM(s) IV Push every 6 hours PRN  oxyCODONE    IR 5 milliGRAM(s) Oral every 3 hours PRN  oxyCODONE    IR 10 milliGRAM(s) Oral every 3 hours PRN  pantoprazole    Tablet 40 milliGRAM(s) Oral before breakfast  senna 2 Tablet(s) Oral at bedtime  traZODone 150 milliGRAM(s) Oral at bedtime  vancomycin  IVPB      vancomycin  IVPB 1250 milliGRAM(s) IV Intermittent every 12 hours    SOCIAL       FAMILY   FAMILY HISTORY:  No pertinent family history in first degree relatives      REVIEW OF SYSTEMS:  CONSTITUTIONAL:  No Fever or chills  HEENT:   No diplopia or blurred vision.  No earache, sore throat or runny nose.  CARDIOVASCULAR:  No pressure, squeezing, strangling, tightness, heaviness or aching about the chest, neck, axilla or epigastrium.  RESPIRATORY:  No cough, shortness of breath, PND or orthopnea.  GASTROINTESTINAL:  No nausea, vomiting or diarrhea.  GENITOURINARY:  No dysuria, frequency or urgency. No Blood in urine  MUSCULOSKELETAL:   as per HPI  SKIN:  No change in skin, hair or nails.  NEUROLOGIC:  No paresthesias, fasciculations, seizures or weakness.  PSYCHIATRIC:  No disorder of thought or mood.  ENDOCRINE:  No heat or cold intolerance, polyuria or polydipsia.  HEMATOLOGICAL:  No easy bruising or bleeding.            Physical Exam:   Vital Signs Last 24 Hrs  T(C): 36.4 (16 Jun 2022 05:00), Max: 37.1 (15 Stuart 2022 08:27)  T(F): 97.5 (16 Jun 2022 05:00), Max: 98.7 (15 Stuart 2022 08:27)  HR: 73 (16 Jun 2022 05:00) (72 - 93)  BP: 116/74 (16 Jun 2022 06:50) (116/74 - 189/95)  BP(mean): --  RR: 18 (16 Jun 2022 05:00) (18 - 18)  SpO2: 93% (16 Jun 2022 05:00) (93% - 96%)    GEN: NAD,   HEENT: normocephalic and atraumatic. EOMI. GOLDIE.    NECK: Supple. No carotid bruits.  No lymphadenopathy or thyromegaly.  LUNGS: Clear to auscultation.  HEART: Regular rate and rhythm without murmur.  ABDOMEN: Soft, nontender, and nondistended.  Positive bowel sounds.    : No CVA tenderness  EXTREMITIES: right knee dressed   MSK: no joint swelling  NEUROLOGIC: Cranial nerves II through XII are grossly intact.  PSYCHIATRIC: Appropriate affect .  SKIN: No ulceration or induration present.        Labs:  Vitals:  ============  T(F): 98 (15 Stuart 2022 06:00), Max: 99.8 (14 Jun 2022 15:35)  HR: 90 (15 Stuart 2022 06:00)  BP: 178/85 (15 Stuart 2022 06:00)  RR: 18 (15 Stuart 2022 06:00)  SpO2: 93% (15 Stuart 2022 06:00) (92% - 98%)  temp max in last 48H T(F): , Max: 99.8 (06-14-22 @ 15:35)    =======================================================  Current Antibiotics:  ceFAZolin   IVPB      ceFAZolin   IVPB 2000 milliGRAM(s) IV Intermittent every 8 hours  vancomycin  IVPB      vancomycin  IVPB 1250 milliGRAM(s) IV Intermittent every 12 hours    Other medications:  acetaminophen     Tablet .. 975 milliGRAM(s) Oral every 8 hours  aspirin enteric coated 325 milliGRAM(s) Oral two times a day  atorvastatin 40 milliGRAM(s) Oral at bedtime  buPROPion XL (24-Hour) . 300 milliGRAM(s) Oral daily  busPIRone 5 milliGRAM(s) Oral two times a day  ketorolac   Injectable 15 milliGRAM(s) IV Push every 6 hours  pantoprazole    Tablet 40 milliGRAM(s) Oral before breakfast  senna 2 Tablet(s) Oral at bedtime  traZODone 150 milliGRAM(s) Oral at bedtime      =======================================================  Labs:                                9.7    10.34 )-----------( 247      ( 15 Stuart 2022 06:12 )             30.1         Culture - Joint Fluid (collected 06-14-22 @ 22:59)  06-15    138  |  103  |  10.3  ----------------------------<  127<H>  3.7   |  23.0  |  0.67    Ca    8.3<L>      15 Stuart 2022 06:12    Source: Joint Fl Right Knee Synovial Fluid  Gram Stain (06-15-22 @ 02:16):    polymorphonuclear leukocytes seen    No organisms seen    by cytocentrifuge    Culture - Tissue with Gram Stain (collected 06-14-22 @ 22:33)  Source: .Tissue Femoral Tissue  Gram Stain (06-15-22 @ 03:15):    Rare polymorphonuclear leukocytes seen per low power field    No organisms seen per oil power field    Culture - Joint Fluid (collected 06-14-22 @ 02:21)  Source: Knee Synovial Fluid  Gram Stain (06-14-22 @ 04:04):    polymorphonuclear leukocytes seen    No organisms seen    by cytocentrifuge      Creatinine, Serum: 0.67 mg/dL (06-15-22 @ 06:12)  Creatinine, Serum: 0.86 mg/dL (06-13-22 @ 22:58)          C-Reactive Protein, Serum: 72 mg/L (06-13-22 @ 22:58)    WBC Count: 10.34 K/uL (06-15-22 @ 06:12)  WBC Count: 11.34 K/uL (06-13-22 @ 22:58)    SARS-CoV-2 Result: NotDetec (06-14-22 @ 09:57)  COVID-19 PCR: NotDetec (06-13-22 @ 23:00)

## 2022-06-17 ENCOUNTER — TRANSCRIPTION ENCOUNTER (OUTPATIENT)
Age: 57
End: 2022-06-17

## 2022-06-17 DIAGNOSIS — F41.9 ANXIETY DISORDER, UNSPECIFIED: ICD-10-CM

## 2022-06-17 DIAGNOSIS — F32.9 MAJOR DEPRESSIVE DISORDER, SINGLE EPISODE, UNSPECIFIED: ICD-10-CM

## 2022-06-17 LAB
-  AMPICILLIN/SULBACTAM: SIGNIFICANT CHANGE UP
-  CEFAZOLIN: SIGNIFICANT CHANGE UP
-  CLINDAMYCIN: SIGNIFICANT CHANGE UP
-  ERYTHROMYCIN: SIGNIFICANT CHANGE UP
-  GENTAMICIN: SIGNIFICANT CHANGE UP
-  OXACILLIN: SIGNIFICANT CHANGE UP
-  RIFAMPIN: SIGNIFICANT CHANGE UP
-  TETRACYCLINE: SIGNIFICANT CHANGE UP
-  TRIMETHOPRIM/SULFAMETHOXAZOLE: SIGNIFICANT CHANGE UP
-  VANCOMYCIN: SIGNIFICANT CHANGE UP
METHOD TYPE: SIGNIFICANT CHANGE UP
VANCOMYCIN TROUGH SERPL-MCNC: <4 UG/ML — LOW (ref 10–20)

## 2022-06-17 PROCEDURE — 99222 1ST HOSP IP/OBS MODERATE 55: CPT

## 2022-06-17 PROCEDURE — 71045 X-RAY EXAM CHEST 1 VIEW: CPT | Mod: 26

## 2022-06-17 PROCEDURE — 99232 SBSQ HOSP IP/OBS MODERATE 35: CPT

## 2022-06-17 PROCEDURE — 76937 US GUIDE VASCULAR ACCESS: CPT | Mod: 26,59

## 2022-06-17 PROCEDURE — 36569 INSJ PICC 5 YR+ W/O IMAGING: CPT

## 2022-06-17 RX ORDER — SODIUM CHLORIDE 9 MG/ML
10 INJECTION INTRAMUSCULAR; INTRAVENOUS; SUBCUTANEOUS
Refills: 0 | Status: DISCONTINUED | OUTPATIENT
Start: 2022-06-17 | End: 2022-06-22

## 2022-06-17 RX ORDER — CHLORHEXIDINE GLUCONATE 213 G/1000ML
1 SOLUTION TOPICAL
Refills: 0 | Status: DISCONTINUED | OUTPATIENT
Start: 2022-06-17 | End: 2022-06-22

## 2022-06-17 RX ORDER — CEFAZOLIN SODIUM 1 G
2 VIAL (EA) INJECTION
Qty: 2 | Refills: 0
Start: 2022-06-17

## 2022-06-17 RX ORDER — BUPRENORPHINE AND NALOXONE 2; .5 MG/1; MG/1
1 TABLET SUBLINGUAL
Qty: 15 | Refills: 0
Start: 2022-06-17 | End: 2022-06-21

## 2022-06-17 RX ADMIN — Medication 100 MILLIGRAM(S): at 06:08

## 2022-06-17 RX ADMIN — GABAPENTIN 300 MILLIGRAM(S): 400 CAPSULE ORAL at 22:10

## 2022-06-17 RX ADMIN — CELECOXIB 200 MILLIGRAM(S): 200 CAPSULE ORAL at 06:24

## 2022-06-17 RX ADMIN — Medication 100 MILLIGRAM(S): at 14:35

## 2022-06-17 RX ADMIN — METHOCARBAMOL 750 MILLIGRAM(S): 500 TABLET, FILM COATED ORAL at 06:07

## 2022-06-17 RX ADMIN — Medication 325 MILLIGRAM(S): at 18:42

## 2022-06-17 RX ADMIN — Medication 150 MILLIGRAM(S): at 22:09

## 2022-06-17 RX ADMIN — CELECOXIB 200 MILLIGRAM(S): 200 CAPSULE ORAL at 18:42

## 2022-06-17 RX ADMIN — Medication 975 MILLIGRAM(S): at 22:30

## 2022-06-17 RX ADMIN — PANTOPRAZOLE SODIUM 40 MILLIGRAM(S): 20 TABLET, DELAYED RELEASE ORAL at 06:08

## 2022-06-17 RX ADMIN — CHLORHEXIDINE GLUCONATE 1 APPLICATION(S): 213 SOLUTION TOPICAL at 14:36

## 2022-06-17 RX ADMIN — GABAPENTIN 300 MILLIGRAM(S): 400 CAPSULE ORAL at 06:07

## 2022-06-17 RX ADMIN — ATORVASTATIN CALCIUM 40 MILLIGRAM(S): 80 TABLET, FILM COATED ORAL at 22:10

## 2022-06-17 RX ADMIN — BUPROPION HYDROCHLORIDE 300 MILLIGRAM(S): 150 TABLET, EXTENDED RELEASE ORAL at 12:19

## 2022-06-17 RX ADMIN — BUPRENORPHINE AND NALOXONE 1 TABLET(S): 2; .5 TABLET SUBLINGUAL at 12:20

## 2022-06-17 RX ADMIN — CELECOXIB 200 MILLIGRAM(S): 200 CAPSULE ORAL at 06:08

## 2022-06-17 RX ADMIN — Medication 5 MILLIGRAM(S): at 06:08

## 2022-06-17 RX ADMIN — Medication 975 MILLIGRAM(S): at 14:34

## 2022-06-17 RX ADMIN — BUPRENORPHINE AND NALOXONE 1 TABLET(S): 2; .5 TABLET SUBLINGUAL at 20:15

## 2022-06-17 RX ADMIN — Medication 325 MILLIGRAM(S): at 06:08

## 2022-06-17 RX ADMIN — Medication 100 MILLIGRAM(S): at 22:09

## 2022-06-17 RX ADMIN — METHOCARBAMOL 750 MILLIGRAM(S): 500 TABLET, FILM COATED ORAL at 12:19

## 2022-06-17 RX ADMIN — Medication 975 MILLIGRAM(S): at 22:09

## 2022-06-17 RX ADMIN — Medication 0.1 MILLIGRAM(S): at 18:42

## 2022-06-17 RX ADMIN — GABAPENTIN 300 MILLIGRAM(S): 400 CAPSULE ORAL at 14:35

## 2022-06-17 RX ADMIN — Medication 5 MILLIGRAM(S): at 18:42

## 2022-06-17 RX ADMIN — Medication 975 MILLIGRAM(S): at 06:07

## 2022-06-17 RX ADMIN — SENNA PLUS 2 TABLET(S): 8.6 TABLET ORAL at 22:10

## 2022-06-17 RX ADMIN — Medication 975 MILLIGRAM(S): at 06:24

## 2022-06-17 RX ADMIN — METHOCARBAMOL 750 MILLIGRAM(S): 500 TABLET, FILM COATED ORAL at 18:42

## 2022-06-17 NOTE — BH CONSULTATION LIAISON ASSESSMENT NOTE - CURRENT MEDICATION
MEDICATIONS  (STANDING):  acetaminophen     Tablet .. 975 milliGRAM(s) Oral every 8 hours  aspirin enteric coated 325 milliGRAM(s) Oral two times a day  atorvastatin 40 milliGRAM(s) Oral at bedtime  buprenorphine 8 mG/naloxone 2 mG SL  Tablet 1 Tablet(s) SubLingual <User Schedule>  buPROPion XL (24-Hour) . 300 milliGRAM(s) Oral daily  busPIRone 5 milliGRAM(s) Oral two times a day  ceFAZolin   IVPB 2000 milliGRAM(s) IV Intermittent every 8 hours  celecoxib 200 milliGRAM(s) Oral every 12 hours  chlorhexidine 2% Cloths 1 Application(s) Topical <User Schedule>  cloNIDine 0.1 milliGRAM(s) Oral two times a day  gabapentin 300 milliGRAM(s) Oral three times a day  methocarbamol 750 milliGRAM(s) Oral four times a day  pantoprazole    Tablet 40 milliGRAM(s) Oral before breakfast  senna 2 Tablet(s) Oral at bedtime  traZODone 150 milliGRAM(s) Oral at bedtime    MEDICATIONS  (PRN):  bisacodyl Suppository 10 milliGRAM(s) Rectal once PRN Constipation  labetalol Injectable 10 milliGRAM(s) IV Push every 2 hours PRN Systolic blood pressure > 170  magnesium hydroxide Suspension 30 milliLiter(s) Oral daily PRN Constipation  ondansetron Injectable 4 milliGRAM(s) IV Push every 6 hours PRN Nausea and/or Vomiting  sodium chloride 0.9% lock flush 10 milliLiter(s) IV Push every 1 hour PRN Pre/post blood products, medications, blood draw, and to maintain line patency

## 2022-06-17 NOTE — DISCHARGE NOTE NURSING/CASE MANAGEMENT/SOCIAL WORK - NSDCVIVACCINE_GEN_ALL_CORE_FT
Tdap; 29-Feb-2020 23:58; Lauren Delacruz (TASHIA); [employer]; [id number] (Exp. Date: 19-Mar-2022); IntraMuscular; Deltoid Right.; 0.5 milliLiter(s); VIS (VIS Published: 09-May-2013, VIS Presented: 29-Feb-2020);

## 2022-06-17 NOTE — BH CONSULTATION LIAISON ASSESSMENT NOTE - HPI (INCLUDE ILLNESS QUALITY, SEVERITY, DURATION, TIMING, CONTEXT, MODIFYING FACTORS, ASSOCIATED SIGNS AND SYMPTOMS)
57 yo single, employed male domiciled in private residence in [place] with friends with a past psychiatric hx anxiety, depression, polysubstance abuse (heroin, alcohol, xanax) and hx of overdose x 4 currently sober x 2 years, with previous psychiatric admissions relating to polysubstance abuse (last in 2015), and a PMHx R knee septic arthritis s/p multiple R knee surgeries presented to the ED 6/13 for R knee pain and swelling. Pt reports he has had multiple traumatic events and ~ 5 surgeries to the R knee within the last year, most recently a right quad tendon repair after TKA on 5/11/22. Current medical plan for surgery in 6 weeks once current infection clears.   Pt reports he is 2 years sober after a long history of polysubstance abuse. Pt was in [facility] for 5 months, before [facility] the last 10 months and attends AA meetings. Admits he sees outpatient psychiatrist [facility] Dr. Mariee and is well-managed on his psych meds including Lexapro, Clonidine, Buspar, and Wellbutrin. Pt currently has been weaned off opiates / narcotics during hospital course but is looking to get Suboxone 8mg/2mg TID for management at this time. Pt reports he has an appt with Dr. Mariee today at 15:00 to discuss if outpatient provider can send Suboxone script. Pt will contact psych again if Dr. Mariee is unable to write the script for pt's discharge tomorrow. Denies any current SI/HI, A/V/H, tawanna or agitation.

## 2022-06-17 NOTE — PROGRESS NOTE ADULT - SUBJECTIVE AND OBJECTIVE BOX
When speaking to patient in AM - patient mentioned history of heroin abuse in the past. Patient states that last time he used was about over a year ago and he is now in a program called [facility]. Patient states that he had taken suboxone before in the past due to his heroin addiction. Patient states that he used to get suboxone prescribed from Dr. Amin - his psychiatrist. Patient received his PICC line earlier this AM. After conversation with case management and Dr. Huerta,  Dr. Gao was contacted - who called back in afternoon. As per Dr. Gao patient has been in remission and Dr. Gao has been following up with the patient monthly for about 2 years now - last seen on June 7th this year. Dr. Gao concluded that it was in the patients best interest and safety to go to a rehab to finish his IV antibiotics with the PICC line in place. Dr. Huerta made aware.

## 2022-06-17 NOTE — PROGRESS NOTE ADULT - SUBJECTIVE AND OBJECTIVE BOX
Pt Name: MOSES HANKINS  MRN: [id number]    Late chart note. patient seen around 8am.     Patient is a 56y male s/p full explant w static cement spacer placement/quad tendon repair POD #3. Patient seen and examined at bedside, resting comfortably. Upon conversation, patient states that he was a heroin addict - states that the last time he used was "maybe over a year or two ago" as he would "dabble with heroin after drinking". Patient also reports to be sober and last used alcohol about 2 years ago. The patient's pain is controlled using the prescribed pain medications. The patient is participating in physical therapy. Denies nausea, vomiting, chest pain, shortness of breath, abdominal pain or fever. No new complaints. Patient has voided post-op and is tolerating diet.         PAST MEDICAL & SURGICAL HISTORY:  Depression      Anxiety      Alcohol abuse      Overdose  heroine and xanax 3/10/2015      ACL (anterior cruciate ligament) tear, left, initial encounter          Allergies: shellfish. (Hives)      PHYSICAL EXAM:    Vital Signs Last 24 Hrs  T(C): 36.8 (17 Jun 2022 10:12), Max: 36.9 (16 Jun 2022 16:23)  T(F): 98.3 (17 Jun 2022 10:12), Max: 98.4 (16 Jun 2022 16:23)  HR: 66 (17 Jun 2022 10:12) (66 - 90)  BP: 142/79 (17 Jun 2022 10:12) (108/60 - 149/78)  BP(mean): --  RR: 18 (17 Jun 2022 10:12) (18 - 18)  SpO2: 97% (17 Jun 2022 10:12) (95% - 97%)  Daily     Daily     Appearance: Alert, responsive, in no acute distress.  Musculoskeletal:       Right Lower Extremity: + KI on correctly. +ACE bandage clean, dry, intact with no bleeding or discharge noted. ACE wrapping removed. Prevena clean, dry, and suction intact with no bleeding noted. No erythema noted to visible skin.  No discharge or blood noted in cannister. Sensation grossly intact to light touch distally. + DP pulse. BCR. +Df/PF/EHL/FHL. No calf pain upon palpation.       A/P:  Pt is a  56y Male with right knee explant and antibiotic spacer placement w/ quad tendon repair POD #3    PLAN:   - PICC line placed today 6/17  - Case management made aware of patients abuse hx   - Psych consulted   - Ortho stable   - Continue prevena  - Pain control.  -TTWB in KI at all times.  -ASA for DVT PPx.  -PT.  -F/U Cx sensitivities.  -IV Abx as per ID.  -Diet/GI PPx.  - Discharge when cleared by PT and Medicine  Pt Name: MOSES HANKINS  MRN: [id number]    Late chart note. patient seen around 8am.     Patient is a 56y male s/p full explant w static cement spacer placement/quad tendon repair POD #3. Patient seen and examined at bedside, resting comfortably. Upon conversation, patient states that he was a heroin addict - states that the last time he used was "maybe over a year or two ago" as he would "dabble with heroin after drinking". Patient also reports to be sober and last used alcohol about 2 years ago. The patient's pain is controlled using the prescribed pain medications. The patient is participating in physical therapy. Denies nausea, vomiting, chest pain, shortness of breath, abdominal pain or fever. No new complaints. Patient has voided post-op and is tolerating diet.         PAST MEDICAL & SURGICAL HISTORY:  Depression      Anxiety      Alcohol abuse      Overdose  heroine and xanax 3/10/2015      ACL (anterior cruciate ligament) tear, left, initial encounter          Allergies: shellfish. (Hives)      PHYSICAL EXAM:    Vital Signs Last 24 Hrs  T(C): 36.8 (17 Jun 2022 10:12), Max: 36.9 (16 Jun 2022 16:23)  T(F): 98.3 (17 Jun 2022 10:12), Max: 98.4 (16 Jun 2022 16:23)  HR: 66 (17 Jun 2022 10:12) (66 - 90)  BP: 142/79 (17 Jun 2022 10:12) (108/60 - 149/78)  BP(mean): --  RR: 18 (17 Jun 2022 10:12) (18 - 18)  SpO2: 97% (17 Jun 2022 10:12) (95% - 97%)  Daily     Daily     Appearance: Alert, responsive, in no acute distress.  Musculoskeletal:       Right Lower Extremity: + KI on correctly. +ACE bandage clean, dry, intact with no bleeding or discharge noted. ACE wrapping removed. Prevena clean, dry, and suction intact with no bleeding noted. No erythema noted to visible skin.  No discharge or blood noted in cannister. Sensation grossly intact to light touch distally. + DP pulse. BCR. +Df/PF/EHL/FHL. No calf pain upon palpation.       A/P:  Pt is a  56y Male with right knee explant and antibiotic spacer placement w/ quad tendon repair POD #3    PLAN:   - PICC line placed today 6/17  - Case management, pain management  made aware of patients abuse hx   - Awaiting phone call from patients primary psychiatrist - Dr. Draper  - Psych consulted   - Ortho stable   - Continue prevena  - Pain control.  -TTWB in KI at all times.  -ASA for DVT PPx.  -PT.  -F/U Cx sensitivities.  -IV Abx as per ID.  -Diet/GI PPx.  - Discharge when cleared by PT and Medicine

## 2022-06-17 NOTE — DISCHARGE NOTE NURSING/CASE MANAGEMENT/SOCIAL WORK - PATIENT PORTAL LINK FT
You can access the FollowMyHealth Patient Portal offered by [facility] by registering at the following website: [email or web address]. By joining [facility]’s FollowMyHealth portal, you will also be able to view your health information using other applications (apps) compatible with our system.

## 2022-06-17 NOTE — PATIENT PROFILE ADULT - FALL HARM RISK - RISK INTERVENTIONS
Assistance OOB with selected safe patient handling equipment/Assistance with ambulation/Communicate Fall Risk and Risk Factors to all staff, patient, and family/Discuss with provider need for PT consult/Monitor gait and stability/Provide patient with walking aids - walker, cane, crutches/Reinforce activity limits and safety measures with patient and family/Sit up slowly, dangle for a short time, stand at bedside before walking/Use of alarms - bed, chair and/or voice tab/Visual Cue: Yellow wristband/Bed in lowest position, wheels locked, appropriate side rails in place/Call bell, personal items and telephone in reach/Instruct patient to call for assistance before getting out of bed or chair/Non-slip footwear when patient is out of bed/[place] to call system/Physically safe environment - no spills, clutter or unnecessary equipment/Purposeful Proactive Rounding/Room/bathroom lighting operational, light cord in reach

## 2022-06-17 NOTE — PROGRESS NOTE ADULT - SUBJECTIVE AND OBJECTIVE BOX
Interval Hx:  Patient seen during rounds  Patient reports pain to be controlled on current medications  Patient denies sedation with medications     Analgesic Dosing for past 24 hours reviewed as below:  acetaminophen     Tablet ..   975 milliGRAM(s) Oral (06-17-22 @ 06:07)   975 milliGRAM(s) Oral (06-16-22 @ 21:23)   975 milliGRAM(s) Oral (06-16-22 @ 14:04)    buprenorphine 8 mG/naloxone 2 mG SL  Tablet   1 Tablet(s) SubLingual (06-17-22 @ 12:20)    buPROPion XL (24-Hour) .   300 milliGRAM(s) Oral (06-17-22 @ 12:19)    busPIRone   5 milliGRAM(s) Oral (06-17-22 @ 06:08)   5 milliGRAM(s) Oral (06-16-22 @ 18:00)    celecoxib   200 milliGRAM(s) Oral (06-17-22 @ 06:08)   200 milliGRAM(s) Oral (06-16-22 @ 17:59)    gabapentin   300 milliGRAM(s) Oral (06-17-22 @ 06:07)   300 milliGRAM(s) Oral (06-16-22 @ 21:29)   300 milliGRAM(s) Oral (06-16-22 @ 16:04)    methocarbamol   750 milliGRAM(s) Oral (06-17-22 @ 12:19)   750 milliGRAM(s) Oral (06-17-22 @ 06:07)   750 milliGRAM(s) Oral (06-16-22 @ 23:10)   750 milliGRAM(s) Oral (06-16-22 @ 18:00)    morphine  IR   15 milliGRAM(s) Oral (06-16-22 @ 21:22)    morphine  IR   30 milliGRAM(s) Oral (06-16-22 @ 16:05)    traZODone   150 milliGRAM(s) Oral (06-16-22 @ 21:23)          T(C): 36.8 (06-17-22 @ 10:12), Max: 36.9 (06-16-22 @ 16:23)  HR: 66 (06-17-22 @ 10:12) (66 - 90)  BP: 142/79 (06-17-22 @ 10:12) (108/60 - 149/78)  RR: 18 (06-17-22 @ 10:12) (18 - 18)  SpO2: 97% (06-17-22 @ 10:12) (95% - 97%)      06-16-22 @ 07:01  -  06-17-22 @ 07:00  --------------------------------------------------------  IN: 0 mL / OUT: 1500 mL / NET: -1500 mL        acetaminophen     Tablet .. 975 milliGRAM(s) Oral every 8 hours  aspirin enteric coated 325 milliGRAM(s) Oral two times a day  atorvastatin 40 milliGRAM(s) Oral at bedtime  bisacodyl Suppository 10 milliGRAM(s) Rectal once PRN  buprenorphine 8 mG/naloxone 2 mG SL  Tablet 1 Tablet(s) SubLingual <User Schedule>  buPROPion XL (24-Hour) . 300 milliGRAM(s) Oral daily  busPIRone 5 milliGRAM(s) Oral two times a day  ceFAZolin   IVPB 2000 milliGRAM(s) IV Intermittent every 8 hours  celecoxib 200 milliGRAM(s) Oral every 12 hours  chlorhexidine 2% Cloths 1 Application(s) Topical <User Schedule>  cloNIDine 0.1 milliGRAM(s) Oral two times a day  gabapentin 300 milliGRAM(s) Oral three times a day  labetalol Injectable 10 milliGRAM(s) IV Push every 2 hours PRN  magnesium hydroxide Suspension 30 milliLiter(s) Oral daily PRN  methocarbamol 750 milliGRAM(s) Oral four times a day  ondansetron Injectable 4 milliGRAM(s) IV Push every 6 hours PRN  pantoprazole    Tablet 40 milliGRAM(s) Oral before breakfast  senna 2 Tablet(s) Oral at bedtime  sodium chloride 0.9% lock flush 10 milliLiter(s) IV Push every 1 hour PRN  traZODone 150 milliGRAM(s) Oral at bedtime                  Pain Service   [phone]

## 2022-06-17 NOTE — BH CONSULTATION LIAISON ASSESSMENT NOTE - RISK ASSESSMENT
RF: chronic mental illness and substance abuse  PF: engaged in treatment, sobriety, residential and employment stability

## 2022-06-17 NOTE — DISCHARGE NOTE NURSING/CASE MANAGEMENT/SOCIAL WORK - NSDCPEFALRISK_GEN_ALL_CORE
For information on Fall & Injury Prevention, visit: [email or web address] OR  [email or web address] OR  https://www.cdc.gov/steadi/patient.html

## 2022-06-17 NOTE — PROGRESS NOTE ADULT - ASSESSMENT
Patient is a 56y Male presenting to the emergency department with a chief complaint of right knee pain and swelling. Patient is s/p right quad tendon repair after total knee arthroplasty on 5/11/22 by Dr. Huerta.  patient has been seeing wound care, finished bactrim and states discharge to distal wound continues.  patient states no fevers or chills however today knee became more swollen and painful    PT S/P ASPIRATION OF KNEE AND NOW POST OP IN RECOVERY ROOM    OPERATIVE CX MSSA  BLOOD CX X 2 NEGATIVE   WAS  ON VANCO ROCEPHIN  CHANGED TO CEFAZOLIN 2GM Q8  WILL ORDER PICC LINE   PT WILL NEED IV ABX   CEFAZOLIN 2GM IV Q 8 THROUGH 7/26/22  WEEKLY CBC CMP SED RATE  WILL FOLLOWUP  IN OFFICE   RX GIVEN TO   PLEASE CALL IF QUESTIONS   AS ABOVE PT DOES NOT HAVE MRSA

## 2022-06-17 NOTE — PROCEDURE NOTE - ADDITIONAL PROCEDURE DETAILS
Area prepped with chloraprep.  Sterile gloves and probe cover don.  Saphanous nerve in adductor cananl located lateral to femoral artery on ultrasound.  Local anesthesia with lidocaine 1% 2cc applied via 25g needle  22g 50mm pajunk needle inserted in-plane  10cc bupivicaine 0.25% with 5mg PF decadron injected into adductor canal  aspiration q5cc's negative for heme  patient denies paresthesias  appropriate spread of local anesthetic on ultrasound  image saved to chart  area cleaned and bandaged   knee immobilizer replaced
BARD POWER INJECTABLE PICC    INSERTED 44CM  ARM CIRC 33CM

## 2022-06-17 NOTE — BH CONSULTATION LIAISON ASSESSMENT NOTE - NSBHCHARTREVIEWVS_PSY_A_CORE FT
Vital Signs Last 24 Hrs  T(C): 36.8 (17 Jun 2022 10:12), Max: 36.9 (16 Jun 2022 16:23)  T(F): 98.3 (17 Jun 2022 10:12), Max: 98.4 (16 Jun 2022 16:23)  HR: 66 (17 Jun 2022 10:12) (66 - 90)  BP: 142/79 (17 Jun 2022 10:12) (108/60 - 149/78)  BP(mean): --  RR: 18 (17 Jun 2022 10:12) (18 - 18)  SpO2: 97% (17 Jun 2022 10:12) (95% - 97%)

## 2022-06-17 NOTE — BH CONSULTATION LIAISON ASSESSMENT NOTE - OTHER PAST PSYCHIATRIC HISTORY (INCLUDE DETAILS REGARDING ONSET, COURSE OF ILLNESS, INPATIENT/OUTPATIENT TREATMENT)
Depression, anxiety   Admissions to [facility] addiction treatment center. Last admitted for 6 mos in 2017  [facility]  H/o suicidal attempts via overdose Depression, anxiety   Admissions to [facility] addiction treatment center. Last admitted for 6 mos in 2017  [facility] [place] [facility]  H/o suicidal attempts via overdose  admitted [facility] Baca 2015 Luis age 14

## 2022-06-17 NOTE — BH CONSULTATION LIAISON ASSESSMENT NOTE - MODIFICATIONS
going home with PICC line and home carewill see psychiatrist at [facility] Tuesday will send 5 days of Suboxone

## 2022-06-17 NOTE — PROGRESS NOTE ADULT - SUBJECTIVE AND OBJECTIVE BOX
INFECTIOUS DISEASES AND INTERNAL MEDICINE at [facility]  =======================================================  Mingo Delgado MD  Diplomates American Board of Internal Medicine and Infectious Diseases  Telephone [phone]  Fax            [phone]  =======================================================    MOSES HANIKNS [id number]    Follow up: RIGHT KNEE MSSA    Allergies:  shellfish. (Hives)      Medications:  acetaminophen     Tablet .. 975 milliGRAM(s) Oral every 8 hours  aspirin enteric coated 325 milliGRAM(s) Oral two times a day  atorvastatin 40 milliGRAM(s) Oral at bedtime  bisacodyl Suppository 10 milliGRAM(s) Rectal once PRN  buprenorphine 8 mG/naloxone 2 mG SL  Tablet 1 Tablet(s) SubLingual <User Schedule>  buPROPion XL (24-Hour) . 300 milliGRAM(s) Oral daily  busPIRone 5 milliGRAM(s) Oral two times a day  ceFAZolin   IVPB 2000 milliGRAM(s) IV Intermittent every 8 hours  celecoxib 200 milliGRAM(s) Oral every 12 hours  cloNIDine 0.1 milliGRAM(s) Oral two times a day  gabapentin 300 milliGRAM(s) Oral three times a day  labetalol Injectable 10 milliGRAM(s) IV Push every 2 hours PRN  magnesium hydroxide Suspension 30 milliLiter(s) Oral daily PRN  methocarbamol 750 milliGRAM(s) Oral four times a day  ondansetron Injectable 4 milliGRAM(s) IV Push every 6 hours PRN  pantoprazole    Tablet 40 milliGRAM(s) Oral before breakfast  senna 2 Tablet(s) Oral at bedtime  traZODone 150 milliGRAM(s) Oral at bedtime    SOCIAL       FAMILY   FAMILY HISTORY:  No pertinent family history in first degree relatives      REVIEW OF SYSTEMS:  CONSTITUTIONAL:  No Fever or chills  HEENT:   No diplopia or blurred vision.  No earache, sore throat or runny nose.  CARDIOVASCULAR:  No pressure, squeezing, strangling, tightness, heaviness or aching about the chest, neck, axilla or epigastrium.  RESPIRATORY:  No cough, shortness of breath, PND or orthopnea.  GASTROINTESTINAL:  No nausea, vomiting or diarrhea.  GENITOURINARY:  No dysuria, frequency or urgency. No Blood in urine  MUSCULOSKELETAL:  AS PER HPI   SKIN:  No change in skin, hair or nails.  NEUROLOGIC:  No paresthesias, fasciculations, seizures or weakness.  PSYCHIATRIC:  No disorder of thought or mood.  ENDOCRINE:  No heat or cold intolerance, polyuria or polydipsia.  HEMATOLOGICAL:  No easy bruising or bleeding.            Physical Exam:  ICU Vital Signs Last 24 Hrs  T(C): 36.6 (17 Jun 2022 05:20), Max: 36.9 (16 Jun 2022 08:48)  T(F): 97.8 (17 Jun 2022 05:20), Max: 98.4 (16 Jun 2022 08:48)  HR: 76 (17 Jun 2022 05:20) (74 - 90)  BP: 108/60 (17 Jun 2022 05:20) (104/70 - 149/78)  BP(mean): --  ABP: --  ABP(mean): --  RR: 18 (17 Jun 2022 05:20) (18 - 18)  SpO2: 95% (17 Jun 2022 05:20) (94% - 96%)    GEN: NAD,   HEENT: normocephalic and atraumatic. EOMI. GOLDIE.    NECK: Supple. No carotid bruits.  No lymphadenopathy or thyromegaly.  LUNGS: Clear to auscultation.  HEART: Regular rate and rhythm without murmur.  ABDOMEN: Soft, nontender, and nondistended.  Positive bowel sounds.    : No CVA tenderness  EXTREMITIES: R KNEE WRAPPED   MSK: no joint swelling  NEUROLOGIC: Cranial nerves II through XII are grossly intact.  PSYCHIATRIC: Appropriate affect .           Labs:  Vitals:  ============  T(F): 97.8 (17 Jun 2022 05:20), Max: 98.4 (16 Jun 2022 08:48)  HR: 76 (17 Jun 2022 05:20)  BP: 108/60 (17 Jun 2022 05:20)  RR: 18 (17 Jun 2022 05:20)  SpO2: 95% (17 Jun 2022 05:20) (94% - 96%)  temp max in last 48H T(F): , Max: 98.7 (06-15-22 @ 08:27)    =======================================================  Current Antibiotics:  ceFAZolin   IVPB 2000 milliGRAM(s) IV Intermittent every 8 hours    Other medications:  acetaminophen     Tablet .. 975 milliGRAM(s) Oral every 8 hours  aspirin enteric coated 325 milliGRAM(s) Oral two times a day  atorvastatin 40 milliGRAM(s) Oral at bedtime  buprenorphine 8 mG/naloxone 2 mG SL  Tablet 1 Tablet(s) SubLingual <User Schedule>  buPROPion XL (24-Hour) . 300 milliGRAM(s) Oral daily  busPIRone 5 milliGRAM(s) Oral two times a day  celecoxib 200 milliGRAM(s) Oral every 12 hours  cloNIDine 0.1 milliGRAM(s) Oral two times a day  gabapentin 300 milliGRAM(s) Oral three times a day  methocarbamol 750 milliGRAM(s) Oral four times a day  pantoprazole    Tablet 40 milliGRAM(s) Oral before breakfast  senna 2 Tablet(s) Oral at bedtime  traZODone 150 milliGRAM(s) Oral at bedtime      =======================================================  Labs:            Culture - Blood (collected 06-15-22 @ 00:18)  Source: .Blood Blood    Culture - Joint Fluid (collected 06-14-22 @ 22:59)  Source: Joint Fl Right Knee Synovial Fluid  Gram Stain (06-15-22 @ 02:16):    polymorphonuclear leukocytes seen    No organisms seen    by cytocentrifuge  Organism: Staphylococcus aureus (06-16-22 @ 16:42)  Organism: Staphylococcus aureus (06-16-22 @ 16:42)    Sensitivities:      -  Ampicillin/Sulbactam: S <=8/4      -  Cefazolin: S <=4      -  Clindamycin: S <=0.25      -  Erythromycin: S <=0.25      -  Gentamicin: S <=1 Should not be used as monotherapy      -  Oxacillin: S <=0.25 Oxacillin predicts susceptibility for dicloxacillin, methicillin, and nafcillin      -  Rifampin: S <=1 Should not be used as monotherapy      -  Tetra/Doxy: S <=1      -  Trimethoprim/Sulfamethoxazole: S <=0.5/9.5      -  Vancomycin: S 1      Method Type: REJI    Culture - Other (collected 06-14-22 @ 22:36)  Source: .Other Femoral Canal    Culture - Tissue with Gram Stain (collected 06-14-22 @ 22:33)  Source: .Tissue Femoral Tissue  Gram Stain (06-15-22 @ 03:15):    Rare polymorphonuclear leukocytes seen per low power field    No organisms seen per oil power field    Culture - Surgical Swab (collected 06-14-22 @ 22:20)  Source: .Surgical Swab Tibial Canal    Culture - Blood (collected 06-14-22 @ 16:30)  Source: .Blood Blood    Culture - Joint Fluid (collected 06-14-22 @ 02:21)  Source: Knee Synovial Fluid  Gram Stain (06-14-22 @ 04:04):    polymorphonuclear leukocytes seen    No organisms seen    by cytocentrifuge  Organism: Staphylococcus aureus (06-16-22 @ 19:32)  Organism: Staphylococcus aureus (06-16-22 @ 19:32)    Sensitivities:      -  Ampicillin/Sulbactam: S <=8/4      -  Cefazolin: S <=4      -  Clindamycin: S <=0.25      -  Erythromycin: S <=0.25      -  Gentamicin: S 2 Should not be used as monotherapy      -  Oxacillin: S <=0.25 Oxacillin predicts susceptibility for dicloxacillin, methicillin, and nafcillin      -  Rifampin: S <=1 Should not be used as monotherapy      -  Tetra/Doxy: S <=1      -  Trimethoprim/Sulfamethoxazole: S <=0.5/9.5      -  Vancomycin: S 2      Method Type: REJI      Creatinine, Serum: 0.67 mg/dL (06-15-22 @ 06:12)  Creatinine, Serum: 0.86 mg/dL (06-13-22 @ 22:58)          C-Reactive Protein, Serum: 72 mg/L (06-13-22 @ 22:58)    WBC Count: 10.34 K/uL (06-15-22 @ 06:12)  WBC Count: 11.34 K/uL (06-13-22 @ 22:58)    SARS-CoV-2 Result: NotDete (06-14-22 @ 09:57)  COVID-19 PCR: NotDete (06-13-22 @ 23:00)

## 2022-06-17 NOTE — PROGRESS NOTE ADULT - ASSESSMENT
56M  hx of heroin abuse - was previously on suboxone 8mg BID  multiple knee surgeries c/b infected joint hardware  POD#3 s/p right knee removal of hardware, incision and drainage with antibiotic cement spacer application/ repair of quad tendon     s/p adductor canal nerve block - good relief  off all opioids today    acetaminophen     Tablet .. 975 milliGRAM(s) Oral every 8 hours  gabapentin 300 milliGRAM(s) Oral three times a day  HYDROmorphone  Injectable 0.5 milliGRAM(s) IV Push once PRN BTP  methocarbamol 750 milliGRAM(s) Oral four times a day   suboxone 8mg TID    patient will receive 5 days of suboxone from inpatient psych team and will follow up with his outpt psych on tuesday    Pain controlled  Pain service will sign off  Please reconsult as needed

## 2022-06-17 NOTE — BH CONSULTATION LIAISON ASSESSMENT NOTE - DESCRIPTION
55 yo single, employed male domiciled in private residence in [place] with hx of polysubstance abuse (heroin, alcohol, xanax) and hx of overdose x 4 currently sober x 2 years, with previous psychiatric admissions relating to polysubstance abuse (last in 2015).

## 2022-06-17 NOTE — BH CONSULTATION LIAISON ASSESSMENT NOTE - NSBHCONSULTFOLLOWAFTERCARE_PSY_A_CORE FT
If outpatient psychiatrist unable to provide Suboxone prior to discharge, psych will send script to pt's pharmacy

## 2022-06-17 NOTE — BH CONSULTATION LIAISON ASSESSMENT NOTE - SUMMARY
57 yo single, employed male domiciled in private residence in [place] with friends with a past psychiatric hx anxiety, depression, polysubstance abuse (heroin, alcohol, xanax) and hx of overdose x 4 currently sober x 2 years, with previous psychiatric admissions relating to polysubstance abuse (last in 2015), and a PMHx R knee septic arthritis s/p multiple R knee surgeries presented to the ED 6/13 for R knee pain and swelling. Pt reports he has had multiple traumatic events and ~ 5 surgeries to the R knee within the last year, most recently a right quad tendon repair after TKA on 5/11/22. Current medical plan for surgery in 6 weeks once current infection clears.   Pt alert, cooperative and pleasant on encounter with good insight and judgement into his psychiatric conditions. Pt has a telephone appt with Dr. Mariee, his outpatient psychiatrist with [facility], today at 15:00 to discuss if the Suboxone script cna be sent from [facility]. Pending discussion with outpatient provider, will need Suboxone 8mg/2mg script sent if [facility] psychiatrist unable to provider prior to pt discharge tomorrow.

## 2022-06-18 LAB
-  AMPICILLIN/SULBACTAM: SIGNIFICANT CHANGE UP
-  AMPICILLIN/SULBACTAM: SIGNIFICANT CHANGE UP
-  CEFAZOLIN: SIGNIFICANT CHANGE UP
-  CEFAZOLIN: SIGNIFICANT CHANGE UP
-  CLINDAMYCIN: SIGNIFICANT CHANGE UP
-  CLINDAMYCIN: SIGNIFICANT CHANGE UP
-  ERYTHROMYCIN: SIGNIFICANT CHANGE UP
-  ERYTHROMYCIN: SIGNIFICANT CHANGE UP
-  GENTAMICIN: SIGNIFICANT CHANGE UP
-  GENTAMICIN: SIGNIFICANT CHANGE UP
-  OXACILLIN: SIGNIFICANT CHANGE UP
-  OXACILLIN: SIGNIFICANT CHANGE UP
-  RIFAMPIN: SIGNIFICANT CHANGE UP
-  RIFAMPIN: SIGNIFICANT CHANGE UP
-  TETRACYCLINE: SIGNIFICANT CHANGE UP
-  TETRACYCLINE: SIGNIFICANT CHANGE UP
-  TRIMETHOPRIM/SULFAMETHOXAZOLE: SIGNIFICANT CHANGE UP
-  TRIMETHOPRIM/SULFAMETHOXAZOLE: SIGNIFICANT CHANGE UP
-  VANCOMYCIN: SIGNIFICANT CHANGE UP
-  VANCOMYCIN: SIGNIFICANT CHANGE UP
CULTURE RESULTS: SIGNIFICANT CHANGE UP
METHOD TYPE: SIGNIFICANT CHANGE UP
METHOD TYPE: SIGNIFICANT CHANGE UP
ORGANISM # SPEC MICROSCOPIC CNT: SIGNIFICANT CHANGE UP
ORGANISM # SPEC MICROSCOPIC CNT: SIGNIFICANT CHANGE UP
SPECIMEN SOURCE: SIGNIFICANT CHANGE UP

## 2022-06-18 PROCEDURE — 99232 SBSQ HOSP IP/OBS MODERATE 35: CPT

## 2022-06-18 RX ADMIN — GABAPENTIN 300 MILLIGRAM(S): 400 CAPSULE ORAL at 22:45

## 2022-06-18 RX ADMIN — CHLORHEXIDINE GLUCONATE 1 APPLICATION(S): 213 SOLUTION TOPICAL at 06:32

## 2022-06-18 RX ADMIN — CELECOXIB 200 MILLIGRAM(S): 200 CAPSULE ORAL at 18:30

## 2022-06-18 RX ADMIN — GABAPENTIN 300 MILLIGRAM(S): 400 CAPSULE ORAL at 13:07

## 2022-06-18 RX ADMIN — CELECOXIB 200 MILLIGRAM(S): 200 CAPSULE ORAL at 06:30

## 2022-06-18 RX ADMIN — CELECOXIB 200 MILLIGRAM(S): 200 CAPSULE ORAL at 18:39

## 2022-06-18 RX ADMIN — Medication 100 MILLIGRAM(S): at 22:45

## 2022-06-18 RX ADMIN — PANTOPRAZOLE SODIUM 40 MILLIGRAM(S): 20 TABLET, DELAYED RELEASE ORAL at 06:33

## 2022-06-18 RX ADMIN — ATORVASTATIN CALCIUM 40 MILLIGRAM(S): 80 TABLET, FILM COATED ORAL at 22:45

## 2022-06-18 RX ADMIN — BUPRENORPHINE AND NALOXONE 1 TABLET(S): 2; .5 TABLET SUBLINGUAL at 06:33

## 2022-06-18 RX ADMIN — Medication 325 MILLIGRAM(S): at 06:33

## 2022-06-18 RX ADMIN — Medication 975 MILLIGRAM(S): at 06:32

## 2022-06-18 RX ADMIN — Medication 975 MILLIGRAM(S): at 22:46

## 2022-06-18 RX ADMIN — METHOCARBAMOL 750 MILLIGRAM(S): 500 TABLET, FILM COATED ORAL at 00:02

## 2022-06-18 RX ADMIN — BUPROPION HYDROCHLORIDE 300 MILLIGRAM(S): 150 TABLET, EXTENDED RELEASE ORAL at 13:07

## 2022-06-18 RX ADMIN — Medication 0.1 MILLIGRAM(S): at 06:34

## 2022-06-18 RX ADMIN — Medication 975 MILLIGRAM(S): at 06:30

## 2022-06-18 RX ADMIN — GABAPENTIN 300 MILLIGRAM(S): 400 CAPSULE ORAL at 06:33

## 2022-06-18 RX ADMIN — METHOCARBAMOL 750 MILLIGRAM(S): 500 TABLET, FILM COATED ORAL at 18:30

## 2022-06-18 RX ADMIN — BUPRENORPHINE AND NALOXONE 1 TABLET(S): 2; .5 TABLET SUBLINGUAL at 22:45

## 2022-06-18 RX ADMIN — Medication 5 MILLIGRAM(S): at 06:33

## 2022-06-18 RX ADMIN — Medication 325 MILLIGRAM(S): at 18:31

## 2022-06-18 RX ADMIN — Medication 975 MILLIGRAM(S): at 15:06

## 2022-06-18 RX ADMIN — METHOCARBAMOL 750 MILLIGRAM(S): 500 TABLET, FILM COATED ORAL at 06:33

## 2022-06-18 RX ADMIN — Medication 0.1 MILLIGRAM(S): at 18:30

## 2022-06-18 RX ADMIN — METHOCARBAMOL 750 MILLIGRAM(S): 500 TABLET, FILM COATED ORAL at 12:17

## 2022-06-18 RX ADMIN — Medication 150 MILLIGRAM(S): at 22:46

## 2022-06-18 RX ADMIN — CELECOXIB 200 MILLIGRAM(S): 200 CAPSULE ORAL at 06:32

## 2022-06-18 RX ADMIN — Medication 975 MILLIGRAM(S): at 22:53

## 2022-06-18 RX ADMIN — BUPRENORPHINE AND NALOXONE 1 TABLET(S): 2; .5 TABLET SUBLINGUAL at 12:18

## 2022-06-18 RX ADMIN — Medication 5 MILLIGRAM(S): at 18:31

## 2022-06-18 RX ADMIN — Medication 100 MILLIGRAM(S): at 06:31

## 2022-06-18 RX ADMIN — Medication 100 MILLIGRAM(S): at 13:07

## 2022-06-18 RX ADMIN — Medication 975 MILLIGRAM(S): at 13:06

## 2022-06-18 NOTE — PROGRESS NOTE ADULT - SUBJECTIVE AND OBJECTIVE BOX
MOSES HANKINS    [id number]    56y      Male    Patient is a 56y old  Male who presents with a chief complaint of Infected right total knee  (14 Jun 2022 16:57)      INTERVAL HPI/OVERNIGHT EVENTS:    Patient is doing ok, his pain is well controlled, denies fever, chills, chest pain. '    REVIEW OF SYSTEMS:    CONSTITUTIONAL: No fever, fatigue  RESPIRATORY: No cough, No shortness of breath  CARDIOVASCULAR: No chest pain, palpitations  GASTROINTESTINAL: No abdominal, No nausea, vomiting  NEUROLOGICAL: No headaches,  loss of strength.  MISCELLANEOUS: right knee pain is well controlled        Vital Signs Last 24 Hrs  T(C): 36.4 (18 Jun 2022 10:10), Max: 36.9 (17 Jun 2022 17:15)  T(F): 97.5 (18 Jun 2022 10:10), Max: 98.5 (17 Jun 2022 17:15)  HR: 70 (18 Jun 2022 10:10) (70 - 92)  BP: 121/65 (18 Jun 2022 10:10) (118/72 - 157/83)  RR: 18 (18 Jun 2022 05:00) (18 - 18)  SpO2: 96% (18 Jun 2022 10:10) (93% - 96%)    PHYSICAL EXAM:    GENERAL: Middle age male looking comfortable   HEENT: PERRL, +EOMI  NECK: soft, Supple, No JVD  CHEST/LUNG: Clear to auscultate bilaterally; No wheezing  HEART: S1S2+, Regular rate and rhythm; No murmurs  ABDOMEN: Soft, Nontender, Nondistended; Bowel sounds present  EXTREMITIES:  1+ Peripheral Pulses, No edema  SKIN: No rashes or lesions  NEURO: AAOX3, no focal deficits, no motor r sensory loss  PSYCH: normal mood  Right knee with clean dressings on, no bleeding or soaking       LABS:                  I&O's Summary    17 Jun 2022 07:01  -  18 Jun 2022 07:00  --------------------------------------------------------  IN: 0 mL / OUT: 300 mL / NET: -300 mL        MEDICATIONS  (STANDING):  acetaminophen     Tablet .. 975 milliGRAM(s) Oral every 8 hours  aspirin enteric coated 325 milliGRAM(s) Oral two times a day  atorvastatin 40 milliGRAM(s) Oral at bedtime  buprenorphine 8 mG/naloxone 2 mG SL  Tablet 1 Tablet(s) SubLingual <User Schedule>  buPROPion XL (24-Hour) . 300 milliGRAM(s) Oral daily  busPIRone 5 milliGRAM(s) Oral two times a day  ceFAZolin   IVPB 2000 milliGRAM(s) IV Intermittent every 8 hours  celecoxib 200 milliGRAM(s) Oral every 12 hours  chlorhexidine 2% Cloths 1 Application(s) Topical <User Schedule>  cloNIDine 0.1 milliGRAM(s) Oral two times a day  gabapentin 300 milliGRAM(s) Oral three times a day  methocarbamol 750 milliGRAM(s) Oral four times a day  pantoprazole    Tablet 40 milliGRAM(s) Oral before breakfast  senna 2 Tablet(s) Oral at bedtime  traZODone 150 milliGRAM(s) Oral at bedtime    MEDICATIONS  (PRN):  bisacodyl Suppository 10 milliGRAM(s) Rectal once PRN Constipation  labetalol Injectable 10 milliGRAM(s) IV Push every 2 hours PRN Systolic blood pressure > 170  magnesium hydroxide Suspension 30 milliLiter(s) Oral daily PRN Constipation  ondansetron Injectable 4 milliGRAM(s) IV Push every 6 hours PRN Nausea and/or Vomiting  sodium chloride 0.9% lock flush 10 milliLiter(s) IV Push every 1 hour PRN Pre/post blood products, medications, blood draw, and to maintain line patency

## 2022-06-18 NOTE — PROGRESS NOTE ADULT - ASSESSMENT
57 y/o male with PMH of depression, anxiety, HLD came to the ED complaining of right knee pain and swelling. Patient had total knee arthroplasty on 5/11/22;  he has been seeing wound care after that; placed on Bactrim which he completed. He noted pain that is worsened and swelling.     Septic prosthetic R knee   S/p arthrocentesis by orthopedic in the ED- fluid sent - rare staph +   s/p  right knee  hardware removal , incision and drainage with antibiotic cement spacer application Repair of right quadriceps  , blood cultures x2 negative , OR culture so far negative    ID noted and appreciated   continue iv Abx , follow cultures ,   PT/OT/pain mgmt-  pain mgmt noted and appreciated ,   s/p R adductor canal nerve block   patient with hx of heroin/ xanax overdose in the past  -   plan as per pain mgmt   DVT prophylaxis- as per ortho  Incentive spirometry  Prophylaxis of opioid  induced constipation.  will get Utox on him    Depression/Anxiety   Continue Bupropion XL 300mg   Escitalopram 10mg   Trazodone 200mg HS   Buspirone 5mg bid     HLD  Atorvastatin 40mg     Acute postop R knee pain on chronic pain - as above     Postoperative HTN urgency - no Hx of HTN but admits takes Clonidine 0.1 mg BID ,   BP well controlled this am  - asymptomatic , continue Clonidine PO 0.1 mg BID  , continue Labetalol ivp for SBP > 170 ,    No sob , no cp , no palpitations , no headache . Monitor BP .     Anemia - acute blood lost anemia - secondary to surgical blood lost - patient is  asymptomatic.     Spoke to ortho PA ,     DVT prophylaxis  - as per ortho protocol- on ASA BID     Opioid induced constipation  prophylaxis - bowel regimen     Will no follow up daily , please call if any issue . 57 y/o male with PMH of depression, anxiety, HLD came to the ED complaining of right knee pain and swelling. Patient had total knee arthroplasty on 5/11/22;  he has been seeing wound care after that; placed on Bactrim which he completed. He noted pain that is worsened and swelling.     Septic prosthetic R knee   S/p arthrocentesis by orthopedic in the ED- fluid sent - rare staph +   s/p  right knee  hardware removal , incision and drainage with antibiotic cement spacer application Repair of right quadriceps  , blood cultures x2 negative , OR culture so far negative    ID noted and appreciated   continue iv Abx , follow cultures ,   PT/OT/pain mgmt-  pain mgmt noted and appreciated ,   s/p R adductor canal nerve block   patient with hx of heroin/ xanax overdose in the past  -   plan as per pain mgmt   DVT prophylaxis- as per ortho  Incentive spirometry  Prophylaxis of opioid  induced constipation.  will get Utox on him    Depression/Anxiety   Continue Bupropion XL 300mg   Escitalopram 10mg   Trazodone 200mg HS   Buspirone 5mg bid     HLD  Atorvastatin 40mg     Acute postop R knee pain on chronic pain - as above     Postoperative HTN urgency - no Hx of HTN but admits takes Clonidine 0.1 mg BID ,   BP well controlled this am  - asymptomatic , continue Clonidine PO 0.1 mg BID  , continue Labetalol ivp for SBP > 170 ,    No sob , no cp , no palpitations , no headache . Monitor BP .     Anemia - acute blood lost anemia - secondary to surgical blood lost - patient is  asymptomatic.     Spoke to ortho PA ,     DVT prophylaxis  - as per ortho protocol- on ASA BID     Opioid induced constipation  prophylaxis - bowel regimen     Will not follow up daily , please call if any issue .

## 2022-06-18 NOTE — PROGRESS NOTE ADULT - SUBJECTIVE AND OBJECTIVE BOX
Ortho Post Op Check    Name: MOSES SUN    MR #: [id number]    Procedure: Right total knee arthroplasty explant with placement static antibiotic cement spacer  Surgeon: Dr Huerta      Pt comfortable without complaints, pain controlled  Denies CP, SOB, N/V, numbness/tingling       Lengthy discussion had with patient regarding past heroin abuse and his current PICC line. I had a pleasant conversation with Mr Sun about our concern of sending him home with the picc  line and his known past of IV drug use. We reached out to Dr Villafuerte his current out patient psychiatrist and he agrees the patient should not return to his home environment while he has the PICC line. The patient was very understanding and he is open to going to a [facility] for the duration of IV ABX.   Case management is aware and will start the process. Patient also made aware with holiday weekend it is likely that we will not be able to get him to [facility] before tuesday the earliest        PAST MEDICAL & SURGICAL HISTORY:  Depression      Anxiety      Alcohol abuse      Overdose  heroine and xanax 3/10/2015      ACL (anterior cruciate ligament) tear, left, initial encounter          General Exam:  Vital Signs Last 24 Hrs  T(C): 36.4 (06-18-22 @ 10:10), Max: 36.4 (06-18-22 @ 10:10)  T(F): 97.5 (06-18-22 @ 10:10), Max: 97.5 (06-18-22 @ 10:10)  HR: 70 (06-18-22 @ 10:10) (70 - 70)  BP: 121/65 (06-18-22 @ 10:10) (121/65 - 121/65)  BP(mean): --  RR: --  SpO2: 96% (06-18-22 @ 10:10) (96% - 96%)    General: Pt Alert and oriented, NAD, controlled pain.  Right leg knee immobilizer in place. Opened for exam to reveal prevena dressings C/D/I.   No bleeding noted. Canister empty  Pulses: 2+ dorsalis pedis pulse. Cap refill < 2 sec.  Sensation: Grossly intact to light touch without deficit.  Motor: + EHL/FHL/TA/GS  Calf soft, supple and nontender       MEDICATIONS  (STANDING):  acetaminophen     Tablet .. 975 milliGRAM(s) Oral every 8 hours  aspirin enteric coated 325 milliGRAM(s) Oral two times a day  atorvastatin 40 milliGRAM(s) Oral at bedtime  buprenorphine 8 mG/naloxone 2 mG SL  Tablet 1 Tablet(s) SubLingual <User Schedule>  buPROPion XL (24-Hour) . 300 milliGRAM(s) Oral daily  busPIRone 5 milliGRAM(s) Oral two times a day  ceFAZolin   IVPB 2000 milliGRAM(s) IV Intermittent every 8 hours  celecoxib 200 milliGRAM(s) Oral every 12 hours  chlorhexidine 2% Cloths 1 Application(s) Topical <User Schedule>  cloNIDine 0.1 milliGRAM(s) Oral two times a day  gabapentin 300 milliGRAM(s) Oral three times a day  methocarbamol 750 milliGRAM(s) Oral four times a day  pantoprazole    Tablet 40 milliGRAM(s) Oral before breakfast  senna 2 Tablet(s) Oral at bedtime  traZODone 150 milliGRAM(s) Oral at bedtime    MEDICATIONS  (PRN):  bisacodyl Suppository 10 milliGRAM(s) Rectal once PRN Constipation  labetalol Injectable 10 milliGRAM(s) IV Push every 2 hours PRN Systolic blood pressure > 170  magnesium hydroxide Suspension 30 milliLiter(s) Oral daily PRN Constipation  ondansetron Injectable 4 milliGRAM(s) IV Push every 6 hours PRN Nausea and/or Vomiting  sodium chloride 0.9% lock flush 10 milliLiter(s) IV Push every 1 hour PRN Pre/post blood products, medications, blood draw, and to maintain line patency      A/P: 56yMale POD#4 s/p  Right total knee arthroplasty explant with placement static antibiotic cement spacer  - Stable  - Pain Control  - DVT ppx: ASA  - Post op abx: as per ID team Ancef  - Weight bearing status: TTWB in KI AAT  - Continue current Suboxone as per pain management team.

## 2022-06-19 LAB
CULTURE RESULTS: SIGNIFICANT CHANGE UP
ORGANISM # SPEC MICROSCOPIC CNT: SIGNIFICANT CHANGE UP
SPECIMEN SOURCE: SIGNIFICANT CHANGE UP

## 2022-06-19 PROCEDURE — 99232 SBSQ HOSP IP/OBS MODERATE 35: CPT

## 2022-06-19 RX ADMIN — METHOCARBAMOL 750 MILLIGRAM(S): 500 TABLET, FILM COATED ORAL at 17:50

## 2022-06-19 RX ADMIN — GABAPENTIN 300 MILLIGRAM(S): 400 CAPSULE ORAL at 21:51

## 2022-06-19 RX ADMIN — Medication 100 MILLIGRAM(S): at 21:54

## 2022-06-19 RX ADMIN — Medication 0.1 MILLIGRAM(S): at 06:20

## 2022-06-19 RX ADMIN — BUPROPION HYDROCHLORIDE 300 MILLIGRAM(S): 150 TABLET, EXTENDED RELEASE ORAL at 11:26

## 2022-06-19 RX ADMIN — Medication 975 MILLIGRAM(S): at 22:25

## 2022-06-19 RX ADMIN — Medication 150 MILLIGRAM(S): at 21:51

## 2022-06-19 RX ADMIN — Medication 0.1 MILLIGRAM(S): at 17:50

## 2022-06-19 RX ADMIN — GABAPENTIN 300 MILLIGRAM(S): 400 CAPSULE ORAL at 06:21

## 2022-06-19 RX ADMIN — METHOCARBAMOL 750 MILLIGRAM(S): 500 TABLET, FILM COATED ORAL at 11:25

## 2022-06-19 RX ADMIN — Medication 100 MILLIGRAM(S): at 06:19

## 2022-06-19 RX ADMIN — Medication 325 MILLIGRAM(S): at 17:50

## 2022-06-19 RX ADMIN — PANTOPRAZOLE SODIUM 40 MILLIGRAM(S): 20 TABLET, DELAYED RELEASE ORAL at 06:20

## 2022-06-19 RX ADMIN — Medication 975 MILLIGRAM(S): at 13:30

## 2022-06-19 RX ADMIN — Medication 975 MILLIGRAM(S): at 21:50

## 2022-06-19 RX ADMIN — CELECOXIB 200 MILLIGRAM(S): 200 CAPSULE ORAL at 06:20

## 2022-06-19 RX ADMIN — BUPRENORPHINE AND NALOXONE 1 TABLET(S): 2; .5 TABLET SUBLINGUAL at 13:30

## 2022-06-19 RX ADMIN — CELECOXIB 200 MILLIGRAM(S): 200 CAPSULE ORAL at 18:40

## 2022-06-19 RX ADMIN — METHOCARBAMOL 750 MILLIGRAM(S): 500 TABLET, FILM COATED ORAL at 23:11

## 2022-06-19 RX ADMIN — BUPRENORPHINE AND NALOXONE 1 TABLET(S): 2; .5 TABLET SUBLINGUAL at 06:21

## 2022-06-19 RX ADMIN — CHLORHEXIDINE GLUCONATE 1 APPLICATION(S): 213 SOLUTION TOPICAL at 06:19

## 2022-06-19 RX ADMIN — Medication 325 MILLIGRAM(S): at 06:20

## 2022-06-19 RX ADMIN — Medication 5 MILLIGRAM(S): at 07:33

## 2022-06-19 RX ADMIN — ATORVASTATIN CALCIUM 40 MILLIGRAM(S): 80 TABLET, FILM COATED ORAL at 21:51

## 2022-06-19 RX ADMIN — METHOCARBAMOL 750 MILLIGRAM(S): 500 TABLET, FILM COATED ORAL at 06:20

## 2022-06-19 RX ADMIN — Medication 975 MILLIGRAM(S): at 07:33

## 2022-06-19 RX ADMIN — Medication 975 MILLIGRAM(S): at 06:20

## 2022-06-19 RX ADMIN — Medication 975 MILLIGRAM(S): at 14:40

## 2022-06-19 RX ADMIN — GABAPENTIN 300 MILLIGRAM(S): 400 CAPSULE ORAL at 13:30

## 2022-06-19 RX ADMIN — BUPRENORPHINE AND NALOXONE 1 TABLET(S): 2; .5 TABLET SUBLINGUAL at 21:50

## 2022-06-19 RX ADMIN — METHOCARBAMOL 750 MILLIGRAM(S): 500 TABLET, FILM COATED ORAL at 00:16

## 2022-06-19 RX ADMIN — Medication 5 MILLIGRAM(S): at 17:50

## 2022-06-19 RX ADMIN — CELECOXIB 200 MILLIGRAM(S): 200 CAPSULE ORAL at 17:50

## 2022-06-19 RX ADMIN — SENNA PLUS 2 TABLET(S): 8.6 TABLET ORAL at 21:51

## 2022-06-19 RX ADMIN — Medication 100 MILLIGRAM(S): at 13:29

## 2022-06-19 NOTE — PROGRESS NOTE ADULT - SUBJECTIVE AND OBJECTIVE BOX
Pt comfortable without complaints, pain controlled  Denies CP, SOB, N/V, numbness/tingling     Vital Signs Last 24 Hrs  T(C): 36.5 (19 Jun 2022 05:00), Max: 37 (18 Jun 2022 21:44)  T(F): 97.7 (19 Jun 2022 05:00), Max: 98.6 (18 Jun 2022 21:44)  HR: 79 (19 Jun 2022 05:00) (74 - 81)  BP: 123/74 (19 Jun 2022 05:00) (123/74 - 135/85)  RR: 18 (19 Jun 2022 05:00) (18 - 18)  SpO2: 96% (19 Jun 2022 05:00) (94% - 98%)    General: Pt Alert and oriented, NAD, controlled pain.  Right leg knee immobilizer in place.   Prevena Canister empty  Pulses: 2+ dorsalis pedis pulse. Cap refill < 2 sec.  Sensation: Grossly intact to light touch without deficit.  Motor: + EHL/FHL/TA/GS  Calf soft, supple and nontender     Culture - Joint Fluid (06.14.22 @ 22:59)    -  Trimethoprim/Sulfamethoxazole: S <=0.5/9.5    -  Vancomycin: S 1    -  Clindamycin: S <=0.25    -  Erythromycin: S <=0.25    -  Gentamicin: S <=1 Should not be used as monotherapy    -  Oxacillin: S <=0.25 Oxacillin predicts susceptibility for dicloxacillin, methicillin, and nafcillin    -  Rifampin: S <=1 Should not be used as monotherapy    -  Tetra/Doxy: S <=1    Gram Stain:   polymorphonuclear leukocytes seen  No organisms seen  by cytocentrifuge    -  Ampicillin/Sulbactam: S <=8/4    -  Cefazolin: S <=4    Specimen Source: Joint Fl Right Knee Synovial Fluid    Culture Results:   Rare Staphylococcus aureus    Organism Identification: Staphylococcus aureus    Organism: Staphylococcus aureus    Method Type: REJI    Culture - Blood (06.15.22 @ 00:18)    Specimen Source: .Blood Blood    Culture Results:   No growth to date.    A/P: 56yMale POD#5 s/p  Right total knee arthroplasty explant with placement static antibiotic cement spacer  ·	Pain Control  ·	DVT ppx: ASA  ·	Abx as per ID  ·	Weight bearing status: TTWB in KI AAT  ·	Continue current Suboxone as per pain management team.

## 2022-06-20 LAB
CULTURE RESULTS: SIGNIFICANT CHANGE UP
SARS-COV-2 RNA SPEC QL NAA+PROBE: SIGNIFICANT CHANGE UP
SPECIMEN SOURCE: SIGNIFICANT CHANGE UP

## 2022-06-20 RX ADMIN — BUPRENORPHINE AND NALOXONE 1 TABLET(S): 2; .5 TABLET SUBLINGUAL at 21:54

## 2022-06-20 RX ADMIN — BUPROPION HYDROCHLORIDE 300 MILLIGRAM(S): 150 TABLET, EXTENDED RELEASE ORAL at 14:25

## 2022-06-20 RX ADMIN — Medication 100 MILLIGRAM(S): at 21:58

## 2022-06-20 RX ADMIN — METHOCARBAMOL 750 MILLIGRAM(S): 500 TABLET, FILM COATED ORAL at 14:24

## 2022-06-20 RX ADMIN — Medication 5 MILLIGRAM(S): at 18:50

## 2022-06-20 RX ADMIN — Medication 975 MILLIGRAM(S): at 06:29

## 2022-06-20 RX ADMIN — GABAPENTIN 300 MILLIGRAM(S): 400 CAPSULE ORAL at 21:55

## 2022-06-20 RX ADMIN — SENNA PLUS 2 TABLET(S): 8.6 TABLET ORAL at 21:55

## 2022-06-20 RX ADMIN — Medication 325 MILLIGRAM(S): at 06:11

## 2022-06-20 RX ADMIN — Medication 975 MILLIGRAM(S): at 15:25

## 2022-06-20 RX ADMIN — GABAPENTIN 300 MILLIGRAM(S): 400 CAPSULE ORAL at 14:36

## 2022-06-20 RX ADMIN — Medication 100 MILLIGRAM(S): at 14:36

## 2022-06-20 RX ADMIN — Medication 975 MILLIGRAM(S): at 14:25

## 2022-06-20 RX ADMIN — Medication 100 MILLIGRAM(S): at 06:12

## 2022-06-20 RX ADMIN — CELECOXIB 200 MILLIGRAM(S): 200 CAPSULE ORAL at 18:51

## 2022-06-20 RX ADMIN — Medication 0.1 MILLIGRAM(S): at 18:51

## 2022-06-20 RX ADMIN — Medication 975 MILLIGRAM(S): at 21:55

## 2022-06-20 RX ADMIN — CELECOXIB 200 MILLIGRAM(S): 200 CAPSULE ORAL at 06:29

## 2022-06-20 RX ADMIN — METHOCARBAMOL 750 MILLIGRAM(S): 500 TABLET, FILM COATED ORAL at 18:50

## 2022-06-20 RX ADMIN — CELECOXIB 200 MILLIGRAM(S): 200 CAPSULE ORAL at 06:11

## 2022-06-20 RX ADMIN — Medication 5 MILLIGRAM(S): at 06:11

## 2022-06-20 RX ADMIN — METHOCARBAMOL 750 MILLIGRAM(S): 500 TABLET, FILM COATED ORAL at 06:11

## 2022-06-20 RX ADMIN — METHOCARBAMOL 750 MILLIGRAM(S): 500 TABLET, FILM COATED ORAL at 23:39

## 2022-06-20 RX ADMIN — BUPRENORPHINE AND NALOXONE 1 TABLET(S): 2; .5 TABLET SUBLINGUAL at 06:12

## 2022-06-20 RX ADMIN — GABAPENTIN 300 MILLIGRAM(S): 400 CAPSULE ORAL at 06:12

## 2022-06-20 RX ADMIN — Medication 150 MILLIGRAM(S): at 21:55

## 2022-06-20 RX ADMIN — ATORVASTATIN CALCIUM 40 MILLIGRAM(S): 80 TABLET, FILM COATED ORAL at 21:54

## 2022-06-20 RX ADMIN — BUPRENORPHINE AND NALOXONE 1 TABLET(S): 2; .5 TABLET SUBLINGUAL at 14:35

## 2022-06-20 RX ADMIN — Medication 325 MILLIGRAM(S): at 18:51

## 2022-06-20 RX ADMIN — CHLORHEXIDINE GLUCONATE 1 APPLICATION(S): 213 SOLUTION TOPICAL at 06:18

## 2022-06-20 RX ADMIN — PANTOPRAZOLE SODIUM 40 MILLIGRAM(S): 20 TABLET, DELAYED RELEASE ORAL at 06:12

## 2022-06-20 RX ADMIN — Medication 975 MILLIGRAM(S): at 06:12

## 2022-06-20 RX ADMIN — Medication 975 MILLIGRAM(S): at 22:36

## 2022-06-20 NOTE — PROGRESS NOTE ADULT - SUBJECTIVE AND OBJECTIVE BOX
Ortho Post Op Check    Name: MOSES HANKINS    MR #: [id number]    Procedure: Right total knee arthroplasty explant with placement cement spacer static   Surgeon: Dr Huerta    PAST MEDICAL & SURGICAL HISTORY:  Depression      Anxiety      Alcohol abuse      Overdose  heroine and xanax 3/10/2015      ACL (anterior cruciate ligament) tear, left, initial encounter          Pt comfortable without complaints, pain controlled  Denies CP, SOB, N/V, numbness/tingling     General Exam:  Vital Signs Last 24 Hrs  T(C): 36.4 (06-20-22 @ 04:36), Max: 36.4 (06-20-22 @ 04:36)  T(F): 97.6 (06-20-22 @ 04:36), Max: 97.6 (06-20-22 @ 04:36)  HR: 82 (06-20-22 @ 04:36) (82 - 82)  BP: 104/60 (06-20-22 @ 04:36) (104/60 - 104/60)  BP(mean): --  RR: 16 (06-20-22 @ 04:36) (16 - 16)  SpO2: 95% (06-20-22 @ 04:36) (95% - 95%)    General: Pt Alert and oriented, NAD, controlled pain.  Prevena dressings C/D/I. No bleeding.  Pulses: 2+ dorsalis pedis pulse. Cap refill < 2 sec.  Sensation: Grossly intact to light touch without deficit.  Motor: + EHL/FHL/TA/GS  Calf soft, supple and nontender             MEDICATIONS  (STANDING):  acetaminophen     Tablet .. 975 milliGRAM(s) Oral every 8 hours  aspirin enteric coated 325 milliGRAM(s) Oral two times a day  atorvastatin 40 milliGRAM(s) Oral at bedtime  buprenorphine 8 mG/naloxone 2 mG SL  Tablet 1 Tablet(s) SubLingual <User Schedule>  buPROPion XL (24-Hour) . 300 milliGRAM(s) Oral daily  busPIRone 5 milliGRAM(s) Oral two times a day  ceFAZolin   IVPB 2000 milliGRAM(s) IV Intermittent every 8 hours  celecoxib 200 milliGRAM(s) Oral every 12 hours  chlorhexidine 2% Cloths 1 Application(s) Topical <User Schedule>  cloNIDine 0.1 milliGRAM(s) Oral two times a day  gabapentin 300 milliGRAM(s) Oral three times a day  methocarbamol 750 milliGRAM(s) Oral four times a day  pantoprazole    Tablet 40 milliGRAM(s) Oral before breakfast  senna 2 Tablet(s) Oral at bedtime  traZODone 150 milliGRAM(s) Oral at bedtime    MEDICATIONS  (PRN):  bisacodyl Suppository 10 milliGRAM(s) Rectal once PRN Constipation  labetalol Injectable 10 milliGRAM(s) IV Push every 2 hours PRN Systolic blood pressure > 170  magnesium hydroxide Suspension 30 milliLiter(s) Oral daily PRN Constipation  ondansetron Injectable 4 milliGRAM(s) IV Push every 6 hours PRN Nausea and/or Vomiting  sodium chloride 0.9% lock flush 10 milliLiter(s) IV Push every 1 hour PRN Pre/post blood products, medications, blood draw, and to maintain line patency      A/P: 56yMale POD#6 s/p Right total knee arthroplasty explant with placement cement static antibiotic spacer  - Stable  - Pain Control  - DVT ppx: ASA  - Post op abx: as per ID  - PT eval pending  - Weight bearing status: TTWB in KI AAT  - Patient may have KI open while in bed  - DC to [facility] for IV abx

## 2022-06-20 NOTE — DIETITIAN INITIAL EVALUATION ADULT - OTHER INFO
55 y/o male with PMH of depression, anxiety, HLD came to the ED complaining of right knee pain and swelling. Patient had total knee arthroplasty on 5/11/22;  he has been seeing wound care after that; placed on Bactrim which he completed. He noted pain that is worsened and swelling.     Septic prosthetic R knee

## 2022-06-20 NOTE — PROVIDER CONTACT NOTE (CRITICAL VALUE NOTIFICATION) - SITUATION
Joint fluid drawn on 6/13/2022, premlime #2+ staphlyococcus aureus
Tissue culture from femoral tissue collected 6/14 at 1300 final 6/19 is positive for rare staphylococcus aureus

## 2022-06-20 NOTE — DIETITIAN INITIAL EVALUATION ADULT - ORAL INTAKE PTA/DIET HISTORY
Pt reports tolerating meals with good PO intake, no recent changes in weight. HBV protein foods and low sugar foods explained and encouraged to facilitate healing and minimize recurrent infection-   pt receptive.

## 2022-06-20 NOTE — PROVIDER CONTACT NOTE (CRITICAL VALUE NOTIFICATION) - TEST AND RESULT REPORTED:
joint fluid culture 6/14 right knee - rare staphylococcus w/ sensitivties
Joint fluid, + staphlyococcus aureus
Tissue culture from femoral tissue collected 6/14 at 1300 final 6/19 is positive for rare staphylococcus aureus

## 2022-06-21 ENCOUNTER — APPOINTMENT (OUTPATIENT)
Dept: ORTHOPEDIC SURGERY | Facility: CLINIC | Age: 57
End: 2022-06-21

## 2022-06-21 LAB
AMPHET UR-MCNC: POSITIVE
BARBITURATES UR SCN-MCNC: NEGATIVE — SIGNIFICANT CHANGE UP
BENZODIAZ UR-MCNC: POSITIVE
COCAINE METAB.OTHER UR-MCNC: NEGATIVE — SIGNIFICANT CHANGE UP
METHADONE UR-MCNC: NEGATIVE — SIGNIFICANT CHANGE UP
OPIATES UR-MCNC: NEGATIVE — SIGNIFICANT CHANGE UP
PCP SPEC-MCNC: SIGNIFICANT CHANGE UP
PCP UR-MCNC: NEGATIVE — SIGNIFICANT CHANGE UP
THC UR QL: NEGATIVE — SIGNIFICANT CHANGE UP

## 2022-06-21 RX ORDER — METHOCARBAMOL 500 MG/1
1 TABLET, FILM COATED ORAL
Qty: 0 | Refills: 0 | DISCHARGE
Start: 2022-06-21

## 2022-06-21 RX ORDER — GABAPENTIN 400 MG/1
1 CAPSULE ORAL
Qty: 0 | Refills: 0 | DISCHARGE
Start: 2022-06-21

## 2022-06-21 RX ORDER — PANTOPRAZOLE SODIUM 20 MG/1
1 TABLET, DELAYED RELEASE ORAL
Qty: 0 | Refills: 0 | DISCHARGE
Start: 2022-06-21

## 2022-06-21 RX ORDER — SENNA PLUS 8.6 MG/1
2 TABLET ORAL
Qty: 0 | Refills: 0 | DISCHARGE
Start: 2022-06-21

## 2022-06-21 RX ORDER — CELECOXIB 200 MG/1
1 CAPSULE ORAL
Qty: 0 | Refills: 0 | DISCHARGE
Start: 2022-06-21

## 2022-06-21 RX ADMIN — METHOCARBAMOL 750 MILLIGRAM(S): 500 TABLET, FILM COATED ORAL at 18:19

## 2022-06-21 RX ADMIN — METHOCARBAMOL 750 MILLIGRAM(S): 500 TABLET, FILM COATED ORAL at 14:04

## 2022-06-21 RX ADMIN — Medication 975 MILLIGRAM(S): at 05:35

## 2022-06-21 RX ADMIN — CELECOXIB 200 MILLIGRAM(S): 200 CAPSULE ORAL at 18:22

## 2022-06-21 RX ADMIN — Medication 325 MILLIGRAM(S): at 18:19

## 2022-06-21 RX ADMIN — BUPROPION HYDROCHLORIDE 300 MILLIGRAM(S): 150 TABLET, EXTENDED RELEASE ORAL at 14:04

## 2022-06-21 RX ADMIN — ATORVASTATIN CALCIUM 40 MILLIGRAM(S): 80 TABLET, FILM COATED ORAL at 22:15

## 2022-06-21 RX ADMIN — Medication 975 MILLIGRAM(S): at 14:05

## 2022-06-21 RX ADMIN — BUPRENORPHINE AND NALOXONE 1 TABLET(S): 2; .5 TABLET SUBLINGUAL at 05:35

## 2022-06-21 RX ADMIN — Medication 5 MILLIGRAM(S): at 05:36

## 2022-06-21 RX ADMIN — CELECOXIB 200 MILLIGRAM(S): 200 CAPSULE ORAL at 05:35

## 2022-06-21 RX ADMIN — SENNA PLUS 2 TABLET(S): 8.6 TABLET ORAL at 22:15

## 2022-06-21 RX ADMIN — METHOCARBAMOL 750 MILLIGRAM(S): 500 TABLET, FILM COATED ORAL at 23:42

## 2022-06-21 RX ADMIN — CHLORHEXIDINE GLUCONATE 1 APPLICATION(S): 213 SOLUTION TOPICAL at 05:38

## 2022-06-21 RX ADMIN — BUPRENORPHINE AND NALOXONE 1 TABLET(S): 2; .5 TABLET SUBLINGUAL at 22:15

## 2022-06-21 RX ADMIN — Medication 975 MILLIGRAM(S): at 22:14

## 2022-06-21 RX ADMIN — BUPRENORPHINE AND NALOXONE 1 TABLET(S): 2; .5 TABLET SUBLINGUAL at 14:05

## 2022-06-21 RX ADMIN — Medication 100 MILLIGRAM(S): at 14:04

## 2022-06-21 RX ADMIN — Medication 5 MILLIGRAM(S): at 18:19

## 2022-06-21 RX ADMIN — Medication 325 MILLIGRAM(S): at 05:35

## 2022-06-21 RX ADMIN — GABAPENTIN 300 MILLIGRAM(S): 400 CAPSULE ORAL at 05:35

## 2022-06-21 RX ADMIN — Medication 0.1 MILLIGRAM(S): at 05:39

## 2022-06-21 RX ADMIN — METHOCARBAMOL 750 MILLIGRAM(S): 500 TABLET, FILM COATED ORAL at 05:35

## 2022-06-21 RX ADMIN — GABAPENTIN 300 MILLIGRAM(S): 400 CAPSULE ORAL at 22:15

## 2022-06-21 RX ADMIN — GABAPENTIN 300 MILLIGRAM(S): 400 CAPSULE ORAL at 14:05

## 2022-06-21 RX ADMIN — Medication 100 MILLIGRAM(S): at 05:38

## 2022-06-21 RX ADMIN — Medication 975 MILLIGRAM(S): at 22:28

## 2022-06-21 RX ADMIN — Medication 150 MILLIGRAM(S): at 22:15

## 2022-06-21 RX ADMIN — Medication 100 MILLIGRAM(S): at 22:14

## 2022-06-21 RX ADMIN — CELECOXIB 200 MILLIGRAM(S): 200 CAPSULE ORAL at 06:17

## 2022-06-21 RX ADMIN — Medication 0.1 MILLIGRAM(S): at 18:19

## 2022-06-21 RX ADMIN — Medication 975 MILLIGRAM(S): at 14:32

## 2022-06-21 RX ADMIN — CELECOXIB 200 MILLIGRAM(S): 200 CAPSULE ORAL at 18:19

## 2022-06-21 RX ADMIN — Medication 975 MILLIGRAM(S): at 06:17

## 2022-06-21 RX ADMIN — PANTOPRAZOLE SODIUM 40 MILLIGRAM(S): 20 TABLET, DELAYED RELEASE ORAL at 05:35

## 2022-06-21 NOTE — PROGRESS NOTE ADULT - SUBJECTIVE AND OBJECTIVE BOX
ORTHO-TRAUMA SERVICE      Pt Name: MOSES HANKINS    MRN: [id number]      Patient is 56y.o male being followed for Right total knee arthroplasty explant with placement static antibiotic cement spacer. Patient is found sleeping with difficulty staying awake. Patient denies acute motor sensory changes, denies numbness, tingling, pain or discomfort.     PAST MEDICAL & SURGICAL HISTORY:  PAST MEDICAL & SURGICAL HISTORY:  Depression    Anxiety    Alcohol abuse    Overdose  heroine and xanax 3/10/2015    ACL (anterior cruciate ligament) tear, left, initial encounter        Allergies: shellfish. (Hives)      Medications: acetaminophen     Tablet .. 975 milliGRAM(s) Oral every 8 hours  aspirin enteric coated 325 milliGRAM(s) Oral two times a day  atorvastatin 40 milliGRAM(s) Oral at bedtime  bisacodyl Suppository 10 milliGRAM(s) Rectal once PRN  buprenorphine 8 mG/naloxone 2 mG SL  Tablet 1 Tablet(s) SubLingual <User Schedule>  buPROPion XL (24-Hour) . 300 milliGRAM(s) Oral daily  busPIRone 5 milliGRAM(s) Oral two times a day  ceFAZolin   IVPB 2000 milliGRAM(s) IV Intermittent every 8 hours  celecoxib 200 milliGRAM(s) Oral every 12 hours  chlorhexidine 2% Cloths 1 Application(s) Topical <User Schedule>  cloNIDine 0.1 milliGRAM(s) Oral two times a day  gabapentin 300 milliGRAM(s) Oral three times a day  labetalol Injectable 10 milliGRAM(s) IV Push every 2 hours PRN  magnesium hydroxide Suspension 30 milliLiter(s) Oral daily PRN  methocarbamol 750 milliGRAM(s) Oral four times a day  ondansetron Injectable 4 milliGRAM(s) IV Push every 6 hours PRN  pantoprazole    Tablet 40 milliGRAM(s) Oral before breakfast  senna 2 Tablet(s) Oral at bedtime  sodium chloride 0.9% lock flush 10 milliLiter(s) IV Push every 1 hour PRN  traZODone 150 milliGRAM(s) Oral at bedtime      PHYSICAL EXAM:    Vital Signs Last 24 Hrs  T(C): 36.6 (21 Jun 2022 04:12), Max: 36.8 (20 Jun 2022 17:28)  T(F): 97.8 (21 Jun 2022 04:12), Max: 98.2 (20 Jun 2022 17:28)  HR: 73 (21 Jun 2022 04:12) (73 - 82)  BP: 120/69 (21 Jun 2022 04:12) (120/69 - 131/70)  BP(mean): --  RR: 18 (21 Jun 2022 04:12) (17 - 18)  SpO2: 94% (21 Jun 2022 04:12) (92% - 94%)  Daily     Daily     Appearance: Alert, responsive, in no acute distress.    Neurological: Sensation is grossly intact to light touch.  No focal deficits or weaknesses found.    Skin: no rash on visible skin. Skin is clean, dry and intact. No bleeding. No abrasions. No ulcerations.    Vascular: 2+ distal pulses. Cap refill < 2 sec. No signs of venous   insufficiency   or stasis. No extremity ulcerations. No cyanosis.    Musculoskeletal:         Right Lower Extremity: KI in place. + dorsi plantar flexion. EHL, FHL intact . Calf soft, non-tender. Dressing C/D/I    Culture - Joint Fluid (06.14.22 @ 22:59)    -  Trimethoprim/Sulfamethoxazole: S <=0.5/9.5    -  Vancomycin: S 1    -  Clindamycin: S <=0.25    -  Erythromycin: S <=0.25    -  Gentamicin: S <=1 Should not be used as monotherapy    -  Oxacillin: S <=0.25 Oxacillin predicts susceptibility for dicloxacillin, methicillin, and nafcillin    -  Rifampin: S <=1 Should not be used as monotherapy    -  Tetra/Doxy: S <=1    Gram Stain:   polymorphonuclear leukocytes seen  No organisms seen  by cytocentrifuge    -  Ampicillin/Sulbactam: S <=8/4    -  Cefazolin: S <=4    Specimen Source: Joint Fl Right Knee Synovial Fluid    Culture Results:   Rare Staphylococcus aureus    Organism Identification: Staphylococcus aureus    Organism: Staphylococcus aureus    Method Type: REJI    Culture - Tissue with Gram Stain (06.14.22 @ 22:33)    -  Clindamycin: S <=0.25    -  Ampicillin/Sulbactam: S <=8/4    Gram Stain:   Rare polymorphonuclear leukocytes seen per low power field  No organisms seen per oil power field    -  Oxacillin: S <=0.25 Oxacillin predicts susceptibility for dicloxacillin, methicillin, and nafcillin    -  Tetra/Doxy: S <=1    -  Trimethoprim/Sulfamethoxazole: S <=0.5/9.5    -  Gentamicin: S 2 Should not be used as monotherapy    -  Cefazolin: S <=4    -  Erythromycin: S <=0.25    -  Rifampin: S <=1 Should not be used as monotherapy    -  Vancomycin: S 2    Specimen Source: .Tissue Femoral Tissue    Culture Results:   Rare Staphylococcus aureus    Organism Identification: Staphylococcus aureus    Organism: Staphylococcus aureus    Method Type: REJI      A/P: 56yMale POD#7 s/p Right total knee arthroplasty explant with placement cement static antibiotic spacer  - Stable  - Pain Control  - DVT ppx: ASA  - Post op abx: as per ID  - PT eval pending  - Weight bearing status: TTWB in KI AAT  - Patient may have KI open while in bed  - DC to [facility] for IV abx

## 2022-06-22 VITALS
RESPIRATION RATE: 18 BRPM | OXYGEN SATURATION: 96 % | DIASTOLIC BLOOD PRESSURE: 72 MMHG | HEART RATE: 68 BPM | TEMPERATURE: 98 F | SYSTOLIC BLOOD PRESSURE: 125 MMHG

## 2022-06-22 PROCEDURE — 85652 RBC SED RATE AUTOMATED: CPT

## 2022-06-22 PROCEDURE — 88300 SURGICAL PATH GROSS: CPT

## 2022-06-22 PROCEDURE — 89051 BODY FLUID CELL COUNT: CPT

## 2022-06-22 PROCEDURE — 80307 DRUG TEST PRSMV CHEM ANLYZR: CPT

## 2022-06-22 PROCEDURE — 80202 ASSAY OF VANCOMYCIN: CPT

## 2022-06-22 PROCEDURE — U0003: CPT

## 2022-06-22 PROCEDURE — U0005: CPT

## 2022-06-22 PROCEDURE — 97530 THERAPEUTIC ACTIVITIES: CPT

## 2022-06-22 PROCEDURE — 89060 EXAM SYNOVIAL FLUID CRYSTALS: CPT

## 2022-06-22 PROCEDURE — 85610 PROTHROMBIN TIME: CPT

## 2022-06-22 PROCEDURE — 73562 X-RAY EXAM OF KNEE 3: CPT

## 2022-06-22 PROCEDURE — 86140 C-REACTIVE PROTEIN: CPT

## 2022-06-22 PROCEDURE — 86900 BLOOD TYPING SEROLOGIC ABO: CPT

## 2022-06-22 PROCEDURE — 86850 RBC ANTIBODY SCREEN: CPT

## 2022-06-22 PROCEDURE — 87637 SARSCOV2&INF A&B&RSV AMP PRB: CPT

## 2022-06-22 PROCEDURE — 80048 BASIC METABOLIC PNL TOTAL CA: CPT

## 2022-06-22 PROCEDURE — C1713: CPT

## 2022-06-22 PROCEDURE — 87077 CULTURE AEROBIC IDENTIFY: CPT

## 2022-06-22 PROCEDURE — 97163 PT EVAL HIGH COMPLEX 45 MIN: CPT

## 2022-06-22 PROCEDURE — 73560 X-RAY EXAM OF KNEE 1 OR 2: CPT

## 2022-06-22 PROCEDURE — 87075 CULTR BACTERIA EXCEPT BLOOD: CPT

## 2022-06-22 PROCEDURE — 87040 BLOOD CULTURE FOR BACTERIA: CPT

## 2022-06-22 PROCEDURE — 87186 SC STD MICRODIL/AGAR DIL: CPT

## 2022-06-22 PROCEDURE — 99285 EMERGENCY DEPT VISIT HI MDM: CPT | Mod: 25

## 2022-06-22 PROCEDURE — 87070 CULTURE OTHR SPECIMN AEROBIC: CPT

## 2022-06-22 PROCEDURE — 86901 BLOOD TYPING SEROLOGIC RH(D): CPT

## 2022-06-22 PROCEDURE — 97167 OT EVAL HIGH COMPLEX 60 MIN: CPT

## 2022-06-22 PROCEDURE — 85027 COMPLETE CBC AUTOMATED: CPT

## 2022-06-22 PROCEDURE — 36415 COLL VENOUS BLD VENIPUNCTURE: CPT

## 2022-06-22 PROCEDURE — 87205 SMEAR GRAM STAIN: CPT

## 2022-06-22 PROCEDURE — 97116 GAIT TRAINING THERAPY: CPT

## 2022-06-22 PROCEDURE — 71045 X-RAY EXAM CHEST 1 VIEW: CPT

## 2022-06-22 RX ADMIN — Medication 5 MILLIGRAM(S): at 17:52

## 2022-06-22 RX ADMIN — GABAPENTIN 300 MILLIGRAM(S): 400 CAPSULE ORAL at 06:11

## 2022-06-22 RX ADMIN — Medication 975 MILLIGRAM(S): at 06:23

## 2022-06-22 RX ADMIN — Medication 975 MILLIGRAM(S): at 14:40

## 2022-06-22 RX ADMIN — CELECOXIB 200 MILLIGRAM(S): 200 CAPSULE ORAL at 06:23

## 2022-06-22 RX ADMIN — Medication 325 MILLIGRAM(S): at 06:12

## 2022-06-22 RX ADMIN — BUPROPION HYDROCHLORIDE 300 MILLIGRAM(S): 150 TABLET, EXTENDED RELEASE ORAL at 14:39

## 2022-06-22 RX ADMIN — GABAPENTIN 300 MILLIGRAM(S): 400 CAPSULE ORAL at 14:39

## 2022-06-22 RX ADMIN — Medication 975 MILLIGRAM(S): at 15:30

## 2022-06-22 RX ADMIN — Medication 100 MILLIGRAM(S): at 14:41

## 2022-06-22 RX ADMIN — Medication 5 MILLIGRAM(S): at 06:11

## 2022-06-22 RX ADMIN — METHOCARBAMOL 750 MILLIGRAM(S): 500 TABLET, FILM COATED ORAL at 17:51

## 2022-06-22 RX ADMIN — CELECOXIB 200 MILLIGRAM(S): 200 CAPSULE ORAL at 18:50

## 2022-06-22 RX ADMIN — Medication 325 MILLIGRAM(S): at 17:53

## 2022-06-22 RX ADMIN — Medication 100 MILLIGRAM(S): at 06:14

## 2022-06-22 RX ADMIN — BUPRENORPHINE AND NALOXONE 1 TABLET(S): 2; .5 TABLET SUBLINGUAL at 06:18

## 2022-06-22 RX ADMIN — Medication 975 MILLIGRAM(S): at 06:12

## 2022-06-22 RX ADMIN — BUPRENORPHINE AND NALOXONE 1 TABLET(S): 2; .5 TABLET SUBLINGUAL at 12:14

## 2022-06-22 RX ADMIN — CHLORHEXIDINE GLUCONATE 1 APPLICATION(S): 213 SOLUTION TOPICAL at 06:19

## 2022-06-22 RX ADMIN — CELECOXIB 200 MILLIGRAM(S): 200 CAPSULE ORAL at 06:11

## 2022-06-22 RX ADMIN — METHOCARBAMOL 750 MILLIGRAM(S): 500 TABLET, FILM COATED ORAL at 12:11

## 2022-06-22 RX ADMIN — CELECOXIB 200 MILLIGRAM(S): 200 CAPSULE ORAL at 17:54

## 2022-06-22 RX ADMIN — METHOCARBAMOL 750 MILLIGRAM(S): 500 TABLET, FILM COATED ORAL at 06:12

## 2022-06-22 RX ADMIN — Medication 0.1 MILLIGRAM(S): at 17:52

## 2022-06-22 RX ADMIN — PANTOPRAZOLE SODIUM 40 MILLIGRAM(S): 20 TABLET, DELAYED RELEASE ORAL at 06:11

## 2022-06-22 NOTE — PROGRESS NOTE ADULT - PROVIDER SPECIALTY LIST ADULT
Infectious Disease
Infectious Disease
Orthopedics
Hospitalist
Orthopedics
Pain Medicine
Hospitalist
Infectious Disease
Pain Medicine

## 2022-06-22 NOTE — PROGRESS NOTE ADULT - SUBJECTIVE AND OBJECTIVE BOX
Ortho Progress note    Name: MOSES HANKINS    MR #: [id number]    Procedure: s/p right knee removal of hardware, incision and drainage with antibiotic cement spacer application/ repair of quad tendon   Surgeon: Dr. Huerta    Pt seen resting comfortably in bed  Denies CP, SOB, N/V, numbness/tingling   No other complaints  Awaiting [facility] placement    General Exam:  Vital Signs Last 24 Hrs  T(C): 37.2 (22 Jun 2022 05:15), Max: 37.2 (22 Jun 2022 05:15)  T(F): 99 (22 Jun 2022 05:15), Max: 99 (22 Jun 2022 05:15)  HR: 73 (22 Jun 2022 05:15) (67 - 76)  BP: 116/50 (22 Jun 2022 05:15) (104/64 - 124/73)  BP(mean): --  RR: 18 (22 Jun 2022 05:15) (16 - 18)  SpO2: 94% (22 Jun 2022 05:15) (90% - 95%)    General: Pt Alert and oriented, NAD, controlled pain.  Knee immobilizer in place-removed  Prevena Dressing intact and functioning- removed  Incision healing well  Staples in place, minimal bloody drainage at mid incision  No erythema or purulent drainage  Dry sterile dressing place  knee immobilizer reapplied  Pulses: 2+ dorsalis pedis pulse. Cap refill < 2 sec.  Sensation: Grossly intact to light touch without deficit.  Motor: + EHL/FHL/TA/GS    A/P: 56y Male POD#8 s/p right knee removal of hardware, incision and drainage with antibiotic cement spacer application/ repair of quad tendon     - Maintain knee immobilizer  - Pain Control per pain management  - DVT ppx:  BID  - Continue IV abx per ID  - Continue PT  - Weight bearing status: TTWB in knee immobilizer at all times  - anticipated DC to [facility] today

## 2022-06-23 NOTE — CHART NOTE - NSCHARTNOTEFT_GEN_A_CORE
[facility] Program Note:    Initial Screening    Date of Initial Screenin22    Patient qualifies for Food [facility] Program  [  ] Patient qualifies for [facility] As [facility] Program w/ health condition  [ x ] Patient does not qualify for [facility] As [facility] Program w/ no health conditions    Diagnosis that qualifies patient for program  [  ] Hypertension  [  ] Diabetes  [  ] Unintended weight loss  [  ] Obesity  [  ] CHF  [  ] Other    Additional Comments:          Current Patient Diet:      Actions Taken:  [ x ] Spoke with patient  [  ] [employer] survey completed  Additional Comments: patient has no health conditions that qualify for program. Provided with list of local food pantries and healthy recipes,. as well as [employer] information. Will FU with RD as needed.          Non-qualification for [facility] Program  [ x  ] D/C to [facility]  [   ] Referred to Social Work  [  ] Declined [facility] Program  [   ] D/C Before Seen By RD  Participant Phone Number:  KARRIE Comments:              Discharge Visit  [  ] Initial Screening already completed    Date of D/C:  [  ]  Patient provided with healthy groceries  [  ] Follow Up appointment made  [ x ] Other community outreach given  [  ] Help with SNAP application at F/U appointment  [  ] Patient D/C while RD was unavailable. Will call to schedule pick-up

## 2022-06-27 LAB
CULTURE RESULTS: SIGNIFICANT CHANGE UP
ORGANISM # SPEC MICROSCOPIC CNT: SIGNIFICANT CHANGE UP
ORGANISM # SPEC MICROSCOPIC CNT: SIGNIFICANT CHANGE UP
SPECIMEN SOURCE: SIGNIFICANT CHANGE UP

## 2022-06-28 LAB
CULTURE RESULTS: SIGNIFICANT CHANGE UP
ORGANISM # SPEC MICROSCOPIC CNT: SIGNIFICANT CHANGE UP
ORGANISM # SPEC MICROSCOPIC CNT: SIGNIFICANT CHANGE UP
SPECIMEN SOURCE: SIGNIFICANT CHANGE UP
SURGICAL PATHOLOGY STUDY: SIGNIFICANT CHANGE UP

## 2022-07-06 ENCOUNTER — APPOINTMENT (OUTPATIENT)
Dept: ORTHOPEDIC SURGERY | Facility: CLINIC | Age: 57
End: 2022-07-06

## 2022-07-06 DIAGNOSIS — S82.001A UNSPECIFIED FRACTURE OF RIGHT PATELLA, INITIAL ENCOUNTER FOR CLOSED FRACTURE: ICD-10-CM

## 2022-07-06 PROCEDURE — 73560 X-RAY EXAM OF KNEE 1 OR 2: CPT | Mod: RT

## 2022-07-06 PROCEDURE — 99024 POSTOP FOLLOW-UP VISIT: CPT

## 2022-07-06 NOTE — HISTORY OF PRESENT ILLNESS
[Clean/Dry/Intact] : clean, dry and intact [Neuro Intact] : an unremarkable neurological exam [No Sign of Infection] : is showing no signs of infection [Adequate Pain Control] : has adequate pain control [Xray (Date:___)] : [unfilled] Xray -  [Hardware in Good Position] : hardware in good position [Chills] : no chills [Fever] : no fever [Erythema] : not erythematous [Discharge] : absent of discharge [Swelling] : not swollen [Dehiscence] : not dehisced [de-identified] : s/p removal of right TKR, all components. Revision repair of right quadriceps tendon. 6/14/22 [de-identified] : 57 yo m s/p removal of right TKR, all components. Revision repair of right quadriceps tendon. 6/14/22 here for pop visit.  Patient  is in [facility] rehab and is TTWB in KI Left LE.   He is receiving PT at rehab for his right LE also.   [de-identified] : KI inplace\par staples remoced and steri strips applied [de-identified] : xr right knee 2 views:  [de-identified] : Continue PT TTWB RLE with Knee Immobilizer.\par Continue IV anbx as per ID vinicio CYR in rehab.\par Further plans as per Dr. Huerta.

## 2022-07-19 NOTE — DIETITIAN INITIAL EVALUATION ADULT - CALCULATED TO (CAL/KG)
Head, normocephalic, atraumatic, Face, Face within normal limits, Ears, External ears within normal limits
2094

## 2022-08-10 ENCOUNTER — APPOINTMENT (OUTPATIENT)
Dept: ORTHOPEDIC SURGERY | Facility: CLINIC | Age: 57
End: 2022-08-10

## 2022-08-10 PROCEDURE — 73560 X-RAY EXAM OF KNEE 1 OR 2: CPT | Mod: RT

## 2022-08-10 PROCEDURE — 99024 POSTOP FOLLOW-UP VISIT: CPT

## 2022-08-10 NOTE — ED PROVIDER NOTE - DISCHARGE DATE
Assumed care of pt @ 0730. Pt is A/Ox 4. Pt tearful at times. On RA, VSS, SR on tele. BP high this evening, PRN meds given. IV saline locked, flushed. Tolerating diet. Pt states headache is much better after fioricet. Up as tolerated with SBA. Pt needs a lot of encouragement for liquid intake. States she doesn't normally drink much liquids during the day. Plan tx to ctu when bed able, start losartan when ordered. Updated POC with patient and family. Will continue to monitor. Problem: CARDIOVASCULAR - ADULT  Goal: Maintains optimal cardiac output and hemodynamic stability  Description: INTERVENTIONS:  - Monitor vital signs, rhythm, and trends  - Monitor for bleeding, hypotension and signs of decreased cardiac output  - Evaluate effectiveness of vasoactive medications to optimize hemodynamic stability  - Monitor arterial and/or venous puncture sites for bleeding and/or hematoma  - Assess quality of pulses, skin color and temperature  - Assess for signs of decreased coronary artery perfusion - ex. Angina  - Evaluate fluid balance, assess for edema, trend weights  Outcome: Progressing     Problem: NEUROLOGICAL - ADULT  Goal: Achieves stable or improved neurological status  Description: INTERVENTIONS  - Assess for and report changes in neurological status  - Initiate measures to prevent increased intracranial pressure  - Maintain blood pressure and fluid volume within ordered parameters to optimize cerebral perfusion and minimize risk of hemorrhage  - Monitor temperature, glucose, and sodium.  Initiate appropriate interventions as ordered  Outcome: Progressing  Goal: Achieves maximal functionality and self care  Description: INTERVENTIONS  - Monitor swallowing and airway patency with patient fatigue and changes in neurological status  - Encourage and assist patient to increase activity and self care with guidance from PT/OT  - Encourage visually impaired, hearing impaired and aphasic patients to use assistive/communication devices  Outcome: Progressing     Problem: SAFETY ADULT - FALL  Goal: Free from fall injury  Description: INTERVENTIONS:  - Assess pt frequently for physical needs  - Identify cognitive and physical deficits and behaviors that affect risk of falls.   - [place] fall precautions as indicated by assessment.  - Educate pt/family on patient safety including physical limitations  - Instruct pt to call for assistance with activity based on assessment  - Modify environment to reduce risk of injury  - Provide assistive devices as appropriate  - Consider OT/PT consult to assist with strengthening/mobility  - Encourage toileting schedule  Outcome: Progressing 05-Oct-2018

## 2022-08-10 NOTE — HISTORY OF PRESENT ILLNESS
[5] : the patient reports pain that is 5/10 in severity [Chills] : no chills [Constipation] : no constipation [Diarrhea] : no diarrhea [Dysuria] : no dysuria [Fever] : no fever [Nausea] : no nausea [Vomiting] : no vomiting [Clean/Dry/Intact] : clean, dry and intact [Healed] : healed [Erythema] : not erythematous [Discharge] : absent of discharge [Swelling] : swollen [Neuro Intact] : an unremarkable neurological exam [Vascular Intact] : ~T peripheral vascular exam normal [Slow Progress] : is progressing slowly [No Sign of Infection] : is showing no signs of infection [Adequate Pain Control] : has adequate pain control [de-identified] : s/p removal right TKR, all components. Revision repair of right quadriceps tendon. 6/14/22 [de-identified] : Patient is a 56-year-old gentleman who presents today s/p removal right TKR, all components. Revision repair of right quadriceps tendon. 6/14/22 He is having significant knee pain which he attributes to multiple recent surgeries on the knee.\par \par He was put on Suboxone by his outpatient rehab facility and wants to continue it due to his history of drug abuse.  He does not have a pain management doctor currently. [de-identified] : Physical Exam:\par General: Well appearing, no acute distress, A&O\par Neurologic: A&Ox3, No focal deficits\par Head: NCAT without abrasions, lacerations, or ecchymosis to head, face, or scalp\par Respiratory: Equal chest wall expansion bilaterally, no accessory muscle use\par Lymphatic: No lymphadenopathy palpated\par Skin: Warm and dry\par Psychiatric: Normal mood and affect\par \par RLE:\par SILT s/s/sp/dp/t\par Fires EHL/FHL/GS/TA\par 2+ DP/PT pulse\par brisk capillary refill [de-identified] : 2 view plain films of the right knee today show no change in alignment from postoperative imaging of static antibiotic spacer [de-identified] : Patient is doing well and does not appear to have any clinical signs of infection. He will follow up with Dr. Canada to discuss reimplantation.

## 2022-08-19 ENCOUNTER — EMERGENCY (EMERGENCY)
Facility: HOSPITAL | Age: 57
LOS: 1 days | Discharge: HOME | End: 2022-08-19
Attending: STUDENT IN AN ORGANIZED HEALTH CARE EDUCATION/TRAINING PROGRAM
Payer: COMMERCIAL

## 2022-08-19 VITALS
DIASTOLIC BLOOD PRESSURE: 71 MMHG | WEIGHT: 175.05 LBS | HEIGHT: 68 IN | RESPIRATION RATE: 16 BRPM | OXYGEN SATURATION: 97 % | HEART RATE: 69 BPM | TEMPERATURE: 98 F | SYSTOLIC BLOOD PRESSURE: 106 MMHG

## 2022-08-19 DIAGNOSIS — S83.512A SPRAIN OF ANTERIOR CRUCIATE LIGAMENT OF LEFT KNEE, INITIAL ENCOUNTER: Chronic | ICD-10-CM

## 2022-08-19 PROCEDURE — 99284 EMERGENCY DEPT VISIT MOD MDM: CPT

## 2022-08-19 PROCEDURE — 73700 CT LOWER EXTREMITY W/O DYE: CPT | Mod: 26,RT,MA

## 2022-08-19 PROCEDURE — 73564 X-RAY EXAM KNEE 4 OR MORE: CPT | Mod: 26,RT

## 2022-08-19 RX ORDER — KETOROLAC TROMETHAMINE 30 MG/ML
60 SYRINGE (ML) INJECTION ONCE
Refills: 0 | Status: DISCONTINUED | OUTPATIENT
Start: 2022-08-19 | End: 2022-08-19

## 2022-08-19 RX ADMIN — Medication 60 MILLIGRAM(S): at 23:07

## 2022-08-19 NOTE — ED PROVIDER NOTE - PROGRESS NOTE DETAILS
CT showing "acute tibial diaphysis fracture", ortho consulted. KI placed. educated pt it needs to stay in place. he has follow up with Dr. Canada in 2 days. Toyin sanchez

## 2022-08-19 NOTE — ED ADULT NURSE NOTE - NSIMPLEMENTINTERV_GEN_ALL_ED
Implemented All Fall Risk Interventions:  [place] to call system. Call bell, personal items and telephone within reach. Instruct patient to call for assistance. Room bathroom lighting operational. Non-slip footwear when patient is off stretcher. Physically safe environment: no spills, clutter or unnecessary equipment. Stretcher in lowest position, wheels locked, appropriate side rails in place. Provide visual cue, wrist band, yellow gown, etc. Monitor gait and stability. Monitor for mental status changes and reorient to person, place, and time. Review medications for side effects contributing to fall risk. Reinforce activity limits and safety measures with patient and family.

## 2022-08-19 NOTE — ED ADULT TRIAGE NOTE - CHIEF COMPLAINT QUOTE
Patient BIBEMS c/o right knee pain after his walker gave out underneath him and hit his knee. Patient had the knee replaced in March which has been followed by multiple infections. Patient now states "I have no knee." Patient's knee wrapped prior to arrival. As per EMS patient was unable to ambulate at all on scene.

## 2022-08-19 NOTE — ED ADULT NURSE NOTE - OBJECTIVE STATEMENT
Assumed care of pt at 22:45. Pt A&Ox4 c/o having knee pain after hitting his knee when the walker gave out from underneath him. Pt had a knee replacement in march which had post op infections on multiple occurrences. Pt is unable to ambulate at this time, requiring wheelchair to the bathroom, complaining of being in a lot of pain but resting comfortably sitting on the stretcher. OMAR Flores at bedside with pt.

## 2022-08-19 NOTE — ED PROVIDER NOTE - PATIENT PORTAL LINK FT
You can access the FollowMyHealth Patient Portal offered by [facility] by registering at the following website: [email or web address]. By joining [employer]’s FollowMyHealth portal, you will also be able to view your health information using other applications (apps) compatible with our system.

## 2022-08-19 NOTE — ED PROVIDER NOTE - CARE PROVIDER_API CALL
Pedro Canada)  Orthopedics  [address] [place] [id number]  Phone: [phone]  Fax: [phone]  Follow Up Time:

## 2022-08-19 NOTE — ED PROVIDER NOTE - OBJECTIVE STATEMENT
56 year old male with pmhx of alcoholism, polysubstance abuse 2 years sober presented to ED c/o knee injury. Patient is s/p right quad tendon repair after total knee arthroplasty on 5/11/22 by Dr. Huerta further complicated by infection with OR I&D and removal of hardware 6/14/22 discharged with PICC line for IV antibiotics cefazolin to [facility] rehab. Pt states today he was walking with walker outside, tripped, fell forward onto his walker, right knee hit the bar on the walker. did not fall to ground. denies LOC/head trauma, fever/chills. denies wound infection (erythema/discharge/swelling).  currently living in a recovering alcoholics home

## 2022-08-19 NOTE — ED PROVIDER NOTE - HIV OFFER
[FreeTextEntry1] : Physical\par She is UTD with mammo, GYN, and colonoscopy\par Encouraged increased physical activity  30 min/day, healthy meal choices, and weight management\par blood work ordered\par \par HTN\par discussed importance of following a low salt diet, weight loss, exercise\par cont Amlodipine \par \par HLD\par pt stopped Atorvastatin due to experiencing side effects\par discussed importance of following a low cholesterol/low fat diet\par we'll check Lipid panel today\par \par follow up in one week for lab results  Previously Declined (within the last year)

## 2022-08-19 NOTE — ED PROVIDER NOTE - CLINICAL SUMMARY MEDICAL DECISION MAKING FREE TEXT BOX
56 year old male with pmhx of alcoholism, polysubstance abuse 2 years sober presented to ED c/o knee injury. CT, pain control, ortho, PT??

## 2022-08-19 NOTE — ED PROVIDER NOTE - NSFOLLOWUPINSTRUCTIONS_ED_ALL_ED_FT
Follow up with Dr. Canada in 2 days as you have planned to discuss revision surgery   take tylenol every 6 hours for pain   Ice knee   Keep Knee immobilizer in place, use walker     return if new or worsening symptoms

## 2022-08-19 NOTE — ED PROVIDER NOTE - ATTENDING APP SHARED VISIT CONTRIBUTION OF CARE
Gregg CYR: I have personally seen and examined this patient. I have fully participated in the care of this patient. I have made amendments to the documentation where appropriate and otherwise agree with the history, physical exam, and plan as documented by the ACP. My brief assessment is as follows:    56y M w/ hx multiple R knee surgeries, presents for knee pain. No signs of infection; CT showing acute fracture of tibial diaphysis extending to cement. Seen by ortho, who placed knee immobilizer and advise that pt already has appt scheduled in 2 days. Cleared for discharge.

## 2022-08-20 PROCEDURE — 73700 CT LOWER EXTREMITY W/O DYE: CPT | Mod: MA

## 2022-08-20 PROCEDURE — 96372 THER/PROPH/DIAG INJ SC/IM: CPT

## 2022-08-20 PROCEDURE — 73564 X-RAY EXAM KNEE 4 OR MORE: CPT

## 2022-08-20 PROCEDURE — 99284 EMERGENCY DEPT VISIT MOD MDM: CPT

## 2022-08-20 RX ADMIN — Medication 60 MILLIGRAM(S): at 00:45

## 2022-08-20 NOTE — CONSULT NOTE ADULT - CONSULT REQUESTED BY NAME
Past Medical History:   Diagnosis Date    Arthritis     Basal cell carcinoma     Cataract     Depression     Fever blister     High cholesterol     HTN (hypertension) 6/27/2013    Hypertension     Memory loss     Parkinsonism        Past Surgical History:   Procedure Laterality Date    CATARACT EXTRACTION      HYSTERECTOMY      TONSILLECTOMY         Review of patient's allergies indicates:  No Known Allergies    No current facility-administered medications on file prior to encounter.      Current Outpatient Medications on File Prior to Encounter   Medication Sig    verapamil (CALAN-SR) 180 MG CR tablet TAKE 1 TABLET ONE TIME DAILY    ascorbic acid, vitamin C, (VITAMIN C) 500 MG tablet Take 500 mg by mouth once daily.    aspirin (ECOTRIN) 81 MG EC tablet Take 81 mg by mouth once daily.    calcium-vitamin D3 500 mg(1,250mg) -200 unit per tablet Take 1 tablet by mouth 2 (two) times daily with meals.    carbidopa-levodopa  mg (SINEMET)  mg per tablet Take up to 12 tablets divided as instructed    DOCUSATE CALCIUM (STOOL SOFTENER ORAL) Take 1 tablet by mouth daily as needed.     donepezil (ARICEPT) 10 MG tablet Take 1 tablet (10 mg total) by mouth every evening.    influenza (FLUZONE HIGH-DOSE) 180 mcg/0.5 mL vaccine Inject into the muscle.    mirabegron (MYRBETRIQ) 25 mg Tb24 ER tablet Take 1 tablet (25 mg total) by mouth once daily.    multivitamin with minerals tablet     sertraline (ZOLOFT) 25 MG tablet Take 1 tablet (25 mg total) by mouth once daily.    [DISCONTINUED] citalopram (CELEXA) 10 MG tablet TAKE 1 TABLET ONE TIME DAILY    [DISCONTINUED] pravastatin (PRAVACHOL) 40 MG tablet Take 1 tablet (40 mg total) by mouth once daily.    [DISCONTINUED] vitamin A palmitate-vitamin D2 10,000-400 unit Tab      Family History     Problem Relation (Age of Onset)    Cancer Mother (80)    Macular degeneration Paternal Aunt    No Known Problems Father, Sister, Brother, Maternal Aunt,  Maternal Uncle, Paternal Uncle, Maternal Grandmother, Maternal Grandfather, Paternal Grandmother, Paternal Grandfather        Tobacco Use    Smoking status: Former Smoker     Last attempt to quit: 8/3/1968     Years since quittin.1    Smokeless tobacco: Never Used   Substance and Sexual Activity    Alcohol use: Yes     Comment: social    Drug use: No    Sexual activity: Not on file     Review of Systems   Unable to perform ROS: Dementia     Objective:     Vital Signs (Most Recent):  Temp: 97.5 °F (36.4 °C) (19 0947)  Pulse: 72 (19 1512)  Resp: 17 (19 1512)  BP: 138/65 (19 1331)  SpO2: 95 % (19 1512) Vital Signs (24h Range):  Temp:  [97.5 °F (36.4 °C)] 97.5 °F (36.4 °C)  Pulse:  [] 72  Resp:  [12-40] 17  SpO2:  [94 %-97 %] 95 %  BP: (124-179)/(56-77) 138/65     Weight: 52.2 kg (115 lb)  Body mass index is 19.14 kg/m².    Physical Exam   Constitutional: She is oriented to person, place, and time. She appears well-developed and well-nourished. No distress.   HENT:   Head: Normocephalic and atraumatic.   Mouth/Throat: Mucous membranes are normal. Normal dentition. No oropharyngeal exudate.   Eyes: Conjunctivae, EOM and lids are normal.   Neck: Normal range of motion. Neck supple. No JVD present.   Cardiovascular: Regular rhythm, normal heart sounds and intact distal pulses. Bradycardia present.   No murmur heard.  Pulmonary/Chest: Effort normal and breath sounds normal. She exhibits no tenderness.   Abdominal: Soft. Bowel sounds are normal. She exhibits no distension. There is no tenderness.   Musculoskeletal: Normal range of motion. She exhibits no edema or deformity.   Neurological: She is alert and oriented to person, place, and time. No cranial nerve deficit.   Skin: Skin is warm and dry. No rash noted. She is not diaphoretic. No erythema.   Psychiatric: She has a normal mood and affect. Judgment and thought content normal.   Nursing note and vitals  Regina NELSON reviewed.        CRANIAL NERVES     CN III, IV, VI   Extraocular motions are normal.     Significant Labs:   CBC:   Recent Labs   Lab 09/27/19  1013   WBC 7.46   HGB 12.4   HCT 38.6        CMP:   Recent Labs   Lab 09/27/19  1013      K 3.2*      CO2 27      BUN 15   CREATININE 0.8   CALCIUM 9.5   ANIONGAP 10   EGFRNONAA >60.0     Troponin:   Recent Labs   Lab 09/27/19  1013   TROPONINI 0.009     TSH:   Recent Labs   Lab 09/27/19  1013   TSH 3.851     All pertinent labs within the past 24 hours have been reviewed.    Significant Imaging: I have reviewed all pertinent imaging results/findings within the past 24 hours.

## 2022-08-20 NOTE — CONSULT NOTE ADULT - SUBJECTIVE AND OBJECTIVE BOX
Pt Name: MOSES HANKINS    MRN: [id number]      Patient is a 56y Male well-known to orthopedic service presenting to ED today c/o right knee pain s/p fall at home. Patient had total knee arthroplasty on 4/15/22 on [facility]. Subsequently, had quad tendon tear and repair done by Dr. Huerta on 5/12. Patient then developed septic knee and had explant, static cement spacer placed on 6/14, was on IV ancef until 7/26. Patient has been TTWB. Patient most recently saw Dr. Huerta 8/10, doing well at that time, plan was to follow-up with Dr. Canada on 8/22 to discuss the revision surgery. Patient states he was leaning out his window tonight when he fell and hit his knee on his walker. Now complaining of worse knee pain. Denies any motor or sensory changes. Unable to flex knee secondary to static spacer. Denies numbness/tingling. No other complaints.       REVIEW OF SYSTEMS    General: Alert, responsive, in NAD    Skin/Breast: No rashes, no pruritis     Respiratory and Thorax: No difficulty breathing. No cough.  	   Cardiovascular:	No chest pain. No palpitations.    Gastrointestinal:	 No abdominal pain. No diarrhea.     Musculoskeletal: SEE HPI.    Neurological: No sensory or motor changes.     Hematology/Lymphatics: No swelling.    ROS is otherwise negative.      PAST MEDICAL & SURGICAL HISTORY:  Depression      Anxiety      Alcohol abuse      Overdose  heroine and xanax 3/10/2015      ACL (anterior cruciate ligament) tear, left, initial encounter          Allergies: shellfish. (Hives)      Medications:     FAMILY HISTORY:  No pertinent family history in first degree relatives    : non-contributory    Social History:     Ambulation: Walking independently [ ] With Cane [ ] With Walker [X]  Bedbound [ ]           Vital Signs Last 24 Hrs  T(C): 36.8 (19 Aug 2022 22:11), Max: 36.8 (19 Aug 2022 22:11)  T(F): 98.2 (19 Aug 2022 22:11), Max: 98.2 (19 Aug 2022 22:11)  HR: 69 (19 Aug 2022 22:11) (69 - 69)  BP: 106/71 (19 Aug 2022 22:11) (106/71 - 106/71)  BP(mean): --  RR: 16 (19 Aug 2022 22:11) (16 - 16)  SpO2: 97% (19 Aug 2022 22:11) (97% - 97%)    Parameters below as of 19 Aug 2022 22:11  Patient On (Oxygen Delivery Method): room air        Daily Height in cm: 172.72 (19 Aug 2022 22:11)    Daily       PHYSICAL EXAM:    Appearance: Alert, responsive, in no acute distress.    Musculoskeletal:         Right Lower Extremity: +well-healed surgical incision. +TTP to anterior knee. No abrasions, ecchymosis, or erythema. No bleeding. Sensation is grossly intact to light touch. + dorsi/plantarflexion/EHL/FHL. DP pulse 2+. Cap refill < 2 seconds. No cyanosis. No signs of venous insufficiency or stasis.       Imaging Studies:  xray knee: no changes noted from prior films    < from: CT Knee No Cont, Right (08.19.22 @ 23:48) >  ACC: [id number] EXAM:  CT KNEE ONLY RT                          PROCEDURE DATE:  08/19/2022      INTERPRETATIN:  Indication:   Trip and fall, knee pain    Technique: Helical CT of the right knee obtained without contrast, and   multiplanar reformats submitted. Additional 3-D reformatted images   created on an outside workstation and submitted for review.    Comparison: Radiographs from June 16, 2022 and May 10, 2022    Findings:  Status post complete removal of the total knee arthroplasty   components. Status post Antibiotic impregnated cement intramedullary   nailing with additional intra-articular antibiotic impregnated cement   spacer in the suprapatellar joint recess.    Acute, oblique fracture of the proximal tibia diaphysis, extends to the   cement. This is best seen on sagittal series 7, images 54-77 fracture   extends to the proximal tibia-fibula syndesmosis. The medullary nail is   intact without loosening.    Surgical changes to the quadriceps and patellar tendons. Chronic fracture   fragments along the superior pole of the patella..      Impression:  Acute fracture of the proximal tibial diaphysis, extends to   the cement arthrodesis.    --- End of Report ---    BLAIR MENDOZA MD; Attending Radiologist  This document has been electronically signed. Aug 20 2022  1:09AM    < end of copied text >          A/P:  Pt is a  56y Male with acute fracture of proximal tibia adjacent to cement    PLAN:   * no acute orthopedic surgical intervention at this time  * NWB RLE  * bulky hernandes / KI placed to right knee  * elevation, pain control, ice  * patient already has appointment with Dr. Canada on Monday 8/22. Patient can continue to follow up to discuss revision surgery

## 2022-08-24 ENCOUNTER — NON-APPOINTMENT (OUTPATIENT)
Age: 57
End: 2022-08-24

## 2022-08-31 ENCOUNTER — EMERGENCY (EMERGENCY)
Facility: HOSPITAL | Age: 57
LOS: 1 days | Discharge: HOME | End: 2022-08-31
Attending: EMERGENCY MEDICINE
Payer: COMMERCIAL

## 2022-08-31 VITALS
OXYGEN SATURATION: 98 % | WEIGHT: 195.11 LBS | SYSTOLIC BLOOD PRESSURE: 155 MMHG | HEART RATE: 90 BPM | RESPIRATION RATE: 16 BRPM | TEMPERATURE: 99 F | DIASTOLIC BLOOD PRESSURE: 89 MMHG | HEIGHT: 68 IN

## 2022-08-31 DIAGNOSIS — S83.512A SPRAIN OF ANTERIOR CRUCIATE LIGAMENT OF LEFT KNEE, INITIAL ENCOUNTER: Chronic | ICD-10-CM

## 2022-08-31 LAB
ALBUMIN SERPL ELPH-MCNC: 4.5 G/DL — SIGNIFICANT CHANGE UP (ref 3.3–5.2)
ALP SERPL-CCNC: 125 U/L — HIGH (ref 40–120)
ALT FLD-CCNC: 12 U/L — SIGNIFICANT CHANGE UP
ANION GAP SERPL CALC-SCNC: 19 MMOL/L — HIGH (ref 5–17)
AST SERPL-CCNC: 16 U/L — SIGNIFICANT CHANGE UP
BASOPHILS # BLD AUTO: 0.04 K/UL — SIGNIFICANT CHANGE UP (ref 0–0.2)
BASOPHILS NFR BLD AUTO: 0.5 % — SIGNIFICANT CHANGE UP (ref 0–2)
BILIRUB SERPL-MCNC: 0.4 MG/DL — SIGNIFICANT CHANGE UP (ref 0.4–2)
BUN SERPL-MCNC: 18.8 MG/DL — SIGNIFICANT CHANGE UP (ref 8–20)
CALCIUM SERPL-MCNC: 9.9 MG/DL — SIGNIFICANT CHANGE UP (ref 8.4–10.5)
CHLORIDE SERPL-SCNC: 103 MMOL/L — SIGNIFICANT CHANGE UP (ref 98–107)
CO2 SERPL-SCNC: 17 MMOL/L — LOW (ref 22–29)
CREAT SERPL-MCNC: 0.98 MG/DL — SIGNIFICANT CHANGE UP (ref 0.5–1.3)
EGFR: 90 ML/MIN/1.73M2 — SIGNIFICANT CHANGE UP
EOSINOPHIL # BLD AUTO: 0.28 K/UL — SIGNIFICANT CHANGE UP (ref 0–0.5)
EOSINOPHIL NFR BLD AUTO: 3.6 % — SIGNIFICANT CHANGE UP (ref 0–6)
ERYTHROCYTE [SEDIMENTATION RATE] IN BLOOD: 20 MM/HR — SIGNIFICANT CHANGE UP (ref 0–20)
GLUCOSE SERPL-MCNC: 114 MG/DL — HIGH (ref 70–99)
HCT VFR BLD CALC: 37.5 % — LOW (ref 39–50)
HGB BLD-MCNC: 12.6 G/DL — LOW (ref 13–17)
IMM GRANULOCYTES NFR BLD AUTO: 0.4 % — SIGNIFICANT CHANGE UP (ref 0–1.5)
LYMPHOCYTES # BLD AUTO: 2.23 K/UL — SIGNIFICANT CHANGE UP (ref 1–3.3)
LYMPHOCYTES # BLD AUTO: 28.7 % — SIGNIFICANT CHANGE UP (ref 13–44)
MCHC RBC-ENTMCNC: 29.1 PG — SIGNIFICANT CHANGE UP (ref 27–34)
MCHC RBC-ENTMCNC: 33.6 GM/DL — SIGNIFICANT CHANGE UP (ref 32–36)
MCV RBC AUTO: 86.6 FL — SIGNIFICANT CHANGE UP (ref 80–100)
MONOCYTES # BLD AUTO: 0.59 K/UL — SIGNIFICANT CHANGE UP (ref 0–0.9)
MONOCYTES NFR BLD AUTO: 7.6 % — SIGNIFICANT CHANGE UP (ref 2–14)
NEUTROPHILS # BLD AUTO: 4.61 K/UL — SIGNIFICANT CHANGE UP (ref 1.8–7.4)
NEUTROPHILS NFR BLD AUTO: 59.2 % — SIGNIFICANT CHANGE UP (ref 43–77)
PLATELET # BLD AUTO: 371 K/UL — SIGNIFICANT CHANGE UP (ref 150–400)
POTASSIUM SERPL-MCNC: 4.1 MMOL/L — SIGNIFICANT CHANGE UP (ref 3.5–5.3)
POTASSIUM SERPL-SCNC: 4.1 MMOL/L — SIGNIFICANT CHANGE UP (ref 3.5–5.3)
PROT SERPL-MCNC: 8.3 G/DL — SIGNIFICANT CHANGE UP (ref 6.6–8.7)
RBC # BLD: 4.33 M/UL — SIGNIFICANT CHANGE UP (ref 4.2–5.8)
RBC # FLD: 14.3 % — SIGNIFICANT CHANGE UP (ref 10.3–14.5)
SODIUM SERPL-SCNC: 139 MMOL/L — SIGNIFICANT CHANGE UP (ref 135–145)
WBC # BLD: 7.78 K/UL — SIGNIFICANT CHANGE UP (ref 3.8–10.5)
WBC # FLD AUTO: 7.78 K/UL — SIGNIFICANT CHANGE UP (ref 3.8–10.5)

## 2022-08-31 PROCEDURE — 73564 X-RAY EXAM KNEE 4 OR MORE: CPT | Mod: 26,RT

## 2022-08-31 PROCEDURE — 99218: CPT

## 2022-08-31 PROCEDURE — 93971 EXTREMITY STUDY: CPT | Mod: 26,RT

## 2022-08-31 RX ORDER — KETOROLAC TROMETHAMINE 30 MG/ML
15 SYRINGE (ML) INJECTION ONCE
Refills: 0 | Status: DISCONTINUED | OUTPATIENT
Start: 2022-08-31 | End: 2022-08-31

## 2022-08-31 RX ORDER — KETOROLAC TROMETHAMINE 30 MG/ML
15 SYRINGE (ML) INJECTION EVERY 6 HOURS
Refills: 0 | Status: DISCONTINUED | OUTPATIENT
Start: 2022-08-31 | End: 2022-08-31

## 2022-08-31 RX ORDER — TRAZODONE HCL 50 MG
1 TABLET ORAL
Qty: 0 | Refills: 0 | DISCHARGE

## 2022-08-31 RX ORDER — LANOLIN ALCOHOL/MO/W.PET/CERES
3 CREAM (GRAM) TOPICAL AT BEDTIME
Refills: 0 | Status: DISCONTINUED | OUTPATIENT
Start: 2022-08-31 | End: 2022-09-05

## 2022-08-31 RX ORDER — ACETAMINOPHEN 500 MG
650 TABLET ORAL ONCE
Refills: 0 | Status: COMPLETED | OUTPATIENT
Start: 2022-08-31 | End: 2022-08-31

## 2022-08-31 RX ORDER — ACETAMINOPHEN 500 MG
650 TABLET ORAL EVERY 8 HOURS
Refills: 0 | Status: DISCONTINUED | OUTPATIENT
Start: 2022-08-31 | End: 2022-08-31

## 2022-08-31 RX ORDER — BUPROPION HYDROCHLORIDE 150 MG/1
300 TABLET, EXTENDED RELEASE ORAL DAILY
Refills: 0 | Status: DISCONTINUED | OUTPATIENT
Start: 2022-09-01 | End: 2022-09-05

## 2022-08-31 RX ORDER — IBUPROFEN 200 MG
600 TABLET ORAL EVERY 6 HOURS
Refills: 0 | Status: DISCONTINUED | OUTPATIENT
Start: 2022-08-31 | End: 2022-09-05

## 2022-08-31 RX ORDER — ATORVASTATIN CALCIUM 80 MG/1
40 TABLET, FILM COATED ORAL AT BEDTIME
Refills: 0 | Status: DISCONTINUED | OUTPATIENT
Start: 2022-08-31 | End: 2022-09-05

## 2022-08-31 RX ORDER — ACETAMINOPHEN 500 MG
650 TABLET ORAL EVERY 6 HOURS
Refills: 0 | Status: DISCONTINUED | OUTPATIENT
Start: 2022-08-31 | End: 2022-09-01

## 2022-08-31 RX ADMIN — Medication 600 MILLIGRAM(S): at 23:03

## 2022-08-31 RX ADMIN — Medication 650 MILLIGRAM(S): at 14:58

## 2022-08-31 RX ADMIN — Medication 600 MILLIGRAM(S): at 21:41

## 2022-08-31 RX ADMIN — Medication 0.1 MILLIGRAM(S): at 19:52

## 2022-08-31 RX ADMIN — Medication 650 MILLIGRAM(S): at 18:50

## 2022-08-31 RX ADMIN — Medication 3 MILLIGRAM(S): at 21:13

## 2022-08-31 RX ADMIN — Medication 15 MILLIGRAM(S): at 18:50

## 2022-08-31 RX ADMIN — Medication 15 MILLIGRAM(S): at 14:58

## 2022-08-31 NOTE — ED CDU PROVIDER INITIAL DAY NOTE - ATTENDING APP SHARED VISIT CONTRIBUTION OF CARE
I, Freddy Perez, performed a history and physical exam of the patient and discussed their management with the resident and /or advanced care provider. I reviewed the resident and /or ACP's note and agree with the documented findings and plan of care. I was present and available for all procedures.

## 2022-08-31 NOTE — ED PROVIDER NOTE - PHYSICAL EXAMINATION
Gen: No acute distress, non toxic  HEENT: Mucous membranes moist, pink conjunctivae, EOMI. PERRL. Airway patent.   CV: RRR, nl s1/s2.  Resp: CTAB, normal rate and effort. No wheezes, rhonchi, or crackles.   GI: Abdomen soft, NT, ND. No rebound, no guarding  Neuro: A&O x4., moving all 4 extremities. Unable to assess str of RLE due to inability to flex knee. No FND's. Sensation intact, symmetric throughout.   MSK: Pt unable to flex rt knee due to spacer placed. +ttp to the anterior rt knee and mildly in the posterior rt knee.   compartments soft/compressible.  Skin: +vertical well healed surgical scar on rt anterior knee. No surrounding erythema, warmth to touch. No evidence of cellulitic processes. No rashes, skin intact and well perfused. Cap refill <2sec  Vascular: Radial and dorsalis pedal pulses 2+ b/l. +calf swelling rt. mildly ttp in rt calf.

## 2022-08-31 NOTE — ED PROVIDER NOTE - OBJECTIVE STATEMENT
57 yo male with pmhx of 55 yo male with pmhx of substance abuse 57 yo male with pmhx of substance abuse, HTN, HLD, chronic knee pain presents with rt sided knee pain. Pt states that he has had a long hx of pain in his rt knee. Pt states he originally hurt his right knee back in March 2021 after an inversion injury and since then, he has had progressively worsening problems with it. Says he had an arthroscopy done in June 2021 and then another done in April 2022. States he suffered from a quadriceps tendon rupture and a septic joint in the rt knee. Had a cement spacer placed on 6/14. Pt states he has noticed some swelling in the rt lower leg but unsure of how long that has been for. Pt states that he no longer has a home health aide and his PT has run out. Pt states he requires help at home. Says for the past 3 months he has been laying in bed without any help and "feels helpless."  Reports he has been walking with a walker but is unable to FWB on the rt. States he has been taking ibuprofen for the pain bc does not want narcotics due to his hx. Pt states that he was supposed to f/u with his surgeon Dr. Canada 2 wks ago but had no ride. Denies fever, chills, body aches, dizziness, LOC, visual changes, chest pain, SOB, abd pain, N/V, paresthesias in the extremities, rashes.

## 2022-08-31 NOTE — ED CDU PROVIDER INITIAL DAY NOTE - OBJECTIVE STATEMENT
56M with PMH HTN, HLD, PSA now off all opiates x 2 years presenting with right knee pain and difficulty ambulating.   Pt was admitted for right septic knee May 2022 and has since had difficulty with FU due to immobilization. Knee with edema but does not appear infected.

## 2022-08-31 NOTE — ED CDU PROVIDER INITIAL DAY NOTE - MEDICAL DECISION MAKING DETAILS
56M with right knee pain. Pt had multiple surgeries to the knee with wound dehiscence and infection May 2022. Now with difficulty ambulating after discontinuation of PT and HHA. Admitted to observation for PT and pain control.

## 2022-08-31 NOTE — ED PROVIDER NOTE - ATTENDING APP SHARED VISIT CONTRIBUTION OF CARE
I, Inocencio Jones, performed a face to face bedside interview with this patient regarding history of present illness, review of symptoms and relevant past medical, social and family history.  I completed an independent physical examination. I have communicated the patient’s plan of care and disposition with the ACP.  Pt with pmh opiate abuse, septic arthritis, currently with cement spacer presents with knee pain. pt reports that he is in chronic knee immobilizer and has been having difficulty walking for the past month. He reports pain at the R knee  Gen: NAD, well appearing  CV: RRR  Pul: CTA b/l  Abd: Soft, non-distended, non-tender  Neuro: no focal deficits  msk: minimal edema and ttp to the R knee, midline old surgical incision is c/d/i  Pt will be placed on obs for difficulty ambulating

## 2022-08-31 NOTE — ED PROVIDER NOTE - NS ED ROS FT
Gen: denies fever, chills  Skin: denies rashes, laceration, bruising  HEENT: denies visual changes, ear pain, nasal congestion, throat pain  Respiratory: denies SOB   Cardiovascular: denies chest pain  GI: denies abdominal pain, n/v/d  : denies dysuria, frequency, urgency  MSK: +Rt knee pain. denies  back pain, neck pain  Neuro: denies headache, dizziness, weakness, numbness

## 2022-09-01 VITALS
HEART RATE: 79 BPM | TEMPERATURE: 98 F | DIASTOLIC BLOOD PRESSURE: 94 MMHG | RESPIRATION RATE: 18 BRPM | SYSTOLIC BLOOD PRESSURE: 162 MMHG | OXYGEN SATURATION: 96 %

## 2022-09-01 LAB — CRP SERPL-MCNC: <4 MG/L — SIGNIFICANT CHANGE UP

## 2022-09-01 PROCEDURE — 86140 C-REACTIVE PROTEIN: CPT

## 2022-09-01 PROCEDURE — 96372 THER/PROPH/DIAG INJ SC/IM: CPT

## 2022-09-01 PROCEDURE — 80053 COMPREHEN METABOLIC PANEL: CPT

## 2022-09-01 PROCEDURE — 85025 COMPLETE CBC W/AUTO DIFF WBC: CPT

## 2022-09-01 PROCEDURE — 36415 COLL VENOUS BLD VENIPUNCTURE: CPT

## 2022-09-01 PROCEDURE — 73564 X-RAY EXAM KNEE 4 OR MORE: CPT

## 2022-09-01 PROCEDURE — 99217: CPT

## 2022-09-01 PROCEDURE — G0378: CPT

## 2022-09-01 PROCEDURE — 93971 EXTREMITY STUDY: CPT

## 2022-09-01 PROCEDURE — 99285 EMERGENCY DEPT VISIT HI MDM: CPT | Mod: 25

## 2022-09-01 PROCEDURE — 85652 RBC SED RATE AUTOMATED: CPT

## 2022-09-01 RX ORDER — BUPRENORPHINE AND NALOXONE 2; .5 MG/1; MG/1
1 TABLET SUBLINGUAL EVERY 8 HOURS
Refills: 0 | Status: COMPLETED | OUTPATIENT
Start: 2022-09-01 | End: 2022-09-08

## 2022-09-01 RX ORDER — ACETAMINOPHEN 500 MG
975 TABLET ORAL EVERY 8 HOURS
Refills: 0 | Status: DISCONTINUED | OUTPATIENT
Start: 2022-09-01 | End: 2022-09-05

## 2022-09-01 RX ORDER — METHOCARBAMOL 500 MG/1
750 TABLET, FILM COATED ORAL EVERY 6 HOURS
Refills: 0 | Status: DISCONTINUED | OUTPATIENT
Start: 2022-09-01 | End: 2022-09-05

## 2022-09-01 RX ORDER — LIDOCAINE 4 G/100G
1 CREAM TOPICAL DAILY
Refills: 0 | Status: DISCONTINUED | OUTPATIENT
Start: 2022-09-01 | End: 2022-09-05

## 2022-09-01 RX ORDER — GABAPENTIN 400 MG/1
300 CAPSULE ORAL EVERY 8 HOURS
Refills: 0 | Status: DISCONTINUED | OUTPATIENT
Start: 2022-09-01 | End: 2022-09-05

## 2022-09-01 RX ORDER — METHOCARBAMOL 500 MG/1
1 TABLET, FILM COATED ORAL
Qty: 60 | Refills: 0
Start: 2022-09-01 | End: 2022-09-15

## 2022-09-01 RX ORDER — KETOROLAC TROMETHAMINE 30 MG/ML
1 SYRINGE (ML) INJECTION
Qty: 60 | Refills: 0
Start: 2022-09-01 | End: 2022-09-15

## 2022-09-01 RX ORDER — GABAPENTIN 400 MG/1
1 CAPSULE ORAL
Qty: 90 | Refills: 0
Start: 2022-09-01 | End: 2022-09-30

## 2022-09-01 RX ADMIN — Medication 650 MILLIGRAM(S): at 05:38

## 2022-09-01 RX ADMIN — Medication 600 MILLIGRAM(S): at 04:33

## 2022-09-01 RX ADMIN — Medication 5 MILLIGRAM(S): at 05:38

## 2022-09-01 RX ADMIN — BUPROPION HYDROCHLORIDE 300 MILLIGRAM(S): 150 TABLET, EXTENDED RELEASE ORAL at 08:13

## 2022-09-01 RX ADMIN — LIDOCAINE 1 PATCH: 4 CREAM TOPICAL at 17:43

## 2022-09-01 RX ADMIN — Medication 0.1 MILLIGRAM(S): at 17:18

## 2022-09-01 RX ADMIN — Medication 0.1 MILLIGRAM(S): at 08:13

## 2022-09-01 RX ADMIN — Medication 600 MILLIGRAM(S): at 03:44

## 2022-09-01 RX ADMIN — METHOCARBAMOL 750 MILLIGRAM(S): 500 TABLET, FILM COATED ORAL at 17:18

## 2022-09-01 RX ADMIN — Medication 650 MILLIGRAM(S): at 06:00

## 2022-09-01 RX ADMIN — LIDOCAINE 1 PATCH: 4 CREAM TOPICAL at 08:16

## 2022-09-01 RX ADMIN — Medication 600 MILLIGRAM(S): at 09:14

## 2022-09-01 NOTE — PROVIDER CONTACT NOTE (OTHER) - BACKGROUND
Pt PMH chronic R knee pain, quad tendon rupture/ septic R TKR april 2022. Presents with worsening R Knee pain

## 2022-09-01 NOTE — PHYSICAL THERAPY INITIAL EVALUATION ADULT - PASSIVE RANGE OF MOTION EXAMINATION, REHAB EVAL
deferred assessment of R knee due to pain/bilateral upper extremity Passive ROM was WFL (within functional limits)/Right LE Passive ROM was WFL (within functional limits)

## 2022-09-01 NOTE — CONSULT NOTE ADULT - SUBJECTIVE AND OBJECTIVE BOX
CC: knee pain    HPI: per chart review:      .seen     Analgesic Dosing for past 24 hours reviewed as below:  acetaminophen     Tablet ..   650 milliGRAM(s) Oral (08-31-22 @ 14:58)    acetaminophen     Tablet ..   650 milliGRAM(s) Oral (09-01-22 @ 05:38)    buPROPion XL (24-Hour) .   300 milliGRAM(s) Oral (09-01-22 @ 08:13)    busPIRone   5 milliGRAM(s) Oral (09-01-22 @ 05:38)    ibuprofen  Tablet.   600 milliGRAM(s) Oral (09-01-22 @ 03:44)   600 milliGRAM(s) Oral (08-31-22 @ 21:41)    ketorolac   Injectable   15 milliGRAM(s) IntraMuscular (08-31-22 @ 14:58)    melatonin   3 milliGRAM(s) Oral (08-31-22 @ 21:13)          T(C): 36.6 (09-01-22 @ 05:30), Max: 37.3 (08-31-22 @ 12:51)  HR: 72 (09-01-22 @ 08:10) (62 - 90)  BP: 186/88 (09-01-22 @ 08:10) (109/69 - 186/88)  RR: 19 (09-01-22 @ 08:10) (16 - 19)  SpO2: 97% (09-01-22 @ 08:10) (96% - 98%)        acetaminophen     Tablet .. 650 milliGRAM(s) Oral every 6 hours  atorvastatin Oral Tab/Cap - Peds 40 milliGRAM(s) Oral at bedtime  buPROPion XL (24-Hour) . 300 milliGRAM(s) Oral daily  busPIRone 5 milliGRAM(s) Oral two times a day  cloNIDine 0.1 milliGRAM(s) Oral two times a day  ibuprofen  Tablet. 600 milliGRAM(s) Oral every 6 hours PRN  lidocaine   4% Patch 1 Patch Transdermal daily  melatonin 3 milliGRAM(s) Oral at bedtime                          12.6   7.78  )-----------( 371      ( 31 Aug 2022 14:50 )             37.5     08-31    139  |  103  |  18.8  ----------------------------<  114<H>  4.1   |  17.0<L>  |  0.98    Ca    9.9      31 Aug 2022 14:50    TPro  8.3  /  Alb  4.5  /  TBili  0.4  /  DBili  x   /  AST  16  /  ALT  12  /  AlkPhos  125<H>  08-31          Pain Service   [phone] CC: knee pain    HPI: per chart review:  56M with right knee pain. Pt had multiple surgeries to the knee with wound dehiscence and infection May 2022. most recently had fall on 8/19 seen here and dx proximal tibia fracture, seen by ortho recommended NWB and f/u outpt. pt has an upcoming outpatient ortho appt with Dr. Canada.     Interval Hx:  Patient seen during rounds  Patient reports pain to be mod controlled on current medications  Patient denies sedation with medications   has been off suboxone for >1 month as he had difficulty reaching his prescriber  interested in restarting suboxone  due to see his orthopedic surgeon next week      Analgesic Dosing for past 24 hours reviewed as below:  acetaminophen     Tablet ..   650 milliGRAM(s) Oral (08-31-22 @ 14:58)    acetaminophen     Tablet ..   650 milliGRAM(s) Oral (09-01-22 @ 05:38)    buPROPion XL (24-Hour) .   300 milliGRAM(s) Oral (09-01-22 @ 08:13)    busPIRone   5 milliGRAM(s) Oral (09-01-22 @ 05:38)    ibuprofen  Tablet.   600 milliGRAM(s) Oral (09-01-22 @ 03:44)   600 milliGRAM(s) Oral (08-31-22 @ 21:41)    ketorolac   Injectable   15 milliGRAM(s) IntraMuscular (08-31-22 @ 14:58)    melatonin   3 milliGRAM(s) Oral (08-31-22 @ 21:13)          T(C): 36.6 (09-01-22 @ 05:30), Max: 37.3 (08-31-22 @ 12:51)  HR: 72 (09-01-22 @ 08:10) (62 - 90)  BP: 186/88 (09-01-22 @ 08:10) (109/69 - 186/88)  RR: 19 (09-01-22 @ 08:10) (16 - 19)  SpO2: 97% (09-01-22 @ 08:10) (96% - 98%)        acetaminophen     Tablet .. 650 milliGRAM(s) Oral every 6 hours  atorvastatin Oral Tab/Cap - Peds 40 milliGRAM(s) Oral at bedtime  buPROPion XL (24-Hour) . 300 milliGRAM(s) Oral daily  busPIRone 5 milliGRAM(s) Oral two times a day  cloNIDine 0.1 milliGRAM(s) Oral two times a day  ibuprofen  Tablet. 600 milliGRAM(s) Oral every 6 hours PRN  lidocaine   4% Patch 1 Patch Transdermal daily  melatonin 3 milliGRAM(s) Oral at bedtime                          12.6   7.78  )-----------( 371      ( 31 Aug 2022 14:50 )             37.5     08-31    139  |  103  |  18.8  ----------------------------<  114<H>  4.1   |  17.0<L>  |  0.98    Ca    9.9      31 Aug 2022 14:50    TPro  8.3  /  Alb  4.5  /  TBili  0.4  /  DBili  x   /  AST  16  /  ALT  12  /  AlkPhos  125<H>  08-31          Pain Service   [phone]

## 2022-09-01 NOTE — ED CDU PROVIDER SUBSEQUENT DAY NOTE - HISTORY
no changes overnight, pt remains in knee immobilizer.  ESR/CRP normal. pending PT eval.  has upcoming outpt ortho appt

## 2022-09-01 NOTE — PHYSICAL THERAPY INITIAL EVALUATION ADULT - GAIT DEVIATIONS NOTED, PT EVAL
decreased florentin/increased time in double stance/decreased step length/decreased stride length/increased stride width/decreased weight-shifting ability

## 2022-09-01 NOTE — ED CDU PROVIDER SUBSEQUENT DAY NOTE - ATTENDING APP SHARED VISIT CONTRIBUTION OF CARE
TOLU Jeong: see mdm    I have personally performed a face to face diagnostic evaluation on this patient.  I have reviewed the ACP note and agree with the history, exam, and plan of care, except as noted.   My medical decision making and observations are found above.

## 2022-09-01 NOTE — CONSULT NOTE ADULT - ASSESSMENT
prior pain recs:  acetaminophen     Tablet .. 975 milliGRAM(s) Oral every 8 hours  gabapentin 300 milliGRAM(s) Oral three times a day  methocarbamol 750 milliGRAM(s) Oral four times a day   suboxone 8mg TID 56M  hx of suboxone for dependence  acute on chronic knee pain  pt known to pain service from prior visit    rec restarting prior pain recs:  acetaminophen     Tablet .. 975 milliGRAM(s) Oral every 8 hours  gabapentin 300 milliGRAM(s) Oral three times a day  methocarbamol 750 milliGRAM(s) Oral four times a day   suboxone 8mg TID    pt will need referral for outpatient suboxone therapy, consider consulting psych or SW

## 2022-09-01 NOTE — ED CDU PROVIDER DISPOSITION NOTE - PATIENT PORTAL LINK FT
You can access the FollowMyHealth Patient Portal offered by [facility] by registering at the following website: [email or web address]. By joining [employer]’s FollowMyHealth portal, you will also be able to view your health information using other applications (apps) compatible with our system.

## 2022-09-01 NOTE — ED ADULT NURSE REASSESSMENT NOTE - NS ED NURSE REASSESS COMMENT FT1
patient sleeping in NAD. patient re-applied RLE knee immobilizer. frequent rounds provided.
received report from day RN, assumed care of pt at change of shift.  pt is aox4, is going to OBS for right knee pain x1 year.  pt to be evaluated by PT and possible GENET.  VSS.
all 0600 meds administered as ordered. in NAD, awaiting PT this AM.
patient resting in NAD. awaiting PT in AM
care assumed from KORY Boswell RN. patient discharged.  ambulated off unit with a walker. refused VS prior to dc. MD Jones aware
care assumed from TASHIA Shelton. patient AAO x 4. knee immobilizer in place to RLE. meal provided. all questions answered. in NAD. on obs pending PT consult. + pulse to RLE, + sensation. bed in lowest position, call bell within reach.

## 2022-09-01 NOTE — ED CDU PROVIDER SUBSEQUENT DAY NOTE - MEDICAL DECISION MAKING DETAILS
56M with right knee pain. Pt had multiple surgeries to the knee with wound dehiscence and infection May 2022. most recently had fall on 8/19 seen here and dx proximal tibia fracture, seen by ortho recommended NWB and f/u outpt. pt has an upcoming outpatient ortho appt with Dr. Canada.   having difficulty at home due to discontinuation of home aide  pending PT TEMI alexis

## 2022-09-01 NOTE — PHYSICAL THERAPY INITIAL EVALUATION ADULT - PERTINENT HX OF CURRENT PROBLEM, REHAB EVAL
Pt PMH substance abuse, chronic R knee pain with quad tendon rupture and septic R total joint  April 2022- with cement spacer 6/14 states hes bee mostly laying bed for the past 3 months due to R knee pain presents with worsening pain.

## 2022-09-01 NOTE — ED ADULT NURSE REASSESSMENT NOTE - GENERAL PATIENT STATE
comfortable appearance
comfortable appearance/resting/sleeping
comfortable appearance/resting/sleeping
resting/sleeping
comfortable appearance
comfortable appearance/cooperative

## 2022-09-01 NOTE — ED CDU PROVIDER DISPOSITION NOTE - CLINICAL COURSE
56M with PMH HTN, HLD, PSA now off all opiates x 2 years presenting with right knee pain and difficulty ambulating. No new injury or fx. Pt evaluated by pain management and recommended to restart regiment of tylenol 975q8, gabapentin 300 tid, robaxin 750qid, suboxone 8tid. Pt evaluated by PT and found to be functionally independent and recommended to discharge home.

## 2022-09-01 NOTE — PHYSICAL THERAPY INITIAL EVALUATION ADULT - ACTIVE RANGE OF MOTION EXAMINATION, REHAB EVAL
R knee flex <20deg/bilateral upper extremity Active ROM was WFL (within functional limits)/Right LE Active ROM was WFL (within functional limits)

## 2022-09-01 NOTE — ED CDU PROVIDER DISPOSITION NOTE - NSFOLLOWUPINSTRUCTIONS_ED_ALL_ED_FT
Follow up with ortho Dr Canada 9/6.   Follow up with pain management Dr Walker for your appointment 9/15.   Take all medications as prescribed.       Chronic pain is a complex condition and the Emergency Department is not the ideal place to manage this condition. Prescription painkillers must be written by your primary care provider or a pain management physician. Avoid activities or triggers that exacerbate your pain.    SEEK IMMEDIATE MEDICAL CARE IF YOU HAVE ANY OF THE FOLLOWING SYMPTOMS: severe chest pain, fainting, vomiting blood, dark or bloody stools, or pain different from your chronic pain.

## 2022-09-01 NOTE — PHYSICAL THERAPY INITIAL EVALUATION ADULT - ADDITIONAL COMMENTS
Pt Herman4 states he rents a room in [place] with a friend next door to him, Pt AxOx4 states he rents a room in [place] alone with [id number] and 12steps upstairs (2HR). Pt was independent PTA with use of RW and has friend who rents the door next store who helps to assist after working hours. Pt states he has been lying in bed over last few months due to increased R knee pain. Pt owns 2 RW and WC normal... Well appearing, well nourished, awake, alert, oriented to person, place, time/situation and in no apparent distress.

## 2022-09-01 NOTE — CHART NOTE - NSCHARTNOTEFT_GEN_A_CORE
SW Note: Social work continues to follow patient in ED for safe discharge planning. Patient continues to be medically cleared for discharge. Medical team able to accommodate patient's request for Suboxone for discharge. Social work set up Medicaid taxi with [employer]. Transportation arranged with [employer] [place] for 4:30pm. Medicaid auth #[id number]. Social work made medical team aware, team in agreement. Patient made aware and in agreement. Patient appreciative of social work intervention and with no additional needs or concerns at this time. Social work will remain available.
SW Note: Worker met with pt at bedside. MD was also present during this interaction. Pt in agreement with d/c back to his residence provided ([address], [facility], [address]). Worker provided pt with resources for [facility], both being able to provide bridge script for suboxone. Worker alerted pt about setting up transport for return back home. Worker contacted [facility] rola) who set up a medicaid cab RES # [id number]. Worker to provide pt with slip and instructions for cab. RN to be made aware. MD aware. No other SW needs.
SW Note: Social work consulted to patient in ED for safe discharge planning. Per medical team, patient medically cleared for discharge. Patient cleared by PT at this time. Social work met with patient at bedside to introduce self and social work role, patient verbalized understanding and in agreement. Social work discussed discharge plan with patient, patient reports he is in agreement to return home with appropriate pain management and medication. Social work validated patient on this. Social work contacted PA and relayed what patient reported. Per PA, patient is to be seen by pain management this morning. Social work will continue to follow.
SW Note: Worker alerted by RN that pts sober  called the hospital to share that the pt is no longer welcomed back due to him "actively using heroin". Worker met with pt at bedside to gather more information. Worker inquired about the pts substance use hx, in which the pt adamantly denied using any substances for "nearly 2 years". Worker asked if the pt was living in a sober living home, in which the pt reports he used to live in "[facility] journey [facility] here" sober living in [place] ([address], [place]), but now he reports renting a room in [place] ([address] Pt shared that he has completed many programs, and is actively in support groups. Pt states that he is apart of [facility], providing information about his CM (Breonna Martinez) verbally consenting to speaking with her. Pt also reports that he can speak with his prescribing MD from outreach, Dr. Amin about resumption of suboxone once he returns from vacation.  Worker consulted with psych MD who reports that the pt can be seen by psych in the AM for a eval, in which he may be given a bridge script until he secures appointment with ortho (listed in other note as being in 2 weeks from today for pain management). Worker contacted the pts PA (Muriel) to place order for psych eval for the AM, as pt reports not wanting to leave ED until he gets suboxone. PA informed of the information above. PA reports she will relay this information to MD. Worker to reengage with pt, leaving him information for [facility] in the event that he wants to participate in an alternate medication assisted treatment program, as the pt reports that resuming suboxone would be beneficial for his recovery. TEMI continues to monitor.

## 2022-09-01 NOTE — ED CDU PROVIDER DISPOSITION NOTE - ATTENDING APP SHARED VISIT CONTRIBUTION OF CARE
I, Inocencio Jones, performed a face to face bedside interview with this patient regarding history of present illness, review of symptoms and relevant past medical, social and family history.  I completed an independent physical examination. I have communicated the patient’s plan of care and disposition with the ACP.  Pt did well with PT, pain controlled, has a place to stay, stable for dc  Gen: NAD, well appearing  CV: RRR  Pul: CTA b/l  Abd: Soft, non-distended, non-tender  Neuro: no focal deficits  Pt improved, stable for dc

## 2022-09-03 ENCOUNTER — EMERGENCY (EMERGENCY)
Facility: HOSPITAL | Age: 57
LOS: 1 days | Discharge: HOME | End: 2022-09-03
Attending: EMERGENCY MEDICINE
Payer: COMMERCIAL

## 2022-09-03 VITALS
DIASTOLIC BLOOD PRESSURE: 77 MMHG | HEART RATE: 77 BPM | WEIGHT: 199.96 LBS | TEMPERATURE: 99 F | OXYGEN SATURATION: 97 % | RESPIRATION RATE: 16 BRPM | SYSTOLIC BLOOD PRESSURE: 133 MMHG | HEIGHT: 68 IN

## 2022-09-03 VITALS
SYSTOLIC BLOOD PRESSURE: 162 MMHG | HEART RATE: 60 BPM | DIASTOLIC BLOOD PRESSURE: 100 MMHG | RESPIRATION RATE: 18 BRPM | OXYGEN SATURATION: 99 % | TEMPERATURE: 98 F

## 2022-09-03 DIAGNOSIS — S83.512A SPRAIN OF ANTERIOR CRUCIATE LIGAMENT OF LEFT KNEE, INITIAL ENCOUNTER: Chronic | ICD-10-CM

## 2022-09-03 LAB
ALBUMIN SERPL ELPH-MCNC: 4.6 G/DL — SIGNIFICANT CHANGE UP (ref 3.3–5.2)
ALP SERPL-CCNC: 122 U/L — HIGH (ref 40–120)
ALT FLD-CCNC: 13 U/L — SIGNIFICANT CHANGE UP
ANION GAP SERPL CALC-SCNC: 12 MMOL/L — SIGNIFICANT CHANGE UP (ref 5–17)
AST SERPL-CCNC: 16 U/L — SIGNIFICANT CHANGE UP
BASOPHILS # BLD AUTO: 0.05 K/UL — SIGNIFICANT CHANGE UP (ref 0–0.2)
BASOPHILS NFR BLD AUTO: 0.6 % — SIGNIFICANT CHANGE UP (ref 0–2)
BILIRUB SERPL-MCNC: 0.3 MG/DL — LOW (ref 0.4–2)
BUN SERPL-MCNC: 21.3 MG/DL — HIGH (ref 8–20)
CALCIUM SERPL-MCNC: 9.4 MG/DL — SIGNIFICANT CHANGE UP (ref 8.4–10.5)
CHLORIDE SERPL-SCNC: 103 MMOL/L — SIGNIFICANT CHANGE UP (ref 98–107)
CO2 SERPL-SCNC: 24 MMOL/L — SIGNIFICANT CHANGE UP (ref 22–29)
CREAT SERPL-MCNC: 0.9 MG/DL — SIGNIFICANT CHANGE UP (ref 0.5–1.3)
CRP SERPL-MCNC: <4 MG/L — SIGNIFICANT CHANGE UP
EGFR: 100 ML/MIN/1.73M2 — SIGNIFICANT CHANGE UP
EOSINOPHIL # BLD AUTO: 0.43 K/UL — SIGNIFICANT CHANGE UP (ref 0–0.5)
EOSINOPHIL NFR BLD AUTO: 4.9 % — SIGNIFICANT CHANGE UP (ref 0–6)
ERYTHROCYTE [SEDIMENTATION RATE] IN BLOOD: 20 MM/HR — SIGNIFICANT CHANGE UP (ref 0–20)
GLUCOSE SERPL-MCNC: 76 MG/DL — SIGNIFICANT CHANGE UP (ref 70–99)
HCT VFR BLD CALC: 38.7 % — LOW (ref 39–50)
HGB BLD-MCNC: 12.6 G/DL — LOW (ref 13–17)
IMM GRANULOCYTES NFR BLD AUTO: 0.2 % — SIGNIFICANT CHANGE UP (ref 0–1.5)
LYMPHOCYTES # BLD AUTO: 3.4 K/UL — HIGH (ref 1–3.3)
LYMPHOCYTES # BLD AUTO: 38.9 % — SIGNIFICANT CHANGE UP (ref 13–44)
MCHC RBC-ENTMCNC: 28.3 PG — SIGNIFICANT CHANGE UP (ref 27–34)
MCHC RBC-ENTMCNC: 32.6 GM/DL — SIGNIFICANT CHANGE UP (ref 32–36)
MCV RBC AUTO: 86.8 FL — SIGNIFICANT CHANGE UP (ref 80–100)
MONOCYTES # BLD AUTO: 0.76 K/UL — SIGNIFICANT CHANGE UP (ref 0–0.9)
MONOCYTES NFR BLD AUTO: 8.7 % — SIGNIFICANT CHANGE UP (ref 2–14)
NEUTROPHILS # BLD AUTO: 4.07 K/UL — SIGNIFICANT CHANGE UP (ref 1.8–7.4)
NEUTROPHILS NFR BLD AUTO: 46.7 % — SIGNIFICANT CHANGE UP (ref 43–77)
PLATELET # BLD AUTO: 335 K/UL — SIGNIFICANT CHANGE UP (ref 150–400)
POTASSIUM SERPL-MCNC: 4 MMOL/L — SIGNIFICANT CHANGE UP (ref 3.5–5.3)
POTASSIUM SERPL-SCNC: 4 MMOL/L — SIGNIFICANT CHANGE UP (ref 3.5–5.3)
PROT SERPL-MCNC: 7.9 G/DL — SIGNIFICANT CHANGE UP (ref 6.6–8.7)
RBC # BLD: 4.46 M/UL — SIGNIFICANT CHANGE UP (ref 4.2–5.8)
RBC # FLD: 14.6 % — HIGH (ref 10.3–14.5)
SODIUM SERPL-SCNC: 139 MMOL/L — SIGNIFICANT CHANGE UP (ref 135–145)
WBC # BLD: 8.73 K/UL — SIGNIFICANT CHANGE UP (ref 3.8–10.5)
WBC # FLD AUTO: 8.73 K/UL — SIGNIFICANT CHANGE UP (ref 3.8–10.5)

## 2022-09-03 PROCEDURE — 85652 RBC SED RATE AUTOMATED: CPT

## 2022-09-03 PROCEDURE — 86140 C-REACTIVE PROTEIN: CPT

## 2022-09-03 PROCEDURE — 99284 EMERGENCY DEPT VISIT MOD MDM: CPT

## 2022-09-03 PROCEDURE — 73562 X-RAY EXAM OF KNEE 3: CPT | Mod: 26,RT

## 2022-09-03 PROCEDURE — 36415 COLL VENOUS BLD VENIPUNCTURE: CPT

## 2022-09-03 PROCEDURE — 99284 EMERGENCY DEPT VISIT MOD MDM: CPT | Mod: 25

## 2022-09-03 PROCEDURE — 96374 THER/PROPH/DIAG INJ IV PUSH: CPT

## 2022-09-03 PROCEDURE — 85025 COMPLETE CBC W/AUTO DIFF WBC: CPT

## 2022-09-03 PROCEDURE — 73562 X-RAY EXAM OF KNEE 3: CPT

## 2022-09-03 PROCEDURE — 80053 COMPREHEN METABOLIC PANEL: CPT

## 2022-09-03 RX ORDER — CEPHALEXIN 500 MG
1 CAPSULE ORAL
Qty: 8 | Refills: 0
Start: 2022-09-03 | End: 2022-09-06

## 2022-09-03 RX ORDER — KETOROLAC TROMETHAMINE 30 MG/ML
15 SYRINGE (ML) INJECTION ONCE
Refills: 0 | Status: DISCONTINUED | OUTPATIENT
Start: 2022-09-03 | End: 2022-09-03

## 2022-09-03 RX ADMIN — Medication 15 MILLIGRAM(S): at 14:59

## 2022-09-03 RX ADMIN — Medication 15 MILLIGRAM(S): at 13:31

## 2022-09-03 NOTE — ED PROVIDER NOTE - PATIENT PORTAL LINK FT
You can access the FollowMyHealth Patient Portal offered by [facility] by registering at the following website: [email or web address]. By joining [employer]’s FollowMyHealth portal, you will also be able to view your health information using other applications (apps) compatible with our system.

## 2022-09-03 NOTE — CHART NOTE - NSCHARTNOTEFT_GEN_A_CORE
SW Note: Worker alerted by resident MD that pt is requesting to speak with SW for possible resources in regard to Suboxone for pain management. Worker met pt at bedside. Worker inquired about resources left during pts last, including [facility] for suboxone programs. Pt reports "getting no where" with those resources and denied interest in resources. Pt reports needing transportation. Worker contacted [facility] ([place]) and arranged a medicaid cab for transport back home (RES [id number]). Worker to provide pt with voucher. Resident MD made aware. RN To be made aware. No other SW needs at this time.

## 2022-09-03 NOTE — PROGRESS NOTE ADULT - SUBJECTIVE AND OBJECTIVE BOX
Ortho PA note    Name: MOSES HANKINS    MR #: [id number]    Patient well known to ortho service is here today again at [facility] ER for pain. Patient was here 2 days ago and states he did not get anywhere regarding his pain control. Patient has a long standing history of drug and alcohol abuse. States he has been sober for 2 years yet the previous sober houses he has stayed at no longer allow him to stay there due to heroin use. Patient was taking Suboxone but his prescribing physician is on vacation and he needs rx for it. [facility]  shows last rx 7/8/2022 by Dr Sushil Morales     Regarding his Orthopedic status:  Patient most recently had a removal infected total knee on 6/15/22; He had a total knee done in [facility] April 2022 by Dr Harrison, became infected and Dr Huerta removed it in June 2022. Antibiotic cement spacer was placed and patient had IV abx through PICC for 6 weeks at [facility]. Patient was non compliant with follow up visits to Orthopedic out patient office. Patient states he has appt w Dr Canada on 9/6/22 to discuss surgery. Patient states he feels cold all the time yet is Afebrile.   Patient states he was "picking at the scab on the center of incision and squeezed it like a pimple"         Denies CP, SOB, N/V, numbness/tingling     General Exam:  Vital Signs Last 24 Hrs  T(C): 37.3 (09-03-22 @ 11:08), Max: 37.3 (09-03-22 @ 11:08)  T(F): 99.1 (09-03-22 @ 11:08), Max: 99.1 (09-03-22 @ 11:08)  HR: 77 (09-03-22 @ 11:08) (77 - 77)  BP: 133/77 (09-03-22 @ 11:08) (133/77 - 133/77)  BP(mean): --  RR: 16 (09-03-22 @ 11:08) (16 - 16)  SpO2: 97% (09-03-22 @ 11:08) (97% - 97%)        General: Pt Alert and oriented, NAD. Afebrile  Right knee incision has areas of suture reaction noted. No effusion noted, no increase in warmth, no drainage noted. No bleeding.  Pulses: 2+ dorsalis pedis pulse. Cap refill < 2 sec.  Sensation: Grossly intact to light touch without deficit.  Patient unable to range knee joint due to the static cement spacer.                             12.6   8.73  )-----------( 335      ( 03 Sep 2022 13:30 )             38.7       09-03    139  |  103  |  21.3<H>  ----------------------------<  76  4.0   |  24.0  |  0.90    Ca    9.4      03 Sep 2022 13:30    TPro  7.9  /  Alb  4.6  /  TBili  0.3<L>  /  DBili  x   /  AST  16  /  ALT  13  /  AlkPhos  122<H>  09-03        C-Reactive Protein, Serum: <4 mg/L (09.03.22 @ 13:30)        X-Ray: Right knee reveals static cement spacer with IM nail extending from mid femur into tibia.     A/P: 56yMale s/p Removal infected total knee arthroplasty with placement Antibiotic static cement spacer 6/15/22.     - Patient is Orthopedically Stable and unchanged from previous exam 2 days ago. Explained to patient about suture reaction process.     Ortho recs PO keflex and to keep his follow up w Dr Dread warren coming tuesday 9/6/22  - Pain Control- Ortho recs Psych and Pain management consults for suboxone   - Weight bearing status: WBAT in [place].

## 2022-09-03 NOTE — ED PROVIDER NOTE - OBJECTIVE STATEMENT
57 y/o male w/PMHx of Alcohol and Opiate dependence, anxiety, depression, and R knee replacement complicated by multiple infections presents with R knee pain. Pain has been ongoing since his procedure and complications in the last three months, and was here in the ED 3 days prior with similar complaints. He says he has been having chills and noticed some pus draining from the suture site. Denies CP, sob, abd pain, pain in any other joints.

## 2022-09-03 NOTE — ED PROVIDER NOTE - NSFOLLOWUPINSTRUCTIONS_ED_ALL_ED_FT
Treatment for chronic knee pain depends on the cause. The main treatments for chronic knee pain are physical therapy and weight loss. This condition may also be treated with medicines, injections, a knee sleeve or brace, and by using crutches. Rest, ice, pressure (compression), and elevation, also known as RICE therapy, may also be recommended.    Follow these instructions at home:    If you have a knee sleeve or brace:      •Wear the knee sleeve or brace as told by your health care provider. Remove it only as told by your health care provider.  •Loosen it if your toes tingle, become numb, or turn cold and blue.  •Keep it clean.  •If the sleeve or brace is not waterproof:  •Do not let it get wet.   •Remove it if allowed by your health care provider, or cover it with a watertight covering when you take a bath or a shower.    Managing pain, stiffness, and swelling      •If directed, apply heat to the affected area as often as told by your health care provider. Use the heat source that your health care provider recommends, such as a moist heat pack or a heating pad.  •If you have a removable knee sleeve or brace, remove it as told by your health care provider.  •Place a towel between your skin and the heat source.  •Leave the heat on for 20–30 minutes.  •Remove the heat if your skin turns bright red. This is especially important if you are unable to feel pain, heat, or cold. You may have a greater risk of getting burned.  •If directed, put ice on the affected area. To do this:  •If you have a removable knee sleeve or brace, remove it as told by your health care provider.  •Put ice in a plastic bag.  •Place a towel between your skin and the bag.  •Leave the ice on for 20 minutes, 2–3 times a day.  •Remove the ice if your skin turns bright red. This is very important. If you cannot feel pain, heat, or cold, you have a greater risk of damage to the area.  •Move your toes often to reduce stiffness and swelling.  •Raise (elevate) the injured area above the level of your heart while you are sitting or lying down.      Activity     •Avoid high-impact activities or exercises, such as running, jumping rope, or doing jumping jacks.  •Follow the exercise plan that your health care provider designed for you. Your health care provider may suggest that you:  •Avoid activities that make knee pain worse. This may require you to change your exercise routines, sport participation, or job duties.  •Wear shoes with cushioned soles.  •Avoid sports that require running and sudden changes in direction.  •Do physical therapy. Physical therapy is planned to match your needs and abilities. It may include exercises for strength, flexibility, stability, and endurance.  •Do exercises that increase balance and strength, such as michael chi and yoga.  • Do not use the injured limb to support your body weight until your health care provider says that you can. Use crutches as told by your health care provider.  •Return to your normal activities as told by your health care provider. Ask your health care provider what activities are safe for you.    General instructions     •Take over-the-counter and prescription medicines only as told by your health care provider.  •Lose weight if you are overweight. Losing even a little weight can reduce knee pain. Ask your health care provider what your ideal weight is, and how to safely lose extra weight. A dietitian may be able to help you plan your meals.  • Do not use any products that contain nicotine or tobacco, such as cigarettes, e-cigarettes, and chewing tobacco. These can delay healing. If you need help quitting, ask your health care provider.  •Keep all follow-up visits. This is important.        Contact a health care provider if:    •You have knee pain that is not getting better or gets worse.  •You are unable to do your physical therapy exercises due to knee pain.    Get help right away if:    •Your knee swells and the swelling becomes worse.  •You cannot move your knee.  •You have severe knee pain.    Summary    •Knee pain that lasts more than 3 months is considered chronic knee pain.  •The main treatments for chronic knee pain are physical therapy and weight loss. You may also need to take medicines, wear a knee sleeve or brace, use crutches, and apply ice or heat.  •Losing even a little weight can reduce knee pain. Ask your health care provider what your ideal weight is, and how to safely lose extra weight. A dietitian may be able to help you plan your meals.  •Follow the exercise plan that your health care provider designed for you.    Chronic Pain    Chronic pain is a complex condition and the Emergency Department is not the ideal place to manage this condition. Prescription painkillers must be written by your primary care provider or a pain management physician. Avoid activities or triggers that exacerbate your pain.    SEEK IMMEDIATE MEDICAL CARE IF YOU HAVE ANY OF THE FOLLOWING SYMPTOMS: severe chest pain, fainting, vomiting blood, dark or bloody stools, or pain different from your chronic pain.

## 2022-09-03 NOTE — ED ADULT TRIAGE NOTE - CHIEF COMPLAINT QUOTE
Pt BIBA A&Ox3 c/o right knee pain. Reports knee replacement in June, since has had 3 infections requiring IV abx at home. Pt reports purulent drainage from wound today. Denies any fever or chills. Right knee erythremic in appearance.

## 2022-09-03 NOTE — ED PROVIDER NOTE - ATTENDING CONTRIBUTION TO CARE
55 y/o male w/PMHx of Alcohol and Opiate dependence, anxiety, depression, and R knee replacement complicated  concerned with infection of knee; pe awake alert in nad heent ncat neck supple cor s1s2 lungs clear abd soft nontender  rt knee  tender to palpation  no drainage; pain with rom;   dx knee pain

## 2022-09-03 NOTE — ED PROVIDER NOTE - PHYSICAL EXAMINATION
General: well appearing, NAD  Head: NC, AT  EENT: EOMI, no scleral icterus  Cardiac: RRR, no apparent murmurs, no lower extremity edema  Respiratory: CTABL, no respiratory distress   Abdomen: soft, ND, NT, nonperitonitic  MSK/Vascular: full ROM, R knee exquisitely tender to palpation, some erythema and swelling noticed on two separate portions of the surgical suture site, soft compartments, warm extremities  Neuro: AAOx3, sensation to light touch intact  Psych: calm, cooperative
no back pain
<-- Click to add NO significant Past Surgical History

## 2022-09-03 NOTE — ED PROVIDER NOTE - NS ED ROS FT
Constitutional: no fever, chills  Head: NC, AT   Eyes: no redness   ENMT: no nasal congestion/drainage, no sore throat   CV: no chest pain, no edema  Resp: no cough, no dyspnea  GI: no abdominal pain, no nausea, no vomiting, no diarrhea  : no dysuria, no hematuria   Skin: no lesions, no rashes   MSK: R knee pain  Neuro: no LOC, no headache, no sensory deficits, no weakness

## 2022-09-03 NOTE — ED PROVIDER NOTE - CLINICAL SUMMARY MEDICAL DECISION MAKING FREE TEXT BOX
55 y/o male w/chronic R knee pain. Potential signs of mild infection/inflammation as parts of the suture site are erythematous and edematous, firm when palpated. Suspicion for septic arthritis low as pt is hemodynamically stable. Ortho consult + repeat XR and basic labs to r/o infection. <-- Click to add NO pertinent Family History

## 2022-09-06 ENCOUNTER — APPOINTMENT (OUTPATIENT)
Dept: ORTHOPEDIC SURGERY | Facility: CLINIC | Age: 57
End: 2022-09-06

## 2022-09-06 VITALS
HEART RATE: 64 BPM | HEIGHT: 68 IN | DIASTOLIC BLOOD PRESSURE: 80 MMHG | SYSTOLIC BLOOD PRESSURE: 134 MMHG | BODY MASS INDEX: 31.07 KG/M2 | WEIGHT: 205 LBS

## 2022-09-06 PROCEDURE — 99213 OFFICE O/P EST LOW 20 MIN: CPT

## 2022-09-06 NOTE — HISTORY OF PRESENT ILLNESS
[Worsening] : worsening [5] : a current pain level of 5/10 [8] : an average pain level of 8/10 [10] : a maximum pain level of 10/10 [Daily] : ~He/She~ states the symptoms seem to be occuring daily [Sitting] : worsened by sitting [Knee Extension] : worsened with knee extension [Opioid Analgesics] : relieved by opioid analgesics [de-identified] : 56-year-old male with past medical history of hypertension, depression and alcohol and drug abuse presents to the office today for for evaluation s/p removal Right TKA all components due recurrent infection as well as revision repair of quadriceps tendon.  The patient states that his pain has been unbearable lately, he has an appointment with pain management on September 15 but does not feel he can wait till that time.  He went to the emergency department over the weekend for pain control as well as for a draining portion of his incision. The emergency department prescribed him Keflex which he has been taking for the past few days. Patient states that he has been cleaning his incision daily with hydrogen peroxide, applying triple antibiotic ointment and keeping it covered with a bandaid.  The patient states that he has been unable to bend his knee or bear weight.  He is wheelchair-bound and requires assistance with all activities of daily living.  Denies any fevers/chills, nausea/vomiting/diarrhea, chest pain, shortness of breath, new injuries or falls.  Patient states that he finished his IV antibiotics approximately 1 to 2 months ago.  He has not followed up with infectious disease.  He states that the swelling in his knee greatly improved after taking the Keflex.  Was told he had a stitch abscess.  States that he has not had noticed any increased swelling or effusion in the knee.  Has no fevers chills or constitutional symptoms.  Has not been wearing his brace intermittently.  Denies any trauma to the leg.  Denies any ecchymosis.  Denies any hip pain.  Has been taking Keflex since his emergency room visit and has 3 days left.  Denies any current drainage from this wound.

## 2022-09-06 NOTE — DISCUSSION/SUMMARY
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  He does have a small stitch abscess on the superior aspect of his wound and I removed a small area of the stitch today.  I placed a clean dry dressing on there.  I recommended wound care for this.  He is currently on Keflex and states this is helped the swelling around the states abscess.  I did discuss with him that he would need an aspiration of his knee prior to any surgical intervention and due to the fact that he is currently on Keflex it would not show accurate results.  He will need to be off the Keflex for 2 to 3 weeks prior to any aspiration.  I have also recommend that he be protected weightbearing as right lower extremity to this fracture in his tibia.  I recommend he wear his brace at all times.  I have also given him referral to infectious disease as he has not followed up with them since his previous infection.  I have recommended he follow-up with me in 3 weeks for repeat aspiration of his right knee.  I will also obtain an MRI of his knee in order to evaluate his extensor mechanisms.  He was advised that if he was continue to have infection in the knee he would need to have a revision antibiotic spacer placed.  Patient is in agreement this.  His labs were reviewed from the hospital and CRP is less than 4.  We will trend his labs maintain new ESR CRP and white blood cell count at his next visit in 3 weeks.  All questions were asked and answered.

## 2022-09-06 NOTE — PHYSICAL EXAM
[de-identified] : GENERAL APPEARANCE: Well nourished and hydrated, pleasant, alert, and oriented x 3. Appears their stated age. \par HEENT: Normocephalic, extraocular eye motion intact. Nasal septum midline. Oral cavity clear. External auditory canal clear. \par RESPIRATORY: Breath sounds clear and audible in all lobes. No wheezing, No accessory muscle use.\par CARDIOVASCULAR: No apparent abnormalities. No lower leg edema. No varicosities. Pedal pulses are palpable.\par NEUROLOGIC: Sensation is normal, no muscle weakness in the upper or lower extremities.\par DERMATOLOGIC: No apparent skin lesions, moist, warm, no rash.\par SPINE: Cervical spine appears normal and moves freely; thoracic spine appears normal and moves freely; lumbosacral spine appears normal and moves freely, normal, nontender.\par MUSCULOSKELETAL: Hands, wrists, and elbows are normal and move freely, shoulders are normal and move freely. \par Psychiatric: Oriented to person, place, and time, insight and judgement were intact and the affect was normal. \par Musculoskeletal:. Right knee exam shows mild effusion, ROM is not examined due to a static spacer, diffuse joint line tenderness.  There is a well-healed midline surgical incision present except for a small 0.5 cm area of a stitch abscess that is present over the superior aspect of the wound.  Suture material is present in the area today.  No expressed fluid.  No instability is noted to the knee.  There is mild tenderness palpation around the tibia.  Diffuse tenderness palpation around the knee\par 5/5 motor strength in bilateral lower extremities. Sensory: Intact in bilateral lower extremities. DTRs: Biceps, brachioradialis, triceps, patellar, ankle and plantar 2+ and symmetric bilaterally. Pulses: dorsalis pedis, posterior tibial, femoral, popliteal, and radial 2+ and symmetric bilaterally. \par Constitutional: Alert and in no acute distress, but well-appearing. \par  [de-identified] : X-rays of the right knee brought in from outside on 9/4/2022 show no acute fracture or dislocation.  There is a static cement spacer in place.  No evidence of acute fracture.\par \par CT scan brought in from 8/19/2022 shows evidence of possible small fracture in the right tibia.

## 2022-09-14 ENCOUNTER — APPOINTMENT (OUTPATIENT)
Dept: INFECTIOUS DISEASE | Facility: CLINIC | Age: 57
End: 2022-09-14

## 2022-09-16 NOTE — PHYSICAL THERAPY INITIAL EVALUATION ADULT - LEVEL OF INDEPENDENCE: STAIR NEGOTIATION, REHAB EVAL
supervision
safety concerns due to mobility status/unable to perform
Adequate: hears normal conversation without difficulty

## 2022-09-18 ENCOUNTER — INPATIENT (INPATIENT)
Facility: HOSPITAL | Age: 57
LOS: 1 days | Discharge: HOME | DRG: 92 | End: 2022-09-20
Attending: INTERNAL MEDICINE | Admitting: STUDENT IN AN ORGANIZED HEALTH CARE EDUCATION/TRAINING PROGRAM
Payer: COMMERCIAL

## 2022-09-18 VITALS
DIASTOLIC BLOOD PRESSURE: 58 MMHG | OXYGEN SATURATION: 97 % | HEART RATE: 72 BPM | SYSTOLIC BLOOD PRESSURE: 121 MMHG | TEMPERATURE: 98 F | RESPIRATION RATE: 18 BRPM | HEIGHT: 68 IN

## 2022-09-18 DIAGNOSIS — S83.512A SPRAIN OF ANTERIOR CRUCIATE LIGAMENT OF LEFT KNEE, INITIAL ENCOUNTER: Chronic | ICD-10-CM

## 2022-09-18 DIAGNOSIS — R20.0 ANESTHESIA OF SKIN: ICD-10-CM

## 2022-09-18 LAB
ALBUMIN SERPL ELPH-MCNC: 4.2 G/DL — SIGNIFICANT CHANGE UP (ref 3.3–5.2)
ALP SERPL-CCNC: 134 U/L — HIGH (ref 40–120)
ALT FLD-CCNC: 19 U/L — SIGNIFICANT CHANGE UP
AMPHET UR-MCNC: NEGATIVE — SIGNIFICANT CHANGE UP
ANION GAP SERPL CALC-SCNC: 12 MMOL/L — SIGNIFICANT CHANGE UP (ref 5–17)
APTT BLD: 30.1 SEC — SIGNIFICANT CHANGE UP (ref 27.5–35.5)
AST SERPL-CCNC: 19 U/L — SIGNIFICANT CHANGE UP
BARBITURATES UR SCN-MCNC: NEGATIVE — SIGNIFICANT CHANGE UP
BASOPHILS # BLD AUTO: 0.04 K/UL — SIGNIFICANT CHANGE UP (ref 0–0.2)
BASOPHILS NFR BLD AUTO: 0.5 % — SIGNIFICANT CHANGE UP (ref 0–2)
BENZODIAZ UR-MCNC: NEGATIVE — SIGNIFICANT CHANGE UP
BILIRUB SERPL-MCNC: <0.2 MG/DL — LOW (ref 0.4–2)
BUN SERPL-MCNC: 14.3 MG/DL — SIGNIFICANT CHANGE UP (ref 8–20)
CALCIUM SERPL-MCNC: 9 MG/DL — SIGNIFICANT CHANGE UP (ref 8.4–10.5)
CHLORIDE SERPL-SCNC: 98 MMOL/L — SIGNIFICANT CHANGE UP (ref 98–107)
CO2 SERPL-SCNC: 26 MMOL/L — SIGNIFICANT CHANGE UP (ref 22–29)
COCAINE METAB.OTHER UR-MCNC: NEGATIVE — SIGNIFICANT CHANGE UP
CREAT SERPL-MCNC: 1.36 MG/DL — HIGH (ref 0.5–1.3)
EGFR: 61 ML/MIN/1.73M2 — SIGNIFICANT CHANGE UP
EOSINOPHIL # BLD AUTO: 0.49 K/UL — SIGNIFICANT CHANGE UP (ref 0–0.5)
EOSINOPHIL NFR BLD AUTO: 6.5 % — HIGH (ref 0–6)
GLUCOSE SERPL-MCNC: 92 MG/DL — SIGNIFICANT CHANGE UP (ref 70–99)
HCT VFR BLD CALC: 35.7 % — LOW (ref 39–50)
HGB BLD-MCNC: 11.8 G/DL — LOW (ref 13–17)
IMM GRANULOCYTES NFR BLD AUTO: 0.3 % — SIGNIFICANT CHANGE UP (ref 0–0.9)
INR BLD: 1.03 RATIO — SIGNIFICANT CHANGE UP (ref 0.88–1.16)
LYMPHOCYTES # BLD AUTO: 2.08 K/UL — SIGNIFICANT CHANGE UP (ref 1–3.3)
LYMPHOCYTES # BLD AUTO: 27.4 % — SIGNIFICANT CHANGE UP (ref 13–44)
MCHC RBC-ENTMCNC: 29 PG — SIGNIFICANT CHANGE UP (ref 27–34)
MCHC RBC-ENTMCNC: 33.1 GM/DL — SIGNIFICANT CHANGE UP (ref 32–36)
MCV RBC AUTO: 87.7 FL — SIGNIFICANT CHANGE UP (ref 80–100)
METHADONE UR-MCNC: NEGATIVE — SIGNIFICANT CHANGE UP
MONOCYTES # BLD AUTO: 0.72 K/UL — SIGNIFICANT CHANGE UP (ref 0–0.9)
MONOCYTES NFR BLD AUTO: 9.5 % — SIGNIFICANT CHANGE UP (ref 2–14)
NEUTROPHILS # BLD AUTO: 4.24 K/UL — SIGNIFICANT CHANGE UP (ref 1.8–7.4)
NEUTROPHILS NFR BLD AUTO: 55.8 % — SIGNIFICANT CHANGE UP (ref 43–77)
OPIATES UR-MCNC: NEGATIVE — SIGNIFICANT CHANGE UP
PCP SPEC-MCNC: SIGNIFICANT CHANGE UP
PCP UR-MCNC: NEGATIVE — SIGNIFICANT CHANGE UP
PLATELET # BLD AUTO: 225 K/UL — SIGNIFICANT CHANGE UP (ref 150–400)
POTASSIUM SERPL-MCNC: 3.9 MMOL/L — SIGNIFICANT CHANGE UP (ref 3.5–5.3)
POTASSIUM SERPL-SCNC: 3.9 MMOL/L — SIGNIFICANT CHANGE UP (ref 3.5–5.3)
PROT SERPL-MCNC: 7.2 G/DL — SIGNIFICANT CHANGE UP (ref 6.6–8.7)
PROTHROM AB SERPL-ACNC: 12 SEC — SIGNIFICANT CHANGE UP (ref 10.5–13.4)
RBC # BLD: 4.07 M/UL — LOW (ref 4.2–5.8)
RBC # FLD: 14.3 % — SIGNIFICANT CHANGE UP (ref 10.3–14.5)
SARS-COV-2 RNA SPEC QL NAA+PROBE: SIGNIFICANT CHANGE UP
SODIUM SERPL-SCNC: 136 MMOL/L — SIGNIFICANT CHANGE UP (ref 135–145)
THC UR QL: NEGATIVE — SIGNIFICANT CHANGE UP
TROPONIN T SERPL-MCNC: <0.01 NG/ML — SIGNIFICANT CHANGE UP (ref 0–0.06)
WBC # BLD: 7.59 K/UL — SIGNIFICANT CHANGE UP (ref 3.8–10.5)
WBC # FLD AUTO: 7.59 K/UL — SIGNIFICANT CHANGE UP (ref 3.8–10.5)

## 2022-09-18 PROCEDURE — 93926 LOWER EXTREMITY STUDY: CPT | Mod: 26,RT

## 2022-09-18 PROCEDURE — 70498 CT ANGIOGRAPHY NECK: CPT | Mod: 26,MA

## 2022-09-18 PROCEDURE — 99291 CRITICAL CARE FIRST HOUR: CPT

## 2022-09-18 PROCEDURE — 93010 ELECTROCARDIOGRAM REPORT: CPT

## 2022-09-18 PROCEDURE — 73562 X-RAY EXAM OF KNEE 3: CPT | Mod: 26,RT

## 2022-09-18 PROCEDURE — 0042T: CPT | Mod: MA

## 2022-09-18 PROCEDURE — 70496 CT ANGIOGRAPHY HEAD: CPT | Mod: 26,MA

## 2022-09-18 PROCEDURE — 70450 CT HEAD/BRAIN W/O DYE: CPT | Mod: 26,59,MA

## 2022-09-18 PROCEDURE — 93971 EXTREMITY STUDY: CPT | Mod: 26,RT

## 2022-09-18 PROCEDURE — 99223 1ST HOSP IP/OBS HIGH 75: CPT

## 2022-09-18 RX ORDER — ATORVASTATIN CALCIUM 80 MG/1
40 TABLET, FILM COATED ORAL DAILY
Refills: 0 | Status: DISCONTINUED | OUTPATIENT
Start: 2022-09-18 | End: 2022-09-18

## 2022-09-18 RX ORDER — BUPROPION HYDROCHLORIDE 150 MG/1
300 TABLET, EXTENDED RELEASE ORAL DAILY
Refills: 0 | Status: DISCONTINUED | OUTPATIENT
Start: 2022-09-18 | End: 2022-09-20

## 2022-09-18 RX ORDER — TRAZODONE HCL 50 MG
200 TABLET ORAL AT BEDTIME
Refills: 0 | Status: DISCONTINUED | OUTPATIENT
Start: 2022-09-18 | End: 2022-09-20

## 2022-09-18 RX ORDER — IBUPROFEN 200 MG
600 TABLET ORAL EVERY 6 HOURS
Refills: 0 | Status: DISCONTINUED | OUTPATIENT
Start: 2022-09-18 | End: 2022-09-18

## 2022-09-18 RX ORDER — ENOXAPARIN SODIUM 100 MG/ML
40 INJECTION SUBCUTANEOUS EVERY 24 HOURS
Refills: 0 | Status: DISCONTINUED | OUTPATIENT
Start: 2022-09-18 | End: 2022-09-20

## 2022-09-18 RX ORDER — ATORVASTATIN CALCIUM 80 MG/1
80 TABLET, FILM COATED ORAL AT BEDTIME
Refills: 0 | Status: DISCONTINUED | OUTPATIENT
Start: 2022-09-18 | End: 2022-09-18

## 2022-09-18 RX ORDER — ACETAMINOPHEN 500 MG
650 TABLET ORAL EVERY 6 HOURS
Refills: 0 | Status: DISCONTINUED | OUTPATIENT
Start: 2022-09-18 | End: 2022-09-20

## 2022-09-18 RX ORDER — PANTOPRAZOLE SODIUM 20 MG/1
40 TABLET, DELAYED RELEASE ORAL
Refills: 0 | Status: DISCONTINUED | OUTPATIENT
Start: 2022-09-18 | End: 2022-09-20

## 2022-09-18 RX ORDER — GABAPENTIN 400 MG/1
300 CAPSULE ORAL THREE TIMES A DAY
Refills: 0 | Status: DISCONTINUED | OUTPATIENT
Start: 2022-09-18 | End: 2022-09-20

## 2022-09-18 RX ORDER — ATORVASTATIN CALCIUM 80 MG/1
40 TABLET, FILM COATED ORAL DAILY
Refills: 0 | Status: DISCONTINUED | OUTPATIENT
Start: 2022-09-18 | End: 2022-09-20

## 2022-09-18 RX ORDER — CEPHALEXIN 500 MG
500 CAPSULE ORAL EVERY 12 HOURS
Refills: 0 | Status: DISCONTINUED | OUTPATIENT
Start: 2022-09-18 | End: 2022-09-20

## 2022-09-18 RX ORDER — TRAMADOL HYDROCHLORIDE 50 MG/1
50 TABLET ORAL EVERY 6 HOURS
Refills: 0 | Status: DISCONTINUED | OUTPATIENT
Start: 2022-09-18 | End: 2022-09-20

## 2022-09-18 RX ORDER — TRAMADOL HYDROCHLORIDE 50 MG/1
25 TABLET ORAL EVERY 6 HOURS
Refills: 0 | Status: DISCONTINUED | OUTPATIENT
Start: 2022-09-18 | End: 2022-09-20

## 2022-09-18 RX ORDER — METHOCARBAMOL 500 MG/1
750 TABLET, FILM COATED ORAL
Refills: 0 | Status: DISCONTINUED | OUTPATIENT
Start: 2022-09-18 | End: 2022-09-20

## 2022-09-18 RX ADMIN — Medication 5 MILLIGRAM(S): at 18:36

## 2022-09-18 RX ADMIN — ATORVASTATIN CALCIUM 40 MILLIGRAM(S): 80 TABLET, FILM COATED ORAL at 21:48

## 2022-09-18 RX ADMIN — Medication 500 MILLIGRAM(S): at 18:36

## 2022-09-18 RX ADMIN — PANTOPRAZOLE SODIUM 40 MILLIGRAM(S): 20 TABLET, DELAYED RELEASE ORAL at 13:50

## 2022-09-18 RX ADMIN — Medication 200 MILLIGRAM(S): at 21:47

## 2022-09-18 RX ADMIN — Medication 0.1 MILLIGRAM(S): at 18:36

## 2022-09-18 RX ADMIN — ENOXAPARIN SODIUM 40 MILLIGRAM(S): 100 INJECTION SUBCUTANEOUS at 15:01

## 2022-09-18 RX ADMIN — GABAPENTIN 300 MILLIGRAM(S): 400 CAPSULE ORAL at 14:20

## 2022-09-18 RX ADMIN — GABAPENTIN 300 MILLIGRAM(S): 400 CAPSULE ORAL at 21:47

## 2022-09-18 NOTE — ED ADULT NURSE NOTE - NSIMPLEMENTINTERV_GEN_ALL_ED
Implemented All Fall Risk Interventions:  [place] to call system. Call bell, personal items and telephone within reach. Instruct patient to call for assistance. Room bathroom lighting operational. Non-slip footwear when patient is off stretcher. Physically safe environment: no spills, clutter or unnecessary equipment. Stretcher in lowest position, wheels locked, appropriate side rails in place. Provide visual cue, wrist band, yellow gown, etc. Monitor gait and stability. Monitor for mental status changes and reorient to person, place, and time. Review medications for side effects contributing to fall risk. Reinforce activity limits and safety measures with patient and family.

## 2022-09-18 NOTE — ED PROVIDER NOTE - OBJECTIVE STATEMENT
55yo male with PMH alcohol abuse, anxiety, depression presenting with RLE numbness that he noticed this morning. Patient has h/o multiple knee surgeries and knee infections. Denies fevers, chills, back pain. Denies numbness, tingling, or weakness to other extremities. No slurred speech. No h/o CVA in past. 57yo male with PMH alcohol abuse, anxiety, depression presenting with RLE numbness that he noticed this morning. Patient has h/o multiple knee surgeries and knee infections. Denies fevers, chills, back pain. Denies numbness, tingling, or weakness to other extremities. Patient also states that he feels lightheaded and that his speech is slurred. No h/o CVA in past.

## 2022-09-18 NOTE — H&P ADULT - NSHPPHYSICALEXAM_GEN_ALL_CORE
GENERAL: pt examined bedside, laying comfortably in bed in NAD  HEENT: NC/AT, moist oral mucosa, clear conjunctiva, sclera nonicteric  RESPIRATORY: Normal respiratory effort, no wheezing, rhonchi, rales  CARDIOVASCULAR: RRR, normal S1 and S2  ABDOMEN: soft, NT/ND, normoactive bowel sounds, no rebound/guarding  MSK: No joint deformities, edema, erythema  EXTREMITIES: No cynaosis, no clubbing, RLE edema, no pain to palpation of b/l LE's, pulses are 2+ bilaterally  PSYCH: affect appropriate and cooperative  NEUROLOGY: A+O to person, place, and time, +slurred speech (chronic), decreased sensation of RLE below the knee, strength 5/5 in all extremities  SKIN: Rt knee incision noted w/ mild incisional erythema, w/ small area of dehiscence w/ serosanguinous drainage, not warm to palpation

## 2022-09-18 NOTE — H&P ADULT - ASSESSMENT
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 Huerta (antibiotic cement spacer placed and d/c'd to [facility] for 6wks of IV abx) comes in c/o new onset RLE paresthesias x 1 day.  As per pt the paresthesia starts at his rt knee and extends down to the foot.  He also noted some difficulty ambulating bc he cant feel where he is stepping but denies weakness of RLE.  VS stable on arrival.  ED called a code stroke and tele stroke consulted.  Pt noted to have edema of RLE w/ decreased sensation of entire RLE from rt knee down.  Slurred speech noted but pt states it is chronic and unchanged.  CTH (-), CTA head/neck (-).  Will admit for further w/u.        Acute onset Rt LE paresthesia      - Admit to telemetry   - Code stroke called in ED  - CTH (-), CTA head/neck (-)  - Suspect RLE paresthesia is related to recent hx of multiple Rt knee surgeries   - Had total knee replacement 04/2022 complicated by infection w/ subsequent removal of knee replacement 06/2022 and had antibiotic cement spacer put in place   - s/p 6 week of IV abx and now on Kephlex po  - RLE edema noted and most prominent at Rt knee concerning for compartment syndrome however pulses palpable   - Discussed w/ ortho and noted that Rt knee appears swollen due to positioning of antibiotic cement spacer  - Ortho also noted that there is no indication for ortho consult at this time   - Will order RLE doppler to r/o DVT   - Will order arterial doppler of RLE and if abnormal will consult vascular for possible compartment syndrome       YVON likely prerenal   - Baseline Cr 0.9  - Will hold off on NSAIDs for now   - Monitor renal fxn   - Avoid nephrotoxins or renally dose if needed        Normocytic anemia   - Hb at baseline  - Hemodynamically stable   - Monitor CBC and transfuse if Hb<7      VTE ppx: lovenox SQ         55 y/o M w/ a PMH of substance abuse (quit 2 years ago), anxiety, depression, remote hx of total knee replacement 04/2022 complicated by infection w/ subsequent removal of knee replacement 06/2022 by Dr. Huerta (antibiotic cement spacer placed and d/c'd to [facility] for 6wks of IV abx) comes in c/o new onset RLE paresthesias x 1 day.  As per pt the paresthesia starts at his rt knee and extends down to the foot.  He also noted some difficulty ambulating bc he cant feel where he is stepping but denies weakness of RLE.  VS stable on arrival.  ED called a code stroke and tele stroke consulted.  Pt noted to have edema of RLE w/ decreased sensation of entire RLE from rt knee down.  Slurred speech noted but pt states it is chronic and unchanged.  CTH (-), CTA head/neck (-).  Will admit for further w/u.        Acute onset Rt LE paresthesia      - Admit to telemetry   - Code stroke called in ED but low suspicion at this time   - CTH (-), CTA head/neck (-)  - Suspect RLE paresthesia is related to recent hx of multiple Rt knee surgeries   - Had total knee replacement 04/2022 complicated by infection w/ subsequent removal of knee replacement 06/2022 and had antibiotic cement spacer put in place   - s/p 6 week of IV abx and now on Kephlex po  - RLE edema noted and most prominent at Rt knee concerning for compartment syndrome however pulses palpable   - Discussed w/ ortho and noted that Rt knee appears swollen due to positioning of antibiotic cement spacer  - Ortho also noted that there is no indication for ortho consult at this time   - Will order RLE doppler to r/o DVT   - Will order arterial doppler of RLE and if abnormal will consult vascular for possible compartment syndrome       YVON likely prerenal   - Baseline Cr 0.9  - Will hold off on NSAIDs for now   - Monitor renal fxn   - Avoid nephrotoxins or renally dose if needed        Normocytic anemia   - Hb at baseline  - Hemodynamically stable   - Monitor CBC and transfuse if Hb<7      VTE ppx: lovenox SQ

## 2022-09-18 NOTE — H&P ADULT - NSHPLABSRESULTS_GEN_ALL_CORE
11.8   7.59  )-----------( 225      ( 18 Sep 2022 10:50 )             35.7         09-18    136  |  98  |  14.3  ----------------------------<  92  3.9   |  26.0  |  1.36<H>    Ca    9.0      18 Sep 2022 10:50    TPro  7.2  /  Alb  4.2  /  TBili  <0.2<L>  /  DBili  x   /  AST  19  /  ALT  19  /  AlkPhos  134<H>  09-18        Troponin T, Serum (09.18.22 @ 10:50)    Troponin T, Serum: <0.01: Reference Interval for Troponin T  Less than 0.06 ng/mL - includes the 99th percentile of a healthy  population at a method C.V. of 10% or less.  NOTE: Troponin T is measured by the Roche CLIA method and these  results are not interchangable with Troponin I results since they are  different assays with different reference intervals. ng/mL        < from: CT Angio Neck Stroke Protocol w/ IV Cont (09.18.22 @ 10:56) >    IMPRESSION: Unremarkable CTA of the neck and [place].    No perfusion mismatch to suggest core infarct or critically hypoperfused   tissue at risk.    < end of copied text >        < from: CT Brain Stroke Protocol (09.18.22 @ 10:42) >    IMPRESSION:  No chronic microvascular changes without evidence of an   acute transcortical infarction or hemorrhage. Findings discussed with Dr. Flores at 10:52 AM.

## 2022-09-18 NOTE — ED PROVIDER NOTE - PHYSICAL EXAMINATION
Gen: NAD, AOx3  Head: NCAT  HEENT: PERRL, oral mucosa moist, normal conjunctiva, oropharynx clear without exudate or erythema  Lung: CTAB, no respiratory distress, no wheezing, rales, rhonchi  CV: normal s1/s2, rrr, no murmurs, Normal perfusion, pulses 2+ throughout, lower extremities warm/well perfused bilaterally  Abd: soft, NTND  MSK: No edema, no visible deformities, full range of motion in all 4 extremities  Neuro: no sensation to light touch RLE, strength 5/5 in all 4 extremities sensation intact throughtout  Skin: No rash ,   Psych: normal affect Gen: NAD, AOx3  Head: NCAT  HEENT: PERRL, oral mucosa moist, normal conjunctiva, oropharynx clear without exudate or erythema  Lung: CTAB, no respiratory distress, no wheezing, rales, rhonchi  CV: normal s1/s2, rrr, no murmurs, Normal perfusion, pulses 2+ throughout, lower extremities warm/well perfused bilaterally  Abd: soft, NTND  MSK: No edema, no visible deformities, full range of motion in all 4 extremities  Neuro: no sensation to light touch RLE, strength 5/5 in all 4 extremities sensation intact throughout, mild dysarthria  Skin: No rash ,   Psych: normal affect

## 2022-09-18 NOTE — H&P ADULT - NSHPREVIEWOFSYSTEMS_GEN_ALL_CORE
CONSTITUTIONAL: no fevers, chills, night sweats, weight changes  HEENT: no vision changes or diplopia, no tinnitus, no sore throat  RESPIRATORY: denies dyspnea, sob, nocturnal cough, sputum or hemoptysis  CARDIOVASCULAR: no CP, palpitations or lower extremity edema  GI: no dysphagia, nausea, abd pain, constipation, diarrhea, stool change or blood in stool  : no dysuria or hematuria, no flank pain, no incontinence or urinary retention  MSK: no joint pain or swelling  INTEGUMENTARY: no rashes or lesions  NEUROLOGICAL: no HA, confusion, syncope, +RLE numbness, no weakness, tremors   PSYCH: denies depression  ENDOCRINE: no polyuria, polydipsia, no temp intolerance, no tremors, no changes in skin, hair or nails  HEMATOLOGIC/LYMPHATIC: no lymph node enlargement, abnormal bruising or bleeding

## 2022-09-18 NOTE — H&P ADULT - HISTORY OF PRESENT ILLNESS
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 Huerta (antibiotic cement spacer placed and d/c'd to [facility] for 6wks of IV abx) comes in c/o new onset RLE paresthesias x 1 day.  As per pt the paresthesia starts at his rt knee and extends down to the foot.  He also noted some difficulty ambulating bc he cant feel where he is stepping but denies weakness of RLE.  He is currently on keflex that wound care doctor placed him on a few days ago.  He was seen in the ED multiple times in the past month for uncontrolled pain but today he reports pain is tolerable w/ current pain regimen (ibuprophen 600mg).  He has mildly slurred speech at baseline that started at least 1yr ago and remains unchanged.  Pt denies vision/hearing changes, dizziness, N/V, cp, palpitations, sob, cough.

## 2022-09-18 NOTE — ED PROVIDER NOTE - CLINICAL SUMMARY MEDICAL DECISION MAKING FREE TEXT BOX
Patient with RLE anesthesia, LKN 11pm last night- code stroke activated, labs, CT head, CTA head/neck, d/w telestroke, likely d/w ortho. Jeannette Dee DO Patient with RLE anesthesia a/w slurred speech, LKN 11pm last night- code stroke activated, labs, CT head, CTA head/neck, d/w telestroke, likely d/w ortho. Jeannette Dee DO

## 2022-09-18 NOTE — ED ADULT TRIAGE NOTE - CHIEF COMPLAINT QUOTE
Woke up this morning with lack of feeling in right lower extremity, c/o lightheadedness.  Last known well last night 11pm.

## 2022-09-18 NOTE — ED PROVIDER NOTE - PROGRESS NOTE DETAILS
Discussed case with telestroke- no LVO, outside window for tPA. Patient to be admitted for further management. Jeannette Dee DO

## 2022-09-18 NOTE — ED ADULT NURSE NOTE - OBJECTIVE STATEMENT
Pt came to ED c/o paresthesia in right leg distal to patella. Pt is A&ox4.  NIH 0  GCS 15  + Dorsalis pedis in both feet--noted swelling to feet.  Pt has pmhx of sx to both patellas.  Pt has a bandage with ozzing on the right patella from what the pt reports to be stitches.  Dry sterile dressing is applied

## 2022-09-19 LAB
A1C WITH ESTIMATED AVERAGE GLUCOSE RESULT: 5.6 % — SIGNIFICANT CHANGE UP (ref 4–5.6)
ALBUMIN SERPL ELPH-MCNC: 3.4 G/DL — SIGNIFICANT CHANGE UP (ref 3.3–5.2)
ALP SERPL-CCNC: 121 U/L — HIGH (ref 40–120)
ALT FLD-CCNC: 14 U/L — SIGNIFICANT CHANGE UP
ANION GAP SERPL CALC-SCNC: 11 MMOL/L — SIGNIFICANT CHANGE UP (ref 5–17)
AST SERPL-CCNC: 16 U/L — SIGNIFICANT CHANGE UP
BILIRUB SERPL-MCNC: <0.2 MG/DL — LOW (ref 0.4–2)
BUN SERPL-MCNC: 14.9 MG/DL — SIGNIFICANT CHANGE UP (ref 8–20)
CALCIUM SERPL-MCNC: 8.7 MG/DL — SIGNIFICANT CHANGE UP (ref 8.4–10.5)
CHLORIDE SERPL-SCNC: 103 MMOL/L — SIGNIFICANT CHANGE UP (ref 98–107)
CHOLEST SERPL-MCNC: 124 MG/DL — SIGNIFICANT CHANGE UP
CO2 SERPL-SCNC: 24 MMOL/L — SIGNIFICANT CHANGE UP (ref 22–29)
CREAT SERPL-MCNC: 1.15 MG/DL — SIGNIFICANT CHANGE UP (ref 0.5–1.3)
EGFR: 74 ML/MIN/1.73M2 — SIGNIFICANT CHANGE UP
ESTIMATED AVERAGE GLUCOSE: 114 MG/DL — SIGNIFICANT CHANGE UP (ref 68–114)
GLUCOSE SERPL-MCNC: 113 MG/DL — HIGH (ref 70–99)
HCT VFR BLD CALC: 33.1 % — LOW (ref 39–50)
HDLC SERPL-MCNC: 28 MG/DL — LOW
HGB BLD-MCNC: 10.8 G/DL — LOW (ref 13–17)
LIPID PNL WITH DIRECT LDL SERPL: 36 MG/DL — SIGNIFICANT CHANGE UP
MCHC RBC-ENTMCNC: 28.5 PG — SIGNIFICANT CHANGE UP (ref 27–34)
MCHC RBC-ENTMCNC: 32.6 GM/DL — SIGNIFICANT CHANGE UP (ref 32–36)
MCV RBC AUTO: 87.3 FL — SIGNIFICANT CHANGE UP (ref 80–100)
NON HDL CHOLESTEROL: 96 MG/DL — SIGNIFICANT CHANGE UP
PLATELET # BLD AUTO: 204 K/UL — SIGNIFICANT CHANGE UP (ref 150–400)
POTASSIUM SERPL-MCNC: 4.4 MMOL/L — SIGNIFICANT CHANGE UP (ref 3.5–5.3)
POTASSIUM SERPL-SCNC: 4.4 MMOL/L — SIGNIFICANT CHANGE UP (ref 3.5–5.3)
PROT SERPL-MCNC: 6.4 G/DL — LOW (ref 6.6–8.7)
RBC # BLD: 3.79 M/UL — LOW (ref 4.2–5.8)
RBC # FLD: 14.5 % — SIGNIFICANT CHANGE UP (ref 10.3–14.5)
SODIUM SERPL-SCNC: 138 MMOL/L — SIGNIFICANT CHANGE UP (ref 135–145)
TRIGL SERPL-MCNC: 300 MG/DL — HIGH
WBC # BLD: 6.81 K/UL — SIGNIFICANT CHANGE UP (ref 3.8–10.5)
WBC # FLD AUTO: 6.81 K/UL — SIGNIFICANT CHANGE UP (ref 3.8–10.5)

## 2022-09-19 PROCEDURE — 99232 SBSQ HOSP IP/OBS MODERATE 35: CPT

## 2022-09-19 RX ADMIN — ENOXAPARIN SODIUM 40 MILLIGRAM(S): 100 INJECTION SUBCUTANEOUS at 14:44

## 2022-09-19 RX ADMIN — Medication 0.1 MILLIGRAM(S): at 18:34

## 2022-09-19 RX ADMIN — Medication 5 MILLIGRAM(S): at 06:01

## 2022-09-19 RX ADMIN — Medication 200 MILLIGRAM(S): at 21:59

## 2022-09-19 RX ADMIN — GABAPENTIN 300 MILLIGRAM(S): 400 CAPSULE ORAL at 06:01

## 2022-09-19 RX ADMIN — ATORVASTATIN CALCIUM 40 MILLIGRAM(S): 80 TABLET, FILM COATED ORAL at 14:44

## 2022-09-19 RX ADMIN — Medication 500 MILLIGRAM(S): at 06:00

## 2022-09-19 RX ADMIN — Medication 5 MILLIGRAM(S): at 18:35

## 2022-09-19 RX ADMIN — GABAPENTIN 300 MILLIGRAM(S): 400 CAPSULE ORAL at 14:44

## 2022-09-19 RX ADMIN — PANTOPRAZOLE SODIUM 40 MILLIGRAM(S): 20 TABLET, DELAYED RELEASE ORAL at 06:01

## 2022-09-19 RX ADMIN — BUPROPION HYDROCHLORIDE 300 MILLIGRAM(S): 150 TABLET, EXTENDED RELEASE ORAL at 14:44

## 2022-09-19 RX ADMIN — Medication 500 MILLIGRAM(S): at 18:34

## 2022-09-19 RX ADMIN — GABAPENTIN 300 MILLIGRAM(S): 400 CAPSULE ORAL at 21:59

## 2022-09-19 RX ADMIN — Medication 0.1 MILLIGRAM(S): at 06:01

## 2022-09-19 NOTE — PROGRESS NOTE ADULT - ASSESSMENT
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 Huerta (antibiotic cement spacer placed and d/c'd to [facility] for 6wks of IV abx) comes in c/o new onset RLE paresthesias x 1 day.  As per pt the paresthesia starts at his rt knee and extends down to the foot.  He also noted some difficulty ambulating bc he cant feel where he is stepping but denies weakness of RLE.  VS stable on arrival.  ED called a code stroke and tele stroke consulted.  Pt noted to have edema of RLE w/ decreased sensation of entire RLE from rt knee down.  Slurred speech noted but pt states it is chronic and unchanged.  CTH (-), CTA head/neck (-).  Will admit for further w/u.      Acute onset Rt LE paresthesia      - Admit to telemetry   - Code stroke called in ED but low suspicion at this time   - CTH (-), CTA head/neck (-)  - Suspect RLE paresthesia is related to recent hx of multiple Rt knee surgeries   - Had total knee replacement 04/2022 complicated by infection w/ subsequent removal of knee replacement 06/2022 and had antibiotic cement spacer put in place   - s/p 6 week of IV abx and now on Kephlex po  - RLE edema noted and most prominent at Rt knee concerning for compartment syndrome however pulses palpable   - Discussed w/ ortho and noted that Rt knee appears swollen due to positioning of antibiotic cement spacer  - Ortho also noted that there is no indication for ortho consult at this time   - Will order RLE doppler to r/o DVT   - Will order arterial doppler of RLE and if abnormal will consult vascular for possible compartment syndrome     YVON likely prerenal  - Baseline Cr 0.9  - Will hold off on NSAIDs for now   - Monitor renal fxn   - Avoid nephrotoxins or renally dose if needed     Normocytic anemia   - Hb at baseline  - Hemodynamically stable   - Monitor CBC and transfuse if Hb<7    VTE ppx: lovenox SQ

## 2022-09-19 NOTE — PATIENT PROFILE ADULT - FALL HARM RISK - HARM RISK INTERVENTIONS

## 2022-09-19 NOTE — PROGRESS NOTE ADULT - SUBJECTIVE AND OBJECTIVE BOX
CC: RLE paresthesia (18 Sep 2022 13:20)    INTERVAL HPI/OVERNIGHT EVENTS:  no acute events overnight  without acute events    Vital Signs Last 24 Hrs  T(C): 36.5 (20 Sep 2022 04:41), Max: 36.8 (19 Sep 2022 18:12)  T(F): 97.7 (20 Sep 2022 04:41), Max: 98.2 (19 Sep 2022 18:12)  HR: 63 (20 Sep 2022 04:41) (63 - 69)  BP: 128/83 (20 Sep 2022 04:41) (116/59 - 128/83)  BP(mean): --  RR: 18 (20 Sep 2022 04:41) (18 - 18)  SpO2: 96% (20 Sep 2022 04:41) (95% - 99%)    Parameters below as of 20 Sep 2022 04:41  Patient On (Oxygen Delivery Method): room air    PHYSICAL EXAM:  General: in no acute distress  Eyes: PERRLA, EOMI; conjunctiva and sclera clear  Head: Normocephalic; atraumatic  ENMT: No nasal discharge; airway clear  Neck: Supple; non tender; no masses  Respiratory: No wheezes, rales or rhonchi  Cardiovascular: Regular rate and rhythm. S1 and S2 Normal; No murmurs, gallops or rubs  Gastrointestinal: Soft non-tender non-distended; Normal bowel sounds  Genitourinary: No costovertebral angle tenderness  Extremities: Normal range of motion, No clubbing, cyanosis or edema  Vascular: Peripheral pulses palpable 2+ bilaterally  Neurological: Alert and oriented x4  Skin: Warm and dry. No acute rash  Lymph Nodes: No acute cervical adenopathy  Musculoskeletal: no ROM of the right ext slightly swollen compared to left but good pulse and no erythema; scar over right knee with some erythema noted  Psychiatric: Cooperative and appropriate    I&O's Detail    CARDIAC MARKERS ( 18 Sep 2022 10:50 )  x     / <0.01 ng/mL / x     / x     / x                            10.8   6.81  )-----------( 204      ( 19 Sep 2022 05:36 )             33.1     19 Sep 2022 05:36    138    |  103    |  14.9   ----------------------------<  113    4.4     |  24.0   |  1.15     Ca    8.7        19 Sep 2022 05:36    TPro  6.4    /  Alb  3.4    /  TBili  <0.2   /  DBili  x      /  AST  16     /  ALT  14     /  AlkPhos  121    19 Sep 2022 05:36    PT/INR - ( 18 Sep 2022 10:50 )   PT: 12.0 sec;   INR: 1.03 ratio      PTT - ( 18 Sep 2022 10:50 )  PTT:30.1 sec  CAPILLARY BLOOD GLUCOSE    LIVER FUNCTIONS - ( 19 Sep 2022 05:36 )  Alb: 3.4 g/dL / Pro: 6.4 g/dL / ALK PHOS: 121 U/L / ALT: 14 U/L / AST: 16 U/L / GGT: x           MEDICATIONS  (STANDING):  atorvastatin 40 milliGRAM(s) Oral daily  buPROPion XL (24-Hour) . 300 milliGRAM(s) Oral daily  busPIRone 5 milliGRAM(s) Oral two times a day  cephalexin 500 milliGRAM(s) Oral every 12 hours  cloNIDine 0.1 milliGRAM(s) Oral two times a day  enoxaparin Injectable 40 milliGRAM(s) SubCutaneous every 24 hours  gabapentin 300 milliGRAM(s) Oral three times a day  pantoprazole    Tablet 40 milliGRAM(s) Oral before breakfast  traZODone 200 milliGRAM(s) Oral at bedtime    MEDICATIONS  (PRN):  acetaminophen     Tablet .. 650 milliGRAM(s) Oral every 6 hours PRN Temp greater or equal to 38C (100.4F), Mild Pain (1 - 3)  methocarbamol 750 milliGRAM(s) Oral four times a day PRN Muscle Spasm  traMADol 25 milliGRAM(s) Oral every 6 hours PRN Moderate Pain (4 - 6)  traMADol 50 milliGRAM(s) Oral every 6 hours PRN Severe Pain (7 - 10)    RADIOLOGY & ADDITIONAL TESTS:

## 2022-09-20 ENCOUNTER — TRANSCRIPTION ENCOUNTER (OUTPATIENT)
Age: 57
End: 2022-09-20

## 2022-09-20 VITALS
OXYGEN SATURATION: 95 % | TEMPERATURE: 98 F | SYSTOLIC BLOOD PRESSURE: 133 MMHG | DIASTOLIC BLOOD PRESSURE: 69 MMHG | HEART RATE: 66 BPM

## 2022-09-20 PROCEDURE — 93971 EXTREMITY STUDY: CPT

## 2022-09-20 PROCEDURE — 85730 THROMBOPLASTIN TIME PARTIAL: CPT

## 2022-09-20 PROCEDURE — 0042T: CPT | Mod: MA

## 2022-09-20 PROCEDURE — U0005: CPT

## 2022-09-20 PROCEDURE — 85610 PROTHROMBIN TIME: CPT

## 2022-09-20 PROCEDURE — 70450 CT HEAD/BRAIN W/O DYE: CPT | Mod: MA

## 2022-09-20 PROCEDURE — 70496 CT ANGIOGRAPHY HEAD: CPT | Mod: MA

## 2022-09-20 PROCEDURE — 85027 COMPLETE CBC AUTOMATED: CPT

## 2022-09-20 PROCEDURE — 84484 ASSAY OF TROPONIN QUANT: CPT

## 2022-09-20 PROCEDURE — U0003: CPT

## 2022-09-20 PROCEDURE — 93926 LOWER EXTREMITY STUDY: CPT

## 2022-09-20 PROCEDURE — 99239 HOSP IP/OBS DSCHRG MGMT >30: CPT

## 2022-09-20 PROCEDURE — 80061 LIPID PANEL: CPT

## 2022-09-20 PROCEDURE — 70498 CT ANGIOGRAPHY NECK: CPT | Mod: MA

## 2022-09-20 PROCEDURE — 80053 COMPREHEN METABOLIC PANEL: CPT

## 2022-09-20 PROCEDURE — 36415 COLL VENOUS BLD VENIPUNCTURE: CPT

## 2022-09-20 PROCEDURE — 93005 ELECTROCARDIOGRAM TRACING: CPT

## 2022-09-20 PROCEDURE — 85025 COMPLETE CBC W/AUTO DIFF WBC: CPT

## 2022-09-20 PROCEDURE — 73562 X-RAY EXAM OF KNEE 3: CPT

## 2022-09-20 PROCEDURE — 83036 HEMOGLOBIN GLYCOSYLATED A1C: CPT

## 2022-09-20 PROCEDURE — 80307 DRUG TEST PRSMV CHEM ANLYZR: CPT

## 2022-09-20 PROCEDURE — 99285 EMERGENCY DEPT VISIT HI MDM: CPT | Mod: 25

## 2022-09-20 RX ORDER — GABAPENTIN 400 MG/1
300 CAPSULE ORAL
Refills: 0 | Status: DISCONTINUED | OUTPATIENT
Start: 2022-09-20 | End: 2022-09-20

## 2022-09-20 RX ORDER — GABAPENTIN 400 MG/1
1 CAPSULE ORAL
Qty: 30 | Refills: 0
Start: 2022-09-20 | End: 2022-10-19

## 2022-09-20 RX ORDER — TRAMADOL HYDROCHLORIDE 50 MG/1
1 TABLET ORAL
Qty: 28 | Refills: 0
Start: 2022-09-20 | End: 2022-09-26

## 2022-09-20 RX ORDER — GABAPENTIN 400 MG/1
1 CAPSULE ORAL
Qty: 60 | Refills: 0
Start: 2022-09-20 | End: 2022-10-19

## 2022-09-20 RX ORDER — GABAPENTIN 400 MG/1
600 CAPSULE ORAL AT BEDTIME
Refills: 0 | Status: DISCONTINUED | OUTPATIENT
Start: 2022-09-20 | End: 2022-09-20

## 2022-09-20 RX ORDER — TRAMADOL HYDROCHLORIDE 50 MG/1
1 TABLET ORAL
Qty: 0 | Refills: 0 | DISCHARGE
Start: 2022-09-20

## 2022-09-20 RX ORDER — IBUPROFEN 200 MG
1 TABLET ORAL
Qty: 0 | Refills: 0 | DISCHARGE

## 2022-09-20 RX ADMIN — Medication 500 MILLIGRAM(S): at 05:19

## 2022-09-20 RX ADMIN — Medication 5 MILLIGRAM(S): at 05:19

## 2022-09-20 RX ADMIN — GABAPENTIN 300 MILLIGRAM(S): 400 CAPSULE ORAL at 05:18

## 2022-09-20 RX ADMIN — PANTOPRAZOLE SODIUM 40 MILLIGRAM(S): 20 TABLET, DELAYED RELEASE ORAL at 05:19

## 2022-09-20 RX ADMIN — Medication 0.1 MILLIGRAM(S): at 05:19

## 2022-09-20 RX ADMIN — ATORVASTATIN CALCIUM 40 MILLIGRAM(S): 80 TABLET, FILM COATED ORAL at 13:22

## 2022-09-20 RX ADMIN — BUPROPION HYDROCHLORIDE 300 MILLIGRAM(S): 150 TABLET, EXTENDED RELEASE ORAL at 13:22

## 2022-09-20 NOTE — DISCHARGE NOTE PROVIDER - ATTENDING DISCHARGE PHYSICAL EXAMINATION:
PHYSICAL EXAM:  General: in no acute distress  Eyes: PERRLA, EOMI; conjunctiva and sclera clear  Head: Normocephalic; atraumatic  ENMT: No nasal discharge; airway clear  Neck: Supple; non tender; no masses  Respiratory: No wheezes, rales or rhonchi  Cardiovascular: Regular rate and rhythm. S1 and S2 Normal; No murmurs, gallops or rubs  Gastrointestinal: Soft non-tender non-distended; Normal bowel sounds  Genitourinary: No costovertebral angle tenderness  Extremities: Normal range of motion, No clubbing, cyanosis or edema  Vascular: Peripheral pulses palpable 2+ bilaterally  Neurological: Alert and oriented x4  Skin: Warm and dry. No acute rash  Lymph Nodes: No acute cervical adenopathy  Musculoskeletal: no ROM of the right ext slightly swollen compared to left but good pulse and no erythema; scar over right knee with some erythema noted  Psychiatric: Cooperative and appropriate

## 2022-09-20 NOTE — DISCHARGE NOTE NURSING/CASE MANAGEMENT/SOCIAL WORK - NSDCVIVACCINE_GEN_ALL_CORE_FT
Tdap; 29-Feb-2020 23:58; Lauren Delacruz (TASHIA); [employer]; [id number] (Exp. Date: 19-Mar-2022); IntraMuscular; Deltoid Right.; 0.5 milliLiter(s); VIS (VIS Published: 09-May-2013, VIS Presented: 29-Feb-2020);

## 2022-09-20 NOTE — DISCHARGE NOTE PROVIDER - NSDCMRMEDTOKEN_GEN_ALL_CORE_FT
acetaminophen 325 mg oral tablet: 3 tab(s) orally every 8 hours  atorvastatin 40 mg oral tablet: 1 tab(s) orally once a day  buPROPion 300 mg/24 hours (XL) oral tablet, extended release: 1 tab(s) orally once a day (in the morning)  busPIRone 5 mg oral tablet: 1 tab(s) orally 2 times a day  cephalexin 500 mg oral tablet: 1 tab(s) orally 2 times a day   cloNIDine 0.1 mg oral tablet: 1 tab(s) orally 2 times a day  gabapentin 300 mg oral capsule: 1 cap(s) orally 2 times a day at 8 AM and 2 PM  gabapentin 600 mg oral tablet: 1 tab(s) orally once a day (at bedtime)  methocarbamol 750 mg oral tablet: 1 tab(s) orally 4 times a day   traZODone 100 mg oral tablet: 2 tab(s) orally once a day (at bedtime)  Ultram 50 mg oral tablet: 1 tab(s) orally every 6 hours, As needed, Severe Pain (7 - 10) MDD:4 tabs

## 2022-09-20 NOTE — DISCHARGE NOTE PROVIDER - CARE PROVIDERS DIRECT ADDRESSES
[email or web address]_Unknown

## 2022-09-20 NOTE — DISCHARGE NOTE NURSING/CASE MANAGEMENT/SOCIAL WORK - PATIENT PORTAL LINK FT
You can access the FollowMyHealth Patient Portal offered by [facility] by registering at the following website: [email or web address]. By joining [employer]’s FollowMyHealth portal, you will also be able to view your health information using other applications (apps) compatible with our system.

## 2022-09-20 NOTE — DISCHARGE NOTE PROVIDER - NSDCCPCAREPLAN_GEN_ALL_CORE_FT
PRINCIPAL DISCHARGE DIAGNOSIS  Diagnosis: Leg numbness  Assessment and Plan of Treatment: parethesias likely from swelling after surgery. Continue with gabapentin. Continue with PRN tramadol. Outpatient follow up with ID and ortho on discharge as planned.      SECONDARY DISCHARGE DIAGNOSES  Diagnosis: Cellulitis  Assessment and Plan of Treatment: continue with cephalexin as prescribed

## 2022-09-20 NOTE — DISCHARGE NOTE PROVIDER - CARE PROVIDER_API CALL
Sourav Bonner)  Infectious Disease; Internal Medicine  [address] [place] [address]  Phone: [phone]  Fax: [phone]  Follow Up Time:     Pedro Canada)  Orthopedics  [address] [place] [address]  Phone: [phone]  Fax: [phone]  Follow Up Time:

## 2022-09-20 NOTE — DISCHARGE NOTE NURSING/CASE MANAGEMENT/SOCIAL WORK - NSDCPEFALRISK_GEN_ALL_CORE
For information on Fall & Injury Prevention, visit: [email or web address] OR  [email or web address] OR  https://www.cdc.gov/steadi/patient.html

## 2022-09-20 NOTE — DISCHARGE NOTE PROVIDER - NSDCFUSCHEDAPPT_GEN_ALL_CORE_FT
Sourav Bonner  [facility]  INTMED [address]  Scheduled Appointment: 09/21/2022    Pedro Canada  [facility]  ORTHOSURG [address]  Scheduled Appointment: 09/22/2022

## 2022-09-20 NOTE — DISCHARGE NOTE PROVIDER - HOSPITAL COURSE
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 Huerta (antibiotic cement spacer placed and d/c'd to [facility] for 6wks of IV abx) comes in c/o new onset RLE paresthesias x 1 day. Patient with painful neuropathy on mostly the medial side of the leg and bottom of foot. Good peripheral pulses. Patient was admitted to medicine. At this time pain is better controlled. Patient is from home and should be able to be discharged home on oral antibiotics today. He has follow up with ID.

## 2022-09-21 ENCOUNTER — APPOINTMENT (OUTPATIENT)
Dept: INTERNAL MEDICINE | Facility: CLINIC | Age: 57
End: 2022-09-21

## 2022-09-21 VITALS
BODY MASS INDEX: 30.31 KG/M2 | HEIGHT: 68 IN | WEIGHT: 200 LBS | OXYGEN SATURATION: 99 % | DIASTOLIC BLOOD PRESSURE: 70 MMHG | HEART RATE: 76 BPM | SYSTOLIC BLOOD PRESSURE: 120 MMHG

## 2022-09-21 DIAGNOSIS — L97.929 NON-PRESSURE CHRONIC ULCER OF UNSPECIFIED PART OF LEFT LOWER LEG WITH UNSPECIFIED SEVERITY: ICD-10-CM

## 2022-09-21 PROCEDURE — 99204 OFFICE O/P NEW MOD 45 MIN: CPT

## 2022-09-21 RX ORDER — OXYCODONE 5 MG/1
5 TABLET ORAL EVERY 6 HOURS
Qty: 28 | Refills: 0 | Status: DISCONTINUED | COMMUNITY
Start: 2022-05-24 | End: 2022-09-21

## 2022-09-21 RX ORDER — OXYCODONE 5 MG/1
5 TABLET ORAL
Qty: 42 | Refills: 0 | Status: DISCONTINUED | COMMUNITY
Start: 2022-05-18 | End: 2022-09-21

## 2022-09-21 RX ORDER — OXYCODONE 5 MG/1
5 TABLET ORAL EVERY 8 HOURS
Qty: 20 | Refills: 0 | Status: DISCONTINUED | COMMUNITY
Start: 2022-06-02 | End: 2022-09-21

## 2022-09-21 RX ORDER — OXYCODONE 5 MG/1
5 TABLET ORAL EVERY 8 HOURS
Qty: 20 | Refills: 0 | Status: DISCONTINUED | COMMUNITY
Start: 2022-06-13 | End: 2022-09-21

## 2022-09-21 RX ORDER — SILVER SULFADIAZINE 10 MG/G
1 CREAM TOPICAL TWICE DAILY
Qty: 1 | Refills: 1 | Status: ACTIVE | COMMUNITY
Start: 2022-09-21 | End: 1900-01-01

## 2022-09-21 NOTE — HISTORY OF PRESENT ILLNESS
[FreeTextEntry1] : 57YO MALE WITH PMH OF SUBSTANCE ABUSE  DEPRESSION HAD TOTAL KNEE REPLACEMENT 04/22 COMPLICATED BY INFECTION WITH RMOVAL OF KNEE ON 6/22 WITH ANTIBIOTIC CEMENT SPACER  PLACED PT WAS DISCHARGEE  TO [facility] AND COMPLETED 6 WEEKS IV ABX IN LATE JULY\par DID NOT FOLLOWUP WITH ID AT THE TIME  AND HAS BEEN ON KEFLEX SINCE THEN  \par WAS IN THE [place]  ER RECENTLY BECAUSE  OF  SOME ERYTHEMA AT INCISON SITE   HERE FOR ID EVAL AS WEILL NEED EVENTUAL REMOVAL OF SPACER AND NEW KNEE  REPLACEMENT\par PT DENIES FEVERS OR CHILLS

## 2022-09-21 NOTE — PHYSICAL EXAM
[General Appearance - Alert] : alert [General Appearance - In No Acute Distress] : in no acute distress [Sclera] : the sclera and conjunctiva were normal [PERRL With Normal Accommodation] : pupils were equal in size, round, reactive to light [Extraocular Movements] : extraocular movements were intact [Outer Ear] : the ears and nose were normal in appearance [Oropharynx] : the oropharynx was normal with no thrush [Auscultation Breath Sounds / Voice Sounds] : lungs were clear to auscultation bilaterally [Heart Rate And Rhythm] : heart rate was normal and rhythm regular [Heart Sounds] : normal S1 and S2 [Heart Sounds Gallop] : no gallops [Murmurs] : no murmurs [Heart Sounds Pericardial Friction Rub] : no pericardial rub [Full Pulse] : the pedal pulses are present [Edema] : there was no peripheral edema [Bowel Sounds] : normal bowel sounds [Abdomen Soft] : soft [Abdomen Tenderness] : non-tender [] : no hepato-splenomegaly [Abdomen Mass (___ Cm)] : no abdominal mass palpated [Costovertebral Angle Tenderness] : no CVA tenderness [Deep Tendon Reflexes (DTR)] : deep tendon reflexes were 2+ and symmetric [Sensation] : the sensory exam was normal to light touch and pinprick [No Focal Deficits] : no focal deficits [FreeTextEntry1] : RIGHT KNEE INCION SITE MILD ERYTEMA POS GRANUALTION TISSUE NO ODOR

## 2022-09-21 NOTE — ASSESSMENT
[FreeTextEntry1] : 57YO MALE WITH PMH OF SUBSTANCE ABUSE  DEPRESSION HAD TOTAL KNEE REPLACEMENT 04/22 COMPLICATED BY INFECTION WITH REMOVAL OF KNEE ON 6/22 WITH ANTIBIOTIC CEMENT SPACER  PLACED PT WAS DISCHARGEE  TO [facility] AND COMPLETED 6 WEEKS IV ABX IN LATE JULY\par DID NOT FOLLOWUP WITH ID AT THE TIME  AND HAS BEEN ON KEFLEX SINCE THEN  \par WAS IN THE [place]  ER RECENTLY BECAUSE  OF  SOME ERYTHEMA AT INCISION  SITE   HERE FOR ID EVAL AS WEILL NEED EVENTUAL REMOVAL OF SPACER AND NEW KNEE  REPLACEMENT\par PT DENIES FEVERS OR CHILLS\par KNEE WITH SOME   MILD ERYTHEMA AT Incision NO WARMTH NO ODOR NO Drainage POS GRANULATION TISSUE \par WILL RECOMMEND D/C KEFLEX \par D/W ORHTO WILL PERFORM ASPIRATION WHEN PT HAS BEEN OFF ABX FOR 3 WEEKS \par IF NEG THEN WILL PROCEED WITH REIMPLANTATION\par WILL FOLLOUWP FORM ID STANDPOINT SPOKE TO HARI

## 2022-09-27 NOTE — ED PROVIDER NOTE - NS ED ATTENDING STATEMENT MOD
Detail Level: Simple
Detail Level: Detailed
Quality 137: Melanoma: Continuity Of Care - Recall System: Patient information entered into a recall system that includes: target date for the next exam specified AND a process to follow up with patients regarding missed or unscheduled appointments
When Should The Patient Follow-Up For Their Next Full-Body Skin Exam?: 6 Months
I have personally performed a face to face diagnostic evaluation on this patient. I have reviewed the ACP note and agree with the history, exam and plan of care, except as noted.

## 2022-09-28 ENCOUNTER — EMERGENCY (EMERGENCY)
Facility: HOSPITAL | Age: 57
LOS: 1 days | Discharge: HOME | End: 2022-09-28
Attending: EMERGENCY MEDICINE
Payer: COMMERCIAL

## 2022-09-28 VITALS
RESPIRATION RATE: 16 BRPM | HEIGHT: 68 IN | DIASTOLIC BLOOD PRESSURE: 84 MMHG | TEMPERATURE: 98 F | SYSTOLIC BLOOD PRESSURE: 151 MMHG | OXYGEN SATURATION: 97 % | WEIGHT: 175.05 LBS | HEART RATE: 81 BPM

## 2022-09-28 VITALS
TEMPERATURE: 98 F | RESPIRATION RATE: 19 BRPM | OXYGEN SATURATION: 96 % | HEART RATE: 74 BPM | SYSTOLIC BLOOD PRESSURE: 126 MMHG | DIASTOLIC BLOOD PRESSURE: 65 MMHG

## 2022-09-28 DIAGNOSIS — S83.512A SPRAIN OF ANTERIOR CRUCIATE LIGAMENT OF LEFT KNEE, INITIAL ENCOUNTER: Chronic | ICD-10-CM

## 2022-09-28 PROCEDURE — 99284 EMERGENCY DEPT VISIT MOD MDM: CPT

## 2022-09-28 RX ORDER — ACETAMINOPHEN 500 MG
650 TABLET ORAL ONCE
Refills: 0 | Status: COMPLETED | OUTPATIENT
Start: 2022-09-28 | End: 2022-09-28

## 2022-09-28 RX ADMIN — Medication 650 MILLIGRAM(S): at 08:48

## 2022-09-28 NOTE — ED ADULT NURSE REASSESSMENT NOTE - NS ED NURSE REASSESS COMMENT FT1
assumed care of pt @ 0730, report received from night RN. pt is AOx4 in NAD, c/o knee pain. pt tolerated ambulating to bathroom independently with walker. pt denies any other medical complaints at this time. pt requesting to speak with SW regarding new housing. diet order received, meal tray requested for pt. all questions and concerns addressed. safety maintained.

## 2022-09-28 NOTE — ED PROVIDER NOTE - NS ED ROS FT
Constitutional: (-) fever  (-)chills  (-)sweats  Cardiovascular: (-) chest pain, (-) palpitations (-) edema   Respiratory: (-) cough, (-) shortness of breath   Musculoskeletal: (-) neck pain, (-) back pain, (+) joint pain  Integumentary: (-) rash, (-) edema  Neurological: (-) headache, (-) altered mental status  (-)LOC

## 2022-09-28 NOTE — ED PROVIDER NOTE - CLINICAL SUMMARY MEDICAL DECISION MAKING FREE TEXT BOX
patient presenting with desire to speak with social work for housing placement, chronic pain of right knee, given tylenol.

## 2022-09-28 NOTE — ED PROVIDER NOTE - PHYSICAL EXAMINATION
General:     NAD  Head:     NC/AT, EOMI  Neck:     trachea midline  Lungs:     CTA b/l, no w/r/r  CVS:     S1S2, RRR, no m/g/r  Abd:     +BS, s/nt/nd, no organomegaly  Ext:    R LE in brace  Neuro: AAOx3, no sensory/motor deficits
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
## 2022-09-28 NOTE — ED PROVIDER NOTE - NSFOLLOWUPINSTRUCTIONS_ED_ALL_ED_FT
You are advised to please follow up with your primary care doctor within the next 24 hours and return to the Emergency Department for worsening symptoms or any other concerns.  Your doctor may call [phone] to follow up on the specific results of the tests performed today in the emergency department.

## 2022-09-28 NOTE — ED PROVIDER NOTE - PATIENT PORTAL LINK FT
You can access the FollowMyHealth Patient Portal offered by [facility] by registering at the following website: [email or web address]. By joining [employer]’s FollowMyHealth portal, you will also be able to view your health information using other applications (apps) compatible with our system.
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
## 2022-09-28 NOTE — ED PROVIDER NOTE - CHIEF COMPLAINT
The patient is a 56y Male complaining of see chief complaint quote. Admit to Burn service  IV Abx  wound care  pain control  plan for OR 11/27

## 2022-09-29 ENCOUNTER — APPOINTMENT (OUTPATIENT)
Dept: MRI IMAGING | Facility: CLINIC | Age: 57
End: 2022-09-29

## 2022-09-29 ENCOUNTER — OUTPATIENT (OUTPATIENT)
Dept: OUTPATIENT SERVICES | Facility: HOSPITAL | Age: 57
LOS: 1 days | End: 2022-09-29

## 2022-09-29 DIAGNOSIS — S83.512A SPRAIN OF ANTERIOR CRUCIATE LIGAMENT OF LEFT KNEE, INITIAL ENCOUNTER: Chronic | ICD-10-CM

## 2022-09-29 DIAGNOSIS — T84.018D BROKEN INTERNAL JOINT PROSTHESIS, OTHER SITE, SUBSEQUENT ENCOUNTER: ICD-10-CM

## 2022-09-29 PROCEDURE — 73721 MRI JNT OF LWR EXTRE W/O DYE: CPT | Mod: 26,RT

## 2022-10-05 RX ORDER — DICLOFENAC SODIUM 1% 10 MG/G
1 GEL TOPICAL DAILY
Qty: 1 | Refills: 0 | Status: ACTIVE | COMMUNITY
Start: 2022-10-05 | End: 1900-01-01

## 2022-10-12 ENCOUNTER — APPOINTMENT (OUTPATIENT)
Dept: ORTHOPEDIC SURGERY | Facility: CLINIC | Age: 57
End: 2022-10-12

## 2022-10-12 PROCEDURE — 73562 X-RAY EXAM OF KNEE 3: CPT | Mod: RT

## 2022-10-12 PROCEDURE — 20610 DRAIN/INJ JOINT/BURSA W/O US: CPT

## 2022-10-12 PROCEDURE — 99214 OFFICE O/P EST MOD 30 MIN: CPT | Mod: 57

## 2022-10-12 NOTE — PHYSICAL EXAM
[de-identified] : Musculoskeletal:. Right knee exam shows no effusion, ROM is not examined due to a static spacer, diffuse joint line tenderness.  There is a well-healed midline surgical incision   No instability is noted to the knee.  There is mild tenderness palpation around the tibia.  Diffuse tenderness palpation around the knee no evidence of erythema drainage or discharge\par 5/5 motor strength in bilateral lower extremities. Sensory: Intact in bilateral lower extremities. DTRs: Biceps, brachioradialis, triceps, patellar, ankle and plantar 2+ and symmetric bilaterally. Pulses: dorsalis pedis, posterior tibial, femoral, popliteal, and radial 2+ and symmetric bilaterally. \par Constitutional: Alert and in no acute distress, but well-appearing. \par  [de-identified] : 3 views of the right knee obtained the office today show no acute fracture or dislocation.  There is a static antibiotic spacer in appropriate alignment evidence of fracture dislocation or hardware complication.\par \par MRI of the right knee dated 9/29/2022 is been scanned into the chart and reviewed by me.

## 2022-10-12 NOTE — DISCUSSION/SUMMARY
[Medication Risks Reviewed] : Medication risks reviewed [Surgical risks reviewed] : Surgical risks reviewed [de-identified] : Patient is a 57-year-old male here today for follow-up of a right knee antibiotic spacer placed by one of my colleagues in June.  He is now 6 months from his procedure.  He has been off his antibiotics for over the past 3 weeks.  I attempted to aspirate his knee today and received only 1 to 2 cc of bloody fluid.  There is no evidence of infection or purulence on his exam today.  We did have a thorough discussion about further surgical intervention.  We discussed risk benefits alternatives, limited to blood loss infection damage to vascular structures risk and need for revision surgery risk of periprosthetic fracture risk of quadriceps tendon rupture as well as the fact that he may still have a quadriceps tendon rupture that require further repair.  We discussed postoperative stiffness as well as the need for possible postoperative immobilization.  We also discussed that we will obtain further test intraoperatively in order to rule out infection.  I have ordered a CBC ESR and CRP in order to trend his inflammatory markers.  I have sent the fluid that was received from his knee for analysis.  We did discuss if there was evidence of infection he would require repeat antibiotic spacer.  Patient is in agreement this.  He would like to proceed with a revision total knee arthroplasty and we have scheduled that at a mutually convenient date.  He will continue to follow-up with infectious disease.  All questions were asked and answered.  He will need medical clearance.  I will see him back 2 weeks preoperatively for repeat evaluation.\par \par

## 2022-10-12 NOTE — HISTORY OF PRESENT ILLNESS
[de-identified] : Patient is a 49-year-old male here today for follow-up of his right knee pain.  He states has been off the antibiotics since his previous visit with ID over 3 weeks ago.  He states he has good days and bad days with some intermittent swelling and pain in the knee.  Has had no issues with the incision since he has been off the antibiotics.  Has no fevers chills or constitutional symptoms.  Has been using a brace and ambulating with a walker.  Is eager to proceed with a revision total hip arthroplasty as he states he is having significant pain and dysfunction in his knee.  Is here today for follow-up.

## 2022-10-12 NOTE — PROCEDURE
[Aspiration] : Aspiration [Right] : of the right [Diagnostic] : Diagnostic [Patient] : patient [Alcohol] : Alcohol [Betadine] : Betadine [Lateral] : lateral [Superior] : superior [18] : an 18-gauge [___ mL Fluid] : [unfilled] mL of [Bloody] : bloody [Bandage Applied] : a bandage [Culture] : culture [Cell Count] : cell count [Gram Stain] : gram stain [Crystal Analysis] : crystal analysis [Tolerated Well] : The patient tolerated the procedure well [None] : none

## 2022-10-12 NOTE — REASON FOR VISIT
[Follow-Up Visit] : a follow-up visit for [FreeTextEntry2] : s/p removal right TKR, all components. Revision repair of right quadriceps tendon. 6/14/22.

## 2022-10-17 ENCOUNTER — APPOINTMENT (OUTPATIENT)
Dept: INTERNAL MEDICINE | Facility: CLINIC | Age: 57
End: 2022-10-17

## 2022-10-19 LAB
BASOPHILS # BLD AUTO: 0.05 K/UL
BASOPHILS NFR BLD AUTO: 0.7 %
EOSINOPHIL # BLD AUTO: 0.69 K/UL
EOSINOPHIL NFR BLD AUTO: 9.4 %
ERYTHROCYTE [SEDIMENTATION RATE] IN BLOOD BY WESTERGREN METHOD: 21 MM/HR
HCT VFR BLD CALC: 38 %
HGB BLD-MCNC: 12.2 G/DL
IMM GRANULOCYTES NFR BLD AUTO: 0.3 %
LYMPHOCYTES # BLD AUTO: 2.2 K/UL
LYMPHOCYTES NFR BLD AUTO: 29.9 %
MAN DIFF?: NORMAL
MCHC RBC-ENTMCNC: 28.4 PG
MCHC RBC-ENTMCNC: 32.1 GM/DL
MCV RBC AUTO: 88.4 FL
MONOCYTES # BLD AUTO: 0.87 K/UL
MONOCYTES NFR BLD AUTO: 11.8 %
NEUTROPHILS # BLD AUTO: 3.54 K/UL
NEUTROPHILS NFR BLD AUTO: 47.9 %
PLATELET # BLD AUTO: 204 K/UL
RBC # BLD: 4.3 M/UL
RBC # FLD: 15.9 %
WBC # FLD AUTO: 7.37 K/UL

## 2022-10-21 LAB — CRP SERPL-MCNC: 7 MG/L

## 2022-10-30 ENCOUNTER — EMERGENCY (EMERGENCY)
Facility: HOSPITAL | Age: 57
LOS: 0 days | Discharge: HOME | End: 2022-10-30
Attending: EMERGENCY MEDICINE
Payer: MEDICAID

## 2022-10-30 VITALS
DIASTOLIC BLOOD PRESSURE: 82 MMHG | OXYGEN SATURATION: 96 % | RESPIRATION RATE: 18 BRPM | SYSTOLIC BLOOD PRESSURE: 116 MMHG | HEART RATE: 71 BPM | TEMPERATURE: 98 F

## 2022-10-30 VITALS — WEIGHT: 214.07 LBS | HEIGHT: 68 IN

## 2022-10-30 DIAGNOSIS — M79.89 OTHER SPECIFIED SOFT TISSUE DISORDERS: ICD-10-CM

## 2022-10-30 DIAGNOSIS — F41.9 ANXIETY DISORDER, UNSPECIFIED: ICD-10-CM

## 2022-10-30 DIAGNOSIS — Z96.651 PRESENCE OF RIGHT ARTIFICIAL KNEE JOINT: ICD-10-CM

## 2022-10-30 DIAGNOSIS — M79.661 PAIN IN RIGHT LOWER LEG: ICD-10-CM

## 2022-10-30 DIAGNOSIS — I10 ESSENTIAL (PRIMARY) HYPERTENSION: ICD-10-CM

## 2022-10-30 DIAGNOSIS — F32.A DEPRESSION, UNSPECIFIED: ICD-10-CM

## 2022-10-30 DIAGNOSIS — M25.561 PAIN IN RIGHT KNEE: ICD-10-CM

## 2022-10-30 DIAGNOSIS — S83.512A SPRAIN OF ANTERIOR CRUCIATE LIGAMENT OF LEFT KNEE, INITIAL ENCOUNTER: Chronic | ICD-10-CM

## 2022-10-30 DIAGNOSIS — Z86.59 PERSONAL HISTORY OF OTHER MENTAL AND BEHAVIORAL DISORDERS: ICD-10-CM

## 2022-10-30 DIAGNOSIS — Z91.013 ALLERGY TO SEAFOOD: ICD-10-CM

## 2022-10-30 LAB
ALBUMIN SERPL ELPH-MCNC: 3.6 G/DL — SIGNIFICANT CHANGE UP (ref 3.3–5)
ALP SERPL-CCNC: 132 U/L — HIGH (ref 40–120)
ALT FLD-CCNC: 34 U/L — SIGNIFICANT CHANGE UP (ref 12–78)
ANION GAP SERPL CALC-SCNC: 3 MMOL/L — LOW (ref 5–17)
APTT BLD: 29.9 SEC — SIGNIFICANT CHANGE UP (ref 27.5–35.5)
AST SERPL-CCNC: 26 U/L — SIGNIFICANT CHANGE UP (ref 15–37)
BASOPHILS # BLD AUTO: 0.06 K/UL — SIGNIFICANT CHANGE UP (ref 0–0.2)
BASOPHILS NFR BLD AUTO: 0.8 % — SIGNIFICANT CHANGE UP (ref 0–2)
BILIRUB SERPL-MCNC: 0.3 MG/DL — SIGNIFICANT CHANGE UP (ref 0.2–1.2)
BUN SERPL-MCNC: 21 MG/DL — SIGNIFICANT CHANGE UP (ref 7–23)
CALCIUM SERPL-MCNC: 9.2 MG/DL — SIGNIFICANT CHANGE UP (ref 8.5–10.1)
CHLORIDE SERPL-SCNC: 105 MMOL/L — SIGNIFICANT CHANGE UP (ref 96–108)
CO2 SERPL-SCNC: 29 MMOL/L — SIGNIFICANT CHANGE UP (ref 22–31)
CREAT SERPL-MCNC: 0.79 MG/DL — SIGNIFICANT CHANGE UP (ref 0.5–1.3)
EGFR: 103 ML/MIN/1.73M2 — SIGNIFICANT CHANGE UP
EOSINOPHIL # BLD AUTO: 0.46 K/UL — SIGNIFICANT CHANGE UP (ref 0–0.5)
EOSINOPHIL NFR BLD AUTO: 6.2 % — HIGH (ref 0–6)
GLUCOSE SERPL-MCNC: 96 MG/DL — SIGNIFICANT CHANGE UP (ref 70–99)
HCT VFR BLD CALC: 37.8 % — LOW (ref 39–50)
HGB BLD-MCNC: 12.2 G/DL — LOW (ref 13–17)
IMM GRANULOCYTES NFR BLD AUTO: 0.3 % — SIGNIFICANT CHANGE UP (ref 0–0.9)
INR BLD: 0.97 RATIO — SIGNIFICANT CHANGE UP (ref 0.88–1.16)
LYMPHOCYTES # BLD AUTO: 1.76 K/UL — SIGNIFICANT CHANGE UP (ref 1–3.3)
LYMPHOCYTES # BLD AUTO: 23.7 % — SIGNIFICANT CHANGE UP (ref 13–44)
MCHC RBC-ENTMCNC: 28.8 PG — SIGNIFICANT CHANGE UP (ref 27–34)
MCHC RBC-ENTMCNC: 32.3 GM/DL — SIGNIFICANT CHANGE UP (ref 32–36)
MCV RBC AUTO: 89.4 FL — SIGNIFICANT CHANGE UP (ref 80–100)
MONOCYTES # BLD AUTO: 0.59 K/UL — SIGNIFICANT CHANGE UP (ref 0–0.9)
MONOCYTES NFR BLD AUTO: 7.9 % — SIGNIFICANT CHANGE UP (ref 2–14)
NEUTROPHILS # BLD AUTO: 4.54 K/UL — SIGNIFICANT CHANGE UP (ref 1.8–7.4)
NEUTROPHILS NFR BLD AUTO: 61.1 % — SIGNIFICANT CHANGE UP (ref 43–77)
PLATELET # BLD AUTO: 314 K/UL — SIGNIFICANT CHANGE UP (ref 150–400)
POTASSIUM SERPL-MCNC: 4.1 MMOL/L — SIGNIFICANT CHANGE UP (ref 3.5–5.3)
POTASSIUM SERPL-SCNC: 4.1 MMOL/L — SIGNIFICANT CHANGE UP (ref 3.5–5.3)
PROT SERPL-MCNC: 7.6 GM/DL — SIGNIFICANT CHANGE UP (ref 6–8.3)
PROTHROM AB SERPL-ACNC: 11.2 SEC — SIGNIFICANT CHANGE UP (ref 10.5–13.4)
RBC # BLD: 4.23 M/UL — SIGNIFICANT CHANGE UP (ref 4.2–5.8)
RBC # FLD: 15.5 % — HIGH (ref 10.3–14.5)
SODIUM SERPL-SCNC: 137 MMOL/L — SIGNIFICANT CHANGE UP (ref 135–145)
WBC # BLD: 7.43 K/UL — SIGNIFICANT CHANGE UP (ref 3.8–10.5)
WBC # FLD AUTO: 7.43 K/UL — SIGNIFICANT CHANGE UP (ref 3.8–10.5)

## 2022-10-30 PROCEDURE — 73552 X-RAY EXAM OF FEMUR 2/>: CPT | Mod: RT

## 2022-10-30 PROCEDURE — 85730 THROMBOPLASTIN TIME PARTIAL: CPT

## 2022-10-30 PROCEDURE — 73562 X-RAY EXAM OF KNEE 3: CPT | Mod: 26,RT

## 2022-10-30 PROCEDURE — 73552 X-RAY EXAM OF FEMUR 2/>: CPT | Mod: 26,RT

## 2022-10-30 PROCEDURE — 99285 EMERGENCY DEPT VISIT HI MDM: CPT | Mod: 25

## 2022-10-30 PROCEDURE — 85025 COMPLETE CBC W/AUTO DIFF WBC: CPT

## 2022-10-30 PROCEDURE — 73590 X-RAY EXAM OF LOWER LEG: CPT | Mod: RT

## 2022-10-30 PROCEDURE — 93971 EXTREMITY STUDY: CPT | Mod: 26,RT

## 2022-10-30 PROCEDURE — 73562 X-RAY EXAM OF KNEE 3: CPT | Mod: RT

## 2022-10-30 PROCEDURE — 85610 PROTHROMBIN TIME: CPT

## 2022-10-30 PROCEDURE — 93971 EXTREMITY STUDY: CPT | Mod: RT

## 2022-10-30 PROCEDURE — 99285 EMERGENCY DEPT VISIT HI MDM: CPT

## 2022-10-30 PROCEDURE — 36415 COLL VENOUS BLD VENIPUNCTURE: CPT

## 2022-10-30 PROCEDURE — 73590 X-RAY EXAM OF LOWER LEG: CPT | Mod: 26,RT

## 2022-10-30 PROCEDURE — 80053 COMPREHEN METABOLIC PANEL: CPT

## 2022-10-30 NOTE — ED STATDOCS - CARE PLAN
1 Principal Discharge DX:	Pain of joint of lower extremity  Secondary Diagnosis:	Swelling of lower extremity

## 2022-10-30 NOTE — ED STATDOCS - ADDITIONAL NOTES AND INSTRUCTIONS:
He needs to be non weight bearing and keep leg elevated as often as possible until cleared by the orthopedist

## 2022-10-30 NOTE — ED STATDOCS - PROGRESS NOTE DETAILS
Patient seen and evaluated.  Given inability to bend knee and overuse, he is having swelling and pain.  Advised he should be non-weight bearing and to keep leg elevated with ice until cleared by his orthopedist.  He states he is having difficulty being non weight bearing as he struggles to use the walker and he lives in a shelter where he is supposed to be out and about all day.  Case d/w SW who will try to get a wheel chair for the patient.  Wheel chair should have leg rest so he can elevate the leg and decrease the swelling.  Reviewed with patient the importance of moving the leg to prevent formation of DVT.  He will follow up with his orthopedist -Ruddy Wahl PA-C

## 2022-10-30 NOTE — ED STATDOCS - OBJECTIVE STATEMENT
58 y/o male w/ PMHx of HTN, s/p right knee replacement presents to ED c/o right knee pain. Had the replacement done by Dr. Graves. Last replacement was on June 16th. Replacement was taken out and a eloy was put in. Was told to stop taking antibiotics for 3 weeks in order for knee drainage. On 21st, pt is scheduled for a knee replacement. Pt vapes. Denies drinking.

## 2022-10-30 NOTE — ED ADULT NURSE NOTE - NSIMPLEMENTINTERV_GEN_ALL_ED
Implemented All Fall Risk Interventions:  [place] to call system. Call bell, personal items and telephone within reach. Instruct patient to call for assistance. Room bathroom lighting operational. Non-slip footwear when patient is off stretcher. Physically safe environment: no spills, clutter or unnecessary equipment. Stretcher in lowest position, wheels locked, appropriate side rails in place. Provide visual cue, wrist band, yellow gown, etc. Monitor gait and stability. Monitor for mental status changes and reorient to person, place, and time. Review medications for side effects contributing to fall risk. Reinforce activity limits and safety measures with patient and family.

## 2022-10-30 NOTE — ED STATDOCS - CLINICAL SUMMARY MEDICAL DECISION MAKING FREE TEXT BOX
Pt with recent removal of knee replacement, internal fixation eloy placement pending surgery, p/w pain related to overuse and walking on leg. Afebrile, XRs unremarkable. Pt requesting wheelchair and SW eval which was reqwuested. Otherwise stable for fu with his orthopedist

## 2022-10-30 NOTE — ED ADULT NURSE NOTE - OBJECTIVE STATEMENT
Pt. to the ED C/O Right Knee Pain. pt states he has right knee replacement sx planned for next month but d/t difficulty with homing situation was forced to move into a shelter. Pt uses a walker at the shelter to ambulate but is not allowed to go to his bed until 7p at night thus he has to be on his feet all day. pt c/o increased right knee swelling x 1 week.

## 2022-10-30 NOTE — ED STATDOCS - PATIENT PORTAL LINK FT
You can access the FollowMyHealth Patient Portal offered by [facility] by registering at the following website: [email or web address]. By joining [employer]’s FollowMyHealth portal, you will also be able to view your health information using other applications (apps) compatible with our system.

## 2022-10-30 NOTE — ED STATDOCS - ATTENDING APP SHARED VISIT CONTRIBUTION OF CARE
I, Yosvany Hodge MD, personally saw the patient with KELSEY.  I have personally performed a face to face diagnostic evaluation on this patient.  I have reviewed the KELSEY note and agree with the history, exam, and plan of care, except as noted.

## 2022-10-30 NOTE — ED STATDOCS - PHYSICAL EXAMINATION
General: AAOx3, NAD  HEENT: NCAT  Cardiac: Normal rate, normal peripheral perfusion  Respiratory: Normal rate and effort  GI: Soft, nondistended  Neuro: No focal deficits. RENTERIA equally x4  MSK: FROMx4, no peripheral edema. RLE fixed extension, mild swelling around knee joint, no induration or fluctuance, minimal diffuse TTP.  Skin: No rash

## 2022-10-30 NOTE — ED STATDOCS - NS_ ATTENDINGSCRIBEDETAILS _ED_A_ED_FT
I, Yosvany Hodge MD,  performed the initial face to face bedside interview with this patient regarding history of present illness, review of symptoms and relevant past medical, social and family history.  I completed an independent physical examination.  I was the initial provider who evaluated this patient. I have signed out the follow up of any pending tests (i.e. labs, radiological studies) to the KELSEY.  I have communicated the patient’s plan of care and disposition with the KELSEY.  The history, relevant review of systems, past medical and surgical history, medical decision making, and physical examination was documented by the scribe in my presence and I attest to the accuracy of the documentation.

## 2022-10-31 ENCOUNTER — EMERGENCY (EMERGENCY)
Facility: HOSPITAL | Age: 57
LOS: 0 days | Discharge: HOME | End: 2022-11-01
Attending: EMERGENCY MEDICINE
Payer: MEDICAID

## 2022-10-31 VITALS
HEART RATE: 81 BPM | TEMPERATURE: 97 F | WEIGHT: 154.98 LBS | OXYGEN SATURATION: 95 % | RESPIRATION RATE: 18 BRPM | HEIGHT: 68 IN | DIASTOLIC BLOOD PRESSURE: 78 MMHG | SYSTOLIC BLOOD PRESSURE: 129 MMHG

## 2022-10-31 DIAGNOSIS — F32.A DEPRESSION, UNSPECIFIED: ICD-10-CM

## 2022-10-31 DIAGNOSIS — M25.561 PAIN IN RIGHT KNEE: ICD-10-CM
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
## 2022-10-31 DIAGNOSIS — Z91.013 ALLERGY TO SEAFOOD: ICD-10-CM

## 2022-10-31 DIAGNOSIS — Z87.898 PERSONAL HISTORY OF OTHER SPECIFIED CONDITIONS: ICD-10-CM

## 2022-10-31 DIAGNOSIS — Z86.59 PERSONAL HISTORY OF OTHER MENTAL AND BEHAVIORAL DISORDERS: ICD-10-CM
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
## 2022-10-31 DIAGNOSIS — I10 ESSENTIAL (PRIMARY) HYPERTENSION: ICD-10-CM
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
## 2022-10-31 DIAGNOSIS — S83.512A SPRAIN OF ANTERIOR CRUCIATE LIGAMENT OF LEFT KNEE, INITIAL ENCOUNTER: Chronic | ICD-10-CM
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
## 2022-10-31 DIAGNOSIS — Z89.611 ACQUIRED ABSENCE OF RIGHT LEG ABOVE KNEE: ICD-10-CM

## 2022-10-31 LAB
ALBUMIN SERPL ELPH-MCNC: 3.5 G/DL — SIGNIFICANT CHANGE UP (ref 3.3–5)
ALP SERPL-CCNC: 130 U/L — HIGH (ref 40–120)
ALT FLD-CCNC: 36 U/L — SIGNIFICANT CHANGE UP (ref 12–78)
ANION GAP SERPL CALC-SCNC: 4 MMOL/L — LOW (ref 5–17)
AST SERPL-CCNC: 38 U/L — HIGH (ref 15–37)
BASOPHILS # BLD AUTO: 0.04 K/UL — SIGNIFICANT CHANGE UP (ref 0–0.2)
BASOPHILS NFR BLD AUTO: 0.7 % — SIGNIFICANT CHANGE UP (ref 0–2)
BILIRUB SERPL-MCNC: 0.3 MG/DL — SIGNIFICANT CHANGE UP (ref 0.2–1.2)
BUN SERPL-MCNC: 22 MG/DL — SIGNIFICANT CHANGE UP (ref 7–23)
CALCIUM SERPL-MCNC: 9.3 MG/DL — SIGNIFICANT CHANGE UP (ref 8.5–10.1)
CHLORIDE SERPL-SCNC: 106 MMOL/L — SIGNIFICANT CHANGE UP (ref 96–108)
CO2 SERPL-SCNC: 26 MMOL/L — SIGNIFICANT CHANGE UP (ref 22–31)
CREAT SERPL-MCNC: 0.83 MG/DL — SIGNIFICANT CHANGE UP (ref 0.5–1.3)
EGFR: 101 ML/MIN/1.73M2 — SIGNIFICANT CHANGE UP
EOSINOPHIL # BLD AUTO: 0.34 K/UL — SIGNIFICANT CHANGE UP (ref 0–0.5)
EOSINOPHIL NFR BLD AUTO: 6 % — SIGNIFICANT CHANGE UP (ref 0–6)
GLUCOSE SERPL-MCNC: 103 MG/DL — HIGH (ref 70–99)
HCT VFR BLD CALC: 38.6 % — LOW (ref 39–50)
HGB BLD-MCNC: 12.9 G/DL — LOW (ref 13–17)
IMM GRANULOCYTES NFR BLD AUTO: 0.2 % — SIGNIFICANT CHANGE UP (ref 0–0.9)
LYMPHOCYTES # BLD AUTO: 2.29 K/UL — SIGNIFICANT CHANGE UP (ref 1–3.3)
LYMPHOCYTES # BLD AUTO: 40.4 % — SIGNIFICANT CHANGE UP (ref 13–44)
MCHC RBC-ENTMCNC: 29.3 PG — SIGNIFICANT CHANGE UP (ref 27–34)
MCHC RBC-ENTMCNC: 33.4 GM/DL — SIGNIFICANT CHANGE UP (ref 32–36)
MCV RBC AUTO: 87.5 FL — SIGNIFICANT CHANGE UP (ref 80–100)
MONOCYTES # BLD AUTO: 0.37 K/UL — SIGNIFICANT CHANGE UP (ref 0–0.9)
MONOCYTES NFR BLD AUTO: 6.5 % — SIGNIFICANT CHANGE UP (ref 2–14)
NEUTROPHILS # BLD AUTO: 2.62 K/UL — SIGNIFICANT CHANGE UP (ref 1.8–7.4)
NEUTROPHILS NFR BLD AUTO: 46.2 % — SIGNIFICANT CHANGE UP (ref 43–77)
PLATELET # BLD AUTO: 30 K/UL — LOW (ref 150–400)
POTASSIUM SERPL-MCNC: 5.2 MMOL/L — SIGNIFICANT CHANGE UP (ref 3.5–5.3)
POTASSIUM SERPL-SCNC: 5.2 MMOL/L — SIGNIFICANT CHANGE UP (ref 3.5–5.3)
PROT SERPL-MCNC: 7.8 GM/DL — SIGNIFICANT CHANGE UP (ref 6–8.3)
RBC # BLD: 4.41 M/UL — SIGNIFICANT CHANGE UP (ref 4.2–5.8)
RBC # FLD: 15.9 % — HIGH (ref 10.3–14.5)
SODIUM SERPL-SCNC: 136 MMOL/L — SIGNIFICANT CHANGE UP (ref 135–145)
WBC # BLD: 5.67 K/UL — SIGNIFICANT CHANGE UP (ref 3.8–10.5)
WBC # FLD AUTO: 5.67 K/UL — SIGNIFICANT CHANGE UP (ref 3.8–10.5)

## 2022-10-31 PROCEDURE — 80053 COMPREHEN METABOLIC PANEL: CPT

## 2022-10-31 PROCEDURE — 76376 3D RENDER W/INTRP POSTPROCES: CPT

## 2022-10-31 PROCEDURE — 85652 RBC SED RATE AUTOMATED: CPT

## 2022-10-31 PROCEDURE — 73502 X-RAY EXAM HIP UNI 2-3 VIEWS: CPT | Mod: RT

## 2022-10-31 PROCEDURE — 73502 X-RAY EXAM HIP UNI 2-3 VIEWS: CPT | Mod: 26,RT

## 2022-10-31 PROCEDURE — 73700 CT LOWER EXTREMITY W/O DYE: CPT | Mod: 26,RT,MD

## 2022-10-31 PROCEDURE — 99285 EMERGENCY DEPT VISIT HI MDM: CPT

## 2022-10-31 PROCEDURE — 73552 X-RAY EXAM OF FEMUR 2/>: CPT | Mod: 26,RT

## 2022-10-31 PROCEDURE — 70450 CT HEAD/BRAIN W/O DYE: CPT | Mod: 26,MD

## 2022-10-31 PROCEDURE — 73700 CT LOWER EXTREMITY W/O DYE: CPT | Mod: MD,RT

## 2022-10-31 PROCEDURE — 36415 COLL VENOUS BLD VENIPUNCTURE: CPT

## 2022-10-31 PROCEDURE — 73564 X-RAY EXAM KNEE 4 OR MORE: CPT | Mod: 26,RT

## 2022-10-31 PROCEDURE — 73552 X-RAY EXAM OF FEMUR 2/>: CPT | Mod: RT

## 2022-10-31 PROCEDURE — 85049 AUTOMATED PLATELET COUNT: CPT

## 2022-10-31 PROCEDURE — 85025 COMPLETE CBC W/AUTO DIFF WBC: CPT

## 2022-10-31 PROCEDURE — 73564 X-RAY EXAM KNEE 4 OR MORE: CPT | Mod: RT

## 2022-10-31 PROCEDURE — 86140 C-REACTIVE PROTEIN: CPT

## 2022-10-31 PROCEDURE — 99285 EMERGENCY DEPT VISIT HI MDM: CPT | Mod: 25

## 2022-10-31 PROCEDURE — 93005 ELECTROCARDIOGRAM TRACING: CPT

## 2022-10-31 PROCEDURE — 76376 3D RENDER W/INTRP POSTPROCES: CPT | Mod: 26

## 2022-10-31 PROCEDURE — 70450 CT HEAD/BRAIN W/O DYE: CPT | Mod: MD

## 2022-10-31 RX ORDER — SODIUM CHLORIDE 9 MG/ML
1000 INJECTION INTRAMUSCULAR; INTRAVENOUS; SUBCUTANEOUS ONCE
Refills: 0 | Status: COMPLETED | OUTPATIENT
Start: 2022-10-31 | End: 2022-10-31

## 2022-10-31 RX ADMIN — SODIUM CHLORIDE 1000 MILLILITER(S): 9 INJECTION INTRAMUSCULAR; INTRAVENOUS; SUBCUTANEOUS at 17:55

## 2022-10-31 NOTE — ED PROVIDER NOTE - OBJECTIVE STATEMENT
58 y/o male w/ PMHx of HTN presents to ED from [facility] s/p R knee pain, awaiting R knee replacement surgery. Pt c/o fall out of bed today that worsened knee pain. Pt was trying to cook and the pain worsened. Denies headache, head strike. 58 y/o male w/ PMHx of HTN presents to ED from [facility] s/p R knee pain, awaiting R knee replacement surgery. Pt c/o fall out of bed today that worsened knee pain. Pt was trying to cook and the pain worsened. Denies headache, head strike. No cp, sob or palpitation. No fever or chills. No skin rash. No visual or focal neurological complaints.

## 2022-10-31 NOTE — ED ADULT NURSE NOTE - NSSEPSISNEWALTERMENTAL_ED_A_ED
Chief Complaint   Patient presents with   • Establish Care   • Diarrhea     incontinent of stool  blood in stool        HPI: This is a 66 year old female here with concerns of change in bowel habits, she reports she will have a normal stool in the morning and then will have an \"attack\" and will have more oil stools, \"attacks\" use to only occur 1-2 times per year but now have been almost daily.  She has had fecal incontinence, and has seen blood/black stools x1 episode.  Abdominal pain, cramping improved after a bowel movement.  Mild nausea, no vomiting, denies fevers, chills.  Denies recent antibiotics or recent travel.  She did start a drink for inflammation that has tumeric, vinegar and honey but she does not think this caused these symptoms.  Appetite and weight are stable.       Colonoscopy August 5, 2015 with left sided diverticulosis, large internal hemorrhoids, scarring in the rectum consistent with prior proctitis, proximal ascending colon polyp.      Medications:  Current Outpatient Prescriptions   Medication Sig   • CARTIA  MG 24 hr capsule TAKE ONE CAPSULE BY MOUTH DAILY   • ibuprofen (MOTRIN) 200 MG tablet Take 400 mg by mouth every 6 hours as needed for Pain.   • naproxen (NAPROSYN) 500 MG tablet Take 500 mg by mouth 2 times daily (with meals).   • Ascorbic Acid (VITAMIN C PO) Take by mouth daily.   • DISPENSE Explosion drink   • hydrocortisone (CORTIZONE) 2.5 % ointment Apply to the affected areas twice a day for 7 days to the angles of mouth then 2 times weekly thereafter (Patient taking differently: Apply to the affected areas three times a day.)   • benzocaine (AMERICAINE) 20 % rectal ointment Apply to perianal area prn   • Omega-3 Fatty Acids (FISH OIL PO) Take 1 capsule by mouth daily.   • nystatin (MYCOSTATIN) cream Apply topically to the affected area 2 times daily.   • MULTIPLE VITAMIN PO Take 1 tablet by mouth daily.   • CALCIUM CITRATE PO Take 1 tablet by mouth daily.   • Polyvinyl  Alcohol-Povidone (REFRESH OP) Place 1 drop into both eyes 2 times daily.   • aspirin 81 MG tablet Take 1 tablet by mouth daily.   • clindamycin (CLEOCIN) 150 MG capsule Take 600 mg by mouth. Prior to dental appointments   • furosemide (LASIX) 20 MG tablet Take 20 mg by mouth as needed. Indications: leg swelling    • Flaxseed, Linseed, (FLAX SEED OIL PO) Puts in a mix smoothie and drinks or she takes the pills if she runs out of the oil.   • Acetaminophen (TYLENOL PO) Take  by mouth as needed. Takes as needed for pain.     No current facility-administered medications for this visit.        Allergies:  Allergies as of 12/15/2017 - Reviewed 12/15/2017   Allergen Reaction Noted   • Aluminum hydroxide ANAPHYLAXIS 11/25/2013   • Omeprazole SWELLING 11/25/2013   • Tramadol SWELLING 11/25/2013   • Aim toothpaste SWELLING 04/01/2015   • Amlodipine DIARRHEA 09/30/2015   • Atenolol SWELLING 08/28/2013   • Blue dyes     • Orange SWELLING 02/11/2015   • Penicillin g Other (See Comments) 11/25/2013   • Sulfa antibiotics  08/28/2013   • Sulfate  08/28/2013   • Tetanus toxoid         History:  History   Smoking Status   • Never Smoker   Smokeless Tobacco   • Never Used       Review of systems:  Constitutional: Denies fevers or chills.  GI: Denies  vomiting,constipation, bloody stools.  Positive for nausea, abdominal pain, diarrhea.    Objective:  Visit Vitals  /84   Pulse 64   Ht 5' 8\" (1.727 m)   Wt 102.1 kg   BMI 34.21 kg/m²     Alert, appropriate, ambulatory, cooperative and in no acute distress.  Cardiac: S1S2, regular rate and rhythm.  No murmurs, rubs or gallops.  Respiratory: Clear to auscultation.  No adventitious sounds.  Abdomen: Active bowel sounds, soft, non distended. Generalized abdominal tenderness.  No hepatomegaly, masses, guarding or rebound.    Assessment:  1.  Abdominal pain   2.  Fecal incontinence  3.  Diarrhea   4.  Nausea     Plan:  1.  Lab work today and stool specimens rule out infectious etiology,  if negative colonoscopy will be recommended, questions answered and she agrees with plan of care. Start a probiotic daily.      Collaborating physician:  ANTONIO Parra    No

## 2022-10-31 NOTE — ED ADULT NURSE NOTE - OBJECTIVE STATEMENT
Pt presents to ED from [facility]. c/o R knee pain, awaiting R knee replacement surgery. c/o fall out of bed today that worsened knee pain. denies headstrike, blood thinners.

## 2022-10-31 NOTE — ED ADULT TRIAGE NOTE - CHIEF COMPLAINT QUOTE
Pt presents to ED from [facility]. c/o R knee pain, awaiting R knee replacement surgery. c/o fall out of bed today that worsened knee pain. denies headstrike, blood thinners.

## 2022-10-31 NOTE — CONSULT NOTE ADULT - SUBJECTIVE AND OBJECTIVE BOX
Patient is a 57yMale community ambulator with assistive devices who presents to ED w/ a c/o of right knee pain. Patient states he fell walking to the bathroom this AM and had increasing pain in the right knee. Denies HH/LOC. States ability/inability to walk immediately following the injury. Denies any numbness or tingling. Denies having any other pain elsewhere. 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 Antibiotic cement spacer was placed and patient had IV abx through PICC for 6 weeks at [facility]. Patient now follows with Dr Canada who is planning on revision TKA on 11/21.    Depression    Anxiety    Alcohol abuse    Overdose    No pertinent past medical history            shellfish. (Hives)      PHYSICAL EXAM:  T(C): 36.3 (10-31-22 @ 15:55), Max: 36.3 (10-31-22 @ 15:55)  HR: 81 (10-31-22 @ 15:55) (81 - 81)  BP: 129/78 (10-31-22 @ 15:55) (129/78 - 129/78)  RR: 18 (10-31-22 @ 15:55) (18 - 18)  SpO2: 95% (10-31-22 @ 15:55) (95% - 95%)    Gen: NAD, Resting comfortably    RLE:  Skin intact, incision healed, no dehiscence or drainage   mild bony tenderness to palpation about knee  unable to bend knee 2/2 nail and abx fusion  +EHL/FHL/TA/GSC  +SILT L3-S1  + DP  Compartments soft and compressible  No calf tenderness    Secondary Exam: Benign, Skin intact, NTTP along axial spine, SILT throughout, motor grossly intact throughout, no other orthopedic injuries at this time, compartments soft and compressible     XR and CT show right knee s/p nailing with abx cement spacer placement with unchanged fractures at the cement mantle.     A/P: 56yMale s/p Removal infected total knee arthroplasty with placement Antibiotic static cement spacer planned for revision TKA with Dr Canada on 11/21    - pain control  - no acute injury detected  - WBAT in knee immobilizer with assistive devices as needed  - FU with primary ortho surgeon Dr Dread sheldon 7-10 days   - plan for revision TKA with Dr Canada on 11/21  - ortho stable for DC  - discussed with Dr Huerta who agrees with above plan

## 2022-10-31 NOTE — ED PROVIDER NOTE - PATIENT PORTAL LINK FT
You can access the FollowMyHealth Patient Portal offered by [facility] by registering at the following website: [email or web address]. By joining [employer]’s FollowMyHealth portal, you will also be able to view your health information using other applications (apps) compatible with our system.

## 2022-10-31 NOTE — CONSULT NOTE ADULT - NSCONSULTADDITIONALINFOA_GEN_ALL_CORE
Orthopaedic Trauma Surgeon Addendum:    I have reviewed the resident note and agree with the history, exam, and plan of care, except as noted.    X-rays appear stable. Planning for revision TKA with Dr. Canada in 3 weeks    Timi Huerta MD  Orthopaedic Trauma Surgeon  [facility]

## 2022-10-31 NOTE — ED PROVIDER NOTE - NS_ ATTENDINGSCRIBEDETAILS _ED_A_ED_FT
I Arcenio Aguilar MD saw and examined the patient. Scribe documented for me and under my supervision. I have modified the scribe's documentation where necessary to reflect my history, physical exam and other relevant documentations pertinent to the care of the patient.

## 2022-10-31 NOTE — ED ADULT TRIAGE NOTE - NS ED NURSE AMBULANCES
Group Topic:  KIRA Process Group    Date: 9/29/2020  Start Time:  9:00 AM  End Time: 10:00 AM  Facilitators: Andreia Payton LPC    Focus: Patients attended te morning process group focusing on events of last evening and healthy goals for the day tat can help substantiate sobriety and well-being.  Number in attendance: 6      Method: Group  Attendance: Present  Participation: Active  Patient Response: Attentive  Mood: Indifferent  Affect: Calm  Behavior/Socialization: Appropriate to group and Cooperative  Thought Process: Focused  Task Performance: Follows directions    Patient attended group and was again warmly welcomed as writer was not in yesterday.   Patient discussed motivation for treatment and is working on identifying relapse prevention triggers.         [facility]

## 2022-10-31 NOTE — ED PROVIDER NOTE - MUSCULOSKELETAL, MLM
Spine appears normal, range of motion is not limited, TTP right knee evidence of past surgery, 2+ medial dp, 5/5 strength on extremities Spine appears normal, range of motion is not limited, TTP right knee evidence of past surgery, 2+ pulses in bilateral radial and dp arteries, 5/5 strength on extremities on flexion and extension of all limbs. No nuchal rigidity. No saddle anesthesia.

## 2022-10-31 NOTE — ED ADULT NURSE NOTE - CHIEF COMPLAINT QUOTE
Pt presents to ED from [facility]. c/o R knee pain, awaiting R knee replacement surgery. c/o fall out of bed today that worsened knee pain. denies headstrike, blood thinners.

## 2022-10-31 NOTE — ED PROVIDER NOTE - CLINICAL SUMMARY MEDICAL DECISION MAKING FREE TEXT BOX
pt c/o knee pain and some headache with fall impacted on knee. Plan: labs CT head/knee and orthopedic consultation

## 2022-10-31 NOTE — ED PROVIDER NOTE - PROGRESS NOTE DETAILS
Ricardo Aguilar   spoke with Dr. Pedro Canada over the phone ([phone]) discussed the results of the labs.  states from an orthopedic perspective pt is safe to go home however pt is uncomfortable to leave and os worried  about living situation. Will keep pt overnight for . pt s/o to me, seen by enrico, will go back to [facility]. patient ambulatory with walker, in no distress    ED evaluation and management discussed with the patient and family (if available) in detail.  Close PMD follow up encouraged.  Strict ED return instructions discussed in detail and patient given the opportunity to ask any questions about their discharge diagnosis and instructions. Patient verbalized understanding. Ricardo Aguilar   spoke with Dr. Pedro Canada, patient's orthopedics, over the phone ([phone]) discussed the results of the labs. He states from an orthopedic perspective pt is safe to go home, no concern from last evaluation for septic arthritis, however if pt is uncomfortable to leave and so worried  about living situation ED can help. Will keep pt overnight for  as patient does not feel comfortable to leave and has not the infrastructure to safely return back home. SW to consult.

## 2022-11-01 ENCOUNTER — EMERGENCY (EMERGENCY)
Facility: HOSPITAL | Age: 57
LOS: 0 days | Discharge: HOME | End: 2022-11-02
Attending: EMERGENCY MEDICINE
Payer: MEDICAID

## 2022-11-01 VITALS
RESPIRATION RATE: 18 BRPM | SYSTOLIC BLOOD PRESSURE: 135 MMHG | DIASTOLIC BLOOD PRESSURE: 80 MMHG | HEART RATE: 75 BPM | OXYGEN SATURATION: 100 %

## 2022-11-01 VITALS
HEART RATE: 108 BPM | OXYGEN SATURATION: 95 % | DIASTOLIC BLOOD PRESSURE: 93 MMHG | SYSTOLIC BLOOD PRESSURE: 150 MMHG | RESPIRATION RATE: 20 BRPM

## 2022-11-01 DIAGNOSIS — R45.1 RESTLESSNESS AND AGITATION: ICD-10-CM

## 2022-11-01 DIAGNOSIS — S83.512A SPRAIN OF ANTERIOR CRUCIATE LIGAMENT OF LEFT KNEE, INITIAL ENCOUNTER: Chronic | ICD-10-CM
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
## 2022-11-01 DIAGNOSIS — T40.601A POISONING BY UNSPECIFIED NARCOTICS, ACCIDENTAL (UNINTENTIONAL), INITIAL ENCOUNTER: ICD-10-CM

## 2022-11-01 DIAGNOSIS — F41.9 ANXIETY DISORDER, UNSPECIFIED: ICD-10-CM

## 2022-11-01 DIAGNOSIS — Z20.822 CONTACT WITH AND (SUSPECTED) EXPOSURE TO COVID-19: ICD-10-CM

## 2022-11-01 DIAGNOSIS — Z91.013 ALLERGY TO SEAFOOD: ICD-10-CM
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
## 2022-11-01 DIAGNOSIS — R40.4 TRANSIENT ALTERATION OF AWARENESS: ICD-10-CM

## 2022-11-01 DIAGNOSIS — F10.129 ALCOHOL ABUSE WITH INTOXICATION, UNSPECIFIED: ICD-10-CM

## 2022-11-01 DIAGNOSIS — F32.A DEPRESSION, UNSPECIFIED: ICD-10-CM

## 2022-11-01 LAB
ALBUMIN SERPL ELPH-MCNC: 3.8 G/DL — SIGNIFICANT CHANGE UP (ref 3.3–5)
ALP SERPL-CCNC: 138 U/L — HIGH (ref 40–120)
ALT FLD-CCNC: 35 U/L — SIGNIFICANT CHANGE UP (ref 12–78)
ANION GAP SERPL CALC-SCNC: 10 MMOL/L — SIGNIFICANT CHANGE UP (ref 5–17)
APTT BLD: 31.1 SEC — SIGNIFICANT CHANGE UP (ref 27.5–35.5)
AST SERPL-CCNC: 24 U/L — SIGNIFICANT CHANGE UP (ref 15–37)
BASOPHILS # BLD AUTO: 0.08 K/UL — SIGNIFICANT CHANGE UP (ref 0–0.2)
BASOPHILS NFR BLD AUTO: 0.7 % — SIGNIFICANT CHANGE UP (ref 0–2)
BILIRUB SERPL-MCNC: 0.2 MG/DL — SIGNIFICANT CHANGE UP (ref 0.2–1.2)
BUN SERPL-MCNC: 20 MG/DL — SIGNIFICANT CHANGE UP (ref 7–23)
CALCIUM SERPL-MCNC: 8.8 MG/DL — SIGNIFICANT CHANGE UP (ref 8.5–10.1)
CHLORIDE SERPL-SCNC: 108 MMOL/L — SIGNIFICANT CHANGE UP (ref 96–108)
CO2 SERPL-SCNC: 20 MMOL/L — LOW (ref 22–31)
CREAT SERPL-MCNC: 1.22 MG/DL — SIGNIFICANT CHANGE UP (ref 0.5–1.3)
CRP SERPL-MCNC: <3 MG/L — SIGNIFICANT CHANGE UP
EGFR: 69 ML/MIN/1.73M2 — SIGNIFICANT CHANGE UP
EOSINOPHIL # BLD AUTO: 0.4 K/UL — SIGNIFICANT CHANGE UP (ref 0–0.5)
EOSINOPHIL NFR BLD AUTO: 3.7 % — SIGNIFICANT CHANGE UP (ref 0–6)
ETHANOL SERPL-MCNC: 185 MG/DL — HIGH (ref 0–10)
FLUAV AG NPH QL: SIGNIFICANT CHANGE UP
FLUBV AG NPH QL: SIGNIFICANT CHANGE UP
GLUCOSE SERPL-MCNC: 260 MG/DL — HIGH (ref 70–99)
GRAM STN ASPIRATE: NORMAL
HCT VFR BLD CALC: 41.7 % — SIGNIFICANT CHANGE UP (ref 39–50)
HGB BLD-MCNC: 13.2 G/DL — SIGNIFICANT CHANGE UP (ref 13–17)
IMM GRANULOCYTES NFR BLD AUTO: 0.5 % — SIGNIFICANT CHANGE UP (ref 0–0.9)
INR BLD: 0.98 RATIO — SIGNIFICANT CHANGE UP (ref 0.88–1.16)
LYMPHOCYTES # BLD AUTO: 3.58 K/UL — HIGH (ref 1–3.3)
LYMPHOCYTES # BLD AUTO: 33.1 % — SIGNIFICANT CHANGE UP (ref 13–44)
MCHC RBC-ENTMCNC: 29.1 PG — SIGNIFICANT CHANGE UP (ref 27–34)
MCHC RBC-ENTMCNC: 31.7 GM/DL — LOW (ref 32–36)
MCV RBC AUTO: 91.9 FL — SIGNIFICANT CHANGE UP (ref 80–100)
MONOCYTES # BLD AUTO: 0.81 K/UL — SIGNIFICANT CHANGE UP (ref 0–0.9)
MONOCYTES NFR BLD AUTO: 7.5 % — SIGNIFICANT CHANGE UP (ref 2–14)
NEUTROPHILS # BLD AUTO: 5.88 K/UL — SIGNIFICANT CHANGE UP (ref 1.8–7.4)
NEUTROPHILS NFR BLD AUTO: 54.5 % — SIGNIFICANT CHANGE UP (ref 43–77)
PLATELET # BLD AUTO: 359 K/UL — SIGNIFICANT CHANGE UP (ref 150–400)
POTASSIUM SERPL-MCNC: 5 MMOL/L — SIGNIFICANT CHANGE UP (ref 3.5–5.3)
POTASSIUM SERPL-SCNC: 5 MMOL/L — SIGNIFICANT CHANGE UP (ref 3.5–5.3)
PROT SERPL-MCNC: 8.3 GM/DL — SIGNIFICANT CHANGE UP (ref 6–8.3)
PROTHROM AB SERPL-ACNC: 11.4 SEC — SIGNIFICANT CHANGE UP (ref 10.5–13.4)
RBC # BLD: 4.54 M/UL — SIGNIFICANT CHANGE UP (ref 4.2–5.8)
RBC # FLD: 15.6 % — HIGH (ref 10.3–14.5)
RSV RNA NPH QL NAA+NON-PROBE: SIGNIFICANT CHANGE UP
SARS-COV-2 RNA SPEC QL NAA+PROBE: SIGNIFICANT CHANGE UP
SODIUM SERPL-SCNC: 138 MMOL/L — SIGNIFICANT CHANGE UP (ref 135–145)
TROPONIN I, HIGH SENSITIVITY RESULT: 7.95 NG/L — SIGNIFICANT CHANGE UP
WBC # BLD: 10.8 K/UL — HIGH (ref 3.8–10.5)
WBC # FLD AUTO: 10.8 K/UL — HIGH (ref 3.8–10.5)

## 2022-11-01 PROCEDURE — 86850 RBC ANTIBODY SCREEN: CPT

## 2022-11-01 PROCEDURE — 93005 ELECTROCARDIOGRAM TRACING: CPT

## 2022-11-01 PROCEDURE — 70450 CT HEAD/BRAIN W/O DYE: CPT | Mod: 26,MA

## 2022-11-01 PROCEDURE — 71045 X-RAY EXAM CHEST 1 VIEW: CPT

## 2022-11-01 PROCEDURE — 84484 ASSAY OF TROPONIN QUANT: CPT

## 2022-11-01 PROCEDURE — 80053 COMPREHEN METABOLIC PANEL: CPT

## 2022-11-01 PROCEDURE — 70450 CT HEAD/BRAIN W/O DYE: CPT | Mod: MA

## 2022-11-01 PROCEDURE — 71045 X-RAY EXAM CHEST 1 VIEW: CPT | Mod: 26

## 2022-11-01 PROCEDURE — 86900 BLOOD TYPING SEROLOGIC ABO: CPT

## 2022-11-01 PROCEDURE — 80307 DRUG TEST PRSMV CHEM ANLYZR: CPT

## 2022-11-01 PROCEDURE — 85025 COMPLETE CBC W/AUTO DIFF WBC: CPT

## 2022-11-01 PROCEDURE — 36415 COLL VENOUS BLD VENIPUNCTURE: CPT

## 2022-11-01 PROCEDURE — 99285 EMERGENCY DEPT VISIT HI MDM: CPT | Mod: 25

## 2022-11-01 PROCEDURE — 99285 EMERGENCY DEPT VISIT HI MDM: CPT

## 2022-11-01 PROCEDURE — 85610 PROTHROMBIN TIME: CPT

## 2022-11-01 PROCEDURE — 93010 ELECTROCARDIOGRAM REPORT: CPT | Mod: 76

## 2022-11-01 PROCEDURE — 93010 ELECTROCARDIOGRAM REPORT: CPT

## 2022-11-01 PROCEDURE — 96374 THER/PROPH/DIAG INJ IV PUSH: CPT

## 2022-11-01 PROCEDURE — 86901 BLOOD TYPING SEROLOGIC RH(D): CPT

## 2022-11-01 PROCEDURE — 85730 THROMBOPLASTIN TIME PARTIAL: CPT

## 2022-11-01 PROCEDURE — 99284 EMERGENCY DEPT VISIT MOD MDM: CPT

## 2022-11-01 PROCEDURE — 0241U: CPT

## 2022-11-01 PROCEDURE — 96375 TX/PRO/DX INJ NEW DRUG ADDON: CPT

## 2022-11-01 PROCEDURE — 96372 THER/PROPH/DIAG INJ SC/IM: CPT | Mod: XU

## 2022-11-01 RX ORDER — OXYCODONE HYDROCHLORIDE 5 MG/1
5 TABLET ORAL ONCE
Refills: 0 | Status: DISCONTINUED | OUTPATIENT
Start: 2022-11-01 | End: 2022-11-01

## 2022-11-01 RX ORDER — NALOXONE HYDROCHLORIDE 4 MG/.1ML
0.4 SPRAY NASAL ONCE
Refills: 0 | Status: COMPLETED | OUTPATIENT
Start: 2022-11-01 | End: 2022-11-01

## 2022-11-01 RX ORDER — HALOPERIDOL DECANOATE 100 MG/ML
5 INJECTION INTRAMUSCULAR ONCE
Refills: 0 | Status: COMPLETED | OUTPATIENT
Start: 2022-11-01 | End: 2022-11-01

## 2022-11-01 RX ORDER — ONDANSETRON 8 MG/1
4 TABLET, FILM COATED ORAL ONCE
Refills: 0 | Status: COMPLETED | OUTPATIENT
Start: 2022-11-01 | End: 2022-11-01

## 2022-11-01 RX ADMIN — NALOXONE HYDROCHLORIDE 0.4 MILLIGRAM(S): 4 SPRAY NASAL at 22:41

## 2022-11-01 RX ADMIN — ONDANSETRON 4 MILLIGRAM(S): 8 TABLET, FILM COATED ORAL at 20:42

## 2022-11-01 RX ADMIN — HALOPERIDOL DECANOATE 5 MILLIGRAM(S): 100 INJECTION INTRAMUSCULAR at 20:10

## 2022-11-01 RX ADMIN — OXYCODONE HYDROCHLORIDE 5 MILLIGRAM(S): 5 TABLET ORAL at 01:47

## 2022-11-01 RX ADMIN — Medication 2 MILLIGRAM(S): at 20:10

## 2022-11-01 NOTE — ED PROVIDER NOTE - NSFOLLOWUPINSTRUCTIONS_ED_ALL_ED_FT
please follow up with your doctor in 2-3 days./   drink plenty of fluids.   return to ED for any concerns

## 2022-11-01 NOTE — ED PROVIDER NOTE - OBJECTIVE STATEMENT
56 y/o male w/ PMHx of depression, anxiety, alcohol abuse presents to ED BIBEMS s/p overdose. Pt was found on grass, unresponsive. EMS called by bystander. 6mp of narcan given in the field without results. Pt being bagged on arrival, woke up when moving onto stretcher. Altered agitated state, aggressive saying he's X[place] and "I could kill you". No signs of head trauma.

## 2022-11-01 NOTE — ED PROVIDER NOTE - CLINICAL SUMMARY MEDICAL DECISION MAKING FREE TEXT BOX
Patient with clinical opiate overdose.  Woke up combative s/p narcan given by EMS.  Given haldol and ativan in ED on arrival.  Receive some additional narcan 0.4 mg in ED. No signs of trauma on exam.  CT head negative.  Alcohol present, other labs WNL.  Patient currently being monitored on ETCO2, cardiac monitor.  Signout to Dr. Puckett 11 PM, pending further observation for clinical sobriety, and monitoring for apnea.

## 2022-11-01 NOTE — ED ADULT NURSE REASSESSMENT NOTE - NS ED NURSE REASSESS COMMENT FT1
pt up out of bed. vss, In pantry walking with steady gait getting food out of refrigerator. pt requesting today stay the night looking for replacement in the am. wctm

## 2022-11-01 NOTE — ED PROVIDER NOTE - PATIENT PORTAL LINK FT
You can access the FollowMyHealth Patient Portal offered by [facility] by registering at the following website: [email or web address]. By joining [email or web address]’s FollowMyHealth portal, you will also be able to view your health information using other applications (apps) compatible with our system.

## 2022-11-01 NOTE — ED ADULT TRIAGE NOTE - CHIEF COMPLAINT QUOTE
pt BIB EMS after overdose. EMS called by bystander. found on grass, unresponsive. 6mp of narcan given in the field without results

## 2022-11-01 NOTE — ED PROVIDER NOTE - PROGRESS NOTE DETAILS
pt A&OX4.   ambualting with walker to pantry without difficulty.   states wants to leave now.   will d/c

## 2022-11-01 NOTE — ED ADULT NURSE NOTE - OBJECTIVE STATEMENT
BIBA for OD, given narcan in field, pt became repsonsive upon ED arrival, combative and yelling out- pt  medicated with haldol, ativen in ED

## 2022-11-01 NOTE — CHART NOTE - NSCHARTNOTEFT_GEN_A_CORE
Pt is a 56 y/o male who presented to the ER with c/o knee pain.  Pt is currently a resident at [facility].  Pt is scheduled for knee surgery later this month.  Pt was recently in an SNF for IV antibiotics.  Pt was evaluated by ortho and cleared for d/c.  Matt met with pt at bedside.  Pt is agreeable to return to [facility].  Pt reports he missed his presurgical testing appointment.  Matt discussed with Tatiana from [facility] who will assist pt with making a new appointment.  Pt will be transported back to [facility] via Medicaid cab.  Case discussed with RN/MD.

## 2022-11-01 NOTE — ED ADULT NURSE REASSESSMENT NOTE - NS ED NURSE REASSESS COMMENT FT1
pt ambulatory with walker, ekg done prior to dc, md gilliland made aware.  Medicaid taxi was set up to dc pt back to [facility].  pt escorted to waiting room.

## 2022-11-01 NOTE — ED PROVIDER NOTE - MUSCULOSKELETAL, MLM
Spine appears normal, range of motion is not limited, no muscle or joint tenderness Spine appears normal, range of motion is not limited, no muscle or joint tenderness. +r knee immobilizer.

## 2022-11-02 VITALS
OXYGEN SATURATION: 98 % | TEMPERATURE: 98 F | SYSTOLIC BLOOD PRESSURE: 139 MMHG | RESPIRATION RATE: 18 BRPM | HEART RATE: 90 BPM | DIASTOLIC BLOOD PRESSURE: 87 MMHG

## 2022-11-09 ENCOUNTER — APPOINTMENT (OUTPATIENT)
Dept: ORTHOPEDIC SURGERY | Facility: CLINIC | Age: 57
End: 2022-11-09

## 2022-11-09 VITALS
BODY MASS INDEX: 30.31 KG/M2 | HEIGHT: 68 IN | DIASTOLIC BLOOD PRESSURE: 91 MMHG | HEART RATE: 81 BPM | SYSTOLIC BLOOD PRESSURE: 188 MMHG | WEIGHT: 200 LBS

## 2022-11-09 PROCEDURE — 99214 OFFICE O/P EST MOD 30 MIN: CPT

## 2022-11-09 PROCEDURE — 73560 X-RAY EXAM OF KNEE 1 OR 2: CPT | Mod: RT

## 2022-11-09 NOTE — DISCUSSION/SUMMARY
[Medication Risks Reviewed] : Medication risks reviewed [Surgical risks reviewed] : Surgical risks reviewed [de-identified] : Patient is a 57-year-old male here today for follow-up of his right knee antibiotic spacer.  I had a thorough discussion with him about surgical intervention.  He is scheduled to undergo a revision arthroplasty in 2 weeks time.  We did discuss that we were unable to obtain any fluid from the knee at his last visit and would likely need to use neutrophils per high-power field in order to ascertain if there is an infection in his knee intraoperatively.  We also discussed that we will obtain a repeat CRP and ESR due to the fact that his CRP was 7 and his ESR was 22.  We did discuss if they were trending up or there is evidence of increased infection then he would likely require a repeat antibiotic spacer and IV antibiotics.  Patient understands and agreement this.  We discussed risk benefits alternatives, limited blood loss infection damage neurovascular structures risk and need for revision surgery risk of periprosthetic fracture.  Patient in agreement.  I will see him back postoperatively for repeat evaluation.  Again I have ordered a repeat ESR and CRP to be done preoperatively.

## 2022-11-09 NOTE — HISTORY OF PRESENT ILLNESS
[de-identified] : Patient is a 57-year-old male here today for follow-up of his right knee antibiotic spacer performed by one of my partners.  He states that he has been diligently working on getting his preoperative clearances for surgical intervention.  However he states that he was having increasing pain in the knee and he relapsed and took some heroin.  He was rushed to the emergency room and was received Narcan.  He states that he has now recovered from that.  He has been trying to refrain from any narcotic use.  He states that he has relapsed.  He is noted that he has not had any swelling or erythema or pain in the knee.  States that he has some small stitch issues but has not had any stitch abscesses.  Has not noticed any dehiscence of the wound.  Has not noticed any changes.  Not any falls or trauma.  Denies any neurovascular compromise.  Did obtain his ESR and CRP at his last visit that were mildly elevated

## 2022-11-09 NOTE — PHYSICAL EXAM
[de-identified] : Musculoskeletal:. Right knee exam shows no effusion, ROM is not examined due to a static spacer, diffuse joint line tenderness.  There is a well-healed midline surgical incision   No instability is noted to the knee.  There is mild tenderness palpation around the tibia.  Diffuse tenderness palpation around the knee no evidence of erythema drainage or discharge\par 5/5 motor strength in bilateral lower extremities. Sensory: Intact in bilateral lower extremities. DTRs: Biceps, brachioradialis, triceps, patellar, ankle and plantar 2+ and symmetric bilaterally. Pulses: dorsalis pedis, posterior tibial, femoral, popliteal, and radial 2+ and symmetric bilaterally. \par Constitutional: Alert and in no acute distress, but well-appearing. \par  [de-identified] : 2 views of the right knee obtained the office today show no acute fracture or dislocation.  There is a right knee antibiotic spacer in appropriate line without evidence of change from prior x-ray.

## 2022-11-10 ENCOUNTER — OUTPATIENT (OUTPATIENT)
Dept: OUTPATIENT SERVICES | Facility: HOSPITAL | Age: 57
LOS: 1 days | End: 2022-11-10
Payer: COMMERCIAL

## 2022-11-10 ENCOUNTER — RESULT REVIEW (OUTPATIENT)
Age: 57
End: 2022-11-10

## 2022-11-10 VITALS
DIASTOLIC BLOOD PRESSURE: 80 MMHG | HEIGHT: 68 IN | WEIGHT: 214.29 LBS | OXYGEN SATURATION: 98 % | SYSTOLIC BLOOD PRESSURE: 140 MMHG | HEART RATE: 72 BPM | TEMPERATURE: 97 F | RESPIRATION RATE: 17 BRPM

## 2022-11-10 DIAGNOSIS — Z01.818 ENCOUNTER FOR OTHER PREPROCEDURAL EXAMINATION: ICD-10-CM

## 2022-11-10 DIAGNOSIS — Z91.89 OTHER SPECIFIED PERSONAL RISK FACTORS, NOT ELSEWHERE CLASSIFIED: ICD-10-CM

## 2022-11-10 DIAGNOSIS — Z96.651 PRESENCE OF RIGHT ARTIFICIAL KNEE JOINT: Chronic | ICD-10-CM

## 2022-11-10 DIAGNOSIS — I10 ESSENTIAL (PRIMARY) HYPERTENSION: ICD-10-CM

## 2022-11-10 DIAGNOSIS — Z96.659 PRESENCE OF UNSPECIFIED ARTIFICIAL KNEE JOINT: ICD-10-CM

## 2022-11-10 DIAGNOSIS — G47.33 OBSTRUCTIVE SLEEP APNEA (ADULT) (PEDIATRIC): ICD-10-CM

## 2022-11-10 DIAGNOSIS — T84.018D BROKEN INTERNAL JOINT PROSTHESIS, OTHER SITE, SUBSEQUENT ENCOUNTER: ICD-10-CM

## 2022-11-10 DIAGNOSIS — Z89.529 ACQUIRED ABSENCE OF UNSPECIFIED KNEE: ICD-10-CM

## 2022-11-10 DIAGNOSIS — S83.512A SPRAIN OF ANTERIOR CRUCIATE LIGAMENT OF LEFT KNEE, INITIAL ENCOUNTER: Chronic | ICD-10-CM

## 2022-11-10 LAB
A1C WITH ESTIMATED AVERAGE GLUCOSE RESULT: 5.8 % — HIGH (ref 4–5.6)
ANION GAP SERPL CALC-SCNC: 10 MMOL/L — SIGNIFICANT CHANGE UP (ref 5–17)
APTT BLD: 28.6 SEC — SIGNIFICANT CHANGE UP (ref 27.5–35.5)
BASOPHILS # BLD AUTO: 0.04 K/UL — SIGNIFICANT CHANGE UP (ref 0–0.2)
BASOPHILS NFR BLD AUTO: 0.6 % — SIGNIFICANT CHANGE UP (ref 0–2)
BLD GP AB SCN SERPL QL: SIGNIFICANT CHANGE UP
BUN SERPL-MCNC: 18.6 MG/DL — SIGNIFICANT CHANGE UP (ref 8–20)
CALCIUM SERPL-MCNC: 9.2 MG/DL — SIGNIFICANT CHANGE UP (ref 8.4–10.5)
CHLORIDE SERPL-SCNC: 100 MMOL/L — SIGNIFICANT CHANGE UP (ref 96–108)
CO2 SERPL-SCNC: 27 MMOL/L — SIGNIFICANT CHANGE UP (ref 22–29)
CREAT SERPL-MCNC: 0.76 MG/DL — SIGNIFICANT CHANGE UP (ref 0.5–1.3)
CRP SERPL-MCNC: 6 MG/L — HIGH
EGFR: 104 ML/MIN/1.73M2 — SIGNIFICANT CHANGE UP
EOSINOPHIL # BLD AUTO: 0.34 K/UL — SIGNIFICANT CHANGE UP (ref 0–0.5)
EOSINOPHIL NFR BLD AUTO: 4.9 % — SIGNIFICANT CHANGE UP (ref 0–6)
ERYTHROCYTE [SEDIMENTATION RATE] IN BLOOD: 26 MM/HR — HIGH (ref 0–20)
ESTIMATED AVERAGE GLUCOSE: 120 MG/DL — HIGH (ref 68–114)
GLUCOSE SERPL-MCNC: 97 MG/DL — SIGNIFICANT CHANGE UP (ref 70–99)
HCT VFR BLD CALC: 37.1 % — LOW (ref 39–50)
HGB BLD-MCNC: 12.2 G/DL — LOW (ref 13–17)
IMM GRANULOCYTES NFR BLD AUTO: 0.4 % — SIGNIFICANT CHANGE UP (ref 0–0.9)
INR BLD: 1.05 RATIO — SIGNIFICANT CHANGE UP (ref 0.88–1.16)
LYMPHOCYTES # BLD AUTO: 1.67 K/UL — SIGNIFICANT CHANGE UP (ref 1–3.3)
LYMPHOCYTES # BLD AUTO: 23.8 % — SIGNIFICANT CHANGE UP (ref 13–44)
MCHC RBC-ENTMCNC: 29.8 PG — SIGNIFICANT CHANGE UP (ref 27–34)
MCHC RBC-ENTMCNC: 32.9 GM/DL — SIGNIFICANT CHANGE UP (ref 32–36)
MCV RBC AUTO: 90.5 FL — SIGNIFICANT CHANGE UP (ref 80–100)
MONOCYTES # BLD AUTO: 0.73 K/UL — SIGNIFICANT CHANGE UP (ref 0–0.9)
MONOCYTES NFR BLD AUTO: 10.4 % — SIGNIFICANT CHANGE UP (ref 2–14)
MRSA PCR RESULT.: SIGNIFICANT CHANGE UP
NEUTROPHILS # BLD AUTO: 4.2 K/UL — SIGNIFICANT CHANGE UP (ref 1.8–7.4)
NEUTROPHILS NFR BLD AUTO: 59.9 % — SIGNIFICANT CHANGE UP (ref 43–77)
PLATELET # BLD AUTO: 302 K/UL — SIGNIFICANT CHANGE UP (ref 150–400)
POTASSIUM SERPL-MCNC: 4.6 MMOL/L — SIGNIFICANT CHANGE UP (ref 3.5–5.3)
POTASSIUM SERPL-SCNC: 4.6 MMOL/L — SIGNIFICANT CHANGE UP (ref 3.5–5.3)
PROTHROM AB SERPL-ACNC: 12.2 SEC — SIGNIFICANT CHANGE UP (ref 10.5–13.4)
RBC # BLD: 4.1 M/UL — LOW (ref 4.2–5.8)
RBC # FLD: 15 % — HIGH (ref 10.3–14.5)
S AUREUS DNA NOSE QL NAA+PROBE: SIGNIFICANT CHANGE UP
SODIUM SERPL-SCNC: 137 MMOL/L — SIGNIFICANT CHANGE UP (ref 135–145)
WBC # BLD: 7.01 K/UL — SIGNIFICANT CHANGE UP (ref 3.8–10.5)
WBC # FLD AUTO: 7.01 K/UL — SIGNIFICANT CHANGE UP (ref 3.8–10.5)

## 2022-11-10 PROCEDURE — 93005 ELECTROCARDIOGRAM TRACING: CPT

## 2022-11-10 PROCEDURE — 93010 ELECTROCARDIOGRAM REPORT: CPT

## 2022-11-10 PROCEDURE — 71046 X-RAY EXAM CHEST 2 VIEWS: CPT | Mod: 26

## 2022-11-10 PROCEDURE — 71046 X-RAY EXAM CHEST 2 VIEWS: CPT

## 2022-11-10 PROCEDURE — G0463: CPT

## 2022-11-10 RX ORDER — SODIUM CHLORIDE 9 MG/ML
3 INJECTION INTRAMUSCULAR; INTRAVENOUS; SUBCUTANEOUS EVERY 8 HOURS
Refills: 0 | Status: DISCONTINUED | OUTPATIENT
Start: 2022-11-21 | End: 2022-11-21

## 2022-11-10 RX ORDER — BUPROPION HYDROCHLORIDE 150 MG/1
1 TABLET, EXTENDED RELEASE ORAL
Qty: 0 | Refills: 0 | DISCHARGE

## 2022-11-10 NOTE — CHART NOTE - NSCHARTNOTEFT_GEN_A_CORE
Search Terms: paul sun, 1965Search Date: 11/10/2022 09:54:17 AM  The Drug Utilization Report below displays all of the controlled substance prescriptions, if any, that your patient has filled in the last twelve months. The information displayed on this report is compiled from pharmacy submissions to the Department, and accurately reflects the information as submitted by the pharmacies.    This report was requested by: Brionna Epstein | Reference #: [id number]    Others' Prescriptions  Patient Name: Paul SunBirth Date: 1965  Address: [address], [place] [address]Lar: Male  Rx Written	Rx Dispensed	Drug	Quantity	Days Supply	Prescriber Name  10/21/2022	10/21/2022	buprenorphine-naloxone 8-2 mg sl film	28	14	Murray, Lionel H  10/07/2022	10/07/2022	buprenorphine-naloxone 8-2 mg sl film	28	14	Murray, Lionel H  09/23/2022	09/24/2022	buprenorphine-naloxone 8-2 mg sl film	14	14	Murray, Lionel H  09/12/2022	09/12/2022	buprenorphine-naloxone 8-2 mg sl film	28	14	Murray, Lionel H  06/02/2022	06/02/2022	oxycodone hcl (ir) 5 mg tablet	20	7	Lizbet Borrego RPA-C  05/24/2022	05/24/2022	oxycodone hcl (ir) 5 mg tablet	28	7	Timothy Jolley  05/18/2022	05/18/2022	oxycodone hcl (ir) 5 mg tablet	42	7	Antolin Carbone-JOSE  05/02/2022	05/02/2022	oxycodone hcl (ir) 5 mg tablet	28	7	Brenda Quinonez L  03/31/2022	04/01/2022	oxycodone hcl (ir) 5 mg tablet	28	7	Onel Walker J (MD)    Patient Name: Paul SunBirth Date: 1965  Address: [address] [place], [place] [address]Fzq: Male  Rx Written	Rx Dispensed	Drug	Quantity	Days Supply	Prescriber Name  11/04/2022	11/04/2022	buprenorphine-naloxone 8-2 mg sl film	58	29	Murray, Lionel H  03/16/2022	03/16/2022	oxycodone-acetaminophen 5-325 mg tablet	42	6	Erich Doherty (PA-C)    Patient Name: Paul SunBirth Date: 1965  Address: [address] [place], [place] [address]Scl: Male  Rx Written	Rx Dispensed	Drug	Quantity	Days Supply	Prescriber Name  05/12/2022	05/13/2022	oxycodone hcl (ir) 5 mg tablet	28	7	Arun Milner RPA-C    Patient Name: Paul SunBirth Date: 1965  Address: [address] [place] [id number]: Male  Rx Written	Rx Dispensed	Drug	Quantity	Days Supply	Prescriber Name  07/08/2022	07/09/2022	buprenorphine-naloxone 8-2 mg sl tablet	60	30	Sushil Morales MD  06/23/2022	06/23/2022	buprenorphine-naloxone 8-2 mg sl tablet	30	30	Sushil Morales MD    Patient Name: Paul SunBirth Date: 1965  Address: [address] [place], [place] [address]Izy: Male  Rx Written	Rx Dispensed	Drug	Quantity	Days Supply	Prescriber Name  11/03/2022	11/04/2022	buprenorphine-naloxone 8-2 mg sl film	2	1	Gordon Ramires  * - Drugs marked with an asterisk are compound drugs. If the compound drug is made up of more than one controlled substance, then each controlled substance will be a separate row in the table.

## 2022-11-10 NOTE — H&P PST ADULT - ATTENDING COMMENTS
pt seen and examined. +THC, alcohol and opiate neg. pt high risk. possible spacer. understands. plan to proceed

## 2022-11-10 NOTE — H&P PST ADULT - PROBLEM/PLAN-2
Placed call to patient regarding referral. Per patient willing to see first available MD, but will call back when he available to schedule.   DISPLAY PLAN FREE TEXT

## 2022-11-10 NOTE — H&P PST ADULT - HISTORY OF PRESENT ILLNESS
56 y/o male with PMH of     57-year-old male here today for follow-up of his right knee antibiotic spacer performed by one of my partners. He states that he has been diligently working on getting his preoperative clearances for surgical intervention. However he states that he was having increasing pain in the knee and he relapsed and took some heroin. He was rushed to the emergency room and was received Narcan. He states that he has now recovered from that. He has been trying to refrain from any narcotic use. He states that he has relapsed. He is noted that he has not had any swelling or erythema or pain in the knee. States that he has some small stitch issues but has not had any stitch abscesses. Has not noticed any dehiscence of the wound. Has not noticed any changes. Not any falls or trauma. Denies any neurovascular compromise. Did obtain his ESR and CRP at his last visit that were mildly elevated 56 y/o male with PMH of IV drug abuse, asthma, diverticulitis, HTN, MARIBEL using mouthpiece and osteoarthritis presents to PST with complaints of right knee pain. He is s/p right knee replacement on 6/14/2022. States after his right knee replacement he was having reoccurring infections.    Patient is scheduled for removal of spacer, revision TKA possible extensive mechanism repair, possible ABX spacer on 11/21/2022 with Dr. Canada.     57-year-old male here today for follow-up of his right knee antibiotic spacer performed by one of my partners. He states that he has been diligently working on getting his preoperative clearances for surgical intervention. However he states that he was having increasing pain in the knee and he relapsed and took some heroin. He was rushed to the emergency room and was received Narcan. He states that he has now recovered from that. He has been trying to refrain from any narcotic use. He states that he has relapsed. He is noted that he has not had any swelling or erythema or pain in the knee. States that he has some small stitch issues but has not had any stitch abscesses. Has not noticed any dehiscence of the wound. Has not noticed any changes. Not any falls or trauma. Denies any neurovascular compromise. Did obtain his ESR and CRP at his last visit that were mildly elevated 56 y/o male with PMH of drug abuse, ETOH abuse, depression, anxiety, asthma, diverticulitis, HTN, HLD, MARIBEL using mouthpiece and osteoarthritis presents to PST with complaints of right knee pain. He is s/p right knee replacement on 6/14/2022. States after his right knee replacement he was having reoccurring infections. He states the pain in his right knee has been getting progressively worse. Patient stated that he was having increasing pain in his right knee, he relapsed (last use prior 2 years ago) and used heroin. He was rushed to the emergency room and received Narcan on 11/1/2022. He is using a knee brace and walker for ambulation. Denies fevers, chills, nausea or vomiting. Patient is scheduled for removal of spacer, revision TKA possible extensive mechanism repair, possible ABX spacer on 11/21/2022 with Dr. Canada.

## 2022-11-10 NOTE — H&P PST ADULT - ASSESSMENT
Patient educated on surgical scrub, COVID testing, preadmission instructions, medical clearance and day of procedure medications, verbalizes understanding. Pt instructed to stop vitamins/supplements/herbal medications/ASA/NSAIDS for one week prior to surgery and discuss with PMD.    CAPRINI SCORE    AGE RELATED RISK FACTORS                                                             [ ] Age 41-60 years                                            (1 Point)  [ ] Age: 61-74 years                                           (2 Points)                 [ ] Age= 75 years                                                (3 Points)             DISEASE RELATED RISK FACTORS                                                       [ ] Edema in the lower extremities                 (1 Point)                     [ ] Varicose veins                                               (1 Point)                                 [ ] BMI > 25 Kg/m2                                            (1 Point)                                  [ ] Serious infection (ie PNA)                            (1 Point)                     [ ] Lung disease ( COPD, Emphysema)            (1 Point)                                                                          [ ] Acute myocardial infarction                         (1 Point)                  [ ] Congestive heart failure (in the previous month)  (1 Point)         [ ] Inflammatory bowel disease                            (1 Point)                  [ ] Central venous access, PICC or Port               (2 points)       (within the last month)                                                                [ ] Stroke (in the previous month)                        (5 Points)    [ ] Previous or present malignancy                       (2 points)                                                                                                                                                         HEMATOLOGY RELATED FACTORS                                                         [ ] Prior episodes of VTE                                     (3 Points)                     [ ] Positive family history for VTE                      (3 Points)                  [ ] Prothrombin 21286 A                                     (3 Points)                     [ ] Factor V Leiden                                                (3 Points)                        [ ] Lupus anticoagulants                                      (3 Points)                                                           [ ] Anticardiolipin antibodies                              (3 Points)                                                       [ ] High homocysteine in the blood                   (3 Points)                                             [ ] Other congenital or acquired thrombophilia      (3 Points)                                                [ ] Heparin induced thrombocytopenia                  (3 Points)                                        MOBILITY RELATED FACTORS  [ ] Bed rest                                                         (1 Point)  [ ] Plaster cast                                                    (2 points)  [ ] Bed bound for more than 72 hours           (2 Points)    GENDER SPECIFIC FACTORS  [ ] Pregnancy or had a baby within the last month   (1 Point)  [ ] Post-partum < 6 weeks                                   (1 Point)  [ ] Hormonal therapy  or oral contraception   (1 Point)  [ ] History of pregnancy complications              (1 point)  [ ] Unexplained or recurrent              (1 Point)    OTHER RISK FACTORS                                           (1 Point)  [ ] BMI >40, smoking, diabetes requiring insulin, chemotherapy  blood transfusions and length of surgery over 2 hours    SURGERY RELATED RISK FACTORS  [ ]  Section within the last month     (1 Point)  [ ] Minor surgery                                                  (1 Point)  [ ] Arthroscopic surgery                                       (2 Points)  [ ] Planned major surgery lasting more            (2 Points)      than 45 minutes     [ ] Elective hip or knee joint replacement       (5 points)       surgery                                                TRAUMA RELATED RISK FACTORS  [ ] Fracture of the hip, pelvis, or leg                       (5 Points)  [ ] Spinal cord injury resulting in paralysis             (5 points)       (in the previous month)    [ ] Paralysis  (less than 1 month)                             (5 Points)  [ ] Multiple Trauma within 1 month                        (5 Points)    Total Score [        ]    Caprini Score 0-2: Low Risk, NO VTE prophylaxis required for most patients, encourage ambulation  Caprini Score 3-6: Moderate Risk , pharmacologic VTE prophylaxis is indicated for most patients (in the absence of contraindications)  Caprini Score Greater than or =7: High risk, pharmocologic VTE prophylaxis indicated for most patients (in the absence of contraindications)    OPIOID RISK TOOL    GABE EACH BOX THAT APPLIES AND ADD TOTALS AT THE END    FAMILY HISTORY OF SUBSTANCE ABUSE                 FEMALE         MALE                                                Alcohol                             [  ]1 pt          [  ]3pts                                               Illegal Durgs                     [  ]2 pts        [  ]3pts                                               Rx Drugs                           [  ]4 pts        [  ]4 pts    PERSONAL HISTORY OF SUBSTANCE ABUSE                                                                                          Alcohol                             [  ]3 pts       [  ]3 pts                                               Illegal Drugs                     [  ]4 pts        [  ]4 pts                                               Rx Drugs                           [  ]5 pts        [  ]5 pts    AGE BETWEEN 16-45 YEARS                                      [  ]1 pt         [  ]1 pt    HISTORY OF PREADOLESCENT   SEXUAL ABUSE                                                             [  ]3 pts        [  ]0pts    PSYCHOLOGICAL DISEASE                     ADD, OCD, Bipolar, Schizophrenia        [  ]2 pts         [  ]2 pts                      Depression                                               [  ]1 pt           [  ]1 pt           SCORING TOTAL   (add numbers and type here)              (***)                                     A score of 3 or lower indicated LOW risk for future opioid abuse  A score of 4 to 7 indicated moderate risk for future opioid abuse  A score of 8 or higher indicates a high risk for opioid abuse   56 y/o male with PMH of drug abuse, ETOH abuse, depression, anxiety, asthma, diverticulitis, HTN, HLD, MARIBEL using mouthpiece and osteoarthritis presents to PST with complaints of right knee pain. He is s/p right knee replacement on 2022. States after his right knee replacement he was having reoccurring infections. He states the pain in his right knee has been getting progressively worse. Patient stated that he was having increasing pain in his right knee, he relapsed (last use prior 2 years ago) and used heroin. He was rushed to the emergency room and received Narcan on 2022. He is using a knee brace and walker for ambulation. Denies fevers, chills, nausea or vomiting. Patient is scheduled for removal of spacer, revision TKA possible extensive mechanism repair, possible ABX spacer on 2022 with Dr. Canada.     Patient educated on surgical scrub, COVID testing, preadmission instructions, medical clearance and day of procedure medications, verbalizes understanding. Pt instructed to stop vitamins/supplements/herbal medications/ASA/NSAIDS for one week prior to surgery and discuss with PMD.    CAPRINI SCORE    AGE RELATED RISK FACTORS                                                             [ ] Age 41-60 years                                            (1 Point)  [ ] Age: 61-74 years                                           (2 Points)                 [ ] Age= 75 years                                                (3 Points)             DISEASE RELATED RISK FACTORS                                                       [ ] Edema in the lower extremities                 (1 Point)                     [ ] Varicose veins                                               (1 Point)                                 [ ] BMI > 25 Kg/m2                                            (1 Point)                                  [ ] Serious infection (ie PNA)                            (1 Point)                     [ ] Lung disease ( COPD, Emphysema)            (1 Point)                                                                          [ ] Acute myocardial infarction                         (1 Point)                  [ ] Congestive heart failure (in the previous month)  (1 Point)         [ ] Inflammatory bowel disease                            (1 Point)                  [ ] Central venous access, PICC or Port               (2 points)       (within the last month)                                                                [ ] Stroke (in the previous month)                        (5 Points)    [ ] Previous or present malignancy                       (2 points)                                                                                                                                                         HEMATOLOGY RELATED FACTORS                                                         [ ] Prior episodes of VTE                                     (3 Points)                     [ ] Positive family history for VTE                      (3 Points)                  [ ] Prothrombin 45402 A                                     (3 Points)                     [ ] Factor V Leiden                                                (3 Points)                        [ ] Lupus anticoagulants                                      (3 Points)                                                           [ ] Anticardiolipin antibodies                              (3 Points)                                                       [ ] High homocysteine in the blood                   (3 Points)                                             [ ] Other congenital or acquired thrombophilia      (3 Points)                                                [ ] Heparin induced thrombocytopenia                  (3 Points)                                        MOBILITY RELATED FACTORS  [ ] Bed rest                                                         (1 Point)  [ ] Plaster cast                                                    (2 points)  [ ] Bed bound for more than 72 hours           (2 Points)    GENDER SPECIFIC FACTORS  [ ] Pregnancy or had a baby within the last month   (1 Point)  [ ] Post-partum < 6 weeks                                   (1 Point)  [ ] Hormonal therapy  or oral contraception   (1 Point)  [ ] History of pregnancy complications              (1 point)  [ ] Unexplained or recurrent              (1 Point)    OTHER RISK FACTORS                                           (1 Point)  [ ] BMI >40, smoking, diabetes requiring insulin, chemotherapy  blood transfusions and length of surgery over 2 hours    SURGERY RELATED RISK FACTORS  [ ]  Section within the last month     (1 Point)  [ ] Minor surgery                                                  (1 Point)  [ ] Arthroscopic surgery                                       (2 Points)  [ ] Planned major surgery lasting more            (2 Points)      than 45 minutes     [ ] Elective hip or knee joint replacement       (5 points)       surgery                                                TRAUMA RELATED RISK FACTORS  [ ] Fracture of the hip, pelvis, or leg                       (5 Points)  [ ] Spinal cord injury resulting in paralysis             (5 points)       (in the previous month)    [ ] Paralysis  (less than 1 month)                             (5 Points)  [ ] Multiple Trauma within 1 month                        (5 Points)    Total Score [        ]    Caprini Score 0-2: Low Risk, NO VTE prophylaxis required for most patients, encourage ambulation  Caprini Score 3-6: Moderate Risk , pharmacologic VTE prophylaxis is indicated for most patients (in the absence of contraindications)  Caprini Score Greater than or =7: High risk, pharmocologic VTE prophylaxis indicated for most patients (in the absence of contraindications)    OPIOID RISK TOOL    GABE EACH BOX THAT APPLIES AND ADD TOTALS AT THE END    FAMILY HISTORY OF SUBSTANCE ABUSE                 FEMALE         MALE                                                Alcohol                             [  ]1 pt          [  ]3pts                                               Illegal Durgs                     [  ]2 pts        [  ]3pts                                               Rx Drugs                           [  ]4 pts        [  ]4 pts    PERSONAL HISTORY OF SUBSTANCE ABUSE                                                                                          Alcohol                             [  ]3 pts       [  ]3 pts                                               Illegal Drugs                     [  ]4 pts        [  ]4 pts                                               Rx Drugs                           [  ]5 pts        [  ]5 pts    AGE BETWEEN 16-45 YEARS                                      [  ]1 pt         [  ]1 pt    HISTORY OF PREADOLESCENT   SEXUAL ABUSE                                                             [  ]3 pts        [  ]0pts    PSYCHOLOGICAL DISEASE                     ADD, OCD, Bipolar, Schizophrenia        [  ]2 pts         [  ]2 pts                      Depression                                               [  ]1 pt           [  ]1 pt           SCORING TOTAL   (add numbers and type here)              (***)                                     A score of 3 or lower indicated LOW risk for future opioid abuse  A score of 4 to 7 indicated moderate risk for future opioid abuse  A score of 8 or higher indicates a high risk for opioid abuse   58 y/o male with PMH of drug abuse, ETOH abuse, depression, anxiety, asthma, diverticulitis, HTN, HLD, MARIBEL using mouthpiece and osteoarthritis presents to PST with complaints of right knee pain. He is s/p right knee replacement on 2022. States after his right knee replacement he was having reoccurring infections. He states the pain in his right knee has been getting progressively worse. Patient stated that he was having increasing pain in his right knee, he relapsed (last use prior 2 years ago) and used heroin. He was rushed to the emergency room and received Narcan on 2022. He is using a knee brace and walker for ambulation. Denies fevers, chills, nausea or vomiting. Patient is scheduled for removal of spacer, revision TKA possible extensive mechanism repair, possible ABX spacer on 2022 with Dr. Canada. Patient educated on surgical scrub, COVID testing, joint book, ERP protocol, preadmission instructions, medical clearance and day of procedure medications, verbalizes understanding. Pt instructed to stop vitamins/supplements/herbal medications/ASA/NSAIDS for one week prior to surgery and discuss with PMD. Advised patient to discuss with MD at [facility] at [place] the dose of suboxone leading up to surgery, patient agrees and verbalized understanding.     CAPRINI SCORE    AGE RELATED RISK FACTORS                                                             [x] Age 41-60 years                                            (1 Point)  [ ] Age: 61-74 years                                           (2 Points)                 [ ] Age= 75 years                                                (3 Points)             DISEASE RELATED RISK FACTORS                                                       [ ] Edema in the lower extremities                 (1 Point)                     [ ] Varicose veins                                               (1 Point)                                 [x ] BMI > 25 Kg/m2                                            (1 Point)                                  [ ] Serious infection (ie PNA)                            (1 Point)                     [ ] Lung disease ( COPD, Emphysema)            (1 Point)                                                                          [ ] Acute myocardial infarction                         (1 Point)                  [ ] Congestive heart failure (in the previous month)  (1 Point)         [ ] Inflammatory bowel disease                            (1 Point)                  [ ] Central venous access, PICC or Port               (2 points)       (within the last month)                                                                [ ] Stroke (in the previous month)                        (5 Points)    [ ] Previous or present malignancy                       (2 points)                                                                                                                                                         HEMATOLOGY RELATED FACTORS                                                         [ ] Prior episodes of VTE                                     (3 Points)                     [ ] Positive family history for VTE                      (3 Points)                  [ ] Prothrombin 16627 A                                     (3 Points)                     [ ] Factor V Leiden                                                (3 Points)                        [ ] Lupus anticoagulants                                      (3 Points)                                                           [ ] Anticardiolipin antibodies                              (3 Points)                                                       [ ] High homocysteine in the blood                   (3 Points)                                             [ ] Other congenital or acquired thrombophilia      (3 Points)                                                [ ] Heparin induced thrombocytopenia                  (3 Points)                                        MOBILITY RELATED FACTORS  [ ] Bed rest                                                         (1 Point)  [ ] Plaster cast                                                    (2 points)  [ ] Bed bound for more than 72 hours           (2 Points)    GENDER SPECIFIC FACTORS  [ ] Pregnancy or had a baby within the last month   (1 Point)  [ ] Post-partum < 6 weeks                                   (1 Point)  [ ] Hormonal therapy  or oral contraception   (1 Point)  [ ] History of pregnancy complications              (1 point)  [ ] Unexplained or recurrent              (1 Point)    OTHER RISK FACTORS                                           (1 Point)  [x ] BMI >40, smoking, diabetes requiring insulin, chemotherapy  blood transfusions and length of surgery over 2 hours    SURGERY RELATED RISK FACTORS  [ ]  Section within the last month     (1 Point)  [ ] Minor surgery                                                  (1 Point)  [ ] Arthroscopic surgery                                       (2 Points)  [ ] Planned major surgery lasting more            (2 Points)      than 45 minutes     [x ] Elective hip or knee joint replacement       (5 points)       surgery                                                TRAUMA RELATED RISK FACTORS  [ ] Fracture of the hip, pelvis, or leg                       (5 Points)  [ ] Spinal cord injury resulting in paralysis             (5 points)       (in the previous month)    [ ] Paralysis  (less than 1 month)                             (5 Points)  [ ] Multiple Trauma within 1 month                        (5 Points)    Total Score [   8     ]    Caprini Score 0-2: Low Risk, NO VTE prophylaxis required for most patients, encourage ambulation  Caprini Score 3-6: Moderate Risk , pharmacologic VTE prophylaxis is indicated for most patients (in the absence of contraindications)  Caprini Score Greater than or =7: High risk, pharmocologic VTE prophylaxis indicated for most patients (in the absence of contraindications)    OPIOID RISK TOOL    GABE EACH BOX THAT APPLIES AND ADD TOTALS AT THE END    FAMILY HISTORY OF SUBSTANCE ABUSE                 FEMALE         MALE                                                Alcohol                             [  ]1 pt          [  ]3pts                                               Illegal Durgs                     [  ]2 pts        [  ]3pts                                               Rx Drugs                           [  ]4 pts        [  ]4 pts    PERSONAL HISTORY OF SUBSTANCE ABUSE                                                                                          Alcohol                             [  ]3 pts       [ x ]3 pts                                               Illegal Drugs                     [  ]4 pts        [x  ]4 pts                                               Rx Drugs                           [  ]5 pts        [  ]5 pts    AGE BETWEEN 16-45 YEARS                                      [  ]1 pt         [  ]1 pt    HISTORY OF PREADOLESCENT   SEXUAL ABUSE                                                             [  ]3 pts        [  ]0pts    PSYCHOLOGICAL DISEASE                     ADD, OCD, Bipolar, Schizophrenia        [  ]2 pts         [  ]2 pts                      Depression                                               [  ]1 pt           [x  ]1 pt           SCORING TOTAL   (add numbers and type here)              (*8**)                                     A score of 3 or lower indicated LOW risk for future opioid abuse  A score of 4 to 7 indicated moderate risk for future opioid abuse  A score of 8 or higher indicates a high risk for opioid abuse

## 2022-11-10 NOTE — H&P PST ADULT - NSICDXFAMILYHX_GEN_ALL_CORE_FT
FAMILY HISTORY:  FH: CAD (coronary artery disease)    Father  Still living? No  FH: prostate cancer, Age at diagnosis: Age Unknown    Mother  Still living? Unknown  FH: diverticulitis, Age at diagnosis: Age Unknown

## 2022-11-10 NOTE — H&P PST ADULT - PROBLEM SELECTOR PLAN 5
MARIBEL precautions, Confirmed MARIBEL as per patient, Advised patient to bring mouthpiece with him the day of the procedure.

## 2022-11-10 NOTE — H&P PST ADULT - PROBLEM SELECTOR PLAN 3
ORT score 8, At high risk for abuse of opiates  Advised patient to discuss with MD at [facility] the dose of suboxone leading up to surgery, patient agrees and verbalized understanding.   Urine drug screen to be done on admission

## 2022-11-10 NOTE — H&P PST ADULT - MUSCULOSKELETAL
details… no calf tenderness/decreased ROM/decreased ROM due to pain/strength 5/5 bilateral upper extremities

## 2022-11-10 NOTE — H&P PST ADULT - PROBLEM SELECTOR PLAN 1
medical clearance pending  Patient is scheduled for removal of spacer, revision TKA possible extensive mechanism repair, possible ABX spacer on 11/21/2022 with Dr. Canada.

## 2022-11-10 NOTE — H&P PST ADULT - OTHER CARE PROVIDERS
Dr. Maciel Hinojosa [phone] [facility] at [facility] [phone] Dr. Lionel Gao

## 2022-11-10 NOTE — H&P PST ADULT - LAB RESULTS AND INTERPRETATION
Labs pending Labs reviewed, sent to Dr. Canada for review. Labs faxed to patients PCP for review, medical clearance pending. AS [facility] Labs reviewed, sent to Dr. Canada for review. Labs faxed to patients PCP for review, medical clearance pending. AS [facility]    Attempted to call patients PCP Dr. Hinojosa, left message requesting for Dr. Hinojosa to call PST, message given to MD to call back, AS [facility] Labs reviewed, sent to Dr. Canada for review. Labs faxed to patients PCP for review, medical clearance pending. AS [facility]    Attempted to call patients PCP Dr. Rowe, left message requesting for Dr. Rowe to call PST, message given to MD to call back, AS [facility]  Dr. Rowe returned phone call, spoke with him regarding this patient, discussed the patients chest Xray results as well as made him aware of the patients finger wounds. He states he will have his office call the patient to have the patient come to the office sooner. Dr. Rowe agrees and verbalized understanding. AS [facility]

## 2022-11-10 NOTE — H&P PST ADULT - NSICDXPASTSURGICALHX_GEN_ALL_CORE_FT
PAST SURGICAL HISTORY:  ACL (anterior cruciate ligament) tear, left, initial encounter     H/O total knee replacement, right 2022

## 2022-11-10 NOTE — H&P PST ADULT - NSICDXPASTMEDICALHX_GEN_ALL_CORE_FT
PAST MEDICAL HISTORY:  Alcohol abuse     Anxiety     Depression     HLD (hyperlipidemia)     HTN (hypertension)     Osteoarthritis     Overdose heroine and xanax 3/10/2015

## 2022-11-15 NOTE — PATIENT PROFILE ADULT - PACKS YRS CALCULATION
Sweating Severity Scale: 3- The sweating is barely tolerable and frequently interferes with daily activities Is This A New Presentation, Or A Follow-Up?: Follow Up Hyperhidrosis Additional History: Last Botox tx was February 2022. Pt states it usually lasts about 5 months for her. 15

## 2022-11-18 ENCOUNTER — EMERGENCY (EMERGENCY)
Facility: HOSPITAL | Age: 57
LOS: 0 days | Discharge: HOME | End: 2022-11-18
Attending: EMERGENCY MEDICINE
Payer: MEDICAID

## 2022-11-18 VITALS
SYSTOLIC BLOOD PRESSURE: 146 MMHG | DIASTOLIC BLOOD PRESSURE: 89 MMHG | TEMPERATURE: 99 F | RESPIRATION RATE: 18 BRPM | OXYGEN SATURATION: 97 % | HEART RATE: 68 BPM

## 2022-11-18 VITALS — WEIGHT: 205.91 LBS | HEIGHT: 68 IN

## 2022-11-18 DIAGNOSIS — E78.5 HYPERLIPIDEMIA, UNSPECIFIED: ICD-10-CM

## 2022-11-18 DIAGNOSIS — F41.9 ANXIETY DISORDER, UNSPECIFIED: ICD-10-CM

## 2022-11-18 DIAGNOSIS — Z86.59 PERSONAL HISTORY OF OTHER MENTAL AND BEHAVIORAL DISORDERS: ICD-10-CM

## 2022-11-18 DIAGNOSIS — M19.90 UNSPECIFIED OSTEOARTHRITIS, UNSPECIFIED SITE: ICD-10-CM

## 2022-11-18 DIAGNOSIS — Z96.651 PRESENCE OF RIGHT ARTIFICIAL KNEE JOINT: Chronic | ICD-10-CM

## 2022-11-18 DIAGNOSIS — F32.A DEPRESSION, UNSPECIFIED: ICD-10-CM

## 2022-11-18 DIAGNOSIS — S83.512A SPRAIN OF ANTERIOR CRUCIATE LIGAMENT OF LEFT KNEE, INITIAL ENCOUNTER: Chronic | ICD-10-CM

## 2022-11-18 DIAGNOSIS — Z20.822 CONTACT WITH AND (SUSPECTED) EXPOSURE TO COVID-19: ICD-10-CM

## 2022-11-18 DIAGNOSIS — I10 ESSENTIAL (PRIMARY) HYPERTENSION: ICD-10-CM

## 2022-11-18 DIAGNOSIS — Z91.013 ALLERGY TO SEAFOOD: ICD-10-CM

## 2022-11-18 LAB
FLUAV AG NPH QL: SIGNIFICANT CHANGE UP
FLUBV AG NPH QL: SIGNIFICANT CHANGE UP
RSV RNA NPH QL NAA+NON-PROBE: SIGNIFICANT CHANGE UP
SARS-COV-2 RNA SPEC QL NAA+PROBE: SIGNIFICANT CHANGE UP

## 2022-11-18 PROCEDURE — 0241U: CPT

## 2022-11-18 PROCEDURE — 99283 EMERGENCY DEPT VISIT LOW MDM: CPT

## 2022-11-18 NOTE — ED ADULT NURSE NOTE - NSIMPLEMENTINTERV_GEN_ALL_ED
Implemented All Fall with Harm Risk Interventions:  [place] to call system. Call bell, personal items and telephone within reach. Instruct patient to call for assistance. Room bathroom lighting operational. Non-slip footwear when patient is off stretcher. Physically safe environment: no spills, clutter or unnecessary equipment. Stretcher in lowest position, wheels locked, appropriate side rails in place. Provide visual cue, wrist band, yellow gown, etc. Monitor gait and stability. Monitor for mental status changes and reorient to person, place, and time. Review medications for side effects contributing to fall risk. Reinforce activity limits and safety measures with patient and family. Provide visual clues: red socks.

## 2022-11-18 NOTE — ED STATDOCS - PATIENT PORTAL LINK FT
You can access the FollowMyHealth Patient Portal offered by [facility] by registering at the following website: [email or web address]. By joining [facility]’s FollowMyHealth portal, you will also be able to view your health information using other applications (apps) compatible with our system.

## 2022-11-18 NOTE — ED ADULT TRIAGE NOTE - CHIEF COMPLAINT QUOTE
Pt presents with request to have a covid swab done for a surgery the pt has coming up.  Pt states he tried to get a test with [facility], but could not.

## 2022-11-18 NOTE — ED STATDOCS - OBJECTIVE STATEMENT
58 y/o male w/ a PMHx of depression, anxiety, and alcohol abuse presents to ED for COVID swab. Pt reports he is getting right knee surgery done in [place], went to [facility] but their computers were down so was unable to get a COVID swab done there. Pt w/ no medical complaints.

## 2022-11-18 NOTE — ED STATDOCS - EYES, MLM
History: Constipation: Has noted more constipation, this was occurring before she started the iron supplement.  Denies any mucus or blood in her stools  Assessment: new condition   Plan: Advised on ways to increase fiber in her diet, adequate hydration and exercise.  If not improved will give prescription for Colace 100 mg nightly to use as needed.   clear bilaterally.  Pupils equal, round, and reactive to light.

## 2022-11-20 ENCOUNTER — TRANSCRIPTION ENCOUNTER (OUTPATIENT)
Age: 57
End: 2022-11-20

## 2022-11-20 RX ORDER — CEFAZOLIN SODIUM 1 G
2000 VIAL (EA) INJECTION ONCE
Refills: 0 | Status: DISCONTINUED | OUTPATIENT
Start: 2022-11-21 | End: 2022-11-21

## 2022-11-20 RX ORDER — TRANEXAMIC ACID 100 MG/ML
1000 INJECTION, SOLUTION INTRAVENOUS ONCE
Refills: 0 | Status: DISCONTINUED | OUTPATIENT
Start: 2022-11-21 | End: 2022-11-21

## 2022-11-21 ENCOUNTER — APPOINTMENT (OUTPATIENT)
Dept: ORTHOPEDIC SURGERY | Facility: HOSPITAL | Age: 57
End: 2022-11-21

## 2022-11-21 ENCOUNTER — TRANSCRIPTION ENCOUNTER (OUTPATIENT)
Age: 57
End: 2022-11-21

## 2022-11-21 ENCOUNTER — INPATIENT (INPATIENT)
Facility: HOSPITAL | Age: 57
LOS: 15 days | Discharge: NURSING, REHAB OR LONG-TERM CARE | DRG: 485 | End: 2022-12-07
Attending: STUDENT IN AN ORGANIZED HEALTH CARE EDUCATION/TRAINING PROGRAM | Admitting: STUDENT IN AN ORGANIZED HEALTH CARE EDUCATION/TRAINING PROGRAM
Payer: COMMERCIAL

## 2022-11-21 ENCOUNTER — RESULT REVIEW (OUTPATIENT)
Age: 57
End: 2022-11-21

## 2022-11-21 VITALS
SYSTOLIC BLOOD PRESSURE: 148 MMHG | TEMPERATURE: 97 F | DIASTOLIC BLOOD PRESSURE: 82 MMHG | RESPIRATION RATE: 16 BRPM | HEIGHT: 68 IN | OXYGEN SATURATION: 98 % | HEART RATE: 66 BPM | WEIGHT: 205.91 LBS

## 2022-11-21 DIAGNOSIS — T84.018D BROKEN INTERNAL JOINT PROSTHESIS, OTHER SITE, SUBSEQUENT ENCOUNTER: ICD-10-CM

## 2022-11-21 DIAGNOSIS — S83.512A SPRAIN OF ANTERIOR CRUCIATE LIGAMENT OF LEFT KNEE, INITIAL ENCOUNTER: Chronic | ICD-10-CM

## 2022-11-21 DIAGNOSIS — Z96.651 PRESENCE OF RIGHT ARTIFICIAL KNEE JOINT: Chronic | ICD-10-CM

## 2022-11-21 LAB
AMPHET UR-MCNC: NEGATIVE — SIGNIFICANT CHANGE UP
B PERT IGG+IGM PNL SER: ABNORMAL
BARBITURATES UR SCN-MCNC: NEGATIVE — SIGNIFICANT CHANGE UP
BENZODIAZ UR-MCNC: NEGATIVE — SIGNIFICANT CHANGE UP
BLD GP AB SCN SERPL QL: SIGNIFICANT CHANGE UP
COCAINE METAB.OTHER UR-MCNC: NEGATIVE — SIGNIFICANT CHANGE UP
COLOR FLD: ABNORMAL
ETHANOL SERPL-MCNC: <10 MG/DL — SIGNIFICANT CHANGE UP (ref 0–9)
FLUID INTAKE SUBSTANCE CLASS: SIGNIFICANT CHANGE UP
GLUCOSE BLDC GLUCOMTR-MCNC: 112 MG/DL — HIGH (ref 70–99)
GLUCOSE BLDC GLUCOMTR-MCNC: 121 MG/DL — HIGH (ref 70–99)
GLUCOSE BLDC GLUCOMTR-MCNC: 90 MG/DL — SIGNIFICANT CHANGE UP (ref 70–99)
LYMPHOCYTES # FLD: 89 % — SIGNIFICANT CHANGE UP
METHADONE UR-MCNC: NEGATIVE — SIGNIFICANT CHANGE UP
MONOS+MACROS # FLD: 1 % — SIGNIFICANT CHANGE UP
NEUTROPHILS-BODY FLUID: 10 % — SIGNIFICANT CHANGE UP
OPIATES UR-MCNC: NEGATIVE — SIGNIFICANT CHANGE UP
PCP SPEC-MCNC: SIGNIFICANT CHANGE UP
PCP UR-MCNC: NEGATIVE — SIGNIFICANT CHANGE UP
RCV VOL RI: 3000 /UL — HIGH (ref 0–0)
SARS-COV-2 RNA SPEC QL NAA+PROBE: SIGNIFICANT CHANGE UP
SPECIMEN SOURCE FLD: SIGNIFICANT CHANGE UP
THC UR QL: POSITIVE
TOTAL NUCLEATED CELL COUNT, BODY FLUID: 1138 /UL — SIGNIFICANT CHANGE UP
TUBE TYPE: SIGNIFICANT CHANGE UP

## 2022-11-21 PROCEDURE — 97605 NEG PRS WND THER DME<=50SQCM: CPT

## 2022-11-21 PROCEDURE — 27488 REMOVAL OF KNEE PROSTHESIS: CPT | Mod: RT

## 2022-11-21 PROCEDURE — 73560 X-RAY EXAM OF KNEE 1 OR 2: CPT | Mod: 26,RT

## 2022-11-21 PROCEDURE — 88304 TISSUE EXAM BY PATHOLOGIST: CPT | Mod: 26

## 2022-11-21 PROCEDURE — 97605 NEG PRS WND THER DME<=50SQCM: CPT | Mod: AS

## 2022-11-21 PROCEDURE — 27488 REMOVAL OF KNEE PROSTHESIS: CPT | Mod: AS,RT

## 2022-11-21 PROCEDURE — 88331 PATH CONSLTJ SURG 1 BLK 1SPC: CPT | Mod: 26

## 2022-11-21 PROCEDURE — 88311 DECALCIFY TISSUE: CPT | Mod: 26

## 2022-11-21 DEVICE — IMPLANTABLE DEVICE
Type: IMPLANTABLE DEVICE | Site: RIGHT | Status: NON-FUNCTIONAL
Removed: 2022-11-21

## 2022-11-21 DEVICE — CEMENT PALACOS R
Type: IMPLANTABLE DEVICE | Site: RIGHT | Status: NON-FUNCTIONAL
Removed: 2022-11-21

## 2022-11-21 RX ORDER — GABAPENTIN 400 MG/1
600 CAPSULE ORAL AT BEDTIME
Refills: 0 | Status: DISCONTINUED | OUTPATIENT
Start: 2022-11-21 | End: 2022-12-07

## 2022-11-21 RX ORDER — ONDANSETRON 8 MG/1
4 TABLET, FILM COATED ORAL EVERY 6 HOURS
Refills: 0 | Status: DISCONTINUED | OUTPATIENT
Start: 2022-11-21 | End: 2022-12-07

## 2022-11-21 RX ORDER — HYDROMORPHONE HYDROCHLORIDE 2 MG/ML
0.5 INJECTION INTRAMUSCULAR; INTRAVENOUS; SUBCUTANEOUS
Refills: 0 | Status: DISCONTINUED | OUTPATIENT
Start: 2022-11-21 | End: 2022-11-21

## 2022-11-21 RX ORDER — ACETAMINOPHEN 500 MG
1000 TABLET ORAL ONCE
Refills: 0 | Status: COMPLETED | OUTPATIENT
Start: 2022-11-21 | End: 2022-11-22

## 2022-11-21 RX ORDER — KETOROLAC TROMETHAMINE 30 MG/ML
15 SYRINGE (ML) INJECTION EVERY 6 HOURS
Refills: 0 | Status: DISCONTINUED | OUTPATIENT
Start: 2022-11-21 | End: 2022-11-22

## 2022-11-21 RX ORDER — SODIUM CHLORIDE 9 MG/ML
1000 INJECTION INTRAMUSCULAR; INTRAVENOUS; SUBCUTANEOUS
Refills: 0 | Status: DISCONTINUED | OUTPATIENT
Start: 2022-11-21 | End: 2022-12-07

## 2022-11-21 RX ORDER — ACETAMINOPHEN 500 MG
975 TABLET ORAL EVERY 8 HOURS
Refills: 0 | Status: DISCONTINUED | OUTPATIENT
Start: 2022-11-21 | End: 2022-12-07

## 2022-11-21 RX ORDER — OXYCODONE HYDROCHLORIDE 5 MG/1
10 TABLET ORAL
Refills: 0 | Status: DISCONTINUED | OUTPATIENT
Start: 2022-11-21 | End: 2022-11-23

## 2022-11-21 RX ORDER — SODIUM CHLORIDE 9 MG/ML
500 INJECTION INTRAMUSCULAR; INTRAVENOUS; SUBCUTANEOUS ONCE
Refills: 0 | Status: DISCONTINUED | OUTPATIENT
Start: 2022-11-21 | End: 2022-12-07

## 2022-11-21 RX ORDER — VANCOMYCIN HCL 1 G
1500 VIAL (EA) INTRAVENOUS ONCE
Refills: 0 | Status: COMPLETED | OUTPATIENT
Start: 2022-11-21 | End: 2022-11-21

## 2022-11-21 RX ORDER — GABAPENTIN 400 MG/1
300 CAPSULE ORAL
Refills: 0 | Status: DISCONTINUED | OUTPATIENT
Start: 2022-11-21 | End: 2022-12-07

## 2022-11-21 RX ORDER — ACETAMINOPHEN 500 MG
975 TABLET ORAL ONCE
Refills: 0 | Status: COMPLETED | OUTPATIENT
Start: 2022-11-21 | End: 2022-11-21

## 2022-11-21 RX ORDER — HYDROMORPHONE HYDROCHLORIDE 2 MG/ML
0.5 INJECTION INTRAMUSCULAR; INTRAVENOUS; SUBCUTANEOUS
Refills: 0 | Status: DISCONTINUED | OUTPATIENT
Start: 2022-11-21 | End: 2022-11-23

## 2022-11-21 RX ORDER — VANCOMYCIN HCL 1 G
1500 VIAL (EA) INTRAVENOUS EVERY 12 HOURS
Refills: 0 | Status: DISCONTINUED | OUTPATIENT
Start: 2022-11-21 | End: 2022-11-22

## 2022-11-21 RX ORDER — ATORVASTATIN CALCIUM 80 MG/1
40 TABLET, FILM COATED ORAL AT BEDTIME
Refills: 0 | Status: DISCONTINUED | OUTPATIENT
Start: 2022-11-21 | End: 2022-12-07

## 2022-11-21 RX ORDER — CELECOXIB 200 MG/1
400 CAPSULE ORAL ONCE
Refills: 0 | Status: COMPLETED | OUTPATIENT
Start: 2022-11-21 | End: 2022-11-21

## 2022-11-21 RX ORDER — HYDROMORPHONE HYDROCHLORIDE 2 MG/ML
4 INJECTION INTRAMUSCULAR; INTRAVENOUS; SUBCUTANEOUS
Refills: 0 | Status: DISCONTINUED | OUTPATIENT
Start: 2022-11-21 | End: 2022-11-23

## 2022-11-21 RX ORDER — MAGNESIUM HYDROXIDE 400 MG/1
30 TABLET, CHEWABLE ORAL DAILY
Refills: 0 | Status: DISCONTINUED | OUTPATIENT
Start: 2022-11-21 | End: 2022-12-07

## 2022-11-21 RX ORDER — CEFAZOLIN SODIUM 1 G
2000 VIAL (EA) INJECTION EVERY 8 HOURS
Refills: 0 | Status: DISCONTINUED | OUTPATIENT
Start: 2022-11-21 | End: 2022-11-28

## 2022-11-21 RX ORDER — OXYCODONE HYDROCHLORIDE 5 MG/1
5 TABLET ORAL
Refills: 0 | Status: DISCONTINUED | OUTPATIENT
Start: 2022-11-21 | End: 2022-11-23

## 2022-11-21 RX ORDER — SENNA PLUS 8.6 MG/1
2 TABLET ORAL AT BEDTIME
Refills: 0 | Status: DISCONTINUED | OUTPATIENT
Start: 2022-11-21 | End: 2022-12-07

## 2022-11-21 RX ORDER — TRAZODONE HCL 50 MG
200 TABLET ORAL AT BEDTIME
Refills: 0 | Status: DISCONTINUED | OUTPATIENT
Start: 2022-11-21 | End: 2022-12-07

## 2022-11-21 RX ORDER — APIXABAN 2.5 MG/1
2.5 TABLET, FILM COATED ORAL
Refills: 0 | Status: DISCONTINUED | OUTPATIENT
Start: 2022-11-22 | End: 2022-12-07

## 2022-11-21 RX ORDER — APREPITANT 80 MG/1
40 CAPSULE ORAL ONCE
Refills: 0 | Status: COMPLETED | OUTPATIENT
Start: 2022-11-21 | End: 2022-11-21

## 2022-11-21 RX ORDER — POLYETHYLENE GLYCOL 3350 17 G/17G
17 POWDER, FOR SOLUTION ORAL AT BEDTIME
Refills: 0 | Status: DISCONTINUED | OUTPATIENT
Start: 2022-11-21 | End: 2022-12-07

## 2022-11-21 RX ADMIN — HYDROMORPHONE HYDROCHLORIDE 4 MILLIGRAM(S): 2 INJECTION INTRAMUSCULAR; INTRAVENOUS; SUBCUTANEOUS at 23:27

## 2022-11-21 RX ADMIN — CELECOXIB 400 MILLIGRAM(S): 200 CAPSULE ORAL at 13:21

## 2022-11-21 RX ADMIN — Medication 5 MILLIGRAM(S): at 23:30

## 2022-11-21 RX ADMIN — SENNA PLUS 2 TABLET(S): 8.6 TABLET ORAL at 23:29

## 2022-11-21 RX ADMIN — APREPITANT 40 MILLIGRAM(S): 80 CAPSULE ORAL at 13:20

## 2022-11-21 RX ADMIN — HYDROMORPHONE HYDROCHLORIDE 0.5 MILLIGRAM(S): 2 INJECTION INTRAMUSCULAR; INTRAVENOUS; SUBCUTANEOUS at 22:05

## 2022-11-21 RX ADMIN — Medication 200 MILLIGRAM(S): at 23:30

## 2022-11-21 RX ADMIN — Medication 15 MILLIGRAM(S): at 23:30

## 2022-11-21 RX ADMIN — HYDROMORPHONE HYDROCHLORIDE 0.5 MILLIGRAM(S): 2 INJECTION INTRAMUSCULAR; INTRAVENOUS; SUBCUTANEOUS at 21:55

## 2022-11-21 RX ADMIN — GABAPENTIN 600 MILLIGRAM(S): 400 CAPSULE ORAL at 23:27

## 2022-11-21 RX ADMIN — Medication 975 MILLIGRAM(S): at 13:21

## 2022-11-21 RX ADMIN — ATORVASTATIN CALCIUM 40 MILLIGRAM(S): 80 TABLET, FILM COATED ORAL at 23:29

## 2022-11-21 RX ADMIN — Medication 300 MILLIGRAM(S): at 23:31

## 2022-11-21 NOTE — BRIEF OPERATIVE NOTE - OPERATION/FINDINGS
Home
removal of antibiotic spacer, purulence in patella drill holes, 3/5 specimens + for >8 neutrophils per hpf  removal of cement spacer and placement of static antibiotic spacer, I&D

## 2022-11-21 NOTE — DISCHARGE NOTE PROVIDER - NSDCMRMEDTOKEN_GEN_ALL_CORE_FT
atorvastatin 40 mg oral tablet: 1 tab(s) orally once a day  busPIRone 5 mg oral tablet: 1 tab(s) orally 2 times a day  cloNIDine 0.1 mg oral tablet: 1 tab(s) orally 2 times a day  gabapentin 300 mg oral capsule: 1 cap(s) orally 2 times a day at 8 AM and 2 PM  gabapentin 600 mg oral tablet: 1 tab(s) orally once a day (at bedtime)  Suboxone 8 mg-2 mg sublingual film: 1 film(s) sublingual 2 times a day  traZODone 100 mg oral tablet: 2 tab(s) orally once a day (at bedtime)   acetaminophen 325 mg oral tablet: 3 tab(s) orally every 8 hours  apixaban 2.5 mg oral tablet: 1 tab(s) orally 2 times a day  atorvastatin 40 mg oral tablet: 1 tab(s) orally once a day  busPIRone 5 mg oral tablet: 1 tab(s) orally 2 times a day  cloNIDine 0.1 mg oral tablet: 1 tab(s) orally 2 times a day  DAPTOmycin 500 mg intravenous injection: 550 milligram(s) intravenous every 24 hours  gabapentin 300 mg oral capsule: 1 cap(s) orally 2 times a day at 8 AM and 2 PM  gabapentin 600 mg oral tablet: 1 tab(s) orally once a day (at bedtime)  Suboxone 8 mg-2 mg sublingual film: 1 film(s) sublingual 2 times a day  traZODone 100 mg oral tablet: 2 tab(s) orally once a day (at bedtime)

## 2022-11-21 NOTE — PATIENT PROFILE ADULT - FALL HARM RISK - HARM RISK INTERVENTIONS
Assistance with ambulation/Assistance OOB with selected safe patient handling equipment/Communicate Risk of Fall with Harm to all staff/Discuss with provider need for PT consult/Monitor gait and stability/Provide patient with walking aids - walker, cane, crutches/Reinforce activity limits and safety measures with patient and family/Sit up slowly, dangle for a short time, stand at bedside before walking/Tailored Fall Risk Interventions/Use of alarms - bed, chair and/or voice tab/Visual Cue: Yellow wristband and red socks/Bed in lowest position, wheels locked, appropriate side rails in place/Call bell, personal items and telephone in reach/Instruct patient to call for assistance before getting out of bed or chair/Non-slip footwear when patient is out of bed/[place] to call system/Physically safe environment - no spills, clutter or unnecessary equipment/Purposeful Proactive Rounding/Room/bathroom lighting operational, light cord in reach

## 2022-11-21 NOTE — CONSULT NOTE ADULT - SUBJECTIVE AND OBJECTIVE BOX
Patient is a 57y old  Male who presents with a chief complaint of " Right knee pain " (10 Nov 2022 08:24)      HPI:  58 y/o male with PMH of drug abuse, ETOH abuse, depression, anxiety, asthma, diverticulitis, HTN, HLD, MARIBEL using mouthpiece and osteoarthritis presents to PST with complaints of right knee pain. He is s/p right knee replacement on 6/14/2022. States after his right knee replacement he was having reoccurring infections. He states the pain in his right knee has been getting progressively worse. Patient stated that he was having increasing pain in his right knee, he relapsed (last use prior 2 years ago) and used heroin. He was rushed to the emergency room and received Narcan on 11/1/2022. He is using a knee brace and walker for ambulation. Denies fevers, chills, nausea or vomiting. Patient is scheduled for removal of spacer, revision TKA possible extensive mechanism repair, possible ABX spacer on 11/21/2022 with Dr. Canada.  (10 Nov 2022 08:24)        Interval Hx:  Patient seen during rounds in preop area  due for TKR today  patient has stopped taking suboxone 8 days ago  has not taken any opioid medications since then  denies withdrawal currently    Rx Written	Rx Dispensed	Drug	Quantity	Days Supply	Prescriber Name  11/04/2022	11/04/2022	buprenorphine-naloxone 8-2 mg sl film	58	29	Murray, Linoel H  10/21/2022	10/21/2022	buprenorphine-naloxone 8-2 mg sl film	28	14	Murray, Lionel H  10/07/2022	10/07/2022	buprenorphine-naloxone 8-2 mg sl film	28	14	Murray, Lionel H  09/23/2022	09/24/2022	buprenorphine-naloxone 8-2 mg sl film	14	14	Murray, Lionel H  09/12/2022	09/12/2022	buprenorphine-naloxone 8-2 mg sl film	28	14	Murray, Lionel H    Analgesic Dosing for past 24 hours reviewed as below:          T(C): 36.2 (11-21-22 @ 12:44), Max: 36.2 (11-21-22 @ 12:44)  HR: 66 (11-21-22 @ 12:44) (66 - 66)  BP: 148/82 (11-21-22 @ 12:44) (148/82 - 148/82)  RR: 16 (11-21-22 @ 12:44) (16 - 16)  SpO2: 98% (11-21-22 @ 12:44) (98% - 98%)        acetaminophen     Tablet .. 975 milliGRAM(s) Oral once  aprepitant 40 milliGRAM(s) Oral once  ceFAZolin   IVPB 2000 milliGRAM(s) IV Intermittent once  celecoxib 400 milliGRAM(s) Oral once  sodium chloride 0.9% lock flush 3 milliLiter(s) IV Push every 8 hours  tranexamic acid IVPB 1000 milliGRAM(s) IV Intermittent once  tranexamic acid IVPB 1000 milliGRAM(s) IV Intermittent once                  Pain Service   [phone]

## 2022-11-21 NOTE — ASU PREOP CHECKLIST - SELECT TESTS ORDERED
Hold your coumadin Sunday night!!!  
covid, urine drug, alcohol blood/Type and Screen/POCT Blood Glucose

## 2022-11-21 NOTE — DISCHARGE NOTE PROVIDER - NSDCCPTREATMENT_GEN_ALL_CORE_FT
PRINCIPAL PROCEDURE  Procedure: Debridement, knee, with antibiotic spacer insertion  Findings and Treatment:       SECONDARY PROCEDURE  Procedure: Removal, antibiotic spacer  Findings and Treatment:

## 2022-11-21 NOTE — PATIENT PROFILE ADULT - FUNCTIONAL ASSESSMENT - BASIC MOBILITY SCORE.
[FreeTextEntry1] : 64 yo female with mild TIFFANY with a hybrid oral appliance/PAP device her for a follow up. \par \par 1. Moderate TIFFANY-\par -She is deriving benefit from CPAP therapy with the use of a hybrid oral-nasal device for moderate symptomatic TIFFANY with improvement in sleep quality, sleep duration, sleep fragmentation, daytime somnolence, and resolved snoring.\par -Compliance and therapeutic data show >/=4 hours of usage at 97% with an average AHI of 4.5 events/hr at an APAP setting of 4-20 cmH2O \par -Has issues with her bite according to her dentist, using a morning repositioning device, she will follow up with Dr. Leong shortly\par -The ramifications of TIFFANY and its potential therapeutic modalities were discussed with the patient. The patient was cautioned on the importance of avoiding drowsy driving. \par \par Pato Vergara DO\par Sleep Fellow  16

## 2022-11-21 NOTE — DISCHARGE NOTE PROVIDER - NSDCFUADDINST_GEN_ALL_CORE_FT
The patient will be seen in the office between 2-3 weeks for wound check.   **Your first post-operative visit has been scheduled prior to your admission. PLEASE CONTACT OFFICE TO CONFIRM THE APPOINTMENT DATE. Sutures/Staples/Tape will be removed at that time.  **  The silver based dressing is to be removed 7 days from the date of surgery.   ** CONTACT THE OFFICE IF THE FOLLOWING DEVELOP:  - the dressing becomes soiled or saturated  - you develop a fever greater that 101F  - the wound becomes red or you develop blistering around the wound  * Patient may shower after post-op day #3.    * The patient will maintain knee immobilizer at all times. No range of motion allowed of the RLE.  * The patient is TOE TOUCH weight bearing of the right lower extremity.  * Continue antibiotics as prescribed.   *** The patient will continue ELIQUIS for blood clot prevention.   * The patient will take OXYCODONE AND TYLENOL for pain control and adjust according to prescription and patient needs. Contact the office if pain increases while taking prescribed pain medications or related concerns develop.   * The patient will take Senna S while taking oxycodone to prevent narcotic associated constipation.  Additionally, increase water intake (drink at least 8 glasses of water daily) and try adding fiber to the diet by eating fruits, vegetables and foods that are rich in grains. If constipation is experienced, contact the medical/primary care provider to discuss further treatment options.  * To avoid injury at home:  - continue use of rolling walker until cleared by physical therapist  - have family or friend remove all throw rug or objects in hallways that may present a trip hazard.  - if you experience any dizziness or medical concerns, call your medical doctor or  911.  * The implant may activate metal detection devices.  The patient will be seen in the office between 2-3 weeks for wound check.   **Your first post-operative visit has been scheduled prior to your admission. PLEASE CONTACT OFFICE TO CONFIRM THE APPOINTMENT DATE.  ** CONTACT THE OFFICE IF THE FOLLOWING DEVELOP:  - the dressing becomes soiled or saturated  - you develop a fever greater that 101F  - the wound becomes red or you develop blistering around the wound  * Patient may shower  * The patient will maintain knee immobilizer at all times. No range of motion allowed of the RLE.  * The patient is TOE TOUCH weight bearing of the right lower extremity.  * Continue antibiotics as prescribed.   * The patient will continue ELIQUIS for blood clot prevention.   * The patient will take Suboxone for pain control per Pain Management. Contact the office if pain increases while taking prescribed pain medications or related concerns develop.   * To avoid injury at home:  - continue use of rolling walker until cleared by physical therapist  - have family or friend remove all throw rug or objects in hallways that may present a trip hazard.  - if you experience any dizziness or medical concerns, call your medical doctor or  911.  * The implant may activate metal detection devices.

## 2022-11-21 NOTE — DISCHARGE NOTE PROVIDER - NSDCCPCAREPLAN_GEN_ALL_CORE_FT
PRINCIPAL DISCHARGE DIAGNOSIS  Diagnosis: Infection of total right knee replacement  Assessment and Plan of Treatment:

## 2022-11-21 NOTE — DISCHARGE NOTE PROVIDER - HOSPITAL COURSE
The patient underwent a REMOVAL OF HARDWARE/ABX SPACER AND REINSERTION OF NEW ABX SPACER on 11/21/22. The patient received ANCEF/VANCO around the clock. The patient underwent the procedure and had no intra-operative complications. Post-operatively, the patient was seen by medicine and PT. The patient received ELIQUIS for clot prevention. The patient received pain medications per orthopedic pain management pathway and the pain was appropriately controlled. The patient did not have any post-operative medical complications. The patient was discharged in stable condition. The patient underwent a REMOVAL OF HARDWARE/ABX SPACER AND REINSERTION OF NEW ABX SPACER on 11/21/22. The patient received ANCEF/VANCO post operatively. The patient underwent the procedure and had no intra-operative complications. Post-operatively, the patient was seen by medicine and PT. The patient received ELIQUIS for clot prevention. The patient received pain medications per orthopedic pain management pathway and the pain was appropriately controlled. The patient did not have any post-operative medical complications. The patient was toe touch weight bearing of the right lower extremity in knee immobilizer at all times. The patient was discharged in stable condition. The patient underwent a REMOVAL OF HARDWARE/ ABX SPACER AND REINSERTION OF NEW ABX SPACER on 11/21/22. The patient underwent the procedure and had no intra-operative complications. Post-operatively, the patient was seen by medicine, PT and ID. The patient received IV antibiotics per ID post operatively. The patient received ELIQUIS for clot prevention. The patient received pain medications per orthopedic pain management pathway and the pain was appropriately controlled. The patient did not have any post-operative medical complications. The patient was toe touch weight bearing of the right lower extremity in knee immobilizer at all times. The patient was discharged in stable condition.

## 2022-11-21 NOTE — PROGRESS NOTE ADULT - SUBJECTIVE AND OBJECTIVE BOX
Orthopedic PA Postop Note  Patient S/P REMOVAL OF HARDWARE/ABX SPACER, REINSERTION OF NEW ABX SPACER  Patient in bed comfortable   RIGHT Leg  Dressing C/D/I - ACE wrap without staining, knee immobilizer  Prevena intact and functioning  DP Pulse intact   Calf Soft NT  Dorsi/Plantar Flexion/EHL/FHL intact   Sensation intact to light touch    Vital Signs Last 24 Hrs  T(C): 36.3 (21 Nov 2022 23:21), Max: 36.7 (21 Nov 2022 21:45)  T(F): 97.4 (21 Nov 2022 23:21), Max: 98 (21 Nov 2022 21:45)  HR: 60 (21 Nov 2022 23:21) (60 - 69)  BP: 155/74 (21 Nov 2022 23:21) (93/61 - 155/74)  BP(mean): 72 (21 Nov 2022 22:30) (66 - 78)  RR: 17 (21 Nov 2022 23:21) (7 - 22)  SpO2: 95% (21 Nov 2022 23:21) (93% - 98%)    Parameters below as of 21 Nov 2022 23:21  Patient On (Oxygen Delivery Method): room air        A/P:  S/P REMOVAL OF HARDWARE/ABX SPACER, REINSERTION OF NEW ABX SPACER  1. DVTP - ELIQUIS  2. Physical Therapy   3. Pain Control as clinically indicated

## 2022-11-21 NOTE — DISCHARGE NOTE PROVIDER - CARE PROVIDER_API CALL
Pedro Canada)  Orthopedics  [address] [place] [id number]  Phone: [phone]  Fax: [phone]  Follow Up Time:

## 2022-11-22 LAB
ANION GAP SERPL CALC-SCNC: 10 MMOL/L — SIGNIFICANT CHANGE UP (ref 5–17)
BUN SERPL-MCNC: 20.7 MG/DL — HIGH (ref 8–20)
CALCIUM SERPL-MCNC: 8.1 MG/DL — LOW (ref 8.4–10.5)
CHLORIDE SERPL-SCNC: 103 MMOL/L — SIGNIFICANT CHANGE UP (ref 96–108)
CO2 SERPL-SCNC: 26 MMOL/L — SIGNIFICANT CHANGE UP (ref 22–29)
CREAT SERPL-MCNC: 0.84 MG/DL — SIGNIFICANT CHANGE UP (ref 0.5–1.3)
EGFR: 101 ML/MIN/1.73M2 — SIGNIFICANT CHANGE UP
GLUCOSE SERPL-MCNC: 128 MG/DL — HIGH (ref 70–99)
GRAM STN FLD: SIGNIFICANT CHANGE UP
HCT VFR BLD CALC: 30.8 % — LOW (ref 39–50)
HGB BLD-MCNC: 10.2 G/DL — LOW (ref 13–17)
MCHC RBC-ENTMCNC: 30.3 PG — SIGNIFICANT CHANGE UP (ref 27–34)
MCHC RBC-ENTMCNC: 33.1 GM/DL — SIGNIFICANT CHANGE UP (ref 32–36)
MCV RBC AUTO: 91.4 FL — SIGNIFICANT CHANGE UP (ref 80–100)
PLATELET # BLD AUTO: 258 K/UL — SIGNIFICANT CHANGE UP (ref 150–400)
POTASSIUM SERPL-MCNC: 3.4 MMOL/L — LOW (ref 3.5–5.3)
POTASSIUM SERPL-SCNC: 3.4 MMOL/L — LOW (ref 3.5–5.3)
RBC # BLD: 3.37 M/UL — LOW (ref 4.2–5.8)
RBC # FLD: 13.7 % — SIGNIFICANT CHANGE UP (ref 10.3–14.5)
SODIUM SERPL-SCNC: 139 MMOL/L — SIGNIFICANT CHANGE UP (ref 135–145)
SPECIMEN SOURCE: SIGNIFICANT CHANGE UP
WBC # BLD: 9.29 K/UL — SIGNIFICANT CHANGE UP (ref 3.8–10.5)
WBC # FLD AUTO: 9.29 K/UL — SIGNIFICANT CHANGE UP (ref 3.8–10.5)

## 2022-11-22 PROCEDURE — 99223 1ST HOSP IP/OBS HIGH 75: CPT

## 2022-11-22 PROCEDURE — 99222 1ST HOSP IP/OBS MODERATE 55: CPT

## 2022-11-22 RX ORDER — VANCOMYCIN HCL 1 G
1250 VIAL (EA) INTRAVENOUS EVERY 12 HOURS
Refills: 0 | Status: DISCONTINUED | OUTPATIENT
Start: 2022-11-22 | End: 2022-11-24

## 2022-11-22 RX ORDER — VANCOMYCIN HCL 1 G
1500 VIAL (EA) INTRAVENOUS ONCE
Refills: 0 | Status: COMPLETED | OUTPATIENT
Start: 2022-11-22 | End: 2022-11-22

## 2022-11-22 RX ORDER — POTASSIUM CHLORIDE 20 MEQ
40 PACKET (EA) ORAL ONCE
Refills: 0 | Status: COMPLETED | OUTPATIENT
Start: 2022-11-22 | End: 2022-11-22

## 2022-11-22 RX ADMIN — GABAPENTIN 600 MILLIGRAM(S): 400 CAPSULE ORAL at 21:45

## 2022-11-22 RX ADMIN — Medication 100 MILLIGRAM(S): at 21:45

## 2022-11-22 RX ADMIN — Medication 975 MILLIGRAM(S): at 12:55

## 2022-11-22 RX ADMIN — SENNA PLUS 2 TABLET(S): 8.6 TABLET ORAL at 21:49

## 2022-11-22 RX ADMIN — Medication 1000 MILLIGRAM(S): at 06:30

## 2022-11-22 RX ADMIN — Medication 15 MILLIGRAM(S): at 17:23

## 2022-11-22 RX ADMIN — Medication 15 MILLIGRAM(S): at 05:26

## 2022-11-22 RX ADMIN — Medication 5 MILLIGRAM(S): at 05:26

## 2022-11-22 RX ADMIN — ATORVASTATIN CALCIUM 40 MILLIGRAM(S): 80 TABLET, FILM COATED ORAL at 21:49

## 2022-11-22 RX ADMIN — OXYCODONE HYDROCHLORIDE 10 MILLIGRAM(S): 5 TABLET ORAL at 18:46

## 2022-11-22 RX ADMIN — Medication 100 MILLIGRAM(S): at 13:02

## 2022-11-22 RX ADMIN — GABAPENTIN 300 MILLIGRAM(S): 400 CAPSULE ORAL at 09:23

## 2022-11-22 RX ADMIN — HYDROMORPHONE HYDROCHLORIDE 4 MILLIGRAM(S): 2 INJECTION INTRAMUSCULAR; INTRAVENOUS; SUBCUTANEOUS at 04:19

## 2022-11-22 RX ADMIN — HYDROMORPHONE HYDROCHLORIDE 4 MILLIGRAM(S): 2 INJECTION INTRAMUSCULAR; INTRAVENOUS; SUBCUTANEOUS at 00:35

## 2022-11-22 RX ADMIN — Medication 975 MILLIGRAM(S): at 15:53

## 2022-11-22 RX ADMIN — OXYCODONE HYDROCHLORIDE 10 MILLIGRAM(S): 5 TABLET ORAL at 21:35

## 2022-11-22 RX ADMIN — Medication 15 MILLIGRAM(S): at 17:20

## 2022-11-22 RX ADMIN — Medication 0.1 MILLIGRAM(S): at 09:23

## 2022-11-22 RX ADMIN — OXYCODONE HYDROCHLORIDE 10 MILLIGRAM(S): 5 TABLET ORAL at 17:19

## 2022-11-22 RX ADMIN — Medication 975 MILLIGRAM(S): at 22:45

## 2022-11-22 RX ADMIN — OXYCODONE HYDROCHLORIDE 10 MILLIGRAM(S): 5 TABLET ORAL at 23:50

## 2022-11-22 RX ADMIN — POLYETHYLENE GLYCOL 3350 17 GRAM(S): 17 POWDER, FOR SOLUTION ORAL at 21:49

## 2022-11-22 RX ADMIN — Medication 1 TABLET(S): at 12:55

## 2022-11-22 RX ADMIN — Medication 15 MILLIGRAM(S): at 13:01

## 2022-11-22 RX ADMIN — GABAPENTIN 300 MILLIGRAM(S): 400 CAPSULE ORAL at 12:55

## 2022-11-22 RX ADMIN — Medication 15 MILLIGRAM(S): at 15:53

## 2022-11-22 RX ADMIN — OXYCODONE HYDROCHLORIDE 10 MILLIGRAM(S): 5 TABLET ORAL at 18:50

## 2022-11-22 RX ADMIN — Medication 15 MILLIGRAM(S): at 06:30

## 2022-11-22 RX ADMIN — Medication 166.67 MILLIGRAM(S): at 17:20

## 2022-11-22 RX ADMIN — HYDROMORPHONE HYDROCHLORIDE 4 MILLIGRAM(S): 2 INJECTION INTRAMUSCULAR; INTRAVENOUS; SUBCUTANEOUS at 17:25

## 2022-11-22 RX ADMIN — Medication 200 MILLIGRAM(S): at 21:50

## 2022-11-22 RX ADMIN — Medication 0.1 MILLIGRAM(S): at 17:19

## 2022-11-22 RX ADMIN — Medication 40 MILLIEQUIVALENT(S): at 13:01

## 2022-11-22 RX ADMIN — Medication 100 MILLIGRAM(S): at 05:26

## 2022-11-22 RX ADMIN — OXYCODONE HYDROCHLORIDE 10 MILLIGRAM(S): 5 TABLET ORAL at 20:35

## 2022-11-22 RX ADMIN — Medication 15 MILLIGRAM(S): at 00:35

## 2022-11-22 RX ADMIN — APIXABAN 2.5 MILLIGRAM(S): 2.5 TABLET, FILM COATED ORAL at 05:26

## 2022-11-22 RX ADMIN — OXYCODONE HYDROCHLORIDE 10 MILLIGRAM(S): 5 TABLET ORAL at 12:54

## 2022-11-22 RX ADMIN — HYDROMORPHONE HYDROCHLORIDE 4 MILLIGRAM(S): 2 INJECTION INTRAMUSCULAR; INTRAVENOUS; SUBCUTANEOUS at 09:22

## 2022-11-22 RX ADMIN — APIXABAN 2.5 MILLIGRAM(S): 2.5 TABLET, FILM COATED ORAL at 17:19

## 2022-11-22 RX ADMIN — Medication 975 MILLIGRAM(S): at 21:45

## 2022-11-22 RX ADMIN — HYDROMORPHONE HYDROCHLORIDE 4 MILLIGRAM(S): 2 INJECTION INTRAMUSCULAR; INTRAVENOUS; SUBCUTANEOUS at 03:19

## 2022-11-22 RX ADMIN — Medication 400 MILLIGRAM(S): at 05:25

## 2022-11-22 RX ADMIN — Medication 5 MILLIGRAM(S): at 17:19

## 2022-11-22 RX ADMIN — Medication 300 MILLIGRAM(S): at 06:54

## 2022-11-22 NOTE — OCCUPATIONAL THERAPY INITIAL EVALUATION ADULT - PERTINENT HX OF CURRENT PROBLEM, REHAB EVAL
HPI   The patient is a 76-year-old black female who took three quarters of a bottle of tequila over the last several hours and then took a handful of Klonopin clonazepam for fluoxetine and 4 Percocets about 1 hour prior to admission and an overdose attempt. She is awake but somnolent easily arousable with a positive gag reflex at this time. She has a history of asthma diabetes type 2 and morbid obesity. Past Medical History:   Diagnosis Date    Asthma     Diabetes ([facility] [address]     Diabetes mellitus type 2 in obese -- Diet controlled 10/28/2014    Morbid obesity ([address] 10/28/2014       Past Surgical History:   Procedure Laterality Date    HX GYN      3 C-sections    HX GYN      Miscarriage         Family History:   Problem Relation Age of Onset    Hypertension Maternal Aunt     Hypertension Maternal Uncle     Thyroid Disease Maternal Uncle     Hypertension Maternal Grandmother     Cancer Maternal Grandfather     Hypertension Maternal Grandfather     Diabetes Paternal Grandmother     Cancer Paternal Grandmother     Hypertension Mother     Alcohol abuse Father     Substance Abuse Father     Hypertension Maternal Aunt     Hypertension Maternal Uncle     Stroke Maternal Uncle     Hypertension Maternal Uncle     Alcohol abuse Maternal Uncle     Hypertension Maternal Uncle     Hypertension Maternal Uncle     Arrhythmia Maternal Uncle        Social History     Socioeconomic History    Marital status: SINGLE     Spouse name: Not on file    Number of children: Not on file    Years of education: Not on file    Highest education level: Not on file   Occupational History    Not on file   Tobacco Use    Smoking status: Former Smoker     Packs/day: 0.10     Types: Cigarettes     Start date: 2014     Quit date: 2016     Years since quittin.4    Smokeless tobacco: Never Used   Vaping Use    Vaping Use: Never used   Substance and Sexual Activity    Alcohol use:  Yes     Alcohol/week: 0.0 standard drinks     Comment: last  drink was 1year ago    Drug use: No    Sexual activity: Yes     Partners: Male   Other Topics Concern    Not on file   Social History Narrative    Not on file     Social Determinants of Health     Financial Resource Strain: High Risk    Difficulty of Paying Living Expenses: Hard   Food Insecurity: Food Insecurity Present    Worried About Running Out of Food in the Last Year: Sometimes true    [place]anna of Food in the Last Year: Sometimes true   Transportation Needs:     Lack of Transportation (Medical): Not on file    Lack of Transportation (Non-Medical): Not on file   Physical Activity:     Days of Exercise per Week: Not on file    Minutes of Exercise per Session: Not on file   Stress:     Feeling of Stress : Not on file   Social Connections:     Frequency of Communication with Friends and Family: Not on file    Frequency of Social Gatherings with Friends and Family: Not on file    Attends Protestant Services: Not on file    Active Member of [address] [place] [address] or Organizations: Not on file    Attends Club or Organization Meetings: Not on file    Marital Status: Not on file   Intimate Partner Violence:     Fear of Current or Ex-Partner: Not on file    Emotionally Abused: Not on file    Physically Abused: Not on file    Sexually Abused: Not on file   Housing Stability:     Unable to Pay for Housing in the Last Year: Not on file    Number of [place] in the Last Year: Not on file    Unstable Housing in the Last Year: Not on file         ALLERGIES: Metformin and Pcn [penicillins]    Review of Systems   All other systems reviewed and are negative. Vitals:    06/01/22 0908   BP: (!) 141/75   Pulse: 87   Resp: 14   Temp: 98.4 °F (36.9 °C)   SpO2: 95%   Weight: 151.1 kg (333 lb 1.8 oz)   Height: 5' 4\" (1.626 m)            Physical Exam  Vitals and nursing note reviewed. Constitutional:       Appearance: She is well-developed.       Comments: Morbidly obese smells of alcohol somnolent but arousable positive gag   HENT:      Head: Normocephalic and atraumatic. Mouth/Throat:      Pharynx: No oropharyngeal exudate. Eyes:      General: No scleral icterus. Conjunctiva/sclera: Conjunctivae normal.   Neck:      Thyroid: No thyromegaly. Cardiovascular:      Rate and Rhythm: Normal rate and regular rhythm. Heart sounds: Normal heart sounds. No murmur heard. No friction rub. No gallop. Pulmonary:      Effort: Pulmonary effort is normal. No respiratory distress. Breath sounds: Normal breath sounds. No stridor. No wheezing or rales. Abdominal:      General: Bowel sounds are normal.      Palpations: Abdomen is soft. Tenderness: There is no abdominal tenderness. There is no guarding or rebound. Musculoskeletal:         General: Normal range of motion. Cervical back: Neck supple. Lymphadenopathy:      Cervical: No cervical adenopathy. Skin:     General: Skin is warm and dry. Neurological:      Mental Status: She is alert and oriented to person, place, and time. MDM  Number of Diagnoses or Management Options     Amount and/or Complexity of Data Reviewed  Clinical lab tests: ordered and reviewed  Tests in the radiology section of CPT®: ordered and reviewed  Tests in the medicine section of CPT®: ordered and reviewed    Risk of Complications, Morbidity, and/or Mortality  Presenting problems: high  Diagnostic procedures: high  Management options: high           Procedures        Assessment and plan        patient has a multidrug overdose with some lethargy plan to admit to hospitalist service at [address] [place] [address] for observation and further work-up    [facility] Consult for Admission  11:17 AM    ED Room Number: C02/C02  Patient Name and age:  Rosario Serrano 35 y.o.  female  Working Diagnosis:   1.  Intentional drug overdose, initial encounter ([facility] [address]        COVID-19 Suspicion:  no  Sepsis present:  no  Reassessment needed: yes  Code Status:  Full Code  Readmission: no  Isolation Requirements:  no  Recommended Level of Care:  step down  Department:[facility] - [phone]  Other[place]  [facility] [place] abuse, ETOH abuse, depression, anxiety, asthma, diverticulitis, HTN, HLD, MARIBEL using mouthpiece and osteoarthritis presents to PST with complaints of right knee pain. He is s/p right knee replacement on 6/14/2022. States after his right knee replacement he was having reoccurring infections. Patient stated that he was having increasing pain in his right knee, he relapsed (last use prior 2 years ago) and used heroin. He was rushed to the emergency room and received Narcan on 11/1/2022.

## 2022-11-22 NOTE — PHYSICAL THERAPY INITIAL EVALUATION ADULT - TRANSFER SKILLS, REHAB EVAL
[facility]      Patient: Caleb Hernandez  MRN: [id number]  Date of Service: 1/19/2021      [facility] [[id number]]  Reason for Visit     Chief Complaint   Patient presents with     Follow Up   follow-up hypoglycemia and hypotension    Code Status     Full code    Assessment       -Right foot cellulitis with abscess work-up positive for osteomyelitis with septic arthritis of 2nd-4th tarsometatarsal joint  -Status post I&D of abscess on 11/27/2020  -Postoperative urinary retention with hematuria  -History of right 5th metatarsal excision on 11/2/2020  -Severe peripheral vascular disease.  -Status post right lower extremity angiogram with balloon angioplasty of anterior tibial and peroneal arteries on 11/24/2020  -- IDDM-2   - HTN with hypotension noted today  - FTT  -Generalized weakness with deconditioning    Plan     Patient has been admitted to the TCU for strengthening and rehab.  He has been admitted with ulceration of his right foot status post TheraSkin graft.  He currently has a wound VAC in place  He remains nonweightbearing on his right lower extremity.  He is compliant with his nonweightbearing  He currently has a wound VAC on on his wound  He has a follow-up appointment with podiatry tomorrow and hoping for its discontinuation.  Recheck labs reviewed he is no longer on antibiotics and his CRP has jumped to 8.8  However his white count electrolytes and kidney function are stable.  Will await his wound inspection tomorrow.  Diabetic control reviewed he has had an occasional high blood sugar greater than 200 but most of his blood sugars are under 180 indicating excellent control.  Blood pressures are stable on a low-dose of medication.  He is pushing for discharge home stating that he would like to go home he is trying a scooter but having difficulty putting weight on it.  No voiding trial in the TCU at patient's request.he will follow up with urology      History      Patient is a very pleasant 83 y.o. male who is admitted to TCU  Patient presented to the hospital with right lower extremity cellulitis.  He was admitted with concerning infection.  MRI confirmed new synovitis with osteomyelitis of the 2nd-4th tarsometatarsal joint with septic arthritis.  He underwent I&D of the abscess on 11/27/2020.  Postoperatively he has been discharged nonweightbearing with outpatient ertapenem for 4 weeks  Subsequently he was readmitted on 12/17/2024 application of TheraSkin with redebridement of his wound.  Wound is healing slowly.  He has a wound VAC in place and remains nonweightbearing..  He has a follow-up with podiatry tomorrow and hoping he can discharge home if his wound VAC can be discontinued.  He recently had his IV antibiotics discontinued.  Recheck labs done in the TCU show a markedly elevated CRP of 8.8.  White count is normal and he is afebrile.  Will await his wound inspection results from his podiatry whether he really needs to be started on IV antibiotics    He also has a history of severe peripheral vascular disease and underwent right lower extremity angiogram with angioplasty of anterior tibial and peroneal arteries on 11/24/2020 with vascular surgery.  He will require follow-up with vascular.  It was done in the TCU.  They have recommended further follow-up with podiatry for further work-up they recommend seeing him back in 3 months for ABIs  He does report some occasional discomfort in his leg   However he does report that he will take a tramadol at bedtime because of some cramping and pain in his leg and foot.    Postoperatively had urinary retention with bladder wall thickening suggestive of cystitis.  He has continued with an indwelling Dietrich catheter.  I did offer a voiding trial in the TCU.  He is quite hesitant and does not want to have that done in the TCU he agrees to see urology.  In addition he is a diabetic  Currently on a much lower dose of insulin blood  sugars have improved significantly.  Other than occasional high greater than 200 almost all his blood sugars are under 180.  He reports a good appetite    Past Medical History     Active Ambulatory (Non-Hospital) Problems    Diagnosis     Hematuria     Diabetic ulcer of right midfoot associated with type 2 diabetes mellitus, with necrosis of muscle (H)     ACP (advance care planning)     Septic arthritis of right foot (H)     Gross hematuria     Urinary retention     Type 2 diabetes mellitus with diabetic peripheral angiopathy without gangrene, with long-term current use of insulin (H)     Adult failure to thrive     Normocytic anemia     Paroxysmal atrial fibrillation (H)     Atherosclerosis of native artery of right lower extremity with ulceration of other part of foot (H)     Cellulitis of right lower extremity     Abscess of right foot     Cellulitis and abscess of foot excluding toe     Osteomyelitis of right foot (H)     Statin intolerance     Personal history of PE (pulmonary embolism)-- May 2020, unprovoked, with right heart strain     PVD (peripheral vascular disease) (H)     Overweight (BMI 25.0-29.9)     Chronic anticoagulation     Acute pulmonary embolism with acute cor pulmonale (H)     Diabetic ulcer of right foot associated with type 2 diabetes mellitus (H)     Type 2 diabetes mellitus (H)     Hyperlipidemia     Essential hypertension     Past Medical History:   Diagnosis Date     BPH (benign prostatic hyperplasia)      Cholelithiasis      Common bile duct (CBD) obstruction 9/4/2017     Compression Fracture Of Thoracic Vertebral Body      Diabetes mellitus (H)      Essential hypertension      Hyperlipidemia      Pancreatitis      Rib Fracture      Sepsis due to pneumonia (H) 9/8/2017       Past Social History     Reviewed, and he  reports that he quit smoking about 29 years ago. He has never used smokeless tobacco. He reports that he does not drink alcohol or use drugs.    Family History     Reviewed,  and family history includes Alcohol abuse in his father; Aortic aneurysm in his mother.    Medication List   Post Discharge Medication Reconciliation Status: discharge medications reconciled, continue medications without change   Current Outpatient Medications on File Prior to Visit   Medication Sig Dispense Refill     acetaminophen (TYLENOL) 500 MG tablet Take 1-2 tablets (500-1,000 mg total) by mouth every 4 (four) hours as needed.  0     apixaban ANTICOAGULANT (ELIQUIS) 5 mg Tab tablet Take 1 tablet (5 mg total) by mouth 2 (two) times a day. 60 tablet 3     aspirin 81 MG EC tablet Take 81 mg by mouth daily.       blood glucose test strips Use 1 each As Directed 2 (two) times a day. Dispense brand per patient's insurance at pharmacy discretion. 120 strip 6     blood-glucose meter (ONETOUCH VERIO IQ METER) Misc Use 1 each As Directed 2 (two) times a day. 1 each 0     cholecalciferol, vitamin D3, (VITAMIN D3) 5,000 unit Tab Take 5,000 Units by mouth daily.        insulin glargine (LANTUS SOLOSTAR U-100 INSULIN) 100 unit/mL (3 mL) pen Inject 44 Units under the skin at bedtime. (Patient taking differently: Inject 30 Units under the skin at bedtime. Takes 15 units q am and 30 units Q HS)       liraglutide (VICTOZA 3-RUPALI) 0.6 mg/0.1 mL (18 mg/3 mL) injection Inject 0.3 mL (1.8 mg total) under the skin daily.       lisinopriL (PRINIVIL,ZESTRIL) 5 MG tablet Take 1 tablet (5 mg total) by mouth daily. 90 tablet 3     multivit-min/FA/lycopen/lutein (CENTRUM SILVER MEN ORAL) Take 1 tablet by mouth daily.       mupirocin (BACTROBAN) 2 % ointment Apply topically daily as needed. Apply to wound.       neomycin-bacitracin-polymyxin (NEOSPORIN) ointment Apply to penis at catheter insertion site three times a day while catheter is in place. 15 g 0     NOVOLOG FLEXPEN U-100 INSULIN 100 unit/mL (3 mL) injection pen Inject 3 Units under the skin 3 (three) times a day before meals. 2.7 mL 0     oxyCODONE (ROXICODONE) 5 MG immediate  "release tablet Take 1 tablet (5 mg total) by mouth every 6 (six) hours as needed for pain. 30 tablet 0     pen needle, diabetic 31 gauge x 5/16\" Ndle Used to inject insulin daily 90 each 0     polyethylene glycol (MIRALAX) 17 gram packet Take 1 packet (17 g total) by mouth daily as needed.  0     tamsulosin (FLOMAX) 0.4 mg cap Take 1 capsule (0.4 mg total) by mouth daily after supper.  0     traMADoL (ULTRAM) 50 mg tablet Take 50 mg by mouth every 6 (six) hours as needed for pain.       vitamin E 400 unit capsule Take 400 Units by mouth daily.       No current facility-administered medications on file prior to visit.        Allergies     Allergies   Allergen Reactions     Cresol      Muscle cramps     Crestor [Rosuvastatin] Myalgia     Declomycin [Demeclocycline] Hives       Review of Systems   A comprehensive review of 14 systems was done. Pertinent findings noted here and in history of present illness. All the rest negative.  Constitutional: Negative.  Negative for fever, chills, he has activity change, appetite change and fatigue.   HENT: Negative for congestion and facial swelling.    Eyes: Negative for photophobia, redness and visual disturbance.   Respiratory: Negative for cough and chest tightness.    Cardiovascular: Negative for chest pain, palpitations and  leg swelling.   Gastrointestinal: Negative for nausea, diarrhea, constipation, blood in stool and abdominal distention.   Genitourinary: Negative.  Has  A mai  Musculoskeletal: Negative.  Denies any pain pin his foot .using a scooter for ambulation  Skin: Negative.    Neurological: Negative for dizziness, tremors, syncope, weakness, light-headedness and headaches.   Hematological: Does not bruise/bleed easily.   Psychiatric/Behavioral: Negative.  Some confusion noted he just wants to go home as per him      Physical Exam     Recent Vitals 1/19/2021   Height 5' 10\"   Weight 179 lbs 6 oz   BSA (m2) 2.01 m2   /78   Pulse 62   Temp 98.8   SpO2 92 "   Some recent data might be hidden       Constitutional: Oriented to person, place, and time and appears well-developed.   HEENT:  Normocephalic and atraumatic.  Eyes: Conjunctivae and EOM are normal. Pupils are equal, round, and reactive to light. No discharge.  No scleral icterus. Nose normal. Mouth/Throat: Oropharynx is clear and moist. No oropharyngeal exudate.    NECK: Normal range of motion. Neck supple. No JVD present. No tracheal deviation present. No thyromegaly present.   CARDIOVASCULAR: Normal rate, regular rhythm and intact distal pulses.  Exam reveals no gallop and no friction rub.  Systolic murmur present.  PULMONARY: Effort normal and breath sounds normal. No respiratory distress.No Wheezing or rales.  ABDOMEN: Soft. Bowel sounds are normal. No distension and no mass.  There is no tenderness. There is no rebound and no guarding. No HSM.  MUSCULOSKELETAL: Normal range of motion. No edema and no tenderness. Mild kyphosis, no tenderness.  Rt foot missing 5th toe  He has a wound VAC in place  LYMPH NODES: Has no cervical, supraclavicular, axillary and groin adenopathy.   NEUROLOGICAL: Alert and oriented to person, place, and time. No cranial nerve deficit.  Normal muscle tone. Coordination normal.   GENITOURINARY: Deferred exam.  Dietrich present  SKIN: Skin is warm and dry. No rash noted. No erythema. No pallor.   EXTREMITIES: No cyanosis, no clubbing, no edema. No Deformity.  PSYCHIATRIC: Normal mood, affect and behavior.      Lab Results     Recent Results (from the past 240 hour(s))   COVID-19 Virus PCR MRF    Collection Time: 01/11/21 12:00 PM    Specimen: Respiratory   Result Value Ref Range    COVID-19 VIRUS SPECIMEN SOURCE [facility]     2019-nCOV Not Detected    Basic Metabolic Panel    Collection Time: 01/12/21  8:21 AM   Result Value Ref Range    Sodium 140 136 - 145 mmol/L    Potassium 4.6 3.5 - 5.0 mmol/L    Chloride 102 98 - 107 mmol/L    CO2 31 22 - 31 mmol/L    Anion Gap, Calculation 7 5 - 18  mmol/L    Glucose 116 70 - 125 mg/dL    Calcium 8.9 8.5 - 10.5 mg/dL    BUN 18 8 - 28 mg/dL    Creatinine 0.89 0.70 - 1.30 mg/dL    GFR MDRD Af Amer >60 >60 mL/min/1.73m2    GFR MDRD Non Af Amer >60 >60 mL/min/1.73m2   C-Reactive Protein    Collection Time: 01/12/21  8:21 AM   Result Value Ref Range    CRP 1.7 (H) 0.0 - 0.8 mg/dL   Hepatic Profile    Collection Time: 01/12/21  8:21 AM   Result Value Ref Range    Bilirubin, Total 0.6 0.0 - 1.0 mg/dL    Bilirubin, Direct 0.2 <=0.5 mg/dL    Protein, Total 6.5 6.0 - 8.0 g/dL    Albumin 2.7 (L) 3.5 - 5.0 g/dL    Alkaline Phosphatase 98 45 - 120 U/L    AST 20 0 - 40 U/L    ALT 21 0 - 45 U/L   HM1 (CBC with Diff)    Collection Time: 01/12/21  8:21 AM   Result Value Ref Range    WBC 5.6 4.0 - 11.0 thou/uL    RBC 4.48 4.40 - 6.20 mill/uL    Hemoglobin 13.3 (L) 14.0 - 18.0 g/dL    Hematocrit 41.6 40.0 - 54.0 %    MCV 93 80 - 100 fL    MCH 29.7 27.0 - 34.0 pg    MCHC 32.0 32.0 - 36.0 g/dL    RDW 13.9 11.0 - 14.5 %    Platelets 301 140 - 440 thou/uL    MPV 9.8 8.5 - 12.5 fL    Neutrophils % 60 50 - 70 %    Lymphocytes % 19 (L) 20 - 40 %    Monocytes % 13 (H) 2 - 10 %    Eosinophils % 6 0 - 6 %    Basophils % 1 0 - 2 %    Immature Granulocyte % 1 (H) <=0 %    Neutrophils Absolute 3.3 2.0 - 7.7 thou/uL    Lymphocytes Absolute 1.1 0.8 - 4.4 thou/uL    Monocytes Absolute 0.8 0.0 - 0.9 thou/uL    Eosinophils Absolute 0.4 0.0 - 0.4 thou/uL    Basophils Absolute 0.1 0.0 - 0.2 thou/uL    Immature Granulocyte Absolute 0.0 <=0.0 thou/uL   Comprehensive Metabolic Panel    Collection Time: 01/16/21  7:24 AM   Result Value Ref Range    Sodium 140 136 - 145 mmol/L    Potassium 4.8 3.5 - 5.0 mmol/L    Chloride 103 98 - 107 mmol/L    CO2 29 22 - 31 mmol/L    Anion Gap, Calculation 8 5 - 18 mmol/L    Glucose 92 70 - 125 mg/dL    BUN 18 8 - 28 mg/dL    Creatinine 0.93 0.70 - 1.30 mg/dL    GFR MDRD Af Amer >60 >60 mL/min/1.73m2    GFR MDRD Non Af Amer >60 >60 mL/min/1.73m2    Bilirubin, Total 0.6  0.0 - 1.0 mg/dL    Calcium 9.1 8.5 - 10.5 mg/dL    Protein, Total 6.1 6.0 - 8.0 g/dL    Albumin 2.5 (L) 3.5 - 5.0 g/dL    Alkaline Phosphatase 79 45 - 120 U/L    AST 15 0 - 40 U/L    ALT 13 0 - 45 U/L   C-Reactive Protein    Collection Time: 01/16/21  7:24 AM   Result Value Ref Range    CRP 8.8 (H) 0.0 - 0.8 mg/dL   HM1 (CBC with Diff)    Collection Time: 01/16/21  7:24 AM   Result Value Ref Range    WBC 7.0 4.0 - 11.0 thou/uL    RBC 3.88 (L) 4.40 - 6.20 mill/uL    Hemoglobin 11.3 (L) 14.0 - 18.0 g/dL    Hematocrit 35.0 (L) 40.0 - 54.0 %    MCV 90 80 - 100 fL    MCH 29.1 27.0 - 34.0 pg    MCHC 32.3 32.0 - 36.0 g/dL    RDW 14.0 11.0 - 14.5 %    Platelets 253 140 - 440 thou/uL    MPV 10.3 8.5 - 12.5 fL    Neutrophils % 60 50 - 70 %    Lymphocytes % 19 (L) 20 - 40 %    Monocytes % 16 (H) 2 - 10 %    Eosinophils % 4 0 - 6 %    Basophils % 1 0 - 2 %    Immature Granulocyte % 0 <=0 %    Neutrophils Absolute 4.2 2.0 - 7.7 thou/uL    Lymphocytes Absolute 1.3 0.8 - 4.4 thou/uL    Monocytes Absolute 1.1 (H) 0.0 - 0.9 thou/uL    Eosinophils Absolute 0.3 0.0 - 0.4 thou/uL    Basophils Absolute 0.1 0.0 - 0.2 thou/uL    Immature Granulocyte Absolute 0.0 <=0.0 thou/uL                DONNY Kwan    RW/independent/needs device

## 2022-11-22 NOTE — CONSULT NOTE ADULT - SUBJECTIVE AND OBJECTIVE BOX
INFECTIOUS DISEASES AND INTERNAL MEDICINE at [place]  =======================================================  Mingo Culver MD  Diplomates American Board of Internal Medicine and Infectious Diseases  Telephone [phone]  Fax            [phone]  =======================================================    MOSES [id number]      HPI:  58 y/o male with PMH of drug abuse, ETOH abuse, depression, anxiety, asthma, diverticulitis, HTN, HLD, MARIBEL using mouthpiece and osteoarthritis presents to PST with complaints of right knee pain. He is s/p right knee replacement on 6/14/2022. States after his right knee replacement he was having reoccurring infections. He states the pain in his right knee has been getting progressively worse. Patient stated that he was having increasing pain in his right knee, he relapsed (last use prior 2 years ago) and used heroin. He was rushed to the emergency room and received Narcan on 11/1/2022. He is using a knee brace and walker for ambulation. Denies fevers, chills, nausea or vomiting. Patient is scheduled for removal of spacer, revision TKA possible extensive mechanism repair, possible ABX spacer on 11/21/2022 with Dr. Canada.     AS ABOVE HAD A  COURSE OF IV CEFAZOLIN THEN ORAL ABX    PT ORIGINAL PLAN WAS TO Reimplant KNEE HOWEVER PUS SEEN AND   NEW SPACER PLACED  ASKED TO EVALUATE FROM ID STANDPOINT       PAST MEDICAL & SURGICAL HISTORY:  Depression      Anxiety      Alcohol abuse      Overdose  heroine and xanax 3/10/2015      Osteoarthritis      HTN (hypertension)      HLD (hyperlipidemia)      ACL (anterior cruciate ligament) tear, left, initial encounter      H/O total knee replacement, right  2022          ANTIBIOTICS  ceFAZolin   IVPB 2000 milliGRAM(s) IV Intermittent every 8 hours  vancomycin  IVPB 1500 milliGRAM(s) IV Intermittent every 12 hours      Allergies    shellfish. (Hives)    Intolerances        SOCIAL HISTORY:     FAMILY HX   FAMILY HISTORY:  FH: diverticulitis (Mother)    FH: prostate cancer (Father)    FH: CAD (coronary artery disease)        Vital Signs Last 24 Hrs  T(C): 36.9 (22 Nov 2022 05:20), Max: 36.9 (22 Nov 2022 05:20)  T(F): 98.5 (22 Nov 2022 05:20), Max: 98.5 (22 Nov 2022 05:20)  HR: 71 (22 Nov 2022 05:20) (60 - 71)  BP: 138/69 (22 Nov 2022 05:20) (93/61 - 155/74)  BP(mean): 72 (21 Nov 2022 22:30) (66 - 78)  RR: 17 (22 Nov 2022 05:20) (7 - 22)  SpO2: 97% (22 Nov 2022 05:20) (93% - 98%)    Parameters below as of 22 Nov 2022 05:20  Patient On (Oxygen Delivery Method): room air      Drug Dosing Weight  Height (cm): 172.7 (21 Nov 2022 12:44)  Weight (kg): 93.4 (21 Nov 2022 12:44)  BMI (kg/m2): 31.3 (21 Nov 2022 12:44)  BSA (m2): 2.07 (21 Nov 2022 12:44)      REVIEW OF SYSTEMS:    CONSTITUTIONAL:  As per HPI.    HEENT:  Eyes:  No diplopia or blurred vision. ENT:  No earache, sore throat or runny nose.    CARDIOVASCULAR:  No pressure, squeezing, strangling, tightness, heaviness or aching about the chest, neck, axilla or epigastrium.    RESPIRATORY:  No cough, shortness of breath, PND or orthopnea.    GASTROINTESTINAL:  No nausea, vomiting or diarrhea.    GENITOURINARY:  No dysuria, frequency or urgency.    MUSCULOSKELETAL:  As per HPI.    SKIN:  No change in skin, hair or nails.    NEUROLOGIC:  No paresthesias, fasciculations, seizures or weakness.                  PHYSICAL EXAMINATION:    GENERAL: The patient is a _____in no apparent distress. ___     VITAL SIGNS: T(C): 36.9 (11-22-22 @ 05:20), Max: 36.9 (11-22-22 @ 05:20)  HR: 71 (11-22-22 @ 05:20) (60 - 71)  BP: 138/69 (11-22-22 @ 05:20) (93/61 - 155/74)  RR: 17 (11-22-22 @ 05:20) (7 - 22)  SpO2: 97% (11-22-22 @ 05:20) (93% - 98%)  Wt(kg): --    HEENT: Head is normocephalic and atraumatic.  ANICTERIC  NECK: Supple. No carotid bruits.  No lymphadenopathy or thyromegaly.    LUNGS:COARSE BREATH SOUNDS    HEART: Regular rate and rhythm without murmur.    ABDOMEN: Soft, nontender, and nondistended.  Positive bowel sounds.  No hepatosplenomegaly was noted. NO REBOUND NO GUARDING    EXTREMITIES: DRESSING IN PLACE    NEUROLOGIC: NON FOCAL      SKIN: No ulceration or induration present. NO RASH        BLOOD CULTURES       URINE CX          LABS:                        10.2   9.29  )-----------( 258      ( 22 Nov 2022 06:46 )             30.8     11-22    139  |  103  |  20.7<H>  ----------------------------<  128<H>  3.4<L>   |  26.0  |  0.84    Ca    8.1<L>      22 Nov 2022 06:46            RADIOLOGY & ADDITIONAL STUDIES:      ASSESSMENT/PLAN    58 y/o male with PMH of drug abuse, ETOH abuse, depression, anxiety, asthma, diverticulitis, HTN, HLD, MARIBEL using mouthpiece and osteoarthritis presents to PST with complaints of right knee pain. He is s/p right knee replacement on 6/14/2022. States after his right knee replacement he was having reoccurring infections. He states the pain in his right knee has been getting progressively worse. Patient stated that he was having increasing pain in his right knee, he relapsed (last use prior 2 years ago) and used heroin. He was rushed to the emergency room and received Narcan on 11/1/2022. He is using a knee brace and walker for ambulation. Denies fevers, chills, nausea or vomiting. Patient is scheduled for removal of spacer, revision TKA possible extensive mechanism repair, possible ABX spacer on 11/21/2022 with Dr. Canada.     AS ABOVE HAD A  COURSE OF IV CEFAZOLIN THEN ORAL ABX    PT ORIGINAL PLAN WAS TO Reimplant KNEE HOWEVER PUS SEEN AND   NEW SPACER PLACED  WILL CHECK CULTURES FROM OR  PT WITH ? RECENT IV DUG USE WILL ORDER BLOOD CX X2 SETS  WILL FOLLOWUP  EVENTUAL PICC LINE PLACEMENT  WILL FOLLOW UP   WITH FURTHER RECOMMENDATIONS                SHAWN GONZALEZ MD INFECTIOUS DISEASES AND INTERNAL MEDICINE at [place]  =======================================================  Mingo Culver MD  Diplomates American Board of Internal Medicine and Infectious Diseases  Telephone [phone]  Fax            [phone]  =======================================================    MOSES [id number]      HPI:  56 y/o male with PMH of drug abuse, ETOH abuse, depression, anxiety, asthma, diverticulitis, HTN, HLD, MARIBEL using mouthpiece and osteoarthritis presents to PST with complaints of right knee pain. He is s/p right knee replacement on 6/14/2022. States after his right knee replacement he was having reoccurring infections. He states the pain in his right knee has been getting progressively worse. Patient stated that he was having increasing pain in his right knee, he relapsed (last use prior 2 years ago) and used heroin. He was rushed to the emergency room and received Narcan on 11/1/2022. He is using a knee brace and walker for ambulation. Denies fevers, chills, nausea or vomiting. Patient is scheduled for removal of spacer, revision TKA possible extensive mechanism repair, possible ABX spacer on 11/21/2022 with Dr. Canada.     AS ABOVE HAD A  COURSE OF IV CEFAZOLIN THEN ORAL ABX    PT ORIGINAL PLAN WAS TO Reimplant KNEE HOWEVER PUS SEEN AND   NEW SPACER PLACED  ASKED TO EVALUATE FROM ID STANDPOINT       PAST MEDICAL & SURGICAL HISTORY:  Depression      Anxiety      Alcohol abuse      Overdose  heroine and xanax 3/10/2015      Osteoarthritis      HTN (hypertension)      HLD (hyperlipidemia)      ACL (anterior cruciate ligament) tear, left, initial encounter      H/O total knee replacement, right  2022          ANTIBIOTICS  ceFAZolin   IVPB 2000 milliGRAM(s) IV Intermittent every 8 hours  vancomycin  IVPB 1500 milliGRAM(s) IV Intermittent every 12 hours      Allergies    shellfish. (Hives)    Intolerances        SOCIAL HISTORY:     FAMILY HX   FAMILY HISTORY:  FH: diverticulitis (Mother)    FH: prostate cancer (Father)    FH: CAD (coronary artery disease)        Vital Signs Last 24 Hrs  T(C): 36.9 (22 Nov 2022 05:20), Max: 36.9 (22 Nov 2022 05:20)  T(F): 98.5 (22 Nov 2022 05:20), Max: 98.5 (22 Nov 2022 05:20)  HR: 71 (22 Nov 2022 05:20) (60 - 71)  BP: 138/69 (22 Nov 2022 05:20) (93/61 - 155/74)  BP(mean): 72 (21 Nov 2022 22:30) (66 - 78)  RR: 17 (22 Nov 2022 05:20) (7 - 22)  SpO2: 97% (22 Nov 2022 05:20) (93% - 98%)    Parameters below as of 22 Nov 2022 05:20  Patient On (Oxygen Delivery Method): room air      Drug Dosing Weight  Height (cm): 172.7 (21 Nov 2022 12:44)  Weight (kg): 93.4 (21 Nov 2022 12:44)  BMI (kg/m2): 31.3 (21 Nov 2022 12:44)  BSA (m2): 2.07 (21 Nov 2022 12:44)      REVIEW OF SYSTEMS:    CONSTITUTIONAL:  As per HPI.    HEENT:  Eyes:  No diplopia or blurred vision. ENT:  No earache, sore throat or runny nose.    CARDIOVASCULAR:  No pressure, squeezing, strangling, tightness, heaviness or aching about the chest, neck, axilla or epigastrium.    RESPIRATORY:  No cough, shortness of breath, PND or orthopnea.    GASTROINTESTINAL:  No nausea, vomiting or diarrhea.    GENITOURINARY:  No dysuria, frequency or urgency.    MUSCULOSKELETAL:  As per HPI.    SKIN:  No change in skin, hair or nails.    NEUROLOGIC:  No paresthesias, fasciculations, seizures or weakness.                  PHYSICAL EXAMINATION:    GENERAL: The patient is a _____in no apparent distress. ___     VITAL SIGNS: T(C): 36.9 (11-22-22 @ 05:20), Max: 36.9 (11-22-22 @ 05:20)  HR: 71 (11-22-22 @ 05:20) (60 - 71)  BP: 138/69 (11-22-22 @ 05:20) (93/61 - 155/74)  RR: 17 (11-22-22 @ 05:20) (7 - 22)  SpO2: 97% (11-22-22 @ 05:20) (93% - 98%)  Wt(kg): --    HEENT: Head is normocephalic and atraumatic.  ANICTERIC  NECK: Supple. No carotid bruits.  No lymphadenopathy or thyromegaly.    LUNGS:COARSE BREATH SOUNDS    HEART: Regular rate and rhythm without murmur.    ABDOMEN: Soft, nontender, and nondistended.  Positive bowel sounds.  No hepatosplenomegaly was noted. NO REBOUND NO GUARDING    EXTREMITIES: DRESSING IN PLACE    NEUROLOGIC: NON FOCAL      SKIN: No ulceration or induration present. NO RASH        BLOOD CULTURES       URINE CX          LABS:                        10.2   9.29  )-----------( 258      ( 22 Nov 2022 06:46 )             30.8     11-22    139  |  103  |  20.7<H>  ----------------------------<  128<H>  3.4<L>   |  26.0  |  0.84    Ca    8.1<L>      22 Nov 2022 06:46            RADIOLOGY & ADDITIONAL STUDIES:      ASSESSMENT/PLAN    56 y/o male with PMH of drug abuse, ETOH abuse, depression, anxiety, asthma, diverticulitis, HTN, HLD, MARIBEL using mouthpiece and osteoarthritis presents to PST with complaints of right knee pain. He is s/p right knee replacement on 6/14/2022. States after his right knee replacement he was having reoccurring infections. He states the pain in his right knee has been getting progressively worse. Patient stated that he was having increasing pain in his right knee, he relapsed (last use prior 2 years ago) and used heroin. He was rushed to the emergency room and received Narcan on 11/1/2022. He is using a knee brace and walker for ambulation. Denies fevers, chills, nausea or vomiting. Patient is scheduled for removal of spacer, revision TKA possible extensive mechanism repair, possible ABX spacer on 11/21/2022 with Dr. Canada.     AS ABOVE HAD A  COURSE OF IV CEFAZOLIN THEN ORAL ABX    PT ORIGINAL PLAN WAS TO Reimplant KNEE HOWEVER PUS SEEN AND   NEW SPACER PLACED  WILL CHECK CULTURES FROM OR   WILL ORDER BLOOD CX X2 SETS  ALTHOUGH LOWER YIELD AS PT ON IV ABX     WILL FOLLOWUP  EVENTUAL PICC LINE PLACEMENT  WILL FOLLOW UP   WITH FURTHER RECOMMENDATIONS                SHAWN GONZALEZ MD

## 2022-11-22 NOTE — CONSULT NOTE ADULT - ASSESSMENT
56M  hx of suboxone 8mg bid for dependence  acute on chronic knee pain  pt known to pain service from prior visit  here for TKR    Postop meds recs:  - Recommend starting oxycodone PO 5mg/10mg [id number] PRN mod/severe pain  - Recommend starting hydromorphone IVP 0.5mg q4hour PRN breakthrough pain  - Recommend starting acetaminophen PO 975mg [id number] standing. HOLD for liver function test dysfunction  - Recommend starting gabapentin PO 300mg [id number] standing. HOLD for sedation  - If no contraindication to NSAIDs, recommend starting ketorolac IV 30mg [id number] standing x 4 doses. Afterwards, can use celebrex 200mg PO q12h x 7days, with food. HOLD for black/red stools.  - Recommend starting robaxin 750mg q8h standing. HOLD for sedation     will look to restart suboxone POD-1  
56 y/o male with Hx of drug abuse, ETOH abuse, depression, anxiety, asthma, diverticulitis, HTN, HLD, MARIBEL using mouthpiece and osteoarthritis, has Hx of OA of the right knee pain and is s/p right knee replacement on 6/14/2022, he has been having reoccurring infections int he right knee pain has been getting progressively worse, he was having increasing pain in his right knee, he relapsed (last use prior 2 years ago) and used heroin, He was rushed to the emergency room and received Narcan on 11/1/2022, patient came in here for elective removal of spacer, revision TKA possible extensive mechanism repair, possible ABX spacer on 11/21/2022 with Dr. Canada, patient is s/p removal of antibiotic spacer, purulence in patella drill holes, 3/5 specimens + for >8 neutrophils per hpf removal of cement spacer and placement of static antibiotic spacer, I&D.     Plan:     Septic prosthetic R knee s/p  removal of antibiotic spacer.   Management as per Primary team    ID noted and appreciated   continue iv Abx , follow cultures   PT/OT/pain mgmt   pain mgmt consult    DVT prophylaxis- as per ortho  Incentive spirometry  Prophylaxis of opioid  induced constipation.  gabapentin 600 mg once a day (at bedtime)  gabapentin 300 mg 2 times a day at 8 AM and 2 PM      Depression/Anxiety:   Trazodone 200mg HS   Buspirone 5mg bid     HLD  Atorvastatin 40mg       Hx of HTN: on Clonidine 0.1 mg BID  with holding parameters       Anemia - acute blood lost anemia, will monitor CBC, iron supplement     Hx of Heroin abuse: on Suboxone 8 mg-2 mg sublingual film: 1 film(s) sublingual 2 times a day, will resume after confirmation

## 2022-11-22 NOTE — PHYSICAL THERAPY INITIAL EVALUATION ADULT - ACTIVE RANGE OF MOTION EXAMINATION, REHAB EVAL
Right LE in knee immobilizer/LLE Active ROM was WNL (within normal limits)/bilateral upper extremity Active ROM was WFL (within functional limits)/deficits as listed below

## 2022-11-22 NOTE — OCCUPATIONAL THERAPY INITIAL EVALUATION ADULT - GENERAL OBSERVATIONS, REHAB EVAL
Received pt semifowler in bed, NAD, +IV, +R knee immobilizer, +Prevena, +on RA, A&Ox4. Pre/post pain 7/10, RLE; RN aware. Patient agreeable to OT evaluation

## 2022-11-22 NOTE — PHYSICAL THERAPY INITIAL EVALUATION ADULT - ADDITIONAL COMMENTS
Pt lives in a shelter with  0 steps to enter and 0 stairs inside.   Pt owns medical equipment: [id number]   Pt lives with: other people in shelter   Someone is always available to provide assist.

## 2022-11-22 NOTE — PROGRESS NOTE ADULT - SUBJECTIVE AND OBJECTIVE BOX
Interval Hx:  Patient seen during rounds  Patient reports pain to be mod controlled on current medications  Patient denies sedation with medications   discussed restarting suboxone tomorrow        Analgesic Dosing for past 24 hours reviewed as below:    acetaminophen     Tablet ..   975 milliGRAM(s) Oral (11-21-22 @ 13:21)    acetaminophen   IVPB ..   400 mL/Hr IV Intermittent (11-22-22 @ 05:25)    aprepitant   40 milliGRAM(s) Oral (11-21-22 @ 13:20)    busPIRone   5 milliGRAM(s) Oral (11-22-22 @ 05:26)   5 milliGRAM(s) Oral (11-21-22 @ 23:30)    celecoxib   400 milliGRAM(s) Oral (11-21-22 @ 13:21)    gabapentin   300 milliGRAM(s) Oral (11-22-22 @ 09:23)    gabapentin   600 milliGRAM(s) Oral (11-21-22 @ 23:27)    HYDROmorphone   Tablet   4 milliGRAM(s) Oral (11-22-22 @ 09:22)   4 milliGRAM(s) Oral (11-22-22 @ 03:19)   4 milliGRAM(s) Oral (11-21-22 @ 23:27)    HYDROmorphone  Injectable   0.5 milliGRAM(s) IV Push (11-21-22 @ 21:55)    ketorolac   Injectable   15 milliGRAM(s) IV Push (11-22-22 @ 05:26)   15 milliGRAM(s) IV Push (11-21-22 @ 23:30)    traZODone   200 milliGRAM(s) Oral (11-21-22 @ 23:30)          T(C): 36.7 (11-22-22 @ 10:10), Max: 36.9 (11-22-22 @ 05:20)  HR: 65 (11-22-22 @ 10:10) (60 - 71)  BP: 153/70 (11-22-22 @ 10:10) (93/61 - 155/74)  RR: 18 (11-22-22 @ 10:10) (7 - 22)  SpO2: 97% (11-22-22 @ 10:10) (93% - 98%)      11-21-22 @ 07:01  -  11-22-22 @ 07:00  --------------------------------------------------------  IN: 1000 mL / OUT: 200 mL / NET: 800 mL        acetaminophen     Tablet .. 975 milliGRAM(s) Oral every 8 hours  apixaban 2.5 milliGRAM(s) Oral two times a day  atorvastatin 40 milliGRAM(s) Oral at bedtime  busPIRone 5 milliGRAM(s) Oral two times a day  ceFAZolin   IVPB 2000 milliGRAM(s) IV Intermittent every 8 hours  cloNIDine 0.1 milliGRAM(s) Oral two times a day  gabapentin 300 milliGRAM(s) Oral <User Schedule>  gabapentin 600 milliGRAM(s) Oral at bedtime  HYDROmorphone   Tablet 4 milliGRAM(s) Oral every 3 hours PRN  HYDROmorphone  Injectable 0.5 milliGRAM(s) IV Push every 3 hours PRN  ketorolac   Injectable 15 milliGRAM(s) IV Push every 6 hours  magnesium hydroxide Suspension 30 milliLiter(s) Oral daily PRN  multivitamin 1 Tablet(s) Oral daily  ondansetron Injectable 4 milliGRAM(s) IV Push every 6 hours PRN  oxyCODONE    IR 5 milliGRAM(s) Oral every 3 hours PRN  oxyCODONE    IR 10 milliGRAM(s) Oral every 3 hours PRN  polyethylene glycol 3350 17 Gram(s) Oral at bedtime  potassium chloride    Tablet ER 40 milliEquivalent(s) Oral once  senna 2 Tablet(s) Oral at bedtime  sodium chloride 0.9% Bolus 500 milliLiter(s) IV Bolus once  sodium chloride 0.9%. 1000 milliLiter(s) IV Continuous <Continuous>  traZODone 200 milliGRAM(s) Oral at bedtime  vancomycin  IVPB 1250 milliGRAM(s) IV Intermittent every 12 hours                          10.2   9.29  )-----------( 258      ( 22 Nov 2022 06:46 )             30.8     11-22    139  |  103  |  20.7<H>  ----------------------------<  128<H>  3.4<L>   |  26.0  |  0.84    Ca    8.1<L>      22 Nov 2022 06:46            Pain Service   [phone]

## 2022-11-22 NOTE — OCCUPATIONAL THERAPY INITIAL EVALUATION ADULT - ADDITIONAL COMMENTS
Pt reports shelter has a shower stall with doors and grab bars. Pt owns RW and WC. Pt is right handed, has a drivers license but no car. Pt reports has friends local who can assist prn, pt has plans to move [facility] when healed.

## 2022-11-22 NOTE — CONSULT NOTE ADULT - SUBJECTIVE AND OBJECTIVE BOX
56 y/o male with Hx of drug abuse, ETOH abuse, depression, anxiety, asthma, diverticulitis, HTN, HLD, MARIBEL using mouthpiece and osteoarthritis, has Hx of OA of the right knee pain and is s/p right knee replacement on 6/14/2022, he has been having reoccurring infections int he right knee pain has been getting progressively worse, he was having increasing pain in his right knee, he relapsed (last use prior 2 years ago) and used heroin, He was rushed to the emergency room and received Narcan on 11/1/2022, patient came in here for elective removal of spacer, revision TKA possible extensive mechanism repair, possible ABX spacer on 11/21/2022 with Dr. Canada, patient is s/p removal of antibiotic spacer, purulence in patella drill holes, 3/5 specimens + for >8 neutrophils per hpf removal of cement spacer and placement of static antibiotic spacer, I&D.   Patient has some pain in the knee, pain meds help some how, denies fever, chills, chest pain.     REVIEW OF SYSTEMS:    CONSTITUTIONAL: No fever, some fatigue  RESPIRATORY: No cough, No shortness of breath  CARDIOVASCULAR: No chest pain, palpitations  GASTROINTESTINAL: No abdominal, No nausea, vomiting  NEUROLOGICAL: No headaches,  loss of strength.  MISCELLANEOUS: right knee pain     Allergies:  	shellfish.: Drug Category, Hives    Home Medications:   * Incomplete Medication History as of 10-Nov-2022 09:07 documented in Structured Notes  · 	gabapentin 600 mg oral tablet: Last Dose Taken:  , 1 tab(s) orally once a day (at bedtime)  · 	gabapentin 300 mg oral capsule: Last Dose Taken:  , 1 cap(s) orally 2 times a day at 8 AM and 2 PM  · 	cloNIDine 0.1 mg oral tablet: Last Dose Taken:  , 1 tab(s) orally 2 times a day  · 	busPIRone 5 mg oral tablet: Last Dose Taken:  , 1 tab(s) orally 2 times a day  · 	atorvastatin 40 mg oral tablet: Last Dose Taken:  , 1 tab(s) orally once a day  · 	traZODone 100 mg oral tablet: Last Dose Taken:  , 2 tab(s) orally once a day (at bedtime)  · 	Suboxone 8 mg-2 mg sublingual film: 1 film(s) sublingual 2 times a day      PAST MEDICAL HISTORY:  Alcohol abuse     Anxiety     Depression     HLD (hyperlipidemia)     HTN (hypertension)     Osteoarthritis     Overdose heroine and xanax 3/10/2015.     PAST SURGICAL HISTORY:  ACL (anterior cruciate ligament) tear, left, initial encounter     H/O total knee replacement, right 2022.     FAMILY HISTORY:  FH: CAD (coronary artery disease)    Father  Still living? No  FH: prostate cancer, Age at diagnosis: Age Unknown    Mother  Still living? Unknown  FH: diverticulitis, Age at diagnosis: Age Unknown.    Social History: Not a smoker, uses heroin, no drinker          Vital Signs Last 24 Hrs  T(C): 36.7 (22 Nov 2022 10:10), Max: 36.9 (22 Nov 2022 05:20)  T(F): 98.1 (22 Nov 2022 10:10), Max: 98.5 (22 Nov 2022 05:20)  HR: 65 (22 Nov 2022 10:10) (60 - 71)  BP: 153/70 (22 Nov 2022 10:10) (93/61 - 155/74)  BP(mean): 72 (21 Nov 2022 22:30) (66 - 78)  RR: 18 (22 Nov 2022 10:10) (7 - 22)  SpO2: 97% (22 Nov 2022 10:10) (93% - 98%)    Parameters below as of 22 Nov 2022 10:10  Patient On (Oxygen Delivery Method): room air        PHYSICAL EXAM:    GENERAL: Middle age male looking comfortable   HEENT: PERRL, +EOMI  NECK: soft, Supple, No JVD   CHEST/LUNG: Clear to auscultate bilaterally; No wheezing  HEART: S1S2+, Regular rate and rhythm; No murmurs  ABDOMEN: Soft, Nontender, Nondistended; Bowel sounds present  EXTREMITIES:  1+ Peripheral Pulses, No edema, right knee with clean dressings on, no bleeding or soaking   SKIN: No rashes or lesions  NEURO: AAOX3, no focal deficits, no motor r sensory loss  PSYCH: normal mood      LABS:                        10.2   9.29  )-----------( 258      ( 22 Nov 2022 06:46 )             30.8     11-22    139  |  103  |  20.7<H>  ----------------------------<  128<H>  3.4<L>   |  26.0  |  0.84    Ca    8.1<L>      22 Nov 2022 06:46              I&O's Summary    21 Nov 2022 07:01  -  22 Nov 2022 07:00  --------------------------------------------------------  IN: 1000 mL / OUT: 200 mL / NET: 800 mL        MEDICATIONS  (STANDING):  acetaminophen     Tablet .. 975 milliGRAM(s) Oral every 8 hours  apixaban 2.5 milliGRAM(s) Oral two times a day  atorvastatin 40 milliGRAM(s) Oral at bedtime  busPIRone 5 milliGRAM(s) Oral two times a day  ceFAZolin   IVPB 2000 milliGRAM(s) IV Intermittent every 8 hours  cloNIDine 0.1 milliGRAM(s) Oral two times a day  gabapentin 300 milliGRAM(s) Oral <User Schedule>  gabapentin 600 milliGRAM(s) Oral at bedtime  ketorolac   Injectable 15 milliGRAM(s) IV Push every 6 hours  multivitamin 1 Tablet(s) Oral daily  polyethylene glycol 3350 17 Gram(s) Oral at bedtime  potassium chloride    Tablet ER 40 milliEquivalent(s) Oral once  senna 2 Tablet(s) Oral at bedtime  sodium chloride 0.9% Bolus 500 milliLiter(s) IV Bolus once  sodium chloride 0.9%. 1000 milliLiter(s) (125 mL/Hr) IV Continuous <Continuous>  traZODone 200 milliGRAM(s) Oral at bedtime  vancomycin  IVPB 1250 milliGRAM(s) IV Intermittent every 12 hours    MEDICATIONS  (PRN):  HYDROmorphone   Tablet 4 milliGRAM(s) Oral every 3 hours PRN Severe Pain (7 - 10)  HYDROmorphone  Injectable 0.5 milliGRAM(s) IV Push every 3 hours PRN Breakthrough Pain  magnesium hydroxide Suspension 30 milliLiter(s) Oral daily PRN Constipation  ondansetron Injectable 4 milliGRAM(s) IV Push every 6 hours PRN Nausea and/or Vomiting  oxyCODONE    IR 5 milliGRAM(s) Oral every 3 hours PRN Mild Pain (1 - 3)  oxyCODONE    IR 10 milliGRAM(s) Oral every 3 hours PRN Moderate Pain (4 - 6)

## 2022-11-22 NOTE — PROGRESS NOTE ADULT - SUBJECTIVE AND OBJECTIVE BOX
MOSES HANKINS  [id number]    History: 57y Male is status post right knee incision and drainage with removal of antibiotic spacer and reimplantation of antibiotic cement spacer, POD # 1. Patient is doing well and is comfortable. The patient's pain is controlled using the prescribed pain medications. The patient will be participating in physical therapy. Denies numbness or tingling. No new complaints.    Vital Signs Last 24 Hrs  T(C): 36.9 (22 Nov 2022 05:20), Max: 36.9 (22 Nov 2022 05:20)  T(F): 98.5 (22 Nov 2022 05:20), Max: 98.5 (22 Nov 2022 05:20)  HR: 71 (22 Nov 2022 05:20) (60 - 71)  BP: 138/69 (22 Nov 2022 05:20) (93/61 - 155/74)  BP(mean): 72 (21 Nov 2022 22:30) (66 - 78)  RR: 17 (22 Nov 2022 05:20) (7 - 22)  SpO2: 97% (22 Nov 2022 05:20) (93% - 98%)    Parameters below as of 22 Nov 2022 05:20  Patient On (Oxygen Delivery Method): room air                        10.2   9.29  )-----------( 258      ( 22 Nov 2022 06:46 )             30.8     11-22    139  |  103  |  20.7<H>  ----------------------------<  128<H>  3.4<L>   |  26.0  |  0.84    Ca    8.1<L>      22 Nov 2022 06:46      General: Alert, awake, NAD  Physical exam: KI to RLE. The right knee ace wrap dressing is clean, dry and intact. No drainage, discharge, erythema, blistering or ecchymosis noted. Prevena vac in place, tube patent, functioning properly.  The calf is supple nontender b/l.   SILT. +AT/GC/EHL/FHL.   2+ DP pulse. BCR. No cyanosis.    Plan:   - DVT prophylaxis as prescribed, including use of compression devices and ankle pumps-eliquis  - Continue physical therapy  - Weight bearing status of surgical extremity: TTWB RLE with Knee immobilzer at all times   - Incentive spirometry encouraged  - Pain control as clinically indicated  - ID consulted  - continue IV abx   - f/u OR cx

## 2022-11-23 LAB — VANCOMYCIN TROUGH SERPL-MCNC: 8.3 UG/ML — LOW (ref 10–20)

## 2022-11-23 PROCEDURE — 99232 SBSQ HOSP IP/OBS MODERATE 35: CPT

## 2022-11-23 RX ORDER — BUPRENORPHINE AND NALOXONE 2; .5 MG/1; MG/1
1 TABLET SUBLINGUAL EVERY 8 HOURS
Refills: 0 | Status: DISCONTINUED | OUTPATIENT
Start: 2022-11-23 | End: 2022-11-30

## 2022-11-23 RX ADMIN — GABAPENTIN 300 MILLIGRAM(S): 400 CAPSULE ORAL at 13:01

## 2022-11-23 RX ADMIN — Medication 0.1 MILLIGRAM(S): at 17:16

## 2022-11-23 RX ADMIN — Medication 200 MILLIGRAM(S): at 21:48

## 2022-11-23 RX ADMIN — Medication 100 MILLIGRAM(S): at 21:49

## 2022-11-23 RX ADMIN — OXYCODONE HYDROCHLORIDE 10 MILLIGRAM(S): 5 TABLET ORAL at 00:50

## 2022-11-23 RX ADMIN — ATORVASTATIN CALCIUM 40 MILLIGRAM(S): 80 TABLET, FILM COATED ORAL at 21:48

## 2022-11-23 RX ADMIN — GABAPENTIN 600 MILLIGRAM(S): 400 CAPSULE ORAL at 21:49

## 2022-11-23 RX ADMIN — SENNA PLUS 2 TABLET(S): 8.6 TABLET ORAL at 21:49

## 2022-11-23 RX ADMIN — Medication 975 MILLIGRAM(S): at 05:30

## 2022-11-23 RX ADMIN — Medication 975 MILLIGRAM(S): at 13:05

## 2022-11-23 RX ADMIN — Medication 975 MILLIGRAM(S): at 21:48

## 2022-11-23 RX ADMIN — HYDROMORPHONE HYDROCHLORIDE 4 MILLIGRAM(S): 2 INJECTION INTRAMUSCULAR; INTRAVENOUS; SUBCUTANEOUS at 07:29

## 2022-11-23 RX ADMIN — OXYCODONE HYDROCHLORIDE 10 MILLIGRAM(S): 5 TABLET ORAL at 04:15

## 2022-11-23 RX ADMIN — Medication 975 MILLIGRAM(S): at 13:01

## 2022-11-23 RX ADMIN — HYDROMORPHONE HYDROCHLORIDE 4 MILLIGRAM(S): 2 INJECTION INTRAMUSCULAR; INTRAVENOUS; SUBCUTANEOUS at 10:43

## 2022-11-23 RX ADMIN — HYDROMORPHONE HYDROCHLORIDE 4 MILLIGRAM(S): 2 INJECTION INTRAMUSCULAR; INTRAVENOUS; SUBCUTANEOUS at 08:29

## 2022-11-23 RX ADMIN — Medication 0.1 MILLIGRAM(S): at 05:31

## 2022-11-23 RX ADMIN — Medication 975 MILLIGRAM(S): at 21:53

## 2022-11-23 RX ADMIN — Medication 100 MILLIGRAM(S): at 13:01

## 2022-11-23 RX ADMIN — Medication 166.67 MILLIGRAM(S): at 05:30

## 2022-11-23 RX ADMIN — Medication 1 TABLET(S): at 11:07

## 2022-11-23 RX ADMIN — BUPRENORPHINE AND NALOXONE 1 FILM(S): 2; .5 TABLET SUBLINGUAL at 17:17

## 2022-11-23 RX ADMIN — Medication 5 MILLIGRAM(S): at 05:31

## 2022-11-23 RX ADMIN — Medication 5 MILLIGRAM(S): at 17:16

## 2022-11-23 RX ADMIN — POLYETHYLENE GLYCOL 3350 17 GRAM(S): 17 POWDER, FOR SOLUTION ORAL at 21:52

## 2022-11-23 RX ADMIN — Medication 166.67 MILLIGRAM(S): at 17:20

## 2022-11-23 RX ADMIN — GABAPENTIN 300 MILLIGRAM(S): 400 CAPSULE ORAL at 07:29

## 2022-11-23 RX ADMIN — Medication 100 MILLIGRAM(S): at 05:30

## 2022-11-23 RX ADMIN — APIXABAN 2.5 MILLIGRAM(S): 2.5 TABLET, FILM COATED ORAL at 05:31

## 2022-11-23 RX ADMIN — APIXABAN 2.5 MILLIGRAM(S): 2.5 TABLET, FILM COATED ORAL at 17:16

## 2022-11-23 RX ADMIN — Medication 975 MILLIGRAM(S): at 06:30

## 2022-11-23 RX ADMIN — OXYCODONE HYDROCHLORIDE 10 MILLIGRAM(S): 5 TABLET ORAL at 03:15

## 2022-11-23 RX ADMIN — HYDROMORPHONE HYDROCHLORIDE 4 MILLIGRAM(S): 2 INJECTION INTRAMUSCULAR; INTRAVENOUS; SUBCUTANEOUS at 11:43

## 2022-11-23 NOTE — PROGRESS NOTE ADULT - SUBJECTIVE AND OBJECTIVE BOX
MOSES ORANTESONEY  [id number]    History: 57y Male is status post right knee incision and drainage with removal of antibiotic spacer and reimplantation of antibiotic cement spacer, POD # 2. Patient is doing well and is comfortable. The patient's pain is controlled using the prescribed pain medications. The patient will be participating in physical therapy. Denies numbness or tingling. No new complaints.    ICU Vital Signs Last 24 Hrs  T(C): 37 (23 Nov 2022 04:08), Max: 37.4 (22 Nov 2022 19:28)  T(F): 98.6 (23 Nov 2022 04:08), Max: 99.3 (22 Nov 2022 19:28)  HR: 75 (23 Nov 2022 04:08) (65 - 89)  BP: 153/83 (23 Nov 2022 04:08) (123/66 - 153/83)  BP(mean): --  ABP: --  ABP(mean): --  RR: 18 (23 Nov 2022 04:08) (18 - 18)  SpO2: 100% (23 Nov 2022 04:08) (93% - 100%)    O2 Parameters below as of 23 Nov 2022 04:08  Patient On (Oxygen Delivery Method): room air                          10.2   9.29  )-----------( 258      ( 22 Nov 2022 06:46 )             30.8         General: Alert, awake, NAD  Physical exam: KI to RLE. KI taken down, dressing taken down. Prevena vac in place- No drainage, discharge, erythema, blistering or ecchymosis noted. Prevena vac in place, tube patent, functioning properly.  The calf is supple nontender b/l.   SILT. +AT/GC/EHL/FHL.   2+ DP pulse. BCR. No cyanosis. webril/acewrap/KI applied.    Culture - Tissue with Gram Stain (11.21.22 @ 18:32)    Gram Stain:   No polymorphonuclear cells seen per low power field  No organisms seen per oil power field    Specimen Source: .Tissue right femur    Culture Results:   No growth    Culture - Tissue with Gram Stain (11.21.22 @ 18:31)    Gram Stain:   No polymorphonuclear cells seen per low power field  No organisms seen per oil power field    Specimen Source: .Tissue right deep knee    Culture Results:   No growth    Culture - Tissue with Gram Stain (11.21.22 @ 18:30)    Gram Stain:   No polymorphonuclear cells seen per low power field  No organisms seen per oil power field    Specimen Source: .Tissue right tibial nail    Culture Results:   No growth        Plan:   - DVT prophylaxis as prescribed, including use of compression devices and ankle pumps-eliquis  - Continue physical therapy  - Weight bearing status of surgical extremity: TTWB RLE with Knee immobilzer at all times   - Incentive spirometry encouraged  - Pain control as clinically indicated  - ID following  - continue IV abx   - f/u OR cx

## 2022-11-23 NOTE — ED ADULT TRIAGE NOTE - WEIGHT IN KG
Results reviewed, please let the patient know that overall findings are reassuring, lipids are overall stable. Follow up as previously planned, thanks!     79.4

## 2022-11-23 NOTE — PROGRESS NOTE ADULT - SUBJECTIVE AND OBJECTIVE BOX
MOSES HANKINS    [id number]    57y      Male    Patient is a 57y old  Male who presents with a chief complaint of s/p right knee abx spacer removal and reinsertion of new abx spacer (21 Nov 2022 23:40)      INTERVAL HPI/OVERNIGHT EVENTS:    Patients right knee pain is well controlled today, denies fever, chills, chest pain    REVIEW OF SYSTEMS:      CONSTITUTIONAL: No fever, some fatigue  RESPIRATORY: No cough, No shortness of breath  CARDIOVASCULAR: No chest pain, palpitations  GASTROINTESTINAL: No abdominal, No nausea, vomiting  NEUROLOGICAL: No headaches,  loss of strength.  MISCELLANEOUS: right knee pain is well controlled        Vital Signs Last 24 Hrs  T(C): 37 (23 Nov 2022 04:08), Max: 37.4 (22 Nov 2022 19:28)  T(F): 98.6 (23 Nov 2022 04:08), Max: 99.3 (22 Nov 2022 19:28)  HR: 75 (23 Nov 2022 04:08) (65 - 89)  BP: 153/83 (23 Nov 2022 04:08) (123/66 - 153/83)  RR: 18 (23 Nov 2022 04:08) (18 - 18)  SpO2: 100% (23 Nov 2022 04:08) (93% - 100%)    Parameters below as of 23 Nov 2022 04:08  Patient On (Oxygen Delivery Method): room air        PHYSICAL EXAM:    GENERAL: Middle age male looking comfortable   HEENT: PERRL, +EOMI  NECK: soft, Supple, No JVD   CHEST/LUNG: Clear to auscultate bilaterally; No wheezing  HEART: S1S2+, Regular rate and rhythm; No murmurs  ABDOMEN: Soft, Nontender, Nondistended; Bowel sounds present  EXTREMITIES:  1+ Peripheral Pulses, No edema, right knee with clean dressings on, no bleeding or soaking   SKIN: No rashes or lesions  NEURO: AAOX3, no focal deficits, no motor r sensory loss  PSYCH: normal mood      LABS:                        10.2   9.29  )-----------( 258      ( 22 Nov 2022 06:46 )             30.8     11-22    139  |  103  |  20.7<H>  ----------------------------<  128<H>  3.4<L>   |  26.0  |  0.84    Ca    8.1<L>      22 Nov 2022 06:46              I&O's Summary    22 Nov 2022 07:01  -  23 Nov 2022 07:00  --------------------------------------------------------  IN: 0 mL / OUT: 400 mL / NET: -400 mL        MEDICATIONS  (STANDING):  acetaminophen     Tablet .. 975 milliGRAM(s) Oral every 8 hours  apixaban 2.5 milliGRAM(s) Oral two times a day  atorvastatin 40 milliGRAM(s) Oral at bedtime  busPIRone 5 milliGRAM(s) Oral two times a day  ceFAZolin   IVPB 2000 milliGRAM(s) IV Intermittent every 8 hours  cloNIDine 0.1 milliGRAM(s) Oral two times a day  gabapentin 300 milliGRAM(s) Oral <User Schedule>  gabapentin 600 milliGRAM(s) Oral at bedtime  multivitamin 1 Tablet(s) Oral daily  polyethylene glycol 3350 17 Gram(s) Oral at bedtime  senna 2 Tablet(s) Oral at bedtime  sodium chloride 0.9% Bolus 500 milliLiter(s) IV Bolus once  sodium chloride 0.9%. 1000 milliLiter(s) (125 mL/Hr) IV Continuous <Continuous>  traZODone 200 milliGRAM(s) Oral at bedtime  vancomycin  IVPB 1250 milliGRAM(s) IV Intermittent every 12 hours    MEDICATIONS  (PRN):  HYDROmorphone   Tablet 4 milliGRAM(s) Oral every 3 hours PRN Severe Pain (7 - 10)  HYDROmorphone  Injectable 0.5 milliGRAM(s) IV Push every 3 hours PRN Breakthrough Pain  magnesium hydroxide Suspension 30 milliLiter(s) Oral daily PRN Constipation  ondansetron Injectable 4 milliGRAM(s) IV Push every 6 hours PRN Nausea and/or Vomiting  oxyCODONE    IR 5 milliGRAM(s) Oral every 3 hours PRN Mild Pain (1 - 3)  oxyCODONE    IR 10 milliGRAM(s) Oral every 3 hours PRN Moderate Pain (4 - 6)

## 2022-11-23 NOTE — PROGRESS NOTE ADULT - SUBJECTIVE AND OBJECTIVE BOX
Interval Hx:  Patient seen during rounds  Patient reports pain to be controlled on current medications  Patient denies sedation with medications   discussed restarting suboxone  will increase dose as patient w postop pain    Analgesic Dosing for past 24 hours reviewed as below:  acetaminophen     Tablet ..   975 milliGRAM(s) Oral (11-23-22 @ 13:01)   975 milliGRAM(s) Oral (11-23-22 @ 05:30)   975 milliGRAM(s) Oral (11-22-22 @ 21:45)    busPIRone   5 milliGRAM(s) Oral (11-23-22 @ 05:31)   5 milliGRAM(s) Oral (11-22-22 @ 17:19)    gabapentin   300 milliGRAM(s) Oral (11-23-22 @ 13:01)   300 milliGRAM(s) Oral (11-23-22 @ 07:29)    gabapentin   600 milliGRAM(s) Oral (11-22-22 @ 21:45)    HYDROmorphone   Tablet   4 milliGRAM(s) Oral (11-23-22 @ 10:43)   4 milliGRAM(s) Oral (11-23-22 @ 07:29)    ketorolac   Injectable   15 milliGRAM(s) IV Push (11-22-22 @ 17:20)    oxyCODONE    IR   10 milliGRAM(s) Oral (11-23-22 @ 03:15)   10 milliGRAM(s) Oral (11-22-22 @ 23:50)   10 milliGRAM(s) Oral (11-22-22 @ 20:35)   10 milliGRAM(s) Oral (11-22-22 @ 17:19)    traZODone   200 milliGRAM(s) Oral (11-22-22 @ 21:50)          T(C): 36.4 (11-23-22 @ 10:59), Max: 37.4 (11-22-22 @ 19:28)  HR: 78 (11-23-22 @ 10:59) (75 - 89)  BP: 158/76 (11-23-22 @ 10:59) (123/66 - 158/76)  RR: 18 (11-23-22 @ 10:59) (18 - 18)  SpO2: 97% (11-23-22 @ 10:59) (93% - 100%)      11-22-22 @ 07:01  -  11-23-22 @ 07:00  --------------------------------------------------------  IN: 0 mL / OUT: 400 mL / NET: -400 mL        acetaminophen     Tablet .. 975 milliGRAM(s) Oral every 8 hours  apixaban 2.5 milliGRAM(s) Oral two times a day  atorvastatin 40 milliGRAM(s) Oral at bedtime  buprenorphine 8 mG/naloxone 2 mG SL Film 1 Film(s) SubLingual every 8 hours  busPIRone 5 milliGRAM(s) Oral two times a day  ceFAZolin   IVPB 2000 milliGRAM(s) IV Intermittent every 8 hours  cloNIDine 0.1 milliGRAM(s) Oral two times a day  gabapentin 300 milliGRAM(s) Oral <User Schedule>  gabapentin 600 milliGRAM(s) Oral at bedtime  HYDROmorphone   Tablet 4 milliGRAM(s) Oral every 3 hours PRN  HYDROmorphone  Injectable 0.5 milliGRAM(s) IV Push every 3 hours PRN  magnesium hydroxide Suspension 30 milliLiter(s) Oral daily PRN  multivitamin 1 Tablet(s) Oral daily  ondansetron Injectable 4 milliGRAM(s) IV Push every 6 hours PRN  oxyCODONE    IR 5 milliGRAM(s) Oral every 3 hours PRN  oxyCODONE    IR 10 milliGRAM(s) Oral every 3 hours PRN  polyethylene glycol 3350 17 Gram(s) Oral at bedtime  senna 2 Tablet(s) Oral at bedtime  sodium chloride 0.9% Bolus 500 milliLiter(s) IV Bolus once  sodium chloride 0.9%. 1000 milliLiter(s) IV Continuous <Continuous>  traZODone 200 milliGRAM(s) Oral at bedtime  vancomycin  IVPB 1250 milliGRAM(s) IV Intermittent every 12 hours                          10.2   9.29  )-----------( 258      ( 22 Nov 2022 06:46 )             30.8     11-22    139  |  103  |  20.7<H>  ----------------------------<  128<H>  3.4<L>   |  26.0  |  0.84    Ca    8.1<L>      22 Nov 2022 06:46            Pain Service   [phone]

## 2022-11-24 LAB — VANCOMYCIN TROUGH SERPL-MCNC: 8.2 UG/ML — LOW (ref 10–20)

## 2022-11-24 PROCEDURE — 99233 SBSQ HOSP IP/OBS HIGH 50: CPT

## 2022-11-24 PROCEDURE — 99232 SBSQ HOSP IP/OBS MODERATE 35: CPT

## 2022-11-24 RX ORDER — VANCOMYCIN HCL 1 G
1500 VIAL (EA) INTRAVENOUS
Refills: 0 | Status: DISCONTINUED | OUTPATIENT
Start: 2022-11-24 | End: 2022-11-28

## 2022-11-24 RX ADMIN — Medication 100 MILLIGRAM(S): at 05:35

## 2022-11-24 RX ADMIN — SENNA PLUS 2 TABLET(S): 8.6 TABLET ORAL at 21:47

## 2022-11-24 RX ADMIN — Medication 975 MILLIGRAM(S): at 14:24

## 2022-11-24 RX ADMIN — ATORVASTATIN CALCIUM 40 MILLIGRAM(S): 80 TABLET, FILM COATED ORAL at 21:42

## 2022-11-24 RX ADMIN — Medication 0.1 MILLIGRAM(S): at 17:30

## 2022-11-24 RX ADMIN — BUPRENORPHINE AND NALOXONE 1 FILM(S): 2; .5 TABLET SUBLINGUAL at 09:32

## 2022-11-24 RX ADMIN — APIXABAN 2.5 MILLIGRAM(S): 2.5 TABLET, FILM COATED ORAL at 17:31

## 2022-11-24 RX ADMIN — BUPRENORPHINE AND NALOXONE 1 FILM(S): 2; .5 TABLET SUBLINGUAL at 01:15

## 2022-11-24 RX ADMIN — Medication 300 MILLIGRAM(S): at 22:23

## 2022-11-24 RX ADMIN — Medication 975 MILLIGRAM(S): at 21:42

## 2022-11-24 RX ADMIN — Medication 975 MILLIGRAM(S): at 14:30

## 2022-11-24 RX ADMIN — BUPRENORPHINE AND NALOXONE 1 FILM(S): 2; .5 TABLET SUBLINGUAL at 17:30

## 2022-11-24 RX ADMIN — Medication 975 MILLIGRAM(S): at 05:40

## 2022-11-24 RX ADMIN — Medication 1 TABLET(S): at 11:07

## 2022-11-24 RX ADMIN — Medication 100 MILLIGRAM(S): at 14:25

## 2022-11-24 RX ADMIN — Medication 975 MILLIGRAM(S): at 21:47

## 2022-11-24 RX ADMIN — Medication 5 MILLIGRAM(S): at 05:35

## 2022-11-24 RX ADMIN — GABAPENTIN 300 MILLIGRAM(S): 400 CAPSULE ORAL at 09:32

## 2022-11-24 RX ADMIN — Medication 5 MILLIGRAM(S): at 17:30

## 2022-11-24 RX ADMIN — GABAPENTIN 300 MILLIGRAM(S): 400 CAPSULE ORAL at 14:24

## 2022-11-24 RX ADMIN — GABAPENTIN 600 MILLIGRAM(S): 400 CAPSULE ORAL at 21:42

## 2022-11-24 RX ADMIN — Medication 200 MILLIGRAM(S): at 21:42

## 2022-11-24 RX ADMIN — Medication 100 MILLIGRAM(S): at 21:42

## 2022-11-24 RX ADMIN — POLYETHYLENE GLYCOL 3350 17 GRAM(S): 17 POWDER, FOR SOLUTION ORAL at 21:47

## 2022-11-24 RX ADMIN — Medication 300 MILLIGRAM(S): at 09:31

## 2022-11-24 RX ADMIN — Medication 0.1 MILLIGRAM(S): at 05:35

## 2022-11-24 RX ADMIN — Medication 975 MILLIGRAM(S): at 05:35

## 2022-11-24 RX ADMIN — APIXABAN 2.5 MILLIGRAM(S): 2.5 TABLET, FILM COATED ORAL at 05:36

## 2022-11-24 NOTE — PROGRESS NOTE ADULT - SUBJECTIVE AND OBJECTIVE BOX
INFECTIOUS DISEASES AND INTERNAL MEDICINE at [place]  =======================================================  Mingo Culver MD  Diplomates [employer] Board of Internal Medicine and Infectious Diseases  Telephone [phone]  Fax            [phone]  =======================================================    MOSES HANKINS [id number]    Follow up: right knee infection    Allergies:  shellfish. (Hives)      Medications:  acetaminophen     Tablet .. 975 milliGRAM(s) Oral every 8 hours  apixaban 2.5 milliGRAM(s) Oral two times a day  atorvastatin 40 milliGRAM(s) Oral at bedtime  buprenorphine 8 mG/naloxone 2 mG SL Film 1 Film(s) SubLingual every 8 hours  busPIRone 5 milliGRAM(s) Oral two times a day  ceFAZolin   IVPB 2000 milliGRAM(s) IV Intermittent every 8 hours  cloNIDine 0.1 milliGRAM(s) Oral two times a day  gabapentin 300 milliGRAM(s) Oral <User Schedule>  gabapentin 600 milliGRAM(s) Oral at bedtime  magnesium hydroxide Suspension 30 milliLiter(s) Oral daily PRN  multivitamin 1 Tablet(s) Oral daily  ondansetron Injectable 4 milliGRAM(s) IV Push every 6 hours PRN  polyethylene glycol 3350 17 Gram(s) Oral at bedtime  senna 2 Tablet(s) Oral at bedtime  sodium chloride 0.9% Bolus 500 milliLiter(s) IV Bolus once  sodium chloride 0.9%. 1000 milliLiter(s) IV Continuous <Continuous>  traZODone 200 milliGRAM(s) Oral at bedtime  vancomycin  IVPB 1250 milliGRAM(s) IV Intermittent every 12 hours    SOCIAL       FAMILY   FAMILY HISTORY:  FH: diverticulitis (Mother)    FH: prostate cancer (Father)    FH: CAD (coronary artery disease)      REVIEW OF SYSTEMS:  CONSTITUTIONAL:  No Fever or chills  HEENT:   No diplopia or blurred vision.  No earache, sore throat or runny nose.  CARDIOVASCULAR:  No pressure, squeezing, strangling, tightness, heaviness or aching about the chest, neck, axilla or epigastrium.  RESPIRATORY:  No cough, shortness of breath, PND or orthopnea.  GASTROINTESTINAL:  No nausea, vomiting or diarrhea.  GENITOURINARY:  No dysuria, frequency or urgency. No Blood in urine  MUSCULOSKELETAL:  as PER HPI  SKIN:  No change in skin, hair or nails.  NEUROLOGIC:  No paresthesias, fasciculations, seizures or weakness.  PSYCHIATRIC:  No disorder of thought or mood.  ENDOCRINE:  No heat or cold intolerance, polyuria or polydipsia.  HEMATOLOGICAL:  No easy bruising or bleeding.            Physical Exam:  ICU Vital Signs Last 24 Hrs  T(C): 36.7 (24 Nov 2022 04:24), Max: 36.9 (23 Nov 2022 17:12)  T(F): 98 (24 Nov 2022 04:24), Max: 98.5 (23 Nov 2022 17:12)  HR: 82 (24 Nov 2022 04:24) (74 - 85)  BP: 153/75 (24 Nov 2022 04:24) (123/71 - 158/76)  BP(mean): --  ABP: --  ABP(mean): --  RR: 18 (24 Nov 2022 04:24) (18 - 18)  SpO2: 97% (24 Nov 2022 04:24) (95% - 98%)    O2 Parameters below as of 24 Nov 2022 04:24  Patient On (Oxygen Delivery Method): room air          GEN: NAD,   HEENT: normocephalic and atraumatic. EOMI. GOLDIE.    NECK: Supple. No carotid bruits.  No lymphadenopathy or thyromegaly.  LUNGS: Clear to auscultation.  HEART: Regular rate and rhythm without murmur.  ABDOMEN: Soft, nontender, and nondistended.  Positive bowel sounds.    : No CVA tenderness  EXTREMITIES: RIGHT KNEE WRAPPED   MSK: no joint swelling  NEUROLOGIC: Cranial nerves II through XII are grossly intact.  PSYCHIATRIC: Appropriate affect .  SKIN: No ulceration or induration present.        Labs:  Vitals:  ============  T(F): 98 (24 Nov 2022 04:24), Max: 98.5 (23 Nov 2022 17:12)  HR: 82 (24 Nov 2022 04:24)  BP: 153/75 (24 Nov 2022 04:24)  RR: 18 (24 Nov 2022 04:24)  SpO2: 97% (24 Nov 2022 04:24) (95% - 98%)  temp max in last 48H T(F): , Max: 99.3 (11-22-22 @ 19:28)    =======================================================  Current Antibiotics:  ceFAZolin   IVPB 2000 milliGRAM(s) IV Intermittent every 8 hours  vancomycin  IVPB 1250 milliGRAM(s) IV Intermittent every 12 hours    Other medications:  acetaminophen     Tablet .. 975 milliGRAM(s) Oral every 8 hours  apixaban 2.5 milliGRAM(s) Oral two times a day  atorvastatin 40 milliGRAM(s) Oral at bedtime  buprenorphine 8 mG/naloxone 2 mG SL Film 1 Film(s) SubLingual every 8 hours  busPIRone 5 milliGRAM(s) Oral two times a day  cloNIDine 0.1 milliGRAM(s) Oral two times a day  gabapentin 300 milliGRAM(s) Oral <User Schedule>  gabapentin 600 milliGRAM(s) Oral at bedtime  multivitamin 1 Tablet(s) Oral daily  polyethylene glycol 3350 17 Gram(s) Oral at bedtime  senna 2 Tablet(s) Oral at bedtime  sodium chloride 0.9% Bolus 500 milliLiter(s) IV Bolus once  sodium chloride 0.9%. 1000 milliLiter(s) IV Continuous <Continuous>  traZODone 200 milliGRAM(s) Oral at bedtime      =======================================================  Labs:            Culture - Blood (collected 11-22-22 @ 10:35)  Source: .Blood Blood    Culture - Blood (collected 11-22-22 @ 10:32)  Source: .Blood Blood    Culture - Tissue with Gram Stain (collected 11-21-22 @ 18:32)  Source: .Tissue right femur  Gram Stain (11-22-22 @ 05:33):    No polymorphonuclear cells seen per low power field    No organisms seen per oil power field    Culture - Tissue with Gram Stain (collected 11-21-22 @ 18:31)  Source: .Tissue right deep knee  Gram Stain (11-22-22 @ 05:33):    No polymorphonuclear cells seen per low power field    No organisms seen per oil power field    Culture - Tissue with Gram Stain (collected 11-21-22 @ 18:30)  Source: .Tissue right tibial nail  Gram Stain (11-22-22 @ 05:33):    No polymorphonuclear cells seen per low power field    No organisms seen per oil power field      Creatinine, Serum: 0.84 mg/dL (11-22-22 @ 06:46)          C-Reactive Protein, Serum: 6 mg/L (11-10-22 @ 09:15)    WBC Count: 9.29 K/uL (11-22-22 @ 06:46)    COVID-19 PCR: NotDetec (11-21-22 @ 12:23)  SARS-CoV-2 Result: NotDetec (11-18-22 @ 11:56)  SARS-CoV-2 Result: NotDetec (11-01-22 @ 20:13)

## 2022-11-24 NOTE — PROGRESS NOTE ADULT - SUBJECTIVE AND OBJECTIVE BOX
HPI:  56M hx of suboxone 8mg bid for dependence, acute on chronic knee pain,  now s/p TKR spacer.    Patient seen during rounds  Patient reports pain to be well-controlled on current medications, has had improved effect with seboxone increase to TID.    T(C): 36.4 (11-23-22 @ 10:59), Max: 37.4 (11-22-22 @ 19:28)  HR: 78 (11-23-22 @ 10:59) (75 - 89)  BP: 158/76 (11-23-22 @ 10:59) (123/66 - 158/76)  RR: 18 (11-23-22 @ 10:59) (18 - 18)  SpO2: 97% (11-23-22 @ 10:59) (93% - 100%)    acetaminophen     Tablet .. 975 milliGRAM(s) Oral every 8 hours  apixaban 2.5 milliGRAM(s) Oral two times a day  atorvastatin 40 milliGRAM(s) Oral at bedtime  buprenorphine 8 mG/naloxone 2 mG SL Film 1 Film(s) SubLingual every 8 hours  busPIRone 5 milliGRAM(s) Oral two times a day  ceFAZolin   IVPB 2000 milliGRAM(s) IV Intermittent every 8 hours  cloNIDine 0.1 milliGRAM(s) Oral two times a day  gabapentin 300 milliGRAM(s) Oral <User Schedule>  gabapentin 600 milliGRAM(s) Oral at bedtime  HYDROmorphone   Tablet 4 milliGRAM(s) Oral every 3 hours PRN  HYDROmorphone  Injectable 0.5 milliGRAM(s) IV Push every 3 hours PRN  magnesium hydroxide Suspension 30 milliLiter(s) Oral daily PRN  multivitamin 1 Tablet(s) Oral daily  ondansetron Injectable 4 milliGRAM(s) IV Push every 6 hours PRN  oxyCODONE    IR 5 milliGRAM(s) Oral every 3 hours PRN  oxyCODONE    IR 10 milliGRAM(s) Oral every 3 hours PRN  polyethylene glycol 3350 17 Gram(s) Oral at bedtime  senna 2 Tablet(s) Oral at bedtime  sodium chloride 0.9% Bolus 500 milliLiter(s) IV Bolus once  sodium chloride 0.9%. 1000 milliLiter(s) IV Continuous <Continuous>  traZODone 200 milliGRAM(s) Oral at bedtime  vancomycin  IVPB 1250 milliGRAM(s) IV Intermittent every 12 hours    · Assessment	  56M  hx of suboxone 8mg bid for dependence, acute on chronic knee pain, pt known to pain service from prior visit, now s/p TKR spacer.      Plan:  continue present regimen without change.            Stable for transfer to [facility] from Pain standpoint.    if pain becomes uncontrolled:  - Recommend starting robaxin 750mg TID standing. HOLD for sedation     Will sign off, thank you!  -Sabrnia Vee NP-C  [phone]

## 2022-11-24 NOTE — PROGRESS NOTE ADULT - SUBJECTIVE AND OBJECTIVE BOX
MOSES HANKINS    [id number]    57y      Male    Patient is a 57y old  Male who presents with a chief complaint of s/p right knee abx spacer removal and reinsertion of new abx spacer (21 Nov 2022 23:40)      INTERVAL HPI/OVERNIGHT EVENTS:      , he has no complains, Patients right knee pain is well controlled, denies fever, chills, chest pain    REVIEW OF SYSTEMS:      CONSTITUTIONAL: No fever, some fatigue  RESPIRATORY: No cough, No shortness of breath  CARDIOVASCULAR: No chest pain, palpitations  GASTROINTESTINAL: No abdominal, No nausea, vomiting  NEUROLOGICAL: No headaches,  loss of strength.  MISCELLANEOUS: right knee pain is well controlled      Vital Signs Last 24 Hrs  T(C): 36.7 (24 Nov 2022 04:24), Max: 36.9 (23 Nov 2022 17:12)  T(F): 98 (24 Nov 2022 04:24), Max: 98.5 (23 Nov 2022 17:12)  HR: 82 (24 Nov 2022 04:24) (74 - 85)  BP: 153/75 (24 Nov 2022 04:24) (123/71 - 158/76)  RR: 18 (24 Nov 2022 04:24) (18 - 18)  SpO2: 97% (24 Nov 2022 04:24) (95% - 98%)    Parameters below as of 24 Nov 2022 04:24  Patient On (Oxygen Delivery Method): room air        PHYSICAL EXAM:      GENERAL: Middle age male looking comfortable   HEENT: PERRL, +EOMI  NECK: soft, Supple, No JVD   CHEST/LUNG: Clear to auscultate bilaterally; No wheezing  HEART: S1S2+, Regular rate and rhythm; No murmurs  ABDOMEN: Soft, Nontender, Nondistended; Bowel sounds present  EXTREMITIES:  1+ Peripheral Pulses, No edema, right knee with clean dressings on, no bleeding or soaking   SKIN: No rashes or lesions  NEURO: AAOX3, no focal deficits, no motor r sensory loss  PSYCH: normal mood    LABS:                  I&O's Summary    23 Nov 2022 07:01  -  24 Nov 2022 07:00  --------------------------------------------------------  IN: 0 mL / OUT: 950 mL / NET: -950 mL        MEDICATIONS  (STANDING):  acetaminophen     Tablet .. 975 milliGRAM(s) Oral every 8 hours  apixaban 2.5 milliGRAM(s) Oral two times a day  atorvastatin 40 milliGRAM(s) Oral at bedtime  buprenorphine 8 mG/naloxone 2 mG SL Film 1 Film(s) SubLingual every 8 hours  busPIRone 5 milliGRAM(s) Oral two times a day  ceFAZolin   IVPB 2000 milliGRAM(s) IV Intermittent every 8 hours  cloNIDine 0.1 milliGRAM(s) Oral two times a day  gabapentin 300 milliGRAM(s) Oral <User Schedule>  gabapentin 600 milliGRAM(s) Oral at bedtime  multivitamin 1 Tablet(s) Oral daily  polyethylene glycol 3350 17 Gram(s) Oral at bedtime  senna 2 Tablet(s) Oral at bedtime  sodium chloride 0.9% Bolus 500 milliLiter(s) IV Bolus once  sodium chloride 0.9%. 1000 milliLiter(s) (125 mL/Hr) IV Continuous <Continuous>  traZODone 200 milliGRAM(s) Oral at bedtime  vancomycin  IVPB 1500 milliGRAM(s) IV Intermittent <User Schedule>    MEDICATIONS  (PRN):  magnesium hydroxide Suspension 30 milliLiter(s) Oral daily PRN Constipation  ondansetron Injectable 4 milliGRAM(s) IV Push every 6 hours PRN Nausea and/or Vomiting

## 2022-11-24 NOTE — PROGRESS NOTE ADULT - SUBJECTIVE AND OBJECTIVE BOX
Ortho Post Op Check    Name: MOSES HANKINS    MR #: [id number]    Procedure:  Right knee removal abx cement spacer with re insertion   Surgeon: Dr Canada    Pt comfortable without complaints, pain controlled with Suboxone  Denies CP, SOB, N/V, numbness/tingling       PAST MEDICAL & SURGICAL HISTORY:  Depression      Anxiety      Alcohol abuse      Overdose  heroine and xanax 3/10/2015      Osteoarthritis      HTN (hypertension)      HLD (hyperlipidemia)      ACL (anterior cruciate ligament) tear, left, initial encounter      H/O total knee replacement, right  2022        General Exam:  Vital Signs Last 24 Hrs  T(C): 36.7 (11-24-22 @ 10:16), Max: 36.7 (11-24-22 @ 10:16)  T(F): 98 (11-24-22 @ 10:16), Max: 98 (11-24-22 @ 10:16)  HR: 76 (11-24-22 @ 10:16) (76 - 76)  BP: 109/63 (11-24-22 @ 10:16) (109/63 - 109/63)  BP(mean): --  RR: 16 (11-24-22 @ 10:16) (16 - 16)  SpO2: 93% (11-24-22 @ 10:16) (93% - 93%)    General: Pt Alert and oriented, NAD, controlled pain.  Right knee ace wrap removed, to reveal prevena dressings C/D/I. No bleeding.  Pulses: 2+ dorsalis pedis pulse. Cap refill < 2 sec.  Sensation: Grossly intact to light touch without deficit.  Motor: + EHL/FHL/TA/GS  Calf soft, supple and nontender     MEDICATIONS  (STANDING):  acetaminophen     Tablet .. 975 milliGRAM(s) Oral every 8 hours  apixaban 2.5 milliGRAM(s) Oral two times a day  atorvastatin 40 milliGRAM(s) Oral at bedtime  buprenorphine 8 mG/naloxone 2 mG SL Film 1 Film(s) SubLingual every 8 hours  busPIRone 5 milliGRAM(s) Oral two times a day  ceFAZolin   IVPB 2000 milliGRAM(s) IV Intermittent every 8 hours  cloNIDine 0.1 milliGRAM(s) Oral two times a day  gabapentin 300 milliGRAM(s) Oral <User Schedule>  gabapentin 600 milliGRAM(s) Oral at bedtime  multivitamin 1 Tablet(s) Oral daily  polyethylene glycol 3350 17 Gram(s) Oral at bedtime  senna 2 Tablet(s) Oral at bedtime  sodium chloride 0.9% Bolus 500 milliLiter(s) IV Bolus once  sodium chloride 0.9%. 1000 milliLiter(s) (125 mL/Hr) IV Continuous <Continuous>  traZODone 200 milliGRAM(s) Oral at bedtime  vancomycin  IVPB 1500 milliGRAM(s) IV Intermittent <User Schedule>    MEDICATIONS  (PRN):  magnesium hydroxide Suspension 30 milliLiter(s) Oral daily PRN Constipation  ondansetron Injectable 4 milliGRAM(s) IV Push every 6 hours PRN Nausea and/or Vomiting              A/P: 57yMale POD#3 s/p Right knee removal abx cement spacer with re insertion   - Stable  - Pain Control  - DVT ppx: Eliquis   - Post op abx: as per ID team   - Weight bearing status: TTWB in  AAT  - Appreciate ID, Medicine and PM teams

## 2022-11-25 LAB
CREAT SERPL-MCNC: 0.84 MG/DL — SIGNIFICANT CHANGE UP (ref 0.5–1.3)
EGFR: 101 ML/MIN/1.73M2 — SIGNIFICANT CHANGE UP
VANCOMYCIN TROUGH SERPL-MCNC: 11.6 UG/ML — SIGNIFICANT CHANGE UP (ref 10–20)

## 2022-11-25 PROCEDURE — 99232 SBSQ HOSP IP/OBS MODERATE 35: CPT

## 2022-11-25 RX ADMIN — Medication 975 MILLIGRAM(S): at 05:08

## 2022-11-25 RX ADMIN — ATORVASTATIN CALCIUM 40 MILLIGRAM(S): 80 TABLET, FILM COATED ORAL at 21:14

## 2022-11-25 RX ADMIN — Medication 975 MILLIGRAM(S): at 05:05

## 2022-11-25 RX ADMIN — APIXABAN 2.5 MILLIGRAM(S): 2.5 TABLET, FILM COATED ORAL at 05:05

## 2022-11-25 RX ADMIN — APIXABAN 2.5 MILLIGRAM(S): 2.5 TABLET, FILM COATED ORAL at 17:20

## 2022-11-25 RX ADMIN — Medication 0.1 MILLIGRAM(S): at 17:19

## 2022-11-25 RX ADMIN — Medication 100 MILLIGRAM(S): at 21:14

## 2022-11-25 RX ADMIN — GABAPENTIN 300 MILLIGRAM(S): 400 CAPSULE ORAL at 14:39

## 2022-11-25 RX ADMIN — BUPRENORPHINE AND NALOXONE 1 FILM(S): 2; .5 TABLET SUBLINGUAL at 01:04

## 2022-11-25 RX ADMIN — Medication 5 MILLIGRAM(S): at 17:13

## 2022-11-25 RX ADMIN — Medication 975 MILLIGRAM(S): at 21:14

## 2022-11-25 RX ADMIN — Medication 975 MILLIGRAM(S): at 14:39

## 2022-11-25 RX ADMIN — Medication 300 MILLIGRAM(S): at 09:21

## 2022-11-25 RX ADMIN — Medication 300 MILLIGRAM(S): at 21:14

## 2022-11-25 RX ADMIN — Medication 975 MILLIGRAM(S): at 15:38

## 2022-11-25 RX ADMIN — Medication 5 MILLIGRAM(S): at 05:05

## 2022-11-25 RX ADMIN — GABAPENTIN 300 MILLIGRAM(S): 400 CAPSULE ORAL at 08:15

## 2022-11-25 RX ADMIN — GABAPENTIN 600 MILLIGRAM(S): 400 CAPSULE ORAL at 21:13

## 2022-11-25 RX ADMIN — BUPRENORPHINE AND NALOXONE 1 FILM(S): 2; .5 TABLET SUBLINGUAL at 17:13

## 2022-11-25 RX ADMIN — BUPRENORPHINE AND NALOXONE 1 FILM(S): 2; .5 TABLET SUBLINGUAL at 08:15

## 2022-11-25 RX ADMIN — Medication 1 TABLET(S): at 14:39

## 2022-11-25 RX ADMIN — Medication 200 MILLIGRAM(S): at 21:14

## 2022-11-25 RX ADMIN — Medication 100 MILLIGRAM(S): at 14:38

## 2022-11-25 RX ADMIN — Medication 0.1 MILLIGRAM(S): at 05:05

## 2022-11-25 RX ADMIN — Medication 100 MILLIGRAM(S): at 05:07

## 2022-11-25 NOTE — PROGRESS NOTE ADULT - SUBJECTIVE AND OBJECTIVE BOX
MOSES HANKINS    [id number]    57y      Male    Patient is a 57y old  Male who presents with a chief complaint of RIGHT knee pain (24 Nov 2022 11:45)      INTERVAL HPI/OVERNIGHT EVENTS:    patient feels ok, no fever spikes, right knee pain is well controlled, denies fever, chills, chest pain    REVIEW OF SYSTEMS:      CONSTITUTIONAL: No fever, some fatigue  RESPIRATORY: No cough, No shortness of breath  CARDIOVASCULAR: No chest pain, palpitations  GASTROINTESTINAL: No abdominal, No nausea, vomiting  NEUROLOGICAL: No headaches,  loss of strength.  MISCELLANEOUS: right knee pain is well controlled       Vital Signs Last 24 Hrs  T(C): 36.7 (25 Nov 2022 10:32), Max: 36.8 (25 Nov 2022 04:32)  T(F): 98.1 (25 Nov 2022 10:32), Max: 98.2 (25 Nov 2022 04:32)  HR: 79 (25 Nov 2022 10:32) (78 - 79)  BP: 149/78 (25 Nov 2022 10:32) (116/68 - 149/78)  RR: 18 (25 Nov 2022 10:32) (17 - 18)  SpO2: 95% (25 Nov 2022 10:32) (94% - 95%)    Parameters below as of 25 Nov 2022 10:32  Patient On (Oxygen Delivery Method): room air        PHYSICAL EXAM:    GENERAL: Middle age male looking comfortable   HEENT: PERRL  NECK: soft, Supple, No JVD   CHEST/LUNG: Clear to auscultate bilaterally; No wheezing  HEART: S1S2+, Regular rate and rhythm; No murmurs  ABDOMEN: Soft, Nontender, Nondistended; Bowel sounds present  EXTREMITIES:  1+ Peripheral Pulses, No edema, right knee with clean dressings on, no bleeding or soaking   SKIN: No rashes or lesions  NEURO: AAOX3, no focal deficits  PSYCH: normal mood        LABS:                  I&O's Summary    24 Nov 2022 07:01  -  25 Nov 2022 07:00  --------------------------------------------------------  IN: 0 mL / OUT: 950 mL / NET: -950 mL        MEDICATIONS  (STANDING):  acetaminophen     Tablet .. 975 milliGRAM(s) Oral every 8 hours  apixaban 2.5 milliGRAM(s) Oral two times a day  atorvastatin 40 milliGRAM(s) Oral at bedtime  buprenorphine 8 mG/naloxone 2 mG SL Film 1 Film(s) SubLingual every 8 hours  busPIRone 5 milliGRAM(s) Oral two times a day  ceFAZolin   IVPB 2000 milliGRAM(s) IV Intermittent every 8 hours  cloNIDine 0.1 milliGRAM(s) Oral two times a day  gabapentin 300 milliGRAM(s) Oral <User Schedule>  gabapentin 600 milliGRAM(s) Oral at bedtime  multivitamin 1 Tablet(s) Oral daily  polyethylene glycol 3350 17 Gram(s) Oral at bedtime  senna 2 Tablet(s) Oral at bedtime  sodium chloride 0.9% Bolus 500 milliLiter(s) IV Bolus once  sodium chloride 0.9%. 1000 milliLiter(s) (125 mL/Hr) IV Continuous <Continuous>  traZODone 200 milliGRAM(s) Oral at bedtime  vancomycin  IVPB 1500 milliGRAM(s) IV Intermittent <User Schedule>    MEDICATIONS  (PRN):  magnesium hydroxide Suspension 30 milliLiter(s) Oral daily PRN Constipation  ondansetron Injectable 4 milliGRAM(s) IV Push every 6 hours PRN Nausea and/or Vomiting

## 2022-11-25 NOTE — PROGRESS NOTE ADULT - SUBJECTIVE AND OBJECTIVE BOX
Patient seen and examined at bedside. comfortable in bed, pain controlled. Denies fever/chills, SOB/chest pain, abdominal pain, numbness/tingling. no complaints.    Vital Signs Last 24 Hrs  T(C): 36.8 (25 Nov 2022 04:32), Max: 36.8 (25 Nov 2022 04:32)  T(F): 98.2 (25 Nov 2022 04:32), Max: 98.2 (25 Nov 2022 04:32)  HR: 78 (25 Nov 2022 04:32) (76 - 78)  BP: 116/68 (25 Nov 2022 04:32) (109/63 - 116/68)  BP(mean): --  RR: 17 (25 Nov 2022 04:32) (16 - 17)  SpO2: 94% (25 Nov 2022 04:32) (93% - 94%)    Parameters below as of 25 Nov 2022 04:32  Patient On (Oxygen Delivery Method): room air    General: Pt Alert and oriented, NAD, controlled pain.  prevena dressings C/D/I. No bleeding.  Pulses: 2+ dorsalis pedis pulse. Cap refill < 2 sec.  Sensation: Grossly intact to light touch without deficit.  Motor: + EHL/FHL/TA/GS  Calf soft, supple and nontender    Culture - Blood (11.22.22 @ 10:35)    Specimen Source: .Blood Blood    Culture Results:   No growth to date.    Culture - Blood (11.22.22 @ 10:32)    Specimen Source: .Blood Blood    Culture Results:   No growth to date.    Culture - Tissue with Gram Stain (11.21.22 @ 18:32)    Gram Stain:   No polymorphonuclear cells seen per low power field  No organisms seen per oil power field    Specimen Source: .Tissue right femur    Culture Results:   No growth        A/P: 57 y.o M s/p right knee removal of spacer, reinsertion of abx spacer POD #3  - WBAT  - DVTP  - f/u cx  - abx per ID Patient seen and examined at bedside. comfortable in bed, pain controlled. Denies fever/chills, SOB/chest pain, abdominal pain, numbness/tingling. no complaints.    Vital Signs Last 24 Hrs  T(C): 36.8 (25 Nov 2022 04:32), Max: 36.8 (25 Nov 2022 04:32)  T(F): 98.2 (25 Nov 2022 04:32), Max: 98.2 (25 Nov 2022 04:32)  HR: 78 (25 Nov 2022 04:32) (76 - 78)  BP: 116/68 (25 Nov 2022 04:32) (109/63 - 116/68)  BP(mean): --  RR: 17 (25 Nov 2022 04:32) (16 - 17)  SpO2: 94% (25 Nov 2022 04:32) (93% - 94%)    Parameters below as of 25 Nov 2022 04:32  Patient On (Oxygen Delivery Method): room air    General: Pt Alert and oriented, NAD, controlled pain.  prevena dressings C/D/I. No bleeding.  Pulses: 2+ dorsalis pedis pulse. Cap refill < 2 sec.  Sensation: Grossly intact to light touch without deficit.  Motor: + EHL/FHL/TA/GS  Calf soft, supple and nontender    Culture - Blood (11.22.22 @ 10:35)    Specimen Source: .Blood Blood    Culture Results:   No growth to date.    Culture - Blood (11.22.22 @ 10:32)    Specimen Source: .Blood Blood    Culture Results:   No growth to date.    Culture - Tissue with Gram Stain (11.21.22 @ 18:32)    Gram Stain:   No polymorphonuclear cells seen per low power field  No organisms seen per oil power field    Specimen Source: .Tissue right femur    Culture Results:   No growth        A/P: 57 y.o M s/p right knee removal of spacer, reinsertion of abx spacer POD #3  - TTWB in KI  - DVTP  - f/u cx  - abx per ID

## 2022-11-25 NOTE — DIETITIAN INITIAL EVALUATION ADULT - ADD RECOMMEND
RX MVI

## 2022-11-26 LAB
CRP SERPL-MCNC: 74 MG/L — HIGH
CULTURE RESULTS: SIGNIFICANT CHANGE UP
CULTURE RESULTS: SIGNIFICANT CHANGE UP
SPECIMEN SOURCE: SIGNIFICANT CHANGE UP
SPECIMEN SOURCE: SIGNIFICANT CHANGE UP

## 2022-11-26 RX ADMIN — Medication 975 MILLIGRAM(S): at 22:50

## 2022-11-26 RX ADMIN — Medication 200 MILLIGRAM(S): at 21:50

## 2022-11-26 RX ADMIN — Medication 300 MILLIGRAM(S): at 20:43

## 2022-11-26 RX ADMIN — Medication 1 TABLET(S): at 12:05

## 2022-11-26 RX ADMIN — Medication 0.1 MILLIGRAM(S): at 05:16

## 2022-11-26 RX ADMIN — Medication 975 MILLIGRAM(S): at 16:48

## 2022-11-26 RX ADMIN — Medication 975 MILLIGRAM(S): at 21:50

## 2022-11-26 RX ADMIN — APIXABAN 2.5 MILLIGRAM(S): 2.5 TABLET, FILM COATED ORAL at 05:18

## 2022-11-26 RX ADMIN — Medication 0.1 MILLIGRAM(S): at 18:05

## 2022-11-26 RX ADMIN — GABAPENTIN 300 MILLIGRAM(S): 400 CAPSULE ORAL at 09:35

## 2022-11-26 RX ADMIN — Medication 975 MILLIGRAM(S): at 06:48

## 2022-11-26 RX ADMIN — BUPRENORPHINE AND NALOXONE 1 FILM(S): 2; .5 TABLET SUBLINGUAL at 01:17

## 2022-11-26 RX ADMIN — Medication 5 MILLIGRAM(S): at 05:16

## 2022-11-26 RX ADMIN — Medication 100 MILLIGRAM(S): at 23:32

## 2022-11-26 RX ADMIN — Medication 975 MILLIGRAM(S): at 14:41

## 2022-11-26 RX ADMIN — Medication 100 MILLIGRAM(S): at 14:44

## 2022-11-26 RX ADMIN — Medication 300 MILLIGRAM(S): at 09:36

## 2022-11-26 RX ADMIN — GABAPENTIN 300 MILLIGRAM(S): 400 CAPSULE ORAL at 14:41

## 2022-11-26 RX ADMIN — ATORVASTATIN CALCIUM 40 MILLIGRAM(S): 80 TABLET, FILM COATED ORAL at 21:50

## 2022-11-26 RX ADMIN — Medication 975 MILLIGRAM(S): at 05:16

## 2022-11-26 RX ADMIN — GABAPENTIN 600 MILLIGRAM(S): 400 CAPSULE ORAL at 21:50

## 2022-11-26 RX ADMIN — Medication 100 MILLIGRAM(S): at 05:17

## 2022-11-26 RX ADMIN — Medication 5 MILLIGRAM(S): at 18:05

## 2022-11-26 RX ADMIN — BUPRENORPHINE AND NALOXONE 1 FILM(S): 2; .5 TABLET SUBLINGUAL at 09:35

## 2022-11-26 RX ADMIN — APIXABAN 2.5 MILLIGRAM(S): 2.5 TABLET, FILM COATED ORAL at 18:05

## 2022-11-26 RX ADMIN — BUPRENORPHINE AND NALOXONE 1 FILM(S): 2; .5 TABLET SUBLINGUAL at 18:06

## 2022-11-26 NOTE — PROGRESS NOTE ADULT - SUBJECTIVE AND OBJECTIVE BOX
MOSES HANKINS    [id number]    History: 57y Male is status post right knee removal of spacer, reinsertion of abx spacer on 11/21, POD # 5. Patient is doing well and is comfortable. States more improved overnight. The patient's pain is controlled using the prescribed pain medications. The patient is participating in physical therapy. Denies nausea, vomiting, chest pain, shortness of breath, abdominal pain or fever. No new complaints.      ICU Vital Signs Last 24 Hrs  T(C): 36.4 (26 Nov 2022 05:01), Max: 36.8 (25 Nov 2022 15:31)  T(F): 97.6 (26 Nov 2022 05:01), Max: 98.2 (25 Nov 2022 15:31)  HR: 72 (26 Nov 2022 05:01) (72 - 86)  BP: 150/83 (26 Nov 2022 05:01) (122/63 - 150/83)  BP(mean): --  ABP: --  ABP(mean): --  RR: 18 (26 Nov 2022 05:01) (18 - 18)  SpO2: 95% (26 Nov 2022 05:01) (92% - 97%)    O2 Parameters below as of 26 Nov 2022 05:01  Patient On (Oxygen Delivery Method): room air        C-Reactive Protein, Serum (11.26.22 @ 04:59)    C-Reactive Protein, Serum: 74 mg/L    Culture - Tissue with Gram Stain (11.21.22 @ 18:30)    Gram Stain:   No polymorphonuclear cells seen per low power field  No organisms seen per oil power field    Specimen Source: .Tissue right tibial nail    Culture Results:   No growth    Culture - Tissue with Gram Stain (11.21.22 @ 18:32)    Gram Stain:   No polymorphonuclear cells seen per low power field  No organisms seen per oil power field    Specimen Source: .Tissue right femur    Culture Results:   No growth    Culture - Tissue with Gram Stain (11.21.22 @ 18:31)    Gram Stain:   No polymorphonuclear cells seen per low power field  No organisms seen per oil power field    Specimen Source: .Tissue right deep knee    Culture Results:   No growth      Physical exam: The right knee Prevena dressing is clean, dry and intact. No fluid in chamber. No drainage or discharge. No erythema is noted. No blistering. No ecchymosis. The calf is supple nontender.  Sensation to light touch is grossly intact distally. + +dorsi/plantarflexion/EHL/FHL. No foot drop. 2+ dorsalis pedis pulse. Capillary refill is less than 2 seconds. No cyanosis.        Primary Orthopedic Assessment:  • s/p right knee removal of spacer, reinsertion of abx spacer POD #3  * OR cultures NGTD      Plan:   * ABX as per ID, pending PICC line placement  • DVT prophylaxis as prescribed, including use of compression devices and ankle pumps  • Continue physical therapy  * TTWB RLE in KI at all times  • Incentive spirometry encouraged  • Pain control as clinically indicated  • Discharge planning

## 2022-11-27 LAB
CULTURE RESULTS: SIGNIFICANT CHANGE UP
CULTURE RESULTS: SIGNIFICANT CHANGE UP
SARS-COV-2 RNA SPEC QL NAA+PROBE: DETECTED
SPECIMEN SOURCE: SIGNIFICANT CHANGE UP
SPECIMEN SOURCE: SIGNIFICANT CHANGE UP
VANCOMYCIN TROUGH SERPL-MCNC: 18.7 UG/ML — SIGNIFICANT CHANGE UP (ref 10–20)

## 2022-11-27 RX ORDER — LANOLIN ALCOHOL/MO/W.PET/CERES
5 CREAM (GRAM) TOPICAL AT BEDTIME
Refills: 0 | Status: COMPLETED | OUTPATIENT
Start: 2022-11-27 | End: 2022-11-27

## 2022-11-27 RX ADMIN — BUPRENORPHINE AND NALOXONE 1 FILM(S): 2; .5 TABLET SUBLINGUAL at 17:26

## 2022-11-27 RX ADMIN — APIXABAN 2.5 MILLIGRAM(S): 2.5 TABLET, FILM COATED ORAL at 05:50

## 2022-11-27 RX ADMIN — Medication 300 MILLIGRAM(S): at 09:22

## 2022-11-27 RX ADMIN — Medication 1 TABLET(S): at 14:00

## 2022-11-27 RX ADMIN — Medication 100 MILLIGRAM(S): at 05:50

## 2022-11-27 RX ADMIN — BUPRENORPHINE AND NALOXONE 1 FILM(S): 2; .5 TABLET SUBLINGUAL at 01:29

## 2022-11-27 RX ADMIN — GABAPENTIN 300 MILLIGRAM(S): 400 CAPSULE ORAL at 14:00

## 2022-11-27 RX ADMIN — Medication 975 MILLIGRAM(S): at 05:50

## 2022-11-27 RX ADMIN — ATORVASTATIN CALCIUM 40 MILLIGRAM(S): 80 TABLET, FILM COATED ORAL at 22:30

## 2022-11-27 RX ADMIN — Medication 5 MILLIGRAM(S): at 05:50

## 2022-11-27 RX ADMIN — Medication 975 MILLIGRAM(S): at 06:22

## 2022-11-27 RX ADMIN — Medication 0.1 MILLIGRAM(S): at 17:25

## 2022-11-27 RX ADMIN — Medication 975 MILLIGRAM(S): at 14:05

## 2022-11-27 RX ADMIN — Medication 5 MILLIGRAM(S): at 22:30

## 2022-11-27 RX ADMIN — Medication 100 MILLIGRAM(S): at 14:00

## 2022-11-27 RX ADMIN — GABAPENTIN 300 MILLIGRAM(S): 400 CAPSULE ORAL at 09:22

## 2022-11-27 RX ADMIN — Medication 300 MILLIGRAM(S): at 22:29

## 2022-11-27 RX ADMIN — BUPRENORPHINE AND NALOXONE 1 FILM(S): 2; .5 TABLET SUBLINGUAL at 09:23

## 2022-11-27 RX ADMIN — GABAPENTIN 600 MILLIGRAM(S): 400 CAPSULE ORAL at 22:30

## 2022-11-27 RX ADMIN — Medication 975 MILLIGRAM(S): at 23:30

## 2022-11-27 RX ADMIN — APIXABAN 2.5 MILLIGRAM(S): 2.5 TABLET, FILM COATED ORAL at 17:25

## 2022-11-27 RX ADMIN — Medication 5 MILLIGRAM(S): at 17:25

## 2022-11-27 RX ADMIN — Medication 975 MILLIGRAM(S): at 14:00

## 2022-11-27 RX ADMIN — Medication 100 MILLIGRAM(S): at 22:31

## 2022-11-27 RX ADMIN — Medication 0.1 MILLIGRAM(S): at 05:50

## 2022-11-27 RX ADMIN — Medication 975 MILLIGRAM(S): at 22:30

## 2022-11-27 NOTE — PROGRESS NOTE ADULT - SUBJECTIVE AND OBJECTIVE BOX
MOSES HANKINS    [id number]    History: 57y Male is status post right total knee removal of spacer and re-insertion of antibiotic spacer on POD #6. Patient is doing well and is comfortable. The patient's pain is controlled using the prescribed pain medications. The patient is participating in physical therapy, compliant with TTWB status to the E. Denies nausea, vomiting, chest pain, shortness of breath, abdominal pain or fever. No new complaints.      11-25    x   |  x   |  x   ----------------------------<  x   x    |  x   |  0.84      MEDICATIONS  (STANDING):  acetaminophen     Tablet .. 975 milliGRAM(s) Oral every 8 hours  apixaban 2.5 milliGRAM(s) Oral two times a day  atorvastatin 40 milliGRAM(s) Oral at bedtime  buprenorphine 8 mG/naloxone 2 mG SL Film 1 Film(s) SubLingual every 8 hours  busPIRone 5 milliGRAM(s) Oral two times a day  ceFAZolin   IVPB 2000 milliGRAM(s) IV Intermittent every 8 hours  cloNIDine 0.1 milliGRAM(s) Oral two times a day  gabapentin 300 milliGRAM(s) Oral <User Schedule>  gabapentin 600 milliGRAM(s) Oral at bedtime  multivitamin 1 Tablet(s) Oral daily  polyethylene glycol 3350 17 Gram(s) Oral at bedtime  senna 2 Tablet(s) Oral at bedtime  sodium chloride 0.9% Bolus 500 milliLiter(s) IV Bolus once  sodium chloride 0.9%. 1000 milliLiter(s) (125 mL/Hr) IV Continuous <Continuous>  traZODone 200 milliGRAM(s) Oral at bedtime  vancomycin  IVPB 1500 milliGRAM(s) IV Intermittent <User Schedule>    MEDICATIONS  (PRN):  magnesium hydroxide Suspension 30 milliLiter(s) Oral daily PRN Constipation  ondansetron Injectable 4 milliGRAM(s) IV Push every 6 hours PRN Nausea and/or Vomiting      Physical exam: The right knee knee immobilizer in place, PREVENA dressing is clean, dry and intact with adequate suction. No drainage or discharge. No erythema is noted. No blistering. No ecchymosis. The calf is supple nontender. Passive range of motion is acceptable to due postoperative pain. Sensation to light touch is grossly intact distally. Motor function distally is 5/5. Extensor hallucis longus and flexor hallucis longus are intact. No foot drop. 2+ dorsalis pedis pulse. Capillary refill is less than 2 seconds. No cyanosis.    Primary Orthopedic Assessment:  • s/p RIGHT total knee revision    Secondary  Orthopedic Assessment(s):   •     Secondary  Medical Assessment(s):       Culture - Blood (11.22.22 @ 10:35)    Specimen Source: .Blood Blood    Culture Results:   No growth to date.    Culture - Blood (11.22.22 @ 10:32)    Specimen Source: .Blood Blood    Culture Results:   No growth to date.    Culture - Tissue with Gram Stain (11.21.22 @ 18:32)    Gram Stain:   No polymorphonuclear cells seen per low power field  No organisms seen per oil power field    Specimen Source: .Tissue right femur    Culture Results:   No growth at 5 days    Culture - Tissue with Gram Stain (11.21.22 @ 18:31)    Gram Stain:   No polymorphonuclear cells seen per low power field  No organisms seen per oil power field    Specimen Source: .Tissue right deep knee    Culture Results:   No growth at 5 days    Culture - Tissue with Gram Stain (11.21.22 @ 18:30)    Gram Stain:   No polymorphonuclear cells seen per low power field  No organisms seen per oil power field    Specimen Source: .Tissue right tibial nail    Culture Results:   No growth at 5 days      Plan:   • DVT prophylaxis as prescribed, including use of compression devices and ankle pumps  • Continue physical therapy  • TTWB in Knee immobilizer at all times to the right lower extremity with assistance of a walker  • Incentive spirometry encouraged  • Pain control as clinically indicated  * OR cultures negative - PICC line pending  * ABX per infectious disease  • Discharge planning

## 2022-11-28 LAB
CREAT SERPL-MCNC: 0.82 MG/DL — SIGNIFICANT CHANGE UP (ref 0.5–1.3)
EGFR: 102 ML/MIN/1.73M2 — SIGNIFICANT CHANGE UP
VANCOMYCIN TROUGH SERPL-MCNC: 20 UG/ML — SIGNIFICANT CHANGE UP (ref 10–20)

## 2022-11-28 PROCEDURE — 76937 US GUIDE VASCULAR ACCESS: CPT | Mod: 26,59

## 2022-11-28 PROCEDURE — 36573 INSJ PICC RS&I 5 YR+: CPT

## 2022-11-28 PROCEDURE — 71045 X-RAY EXAM CHEST 1 VIEW: CPT | Mod: 26

## 2022-11-28 PROCEDURE — 76942 ECHO GUIDE FOR BIOPSY: CPT | Mod: 26,59

## 2022-11-28 RX ORDER — DAPTOMYCIN 500 MG/10ML
550 INJECTION, POWDER, LYOPHILIZED, FOR SOLUTION INTRAVENOUS EVERY 24 HOURS
Refills: 0 | Status: DISCONTINUED | OUTPATIENT
Start: 2022-11-28 | End: 2022-12-07

## 2022-11-28 RX ORDER — VANCOMYCIN HCL 1 G
1250 VIAL (EA) INTRAVENOUS EVERY 12 HOURS
Refills: 0 | Status: DISCONTINUED | OUTPATIENT
Start: 2022-11-28 | End: 2022-11-28

## 2022-11-28 RX ORDER — SODIUM CHLORIDE 9 MG/ML
10 INJECTION INTRAMUSCULAR; INTRAVENOUS; SUBCUTANEOUS
Refills: 0 | Status: DISCONTINUED | OUTPATIENT
Start: 2022-11-28 | End: 2022-12-07

## 2022-11-28 RX ORDER — CHLORHEXIDINE GLUCONATE 213 G/1000ML
1 SOLUTION TOPICAL
Refills: 0 | Status: DISCONTINUED | OUTPATIENT
Start: 2022-11-28 | End: 2022-12-07

## 2022-11-28 RX ADMIN — Medication 100 MILLIGRAM(S): at 14:25

## 2022-11-28 RX ADMIN — Medication 975 MILLIGRAM(S): at 23:00

## 2022-11-28 RX ADMIN — Medication 975 MILLIGRAM(S): at 05:46

## 2022-11-28 RX ADMIN — Medication 975 MILLIGRAM(S): at 06:30

## 2022-11-28 RX ADMIN — CHLORHEXIDINE GLUCONATE 1 APPLICATION(S): 213 SOLUTION TOPICAL at 19:06

## 2022-11-28 RX ADMIN — APIXABAN 2.5 MILLIGRAM(S): 2.5 TABLET, FILM COATED ORAL at 05:46

## 2022-11-28 RX ADMIN — Medication 975 MILLIGRAM(S): at 22:06

## 2022-11-28 RX ADMIN — BUPRENORPHINE AND NALOXONE 1 FILM(S): 2; .5 TABLET SUBLINGUAL at 08:48

## 2022-11-28 RX ADMIN — Medication 5 MILLIGRAM(S): at 19:05

## 2022-11-28 RX ADMIN — Medication 1 TABLET(S): at 14:23

## 2022-11-28 RX ADMIN — GABAPENTIN 600 MILLIGRAM(S): 400 CAPSULE ORAL at 22:06

## 2022-11-28 RX ADMIN — Medication 100 MILLIGRAM(S): at 05:46

## 2022-11-28 RX ADMIN — Medication 975 MILLIGRAM(S): at 14:24

## 2022-11-28 RX ADMIN — BUPRENORPHINE AND NALOXONE 1 FILM(S): 2; .5 TABLET SUBLINGUAL at 19:05

## 2022-11-28 RX ADMIN — Medication 0.1 MILLIGRAM(S): at 19:05

## 2022-11-28 RX ADMIN — GABAPENTIN 300 MILLIGRAM(S): 400 CAPSULE ORAL at 09:00

## 2022-11-28 RX ADMIN — GABAPENTIN 300 MILLIGRAM(S): 400 CAPSULE ORAL at 14:24

## 2022-11-28 RX ADMIN — DAPTOMYCIN 122 MILLIGRAM(S): 500 INJECTION, POWDER, LYOPHILIZED, FOR SOLUTION INTRAVENOUS at 19:05

## 2022-11-28 RX ADMIN — APIXABAN 2.5 MILLIGRAM(S): 2.5 TABLET, FILM COATED ORAL at 19:06

## 2022-11-28 RX ADMIN — SENNA PLUS 2 TABLET(S): 8.6 TABLET ORAL at 22:06

## 2022-11-28 RX ADMIN — BUPRENORPHINE AND NALOXONE 1 FILM(S): 2; .5 TABLET SUBLINGUAL at 00:46

## 2022-11-28 RX ADMIN — ATORVASTATIN CALCIUM 40 MILLIGRAM(S): 80 TABLET, FILM COATED ORAL at 22:07

## 2022-11-28 RX ADMIN — Medication 0.1 MILLIGRAM(S): at 05:46

## 2022-11-28 RX ADMIN — Medication 200 MILLIGRAM(S): at 22:06

## 2022-11-28 RX ADMIN — Medication 5 MILLIGRAM(S): at 05:47

## 2022-11-28 NOTE — PROGRESS NOTE ADULT - SUBJECTIVE AND OBJECTIVE BOX
MOSES ORANTESONEY    [id number]    History: 57y Male is status post right total knee removal of spacer and re-insertion of antibiotic spacer, POD # 7. Patient is doing well and is comfortable. The patient's pain is controlled using the prescribed pain medications. The patient is participating in physical therapy. Denies nausea, vomiting, chest pain, shortness of breath, abdominal pain or dizziness. Got PICC line today. No new complaints.      Culture - Tissue with Gram Stain (11.21.22 @ 18:32)    Gram Stain:   No polymorphonuclear cells seen per low power field  No organisms seen per oil power field    Specimen Source: .Tissue right femur    Culture Results:   No growth at 5 days    Culture - Tissue with Gram Stain (11.21.22 @ 18:31)    Gram Stain:   No polymorphonuclear cells seen per low power field  No organisms seen per oil power field    Specimen Source: .Tissue right deep knee    Culture Results:   No growth at 5 days    Culture - Tissue with Gram Stain (11.21.22 @ 18:30)    Gram Stain:   No polymorphonuclear cells seen per low power field  No organisms seen per oil power field    Specimen Source: .Tissue right tibial nail    Culture Results:   No growth at 5 days    COVID-19 PCR . (11.27.22 @ 14:10)    COVID-19 PCR: Detected: You can help in the fight against COVID-19. [employer] may contact  you to see if you are interested in voluntarily participating in one of  our clinical trials.  Testing is performed using polymerase chain reaction (PCR) or  transcription mediated amplification (TMA). This COVID-19 (SARS-CoV-2)  nucleic acid amplification test was validated by [employer] and is  in use under the FDA Emergency Use Authorization (EUA) for clinical labs  CLIA-certified to perform high complexity testing. Test results should be  correlated with clinical presentation, patient history, and epidemiology.        MEDICATIONS  (STANDING):  acetaminophen     Tablet .. 975 milliGRAM(s) Oral every 8 hours  apixaban 2.5 milliGRAM(s) Oral two times a day  atorvastatin 40 milliGRAM(s) Oral at bedtime  buprenorphine 8 mG/naloxone 2 mG SL Film 1 Film(s) SubLingual every 8 hours  busPIRone 5 milliGRAM(s) Oral two times a day  ceFAZolin   IVPB 2000 milliGRAM(s) IV Intermittent every 8 hours  cloNIDine 0.1 milliGRAM(s) Oral two times a day  gabapentin 300 milliGRAM(s) Oral <User Schedule>  gabapentin 600 milliGRAM(s) Oral at bedtime  multivitamin 1 Tablet(s) Oral daily  polyethylene glycol 3350 17 Gram(s) Oral at bedtime  senna 2 Tablet(s) Oral at bedtime  sodium chloride 0.9% Bolus 500 milliLiter(s) IV Bolus once  sodium chloride 0.9%. 1000 milliLiter(s) (125 mL/Hr) IV Continuous <Continuous>  traZODone 200 milliGRAM(s) Oral at bedtime  vancomycin  IVPB 1500 milliGRAM(s) IV Intermittent <User Schedule>    MEDICATIONS  (PRN):  magnesium hydroxide Suspension 30 milliLiter(s) Oral daily PRN Constipation  ondansetron Injectable 4 milliGRAM(s) IV Push every 6 hours PRN Nausea and/or Vomiting    Vital Signs Last 24 Hrs  T(C): 36.4 (28 Nov 2022 05:04), Max: 36.9 (27 Nov 2022 20:02)  T(F): 97.5 (28 Nov 2022 05:04), Max: 98.5 (27 Nov 2022 20:02)  HR: 66 (28 Nov 2022 05:04) (66 - 80)  BP: 115/71 (28 Nov 2022 05:04) (107/61 - 161/68)  BP(mean): --  RR: 18 (28 Nov 2022 05:04) (18 - 19)  SpO2: 97% (28 Nov 2022 05:04) (93% - 99%)    Parameters below as of 28 Nov 2022 05:04  Patient On (Oxygen Delivery Method): room air  Lying in bed in NAD, awake and alert    Physical exam: Right lower extremity- The right knee prevena vac dressing is clean, dry and intact. No drainage or discharge in cannister. No erythema is noted. No blistering. No ecchymosis. Prevena removed. Staples dry and intact.  The calf is supple nontender. Passive range of motion is acceptable to due postoperative pain. No calf tenderness. Sensation to light touch is grossly intact distally. Motor function distally is 5/5. Extensor hallucis longus and flexor hallucis longus are intact. No foot drop. 2+ dorsalis pedis pulse. Capillary refill is less than 2 seconds. No cyanosis.    Primary Orthopedic Assessment:  • s/p RIGHT total knee removal of spacer with reinsertion of abx spacer, POD#7      Plan:   - Prevena vac removed, mepilex dressing applied  • DVT prophylaxis as prescribed- eliquis, including use of compression devices and ankle pumps  • Continue physical therapy  • Toe touch Weightbearing of the right lower extremity with assistance of a walker  - Knee immobilizer  • Incentive spirometry encouraged  • Pain control as clinically indicated  - PICC line placed this am, pending CXR  - continue IV abx per ID- [email or web address]  • Discharge planning – anticipated discharge is subacute rehabilitation

## 2022-11-29 LAB
CK SERPL-CCNC: 70 U/L — SIGNIFICANT CHANGE UP (ref 30–200)
SURGICAL PATHOLOGY STUDY: SIGNIFICANT CHANGE UP

## 2022-11-29 PROCEDURE — 99233 SBSQ HOSP IP/OBS HIGH 50: CPT

## 2022-11-29 RX ADMIN — Medication 1 TABLET(S): at 13:21

## 2022-11-29 RX ADMIN — BUPRENORPHINE AND NALOXONE 1 FILM(S): 2; .5 TABLET SUBLINGUAL at 21:48

## 2022-11-29 RX ADMIN — Medication 5 MILLIGRAM(S): at 05:52

## 2022-11-29 RX ADMIN — Medication 975 MILLIGRAM(S): at 21:49

## 2022-11-29 RX ADMIN — GABAPENTIN 300 MILLIGRAM(S): 400 CAPSULE ORAL at 18:15

## 2022-11-29 RX ADMIN — Medication 975 MILLIGRAM(S): at 22:49

## 2022-11-29 RX ADMIN — Medication 5 MILLIGRAM(S): at 18:16

## 2022-11-29 RX ADMIN — APIXABAN 2.5 MILLIGRAM(S): 2.5 TABLET, FILM COATED ORAL at 18:15

## 2022-11-29 RX ADMIN — BUPRENORPHINE AND NALOXONE 1 FILM(S): 2; .5 TABLET SUBLINGUAL at 13:22

## 2022-11-29 RX ADMIN — APIXABAN 2.5 MILLIGRAM(S): 2.5 TABLET, FILM COATED ORAL at 05:52

## 2022-11-29 RX ADMIN — Medication 975 MILLIGRAM(S): at 05:52

## 2022-11-29 RX ADMIN — Medication 975 MILLIGRAM(S): at 13:21

## 2022-11-29 RX ADMIN — ATORVASTATIN CALCIUM 40 MILLIGRAM(S): 80 TABLET, FILM COATED ORAL at 21:49

## 2022-11-29 RX ADMIN — CHLORHEXIDINE GLUCONATE 1 APPLICATION(S): 213 SOLUTION TOPICAL at 05:53

## 2022-11-29 RX ADMIN — DAPTOMYCIN 122 MILLIGRAM(S): 500 INJECTION, POWDER, LYOPHILIZED, FOR SOLUTION INTRAVENOUS at 18:16

## 2022-11-29 RX ADMIN — BUPRENORPHINE AND NALOXONE 1 FILM(S): 2; .5 TABLET SUBLINGUAL at 03:10

## 2022-11-29 RX ADMIN — Medication 0.1 MILLIGRAM(S): at 18:15

## 2022-11-29 RX ADMIN — Medication 975 MILLIGRAM(S): at 01:44

## 2022-11-29 RX ADMIN — Medication 0.1 MILLIGRAM(S): at 05:52

## 2022-11-29 RX ADMIN — Medication 200 MILLIGRAM(S): at 21:49

## 2022-11-29 RX ADMIN — GABAPENTIN 300 MILLIGRAM(S): 400 CAPSULE ORAL at 10:58

## 2022-11-29 RX ADMIN — GABAPENTIN 600 MILLIGRAM(S): 400 CAPSULE ORAL at 21:48

## 2022-11-29 NOTE — PROGRESS NOTE ADULT - SUBJECTIVE AND OBJECTIVE BOX
INFECTIOUS DISEASES AND INTERNAL MEDICINE at [place]  =======================================================  Mingo Culver MD  Diplomates American Board of Internal Medicine and Infectious Diseases  Telephone [phone]  Fax            [phone]  =======================================================    MOSES HANKINS [id number]    Follow up:  R KNEE INFECTION    Allergies:  shellfish. (Hives)      Medications:  acetaminophen     Tablet .. 975 milliGRAM(s) Oral every 8 hours  apixaban 2.5 milliGRAM(s) Oral two times a day  atorvastatin 40 milliGRAM(s) Oral at bedtime  buprenorphine 8 mG/naloxone 2 mG SL Film 1 Film(s) SubLingual every 8 hours  busPIRone 5 milliGRAM(s) Oral two times a day  chlorhexidine 2% Cloths 1 Application(s) Topical <User Schedule>  cloNIDine 0.1 milliGRAM(s) Oral two times a day  DAPTOmycin IVPB 550 milliGRAM(s) IV Intermittent every 24 hours  gabapentin 300 milliGRAM(s) Oral <User Schedule>  gabapentin 600 milliGRAM(s) Oral at bedtime  magnesium hydroxide Suspension 30 milliLiter(s) Oral daily PRN  multivitamin 1 Tablet(s) Oral daily  ondansetron Injectable 4 milliGRAM(s) IV Push every 6 hours PRN  polyethylene glycol 3350 17 Gram(s) Oral at bedtime  senna 2 Tablet(s) Oral at bedtime  sodium chloride 0.9% Bolus 500 milliLiter(s) IV Bolus once  sodium chloride 0.9% lock flush 10 milliLiter(s) IV Push every 1 hour PRN  sodium chloride 0.9%. 1000 milliLiter(s) IV Continuous <Continuous>  traZODone 200 milliGRAM(s) Oral at bedtime    SOCIAL       FAMILY   FAMILY HISTORY:  FH: diverticulitis (Mother)    FH: prostate cancer (Father)    FH: CAD (coronary artery disease)      REVIEW OF SYSTEMS:  CONSTITUTIONAL:  No Fever or chills  HEENT:   No diplopia or blurred vision.  No earache, sore throat or runny nose.  CARDIOVASCULAR:  No pressure, squeezing, strangling, tightness, heaviness or aching about the chest, neck, axilla or epigastrium.  RESPIRATORY:  No cough, shortness of breath, PND or orthopnea.  GASTROINTESTINAL:  No nausea, vomiting or diarrhea.  GENITOURINARY:  No dysuria, frequency or urgency. No Blood in urine  MUSCULOSKELETAL:   moves all joints  SKIN:  No change in skin, hair or nails.  NEUROLOGIC:  No paresthesias, fasciculations, seizures or weakness.  PSYCHIATRIC:  No disorder of thought or mood.  ENDOCRINE:  No heat or cold intolerance, polyuria or polydipsia.  HEMATOLOGICAL:  No easy bruising or bleeding.            Physical Exam:  I Vital Signs Last 24 Hrs  T(C): 36.9 (29 Nov 2022 10:34), Max: 36.9 (29 Nov 2022 10:34)  T(F): 98.5 (29 Nov 2022 10:34), Max: 98.5 (29 Nov 2022 10:34)  HR: 67 (29 Nov 2022 10:34) (65 - 76)  BP: 149/78 (29 Nov 2022 10:34) (127/63 - 161/84)  BP(mean): --  RR: 18 (29 Nov 2022 10:34) (16 - 18)  SpO2: 92% (29 Nov 2022 10:34) (92% - 99%)    Parameters below as of 29 Nov 2022 10:34  Patient On (Oxygen Delivery Method): room air              GEN: NAD,   HEENT: normocephalic and atraumatic. EOMI. GOLDIE.    NECK: Supple. No carotid bruits.  No lymphadenopathy or thyromegaly.  LUNGS: Clear to auscultation.  HEART: Regular rate and rhythm without murmur.  ABDOMEN: Soft, nontender, and nondistended.  Positive bowel sounds.    : No CVA tenderness  EXTREMITIES: Without any cyanosis, clubbing, rash, lesions or edema.  MSK: no joint swelling  NEUROLOGIC: Cranial nerves II through XII are grossly intact.  PSYCHIATRIC: Appropriate affect .  SKIN: No ulceration or induration present.        Labs:  Vitals:  =======     =======================================================  Current Antibiotics:  DAPTOmycin IVPB 550 milliGRAM(s) IV Intermittent every 24 hours    Other medications:  acetaminophen     Tablet .. 975 milliGRAM(s) Oral every 8 hours  apixaban 2.5 milliGRAM(s) Oral two times a day  atorvastatin 40 milliGRAM(s) Oral at bedtime  buprenorphine 8 mG/naloxone 2 mG SL Film 1 Film(s) SubLingual every 8 hours  busPIRone 5 milliGRAM(s) Oral two times a day  chlorhexidine 2% Cloths 1 Application(s) Topical <User Schedule>  cloNIDine 0.1 milliGRAM(s) Oral two times a day  gabapentin 300 milliGRAM(s) Oral <User Schedule>  gabapentin 600 milliGRAM(s) Oral at bedtime  multivitamin 1 Tablet(s) Oral daily  polyethylene glycol 3350 17 Gram(s) Oral at bedtime  senna 2 Tablet(s) Oral at bedtime  sodium chloride 0.9% Bolus 500 milliLiter(s) IV Bolus once  sodium chloride 0.9%. 1000 milliLiter(s) IV Continuous <Continuous>  traZODone 200 milliGRAM(s) Oral at bedtime      =======================================================  Labs:           Culture - Blood (collected 11-22-22 @ 10:35)  Source: .Blood Blood  Final Report (11-27-22 @ 18:00):    No Growth Final    Culture - Blood (collected 11-22-22 @ 10:32)  Source: .Blood Blood  Final Report (11-27-22 @ 18:00):    No Growth Final    Culture - Tissue with Gram Stain (collected 11-21-22 @ 18:32)  Source: .Tissue right femur  Gram Stain (11-22-22 @ 05:33):    No polymorphonuclear cells seen per low power field    No organisms seen per oil power field  Final Report (11-26-22 @ 18:13):    No growth at 5 days    Culture - Tissue with Gram Stain (collected 11-21-22 @ 18:31)  Source: .Tissue right deep knee  Gram Stain (11-22-22 @ 05:33):    No polymorphonuclear cells seen per low power field    No organisms seen per oil power field    Culture - Tissue with Gram Stain (collected 11-21-22 @ 18:30)  Source: .Tissue right tibial nail  Gram Stain (11-22-22 @ 05:33):    No polymorphonuclear cells seen per low power field    No organisms seen per oil power field  Final Report (11-26-22 @ 18:12):    No growth at 5 days      Creatinine, Serum: 0.82 mg/dL (11-28-22 @ 06:40)  Creatinine, Serum: 0.84 mg/dL (11-25-22 @ 19:28)          C-Reactive Protein, Serum: 74 mg/L (11-26-22 @ 04:59)      COVID-19 PCR: Detected (11-27-22 @ 14:10)  COVID-19 PCR: NotDetec (11-21-22 @ 12:23)  SARS-CoV-2 Result: NotDetec (11-18-22 @ 11:56)  SARS-CoV-2 Result: NotDetec (11-01-22 @ 20:13)

## 2022-11-29 NOTE — DIETITIAN INITIAL EVALUATION ADULT - OTHER INFO
57y Male is status post right total knee removal of spacer and re-insertion of antibiotic spacer, POD # 7. Patient is doing well and is comfortable. The patient's pain is controlled using the prescribed pain medications. The patient is participating in physical therapy. Denies nausea, vomiting, chest pain, shortness of breath, abdominal pain or dizziness. Got PICC line today.

## 2022-11-29 NOTE — DIETITIAN INITIAL EVALUATION ADULT - ORAL INTAKE PTA/DIET HISTORY
Pt with s/p day#7 knee replacement revision with picc line and wound vac, plan is [facility] awaiting auth. Pt with ~9# weight loss x 5 mo. Po intake usually good.

## 2022-11-29 NOTE — DIETITIAN INITIAL EVALUATION ADULT - PERTINENT MEDS FT
MEDICATIONS  (STANDING):  acetaminophen     Tablet .. 975 milliGRAM(s) Oral every 8 hours  apixaban 2.5 milliGRAM(s) Oral two times a day  atorvastatin 40 milliGRAM(s) Oral at bedtime  buprenorphine 8 mG/naloxone 2 mG SL Film 1 Film(s) SubLingual every 8 hours  busPIRone 5 milliGRAM(s) Oral two times a day  chlorhexidine 2% Cloths 1 Application(s) Topical <User Schedule>  cloNIDine 0.1 milliGRAM(s) Oral two times a day  DAPTOmycin IVPB 550 milliGRAM(s) IV Intermittent every 24 hours  gabapentin 300 milliGRAM(s) Oral <User Schedule>  gabapentin 600 milliGRAM(s) Oral at bedtime  multivitamin 1 Tablet(s) Oral daily  polyethylene glycol 3350 17 Gram(s) Oral at bedtime  senna 2 Tablet(s) Oral at bedtime  sodium chloride 0.9% Bolus 500 milliLiter(s) IV Bolus once  sodium chloride 0.9%. 1000 milliLiter(s) (125 mL/Hr) IV Continuous <Continuous>  traZODone 200 milliGRAM(s) Oral at bedtime    MEDICATIONS  (PRN):  magnesium hydroxide Suspension 30 milliLiter(s) Oral daily PRN Constipation  ondansetron Injectable 4 milliGRAM(s) IV Push every 6 hours PRN Nausea and/or Vomiting  sodium chloride 0.9% lock flush 10 milliLiter(s) IV Push every 1 hour PRN Pre/post blood products, medications, blood draw, and to maintain line patency

## 2022-11-29 NOTE — DIETITIAN INITIAL EVALUATION ADULT - PERTINENT LABORATORY DATA
11-28    x   |  x   |  x   ----------------------------<  x   x    |  x   |  0.82      A1C with Estimated Average Glucose Result: 5.8 % (11-10-22 @ 09:15)  A1C with Estimated Average Glucose Result: 5.6 % (09-19-22 @ 05:36)

## 2022-11-29 NOTE — PROGRESS NOTE ADULT - SUBJECTIVE AND OBJECTIVE BOX
MOSES ORANTESONEY    [id number]    History: 57y Male is status post right total knee removal of spacer and re-insertion of antibiotic spacer, POD # 8. Patient is doing well and is comfortable. The patient's pain is controlled using the prescribed pain medications. The patient is participating in physical therapy. Denies nausea, vomiting, chest pain, shortness of breath, abdominal pain or dizziness. PICC line placed 11/28. No new complaints.    Culture - Tissue with Gram Stain (11.21.22 @ 18:32)    Gram Stain:   No polymorphonuclear cells seen per low power field  No organisms seen per oil power field    Specimen Source: .Tissue right femur    Culture Results:   No growth at 5 days    Culture - Tissue with Gram Stain (11.21.22 @ 18:31)    Gram Stain:   No polymorphonuclear cells seen per low power field  No organisms seen per oil power field    Specimen Source: .Tissue right deep knee    Culture Results:   No growth at 5 days    Culture - Tissue with Gram Stain (11.21.22 @ 18:30)    Gram Stain:   No polymorphonuclear cells seen per low power field  No organisms seen per oil power field    Specimen Source: .Tissue right tibial nail    Culture Results:   No growth at 5 days    Vital Signs Last 24 Hrs  T(C): 36.5 (29 Nov 2022 05:24), Max: 36.7 (28 Nov 2022 12:03)  T(F): 97.7 (29 Nov 2022 05:24), Max: 98.1 (28 Nov 2022 18:48)  HR: 71 (29 Nov 2022 05:24) (65 - 76)  BP: 146/73 (29 Nov 2022 05:24) (127/63 - 161/84)  BP(mean): --  RR: 18 (29 Nov 2022 05:24) (16 - 18)  SpO2: 99% (29 Nov 2022 05:24) (94% - 99%)    Parameters below as of 29 Nov 2022 01:06  Patient On (Oxygen Delivery Method): room air    Lying in bed in NAD, awake and alert    Physical exam: Right lower extremity- The right knee Mepilex dressing is clean, dry and intact. No erythema is noted. No blistering. No ecchymosis. The calf is supple nontender. Passive range of motion is acceptable to due postoperative pain. No calf tenderness. Sensation to light touch is grossly intact distally. Motor function distally is 5/5. Extensor hallucis longus and flexor hallucis longus are intact. No foot drop. 2+ dorsalis pedis pulse. Capillary refill is less than 2 seconds. No cyanosis.    Primary Orthopedic Assessment:  • s/p RIGHT total knee removal of spacer with reinsertion of abx spacer, POD#8    Plan:     • DVT prophylaxis as prescribed- eliquis, including use of compression devices and ankle pumps  • Continue physical therapy  • Toe touch Weightbearing of the right lower extremity with assistance of a walker  • Knee immobilizer  • Incentive spirometry encouraged  • Pain control as clinically indicated  • Continue IV abx per ID  • Discharge planning – anticipated discharge is subacute rehabilitation

## 2022-11-30 RX ADMIN — Medication 0.1 MILLIGRAM(S): at 05:33

## 2022-11-30 RX ADMIN — BUPRENORPHINE AND NALOXONE 1 FILM(S): 2; .5 TABLET SUBLINGUAL at 15:00

## 2022-11-30 RX ADMIN — Medication 5 MILLIGRAM(S): at 18:13

## 2022-11-30 RX ADMIN — BUPRENORPHINE AND NALOXONE 1 FILM(S): 2; .5 TABLET SUBLINGUAL at 05:33

## 2022-11-30 RX ADMIN — Medication 200 MILLIGRAM(S): at 22:20

## 2022-11-30 RX ADMIN — CHLORHEXIDINE GLUCONATE 1 APPLICATION(S): 213 SOLUTION TOPICAL at 05:37

## 2022-11-30 RX ADMIN — GABAPENTIN 600 MILLIGRAM(S): 400 CAPSULE ORAL at 22:20

## 2022-11-30 RX ADMIN — Medication 5 MILLIGRAM(S): at 05:33

## 2022-11-30 RX ADMIN — GABAPENTIN 300 MILLIGRAM(S): 400 CAPSULE ORAL at 08:50

## 2022-11-30 RX ADMIN — APIXABAN 2.5 MILLIGRAM(S): 2.5 TABLET, FILM COATED ORAL at 18:14

## 2022-11-30 RX ADMIN — Medication 0.1 MILLIGRAM(S): at 18:14

## 2022-11-30 RX ADMIN — Medication 975 MILLIGRAM(S): at 23:20

## 2022-11-30 RX ADMIN — Medication 975 MILLIGRAM(S): at 22:20

## 2022-11-30 RX ADMIN — GABAPENTIN 300 MILLIGRAM(S): 400 CAPSULE ORAL at 15:01

## 2022-11-30 RX ADMIN — APIXABAN 2.5 MILLIGRAM(S): 2.5 TABLET, FILM COATED ORAL at 05:34

## 2022-11-30 RX ADMIN — BUPRENORPHINE AND NALOXONE 1 FILM(S): 2; .5 TABLET SUBLINGUAL at 22:20

## 2022-11-30 RX ADMIN — Medication 975 MILLIGRAM(S): at 05:33

## 2022-11-30 RX ADMIN — ATORVASTATIN CALCIUM 40 MILLIGRAM(S): 80 TABLET, FILM COATED ORAL at 22:23

## 2022-11-30 RX ADMIN — Medication 975 MILLIGRAM(S): at 06:33

## 2022-11-30 RX ADMIN — DAPTOMYCIN 122 MILLIGRAM(S): 500 INJECTION, POWDER, LYOPHILIZED, FOR SOLUTION INTRAVENOUS at 22:31

## 2022-11-30 RX ADMIN — Medication 1 TABLET(S): at 18:14

## 2022-11-30 RX ADMIN — Medication 975 MILLIGRAM(S): at 15:01

## 2022-11-30 NOTE — PROGRESS NOTE ADULT - SUBJECTIVE AND OBJECTIVE BOX
MOSES ORANTESONEY    [id number]    History: 57y Male is status post removal of right knee abx spacer with reinsertion of spacer on 11/21, POD # 9. Patient is doing well and is comfortable.  OR cx negative, patient on dapto, PICC line in place.  Covid positive now, can not dc to [facility] until 12/8            MEDICATIONS  (STANDING):  acetaminophen     Tablet .. 975 milliGRAM(s) Oral every 8 hours  apixaban 2.5 milliGRAM(s) Oral two times a day  atorvastatin 40 milliGRAM(s) Oral at bedtime  buprenorphine 8 mG/naloxone 2 mG SL Film 1 Film(s) SubLingual every 8 hours  busPIRone 5 milliGRAM(s) Oral two times a day  chlorhexidine 2% Cloths 1 Application(s) Topical <User Schedule>  cloNIDine 0.1 milliGRAM(s) Oral two times a day  DAPTOmycin IVPB 550 milliGRAM(s) IV Intermittent every 24 hours  gabapentin 300 milliGRAM(s) Oral <User Schedule>  gabapentin 600 milliGRAM(s) Oral at bedtime  multivitamin 1 Tablet(s) Oral daily  polyethylene glycol 3350 17 Gram(s) Oral at bedtime  senna 2 Tablet(s) Oral at bedtime  sodium chloride 0.9% Bolus 500 milliLiter(s) IV Bolus once  sodium chloride 0.9%. 1000 milliLiter(s) (125 mL/Hr) IV Continuous <Continuous>  traZODone 200 milliGRAM(s) Oral at bedtime    MEDICATIONS  (PRN):  magnesium hydroxide Suspension 30 milliLiter(s) Oral daily PRN Constipation  ondansetron Injectable 4 milliGRAM(s) IV Push every 6 hours PRN Nausea and/or Vomiting  sodium chloride 0.9% lock flush 10 milliLiter(s) IV Push every 1 hour PRN Pre/post blood products, medications, blood draw, and to maintain line patency      Physical exam: The right knee dressing is clean, dry and intact. No drainage or discharge. No erythema is noted. No blistering. No ecchymosis. The calf is supple nontender. No calf tenderness. Sensation to light touch is grossly intact distally. Motor function distally is 5/5. Extensor hallucis longus and flexor hallucis longus are intact. No foot drop. 2+ dorsalis pedis pulse. Capillary refill is less than 2 seconds. No cyanosis.    Primary Orthopedic Assessment:  • s/p removal of right knee abx spacer with reinsertion of spacer  •     Plan:   • DVT prophylaxis as prescribed of violet, including use of compression devices and ankle pumps  • Continue physical therapy  • TTWB in KI    • Pain control as clinically indicated  • Discharge planning – anticipated discharge is subacute rehabilitation on 12/8  Continue Dapto per ID

## 2022-12-01 PROCEDURE — 99232 SBSQ HOSP IP/OBS MODERATE 35: CPT

## 2022-12-01 RX ADMIN — ATORVASTATIN CALCIUM 40 MILLIGRAM(S): 80 TABLET, FILM COATED ORAL at 21:06

## 2022-12-01 RX ADMIN — Medication 975 MILLIGRAM(S): at 15:07

## 2022-12-01 RX ADMIN — Medication 975 MILLIGRAM(S): at 14:23

## 2022-12-01 RX ADMIN — GABAPENTIN 600 MILLIGRAM(S): 400 CAPSULE ORAL at 21:06

## 2022-12-01 RX ADMIN — Medication 0.1 MILLIGRAM(S): at 06:15

## 2022-12-01 RX ADMIN — Medication 975 MILLIGRAM(S): at 21:06

## 2022-12-01 RX ADMIN — Medication 0.1 MILLIGRAM(S): at 18:09

## 2022-12-01 RX ADMIN — CHLORHEXIDINE GLUCONATE 1 APPLICATION(S): 213 SOLUTION TOPICAL at 06:17

## 2022-12-01 RX ADMIN — APIXABAN 2.5 MILLIGRAM(S): 2.5 TABLET, FILM COATED ORAL at 18:10

## 2022-12-01 RX ADMIN — Medication 975 MILLIGRAM(S): at 07:00

## 2022-12-01 RX ADMIN — DAPTOMYCIN 122 MILLIGRAM(S): 500 INJECTION, POWDER, LYOPHILIZED, FOR SOLUTION INTRAVENOUS at 18:09

## 2022-12-01 RX ADMIN — Medication 5 MILLIGRAM(S): at 18:10

## 2022-12-01 RX ADMIN — Medication 1 TABLET(S): at 14:23

## 2022-12-01 RX ADMIN — Medication 975 MILLIGRAM(S): at 22:06

## 2022-12-01 RX ADMIN — GABAPENTIN 300 MILLIGRAM(S): 400 CAPSULE ORAL at 08:48

## 2022-12-01 RX ADMIN — GABAPENTIN 300 MILLIGRAM(S): 400 CAPSULE ORAL at 14:23

## 2022-12-01 RX ADMIN — APIXABAN 2.5 MILLIGRAM(S): 2.5 TABLET, FILM COATED ORAL at 06:15

## 2022-12-01 RX ADMIN — Medication 975 MILLIGRAM(S): at 06:15

## 2022-12-01 RX ADMIN — Medication 200 MILLIGRAM(S): at 21:06

## 2022-12-01 RX ADMIN — Medication 5 MILLIGRAM(S): at 06:15

## 2022-12-01 NOTE — PROGRESS NOTE ADULT - SUBJECTIVE AND OBJECTIVE BOX
MOSES ORANTESONEY    [id number]    History: 57y Male is status post right knee removal of abx spacer and re-insertion abx spacer. Patient is doing well and is comfortable. The patient's pain is controlled using the prescribed pain medications. The patient is participating in physical therapy. Denies nausea, vomiting, chest pain, shortness of breath, abdominal pain or fever. No new complaints. Patient discharge to [facility] held up due to positive covid test. can not dc to [facility] until 12/8      MEDICATIONS  (STANDING):  acetaminophen     Tablet .. 975 milliGRAM(s) Oral every 8 hours  apixaban 2.5 milliGRAM(s) Oral two times a day  atorvastatin 40 milliGRAM(s) Oral at bedtime  busPIRone 5 milliGRAM(s) Oral two times a day  chlorhexidine 2% Cloths 1 Application(s) Topical <User Schedule>  cloNIDine 0.1 milliGRAM(s) Oral two times a day  DAPTOmycin IVPB 550 milliGRAM(s) IV Intermittent every 24 hours  gabapentin 300 milliGRAM(s) Oral <User Schedule>  gabapentin 600 milliGRAM(s) Oral at bedtime  multivitamin 1 Tablet(s) Oral daily  polyethylene glycol 3350 17 Gram(s) Oral at bedtime  senna 2 Tablet(s) Oral at bedtime  sodium chloride 0.9% Bolus 500 milliLiter(s) IV Bolus once  sodium chloride 0.9%. 1000 milliLiter(s) (125 mL/Hr) IV Continuous <Continuous>  traZODone 200 milliGRAM(s) Oral at bedtime    MEDICATIONS  (PRN):  magnesium hydroxide Suspension 30 milliLiter(s) Oral daily PRN Constipation  ondansetron Injectable 4 milliGRAM(s) IV Push every 6 hours PRN Nausea and/or Vomiting  sodium chloride 0.9% lock flush 10 milliLiter(s) IV Push every 1 hour PRN Pre/post blood products, medications, blood draw, and to maintain line patency      Physical exam: The right knee MEPILEX is clean, dry and intact. No drainage or discharge. No erythema is noted. No blistering. No ecchymosis. The calf is supple nontender. Passive range of motion is acceptable to due postoperative pain. Sensation to light touch is grossly intact distally. Motor function distally is 5/5. Extensor hallucis longus and flexor hallucis longus are intact. No foot drop. 2+ dorsalis pedis pulse. Capillary refill is less than 2 seconds. No cyanosis.    Primary Orthopedic Assessment:  • s/p RIGHT total knee revision    Secondary  Orthopedic Assessment(s):   •     Secondary  Medical Assessment(s):   •     Plan:   • DVT prophylaxis as prescribed, including use of compression devices and ankle pumps  • Continue physical therapy  • TTWB of the right lower extremity in knee immobilizer with assistance of a walker  • Incentive spirometry encouraged  • Pain control as clinically indicated  * ABX via PICC line per ID  • Discharge planning – anticipated discharge is [facility]

## 2022-12-01 NOTE — PHARMACOTHERAPY INTERVENTION NOTE - COMMENTS
Vancomycin level ordered 
notified PA to renew
Vancomycin level ordered 
Pre 4th level ordered prior to 10pm dose
Vancomycin level 20, dose held, adjusted to 1250 mg IV q12h.

## 2022-12-02 RX ORDER — BUPRENORPHINE AND NALOXONE 2; .5 MG/1; MG/1
1 TABLET SUBLINGUAL EVERY 8 HOURS
Refills: 0 | Status: DISCONTINUED | OUTPATIENT
Start: 2022-12-02 | End: 2022-12-07

## 2022-12-02 RX ADMIN — Medication 975 MILLIGRAM(S): at 06:16

## 2022-12-02 RX ADMIN — Medication 200 MILLIGRAM(S): at 22:20

## 2022-12-02 RX ADMIN — Medication 5 MILLIGRAM(S): at 18:34

## 2022-12-02 RX ADMIN — Medication 975 MILLIGRAM(S): at 15:49

## 2022-12-02 RX ADMIN — GABAPENTIN 600 MILLIGRAM(S): 400 CAPSULE ORAL at 22:20

## 2022-12-02 RX ADMIN — BUPRENORPHINE AND NALOXONE 1 FILM(S): 2; .5 TABLET SUBLINGUAL at 18:32

## 2022-12-02 RX ADMIN — Medication 975 MILLIGRAM(S): at 14:19

## 2022-12-02 RX ADMIN — CHLORHEXIDINE GLUCONATE 1 APPLICATION(S): 213 SOLUTION TOPICAL at 06:19

## 2022-12-02 RX ADMIN — DAPTOMYCIN 122 MILLIGRAM(S): 500 INJECTION, POWDER, LYOPHILIZED, FOR SOLUTION INTRAVENOUS at 20:00

## 2022-12-02 RX ADMIN — Medication 1 TABLET(S): at 14:18

## 2022-12-02 RX ADMIN — Medication 0.1 MILLIGRAM(S): at 06:17

## 2022-12-02 RX ADMIN — APIXABAN 2.5 MILLIGRAM(S): 2.5 TABLET, FILM COATED ORAL at 18:34

## 2022-12-02 RX ADMIN — Medication 0.1 MILLIGRAM(S): at 18:33

## 2022-12-02 RX ADMIN — APIXABAN 2.5 MILLIGRAM(S): 2.5 TABLET, FILM COATED ORAL at 06:18

## 2022-12-02 RX ADMIN — GABAPENTIN 300 MILLIGRAM(S): 400 CAPSULE ORAL at 14:18

## 2022-12-02 RX ADMIN — Medication 975 MILLIGRAM(S): at 23:19

## 2022-12-02 RX ADMIN — ATORVASTATIN CALCIUM 40 MILLIGRAM(S): 80 TABLET, FILM COATED ORAL at 22:19

## 2022-12-02 RX ADMIN — SENNA PLUS 2 TABLET(S): 8.6 TABLET ORAL at 22:20

## 2022-12-02 RX ADMIN — Medication 975 MILLIGRAM(S): at 22:19

## 2022-12-02 RX ADMIN — Medication 975 MILLIGRAM(S): at 07:30

## 2022-12-02 RX ADMIN — GABAPENTIN 300 MILLIGRAM(S): 400 CAPSULE ORAL at 09:54

## 2022-12-02 RX ADMIN — Medication 5 MILLIGRAM(S): at 06:17

## 2022-12-02 NOTE — PROGRESS NOTE ADULT - SUBJECTIVE AND OBJECTIVE BOX
INFECTIOUS DISEASES AND INTERNAL MEDICINE at [place]  =======================================================  Mingo Culver MD  Diplomates American Board of Internal Medicine and Infectious Diseases  Telephone [phone]  Fax            [phone]  =======================================================    MOSES HANKINS [id number]    Follow up:  R KNEE INFECTION    Allergies:  shellfish. (Hives)      Medications:  acetaminophen     Tablet .. 975 milliGRAM(s) Oral every 8 hours  apixaban 2.5 milliGRAM(s) Oral two times a day  atorvastatin 40 milliGRAM(s) Oral at bedtime  buprenorphine 8 mG/naloxone 2 mG SL Film 1 Film(s) SubLingual every 8 hours  busPIRone 5 milliGRAM(s) Oral two times a day  chlorhexidine 2% Cloths 1 Application(s) Topical <User Schedule>  cloNIDine 0.1 milliGRAM(s) Oral two times a day  DAPTOmycin IVPB 550 milliGRAM(s) IV Intermittent every 24 hours  gabapentin 300 milliGRAM(s) Oral <User Schedule>  gabapentin 600 milliGRAM(s) Oral at bedtime  magnesium hydroxide Suspension 30 milliLiter(s) Oral daily PRN  multivitamin 1 Tablet(s) Oral daily  ondansetron Injectable 4 milliGRAM(s) IV Push every 6 hours PRN  polyethylene glycol 3350 17 Gram(s) Oral at bedtime  senna 2 Tablet(s) Oral at bedtime  sodium chloride 0.9% Bolus 500 milliLiter(s) IV Bolus once  sodium chloride 0.9% lock flush 10 milliLiter(s) IV Push every 1 hour PRN  sodium chloride 0.9%. 1000 milliLiter(s) IV Continuous <Continuous>  traZODone 200 milliGRAM(s) Oral at bedtime    SOCIAL       FAMILY   FAMILY HISTORY:  FH: diverticulitis (Mother)    FH: prostate cancer (Father)    FH: CAD (coronary artery disease)      REVIEW OF SYSTEMS:  CONSTITUTIONAL:  No Fever or chills  HEENT:   No diplopia or blurred vision.  No earache, sore throat or runny nose.  CARDIOVASCULAR:  No pressure, squeezing, strangling, tightness, heaviness or aching about the chest, neck, axilla or epigastrium.  RESPIRATORY:  No cough, shortness of breath, PND or orthopnea.  GASTROINTESTINAL:  No nausea, vomiting or diarrhea.  GENITOURINARY:  No dysuria, frequency or urgency. No Blood in urine  MUSCULOSKELETAL:   moves all joints  SKIN:  No change in skin, hair or nails.  NEUROLOGIC:  No paresthesias, fasciculations, seizures or weakness.  PSYCHIATRIC:  No disorder of thought or mood.  ENDOCRINE:  No heat or cold intolerance, polyuria or polydipsia.  HEMATOLOGICAL:  No easy bruising or bleeding.            Physical Exam:  I V Vital Signs Last 24 Hrs  T(C): 36.8 (02 Dec 2022 05:10), Max: 36.8 (02 Dec 2022 05:10)  T(F): 98.3 (02 Dec 2022 05:10), Max: 98.3 (02 Dec 2022 05:10)  HR: 76 (02 Dec 2022 05:10) (72 - 80)  BP: 162/89 (02 Dec 2022 05:10) (113/60 - 162/89)  BP(mean): --  RR: 18 (02 Dec 2022 05:10) (18 - 18)  SpO2: 96% (02 Dec 2022 05:10) (91% - 96%)    Parameters below as of 02 Dec 2022 00:17  Patient On (Oxygen Delivery Method): room air                  GEN: NAD,   HEENT: normocephalic and atraumatic. EOMI. GOLDIE.    NECK: Supple. No carotid bruits.  No lymphadenopathy or thyromegaly.  LUNGS: Clear to auscultation.  HEART: Regular rate and rhythm without murmur.  ABDOMEN: Soft, nontender, and nondistended.  Positive bowel sounds.    : No CVA tenderness  EXTREMITIES: Without any cyanosis, clubbing, rash, lesions or edema.  MSK: no joint swelling  NEUROLOGIC: Cranial nerves II through XII are grossly intact.  PSYCHIATRIC: Appropriate affect .  SKIN: No ulceration or induration present.        Labs:  Vitals:  =======     =======================================================  Current Antibiotics:  DAPTOmycin IVPB 550 milliGRAM(s) IV Intermittent every 24 hours    Other medications:  acetaminophen     Tablet .. 975 milliGRAM(s) Oral every 8 hours  apixaban 2.5 milliGRAM(s) Oral two times a day  atorvastatin 40 milliGRAM(s) Oral at bedtime  buprenorphine 8 mG/naloxone 2 mG SL Film 1 Film(s) SubLingual every 8 hours  busPIRone 5 milliGRAM(s) Oral two times a day  chlorhexidine 2% Cloths 1 Application(s) Topical <User Schedule>  cloNIDine 0.1 milliGRAM(s) Oral two times a day  gabapentin 300 milliGRAM(s) Oral <User Schedule>  gabapentin 600 milliGRAM(s) Oral at bedtime  multivitamin 1 Tablet(s) Oral daily  polyethylene glycol 3350 17 Gram(s) Oral at bedtime  senna 2 Tablet(s) Oral at bedtime  sodium chloride 0.9% Bolus 500 milliLiter(s) IV Bolus once  sodium chloride 0.9%. 1000 milliLiter(s) IV Continuous <Continuous>  traZODone 200 milliGRAM(s) Oral at bedtime      =======================================================  Labs:           Culture - Blood (collected 11-22-22 @ 10:35)  Source: .Blood Blood  Final Report (11-27-22 @ 18:00):    No Growth Final    Culture - Blood (collected 11-22-22 @ 10:32)  Source: .Blood Blood  Final Report (11-27-22 @ 18:00):    No Growth Final    Culture - Tissue with Gram Stain (collected 11-21-22 @ 18:32)  Source: .Tissue right femur  Gram Stain (11-22-22 @ 05:33):    No polymorphonuclear cells seen per low power field    No organisms seen per oil power field  Final Report (11-26-22 @ 18:13):    No growth at 5 days    Culture - Tissue with Gram Stain (collected 11-21-22 @ 18:31)  Source: .Tissue right deep knee  Gram Stain (11-22-22 @ 05:33):    No polymorphonuclear cells seen per low power field    No organisms seen per oil power field    Culture - Tissue with Gram Stain (collected 11-21-22 @ 18:30)  Source: .Tissue right tibial nail  Gram Stain (11-22-22 @ 05:33):    No polymorphonuclear cells seen per low power field    No organisms seen per oil power field  Final Report (11-26-22 @ 18:12):    No growth at 5 days      Creatinine, Serum: 0.82 mg/dL (11-28-22 @ 06:40)  Creatinine, Serum: 0.84 mg/dL (11-25-22 @ 19:28)          C-Reactive Protein, Serum: 74 mg/L (11-26-22 @ 04:59)      COVID-19 PCR: Detected (11-27-22 @ 14:10)  COVID-19 PCR: NotDetec (11-21-22 @ 12:23)  SARS-CoV-2 Result: NotDetec (11-18-22 @ 11:56)  SARS-CoV-2 Result: NotDetec (11-01-22 @ 20:13)

## 2022-12-02 NOTE — PROGRESS NOTE ADULT - SUBJECTIVE AND OBJECTIVE BOX
MOSES HANKINS  [id number]    History: 57y Male is status post right knee removal of abx spacer and re-insertion abx spacer. Patient is doing well and is comfortable. The patient's pain is controlled using the prescribed pain medications. The patient is participating in physical therapy. Denies nausea, vomiting, chest pain, shortness of breath, abdominal pain or fever. No new complaints. Patient discharge to [facility] held up due to positive covid test. can not dc to [facility] until 12/8    Vital Signs Last 24 Hrs  T(C): 36.8 (02 Dec 2022 05:10), Max: 36.8 (02 Dec 2022 05:10)  T(F): 98.3 (02 Dec 2022 05:10), Max: 98.3 (02 Dec 2022 05:10)  HR: 76 (02 Dec 2022 05:10) (72 - 80)  BP: 162/89 (02 Dec 2022 05:10) (113/60 - 162/89)  BP(mean): --  RR: 18 (02 Dec 2022 05:10) (18 - 18)  SpO2: 96% (02 Dec 2022 05:10) (91% - 96%)    Parameters below as of 02 Dec 2022 00:17  Patient On (Oxygen Delivery Method): room air      Physical exam: The right knee MEPILEX is clean, dry and intact. No drainage or discharge. No erythema is noted. No blistering. No ecchymosis. The calf is supple nontender. Passive range of motion is acceptable to due postoperative pain. Sensation to light touch is grossly intact distally. Motor function distally is 5/5. Extensor hallucis longus and flexor hallucis longus are intact. No foot drop. 2+ dorsalis pedis pulse. Capillary refill is less than 2 seconds. No cyanosis.    Primary Orthopedic Assessment:  • s/p RIGHT total knee revision    Plan:   • DVT prophylaxis as prescribed, including use of compression devices and ankle pumps  • Continue physical therapy  • TTWB of the right lower extremity in knee immobilizer with assistance of a walker  • Incentive spirometry encouraged  • Pain control as clinically indicated  * ABX via PICC line per ID  • Discharge planning – anticipated discharge is [facility]   MOSES HANKINS  [id number]    History: 57y Male is status post right knee removal of abx spacer and re-insertion abx spacer. Patient is doing well and is comfortable. The patient's pain is controlled using the prescribed pain medications. The patient is participating in physical therapy. Denies nausea, vomiting, chest pain, shortness of breath, abdominal pain or fever. No new complaints. Patient discharge to [facility] held up due to positive covid test. can not dc to [facility] until 12/8    Vital Signs Last 24 Hrs  T(C): 36.8 (02 Dec 2022 05:10), Max: 36.8 (02 Dec 2022 05:10)  T(F): 98.3 (02 Dec 2022 05:10), Max: 98.3 (02 Dec 2022 05:10)  HR: 76 (02 Dec 2022 05:10) (72 - 80)  BP: 162/89 (02 Dec 2022 05:10) (113/60 - 162/89)  BP(mean): --  RR: 18 (02 Dec 2022 05:10) (18 - 18)  SpO2: 96% (02 Dec 2022 05:10) (91% - 96%)    Parameters below as of 02 Dec 2022 00:17  Patient On (Oxygen Delivery Method): room air      Physical exam: The right knee MEPILEX is clean, dry and intact. No drainage or discharge. No erythema is noted. No blistering. No ecchymosis. The calf is supple nontender. Passive range of motion is acceptable to due postoperative pain. Sensation to light touch is grossly intact distally. Motor function distally is 5/5. Extensor hallucis longus and flexor hallucis longus are intact. No foot drop. 2+ dorsalis pedis pulse. Capillary refill is less than 2 seconds. No cyanosis.    Primary Orthopedic Assessment:  • s/p RIGHT total knee revision    Plan:   • DVT prophylaxis as prescribed, including use of compression devices and ankle pumps  • Continue physical therapy  • TTWB of the right lower extremity in knee immobilizer with assistance of a walker  • Incentive spirometry encouraged  • Pain control as clinically indicated  * ABX via PICC line per ID  • Discharge planning – anticipated discharge is [facility]  * PM contacted for suboxone orders

## 2022-12-03 RX ADMIN — BUPRENORPHINE AND NALOXONE 1 FILM(S): 2; .5 TABLET SUBLINGUAL at 00:32

## 2022-12-03 RX ADMIN — Medication 1 TABLET(S): at 14:17

## 2022-12-03 RX ADMIN — APIXABAN 2.5 MILLIGRAM(S): 2.5 TABLET, FILM COATED ORAL at 19:30

## 2022-12-03 RX ADMIN — Medication 975 MILLIGRAM(S): at 23:22

## 2022-12-03 RX ADMIN — Medication 975 MILLIGRAM(S): at 05:51

## 2022-12-03 RX ADMIN — Medication 5 MILLIGRAM(S): at 05:51

## 2022-12-03 RX ADMIN — Medication 0.1 MILLIGRAM(S): at 05:51

## 2022-12-03 RX ADMIN — Medication 975 MILLIGRAM(S): at 06:43

## 2022-12-03 RX ADMIN — BUPRENORPHINE AND NALOXONE 1 FILM(S): 2; .5 TABLET SUBLINGUAL at 09:22

## 2022-12-03 RX ADMIN — BUPRENORPHINE AND NALOXONE 1 FILM(S): 2; .5 TABLET SUBLINGUAL at 19:31

## 2022-12-03 RX ADMIN — GABAPENTIN 300 MILLIGRAM(S): 400 CAPSULE ORAL at 09:22

## 2022-12-03 RX ADMIN — Medication 0.1 MILLIGRAM(S): at 19:30

## 2022-12-03 RX ADMIN — DAPTOMYCIN 122 MILLIGRAM(S): 500 INJECTION, POWDER, LYOPHILIZED, FOR SOLUTION INTRAVENOUS at 19:55

## 2022-12-03 RX ADMIN — Medication 5 MILLIGRAM(S): at 19:30

## 2022-12-03 RX ADMIN — GABAPENTIN 600 MILLIGRAM(S): 400 CAPSULE ORAL at 22:37

## 2022-12-03 RX ADMIN — CHLORHEXIDINE GLUCONATE 1 APPLICATION(S): 213 SOLUTION TOPICAL at 05:50

## 2022-12-03 RX ADMIN — APIXABAN 2.5 MILLIGRAM(S): 2.5 TABLET, FILM COATED ORAL at 05:51

## 2022-12-03 RX ADMIN — Medication 975 MILLIGRAM(S): at 22:37

## 2022-12-03 RX ADMIN — Medication 200 MILLIGRAM(S): at 22:37

## 2022-12-03 RX ADMIN — ATORVASTATIN CALCIUM 40 MILLIGRAM(S): 80 TABLET, FILM COATED ORAL at 22:37

## 2022-12-03 RX ADMIN — GABAPENTIN 300 MILLIGRAM(S): 400 CAPSULE ORAL at 14:17

## 2022-12-03 NOTE — PROGRESS NOTE ADULT - SUBJECTIVE AND OBJECTIVE BOX
MOSES NHI    [id number]    History: 57y Male is status post right total knee removal of hardware/ removal of spacer/  reisertion of Abx spacer on 11/21, POD#12.  Patient is doing well and is comfortable. The patient's pain is controlled using the prescribed pain medications. The patient is participating in physical therapy, TTWB RLE with knee immobilizer. Denies nausea, vomiting, chest pain, shortness of breath, abdominal pain or fever. No new complaints.    MEDICATIONS  (STANDING):  acetaminophen     Tablet .. 975 milliGRAM(s) Oral every 8 hours  apixaban 2.5 milliGRAM(s) Oral two times a day  atorvastatin 40 milliGRAM(s) Oral at bedtime  buprenorphine 8 mG/naloxone 2 mG SL Film 1 Film(s) SubLingual every 8 hours  busPIRone 5 milliGRAM(s) Oral two times a day  chlorhexidine 2% Cloths 1 Application(s) Topical <User Schedule>  cloNIDine 0.1 milliGRAM(s) Oral two times a day  DAPTOmycin IVPB 550 milliGRAM(s) IV Intermittent every 24 hours  gabapentin 300 milliGRAM(s) Oral <User Schedule>  gabapentin 600 milliGRAM(s) Oral at bedtime  multivitamin 1 Tablet(s) Oral daily  polyethylene glycol 3350 17 Gram(s) Oral at bedtime  senna 2 Tablet(s) Oral at bedtime  sodium chloride 0.9% Bolus 500 milliLiter(s) IV Bolus once  sodium chloride 0.9%. 1000 milliLiter(s) (125 mL/Hr) IV Continuous <Continuous>  traZODone 200 milliGRAM(s) Oral at bedtime    MEDICATIONS  (PRN):  magnesium hydroxide Suspension 30 milliLiter(s) Oral daily PRN Constipation  ondansetron Injectable 4 milliGRAM(s) IV Push every 6 hours PRN Nausea and/or Vomiting  sodium chloride 0.9% lock flush 10 milliLiter(s) IV Push every 1 hour PRN Pre/post blood products, medications, blood draw, and to maintain line patency      Physical exam: The right knee mepilex dressing remains clean, dry and intact. No drainage or discharge. No erythema is noted. No blistering. No ecchymosis. The calf is supple nontender. . No calf tenderness. Sensation to light touch is grossly intact distally. Motor function distally is 5/5. Extensor hallucis longus and flexor hallucis longus are intact. No foot drop. 2+ dorsalis pedis pulse. Capillary refill is less than 2 seconds. No cyanosis.    Primary Orthopedic Assessment:  • s/p RIGHT total knee implantation of antibiotic spacer POD#12    Secondary  Orthopedic Assessment(s):   •     Secondary  Medical Assessment(s):   •     Plan:   • DVT prophylaxis as prescribed, including use of compression devices and ankle pumps  • Continue physical therapy  • Toe touch Weightbearing as tolerated of the right lower extremity with assistance of a walker and knee immobilizer  • Incentive spirometry encouraged  • Pain control as clinically indicated

## 2022-12-04 RX ADMIN — Medication 975 MILLIGRAM(S): at 22:54

## 2022-12-04 RX ADMIN — BUPRENORPHINE AND NALOXONE 1 FILM(S): 2; .5 TABLET SUBLINGUAL at 09:16

## 2022-12-04 RX ADMIN — DAPTOMYCIN 122 MILLIGRAM(S): 500 INJECTION, POWDER, LYOPHILIZED, FOR SOLUTION INTRAVENOUS at 17:46

## 2022-12-04 RX ADMIN — Medication 975 MILLIGRAM(S): at 23:54

## 2022-12-04 RX ADMIN — Medication 5 MILLIGRAM(S): at 06:27

## 2022-12-04 RX ADMIN — Medication 0.1 MILLIGRAM(S): at 17:46

## 2022-12-04 RX ADMIN — ATORVASTATIN CALCIUM 40 MILLIGRAM(S): 80 TABLET, FILM COATED ORAL at 22:54

## 2022-12-04 RX ADMIN — CHLORHEXIDINE GLUCONATE 1 APPLICATION(S): 213 SOLUTION TOPICAL at 06:24

## 2022-12-04 RX ADMIN — SENNA PLUS 2 TABLET(S): 8.6 TABLET ORAL at 22:57

## 2022-12-04 RX ADMIN — BUPRENORPHINE AND NALOXONE 1 FILM(S): 2; .5 TABLET SUBLINGUAL at 17:45

## 2022-12-04 RX ADMIN — APIXABAN 2.5 MILLIGRAM(S): 2.5 TABLET, FILM COATED ORAL at 17:46

## 2022-12-04 RX ADMIN — GABAPENTIN 300 MILLIGRAM(S): 400 CAPSULE ORAL at 09:17

## 2022-12-04 RX ADMIN — BUPRENORPHINE AND NALOXONE 1 FILM(S): 2; .5 TABLET SUBLINGUAL at 02:11

## 2022-12-04 RX ADMIN — GABAPENTIN 600 MILLIGRAM(S): 400 CAPSULE ORAL at 22:54

## 2022-12-04 RX ADMIN — GABAPENTIN 300 MILLIGRAM(S): 400 CAPSULE ORAL at 14:32

## 2022-12-04 RX ADMIN — Medication 975 MILLIGRAM(S): at 06:26

## 2022-12-04 RX ADMIN — Medication 1 TABLET(S): at 14:32

## 2022-12-04 RX ADMIN — Medication 5 MILLIGRAM(S): at 17:46

## 2022-12-04 RX ADMIN — Medication 0.1 MILLIGRAM(S): at 06:27

## 2022-12-04 RX ADMIN — APIXABAN 2.5 MILLIGRAM(S): 2.5 TABLET, FILM COATED ORAL at 06:27

## 2022-12-04 RX ADMIN — Medication 200 MILLIGRAM(S): at 22:54

## 2022-12-04 RX ADMIN — POLYETHYLENE GLYCOL 3350 17 GRAM(S): 17 POWDER, FOR SOLUTION ORAL at 22:57

## 2022-12-04 RX ADMIN — Medication 975 MILLIGRAM(S): at 14:32

## 2022-12-04 NOTE — PROGRESS NOTE ADULT - SUBJECTIVE AND OBJECTIVE BOX
History: 57y Male is status post right total knee removal of hardware/ removal of spacer/  reisertion of Abx spacer on 11/21, POD#13.  Patient is doing well and is comfortable. The patient's pain is controlled using the prescribed pain medications. The patient is participating in physical therapy, TTWB RLE with knee immobilizer. Denies nausea, vomiting, chest pain, shortness of breath, abdominal pain or fever. No new complaints.    Vital Signs Last 24 Hrs  T(C): 36.5 (04 Dec 2022 11:11), Max: 36.7 (04 Dec 2022 04:59)  T(F): 97.7 (04 Dec 2022 11:11), Max: 98.1 (04 Dec 2022 04:59)  HR: 66 (04 Dec 2022 11:11) (64 - 68)  BP: 142/77 (04 Dec 2022 11:11) (116/70 - 142/77)  BP(mean): --  RR: 19 (04 Dec 2022 11:11) (18 - 19)  SpO2: 96% (04 Dec 2022 11:11) (93% - 97%)    Parameters below as of 04 Dec 2022 11:11  Patient On (Oxygen Delivery Method): room air          Physical exam: The right knee mepilex dressing remains clean, dry and intact. No drainage or discharge. No erythema is noted. No blistering. No ecchymosis. The calf is supple nontender. . No calf tenderness. Sensation to light touch is grossly intact distally. Motor function distally is 5/5. Extensor hallucis longus and flexor hallucis longus are intact. No foot drop. 2+ dorsalis pedis pulse. Capillary refill is less than 2 seconds. No cyanosis.    Primary Orthopedic Assessment:  • s/p RIGHT total knee implantation of antibiotic spacer POD#13      Plan:   • DVT prophylaxis as prescribed, including use of compression devices and ankle pumps  • Continue physical therapy  • Toe touch Weightbearing as tolerated of the right lower extremity with assistance of a walker and knee immobilizer  • Incentive spirometry encouraged  • Pain control as clinically indicated

## 2022-12-04 NOTE — PROGRESS NOTE ADULT - NS ATTEND AMEND GEN_ALL_CORE FT
agree/w above
agree /w above
agree /w above. awaiting rehab placement due to covid. d/c staples pod14. repeat xr. trend crp. dvt ppx
agree /w above. trend crp, will obtain tomorrow. f/u ID. d/c when cleared by ID. IV abx
pt seen and examined. +pus in OR, + neutrophils per hpf. ID consulted. continue IV abx. DVT ppx. TTWB RLE. KI at all times. f/u labs. CRP pod 3

## 2022-12-05 LAB
CRP SERPL-MCNC: 13 MG/L — HIGH
CULTURE RESULTS: SIGNIFICANT CHANGE UP
SPECIMEN SOURCE: SIGNIFICANT CHANGE UP

## 2022-12-05 PROCEDURE — 73560 X-RAY EXAM OF KNEE 1 OR 2: CPT | Mod: 26,RT

## 2022-12-05 RX ADMIN — Medication 0.1 MILLIGRAM(S): at 06:03

## 2022-12-05 RX ADMIN — Medication 0.1 MILLIGRAM(S): at 18:08

## 2022-12-05 RX ADMIN — ATORVASTATIN CALCIUM 40 MILLIGRAM(S): 80 TABLET, FILM COATED ORAL at 21:45

## 2022-12-05 RX ADMIN — Medication 5 MILLIGRAM(S): at 18:07

## 2022-12-05 RX ADMIN — Medication 200 MILLIGRAM(S): at 21:45

## 2022-12-05 RX ADMIN — BUPRENORPHINE AND NALOXONE 1 FILM(S): 2; .5 TABLET SUBLINGUAL at 18:07

## 2022-12-05 RX ADMIN — APIXABAN 2.5 MILLIGRAM(S): 2.5 TABLET, FILM COATED ORAL at 18:07

## 2022-12-05 RX ADMIN — GABAPENTIN 300 MILLIGRAM(S): 400 CAPSULE ORAL at 14:31

## 2022-12-05 RX ADMIN — CHLORHEXIDINE GLUCONATE 1 APPLICATION(S): 213 SOLUTION TOPICAL at 06:05

## 2022-12-05 RX ADMIN — Medication 975 MILLIGRAM(S): at 06:02

## 2022-12-05 RX ADMIN — Medication 975 MILLIGRAM(S): at 07:02

## 2022-12-05 RX ADMIN — Medication 975 MILLIGRAM(S): at 14:31

## 2022-12-05 RX ADMIN — Medication 1 TABLET(S): at 14:31

## 2022-12-05 RX ADMIN — GABAPENTIN 600 MILLIGRAM(S): 400 CAPSULE ORAL at 21:45

## 2022-12-05 RX ADMIN — APIXABAN 2.5 MILLIGRAM(S): 2.5 TABLET, FILM COATED ORAL at 06:03

## 2022-12-05 RX ADMIN — Medication 5 MILLIGRAM(S): at 06:02

## 2022-12-05 RX ADMIN — Medication 975 MILLIGRAM(S): at 22:00

## 2022-12-05 RX ADMIN — Medication 975 MILLIGRAM(S): at 21:45

## 2022-12-05 RX ADMIN — GABAPENTIN 300 MILLIGRAM(S): 400 CAPSULE ORAL at 09:09

## 2022-12-05 RX ADMIN — BUPRENORPHINE AND NALOXONE 1 FILM(S): 2; .5 TABLET SUBLINGUAL at 01:02

## 2022-12-05 RX ADMIN — BUPRENORPHINE AND NALOXONE 1 FILM(S): 2; .5 TABLET SUBLINGUAL at 09:09

## 2022-12-05 RX ADMIN — DAPTOMYCIN 122 MILLIGRAM(S): 500 INJECTION, POWDER, LYOPHILIZED, FOR SOLUTION INTRAVENOUS at 18:08

## 2022-12-05 NOTE — PROGRESS NOTE ADULT - SUBJECTIVE AND OBJECTIVE BOX
MOSES HANKINS    [id number]    History: 57y Male is status post right total knee removal of hardware/ removal of spacer/ reinsertion of Abx spacer, POD # 14. Patient is doing well and is comfortable. The patient's pain is controlled using the prescribed pain medications. The patient is participating in physical therapy.  No new complaints.                  MEDICATIONS  (STANDING):  acetaminophen     Tablet .. 975 milliGRAM(s) Oral every 8 hours  apixaban 2.5 milliGRAM(s) Oral two times a day  atorvastatin 40 milliGRAM(s) Oral at bedtime  buprenorphine 8 mG/naloxone 2 mG SL Film 1 Film(s) SubLingual every 8 hours  busPIRone 5 milliGRAM(s) Oral two times a day  chlorhexidine 2% Cloths 1 Application(s) Topical <User Schedule>  cloNIDine 0.1 milliGRAM(s) Oral two times a day  DAPTOmycin IVPB 550 milliGRAM(s) IV Intermittent every 24 hours  gabapentin 300 milliGRAM(s) Oral <User Schedule>  gabapentin 600 milliGRAM(s) Oral at bedtime  multivitamin 1 Tablet(s) Oral daily  polyethylene glycol 3350 17 Gram(s) Oral at bedtime  senna 2 Tablet(s) Oral at bedtime  sodium chloride 0.9% Bolus 500 milliLiter(s) IV Bolus once  sodium chloride 0.9%. 1000 milliLiter(s) (125 mL/Hr) IV Continuous <Continuous>  traZODone 200 milliGRAM(s) Oral at bedtime    MEDICATIONS  (PRN):  magnesium hydroxide Suspension 30 milliLiter(s) Oral daily PRN Constipation  ondansetron Injectable 4 milliGRAM(s) IV Push every 6 hours PRN Nausea and/or Vomiting  sodium chloride 0.9% lock flush 10 milliLiter(s) IV Push every 1 hour PRN Pre/post blood products, medications, blood draw, and to maintain line patency    Vital Signs Last 24 Hrs  T(C): 36.6 (05 Dec 2022 09:00), Max: 37.1 (04 Dec 2022 20:16)  T(F): 97.9 (05 Dec 2022 09:00), Max: 98.7 (04 Dec 2022 20:16)  HR: 71 (05 Dec 2022 09:00) (60 - 71)  BP: 145/82 (05 Dec 2022 09:00) (110/64 - 145/82)  BP(mean): --  RR: 18 (05 Dec 2022 09:00) (18 - 19)  SpO2: 97% (05 Dec 2022 09:00) (96% - 98%)    Parameters below as of 05 Dec 2022 09:00  Patient On (Oxygen Delivery Method): room air  Lying in bed in NAD, awake and alert    Physical exam: Right lower extremity- The right knee incision is clean, dry and staples intact. No drainage or discharge. No erythema is noted. No blistering. No ecchymosis. Staples removed without issues and incision is well healed. The calf is supple. No calf tenderness. Sensation to light touch is grossly intact distally. Motor function distally is 5/5. Extensor hallucis longus and flexor hallucis longus are intact. No foot drop. 2+ dorsalis pedis pulse. Capillary refill is less than 2 seconds. No cyanosis.    Primary Orthopedic Assessment:  • s/p right total knee removal of hardware/ removal of spacer/ reinsertion of Abx spacer, POD # 14      Plan:   - Staples removed  • DVT prophylaxis as prescribed- eliquis, including use of compression devices and ankle pumps  • Continue physical therapy  • Toe touch weightbearing as tolerated of the right lower extremity with assistance of a walker in knee immobilizer  • Incentive spirometry encouraged  • Pain control as clinically indicated  • Continue IV abx  - Xray right knee ordered  -CRP  -DC planning for rehab

## 2022-12-05 NOTE — PHARMACOTHERAPY INTERVENTION NOTE - NSPHARMCOMMASP
ASP - Dose optimization/Non-Renal dose adjustment
ASP - Dose optimization/Non-Renal dose adjustment
ASP - Lab/ test recommended

## 2022-12-06 ENCOUNTER — TRANSCRIPTION ENCOUNTER (OUTPATIENT)
Age: 57
End: 2022-12-06

## 2022-12-06 LAB — CK SERPL-CCNC: 64 U/L — SIGNIFICANT CHANGE UP (ref 30–200)

## 2022-12-06 PROCEDURE — 99231 SBSQ HOSP IP/OBS SF/LOW 25: CPT

## 2022-12-06 RX ADMIN — Medication 200 MILLIGRAM(S): at 21:50

## 2022-12-06 RX ADMIN — Medication 975 MILLIGRAM(S): at 21:51

## 2022-12-06 RX ADMIN — Medication 0.1 MILLIGRAM(S): at 05:24

## 2022-12-06 RX ADMIN — ATORVASTATIN CALCIUM 40 MILLIGRAM(S): 80 TABLET, FILM COATED ORAL at 21:50

## 2022-12-06 RX ADMIN — BUPRENORPHINE AND NALOXONE 1 FILM(S): 2; .5 TABLET SUBLINGUAL at 18:05

## 2022-12-06 RX ADMIN — CHLORHEXIDINE GLUCONATE 1 APPLICATION(S): 213 SOLUTION TOPICAL at 05:24

## 2022-12-06 RX ADMIN — SENNA PLUS 2 TABLET(S): 8.6 TABLET ORAL at 21:50

## 2022-12-06 RX ADMIN — Medication 975 MILLIGRAM(S): at 06:34

## 2022-12-06 RX ADMIN — Medication 5 MILLIGRAM(S): at 18:04

## 2022-12-06 RX ADMIN — Medication 1 TABLET(S): at 13:04

## 2022-12-06 RX ADMIN — GABAPENTIN 600 MILLIGRAM(S): 400 CAPSULE ORAL at 21:51

## 2022-12-06 RX ADMIN — DAPTOMYCIN 122 MILLIGRAM(S): 500 INJECTION, POWDER, LYOPHILIZED, FOR SOLUTION INTRAVENOUS at 18:04

## 2022-12-06 RX ADMIN — Medication 5 MILLIGRAM(S): at 05:24

## 2022-12-06 RX ADMIN — Medication 975 MILLIGRAM(S): at 14:35

## 2022-12-06 RX ADMIN — GABAPENTIN 300 MILLIGRAM(S): 400 CAPSULE ORAL at 14:35

## 2022-12-06 RX ADMIN — BUPRENORPHINE AND NALOXONE 1 FILM(S): 2; .5 TABLET SUBLINGUAL at 08:45

## 2022-12-06 RX ADMIN — APIXABAN 2.5 MILLIGRAM(S): 2.5 TABLET, FILM COATED ORAL at 05:24

## 2022-12-06 RX ADMIN — GABAPENTIN 300 MILLIGRAM(S): 400 CAPSULE ORAL at 08:46

## 2022-12-06 RX ADMIN — Medication 975 MILLIGRAM(S): at 15:35

## 2022-12-06 RX ADMIN — BUPRENORPHINE AND NALOXONE 1 FILM(S): 2; .5 TABLET SUBLINGUAL at 00:59

## 2022-12-06 RX ADMIN — POLYETHYLENE GLYCOL 3350 17 GRAM(S): 17 POWDER, FOR SOLUTION ORAL at 21:51

## 2022-12-06 RX ADMIN — Medication 975 MILLIGRAM(S): at 05:24

## 2022-12-06 RX ADMIN — Medication 0.1 MILLIGRAM(S): at 18:05

## 2022-12-06 RX ADMIN — APIXABAN 2.5 MILLIGRAM(S): 2.5 TABLET, FILM COATED ORAL at 18:04

## 2022-12-06 NOTE — DISCHARGE NOTE NURSING/CASE MANAGEMENT/SOCIAL WORK - PATIENT PORTAL LINK FT
You can access the FollowMyHealth Patient Portal offered by [facility] by registering at the following website: [email or web address]. By joining [facility]’s FollowMyHealth portal, you will also be able to view your health information using other applications (apps) compatible with our system.

## 2022-12-06 NOTE — PROGRESS NOTE ADULT - PROVIDER SPECIALTY LIST ADULT
Hospitalist
Infectious Disease
Orthopedics
Pain Medicine
Hospitalist
Infectious Disease
Orthopedics
Pain Medicine
Hospitalist
Orthopedics
Pain Medicine

## 2022-12-06 NOTE — PROGRESS NOTE ADULT - ASSESSMENT
56 y/o male with PMH of drug abuse, ETOH abuse, depression, anxiety, asthma, diverticulitis, HTN, HLD, MARIBEL using mouthpiece and osteoarthritis presents to [facility] with complaints of right knee pain. He is s/p right knee replacement on 6/14/2022. States after his right knee replacement he was having reoccurring infections. He states the pain in his right knee has been getting progressively worse. Patient stated that he was having increasing pain in his right knee, he relapsed (last use prior 2 years ago) and used heroin. He was rushed to the emergency room and received Narcan on 11/1/2022. He is using a knee brace and walker for ambulation. Denies fevers, chills, nausea or vomiting. Patient is scheduled for removal of spacer, revision TKA possible extensive mechanism repair, possible ABX spacer on 11/21/2022 with Dr. Canada.     AS ABOVE HAD A  COURSE OF IV CEFAZOLIN THEN ORAL ABX    PT ORIGINAL PLAN WAS TO Reimplant KNEE HOWEVER PUS SEEN AND   NEW SPACER PLACED   CULTURES FROM OR SO FAR NEGATIVE      BLOOD CX X2 SETS NEG   ALTHOUGH LOWER YIELD AS PT WAS  ON IV ABX       ALL CX WERE NEGATIVE   L PICC LINE PLACEMENT DONE   PT    HAD MSSA ON PRIOR ADMISSION AND WAS DISCHARGED ON IV CEFAZOLIN   NOW ON  DAPTO TO COVER POSSIBLE MRSA AND MSSA  WILL PLAN 6 WEEKS THROUGH  1/02/23  WEEKLY CBC CMP CPK   PT WITH POS COVID NO SX DEFER TX   WILL FOLLOW UP   FOR [facility] ON 12.8 AS PER THEIR COVID POLICY  WILL FOLLOW UP     
56M  hx of suboxone 8mg bid for dependence  acute on chronic knee pain  pt known to pain service from prior visit  s/p TKR    will restart suboxone as 8mg tid at 5pm    if pain becomes controlled:  - Recommend starting robaxin 750mg TID standing. HOLD for sedation   
58 y/o male with Hx of drug abuse, ETOH abuse, depression, anxiety, asthma, diverticulitis, HTN, HLD, MARIBEL using mouthpiece and osteoarthritis, has Hx of OA of the right knee pain and is s/p right knee replacement on 6/14/2022, he has been having reoccurring infections int he right knee pain has been getting progressively worse, he was having increasing pain in his right knee, he relapsed (last use prior 2 years ago) and used heroin, He was rushed to the emergency room and received Narcan on 11/1/2022, patient came in here for elective removal of spacer, revision TKA possible extensive mechanism repair, possible ABX spacer on 11/21/2022 with Dr. Canada, patient is s/p removal of antibiotic spacer, purulence in patella drill holes, 3/5 specimens + for >8 neutrophils per hpf removal of cement spacer and placement of static antibiotic spacer, I&D.     Plan:     Septic prosthetic R knee s/p  removal of antibiotic spacer:     Management as per Primary team   ID noted and appreciated   continue iv Abx , follow cultures   PT/OT/pain mgmt   pain mgmt following   DVT prophylaxis- as per ortho  Incentive spirometry  Prophylaxis of opioid  induced constipation.  gabapentin 600 mg once a day (at bedtime)  gabapentin 300 mg 2 times a day at 8 AM and 2 PM    Depression/Anxiety:   Trazodone 200mg HS   Buspirone 5mg bid     HLD  Atorvastatin 40mg daily.     Hx of HTN: on Clonidine 0.1 mg BID  with holding parameters     Anemia - acute blood lost anemia, will monitor CBC, iron supplement     Hx of Heroin abuse: on Suboxone 8 mg-2 mg sublingual film: 1 film(s) sublingual 2 times a day, will resume after confirmation.     
56 y/o male with Hx of drug abuse, ETOH abuse, depression, anxiety, asthma, diverticulitis, HTN, HLD, MARIBEL using mouthpiece and osteoarthritis, has Hx of OA of the right knee pain and is s/p right knee replacement on 6/14/2022, he has been having reoccurring infections int he right knee pain has been getting progressively worse, he was having increasing pain in his right knee, he relapsed (last use prior 2 years ago) and used heroin, He was rushed to the emergency room and received Narcan on 11/1/2022, patient came in here for elective removal of spacer, revision TKA possible extensive mechanism repair, possible ABX spacer on 11/21/2022 with Dr. Canada, patient is s/p removal of antibiotic spacer, purulence in patella drill holes, 3/5 specimens + for >8 neutrophils per hpf removal of cement spacer and placement of static antibiotic spacer, I&D.     Plan:     Septic prosthetic R knee s/p  removal of antibiotic spacer:     Management as per Primary team   ID noted and appreciated   continue iv Abx as per ID, follow cultures no growth so far  PT/OT/pain mgmt   pain mgmt following   DVT prophylaxis- as per ortho  Incentive spirometry  Prophylaxis of opioid  induced constipation.  gabapentin 600 mg once a day (at bedtime)  gabapentin 300 mg 2 times a day at 8 AM and 2 PM    Depression/Anxiety:   Trazodone 200mg HS   Buspirone 5mg bid     HLD  Atorvastatin 40mg daily.     Hx of HTN: on Clonidine 0.1 mg BID  with holding parameters     Anemia - acute blood lost anemia, will monitor CBC, iron supplement     Hx of Heroin abuse: on Suboxone 8 mg-2 mg sublingual film: 1 film(s) sublingual 2 times a day, no withdrawal during the course.     
56M  hx of suboxone 8mg bid for dependence  acute on chronic knee pain  pt known to pain service from prior visit  s/p TKR    if pain remains uncontrolled:  DC dilaudid PO  start oxy po 15mg q3h prn sev pain    will look to restart suboxone tomorrow  
58 y/o male with PMH of drug abuse, ETOH abuse, depression, anxiety, asthma, diverticulitis, HTN, HLD, MARIBEL using mouthpiece and osteoarthritis presents to [facility] with complaints of right knee pain. He is s/p right knee replacement on 6/14/2022. States after his right knee replacement he was having reoccurring infections. He states the pain in his right knee has been getting progressively worse. Patient stated that he was having increasing pain in his right knee, he relapsed (last use prior 2 years ago) and used heroin. He was rushed to the emergency room and received Narcan on 11/1/2022. He is using a knee brace and walker for ambulation. Denies fevers, chills, nausea or vomiting. Patient is scheduled for removal of spacer, revision TKA possible extensive mechanism repair, possible ABX spacer on 11/21/2022 with Dr. Canada.     AS ABOVE HAD A  COURSE OF IV CEFAZOLIN THEN ORAL ABX    PT ORIGINAL PLAN WAS TO Reimplant KNEE HOWEVER PUS SEEN AND   NEW SPACER PLACED   CULTURES FROM OR SO FAR NEGATIVE      BLOOD CX X2 SETS NEG   ALTHOUGH LOWER YIELD AS PT WAS  ON IV ABX       ALL CX WERE NEGATIVE   L PICC LINE PLACEMENT DONE   PT    HAD MSSA ON PRIOR ADMISSION AND WAS DISCHARGED ON IV CEFAZOLIN   NOW ON  DAPTO TO COVER POSSIBLE MRSA AND MSSA  WILL PLAN 6 WEEKS THROUGH  1/02/23  WEEKLY CBC CMP CPK   PT WITH POS COVID NO SX DEFER TX   WILL FOLLOW UP   FOR [facility] ON 12.8 AS PER THEIR COVID POLICY  WILL FOLLOW UP  PLEASE CALL IF QUESTIONS      
56 y/o male with Hx of drug abuse, ETOH abuse, depression, anxiety, asthma, diverticulitis, HTN, HLD, MARIBEL using mouthpiece and osteoarthritis, has Hx of OA of the right knee pain and is s/p right knee replacement on 6/14/2022, he has been having reoccurring infections int he right knee pain has been getting progressively worse, he was having increasing pain in his right knee, he relapsed (last use prior 2 years ago) and used heroin, He was rushed to the emergency room and received Narcan on 11/1/2022, patient came in here for elective removal of spacer, revision TKA possible extensive mechanism repair, possible ABX spacer on 11/21/2022 with Dr. Canada, patient is s/p removal of antibiotic spacer, purulence in patella drill holes, 3/5 specimens + for >8 neutrophils per hpf removal of cement spacer and placement of static antibiotic spacer, I&D.     Plan:     Septic prosthetic R knee s/p  removal of antibiotic spacer:     Management as per Primary team   ID noted and appreciated   continue iv Abx as per ID, follow cultures no growth so far  PT/OT/pain mgmt   pain mgmt following   DVT prophylaxis- as per ortho  Incentive spirometry  Prophylaxis of opioid  induced constipation.  gabapentin 600 mg once a day (at bedtime)  gabapentin 300 mg 2 times a day at 8 AM and 2 PM    Depression/Anxiety:   Trazodone 200mg HS   Buspirone 5mg bid.     HLD:   Atorvastatin 40mg daily.     Hx of HTN: on Clonidine 0.1 mg BID  with holding parameters     Anemia - acute blood lost anemia, will monitor CBC, iron supplement     Hx of Heroin abuse: on Suboxone 8 mg-2 mg sublingual film: 1 film(s) sublingual 2 times a day, no withdrawal during the course.     Medicine team is signing off, please call as needed     
56 y/o male with PMH of drug abuse, ETOH abuse, depression, anxiety, asthma, diverticulitis, HTN, HLD, MARIBEL using mouthpiece and osteoarthritis presents to [facility] with complaints of right knee pain. He is s/p right knee replacement on 6/14/2022. States after his right knee replacement he was having reoccurring infections. He states the pain in his right knee has been getting progressively worse. Patient stated that he was having increasing pain in his right knee, he relapsed (last use prior 2 years ago) and used heroin. He was rushed to the emergency room and received Narcan on 11/1/2022. He is using a knee brace and walker for ambulation. Denies fevers, chills, nausea or vomiting. Patient is scheduled for removal of spacer, revision TKA possible extensive mechanism repair, possible ABX spacer on 11/21/2022 with Dr. Canada.     AS ABOVE HAD A  COURSE OF IV CEFAZOLIN THEN ORAL ABX    PT ORIGINAL PLAN WAS TO Reimplant KNEE HOWEVER PUS SEEN AND   NEW SPACER PLACED   CULTURES FROM OR SO FAR NEGATIVE      BLOOD CX X2 SETS NEG   ALTHOUGH LOWER YIELD AS PT WAS  ON IV ABX     WILL FOLLOWUP  EVENTUAL PICC LINE PLACEMENT   MAY  CONSIDER CHANGE TO DAPTO IF NOTHING GROWS TO COVER BOTH MRSA AND MSSA       
58 y/o male with PMH of drug abuse, ETOH abuse, depression, anxiety, asthma, diverticulitis, HTN, HLD, MARIBEL using mouthpiece and osteoarthritis presents to [facility] with complaints of right knee pain. He is s/p right knee replacement on 6/14/2022. States after his right knee replacement he was having reoccurring infections. He states the pain in his right knee has been getting progressively worse. Patient stated that he was having increasing pain in his right knee, he relapsed (last use prior 2 years ago) and used heroin. He was rushed to the emergency room and received Narcan on 11/1/2022. He is using a knee brace and walker for ambulation. Denies fevers, chills, nausea or vomiting. Patient is scheduled for removal of spacer, revision TKA possible extensive mechanism repair, possible ABX spacer on 11/21/2022 with Dr. Canada.     AS ABOVE HAD A  COURSE OF IV CEFAZOLIN THEN ORAL ABX    PT ORIGINAL PLAN WAS TO Reimplant KNEE HOWEVER PUS SEEN AND   NEW SPACER PLACED   CULTURES FROM OR SO FAR NEGATIVE      BLOOD CX X2 SETS NEG   ALTHOUGH LOWER YIELD AS PT WAS  ON IV ABX       ALL CX WERE NEGATIVE   L PICC LINE PLACEMENT DONE   PT    HAD MSSA ON PRIOR ADMISSION AND WAS DISCHARGED ON IV CEFAZOLIN   WILL CHANGE TO DAPTO TO COVER POSSIBLE MRSA AND MSSA  WILL PLAN 6 WEEKS THROUGH  1/02/23  WEEKLY CBC CMP CPK   PT WITH POS COVID NO SX DEFER TX   WILL FOLLOW UP

## 2022-12-06 NOTE — DISCHARGE NOTE NURSING/CASE MANAGEMENT/SOCIAL WORK - NSDCPEFALRISK_GEN_ALL_CORE
For information on Fall & Injury Prevention, visit: [email or web address] OR  [email or web address] OR  https://www.cdc.gov/steadi/patient.html

## 2022-12-06 NOTE — PROGRESS NOTE ADULT - SUBJECTIVE AND OBJECTIVE BOX
INFECTIOUS DISEASES AND INTERNAL MEDICINE at [place]  =======================================================  Mingo Culver MD  Diplomates American Board of Internal Medicine and Infectious Diseases  Telephone [phone]  Fax            [phone]  =======================================================    MOSES HANKINS [id number]    Follow up:  R KNEE INFECTION    Allergies:  shellfish. (Hives)      Medications:  acetaminophen     Tablet .. 975 milliGRAM(s) Oral every 8 hours  apixaban 2.5 milliGRAM(s) Oral two times a day  atorvastatin 40 milliGRAM(s) Oral at bedtime  buprenorphine 8 mG/naloxone 2 mG SL Film 1 Film(s) SubLingual every 8 hours  busPIRone 5 milliGRAM(s) Oral two times a day  chlorhexidine 2% Cloths 1 Application(s) Topical <User Schedule>  cloNIDine 0.1 milliGRAM(s) Oral two times a day  DAPTOmycin IVPB 550 milliGRAM(s) IV Intermittent every 24 hours  gabapentin 300 milliGRAM(s) Oral <User Schedule>  gabapentin 600 milliGRAM(s) Oral at bedtime  magnesium hydroxide Suspension 30 milliLiter(s) Oral daily PRN  multivitamin 1 Tablet(s) Oral daily  ondansetron Injectable 4 milliGRAM(s) IV Push every 6 hours PRN  polyethylene glycol 3350 17 Gram(s) Oral at bedtime  senna 2 Tablet(s) Oral at bedtime  sodium chloride 0.9% Bolus 500 milliLiter(s) IV Bolus once  sodium chloride 0.9% lock flush 10 milliLiter(s) IV Push every 1 hour PRN  sodium chloride 0.9%. 1000 milliLiter(s) IV Continuous <Continuous>  traZODone 200 milliGRAM(s) Oral at bedtime    SOCIAL       FAMILY   FAMILY HISTORY:  FH: diverticulitis (Mother)    FH: prostate cancer (Father)    FH: CAD (coronary artery disease)      REVIEW OF SYSTEMS:  CONSTITUTIONAL:  No Fever or chills  HEENT:   No diplopia or blurred vision.  No earache, sore throat or runny nose.  CARDIOVASCULAR:  No pressure, squeezing, strangling, tightness, heaviness or aching about the chest, neck, axilla or epigastrium.  RESPIRATORY:  No cough, shortness of breath, PND or orthopnea.  GASTROINTESTINAL:  No nausea, vomiting or diarrhea.  GENITOURINARY:  No dysuria, frequency or urgency. No Blood in urine  MUSCULOSKELETAL:   moves all joints  SKIN:  No change in skin, hair or nails.  NEUROLOGIC:  No paresthesias, fasciculations, seizures or weakness.  PSYCHIATRIC:  No disorder of thought or mood.  ENDOCRINE:  No heat or cold intolerance, polyuria or polydipsia.  HEMATOLOGICAL:  No easy bruising or bleeding.            Physical Exam:  I  Vital Signs Last 24 Hrs  T(C): 36.4 (06 Dec 2022 04:41), Max: 36.8 (05 Dec 2022 17:27)  T(F): 97.6 (06 Dec 2022 04:41), Max: 98.2 (05 Dec 2022 17:27)  HR: 69 (06 Dec 2022 04:41) (67 - 70)  BP: 115/64 (06 Dec 2022 04:41) (113/73 - 118/77)  BP(mean): --  RR: 18 (06 Dec 2022 04:41) (18 - 18)  SpO2: 97% (06 Dec 2022 04:41) (94% - 97%)    Parameters below as of 06 Dec 2022 04:41  Patient On (Oxygen Delivery Method): room air                      GEN: NAD,   HEENT: normocephalic and atraumatic. EOMI. GOLDIE.    NECK: Supple. No carotid bruits.  No lymphadenopathy or thyromegaly.  LUNGS: Clear to auscultation.  HEART: Regular rate and rhythm without murmur.  ABDOMEN: Soft, nontender, and nondistended.  Positive bowel sounds.    : No CVA tenderness  EXTREMITIES: incision site clean no drainage  MSK: no joint swelling  NEUROLOGIC: Cranial nerves II through XII are grossly intact.  PSYCHIATRIC: Appropriate affect .  SKIN: No ulceration or induration present.        Labs:  Vitals:  =======     =======================================================  Current Antibiotics:  DAPTOmycin IVPB 550 milliGRAM(s) IV Intermittent every 24 hours    Other medications:  acetaminophen     Tablet .. 975 milliGRAM(s) Oral every 8 hours  apixaban 2.5 milliGRAM(s) Oral two times a day  atorvastatin 40 milliGRAM(s) Oral at bedtime  buprenorphine 8 mG/naloxone 2 mG SL Film 1 Film(s) SubLingual every 8 hours  busPIRone 5 milliGRAM(s) Oral two times a day  chlorhexidine 2% Cloths 1 Application(s) Topical <User Schedule>  cloNIDine 0.1 milliGRAM(s) Oral two times a day  gabapentin 300 milliGRAM(s) Oral <User Schedule>  gabapentin 600 milliGRAM(s) Oral at bedtime  multivitamin 1 Tablet(s) Oral daily  polyethylene glycol 3350 17 Gram(s) Oral at bedtime  senna 2 Tablet(s) Oral at bedtime  sodium chloride 0.9% Bolus 500 milliLiter(s) IV Bolus once  sodium chloride 0.9%. 1000 milliLiter(s) IV Continuous <Continuous>  traZODone 200 milliGRAM(s) Oral at bedtime      =======================================================  Labs:           Culture - Blood (collected 11-22-22 @ 10:35)  Source: .Blood Blood  Final Report (11-27-22 @ 18:00):    No Growth Final    Culture - Blood (collected 11-22-22 @ 10:32)  Source: .Blood Blood  Final Report (11-27-22 @ 18:00):    No Growth Final    Culture - Tissue with Gram Stain (collected 11-21-22 @ 18:32)  Source: .Tissue right femur  Gram Stain (11-22-22 @ 05:33):    No polymorphonuclear cells seen per low power field    No organisms seen per oil power field  Final Report (11-26-22 @ 18:13):    No growth at 5 days    Culture - Tissue with Gram Stain (collected 11-21-22 @ 18:31)  Source: .Tissue right deep knee  Gram Stain (11-22-22 @ 05:33):    No polymorphonuclear cells seen per low power field    No organisms seen per oil power field    Culture - Tissue with Gram Stain (collected 11-21-22 @ 18:30)  Source: .Tissue right tibial nail  Gram Stain (11-22-22 @ 05:33):    No polymorphonuclear cells seen per low power field    No organisms seen per oil power field  Final Report (11-26-22 @ 18:12):    No growth at 5 days      Creatinine, Serum: 0.82 mg/dL (11-28-22 @ 06:40)  Creatinine, Serum: 0.84 mg/dL (11-25-22 @ 19:28)          C-Reactive Protein, Serum: 74 mg/L (11-26-22 @ 04:59)      COVID-19 PCR: Detected (11-27-22 @ 14:10)  COVID-19 PCR: NotDetec (11-21-22 @ 12:23)  SARS-CoV-2 Result: NotDetec (11-18-22 @ 11:56)  SARS-CoV-2 Result: NotDetec (11-01-22 @ 20:13)

## 2022-12-06 NOTE — PROGRESS NOTE ADULT - SUBJECTIVE AND OBJECTIVE BOX
ORTHO-TRAUMA SERVICE      Pt Name: MOSES HANKINS    MRN: [id number]    History: 57y Male is status post right total knee removal of hardware/ removal of spacer/ reinsertion of Abx spacer, POD # 15. Patient is doing well and is comfortable. The patient's pain is controlled using the prescribed pain medications. The patient is participating in physical therapy. Patient reports a small blister medially to incision, endorses similar small blister as thigh area which are now healed. No new orthopedic complaints.      PAST MEDICAL & SURGICAL HISTORY:  PAST MEDICAL & SURGICAL HISTORY:  Depression      Anxiety      Alcohol abuse      Overdose  heroine and xanax 3/10/2015      Osteoarthritis      HTN (hypertension)      HLD (hyperlipidemia)      ACL (anterior cruciate ligament) tear, left, initial encounter      H/O total knee replacement, right  2022          Allergies: shellfish. (Hives)      Medications: acetaminophen     Tablet .. 975 milliGRAM(s) Oral every 8 hours  apixaban 2.5 milliGRAM(s) Oral two times a day  atorvastatin 40 milliGRAM(s) Oral at bedtime  buprenorphine 8 mG/naloxone 2 mG SL Film 1 Film(s) SubLingual every 8 hours  busPIRone 5 milliGRAM(s) Oral two times a day  chlorhexidine 2% Cloths 1 Application(s) Topical <User Schedule>  cloNIDine 0.1 milliGRAM(s) Oral two times a day  DAPTOmycin IVPB 550 milliGRAM(s) IV Intermittent every 24 hours  gabapentin 600 milliGRAM(s) Oral at bedtime  gabapentin 300 milliGRAM(s) Oral <User Schedule>  magnesium hydroxide Suspension 30 milliLiter(s) Oral daily PRN  multivitamin 1 Tablet(s) Oral daily  ondansetron Injectable 4 milliGRAM(s) IV Push every 6 hours PRN  polyethylene glycol 3350 17 Gram(s) Oral at bedtime  senna 2 Tablet(s) Oral at bedtime  sodium chloride 0.9% Bolus 500 milliLiter(s) IV Bolus once  sodium chloride 0.9% lock flush 10 milliLiter(s) IV Push every 1 hour PRN  sodium chloride 0.9%. 1000 milliLiter(s) IV Continuous <Continuous>  traZODone 200 milliGRAM(s) Oral at bedtime        PHYSICAL EXAM:    Vital Signs Last 24 Hrs  T(C): 36.4 (06 Dec 2022 04:41), Max: 36.8 (05 Dec 2022 17:27)  T(F): 97.6 (06 Dec 2022 04:41), Max: 98.2 (05 Dec 2022 17:27)  HR: 69 (06 Dec 2022 04:41) (67 - 70)  BP: 115/64 (06 Dec 2022 04:41) (113/73 - 118/77)  BP(mean): --  RR: 18 (06 Dec 2022 04:41) (18 - 18)  SpO2: 97% (06 Dec 2022 04:41) (94% - 97%)    Parameters below as of 06 Dec 2022 04:41  Patient On (Oxygen Delivery Method): room air      Daily     Daily     Appearance: Alert, responsive, in no acute distress.    Neurological: Sensation is grossly intact to light touch. No focal deficits or weaknesses found.    Skin: Small 2mm blister medially to incision. no rash on visible skin. Skin is clean, dry and intact. No bleeding. No abrasions. No ulcerations.    Motor Exam: Right lower extremity- The right knee incision is clean, dry and intact. Staples not present. No drainage or discharge. No erythema is noted. No ecchymosis. The calf is supple. No calf tenderness. Motor function distally is 5/5. Extensor hallucis longus and flexor hallucis longus are intact. No foot drop. 2+ dorsalis pedis pulse. Capillary refill is less than 2 seconds. No cyanosis.    Primary Orthopedic Assessment:  • s/p right total knee removal of hardware/ removal of spacer/ reinsertion of Abx spacer, POD # 15      < from: Xray Knee 1 or 2 Views, Right (12.05.22 @ 14:32) >  ACC: [id number] EXAM:  XR KNEE 1-2 VIEWS RT                          PROCEDURE DATE:  12/05/2022        INTERPRETATION:  CLINICAL HISTORY: RIGHT knee fusion with spacer.   Follow-up.  Comparison: 11/21/2022 postoperative radiographs  TECHNIQUE: AP,lateral, and oblique radiographs    FINDINGS: The bone mineralization is normal.  Intramedullary eloy fuses the tibia and fibula.  No acute fracture.  IMPRESSION:  Fusion of RIGHT knee with intramedullary eloy and its radiopaque spacer.   No interval change.    --- End of Report ---      MAYO WARD MD; Attending Radiologist  This document has been electronically signed. Dec  6 2022  9:04AM    Culture - Tissue with Gram Stain (11.21.22 @ 18:31)    Gram Stain:   No polymorphonuclear cells seen per low power field  No organisms seen per oil power field    Specimen Source: .Tissue right deep knee    Culture Results:   No growth at 14 days.          Plan:   • DVT prophylaxis as prescribed- Eliquis including use of compression devices and ankle pumps  • Continue physical therapy  • Toe touch weightbearing as tolerated of the right lower extremity with assistance of a walker in knee immobilizer  • Incentive spirometry encouraged  • Pain control as clinically indicated  • Continue IV abx  * CRP 13 12/5/2022  -DC planning for rehab

## 2022-12-07 VITALS
DIASTOLIC BLOOD PRESSURE: 67 MMHG | TEMPERATURE: 98 F | RESPIRATION RATE: 18 BRPM | HEART RATE: 70 BPM | OXYGEN SATURATION: 94 % | SYSTOLIC BLOOD PRESSURE: 124 MMHG

## 2022-12-07 PROCEDURE — 82565 ASSAY OF CREATININE: CPT

## 2022-12-07 PROCEDURE — 73560 X-RAY EXAM OF KNEE 1 OR 2: CPT

## 2022-12-07 PROCEDURE — 86900 BLOOD TYPING SEROLOGIC ABO: CPT

## 2022-12-07 PROCEDURE — U0005: CPT

## 2022-12-07 PROCEDURE — 86140 C-REACTIVE PROTEIN: CPT

## 2022-12-07 PROCEDURE — 97167 OT EVAL HIGH COMPLEX 60 MIN: CPT

## 2022-12-07 PROCEDURE — 97163 PT EVAL HIGH COMPLEX 45 MIN: CPT

## 2022-12-07 PROCEDURE — 80202 ASSAY OF VANCOMYCIN: CPT

## 2022-12-07 PROCEDURE — 87075 CULTR BACTERIA EXCEPT BLOOD: CPT

## 2022-12-07 PROCEDURE — 89051 BODY FLUID CELL COUNT: CPT

## 2022-12-07 PROCEDURE — 86850 RBC ANTIBODY SCREEN: CPT

## 2022-12-07 PROCEDURE — C1713: CPT

## 2022-12-07 PROCEDURE — 86901 BLOOD TYPING SEROLOGIC RH(D): CPT

## 2022-12-07 PROCEDURE — U0003: CPT

## 2022-12-07 PROCEDURE — 85027 COMPLETE CBC AUTOMATED: CPT

## 2022-12-07 PROCEDURE — 80307 DRUG TEST PRSMV CHEM ANLYZR: CPT

## 2022-12-07 PROCEDURE — 36415 COLL VENOUS BLD VENIPUNCTURE: CPT

## 2022-12-07 PROCEDURE — 88331 PATH CONSLTJ SURG 1 BLK 1SPC: CPT

## 2022-12-07 PROCEDURE — 87040 BLOOD CULTURE FOR BACTERIA: CPT

## 2022-12-07 PROCEDURE — 80048 BASIC METABOLIC PNL TOTAL CA: CPT

## 2022-12-07 PROCEDURE — 87070 CULTURE OTHR SPECIMN AEROBIC: CPT

## 2022-12-07 PROCEDURE — 82550 ASSAY OF CK (CPK): CPT

## 2022-12-07 PROCEDURE — 76000 FLUOROSCOPY <1 HR PHYS/QHP: CPT

## 2022-12-07 PROCEDURE — 88311 DECALCIFY TISSUE: CPT

## 2022-12-07 PROCEDURE — 88304 TISSUE EXAM BY PATHOLOGIST: CPT

## 2022-12-07 PROCEDURE — 71045 X-RAY EXAM CHEST 1 VIEW: CPT

## 2022-12-07 PROCEDURE — 82962 GLUCOSE BLOOD TEST: CPT

## 2022-12-07 RX ORDER — APIXABAN 2.5 MG/1
1 TABLET, FILM COATED ORAL
Qty: 0 | Refills: 0 | DISCHARGE
Start: 2022-12-07

## 2022-12-07 RX ORDER — ACETAMINOPHEN 500 MG
3 TABLET ORAL
Qty: 0 | Refills: 0 | DISCHARGE
Start: 2022-12-07

## 2022-12-07 RX ORDER — DAPTOMYCIN 500 MG/10ML
550 INJECTION, POWDER, LYOPHILIZED, FOR SOLUTION INTRAVENOUS
Qty: 0 | Refills: 0 | DISCHARGE
Start: 2022-12-07

## 2022-12-07 RX ADMIN — CHLORHEXIDINE GLUCONATE 1 APPLICATION(S): 213 SOLUTION TOPICAL at 05:51

## 2022-12-07 RX ADMIN — Medication 0.1 MILLIGRAM(S): at 05:52

## 2022-12-07 RX ADMIN — BUPRENORPHINE AND NALOXONE 1 FILM(S): 2; .5 TABLET SUBLINGUAL at 01:17

## 2022-12-07 RX ADMIN — GABAPENTIN 300 MILLIGRAM(S): 400 CAPSULE ORAL at 08:53

## 2022-12-07 RX ADMIN — Medication 975 MILLIGRAM(S): at 05:52

## 2022-12-07 RX ADMIN — BUPRENORPHINE AND NALOXONE 1 FILM(S): 2; .5 TABLET SUBLINGUAL at 08:53

## 2022-12-07 RX ADMIN — APIXABAN 2.5 MILLIGRAM(S): 2.5 TABLET, FILM COATED ORAL at 05:51

## 2022-12-07 RX ADMIN — Medication 5 MILLIGRAM(S): at 05:52

## 2022-12-09 ENCOUNTER — NON-APPOINTMENT (OUTPATIENT)
Age: 57
End: 2022-12-09

## 2022-12-22 ENCOUNTER — APPOINTMENT (OUTPATIENT)
Dept: ORTHOPEDIC SURGERY | Facility: CLINIC | Age: 57
End: 2022-12-22

## 2022-12-22 PROCEDURE — 73560 X-RAY EXAM OF KNEE 1 OR 2: CPT | Mod: RT

## 2022-12-22 PROCEDURE — 99024 POSTOP FOLLOW-UP VISIT: CPT

## 2022-12-22 NOTE — HISTORY OF PRESENT ILLNESS
[Chills] : no chills [Constipation] : no constipation [Diarrhea] : no diarrhea [Dysuria] : no dysuria [Fever] : no fever [Nausea] : no nausea [Vomiting] : no vomiting [de-identified] : s/p Right knee irrigation, debridement, removal of antibiotic spacer and replacement of static antibiotic spacer with wound vac placement DOS: 11/21/22 [de-identified] : 57 year old male presents to office for first post operative visit s/p Right Knee I&D, removal and replacement of of static antibiotic spacer from 11/21/22. The patient states that he has been at [facility] since discharge and has been participating in PT regularly although he is unable to walker due to pain in his non-operative leg. He has been taking Eliquis for  DVT prophylaxis as directed. He has a PICC line and receives his antibiotics daily. He does not know when his follow up appointment with ID Dr. Ramírez is, but states that he has been calling to try to arrange it. The patient states that he is currently homeless and is concerned about discharge from [facility] as we would have to return to a shelter. States that the pain in his operative leg is still severe and has been on Suboxone.  Denies any incisional issues, erythema, drainage or discharge, swelling, calf tenderness, chest pain/SOB.  [de-identified] : Right lower extremity: Incision well-healed without erythema drainage or discharge, no appreciable effusion, no evidence of infection, knee without range of motion due to static spacer, 5 out of 5 strength for plantarflexion dorsiflexion, sensation intact light touch over the foot, foot warm and perfused with cap refill [de-identified] : 2 views of the right knee obtained the office today show no acute fracture or dislocation.  There is a right knee antibiotic spacer in appropriate line without evidence of hardware complication. [de-identified] : Patient is a 57-year-old male here today 1 month status post right knee irrigation debridement and reimplantation of antibiotic impregnated static spacer.  He has been doing well since his last however at the nursing home he has not followed up in the last month.  He states that he has been doing physical therapy however he is getting more pain in his contralateral knee.  He is not having any pain in his right knee.  He has been compliant with his brace use.  He has not had any fevers chills or constitutional symptoms.  He states that his antibiotic therapy was interrupted for 3 days.  He is back on his antibiotics at this time.  I strongly urged him to follow-up with Dr. Shea and my office is helping to arrange follow-up with infectious disease.  I recommend he continue with his physical therapy.  I recommend he continue to wear the brace.  I would like to see him back in 1 month for repeat evaluation x-ray and possible aspiration of his knee if he has been off of his IV antibiotic therapy.  All questions were asked and answered.  We will schedule him for a revision total knee arthroplasty in the future

## 2023-01-03 ENCOUNTER — EMERGENCY (EMERGENCY)
Facility: HOSPITAL | Age: 58
LOS: 1 days | Discharge: HOME | End: 2023-01-03
Attending: EMERGENCY MEDICINE
Payer: COMMERCIAL

## 2023-01-03 VITALS
DIASTOLIC BLOOD PRESSURE: 78 MMHG | HEART RATE: 62 BPM | SYSTOLIC BLOOD PRESSURE: 125 MMHG | OXYGEN SATURATION: 94 % | RESPIRATION RATE: 18 BRPM | TEMPERATURE: 98 F

## 2023-01-03 VITALS
DIASTOLIC BLOOD PRESSURE: 72 MMHG | RESPIRATION RATE: 16 BRPM | SYSTOLIC BLOOD PRESSURE: 137 MMHG | OXYGEN SATURATION: 99 % | HEART RATE: 85 BPM | WEIGHT: 182.1 LBS | HEIGHT: 68 IN | TEMPERATURE: 98 F

## 2023-01-03 DIAGNOSIS — S83.512A SPRAIN OF ANTERIOR CRUCIATE LIGAMENT OF LEFT KNEE, INITIAL ENCOUNTER: Chronic | ICD-10-CM

## 2023-01-03 DIAGNOSIS — Z96.651 PRESENCE OF RIGHT ARTIFICIAL KNEE JOINT: Chronic | ICD-10-CM

## 2023-01-03 LAB
ALBUMIN SERPL ELPH-MCNC: 4.5 G/DL — SIGNIFICANT CHANGE UP (ref 3.3–5.2)
ALP SERPL-CCNC: 137 U/L — HIGH (ref 40–120)
ALT FLD-CCNC: 21 U/L — SIGNIFICANT CHANGE UP
ANION GAP SERPL CALC-SCNC: 10 MMOL/L — SIGNIFICANT CHANGE UP (ref 5–17)
AST SERPL-CCNC: 21 U/L — SIGNIFICANT CHANGE UP
BASOPHILS # BLD AUTO: 0.06 K/UL — SIGNIFICANT CHANGE UP (ref 0–0.2)
BASOPHILS NFR BLD AUTO: 0.9 % — SIGNIFICANT CHANGE UP (ref 0–2)
BILIRUB SERPL-MCNC: 0.3 MG/DL — LOW (ref 0.4–2)
BUN SERPL-MCNC: 18.3 MG/DL — SIGNIFICANT CHANGE UP (ref 8–20)
CALCIUM SERPL-MCNC: 9.4 MG/DL — SIGNIFICANT CHANGE UP (ref 8.4–10.5)
CHLORIDE SERPL-SCNC: 100 MMOL/L — SIGNIFICANT CHANGE UP (ref 96–108)
CO2 SERPL-SCNC: 27 MMOL/L — SIGNIFICANT CHANGE UP (ref 22–29)
CREAT SERPL-MCNC: 0.94 MG/DL — SIGNIFICANT CHANGE UP (ref 0.5–1.3)
EGFR: 94 ML/MIN/1.73M2 — SIGNIFICANT CHANGE UP
EOSINOPHIL # BLD AUTO: 0.94 K/UL — HIGH (ref 0–0.5)
EOSINOPHIL NFR BLD AUTO: 13.4 % — HIGH (ref 0–6)
GLUCOSE SERPL-MCNC: 88 MG/DL — SIGNIFICANT CHANGE UP (ref 70–99)
HCT VFR BLD CALC: 38.2 % — LOW (ref 39–50)
HGB BLD-MCNC: 12.5 G/DL — LOW (ref 13–17)
IMM GRANULOCYTES NFR BLD AUTO: 0.3 % — SIGNIFICANT CHANGE UP (ref 0–0.9)
LYMPHOCYTES # BLD AUTO: 1.66 K/UL — SIGNIFICANT CHANGE UP (ref 1–3.3)
LYMPHOCYTES # BLD AUTO: 23.6 % — SIGNIFICANT CHANGE UP (ref 13–44)
MCHC RBC-ENTMCNC: 28.9 PG — SIGNIFICANT CHANGE UP (ref 27–34)
MCHC RBC-ENTMCNC: 32.7 GM/DL — SIGNIFICANT CHANGE UP (ref 32–36)
MCV RBC AUTO: 88.2 FL — SIGNIFICANT CHANGE UP (ref 80–100)
MONOCYTES # BLD AUTO: 0.61 K/UL — SIGNIFICANT CHANGE UP (ref 0–0.9)
MONOCYTES NFR BLD AUTO: 8.7 % — SIGNIFICANT CHANGE UP (ref 2–14)
NEUTROPHILS # BLD AUTO: 3.73 K/UL — SIGNIFICANT CHANGE UP (ref 1.8–7.4)
NEUTROPHILS NFR BLD AUTO: 53.1 % — SIGNIFICANT CHANGE UP (ref 43–77)
PLATELET # BLD AUTO: 308 K/UL — SIGNIFICANT CHANGE UP (ref 150–400)
POTASSIUM SERPL-MCNC: 4.6 MMOL/L — SIGNIFICANT CHANGE UP (ref 3.5–5.3)
POTASSIUM SERPL-SCNC: 4.6 MMOL/L — SIGNIFICANT CHANGE UP (ref 3.5–5.3)
PROT SERPL-MCNC: 8.2 G/DL — SIGNIFICANT CHANGE UP (ref 6.6–8.7)
RBC # BLD: 4.33 M/UL — SIGNIFICANT CHANGE UP (ref 4.2–5.8)
RBC # FLD: 12.7 % — SIGNIFICANT CHANGE UP (ref 10.3–14.5)
SODIUM SERPL-SCNC: 137 MMOL/L — SIGNIFICANT CHANGE UP (ref 135–145)
WBC # BLD: 7.02 K/UL — SIGNIFICANT CHANGE UP (ref 3.8–10.5)
WBC # FLD AUTO: 7.02 K/UL — SIGNIFICANT CHANGE UP (ref 3.8–10.5)

## 2023-01-03 PROCEDURE — 73552 X-RAY EXAM OF FEMUR 2/>: CPT | Mod: 26,RT

## 2023-01-03 PROCEDURE — 73501 X-RAY EXAM HIP UNI 1 VIEW: CPT | Mod: 26,RT

## 2023-01-03 PROCEDURE — 99284 EMERGENCY DEPT VISIT MOD MDM: CPT

## 2023-01-03 PROCEDURE — 96361 HYDRATE IV INFUSION ADD-ON: CPT

## 2023-01-03 PROCEDURE — 96374 THER/PROPH/DIAG INJ IV PUSH: CPT

## 2023-01-03 PROCEDURE — 85025 COMPLETE CBC W/AUTO DIFF WBC: CPT

## 2023-01-03 PROCEDURE — 99285 EMERGENCY DEPT VISIT HI MDM: CPT

## 2023-01-03 PROCEDURE — 73590 X-RAY EXAM OF LOWER LEG: CPT | Mod: 26,RT

## 2023-01-03 PROCEDURE — 73501 X-RAY EXAM HIP UNI 1 VIEW: CPT

## 2023-01-03 PROCEDURE — 73552 X-RAY EXAM OF FEMUR 2/>: CPT

## 2023-01-03 PROCEDURE — 36415 COLL VENOUS BLD VENIPUNCTURE: CPT

## 2023-01-03 PROCEDURE — 73590 X-RAY EXAM OF LOWER LEG: CPT

## 2023-01-03 PROCEDURE — 73562 X-RAY EXAM OF KNEE 3: CPT

## 2023-01-03 PROCEDURE — 73562 X-RAY EXAM OF KNEE 3: CPT | Mod: 26,RT

## 2023-01-03 PROCEDURE — 80053 COMPREHEN METABOLIC PANEL: CPT

## 2023-01-03 PROCEDURE — 99284 EMERGENCY DEPT VISIT MOD MDM: CPT | Mod: 25

## 2023-01-03 RX ORDER — MORPHINE SULFATE 50 MG/1
4 CAPSULE, EXTENDED RELEASE ORAL ONCE
Refills: 0 | Status: DISCONTINUED | OUTPATIENT
Start: 2023-01-03 | End: 2023-01-03

## 2023-01-03 RX ORDER — SODIUM CHLORIDE 9 MG/ML
500 INJECTION, SOLUTION INTRAVENOUS ONCE
Refills: 0 | Status: COMPLETED | OUTPATIENT
Start: 2023-01-03 | End: 2023-01-03

## 2023-01-03 RX ADMIN — SODIUM CHLORIDE 500 MILLILITER(S): 9 INJECTION, SOLUTION INTRAVENOUS at 14:48

## 2023-01-03 RX ADMIN — MORPHINE SULFATE 4 MILLIGRAM(S): 50 CAPSULE, EXTENDED RELEASE ORAL at 14:40

## 2023-01-03 NOTE — ED ADULT TRIAGE NOTE - CHIEF COMPLAINT QUOTE
Pt sent in from facility for evaluation of RLE. He states that they woke him up the other night to give him his Suboxone and he feel asleep sitting on the edge of his bed. He fell forward and hurt his leg. As per facility he has a tibia fx. Pt also reports that he has knee pain in that same leg and is waiting for a knee replacement.

## 2023-01-03 NOTE — ED ADULT NURSE REASSESSMENT NOTE - NS ED NURSE REASSESS COMMENT FT1
Pt is resting in bed comfortably at this time, no apparent distress noted at this time. pt safety maintained. Pt denies any complaints at this time. Orthopedics is currently at the bedside.

## 2023-01-03 NOTE — ED PROVIDER NOTE - EXTREMITY EXAM
Trever informed states understanding with no further questions   Or concerns  At this time -- trever will call back Friday 10/06/20 to update    will call sooner if any questions or concerns arise.   right lower extremity findings

## 2023-01-03 NOTE — ED PROVIDER NOTE - PATIENT PORTAL LINK FT
You can access the FollowMyHealth Patient Portal offered by [facility] by registering at the following website: [email or web address]. By joining [email or web address]’s FollowMyHealth portal, you will also be able to view your health information using other applications (apps) compatible with our system.

## 2023-01-03 NOTE — ED PROVIDER NOTE - OBJECTIVE STATEMENT
56 y/o male with PMH of ETOH/drug abuse, depression, anxiety, asthma, diverticulitis, HTN, HLD, MARIBEL, osteoarthritis, presents c/o right leg pain. He is s/p right total knee removal of hardware/ removal of spacer/ reinsertion of Abx spacer on 11/21/22. Patient is sent with knee mobilizer from [facility] where he fell front from the edge of bed 2 days ago. R knee Xray performed at [facility] yesterday showed proximal tibial fracture and femur fracture on the R leg. Denies head trauma, CP, abd pain, nausea, vomiting, sob or fever. Reports Pt has pain on RLE only when moving the leg.

## 2023-01-03 NOTE — ED PROVIDER NOTE - ATTENDING CONTRIBUTION TO CARE
The patient seen and examined    Fall    CAREN, Yosvany Ventura, performed the initial face to face bedside interview with this patient regarding history of present illness, review of symptoms and relevant past medical, social and family history.  I completed an independent physical examination.  I was the initial provider who evaluated this patient. I have signed out the follow up of any pending tests (i.e. labs, radiological studies) to the resident.  I have communicated the patient’s plan of care and disposition with the resident

## 2023-01-03 NOTE — ED PROVIDER NOTE - NSFOLLOWUPINSTRUCTIONS_ED_ALL_ED_FT
Please see your ortho doctor within 5 days.    Avulsion Fracture    WHAT YOU NEED TO KNOW:    What is an avulsion fracture? An avulsion fracture is when a small piece of bone breaks and pulls away from a larger bone. Part or all of the piece may break away.    What are the signs and symptoms of an avulsion fracture?   •Pain, tenderness, and swelling       •Trouble moving the area near the avulsion fracture      •Trouble putting weight on the side of the avulsion fracture      How is an avulsion fracture diagnosed? Your healthcare provider will ask about the activity you were doing before your injury. He or she will also examine the area by touching it and moving it. An x-ray, CT, or MRI may show the avulsion fracture. You may be given contrast liquid to help the fracture show up better in the pictures. Tell the healthcare provider if you have ever had an allergic reaction to contrast liquid. Do not enter the MRI room with anything metal. Metal can cause serious injury. Tell the healthcare provider if you have any metal in or on your body.    How is an avulsion fracture treated? Treatment depends on the location and type of fracture you have. You may need any of the following:   •Pain medicine may be given. Ask your healthcare provider how to take this medicine safely.      •Surgery may be needed if your fracture is severe or does not heal with other treatments. Surgery helps return the bones to their normal position using metal pins, screws, or plates.       How can I manage my symptoms?   •Rest as directed while your fracture heals.       •Apply ice on your injury for 15 to 20 minutes every hour or as directed. Use an ice pack, or put crushed ice in a plastic bag. Cover it with a towel. Ice helps prevent tissue damage and decreases swelling and pain.      •Elevate your injured limb above the level of your heart as often as you can. This will help decrease swelling and pain. Prop your injured limb on pillows or blankets to keep it elevated comfortably.       •A splint or cast may be needed to prevent movement and help your fracture heal.       •Crutches or a walker may be needed to decrease stress on your leg or hips if this is where you have an avulsion fracture.      Call your local emergency number (911 in the [place]) if:   •You feel lightheaded, short of breath, and have chest pain.      •You cough up blood.      When should I seek immediate care?   •Your leg feels warm, tender, and painful. It may look swollen and red.      •Your cast cracks or is damaged.      •The pain in your injured limb gets worse even after you rest and take medicine.      •The skin, toes, or fingers of your injured limb become swollen, cold, or blue.      When should I call my doctor?   •You have numbness or tingling in your hand or foot below your cast.      •You cannot move your fingers or toes below the cast.       •You have new sores or redness around your cast or splint.      •You have new or worsening trouble moving your injured limb.      •You have questions or concerns about your condition or care.      CARE AGREEMENT:    You have the right to help plan your care. Learn about your health condition and how it may be treated. Discuss treatment options with your healthcare providers to decide what care you want to receive. You always have the right to refuse treatment.

## 2023-01-03 NOTE — ED ADULT NURSE NOTE - OBJECTIVE STATEMENT
Pt care assumed at 1430. Pt is A&O x4 and resting comfortable in bed. Pt states that he had multiple surgeries on right leg over the past two years to repair a tibia fracture and a torn patellar tendon. Pt states that leg slammed down during today at rehab facility causing lower right leg pain. Right pedal pulse palpable on assessment. Pt understands plan of care and is waiting for x-ray.

## 2023-01-03 NOTE — CONSULT NOTE ADULT - SUBJECTIVE AND OBJECTIVE BOX
Pt Name: MOSES HANKINS    MRN: [id number]      Patient is a 57y Male presenting to the emergency department with a chief complaint right knee pain. Patient seen at bedside and reports fall off chair at rehab 2 days ago now with "mild" right knee pain. Patient reports he was sent for an X-ray which showed possible right fibular head fracture.  Patient denies numbness or tingling. No fever/chills. Patient s/p removal of cement spacer and placement of static antibiotic spacer, incision and drainage on 10/21/22. No other orthopedic complaints at this time.       REVIEW OF SYSTEMS    General: Alert, responsive, in NAD    Skin: well healed scar to right knee. No rashes, no pruritis     Musculoskeletal: SEE HPI.    Neurological: No sensory or motor changes.         PAST MEDICAL & SURGICAL HISTORY:  PAST MEDICAL & SURGICAL HISTORY:  Depression      Anxiety      Alcohol abuse      Overdose  heroine and xanax 3/10/2015      Osteoarthritis      HTN (hypertension)      HLD (hyperlipidemia)      ACL (anterior cruciate ligament) tear, left, initial encounter      H/O total knee replacement, right  2022          Allergies: shellfish. (Hives)      Medications:     FAMILY HISTORY:  FH: diverticulitis (Mother)    FH: prostate cancer (Father)    FH: CAD (coronary artery disease)    : non-contributory    Social History:                             12.5   7.02  )-----------( 308      ( 03 Jan 2023 14:12 )             38.2       01-03    137  |  100  |  18.3  ----------------------------<  88  4.6   |  27.0  |  0.94    Ca    9.4      03 Jan 2023 14:12    TPro  8.2  /  Alb  4.5  /  TBili  0.3<L>  /  DBili  x   /  AST  21  /  ALT  21  /  AlkPhos  137<H>  01-03      Vital Signs Last 24 Hrs  T(C): 36.7 (03 Jan 2023 15:59), Max: 36.9 (03 Jan 2023 12:16)  T(F): 98.1 (03 Jan 2023 15:59), Max: 98.4 (03 Jan 2023 12:16)  HR: 64 (03 Jan 2023 15:59) (64 - 85)  BP: 135/80 (03 Jan 2023 15:59) (135/80 - 137/72)  BP(mean): --  RR: 16 (03 Jan 2023 15:59) (16 - 16)  SpO2: 96% (03 Jan 2023 15:59) (96% - 99%)    Parameters below as of 03 Jan 2023 15:59  Patient On (Oxygen Delivery Method): room air        Daily Height in cm: 172.72 (03 Jan 2023 12:16)    Daily       PHYSICAL EXAM:      Appearance: Alert, responsive, in no acute distress.    Neurological: Sensation is grossly intact to light touch. No focal deficits or weaknesses found.    Skin: no rash on visible skin. Skin is clean, dry and intact. No bleeding. No abrasions. No ulcerations. well healed scar to right knee-no drainage or discharge. no erythema.     Vascular: Cap refill < 2 sec. No signs of venous insufficiency or stasis. No extremity ulcerations. No cyanosis.    Musculoskeletal:         Right Lower Extremity: KI in place, KI taken down for exam.  Hip: NTTP, FROM. HF/HE intact without pain. patient able to SLE with no pain. Knee: +TTP to lateral knee joint-limited ROM.  well healed scar to right knee-no drainage or discharge. no erythema.  Ankle: NTTP, FROM. DF/PF/EHL/FHL intact. Compartments soft compressible. Sensation intact to light touch. DP pulse intact. KI placed after exam.     Imaging Studies:  < from: Xray Hip w/ Pelvis 1 View, Right (01.03.23 @ 15:57) >  ACC: [id number] EXAM:  XR FEMUR 2 VIEWS RT                        ACC: [id number] EXAM:  XR TIB FIB AP LAT 2 VIEWS RT                        ACC: [id number] EXAM:  XR HIP WITH PELV 1V RT                        ACC: [id number] EXAM:  XR KNEE 3 VIEWS RT                    PROCEDURE DATE:  01/03/2023          INTERPRETATION:  Clinical indications: Status post fall with right lower   extremity pain.    AP view of the pelvis, 2 views of the right hip, 2 views of the right   femur, 3 views of the rightknee, and AP and lateral radiographs of the   right tibia and fibula were performed.    Correlation is made with prior radiographs the right knee from November 21, 2022.    FINDINGS:    Pelvis and right hip: There is limited evaluation of the rightfemoral   neck due to rotation. If there is concern for right hip fracture then   further evaluation with CT is recommended. Otherwise, there is no   evidence of acute fracture or dislocation. There is mild bilateral hip   arthrosis. There is lower lumbar spondylosis.    Right femur, knee, tibia, and fibula: There is cortical irregularity at   the fibular head concerning for a nondisplaced fracture. Soft tissue   swelling is seen within the lateral soft tissues. Patient is status post   intramedullary eloy fixation of the right knee with an antibiotic cement   spacer in place. There is lucency between the tibia and antibiotic cement   spacer which is consistent with loosening and appears grossly similar   compared to the previous radiograph. 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 There is   an age-indeterminate small avulsion fracture along the rim of the medial   tibial plateau. There is a small knee joint effusion. There istibiotalar   arthrosis.    IMPRESSION:    PELVIS AND RIGHT HIP: Limited evaluation right femoral neck due to   rotation. Consider CT if there is pain localized to the right hip.    RIGHT FEMUR, KNEE, TIBIA, AND FIBULA: Status post antibiotic cement   spacer and intramedullary eloy fixation of the knee. Lucency about the   tibial cement interface consistent with loosening.    Age-indeterminate avulsion along the peripheral rim of the medial tibial   plateau.    Cortical irregularity at the fibular head concerning for a nondisplaced   fracture.    --- End of Report ---    CALIN KIM MD; Attending Radiologist  This document has been electronically signed. Jean Carlos  3 2023  5:21PM    < end of copied text >    A/P:  Pt is a  57y Male s/p fall found to have right fibular head fracture    PLAN: case/plan/images d/w Dr. Canada  - pain control   - continue KI to RLE AAT  - Toe touch weightbearing as tolerated of the right lower extremity with assistance of a walker in knee immobilizer  - continue rest of care per primary team

## 2023-01-03 NOTE — ED PROVIDER NOTE - CLINICAL SUMMARY MEDICAL DECISION MAKING FREE TEXT BOX
58 y/o male with PMH of ETOH/drug abuse, depression, anxiety, asthma, diverticulitis, HTN, HLD, MARIBEL, osteoarthritis, presents c/o right leg pain. He is s/p right total knee removal of hardware/ removal of spacer/ reinsertion of Abx spacer on 11/21/22. Patient is sent with knee mobilizer from [facility] where he fell front from the edge of bed 2 days ago. R knee Xray performed at [facility] yesterday showed proximal tibial fracture and femur fracture on the R leg. Denies head trauma, CP, abd pain, nausea, vomiting, sob or fever. Reports Pt has pain on RLE only when moving the leg.  Retake Xrays for R hip, knee, femur, tibia. Pain control. Check cbc, cmp and fluid given. Reassess.

## 2023-01-09 ENCOUNTER — APPOINTMENT (OUTPATIENT)
Dept: INTERNAL MEDICINE | Facility: CLINIC | Age: 58
End: 2023-01-09
Payer: MEDICAID

## 2023-01-09 VITALS
SYSTOLIC BLOOD PRESSURE: 142 MMHG | DIASTOLIC BLOOD PRESSURE: 74 MMHG | HEART RATE: 63 BPM | BODY MASS INDEX: 31.22 KG/M2 | HEIGHT: 68 IN | WEIGHT: 206 LBS

## 2023-01-09 DIAGNOSIS — Z79.2 LONG TERM (CURRENT) USE OF ANTIBIOTICS: ICD-10-CM

## 2023-01-09 PROCEDURE — 99215 OFFICE O/P EST HI 40 MIN: CPT | Mod: 25

## 2023-01-09 RX ORDER — SULFAMETHOXAZOLE AND TRIMETHOPRIM 800; 160 MG/1; MG/1
800-160 TABLET ORAL
Qty: 20 | Refills: 0 | Status: DISCONTINUED | COMMUNITY
Start: 2022-06-02 | End: 2023-01-09

## 2023-01-09 RX ORDER — DICLOFENAC SODIUM 1% 10 MG/G
1 GEL TOPICAL DAILY
Qty: 1 | Refills: 1 | Status: DISCONTINUED | COMMUNITY
Start: 2022-11-09 | End: 2023-01-09

## 2023-01-09 NOTE — PHYSICAL EXAM
[General Appearance - Alert] : alert [General Appearance - In No Acute Distress] : in no acute distress [Sclera] : the sclera and conjunctiva were normal [PERRL With Normal Accommodation] : pupils were equal in size, round, reactive to light [Extraocular Movements] : extraocular movements were intact [Outer Ear] : the ears and nose were normal in appearance [Oropharynx] : the oropharynx was normal with no thrush [Auscultation Breath Sounds / Voice Sounds] : lungs were clear to auscultation bilaterally [Heart Rate And Rhythm] : heart rate was normal and rhythm regular [Heart Sounds] : normal S1 and S2 [Heart Sounds Gallop] : no gallops [Murmurs] : no murmurs [Heart Sounds Pericardial Friction Rub] : no pericardial rub [Full Pulse] : the pedal pulses are present [Edema] : there was no peripheral edema [Bowel Sounds] : normal bowel sounds [Abdomen Soft] : soft [Abdomen Tenderness] : non-tender [] : no hepato-splenomegaly [Abdomen Mass (___ Cm)] : no abdominal mass palpated [Costovertebral Angle Tenderness] : no CVA tenderness [Deep Tendon Reflexes (DTR)] : deep tendon reflexes were 2+ and symmetric [Sensation] : the sensory exam was normal to light touch and pinprick [No Focal Deficits] : no focal deficits [FreeTextEntry1] : RIGHT KNEE INCION SITE CLEAN NO DRAINGE NO ERYTHEMA

## 2023-01-09 NOTE — ASSESSMENT
[FreeTextEntry1] : 58YO MALE WITH PMH OF SUBSTANCE ABUSE  DEPRESSION HAD TOTAL KNEE REPLACEMENT 04/22 COMPLICATED BY INFECTION WITH RMOVAL OF KNEE ON 6/22 WITH ANTIBIOTIC CEMENT SPACER  PLACED PT WAS DISCHARGEE  TO [facility] AND COMPLETED 6 WEEKS IV ABX IN LATE JULY\par DID NOT FOLLOWUP WITH ID AT THE TIME PT WAS READMITTED IN LATE NOVEMBER 2022 FOR REMOVAL OF SPACER\par PT WITH PUS IN SPACER SO SPACER WAS REPLACED AND PT TREATED FOR ANOTHER 6 WEEKS \par NOW WITH DAPTOMYCIN TO COVER BOTH  MSSA AND MRSA SENT TO [facility] AND COMPLETED ABX 1/2/23\par AND PICC REMOVED \par OVERALL FEELING WELL  HERE FOR ID FOLLOWUP \par NO FEVERS OR CHILLS INCISION SITE RIGHT LEG APPEARS CLEAN \par WILL OBSERVE OFF ABX\par WILL FOLLOWUP WITH DR STARK WHO WILL ASPIRATE OFF ABX AND PLAN FOR  REMOVAL OF SPACER ADN PLACEMENT OF NEW RIGHT KNEEI MPLANT \par WILL FOLLOW UP HERE AS NEEDED\par WROTE BRIEF NOTE ON CONSULT PAPER AND PT TO RETURN TO [facility] ([facility])

## 2023-01-09 NOTE — HISTORY OF PRESENT ILLNESS
[FreeTextEntry1] : 56YO MALE WITH PMH OF SUBSTANCE ABUSE  DEPRESSION HAD TOTAL KNEE REPLACEMENT 04/22 COMPLICATED BY INFECTION WITH RMOVAL OF KNEE ON 6/22 WITH ANTIBIOTIC CEMENT SPACER  PLACED PT WAS DISCHARGEE  TO [facility] AND COMPLETED 6 WEEKS IV ABX IN LATE JULY\par DID NOT FOLLOWUP WITH ID AT THE TIME PT WAS READMITTED IN LATE NOVEMBER 2022 FOR REMOVAL OF SPACER\par PT WITH PUS IN SPACER SO SPACER WAS REPLACED AND PT TREATED FOR ANOTHER 6 WEEKS SENT TO [facility] AND COMPLETED ABX 1/2\par AND PICC REMOVED \par OVERALL FEELING WELL  NO FEVERS HEREFOR ID FOLLOWUP

## 2023-01-09 NOTE — REASON FOR VISIT
[Follow-Up: _____] : a [unfilled] follow-up visit [Other: _____] : [unfilled] [FreeTextEntry1] : RIGHT KNEE INFECITON WS ON OUTPT INTRAVENOUS ABX AT [facility]

## 2023-01-09 NOTE — ED PROCEDURE NOTE - ATTENDING CONTRIBUTION TO CARE
I, Yosvany Ventura, performed the initial face to face bedside interview with this patient regarding history of present illness, review of symptoms and relevant past medical, social and family history.  I completed an independent physical examination.  I was the initial provider who evaluated this patient. I have signed out the follow up of any pending tests (i.e. labs, radiological studies) to the resident.  I have communicated the patient’s plan of care and disposition with the resident

## 2023-01-25 ENCOUNTER — LABORATORY RESULT (OUTPATIENT)
Age: 58
End: 2023-01-25

## 2023-01-25 ENCOUNTER — APPOINTMENT (OUTPATIENT)
Dept: ORTHOPEDIC SURGERY | Facility: CLINIC | Age: 58
End: 2023-01-25
Payer: MEDICAID

## 2023-01-25 PROCEDURE — 99024 POSTOP FOLLOW-UP VISIT: CPT

## 2023-01-25 PROCEDURE — 73560 X-RAY EXAM OF KNEE 1 OR 2: CPT | Mod: RT

## 2023-01-25 PROCEDURE — 73552 X-RAY EXAM OF FEMUR 2/>: CPT | Mod: RT

## 2023-01-25 NOTE — HISTORY OF PRESENT ILLNESS
[de-identified] : right knee irrigation debridement DOS 11/21/22 [de-identified] : Patient is a 57-year-old male here today 2-month status post right knee irrigation debridement exchange of antibiotic spacer.  Overall has been doing well.  Has been off of his antibiotics for the past 3 weeks.  He has followed up with infectious disease.  He is not have any fevers chills or constitutional symptoms.  Has no issues with the wound.  States that he scratched his leg recently and had a area of skin irritation on the superficial and lateral aspect of the knee.  Is here today for follow-up. [de-identified] : Right lower extremity: Incision well-healed without erythema drainage or discharge no instability to varus or valgus stress, nontender palpation over the patella, nontender palpation over the femur, sensation intact light touch over the foot, foot warm well-perfused brisk cap there is a small area of superficial skin irritation over the distal aspect of the incision as well as lateral aspect of the leg without evidence of wound dehiscence wound compromise or infection. [de-identified] : 2 views of the right knee and 4 views of the right femur obtained the office today show no acute fracture or dislocation.  There is a right knee antibiotic spacer in appropriate line without evidence of fracture dislocation or hardware complication [de-identified] : Patient is a 57-year-old male here today 2 months status post right knee irrigation debridement exchange of antibiotic spacer.  Overall he is been doing well.  He has been cleared by infectious disease.  Has been off of his antibiotics.  He has had no recurrence in pain and swelling or symptoms of recurrent infection.  I have therefore ordered a repeat ESR CRP in order to trend for him preoperatively.  I did aspirate his knee today and received 2 cc of bloody fluid.  These have been sent for analysis.  We will tentatively proceed with surgical intervention at [facility] convenient date.  All questions were asked and answered.  I will see him back preoperatively for repeat evaluation peer

## 2023-01-25 NOTE — PROCEDURE
[Aspiration] : Aspiration [Right] : of the right [Diagnostic] : Diagnostic [Patient] : patient [Alcohol] : Alcohol [Betadine] : Betadine [Medial] : medial [Anterior] : anterior [18] : an 18-gauge [___ mL Fluid] : [unfilled] mL of [Bloody] : bloody [Culture] : culture [Cell Count] : cell count [Gram Stain] : gram stain [Crystal Analysis] : crystal analysis [Tolerated Well] : The patient tolerated the procedure well [None] : none

## 2023-01-26 NOTE — PHYSICAL THERAPY INITIAL EVALUATION ADULT - DISCHARGE DISPOSITION, PT EVAL
No
Home. Assist as needed.
home pending progress and medical management. recommendations subject to change.

## 2023-01-30 LAB
B PERT IGG+IGM PNL SER: ABNORMAL
COLOR FLD: NORMAL
COMMENT: NORMAL
EOSINOPHIL # FLD MANUAL: 18 %
FLUID INTAKE SUBSTANCE CLASS: NORMAL
LYMPHOCYTES # FLD MANUAL: 30 %
MESOTHL CELL NFR FLD: 0 %
MONOS+MACROS NFR FLD MANUAL: 5 %
NEUTS SEG # FLD MANUAL: 47 %
NRBC # FLD: 0 %
RBC # FLD MANUAL: ABNORMAL /UL
SYCRY CLARITY: ABNORMAL
SYCRY COLOR: ABNORMAL
SYCRY ID: NORMAL
SYCRY TUBE: NORMAL
TOTAL CELLS COUNTED FLD: 2405 /UL
TUBE TYPE: NORMAL
UNIDENT CELLS NFR FLD MANUAL: 0 %
VARIANT LYMPHS # FLD MANUAL: 0 %

## 2023-02-16 ENCOUNTER — APPOINTMENT (OUTPATIENT)
Dept: ORTHOPEDIC SURGERY | Facility: CLINIC | Age: 58
End: 2023-02-16
Payer: MEDICAID

## 2023-02-16 PROCEDURE — 73560 X-RAY EXAM OF KNEE 1 OR 2: CPT | Mod: RT

## 2023-02-16 PROCEDURE — 99024 POSTOP FOLLOW-UP VISIT: CPT

## 2023-02-16 PROCEDURE — 73552 X-RAY EXAM OF FEMUR 2/>: CPT | Mod: RT

## 2023-02-16 RX ORDER — DICLOFENAC SODIUM 1% 10 MG/G
1 GEL TOPICAL DAILY
Qty: 1 | Refills: 1 | Status: ACTIVE | COMMUNITY
Start: 2023-02-16 | End: 1900-01-01

## 2023-02-16 RX ORDER — NYSTATIN 100000 [USP'U]/G
100000 CREAM TOPICAL TWICE DAILY
Qty: 1 | Refills: 0 | Status: ACTIVE | COMMUNITY
Start: 2023-02-16 | End: 1900-01-01

## 2023-02-16 NOTE — HISTORY OF PRESENT ILLNESS
[de-identified] : right knee irrigation debridement DOS 11/21/22 [de-identified] : 57-year-old male presents to office for 3 month follow up status post right knee irrigation debridement exchange of antibiotic spacer. The patient completed his antibiotics on 1/2/23. He states his pain has be worsening. He has not been ambulating and has been relying on a wheelchair to get around. He was recently discharged from his [facility] and is now in a shelter in [place]. He states that he has been experiencing skin lesions that come and go on his right lower extremity that cause pain, he recently went to the ED to have them examined. He denies any drainage from his wound. He is here for follow and discuss the details of his planned procedure in March. \par  [de-identified] : Right lower extremity: Incision well-healed without erythema drainage or discharge no instability to varus or valgus stress, nontender palpation over the patella, nontender palpation over the femur, sensation intact light touch over the foot, foot warm well-perfused brisk cap there is a small area of superficial skin irritation over the distal aspect of the incision as well as lateral aspect of the leg without evidence of wound dehiscence wound compromise or infection.  There is a small skin lesion over the anterolateral aspect of the leg not near the incision consistent with ringworm [de-identified] : 2 views of the right knee and 4 views of the right femur obtained the office today show no acute fracture or dislocation.  There is a right knee antibiotic spacer in appropriate line without evidence of fracture dislocation or hardware complication [de-identified] : Patient is a 57-year-old male following up for his right knee irrigation debridement repeat antibiotic spacer.  His aspiration has been negative for infection.  He is not having any increasing swelling or pain in the leg.  He is not having any issues with the wound.  I therefore recommended that we proceed with a right knee revision arthroplasty.  Patient is agreement.  We discussed risk benefits alternatives Koeller to blood loss infection damage to vascular structures risk need for revision surgery risk of periprosthetic fracture as well as high risk of infection in the future.  Patient is agreement.  He also understands that we increased risk of damage to his extensor mechanism.  All questions were asked and answered.  I will see him back postoperatively for repeat evaluation.  As for the small rash on his leg I have prescribed him a nystatin cream.

## 2023-02-27 ENCOUNTER — OUTPATIENT (OUTPATIENT)
Dept: OUTPATIENT SERVICES | Facility: HOSPITAL | Age: 58
LOS: 1 days | End: 2023-02-27
Payer: COMMERCIAL

## 2023-02-27 VITALS
DIASTOLIC BLOOD PRESSURE: 70 MMHG | WEIGHT: 205.47 LBS | HEIGHT: 68 IN | SYSTOLIC BLOOD PRESSURE: 130 MMHG | HEART RATE: 82 BPM | RESPIRATION RATE: 18 BRPM | TEMPERATURE: 98 F | OXYGEN SATURATION: 96 %

## 2023-02-27 DIAGNOSIS — T84.018D BROKEN INTERNAL JOINT PROSTHESIS, OTHER SITE, SUBSEQUENT ENCOUNTER: ICD-10-CM

## 2023-02-27 DIAGNOSIS — Z96.651 PRESENCE OF RIGHT ARTIFICIAL KNEE JOINT: Chronic | ICD-10-CM

## 2023-02-27 DIAGNOSIS — S83.512A SPRAIN OF ANTERIOR CRUCIATE LIGAMENT OF LEFT KNEE, INITIAL ENCOUNTER: Chronic | ICD-10-CM

## 2023-02-27 DIAGNOSIS — Z01.818 ENCOUNTER FOR OTHER PREPROCEDURAL EXAMINATION: ICD-10-CM

## 2023-02-27 DIAGNOSIS — Z13.89 ENCOUNTER FOR SCREENING FOR OTHER DISORDER: ICD-10-CM

## 2023-02-27 DIAGNOSIS — Z29.9 ENCOUNTER FOR PROPHYLACTIC MEASURES, UNSPECIFIED: ICD-10-CM

## 2023-02-27 LAB
A1C WITH ESTIMATED AVERAGE GLUCOSE RESULT: 6.1 % — HIGH (ref 4–5.6)
ALBUMIN SERPL ELPH-MCNC: 3.8 G/DL — SIGNIFICANT CHANGE UP (ref 3.3–5.2)
ALP SERPL-CCNC: 133 U/L — HIGH (ref 40–120)
ALT FLD-CCNC: 28 U/L — SIGNIFICANT CHANGE UP
AMPHET UR-MCNC: NEGATIVE — SIGNIFICANT CHANGE UP
ANION GAP SERPL CALC-SCNC: 8 MMOL/L — SIGNIFICANT CHANGE UP (ref 5–17)
APTT BLD: 28.9 SEC — SIGNIFICANT CHANGE UP (ref 27.5–35.5)
AST SERPL-CCNC: 24 U/L — SIGNIFICANT CHANGE UP
BARBITURATES UR SCN-MCNC: NEGATIVE — SIGNIFICANT CHANGE UP
BASOPHILS # BLD AUTO: 0.04 K/UL — SIGNIFICANT CHANGE UP (ref 0–0.2)
BASOPHILS NFR BLD AUTO: 0.8 % — SIGNIFICANT CHANGE UP (ref 0–2)
BENZODIAZ UR-MCNC: POSITIVE
BILIRUB SERPL-MCNC: <0.2 MG/DL — LOW (ref 0.4–2)
BLD GP AB SCN SERPL QL: SIGNIFICANT CHANGE UP
BUN SERPL-MCNC: 14.6 MG/DL — SIGNIFICANT CHANGE UP (ref 8–20)
CALCIUM SERPL-MCNC: 8.7 MG/DL — SIGNIFICANT CHANGE UP (ref 8.4–10.5)
CHLORIDE SERPL-SCNC: 101 MMOL/L — SIGNIFICANT CHANGE UP (ref 96–108)
CO2 SERPL-SCNC: 28 MMOL/L — SIGNIFICANT CHANGE UP (ref 22–29)
COCAINE METAB.OTHER UR-MCNC: POSITIVE
CREAT SERPL-MCNC: 0.81 MG/DL — SIGNIFICANT CHANGE UP (ref 0.5–1.3)
EGFR: 102 ML/MIN/1.73M2 — SIGNIFICANT CHANGE UP
EOSINOPHIL # BLD AUTO: 0.52 K/UL — HIGH (ref 0–0.5)
EOSINOPHIL NFR BLD AUTO: 9.9 % — HIGH (ref 0–6)
ESTIMATED AVERAGE GLUCOSE: 128 MG/DL — HIGH (ref 68–114)
GLUCOSE SERPL-MCNC: 91 MG/DL — SIGNIFICANT CHANGE UP (ref 70–99)
HCT VFR BLD CALC: 37 % — LOW (ref 39–50)
HGB BLD-MCNC: 11.8 G/DL — LOW (ref 13–17)
IMM GRANULOCYTES NFR BLD AUTO: 0.2 % — SIGNIFICANT CHANGE UP (ref 0–0.9)
INR BLD: 0.97 RATIO — SIGNIFICANT CHANGE UP (ref 0.88–1.16)
LYMPHOCYTES # BLD AUTO: 1.84 K/UL — SIGNIFICANT CHANGE UP (ref 1–3.3)
LYMPHOCYTES # BLD AUTO: 35.1 % — SIGNIFICANT CHANGE UP (ref 13–44)
MCHC RBC-ENTMCNC: 28.5 PG — SIGNIFICANT CHANGE UP (ref 27–34)
MCHC RBC-ENTMCNC: 31.9 GM/DL — LOW (ref 32–36)
MCV RBC AUTO: 89.4 FL — SIGNIFICANT CHANGE UP (ref 80–100)
METHADONE UR-MCNC: NEGATIVE — SIGNIFICANT CHANGE UP
MONOCYTES # BLD AUTO: 0.56 K/UL — SIGNIFICANT CHANGE UP (ref 0–0.9)
MONOCYTES NFR BLD AUTO: 10.7 % — SIGNIFICANT CHANGE UP (ref 2–14)
MRSA PCR RESULT.: SIGNIFICANT CHANGE UP
NEUTROPHILS # BLD AUTO: 2.27 K/UL — SIGNIFICANT CHANGE UP (ref 1.8–7.4)
NEUTROPHILS NFR BLD AUTO: 43.3 % — SIGNIFICANT CHANGE UP (ref 43–77)
OPIATES UR-MCNC: NEGATIVE — SIGNIFICANT CHANGE UP
PCP SPEC-MCNC: SIGNIFICANT CHANGE UP
PCP UR-MCNC: NEGATIVE — SIGNIFICANT CHANGE UP
PLATELET # BLD AUTO: 222 K/UL — SIGNIFICANT CHANGE UP (ref 150–400)
POTASSIUM SERPL-MCNC: 4 MMOL/L — SIGNIFICANT CHANGE UP (ref 3.5–5.3)
POTASSIUM SERPL-SCNC: 4 MMOL/L — SIGNIFICANT CHANGE UP (ref 3.5–5.3)
PROT SERPL-MCNC: 6.8 G/DL — SIGNIFICANT CHANGE UP (ref 6.6–8.7)
PROTHROM AB SERPL-ACNC: 11.2 SEC — SIGNIFICANT CHANGE UP (ref 10.5–13.4)
RBC # BLD: 4.14 M/UL — LOW (ref 4.2–5.8)
RBC # FLD: 13.9 % — SIGNIFICANT CHANGE UP (ref 10.3–14.5)
S AUREUS DNA NOSE QL NAA+PROBE: DETECTED
SODIUM SERPL-SCNC: 137 MMOL/L — SIGNIFICANT CHANGE UP (ref 135–145)
THC UR QL: POSITIVE
WBC # BLD: 5.24 K/UL — SIGNIFICANT CHANGE UP (ref 3.8–10.5)
WBC # FLD AUTO: 5.24 K/UL — SIGNIFICANT CHANGE UP (ref 3.8–10.5)

## 2023-02-27 PROCEDURE — G0463: CPT

## 2023-02-27 NOTE — H&P PST ADULT - PROBLEM SELECTOR PLAN 1
preop assessment, medical clearance pending, Right TKR replacement revision w/Dr Canada scheduled for 3/17/2023

## 2023-02-27 NOTE — H&P PST ADULT - HISTORY OF PRESENT ILLNESS
58 yo M with PMH of HTN, HLD, MARIBEL using mouthpiece, depression, anxiety, diverticulitis, Hx of drug abuse and ETOH abuse, OA presents to PST with c/o severe right knee pain. He is s/p right knee replacement on 6/14/2022, and antibiotic spacer on 11/21/2022. States after his right knee replacement he was having reoccurring infections. He states that the pain in his right knee has been getting progressively worse. He relapsed and used heroin, was rushed to the emergency room and received Narcan on 11/1/2022. He is currently using a knee brace and wheelchair for ambulation. He denies fevers, chills, nausea, vomiting. Patient is scheduled for Right TKR replacement revision w/Dr Canada scheduled for 3/17/2023

## 2023-02-27 NOTE — CHART NOTE - NSCHARTNOTEFT_GEN_A_CORE
Hx of chronic pain and drug abuse; on suboxone; see full report below    Search Terms: paul sun, 1965Search Date: 02/27/2023 10:56:42 AM  The Drug Utilization Report below displays all of the controlled substance prescriptions, if any, that your patient has filled in the last twelve months. The information displayed on this report is compiled from pharmacy submissions to the Department, and accurately reflects the information as submitted by the pharmacies.    This report was requested by: Brisa Tay | Reference #: [id number]    You have not added a YEYO number. Keeping your YEYO number(s) up to date on the My YEYO # page will enable the separation of your prescriptions from others in the search results.    Others' Prescriptions  Patient Name: Paul Sun  YOB: 1965  Address: [address] [place] [address]  Sex: Male    Rx Written	Rx Dispensed	Drug	Quantity	Days Supply	Prescriber Name  10/21/2022	10/21/2022	buprenorphine-naloxone 8-2 mg sl film	28	14	Murray, Lionel H  10/07/2022	10/07/2022	buprenorphine-naloxone 8-2 mg sl film	28	14	Murray, Lionel H  09/23/2022	09/24/2022	buprenorphine-naloxone 8-2 mg sl film	14	14	Murray, Lionel H  09/12/2022	09/12/2022	buprenorphine-naloxone 8-2 mg sl film	28	14	Murray, Lionel H  06/02/2022	06/02/2022	oxycodone hcl (ir) 5 mg tablet	20	7	Lizbet Borrego RPA-JOSE  05/24/2022	05/24/2022	oxycodone hcl (ir) 5 mg tablet	28	7	Timothy Jolley  05/18/2022	05/18/2022	oxycodone hcl (ir) 5 mg tablet	42	7	Antolin Carbone PA-C  05/02/2022	05/02/2022	oxycodone hcl (ir) 5 mg tablet	28	7	Brenda Quinonez L  03/31/2022	04/01/2022	oxycodone hcl (ir) 5 mg tablet	28	7	Onel Walker J (MD)    Patient Name: Paul SunBirth Date: 1965  [address] [place], [place] [address]Fcd: Male  Rx Written	Rx Dispensed	Drug	Quantity	Days Supply	Prescriber Name  11/04/2022	11/04/2022	buprenorphine-naloxone 8-2 mg sl film	58	29	Lionel Gao H  03/16/2022	03/16/2022	oxycodone-acetaminophen 5-325 mg tablet	42	6	Erich Doherty (PA-C)    Patient Name: Paul SunBirth Date: 1965  Address: [address] [place], [place] [address]Bir: Male  Rx Written	Rx Dispensed	Drug	Quantity	Days Supply	Prescriber Name  05/12/2022	05/13/2022	oxycodone hcl (ir) 5 mg tablet	28	7	Arun Milner RPA-C    Patient Name: Paul SunBirth Date: 1965  Address: [address] [place] [address] [address] [place], [place] [address]Amz: Male  Rx Written	Rx Dispensed	Drug	Quantity	Days Supply	Prescriber Name  11/03/2022	11/04/2022	buprenorphine-naloxone 8-2 mg sl film	2	1	Gordon Ramires    Patient Name: Paul SunBirth Date: 1965  Address: [address] [place] [id number]: Male  Rx Written	Rx Dispensed	Drug	Quantity	Days Supply	Prescriber Name  01/02/2023	01/03/2023	buprenorphine-naloxone 8-2 mg sl film	60	30	Sushil Morales MD  12/08/2022	12/08/2022	buprenorphine-naloxone 8-2 mg sl film	60	30	Sushil Morales MD  12/07/2022	12/07/2022	buprenorphine-naloxone 8-2 mg sl film	10	5	Sushil Morales MD  07/08/2022	07/09/2022	buprenorphine-naloxone 8-2 mg sl tablet	60	30	Sushil Morales MD  06/23/2022	06/23/2022	buprenorphine-naloxone 8-2 mg sl tablet	30	30	Sushil Morales MD    Patient Name: Paul SunBirth Date: 1965  Address: [address] [place], [place] [address]Zwu: Male  Rx Written	Rx Dispensed	Drug	Quantity	Days Supply	Prescriber Name  02/17/2023	02/20/2023	buprenorphine-naloxone 8-2 mg sl film	28	14	MurrayLionel cha  02/06/2023	02/08/2023	buprenorphine-naloxone 8-2 mg sl film	28	14	Lionel Gao  01/27/2023	01/27/2023	buprenorphine-naloxone 8-2 mg sl film	28	14	Lionel Gao

## 2023-02-27 NOTE — H&P PST ADULT - ASSESSMENT
58 yo M with PMH of HTN, HLD, MARIBEL using mouthpiece, depression, anxiety, diverticulitis, Hx of drug abuse and ETOH abuse, OA presents to PST with c/o severe right knee pain. He is s/p right knee replacement on 2022, and antibiotic spacer on 2022. States after his right knee replacement he was having reoccurring infections. He states that the pain in his right knee has been getting progressively worse. He relapsed and used heroin, was rushed to the emergency room and received Narcan on 2022. He is currently using a knee brace and wheelchair for ambulation. He denies fevers, chills, nausea, vomiting. Patient is scheduled for Right TKR replacement revision w/Dr Canada scheduled for 3/17/2023    Advised patient to discuss with pain management Dr Gao regarding use of suboxone leading up to surgery, patient agrees and verbalized understanding.     Pt was educated on preop preparation with written and verbal instructions. Pt was informed to obtain clearance >3 days before surgery. Pt was educated on NSAIDs, multivitamins and herbals that increase the risk of bleeding and need to be stopped 7 days before procedure. Pt was educated on covid testing and covid prevention, i.e. social distancing, handwashing, mask wearing. Pt verbalized understanding of the above.     OPIOID RISK TOOL    GABE EACH BOX THAT APPLIES AND ADD TOTALS AT THE END    FAMILY HISTORY OF SUBSTANCE ABUSE                 FEMALE         MALE                                                Alcohol                             [  ]1 pt          [  ]3pts                                               Illegal Durgs                     [  ]2 pts        [  ]3pts                                               Rx Drugs                           [  ]4 pts        [  ]4 pts    PERSONAL HISTORY OF SUBSTANCE ABUSE                                                                                          Alcohol                             [  ]3 pts       [  x]3 pts                                               Illegal Drugs                     [  ]4 pts        [ x ]4 pts                                               Rx Drugs                           [  ]5 pts        [  ]5 pts    AGE BETWEEN 16-45 YEARS                                      [  ]1 pt         [  ]1 pt    HISTORY OF PREADOLESCENT   SEXUAL ABUSE                                                             [  ]3 pts        [  ]0pts    PSYCHOLOGICAL DISEASE                     ADD, OCD, Bipolar, Schizophrenia        [  ]2 pts         [  ]2 pts                      Depression                                               [  ]1 pt           [x  ]1 pt           SCORING TOTAL   (add numbers and type here)              ( 8 )                                     A score of 3 or lower indicated LOW risk for future opioid abuse  A score of 4 to 7 indicated moderate risk for future opioid abuse  A score of 8 or higher indicates a high risk for opioid abuse    CAPRINI VTE 2.0 SCORE [CLOT updated 2019]    AGE RELATED RISK FACTORS                                                       MOBILITY RELATED FACTORS  [x ] Age 41-60 years                                            (1 Point)                    [ ] Bed rest                                                        (1 Point)  [ ] Age: 61-74 years                                           (2 Points)                  [ ] Plaster cast                                                   (2 Points)  [ ] Age= 75 years                                              (3 Points)                    [ ] Bed bound for more than 72 hours                 (2 Points)    DISEASE RELATED RISK FACTORS                                               GENDER SPECIFIC FACTORS  [ ] Edema in the lower extremities                       (1 Point)              [ ] Pregnancy                                                     (1 Point)  [ ] Varicose veins                                               (1 Point)                     [ ] Post-partum < 6 weeks                                   (1 Point)             [x ] BMI > 25 Kg/m2                                            (1 Point)                     [ ] Hormonal therapy  or oral contraception          (1 Point)                 [ ] Sepsis (in the previous month)                        (1 Point)               [ ] History of pregnancy complications                 (1 point)  [ ] Pneumonia or serious lung disease                                               [ ] Unexplained or recurrent                     (1 Point)           (in the previous month)                               (1 Point)  [ ] Abnormal pulmonary function test                     (1 Point)                 SURGERY RELATED RISK FACTORS  [ ] Acute myocardial infarction                              (1 Point)               [ ]  Section                                             (1 Point)  [ ] Congestive heart failure (in the previous month)  (1 Point)      [ ] Minor surgery                                                  (1 Point)   [ ] Inflammatory bowel disease                             (1 Point)               [ ] Arthroscopic surgery                                        (2 Points)  [ ] Central venous access                                      (2 Points)                [ ] General surgery lasting more than 45 minutes (2 points)  [ ] Malignancy- Present or previous                   (2 Points)                [x ] Elective arthroplasty                                         (5 points)    [ ] Stroke (in the previous month)                          (5 Points)                                                                                                                                                           HEMATOLOGY RELATED FACTORS                                                 TRAUMA RELATED RISK FACTORS  [ ] Prior episodes of VTE                                     (3 Points)                [ ] Fracture of the hip, pelvis, or leg                       (5 Points)  [ ] Positive family history for VTE                         (3 Points)             [ ] Acute spinal cord injury (in the previous month)  (5 Points)  [ ] Prothrombin 00812 A                                     (3 Points)               [ ] Paralysis  (less than 1 month)                             (5 Points)  [ ] Factor V Leiden                                             (3 Points)                  [ ] Multiple Trauma within 1 month                        (5 Points)  [ ] Lupus anticoagulants                                     (3 Points)                                                           [ ] Anticardiolipin antibodies                               (3 Points)                                                       [ ] High homocysteine in the blood                      (3 Points)                                             [ ] Other congenital or acquired thrombophilia      (3 Points)                                                [ ] Heparin induced thrombocytopenia                  (3 Points)                                     Total Score [     7     ]

## 2023-02-27 NOTE — H&P PST ADULT - PROBLEM SELECTOR PLAN 2
ort score 8, high risk for substance abuse, urine toxicology ordered, pt was instructed to ask pain management Dr Gao for instructions on suboxone preop    chart note for Dr Smith sent; pain consult ordered on admit

## 2023-02-27 NOTE — H&P PST ADULT - MUSCULOSKELETAL
details… no joint swelling/no calf tenderness/decreased ROM/decreased ROM due to pain/strength 5/5 bilateral upper extremities/abnormal gait

## 2023-02-27 NOTE — H&P PST ADULT - NSICDXPASTMEDICALHX_GEN_ALL_CORE_FT
PAST MEDICAL HISTORY:  Anxiety and depression     Broken internal joint prosthesis, other site, subsequent encounter     History of drug abuse     History of ETOH abuse     HLD (hyperlipidemia)     HTN (hypertension)     MARIBEL (obstructive sleep apnea)     Osteoarthritis

## 2023-02-27 NOTE — H&P PST ADULT - NSICDXFAMILYHX_GEN_ALL_CORE_FT
FAMILY HISTORY:  FH: CAD (coronary artery disease)    Father  Still living? No  FH: prostate cancer, Age at diagnosis: Age Unknown    Mother  Still living? Unknown  FH: diverticulitis, Age at diagnosis: Age Unknown    Sibling  Still living? Unknown  FH: diabetes mellitus, Age at diagnosis: Age Unknown

## 2023-03-07 PROBLEM — F19.11 OTHER PSYCHOACTIVE SUBSTANCE ABUSE, IN REMISSION: Chronic | Status: ACTIVE | Noted: 2023-02-27

## 2023-03-07 PROBLEM — F10.11 ALCOHOL ABUSE, IN REMISSION: Chronic | Status: ACTIVE | Noted: 2023-02-27

## 2023-03-07 PROBLEM — G47.33 OBSTRUCTIVE SLEEP APNEA (ADULT) (PEDIATRIC): Chronic | Status: ACTIVE | Noted: 2023-02-27

## 2023-03-07 PROBLEM — F41.9 ANXIETY DISORDER, UNSPECIFIED: Chronic | Status: ACTIVE | Noted: 2023-02-27

## 2023-03-07 PROBLEM — T84.018D: Chronic | Status: ACTIVE | Noted: 2023-02-27

## 2023-03-09 ENCOUNTER — APPOINTMENT (OUTPATIENT)
Dept: ORTHOPEDIC SURGERY | Facility: CLINIC | Age: 58
End: 2023-03-09
Payer: MEDICAID

## 2023-03-09 VITALS
DIASTOLIC BLOOD PRESSURE: 84 MMHG | SYSTOLIC BLOOD PRESSURE: 153 MMHG | HEART RATE: 82 BPM | BODY MASS INDEX: 31.22 KG/M2 | HEIGHT: 68 IN | WEIGHT: 206 LBS

## 2023-03-09 PROCEDURE — 73560 X-RAY EXAM OF KNEE 1 OR 2: CPT | Mod: RT

## 2023-03-09 PROCEDURE — 99213 OFFICE O/P EST LOW 20 MIN: CPT

## 2023-03-09 PROCEDURE — 73552 X-RAY EXAM OF FEMUR 2/>: CPT | Mod: RT
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
## 2023-03-09 NOTE — PHYSICAL EXAM
[de-identified] : Musculoskeletal:. Right knee exam shows no effusion, ROM is not examined due to a static spacer, diffuse joint line tenderness.  There is a well-healed midline surgical incision   No instability is noted to the knee.  There is mild tenderness palpation around the tibia.  Diffuse tenderness palpation around the knee no evidence of erythema drainage or discharge\par 5/5 motor strength in bilateral lower extremities. Sensory: Intact in bilateral lower extremities. DTRs: Biceps, brachioradialis, triceps, patellar, ankle and plantar 2+ and symmetric bilaterally. Pulses: dorsalis pedis, posterior tibial, femoral, popliteal, and radial 2+ and symmetric bilaterally. \par Constitutional: Alert and in no acute distress, but well-appearing. \par  [de-identified] : 4 views of the right femur obtained the office today show no acute fracture or dislocation.  There is an antibiotic spacer in appropriate line without evidence of new fracture dislocation or hardware complication.  2 views of the right knee obtained the office today show no acute fracture or dislocation.  Antibiotic spacer in appropriate line without evidence of fracture dislocation or hardware complication

## 2023-03-17 ENCOUNTER — APPOINTMENT (OUTPATIENT)
Dept: ORTHOPEDIC SURGERY | Facility: HOSPITAL | Age: 58
End: 2023-03-17

## 2023-03-21 ENCOUNTER — LABORATORY RESULT (OUTPATIENT)
Age: 58
End: 2023-03-21

## 2023-03-21 ENCOUNTER — EMERGENCY (EMERGENCY)
Facility: HOSPITAL | Age: 58
LOS: 0 days | Discharge: HOME | End: 2023-03-21
Attending: STUDENT IN AN ORGANIZED HEALTH CARE EDUCATION/TRAINING PROGRAM
Payer: MEDICAID

## 2023-03-21 VITALS
TEMPERATURE: 98 F | OXYGEN SATURATION: 95 % | HEART RATE: 64 BPM | RESPIRATION RATE: 20 BRPM | HEIGHT: 68 IN | DIASTOLIC BLOOD PRESSURE: 82 MMHG | SYSTOLIC BLOOD PRESSURE: 155 MMHG

## 2023-03-21 DIAGNOSIS — S83.512A SPRAIN OF ANTERIOR CRUCIATE LIGAMENT OF LEFT KNEE, INITIAL ENCOUNTER: Chronic | ICD-10-CM

## 2023-03-21 DIAGNOSIS — R11.0 NAUSEA: ICD-10-CM

## 2023-03-21 DIAGNOSIS — I10 ESSENTIAL (PRIMARY) HYPERTENSION: ICD-10-CM

## 2023-03-21 DIAGNOSIS — R73.03 PREDIABETES: ICD-10-CM

## 2023-03-21 DIAGNOSIS — E78.5 HYPERLIPIDEMIA, UNSPECIFIED: ICD-10-CM

## 2023-03-21 DIAGNOSIS — Z59.01 SHELTERED HOMELESSNESS: ICD-10-CM

## 2023-03-21 DIAGNOSIS — F41.8 OTHER SPECIFIED ANXIETY DISORDERS: ICD-10-CM

## 2023-03-21 DIAGNOSIS — M25.461 EFFUSION, RIGHT KNEE: ICD-10-CM

## 2023-03-21 DIAGNOSIS — L53.9 ERYTHEMATOUS CONDITION, UNSPECIFIED: ICD-10-CM

## 2023-03-21 DIAGNOSIS — Z96.651 PRESENCE OF RIGHT ARTIFICIAL KNEE JOINT: ICD-10-CM

## 2023-03-21 DIAGNOSIS — Z96.651 PRESENCE OF RIGHT ARTIFICIAL KNEE JOINT: Chronic | ICD-10-CM

## 2023-03-21 DIAGNOSIS — Z91.013 ALLERGY TO SEAFOOD: ICD-10-CM

## 2023-03-21 PROCEDURE — 99284 EMERGENCY DEPT VISIT MOD MDM: CPT | Mod: 25

## 2023-03-21 PROCEDURE — 73562 X-RAY EXAM OF KNEE 3: CPT | Mod: RT

## 2023-03-21 PROCEDURE — 73562 X-RAY EXAM OF KNEE 3: CPT | Mod: 26,RT

## 2023-03-21 PROCEDURE — 73552 X-RAY EXAM OF FEMUR 2/>: CPT | Mod: RT

## 2023-03-21 PROCEDURE — 73552 X-RAY EXAM OF FEMUR 2/>: CPT | Mod: 26,RT

## 2023-03-21 PROCEDURE — 99285 EMERGENCY DEPT VISIT HI MDM: CPT

## 2023-03-21 RX ADMIN — Medication 1 TABLET(S): at 22:21

## 2023-03-21 SDOH — ECONOMIC STABILITY - HOUSING INSECURITY: SHELTERED HOMELESSNESS: Z59.01
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
## 2023-03-21 NOTE — ED STATDOCS - PHYSICAL EXAMINATION
PA NOTE: GEN: AOX3, NAD. HEENT: Throat clear. Airway is patent. EYES: PERRLA. EOMI. Head: NC/AT. NECK: Supple, No JVD. FROM. C-spine non-tender. CV:S1S2, RRR, LUNGS: Non-labored breathing, no tachypnea. O2sat 100% RA. CTA b/l. No w/r/r. CHEST: Equal chest expansion and rise. No deformity. ABD: Soft, NT/ND, no rebound, no guarding. No CVAT. EXT: No e/c/c. 2+ distal pulses. SKIN: Old surgical scar right knee. No oozing or drainage. mild tenderness. NEURO: No focal deficits. CN II-XII intact. FROM. 5/5 motor and sensory. ~Sidney Delgado PA-C

## 2023-03-21 NOTE — ED STATDOCS - PROGRESS NOTE DETAILS
ortho consulted. they will see the patient after xrays ortho recc dc with out pt folow up ABx not needed   xr neg acute pathology PA: Patient is a 56 y/o male with PMHx of pre-DM, HLD, HTN, drug abuse, EtOH abuse who presents to [facility] c/o right knee pain. Pt has had multiple surgeries on the right knee s/p incident. Most recent surgery was done in November 2022. Pt states he had complications with infection at the knee in the past, but today endorses redness and oozing. Pt states he never had these symptoms in the past. Pt cannot bear any weight on his right knee. Pt denies fevers, no chills. Pt endorses +nausea and difficulty eating over the last couple of days. Surgery done by Dr. Canada. ~Sidney Delgado PA-C     ortho consulted. they will see the patient after xrays patient was dc'd home by dr. Zuniga. ~Sidney Delgado PA-C

## 2023-03-21 NOTE — ED STATDOCS - SKIN, MLM
Right knee old surgical scar healed well now with overlying erythema and serous drainage seen on band aid overlying it no active drainaige, no pus, no open wounds.  Left inguinal area old surgical scar healed well no palpable fluctuance abscess no fluid no drainage.

## 2023-03-21 NOTE — ED STATDOCS - ATTENDING APP SHARED VISIT CONTRIBUTION OF CARE
I, Lenard Zuniga, DO personally saw the patient with KELSEY.  I have personally performed a face to face diagnostic evaluation on this patient.  I have reviewed the KELSEY note and agree with the history, exam, and plan of care, except as noted.  I personally saw the patient and performed a substantive portion of the visit including all aspects of the medical decision making.

## 2023-03-21 NOTE — CONSULT NOTE ADULT - CONSULT REASON
R knee cellulitis R knee cellulitis  Consult called ~2155  Patient seen initially in XR and evaluated ~ 2159

## 2023-03-21 NOTE — ED STATDOCS - CLINICAL SUMMARY MEDICAL DECISION MAKING FREE TEXT BOX
Adult male coming in with skin cellulitis overlying the right knee surgical incision x1 day. Pt reports drainage not currently draining. VS normal, no fever. exam consistent with cellulitis around surgical site, no concern for septic joint. Plan knee x-ray, ortho evaluation, anticipate dc with abx and ortho follow up. Adult male coming in with skin cellulitis overlying the right knee surgical incision x1 day. Pt reports drainage not currently draining. VS normal, no fever. exam consistent with cellulitis around surgical site, no concern for septic joint. Plan knee x-ray, ortho evaluation, anticipate dc with abx and ortho follow up.    see progress notes. ~Sidney Delgado PA-C

## 2023-03-21 NOTE — CONSULT NOTE ADULT - ASSESSMENT
57M with rash over R knee incision likely 2' self-medication with topical antifungal    Plan:   Imaging findings reviewed and discussed with the patient.   Advised against using any creams, emollients, or other medications on knee incision.   TTWB in knee immobilizer at all times RLE  No need for abx  Pain management PRN  Incentive spirometry  No acute orthopaedic surgical intervention indicated at this time. This patient is orthopaedically stable for discharge.   Patient to follow up with Dr. Canada as an outpatient for further evaluation and management.   All of the patient's questions and concerns were answered and addressed.   Discussed with Dr. Canada who agrees with the plan.

## 2023-03-21 NOTE — ED ADULT TRIAGE NOTE - CHIEF COMPLAINT QUOTE
pt in wheelchair to ED c/o incision infection. pt states sutures were removed and has been having discharge from incisional site. denies fevers/cough. also endorses a right groin cyst approx 3 days ago. hx of staph

## 2023-03-21 NOTE — CONSULT NOTE ADULT - SUBJECTIVE AND OBJECTIVE BOX
27M hx R TKA c/b PJI, s/p explant with static abx spacer in 11/2022 with Dr. Canada of [facility] presents with right knee incision rash. Works as a , currently lives in a homeless shelter. Has been doing well since November until recently, within a week he was prescribed a topical antifungal for a toenail infection by someone at the shelter. About two days ago, he applied this antifungal to the entirety of his knee incision. Today, he noticed that the incision developed a rash and came to the ED for further management and evaluation. In the ED, denies fevers or chills. Denies any new knee pain. Patient denies any numbness, tingling, weakness, or any other orthopaedic complaint. Denies recent fall or trauma.     Exam:   T(C): 36.6 (03-21-23 @ 19:14), Max: 36.6 (03-21-23 @ 19:14)  T(F): 97.9 (03-21-23 @ 19:14), Max: 97.9 (03-21-23 @ 19:14)  HR: 64 (03-21-23 @ 19:14) (64 - 64)  BP: 155/82 (03-21-23 @ 19:14) (155/82 - 155/82)  RR: 20 (03-21-23 @ 19:14) (20 - 20)  SpO2: 95% (03-21-23 @ 19:14) (95% - 95%)    Gen: resting in bed, NAD  RLE:   Midline incision over right knee slightly erythematous, skin shiny; no expressible fluid. Subtle, fine rash over distal aspect of the incision. No open wounds, lesions, or abrasions.   NTTP throughout the extremity. No ROM of the knee  SILT L2-S1  GSC/TA/EHL/FHL intact  DP  pulse palpable.  No calf tenderness bilaterally.  Compartments soft and compressible.     Laboratory Results:       Imaging: XRs reviewed demonstrating a static antibiotic spacer across the knee without any signs of loosening or acute bony abnormality on wet read.

## 2023-03-21 NOTE — ED STATDOCS - PATIENT PORTAL LINK FT
You can access the FollowMyHealth Patient Portal offered by [facility] by registering at the following website: [email or web address]. By joining [facility]’s FollowMyHealth portal, you will also be able to view your health information using other applications (apps) compatible with our system.

## 2023-03-21 NOTE — ED STATDOCS - NSFOLLOWUPINSTRUCTIONS_ED_ALL_ED_FT
Follow up with your orthopedic doctor.     Seek immediate medical assistance for any new or worsening symptoms. If you have issues obtaining follow up, please call: [phone] or [phone]  to obtain a doctor or specialist who takes your insurance in your area.     Please take 600 mg of Ibuprofen (aka Motrin, Advil) and/or 650 mg Acetaminophen (aka Tylenol) every 6 hours, as needed, for mild-moderate pain.    Please do not take these medications if you do not have pain or if you have any history of bleeding disorders, kidney or liver disease.   Do not use ibuprofen if you are on blood thinners (anti-coagulation).

## 2023-03-22 NOTE — ED ADULT NURSE NOTE - FINAL NURSING ELECTRONIC SIGNATURE
Additional Notes: Patient consent was obtained to proceed with the visit and recommended plan of care after discussion of all risks and benefits, including the risks of COVID-19 exposure.
Detail Level: Simple
03-Sep-2022 17:38

## 2023-03-28 LAB
AMPHET UR-MCNC: NEGATIVE NG/ML
BARBITURATES UR-MCNC: NEGATIVE NG/ML
BENZODIAZ UR-MCNC: NORMAL NG/ML
COCAINE METAB.OTHER UR-MCNC: NEGATIVE NG/ML
CREATININE, URINE: 166.7 MG/DL
FENTANYL, URINE: NEGATIVE NG/ML
METHADONE UR-MCNC: NEGATIVE NG/ML
OPIATES UR-MCNC: NEGATIVE NG/ML
OXYCODONE/OXYMORPHONE, URINE: NEGATIVE NG/ML
PCP UR-MCNC: NEGATIVE NG/ML
PH, URINE: 5.4
PLEASE NOTE: DRUGSCRUR: NORMAL
THC UR QL: NORMAL NG/ML

## 2023-04-05 ENCOUNTER — APPOINTMENT (OUTPATIENT)
Dept: ORTHOPEDIC SURGERY | Facility: CLINIC | Age: 58
End: 2023-04-05
Payer: MEDICAID

## 2023-04-05 VITALS
SYSTOLIC BLOOD PRESSURE: 161 MMHG | WEIGHT: 206 LBS | DIASTOLIC BLOOD PRESSURE: 81 MMHG | HEART RATE: 66 BPM | BODY MASS INDEX: 31.22 KG/M2 | HEIGHT: 68 IN

## 2023-04-05 PROCEDURE — 99214 OFFICE O/P EST MOD 30 MIN: CPT

## 2023-04-05 PROCEDURE — 73560 X-RAY EXAM OF KNEE 1 OR 2: CPT | Mod: RT

## 2023-04-05 NOTE — HISTORY OF PRESENT ILLNESS
[de-identified] : Patient is a 49-year-old male with a right knee antibiotic spacer here today for follow-up.  He states he has been doing very well since his last visit.  Is been going to AA meetings.  He has stopped his drug use.  He states his back is improved and he is no longer needing to follow-up with a spine surgeon.  He has no pain.  He is very eager to try to proceed with his revision total knee arthroplasty.  He states he is turning his life around.  Denies any new trauma since his last visit.  Denies any fevers chills constitutional symptoms

## 2023-04-05 NOTE — PHYSICAL EXAM
[de-identified] : Musculoskeletal:. Right knee exam shows no effusion, ROM is not examined due to a static spacer, diffuse joint line tenderness.  There is a well-healed midline surgical incision   No instability is noted to the knee.  There is mild tenderness palpation around the tibia.  Diffuse tenderness palpation around the knee no evidence of erythema drainage or discharge\par 5/5 motor strength in bilateral lower extremities. Sensory: Intact in bilateral lower extremities. DTRs: Biceps, brachioradialis, triceps, patellar, ankle and plantar 2+ and symmetric bilaterally. Pulses: dorsalis pedis, posterior tibial, femoral, popliteal, and radial 2+ and symmetric bilaterally. \par Constitutional: Alert and in no acute distress, but well-appearing. \par  [de-identified] : 2 views of the right knee obtained the office today show no acute fracture or dislocation.There is a right antibiotic spacer in appropriate line with evidence of some subsidence.  No acute fracture
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
## 2023-04-06 ENCOUNTER — APPOINTMENT (OUTPATIENT)
Dept: ORTHOPEDIC SURGERY | Facility: CLINIC | Age: 58
End: 2023-04-06

## 2023-04-11 ENCOUNTER — APPOINTMENT (OUTPATIENT)
Dept: RADIOLOGY | Facility: CLINIC | Age: 58
End: 2023-04-11
Payer: MEDICAID

## 2023-04-11 ENCOUNTER — OUTPATIENT (OUTPATIENT)
Dept: OUTPATIENT SERVICES | Facility: HOSPITAL | Age: 58
LOS: 1 days | End: 2023-04-11
Payer: MEDICAID

## 2023-04-11 ENCOUNTER — RESULT REVIEW (OUTPATIENT)
Age: 58
End: 2023-04-11

## 2023-04-11 ENCOUNTER — APPOINTMENT (OUTPATIENT)
Dept: CT IMAGING | Facility: CLINIC | Age: 58
End: 2023-04-11
Payer: MEDICAID

## 2023-04-11 DIAGNOSIS — Z89.529 ACQUIRED ABSENCE OF UNSPECIFIED KNEE: ICD-10-CM

## 2023-04-11 DIAGNOSIS — Z96.651 PRESENCE OF RIGHT ARTIFICIAL KNEE JOINT: Chronic | ICD-10-CM

## 2023-04-11 DIAGNOSIS — S83.512A SPRAIN OF ANTERIOR CRUCIATE LIGAMENT OF LEFT KNEE, INITIAL ENCOUNTER: Chronic | ICD-10-CM

## 2023-04-11 PROCEDURE — 73552 X-RAY EXAM OF FEMUR 2/>: CPT | Mod: 26,RT

## 2023-04-11 PROCEDURE — 73552 X-RAY EXAM OF FEMUR 2/>: CPT

## 2023-04-11 PROCEDURE — 73700 CT LOWER EXTREMITY W/O DYE: CPT | Mod: 26,RT

## 2023-04-11 PROCEDURE — 73560 X-RAY EXAM OF KNEE 1 OR 2: CPT | Mod: 26,RT

## 2023-04-11 PROCEDURE — 73560 X-RAY EXAM OF KNEE 1 OR 2: CPT

## 2023-04-11 PROCEDURE — 73700 CT LOWER EXTREMITY W/O DYE: CPT

## 2023-04-15 NOTE — ED PROVIDER NOTE - CPE EDP PSYCH NORM
normal... decreased ability to use arms for pushing/pulling/decreased ability to use legs for bridging/pushing

## 2023-04-20 ENCOUNTER — INPATIENT (INPATIENT)
Facility: HOSPITAL | Age: 58
LOS: 7 days | Discharge: HOME | DRG: 753 | End: 2023-04-28
Attending: PSYCHIATRY & NEUROLOGY | Admitting: PSYCHIATRY & NEUROLOGY
Payer: MEDICAID

## 2023-04-20 VITALS
DIASTOLIC BLOOD PRESSURE: 60 MMHG | OXYGEN SATURATION: 95 % | HEIGHT: 68 IN | RESPIRATION RATE: 18 BRPM | WEIGHT: 207.01 LBS | SYSTOLIC BLOOD PRESSURE: 143 MMHG | TEMPERATURE: 97 F | HEART RATE: 62 BPM

## 2023-04-20 DIAGNOSIS — F19.10 OTHER PSYCHOACTIVE SUBSTANCE ABUSE, UNCOMPLICATED: ICD-10-CM

## 2023-04-20 DIAGNOSIS — F33.2 MAJOR DEPRESSIVE DISORDER, RECURRENT SEVERE WITHOUT PSYCHOTIC FEATURES: ICD-10-CM

## 2023-04-20 DIAGNOSIS — Z96.651 PRESENCE OF RIGHT ARTIFICIAL KNEE JOINT: Chronic | ICD-10-CM

## 2023-04-20 DIAGNOSIS — F33.9 MAJOR DEPRESSIVE DISORDER, RECURRENT, UNSPECIFIED: ICD-10-CM

## 2023-04-20 DIAGNOSIS — S83.512A SPRAIN OF ANTERIOR CRUCIATE LIGAMENT OF LEFT KNEE, INITIAL ENCOUNTER: Chronic | ICD-10-CM

## 2023-04-20 DIAGNOSIS — T14.91XA SUICIDE ATTEMPT, INITIAL ENCOUNTER: ICD-10-CM

## 2023-04-20 LAB
ALBUMIN SERPL ELPH-MCNC: 3.7 G/DL — SIGNIFICANT CHANGE UP (ref 3.3–5)
ALP SERPL-CCNC: 137 U/L — HIGH (ref 40–120)
ALT FLD-CCNC: 37 U/L — SIGNIFICANT CHANGE UP (ref 12–78)
AMPHET UR-MCNC: NEGATIVE — SIGNIFICANT CHANGE UP
ANION GAP SERPL CALC-SCNC: 5 MMOL/L — SIGNIFICANT CHANGE UP (ref 5–17)
APAP SERPL-MCNC: < 2 UG/ML (ref 10–30)
APPEARANCE UR: CLEAR — SIGNIFICANT CHANGE UP
AST SERPL-CCNC: 25 U/L — SIGNIFICANT CHANGE UP (ref 15–37)
BACTERIA # UR AUTO: NEGATIVE — SIGNIFICANT CHANGE UP
BARBITURATES UR SCN-MCNC: NEGATIVE — SIGNIFICANT CHANGE UP
BASOPHILS # BLD AUTO: 0.03 K/UL — SIGNIFICANT CHANGE UP (ref 0–0.2)
BASOPHILS NFR BLD AUTO: 0.5 % — SIGNIFICANT CHANGE UP (ref 0–2)
BENZODIAZ UR-MCNC: NEGATIVE — SIGNIFICANT CHANGE UP
BILIRUB SERPL-MCNC: 0.3 MG/DL — SIGNIFICANT CHANGE UP (ref 0.2–1.2)
BILIRUB UR-MCNC: NEGATIVE — SIGNIFICANT CHANGE UP
BUN SERPL-MCNC: 16 MG/DL — SIGNIFICANT CHANGE UP (ref 7–23)
CALCIUM SERPL-MCNC: 8.8 MG/DL — SIGNIFICANT CHANGE UP (ref 8.5–10.1)
CHLORIDE SERPL-SCNC: 109 MMOL/L — HIGH (ref 96–108)
CO2 SERPL-SCNC: 23 MMOL/L — SIGNIFICANT CHANGE UP (ref 22–31)
COCAINE METAB.OTHER UR-MCNC: POSITIVE — SIGNIFICANT CHANGE UP
COLOR SPEC: YELLOW — SIGNIFICANT CHANGE UP
COMMENT - URINE: SIGNIFICANT CHANGE UP
CREAT SERPL-MCNC: 0.87 MG/DL — SIGNIFICANT CHANGE UP (ref 0.5–1.3)
DIFF PNL FLD: ABNORMAL
EGFR: 100 ML/MIN/1.73M2 — SIGNIFICANT CHANGE UP
EOSINOPHIL # BLD AUTO: 0.16 K/UL — SIGNIFICANT CHANGE UP (ref 0–0.5)
EOSINOPHIL NFR BLD AUTO: 2.7 % — SIGNIFICANT CHANGE UP (ref 0–6)
EPI CELLS # UR: NEGATIVE — SIGNIFICANT CHANGE UP
ETHANOL SERPL-MCNC: 16 MG/DL — HIGH (ref 0–10)
FLUAV AG NPH QL: SIGNIFICANT CHANGE UP
FLUBV AG NPH QL: SIGNIFICANT CHANGE UP
GLUCOSE SERPL-MCNC: 105 MG/DL — HIGH (ref 70–99)
GLUCOSE UR QL: NEGATIVE — SIGNIFICANT CHANGE UP
GRAN CASTS # UR COMP ASSIST: ABNORMAL /LPF
HCT VFR BLD CALC: 39.7 % — SIGNIFICANT CHANGE UP (ref 39–50)
HGB BLD-MCNC: 13.3 G/DL — SIGNIFICANT CHANGE UP (ref 13–17)
HYALINE CASTS # UR AUTO: ABNORMAL /LPF
IMM GRANULOCYTES NFR BLD AUTO: 0.2 % — SIGNIFICANT CHANGE UP (ref 0–0.9)
KETONES UR-MCNC: ABNORMAL
LEUKOCYTE ESTERASE UR-ACNC: NEGATIVE — SIGNIFICANT CHANGE UP
LYMPHOCYTES # BLD AUTO: 1.01 K/UL — SIGNIFICANT CHANGE UP (ref 1–3.3)
LYMPHOCYTES # BLD AUTO: 16.8 % — SIGNIFICANT CHANGE UP (ref 13–44)
MCHC RBC-ENTMCNC: 29.9 PG — SIGNIFICANT CHANGE UP (ref 27–34)
MCHC RBC-ENTMCNC: 33.5 GM/DL — SIGNIFICANT CHANGE UP (ref 32–36)
MCV RBC AUTO: 89.2 FL — SIGNIFICANT CHANGE UP (ref 80–100)
METHADONE UR-MCNC: NEGATIVE — SIGNIFICANT CHANGE UP
MONOCYTES # BLD AUTO: 0.43 K/UL — SIGNIFICANT CHANGE UP (ref 0–0.9)
MONOCYTES NFR BLD AUTO: 7.2 % — SIGNIFICANT CHANGE UP (ref 2–14)
NEUTROPHILS # BLD AUTO: 4.37 K/UL — SIGNIFICANT CHANGE UP (ref 1.8–7.4)
NEUTROPHILS NFR BLD AUTO: 72.6 % — SIGNIFICANT CHANGE UP (ref 43–77)
NITRITE UR-MCNC: NEGATIVE — SIGNIFICANT CHANGE UP
OPIATES UR-MCNC: NEGATIVE — SIGNIFICANT CHANGE UP
PCP SPEC-MCNC: SIGNIFICANT CHANGE UP
PCP UR-MCNC: NEGATIVE — SIGNIFICANT CHANGE UP
PH UR: 5 — SIGNIFICANT CHANGE UP (ref 5–8)
PLATELET # BLD AUTO: 278 K/UL — SIGNIFICANT CHANGE UP (ref 150–400)
POTASSIUM SERPL-MCNC: 4.2 MMOL/L — SIGNIFICANT CHANGE UP (ref 3.5–5.3)
POTASSIUM SERPL-SCNC: 4.2 MMOL/L — SIGNIFICANT CHANGE UP (ref 3.5–5.3)
PROT SERPL-MCNC: 7.7 GM/DL — SIGNIFICANT CHANGE UP (ref 6–8.3)
PROT UR-MCNC: NEGATIVE — SIGNIFICANT CHANGE UP
RBC # BLD: 4.45 M/UL — SIGNIFICANT CHANGE UP (ref 4.2–5.8)
RBC # FLD: 13.6 % — SIGNIFICANT CHANGE UP (ref 10.3–14.5)
RBC CASTS # UR COMP ASSIST: NEGATIVE /HPF — SIGNIFICANT CHANGE UP (ref 0–4)
RSV RNA NPH QL NAA+NON-PROBE: SIGNIFICANT CHANGE UP
SALICYLATES SERPL-MCNC: <1.7 MG/DL (ref 2.8–20)
SARS-COV-2 RNA SPEC QL NAA+PROBE: SIGNIFICANT CHANGE UP
SODIUM SERPL-SCNC: 137 MMOL/L — SIGNIFICANT CHANGE UP (ref 135–145)
SP GR SPEC: 1.02 — SIGNIFICANT CHANGE UP (ref 1.01–1.02)
THC UR QL: POSITIVE — SIGNIFICANT CHANGE UP
TSH SERPL-MCNC: 0.76 UU/ML — SIGNIFICANT CHANGE UP (ref 0.34–4.82)
UROBILINOGEN FLD QL: NEGATIVE — SIGNIFICANT CHANGE UP
WBC # BLD: 6.01 K/UL — SIGNIFICANT CHANGE UP (ref 3.8–10.5)
WBC # FLD AUTO: 6.01 K/UL — SIGNIFICANT CHANGE UP (ref 3.8–10.5)
WBC UR QL: SIGNIFICANT CHANGE UP /HPF (ref 0–5)

## 2023-04-20 PROCEDURE — 99285 EMERGENCY DEPT VISIT HI MDM: CPT

## 2023-04-20 PROCEDURE — 84443 ASSAY THYROID STIM HORMONE: CPT

## 2023-04-20 PROCEDURE — 99221 1ST HOSP IP/OBS SF/LOW 40: CPT

## 2023-04-20 PROCEDURE — 97163 PT EVAL HIGH COMPLEX 45 MIN: CPT | Mod: GP

## 2023-04-20 PROCEDURE — 93010 ELECTROCARDIOGRAM REPORT: CPT

## 2023-04-20 PROCEDURE — 97116 GAIT TRAINING THERAPY: CPT | Mod: GP

## 2023-04-20 PROCEDURE — 80178 ASSAY OF LITHIUM: CPT

## 2023-04-20 PROCEDURE — 80061 LIPID PANEL: CPT

## 2023-04-20 PROCEDURE — 93005 ELECTROCARDIOGRAM TRACING: CPT

## 2023-04-20 PROCEDURE — 83036 HEMOGLOBIN GLYCOSYLATED A1C: CPT

## 2023-04-20 PROCEDURE — 36415 COLL VENOUS BLD VENIPUNCTURE: CPT

## 2023-04-20 RX ORDER — ATORVASTATIN CALCIUM 80 MG/1
40 TABLET, FILM COATED ORAL AT BEDTIME
Refills: 0 | Status: DISCONTINUED | OUTPATIENT
Start: 2023-04-20 | End: 2023-04-20

## 2023-04-20 RX ORDER — SOD,AMMONIUM,POTASSIUM LACTATE
1 CREAM (GRAM) TOPICAL
Refills: 0 | Status: DISCONTINUED | OUTPATIENT
Start: 2023-04-20 | End: 2023-04-28

## 2023-04-20 RX ORDER — BUPROPION HYDROCHLORIDE 150 MG/1
300 TABLET, EXTENDED RELEASE ORAL DAILY
Refills: 0 | Status: DISCONTINUED | OUTPATIENT
Start: 2023-04-20 | End: 2023-04-28

## 2023-04-20 RX ORDER — BUPRENORPHINE AND NALOXONE 2; .5 MG/1; MG/1
1 TABLET SUBLINGUAL ONCE
Refills: 0 | Status: DISCONTINUED | OUTPATIENT
Start: 2023-04-20 | End: 2023-04-20

## 2023-04-20 RX ORDER — BUPRENORPHINE AND NALOXONE 2; .5 MG/1; MG/1
1 TABLET SUBLINGUAL
Refills: 0 | Status: DISCONTINUED | OUTPATIENT
Start: 2023-04-20 | End: 2023-04-27

## 2023-04-20 RX ORDER — NICOTINE POLACRILEX 2 MG
2 GUM BUCCAL
Refills: 0 | Status: DISCONTINUED | OUTPATIENT
Start: 2023-04-20 | End: 2023-04-23

## 2023-04-20 RX ORDER — TRAZODONE HCL 50 MG
200 TABLET ORAL AT BEDTIME
Refills: 0 | Status: DISCONTINUED | OUTPATIENT
Start: 2023-04-20 | End: 2023-04-28

## 2023-04-20 RX ORDER — ATORVASTATIN CALCIUM 80 MG/1
40 TABLET, FILM COATED ORAL AT BEDTIME
Refills: 0 | Status: DISCONTINUED | OUTPATIENT
Start: 2023-04-20 | End: 2023-04-28

## 2023-04-20 RX ADMIN — BUPRENORPHINE AND NALOXONE 1 FILM(S): 2; .5 TABLET SUBLINGUAL at 16:28

## 2023-04-20 RX ADMIN — Medication 1 APPLICATION(S): at 22:08

## 2023-04-20 RX ADMIN — BUPRENORPHINE AND NALOXONE 1 FILM(S): 2; .5 TABLET SUBLINGUAL at 22:04

## 2023-04-20 RX ADMIN — Medication 200 MILLIGRAM(S): at 22:04

## 2023-04-20 RX ADMIN — ATORVASTATIN CALCIUM 40 MILLIGRAM(S): 80 TABLET, FILM COATED ORAL at 22:04

## 2023-04-20 RX ADMIN — BUPROPION HYDROCHLORIDE 300 MILLIGRAM(S): 150 TABLET, EXTENDED RELEASE ORAL at 16:28

## 2023-04-20 NOTE — H&P ADULT - TIME BILLING
I spent a total of 55 minutes on the date of this encounter coordinating the patient's care. This includes reviewing prior documentation, results and imaging in addition to completing a history and physical examination on the patient. Further tests, medications, and procedures have been ordered as indicated. Laboratory results and the plan of care were communicated to the patient . Supporting documentation was completed and added to the patient's chart.

## 2023-04-20 NOTE — ED ADULT NURSE NOTE - OBJECTIVE STATEMENT
Pt from [facility], states " I may have taken more pills than I should have but I don't remember cause im tired".  Endorses drinking, marijuana and cocaine use last night .  Denies SI/HI.  Denies any complaints

## 2023-04-20 NOTE — ED ADULT TRIAGE NOTE - CHIEF COMPLAINT QUOTE
pt presents to Ed with complaints of possibly taking too much clonidine at [facility] today. per EMS, [facility] staff report pt wrote a note stating "fuck it all" and was found laying in bed with pills all around him. pt states "it was dark and I was transferring my medication and fell asleep while I was doing it. They think I took too much medicine" pt denies SI and HI at this time.

## 2023-04-20 NOTE — ED BEHAVIORAL HEALTH ASSESSMENT NOTE - DESCRIPTION
Vital Signs Last 24 Hrs  T(C): 36.3 (20 Apr 2023 10:44), Max: 36.3 (20 Apr 2023 10:44)  T(F): 97.4 (20 Apr 2023 10:44), Max: 97.4 (20 Apr 2023 10:44)  HR: 62 (20 Apr 2023 10:44) (62 - 62)  BP: 143/60 (20 Apr 2023 10:44) (143/60 - 143/60)  BP(mean): --  RR: 18 (20 Apr 2023 10:44) (18 - 18)  SpO2: 95% (20 Apr 2023 10:44) (95% - 95%)    Parameters below as of 20 Apr 2023 10:44  Patient On (Oxygen Delivery Method): room air See HPI see hospitalist note

## 2023-04-20 NOTE — BH PATIENT PROFILE - FUNCTIONAL ASSESSMENT - BASIC MOBILITY 6.
3-calculated by average/Not able to assess (calculate score using [facility] averaging method)

## 2023-04-20 NOTE — BH PATIENT PROFILE - HOME MEDICATIONS
Suboxone 8 mg-2 mg sublingual film , 1 film(s) sublingual 2 times a day  cloNIDine 0.1 mg oral tablet , 1 tab(s) orally 2 times a day  traZODone 100 mg oral tablet , 2 tab(s) orally once a day (at bedtime)  atorvastatin 40 mg oral tablet , 1 tab(s) orally once a day  busPIRone 5 mg oral tablet , 1 tab(s) orally 2 times a day

## 2023-04-20 NOTE — ED PROVIDER NOTE - OBJECTIVE STATEMENT
58 yo M with PMHx of EtOH abuse, drug abuse, anxiety and depression, HTN on clonidine, HLD presents to ED c/o ?overdose at [facility] today. According to [facility] staff on the triage document, pt was found unconscious in bed with many pills around him. Pt reports being drunk last night and states he may have took too many clonidine pills last night at around 12am. Does not remember taking any other medications. Denies SI or HI now. Admits to cocaine drug use recently. Reports prior detox hx, but pt is not interested to go to detox now. States he was clean for about 2.5 years and relapsed last year.

## 2023-04-20 NOTE — ED BEHAVIORAL HEALTH ASSESSMENT NOTE - NS ED BHA ED COURSE UTILIZATION OF 1 TO 1 IN ED YN
Pt to er for covid test, close exposure no symptoms, no covid vaccine    The history is provided by the patient. Past Medical History:   Diagnosis Date    Seizures ([address]     approx 2 years ago was last seizure       No past surgical history on file. No family history on file. Social History     Socioeconomic History    Marital status: SINGLE     Spouse name: Not on file    Number of children: Not on file    Years of education: Not on file    Highest education level: Not on file   Occupational History    Not on file   Tobacco Use    Smoking status: Never Smoker    Smokeless tobacco: Never Used   Substance and Sexual Activity    Alcohol use: Not Currently    Drug use: Never    Sexual activity: Not on file   Other Topics Concern    Not on file   Social History Narrative    Not on file     Social Determinants of Health     Financial Resource Strain:     Difficulty of Paying Living Expenses:    Food Insecurity:     Worried About Running Out of Food in the Last Year:     [address] in the Last Year:    Transportation Needs:     Lack of Transportation (Medical):  Lack of Transportation (Non-Medical):    Physical Activity:     Days of Exercise per Week:     Minutes of Exercise per Session:    Stress:     Feeling of Stress :    Social Connections:     Frequency of Communication with Friends and Family:     Frequency of Social Gatherings with Friends and Family:     Attends Anglican Services:     Active Member of Clubs or Organizations:     Attends Club or Organization Meetings:     Marital Status:    Intimate Partner Violence:     Fear of Current or Ex-Partner:     Emotionally Abused:     Physically Abused:     Sexually Abused: ALLERGIES: Patient has no known allergies. Review of Systems   All other systems reviewed and are negative. There were no vitals filed for this visit. Physical Exam  Vitals and nursing note reviewed.    Constitutional: General: He is not in acute distress. Appearance: Normal appearance. He is well-developed. He is not diaphoretic. HENT:      Head: Normocephalic and atraumatic. Right Ear: External ear normal.      Left Ear: External ear normal.   Eyes:      Pupils: Pupils are equal, round, and reactive to light. Cardiovascular:      Rate and Rhythm: Normal rate and regular rhythm. Pulmonary:      Effort: Pulmonary effort is normal.      Breath sounds: Normal breath sounds. Abdominal:      General: Bowel sounds are normal.      Palpations: Abdomen is soft. Musculoskeletal:         General: Normal range of motion. Cervical back: Normal range of motion and neck supple. Skin:     General: Skin is warm. Neurological:      General: No focal deficit present. Mental Status: He is alert and oriented to person, place, and time. Psychiatric:         Mood and Affect: Mood normal.         Behavior: Behavior normal.          MDM  Number of Diagnoses or Management Options  Diagnosis management comments:  Will covid test        Amount and/or Complexity of Data Reviewed  Clinical lab tests: ordered  Review and summarize past medical records: yes    Risk of Complications, Morbidity, and/or Mortality  Presenting problems: minimal  Diagnostic procedures: minimal  Management options: minimal    Patient Progress  Patient progress: improved         Procedures Yes

## 2023-04-20 NOTE — ED ADULT TRIAGE NOTE - BMI (KG/M2)
AMINAH Fu at bedside for update regarding transfer. [facility] requesting blood work prior to patient transfer.   
31.5
No

## 2023-04-20 NOTE — H&P ADULT - NSHPPHYSICALEXAM_GEN_ALL_CORE
Vital Signs Last 24 Hrs  T(C): 36.4 (20 Apr 2023 16:35), Max: 36.4 (20 Apr 2023 16:35)  T(F): 97.6 (20 Apr 2023 16:35), Max: 97.6 (20 Apr 2023 16:35)  HR: 66 (20 Apr 2023 16:35) (62 - 66)  BP: 120/65 (20 Apr 2023 16:35) (120/65 - 143/60)  BP(mean): --  RR: 18 (20 Apr 2023 16:35) (18 - 18)  SpO2: 96% (20 Apr 2023 16:35) (95% - 96%)    Parameters below as of 20 Apr 2023 16:35  Patient On (Oxygen Delivery Method): room air

## 2023-04-20 NOTE — ED BEHAVIORAL HEALTH ASSESSMENT NOTE - NSBHATTESTAPPBILLTIME_PSY_A_CORE
I attest my time as KELSEY is greater than 50% of the total combined time spent on qualifying patient care activities. I have reviewed and verified the documentation.

## 2023-04-20 NOTE — ED BEHAVIORAL HEALTH ASSESSMENT NOTE - SUMMARY
56 y/o single, un-employed male, single, no dependents, domiciled in [facility]. PPHx of anxiety, depression, polysubstance abuse (heroin, alcohol, xanax, cocaine, THC) and hx of overdose x 4 was sober x 2 years, however recently relapsed. 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 Patient was BIB EMS after being found lethargic this morning, surrounded by open bottles of his medication and a note that said "Fuck it all." Psychiatry consulted for evaluation.    Patient presents s/p SA via overdose and symptoms of depression. These symptoms represent a change from baseline from which the patient cannot be reasonably expected to improve with current level of care. The patient presents with risk requiring inpatient psychiatric hospitalization for safety and stabilization.

## 2023-04-20 NOTE — BH CHART NOTE - NSEVENTNOTEFT_PSY_ALL_CORE
Confidential Drug Utilization Report  Search Terms: paul sun, 1965Search Date: 04/20/2023 13:19:32 PM  The Drug Utilization Report below displays all of the controlled substance prescriptions, if any, that your patient has filled in the last twelve months. The information displayed on this report is compiled from pharmacy submissions to the Department, and accurately reflects the information as submitted by the pharmacies.    This report was requested by: Sima Baker | Reference #: [id number]    Others' Prescriptions  Patient Name: Paul SunBirth Date: 1965  Address: [address] [place] [id number]: Male  Rx Written	Rx Dispensed	Drug	Quantity	Days Supply	Prescriber Name	Prescriber Roberta #	Payment Method	Dispenser  01/02/2023	01/03/2023	buprenorphine-naloxone 8-2 mg sl film	60	30	AndrewSushil braun MD	[id number]	Medicaid	[facility]  12/08/2022	12/08/2022	buprenorphine-naloxone 8-2 mg sl film	60	30	Andrew, Sushil CYR	[id number]	Medicaid	[facility]  12/07/2022	12/07/2022	buprenorphine-naloxone 8-2 mg sl film	10	5	Andrew, Sushil CYR	[id number]	Medicaid	[facility]  07/08/2022	07/09/2022	buprenorphine-naloxone 8-2 mg sl tablet	60	30	Andrew, Sushil CYR	[id number]	Medicaid	[facility]  06/23/2022	06/23/2022	buprenorphine-naloxone 8-2 mg sl tablet	30	30	Andrew, Sushil CYR	[id number]	Medicaid	[facility]    Patient Name: Paul SunBirth Date: 1965  Address: [address] [place] [address] [address] [place], [place] [address]Ohe: Male  Rx Written	Rx Dispensed	Drug	Quantity	Days Supply	Prescriber Name	Prescriber Roberta #	Payment Method	Dispenser  03/24/2023	04/05/2023	buprenorphine-naloxone 8-2 mg sl film	14	7	Lionel Gao	[id number]	Medicaid	[id number]  03/13/2023	03/20/2023	buprenorphine-naloxone 8-2 mg sl film	28	14	Lionel Gao	[id number]	Medicaid	[id number]  11/03/2022	11/04/2022	buprenorphine-naloxone 8-2 mg sl film	2	1	Gordon [id number]	[facility] #[id number]    Patient Name: Paul SunBirth Date: 1965  Address: [address] [place], [place] [address]Ita: Male  Rx Written	Rx Dispensed	Drug	Quantity	Days Supply	Prescriber Name	Prescriber Roberta #	Payment Method	Dispenser  10/21/2022	10/21/2022	buprenorphine-naloxone 8-2 mg sl film	28	14	Kenney Gaoed NICOLE	[id number]	Medicaid	[id number]  10/07/2022	10/07/2022	buprenorphine-naloxone 8-2 mg sl film	28	14	Kenney Gaoed H	[id number]	Medicaid	[facility][id number]  09/23/2022	09/24/2022	buprenorphine-naloxone 8-2 mg sl film	14	14	Kenney Gaoed NICOLE	[id number]	Medicaid	[facility][id number]  09/12/2022	09/12/2022	buprenorphine-naloxone 8-2 mg sl film	28	14	Kenney Gaoed H	[id number]	Medicaid	[facility][id number]  06/02/2022	06/02/2022	oxycodone hcl (ir) 5 mg tablet	20	7	Lizbet Borrego	[id number]	Medicaid	[facility][id number]  05/24/2022	05/24/2022	oxycodone hcl (ir) 5 mg tablet	28	7	Timothy Jolley	[id number]	Medicaid	[facility][id number]  05/18/2022	05/18/2022	oxycodone hcl (ir) 5 mg tablet	42	7	Antolin Carbone PA-C	[id number]	Medicaid	[facility][id number]  05/02/2022	05/02/2022	oxycodone hcl (ir) 5 mg tablet	28	7	Brenda Quinonez L	[id number]	Medicaid	[facility][id number]    Patient Name: Paul SunBirth Date: 1965  Address: [address] [place], [place] [address]Lto: Male  Rx Written	Rx Dispensed	Drug	Quantity	Days Supply	Prescriber Name	Prescriber Roberta #	Payment Method	Dispenser  11/04/2022	11/04/2022	buprenorphine-naloxone 8-2 mg sl film	58	29	Kenney Gaoed NICOLE	[id number]	Medicaid	[facility] #[id number]    Patient Name: Paul SunBirth Date: 1965  Address: [address] [place], [place] [address]Onq: Male  Rx Written	Rx Dispensed	Drug	Quantity	Days Supply	Prescriber Name	Prescriber Roberta #	Payment Method	Dispenser  05/12/2022	05/13/2022	oxycodone hcl (ir) 5 mg tablet	28	7	Sonamangela [facility]ole [facility]	[id number]	Insurance	[facility]    Patient Name: Paul SunBirth Date: 1965  Address: [address] [place] [id number]: Male  Rx Written	Rx Dispensed	Drug	Quantity	Days Supply	Prescriber Name	Prescriber Roberta #	Payment Method	Dispenser  04/07/2023	04/12/2023	buprenorphine-naloxone 8-2 mg sl film	28	14	Lionel Gao	[id number]	Medicaid	[id number]  02/28/2023	03/04/2023	buprenorphine-naloxone 8-2 mg sl film	28	14	Lionel Gao	[id number]	[facility] #[id number]  02/17/2023	02/20/2023	buprenorphine-naloxone 8-2 mg sl film	28	14	Lionel Gao	[id number]	[facility] #[id number]  02/06/2023	02/08/2023	buprenorphine-naloxone 8-2 mg sl film	28	14	Lionel Gao	[id number]	Insurance	[facility] #[id number]  01/27/2023	01/27/2023	buprenorphine-naloxone 8-2 mg sl film	28	14	Lionel Gao	[id number]	[facility] #[id number]

## 2023-04-20 NOTE — ED ADULT NURSE NOTE - CHIEF COMPLAINT QUOTE
pt presents to Ed with complaints of possibly taking too much clonidine at [facility] today. per EMS, [facility] staff report pt wrote a note stating "fuck it all" and was found laying in bed with pills all around him. pt states "it was dark and I was transferring my medication and fell asleep while I was doing it. They think I took too much medicine" pt denies SI and HI at this time.

## 2023-04-20 NOTE — ED ADULT NURSE REASSESSMENT NOTE - NS ED NURSE REASSESS COMMENT FT1
Security aware patient needs wanding.  Security at code greys
Pt resting comfortably in bed.  No complaints at this time.

## 2023-04-20 NOTE — ED BEHAVIORAL HEALTH ASSESSMENT NOTE - OTHER PAST PSYCHIATRIC HISTORY (INCLUDE DETAILS REGARDING ONSET, COURSE OF ILLNESS, INPATIENT/OUTPATIENT TREATMENT)
Depression, anxiety   Admissions to [facility] addiction treatment center. Last admitted for 6 mos in 2017  [facility]  H/o suicidal attempts via overdose  admitted [facility] 2015 Luis age 14

## 2023-04-20 NOTE — ED PROVIDER NOTE - CLINICAL SUMMARY MEDICAL DECISION MAKING FREE TEXT BOX
12:30 - spoke with psych who is waiting for collaboration from pt's friend at [facility] for story. Most likely DC home. To sign out to Dr. Burk at 1pm. 12:30 - spoke with psych who is waiting for collaboration from pt's friend at [facility] for story. Most likely DC home. To sign out to Dr. Burk at 1pm.    58 yo M with PMHx of EtOH abuse, drug abuse, anxiety and depression, HTN, HLD presents to ED took too much of clonidine. Per nursing note, pt was found with a  note with SI. Will obtain labs, EKG, psych eval. BP and pulse stable at this time.

## 2023-04-20 NOTE — H&P ADULT - ASSESSMENT
#Major depression  1. management as per psych          #Alcohol use  no signs or symptoms of withdrawl  1. monitor      #Knee immobilzer due to removal of knee prosthesis  has no complaints  wants to be able to make surgery appt for knee prosthesis  1. ambulates w walker  2. on 1:1 given knee immobilzer  3. PT consult pending      #Nicotine dependence  pt requested gum  1. nicotine      #Polysubstance abuse  1. suboxone      #Rash  pt states has had rash for 1 month  told it was from his prior skin infection  pt w dry skin and multiple pinpoint scabs on L shoulder and R arm  1. lac hydrin BID        #VTE  low risk

## 2023-04-20 NOTE — H&P ADULT - HISTORY OF PRESENT ILLNESS
56 yo M with PMHx of EtOH abuse, drug abuse, anxiety and depression, HTN on clonidine, HLD presents to ED c/o ?overdose at [facility] today. According to [facility] staff on the triage document, pt was found unconscious in bed with many pills around him. Pt reports being drunk last night and states he may have took too many clonidine pills last night at around 12am. Does not remember taking any other medications. Denies SI or HI now. Admits to cocaine drug use recently. Reports prior detox hx, but pt is not interested to go to detox now. States he was clean for about 2.5 years and relapsed last year.  · Presenting Symptoms: sleepy, ?overdose    PAST MEDICAL HX  Anxiety and depression   Broken internal joint prosthesis, other site, subsequent encounter   History of drug abuse   History of ETOH abuse   HLD (hyperlipidemia)   HTN (hypertension)   MARIBEL (obstructive sleep apnea)   Osteoarthritis.     PAST SURGICAL HX  ACL (anterior cruciate ligament) tear, left, initial encounter   H/O total knee replacement, right 2022.     FAMILY HX  FH: CAD (coronary artery disease)    Father  Still living? No  FH: prostate cancer, Age at diagnosis: Age Unknown    Mother  Still living? Unknown  FH: diverticulitis, Age at diagnosis: Age Unknown    Sibling  Still living? Unknown  FH: diabetes mellitus, Age at diagnosis: Age Unknown.    57 year old male w alcohol abuse, drug abuse, anxiety + depression, HTN on clonidine, HLD,  R Knee immobilzer s/p removal of prosthesis presents to ED c/o ?overdose at [facility]      "According to [facility] staff on the triage document, pt was found unconscious in bed with many pills around him. Pt reports being drunk last night and states he may have took too many clonidine pills last night at around 12am. Does not remember taking any other medications. Denies SI or HI now. Admits to cocaine drug use recently. Reports prior detox hx, but pt is not interested to go to detox now. States he was clean for about 2.5 years and relapsed last year"    Pt is admitted to 5 N w depression     We were asked to consult for medical management      Pt was interviewed and examined w  staff in attendance        PAST MEDICAL HX  Anxiety and depression   Broken internal joint prosthesis, other site, subsequent encounter   History of drug abuse   History of ETOH abuse   HLD (hyperlipidemia)   HTN (hypertension)   MARIBEL (obstructive sleep apnea)   Osteoarthritis.     PAST SURGICAL HX  ACL (anterior cruciate ligament) tear, left, initial encounter   H/O total knee replacement, right 2022.     FAMILY HX  FH: CAD (coronary artery disease)    Father  Still living? No  FH: prostate cancer, Age at diagnosis: Age Unknown    Mother  Still living? Unknown  FH: diverticulitis, Age at diagnosis: Age Unknown    Sibling  Still living? Unknown  FH: diabetes mellitus, Age at diagnosis: Age Unknown.      SOCIAL HX  has been at [facility]  Smoking hx : vapes  Alcohol use  Drug use THC + cocaine    57 year old male w alcohol abuse, drug abuse, anxiety + depression, HTN on clonidine, HLD,  R Knee immobilzer s/p removal of prosthesis presents to ED c/o ?overdose at [facility]      "According to [facility] staff on the triage document, pt was found unconscious in bed with many pills around him. Pt reports being drunk last night and states he may have took too many clonidine pills last night at around 12am. Does not remember taking any other medications. Denies SI or HI now. Admits to cocaine drug use recently. Reports prior detox hx, but pt is not interested to go to detox now. States he was clean for about 2.5 years and relapsed last year"    Pt is admitted to 5 N w depression     We were asked to consult for medical management      Pt was interviewed and examined w  staff in attendance        PAST MEDICAL HX  Anxiety and depression   Broken internal joint prosthesis, other site, subsequent encounter   History of drug abuse   History of ETOH abuse   HLD (hyperlipidemia)   HTN (hypertension)   MARIBEL (obstructive sleep apnea)   Osteoarthritis.     PAST SURGICAL HX  ACL (anterior cruciate ligament) tear, left, initial encounter   H/O total knee replacement, right 2022.     FAMILY HX  FH: CAD (coronary artery disease)    Father  Still living? No  FH: prostate cancer, Age at diagnosis: Age Unknown    Mother  Still living? Unknown  FH: diverticulitis, Age at diagnosis: Age Unknown    Sibling  Still living? Unknown  FH: diabetes mellitus, Age at diagnosis: Age Unknown.      SOCIAL HX  has been at [facility]  Smoking hx : vapes  Alcohol use  Drug use THC + cocaine

## 2023-04-20 NOTE — ED PROVIDER NOTE - CARE PLAN
Principal Discharge DX:	MDD (major depressive disorder), recurrent episode  Secondary Diagnosis:	Polysubstance abuse   1

## 2023-04-20 NOTE — ED BEHAVIORAL HEALTH ASSESSMENT NOTE - HPI (INCLUDE ILLNESS QUALITY, SEVERITY, DURATION, TIMING, CONTEXT, MODIFYING FACTORS, ASSOCIATED SIGNS AND SYMPTOMS)
56 y/o single, un-employed male, single, no dependents, domiciled in [facility]. PPHx of anxiety, depression, polysubstance abuse (heroin, alcohol, xanax, cocaine, THC) and hx of overdose x 4 was sober x 2 years, however recently relapsed. 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 Patient was BIB EMS after being found lethargic this morning, surrounded by open bottles of his medication and a note that said "Fuck it all." Psychiatry consulted for evaluation.    Patient presents lethargic, but able to arouse by verbal stimuli. He initially reports he must have "dropped my medications on floor" and denies ingesting and additional doses. Patient reports the note is a song [place] that he was going to text a friend. Reports he was drinking four kamila, smoking marijuana and doing cocaine last night. Initially he denies SA. Writer was given permission to obtain patients phone for phone numbers and to view text messages with patient for collateral , there were several text messages to "Freddie" stating he was going to end his life via overdose last night. Patient states he doesn't remember because he was intoxicated but endorses feeling extremely depressed for the past year r/t relapsing, unstable house and multiple medical problems.

## 2023-04-20 NOTE — ED BEHAVIORAL HEALTH ASSESSMENT NOTE - DETAILS
R knee pain Multiple family members with anxiety, depression, and alcohol abuse Admit Dr. Olmstead See HPI

## 2023-04-21 LAB
A1C WITH ESTIMATED AVERAGE GLUCOSE RESULT: 5.7 % — HIGH (ref 4–5.6)
CHOLEST SERPL-MCNC: 190 MG/DL — SIGNIFICANT CHANGE UP
ESTIMATED AVERAGE GLUCOSE: 117 MG/DL — HIGH (ref 68–114)
HDLC SERPL-MCNC: 42 MG/DL — SIGNIFICANT CHANGE UP
LIPID PNL WITH DIRECT LDL SERPL: 103 MG/DL — HIGH
NON HDL CHOLESTEROL: 149 MG/DL — HIGH
TRIGL SERPL-MCNC: 228 MG/DL — HIGH
TSH SERPL-MCNC: 2.25 UU/ML — SIGNIFICANT CHANGE UP (ref 0.34–4.82)

## 2023-04-21 PROCEDURE — 99223 1ST HOSP IP/OBS HIGH 75: CPT

## 2023-04-21 RX ORDER — GUANFACINE 3 MG/1
1 TABLET, EXTENDED RELEASE ORAL
Refills: 0 | Status: DISCONTINUED | OUTPATIENT
Start: 2023-04-21 | End: 2023-04-28

## 2023-04-21 RX ORDER — LITHIUM CARBONATE 300 MG/1
300 TABLET, EXTENDED RELEASE ORAL THREE TIMES A DAY
Refills: 0 | Status: DISCONTINUED | OUTPATIENT
Start: 2023-04-21 | End: 2023-04-26

## 2023-04-21 RX ADMIN — Medication 200 MILLIGRAM(S): at 20:19

## 2023-04-21 RX ADMIN — BUPROPION HYDROCHLORIDE 300 MILLIGRAM(S): 150 TABLET, EXTENDED RELEASE ORAL at 09:35

## 2023-04-21 RX ADMIN — Medication 1 APPLICATION(S): at 20:23

## 2023-04-21 RX ADMIN — BUPRENORPHINE AND NALOXONE 1 FILM(S): 2; .5 TABLET SUBLINGUAL at 20:20

## 2023-04-21 RX ADMIN — GUANFACINE 1 MILLIGRAM(S): 3 TABLET, EXTENDED RELEASE ORAL at 19:00

## 2023-04-21 RX ADMIN — Medication 1 APPLICATION(S): at 12:45

## 2023-04-21 RX ADMIN — Medication 2 MILLIGRAM(S): at 09:35

## 2023-04-21 RX ADMIN — ATORVASTATIN CALCIUM 40 MILLIGRAM(S): 80 TABLET, FILM COATED ORAL at 20:20

## 2023-04-21 RX ADMIN — BUPRENORPHINE AND NALOXONE 1 FILM(S): 2; .5 TABLET SUBLINGUAL at 09:35

## 2023-04-21 RX ADMIN — LITHIUM CARBONATE 300 MILLIGRAM(S): 300 TABLET, EXTENDED RELEASE ORAL at 20:19

## 2023-04-21 NOTE — BH INPATIENT PSYCHIATRY ASSESSMENT NOTE - NSBHMETABOLIC_PSY_ALL_CORE_FT
BMI: BMI (kg/m2): 31.5 (04-20-23 @ 10:44)  HbA1c: A1C with Estimated Average Glucose Result: 6.1 % (02-27-23 @ 10:30)    Glucose: POCT Blood Glucose.: 121 mg/dL (11-21-22 @ 21:44)    BP: 120/65 (04-20-23 @ 16:35) (120/65 - 143/60)  Lipid Panel: Date/Time: 04-21-23 @ 07:54  Cholesterol, Serum: 190  Direct LDL: --  HDL Cholesterol, Serum: 42  Total Cholesterol/HDL Ration Measurement: --  Triglycerides, Serum: 228   BMI: BMI (kg/m2): 31.5 (04-20-23 @ 10:44)  HbA1c: A1C with Estimated Average Glucose Result: 5.7 % (04-21-23 @ 07:54)    Glucose: POCT Blood Glucose.: 121 mg/dL (11-21-22 @ 21:44)    BP: 120/65 (04-20-23 @ 16:35) (120/65 - 143/60)  Lipid Panel: Date/Time: 04-21-23 @ 07:54  Cholesterol, Serum: 190  Direct LDL: --  HDL Cholesterol, Serum: 42  Total Cholesterol/HDL Ration Measurement: --  Triglycerides, Serum: 228

## 2023-04-21 NOTE — BH SAFETY PLAN - LOCAL URGENT CARE ADDRESS
Go to the closest emergency room, urgent care facility or call [phone] if you are having thoughts to harm yourself.

## 2023-04-21 NOTE — BH INPATIENT PSYCHIATRY ASSESSMENT NOTE - NSBHASSESSSUMMFT_PSY_ALL_CORE
RF: chronic mental illness and substance abuse    PF: engaged in treatment, sobriety, residential and employment stability  Low  High Acute Suicide Risk  Yes  () High  (  ) Moderate   (x ) Low   (  ) Unable to determine       ACUTE RISK:   medication non-compliance, unemployed, still isolative, depression     CHRONIC RISK FACTORS: history of aborted suicide attempt,  history of alcohol abuse, THC and cocaine, Patient with hx of  non-compliant with his psychotropic meds and reports sporadic suicidal thoughts ,but denies intent or plan. half-way time x 30 years related to drug offenses     PROTECTIVE FACTORS:  intelligent

## 2023-04-21 NOTE — BH INPATIENT PSYCHIATRY ASSESSMENT NOTE - NSBHCHARTREVIEWVS_PSY_A_CORE FT
Vital Signs Last 24 Hrs  T(C): 36.4 (04-21-23 @ 07:12), Max: 36.4 (04-20-23 @ 16:35)  T(F): 97.5 (04-21-23 @ 07:12), Max: 97.6 (04-20-23 @ 16:35)  HR: 66 (04-20-23 @ 16:35) (66 - 66)  BP: 120/65 (04-20-23 @ 16:35) (120/65 - 120/65)  BP(mean): --  RR: 19 (04-21-23 @ 07:12) (18 - 19)  SpO2: 95% (04-21-23 @ 07:12) (95% - 96%)    Orthostatic VS  04-21-23 @ 07:12  Lying BP: --/-- HR: --  Sitting BP: 144/71 HR: 58  Standing BP: 155/72 HR: 70  Site: upper right arm  Mode: electronic   Vital Signs Last 24 Hrs  T(C): 36.4 (04-21-23 @ 07:12), Max: 36.4 (04-21-23 @ 07:12)  T(F): 97.5 (04-21-23 @ 07:12), Max: 97.5 (04-21-23 @ 07:12)  HR: --  BP: --  BP(mean): --  RR: 19 (04-21-23 @ 07:12) (19 - 19)  SpO2: 95% (04-21-23 @ 07:12) (95% - 95%)    Orthostatic VS  04-21-23 @ 07:12  Lying BP: --/-- HR: --  Sitting BP: 144/71 HR: 58  Standing BP: 155/72 HR: 70  Site: upper right arm  Mode: electronic

## 2023-04-21 NOTE — BH INPATIENT PSYCHIATRY ASSESSMENT NOTE - SELF INJURIOUS BEHAVIOR WITHOUT SUICIDAL INTENT:
If you are a smoker, it is important for your health to stop smoking. Please be aware that second hand smoke is also harmful. None known

## 2023-04-21 NOTE — BH INPATIENT PSYCHIATRY ASSESSMENT NOTE - HPI (INCLUDE ILLNESS QUALITY, SEVERITY, DURATION, TIMING, CONTEXT, MODIFYING FACTORS, ASSOCIATED SIGNS AND SYMPTOMS)
58 y/o single, un-employed male, single, no dependents, domiciled in [facility]. PPHx of anxiety, depression, polysubstance abuse (heroin, alcohol, xanax, cocaine, THC) and hx of overdose x 4 was sober x 2 years, however recently relapsed. 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 Patient was BIB EMS after being found lethargic this morning, surrounded by open bottles of his medication and a note that said "Fuck it all." Psychiatry consulted for evaluation.    Patient presents lethargic, but able to arouse by verbal stimuli. He initially reports he must have "dropped my medications on floor" and denies ingesting and additional doses. Patient reports the note is a song cuba that he was going to text a friend. Reports he was drinking four kamila, smoking marijuana and doing cocaine last night. Initially he denies SA. Writer was given permission to obtain patients phone for phone numbers and to view text messages with patient for collateral , there were several text messages to "Freddie" stating he was going to end his life via overdose last night. Patient states he doesn't remember because he was intoxicated but endorses feeling extremely depressed for the past year r/t relapsing, unstable house and multiple medical problems.

## 2023-04-21 NOTE — BH INPATIENT PSYCHIATRY ASSESSMENT NOTE - OTHER PAST PSYCHIATRIC HISTORY (INCLUDE DETAILS REGARDING ONSET, COURSE OF ILLNESS, INPATIENT/OUTPATIENT TREATMENT)
Depression, anxiety   Admissions to [facility] addiction treatment center. Last admitted for 6 mos in 2017  [facility] [place] outpatient treatment  H/o suicidal attempts via overdose  admitted [facility] 2015 Luis age 14

## 2023-04-21 NOTE — BH SAFETY PLAN - ENVIRONMENT SAFETY 3:
Attend AA/NA meetings regularly and get a sponsor to manage polysubstance misuse and maintain sobriety.

## 2023-04-21 NOTE — ASU PATIENT PROFILE, ADULT - NSICDXPASTMEDICALHX_GEN_ALL_CORE_FT
PAST MEDICAL HISTORY:  Alcohol abuse     Anxiety     Depression     HLD (hyperlipidemia)     HTN (hypertension)     Osteoarthritis     Overdose heroine and xanax 3/10/2015     English

## 2023-04-21 NOTE — BH INPATIENT PSYCHIATRY ASSESSMENT NOTE - CURRENT MEDICATION
MEDICATIONS  (STANDING):  ammonium lactate 12% Lotion 1 Application(s) Topical two times a day  atorvastatin 40 milliGRAM(s) Oral at bedtime  buprenorphine 8 mG/naloxone 2 mG SL Film 1 Film(s) SubLingual two times a day  buPROPion XL (24-Hour) 300 milliGRAM(s) Oral daily  traZODone 200 milliGRAM(s) Oral at bedtime    MEDICATIONS  (PRN):  nicotine  Polacrilex Gum 2 milliGRAM(s) Oral every 2 hours PRN nicotine craving   MEDICATIONS  (STANDING):  ammonium lactate 12% Lotion 1 Application(s) Topical two times a day  atorvastatin 40 milliGRAM(s) Oral at bedtime  buprenorphine 8 mG/naloxone 2 mG SL Film 1 Film(s) SubLingual two times a day  buPROPion XL (24-Hour) 300 milliGRAM(s) Oral daily  guanFACINE. 1 milliGRAM(s) Oral <User Schedule>  lithium 300 milliGRAM(s) Oral three times a day  traZODone 200 milliGRAM(s) Oral at bedtime    MEDICATIONS  (PRN):  nicotine  Polacrilex Gum 2 milliGRAM(s) Oral every 2 hours PRN nicotine craving

## 2023-04-21 NOTE — BH INPATIENT PSYCHIATRY ASSESSMENT NOTE - NSBHCRANIAL_PSY_ALL_CORE
Recognizes 2 fingers or can read (II)/Smiles, shows teeth, opens mouth, sticks out tongue (V, VII, XI)/Normal speech (IX, X, XII)/Extraocular Eye Movement Intact  (III, IV, VI)

## 2023-04-22 PROCEDURE — 99232 SBSQ HOSP IP/OBS MODERATE 35: CPT

## 2023-04-22 RX ADMIN — ATORVASTATIN CALCIUM 40 MILLIGRAM(S): 80 TABLET, FILM COATED ORAL at 20:38

## 2023-04-22 RX ADMIN — LITHIUM CARBONATE 300 MILLIGRAM(S): 300 TABLET, EXTENDED RELEASE ORAL at 08:08

## 2023-04-22 RX ADMIN — Medication 200 MILLIGRAM(S): at 20:39

## 2023-04-22 RX ADMIN — BUPRENORPHINE AND NALOXONE 1 FILM(S): 2; .5 TABLET SUBLINGUAL at 08:07

## 2023-04-22 RX ADMIN — BUPROPION HYDROCHLORIDE 300 MILLIGRAM(S): 150 TABLET, EXTENDED RELEASE ORAL at 08:08

## 2023-04-22 RX ADMIN — GUANFACINE 1 MILLIGRAM(S): 3 TABLET, EXTENDED RELEASE ORAL at 13:42

## 2023-04-22 RX ADMIN — GUANFACINE 1 MILLIGRAM(S): 3 TABLET, EXTENDED RELEASE ORAL at 18:08

## 2023-04-22 RX ADMIN — Medication 1 APPLICATION(S): at 20:42

## 2023-04-22 RX ADMIN — BUPRENORPHINE AND NALOXONE 1 FILM(S): 2; .5 TABLET SUBLINGUAL at 20:39

## 2023-04-22 RX ADMIN — LITHIUM CARBONATE 300 MILLIGRAM(S): 300 TABLET, EXTENDED RELEASE ORAL at 20:38

## 2023-04-22 RX ADMIN — Medication 1 APPLICATION(S): at 08:07

## 2023-04-22 RX ADMIN — GUANFACINE 1 MILLIGRAM(S): 3 TABLET, EXTENDED RELEASE ORAL at 08:08

## 2023-04-22 RX ADMIN — LITHIUM CARBONATE 300 MILLIGRAM(S): 300 TABLET, EXTENDED RELEASE ORAL at 13:42

## 2023-04-22 RX ADMIN — Medication 2 MILLIGRAM(S): at 08:07

## 2023-04-22 RX ADMIN — Medication 2 MILLIGRAM(S): at 20:52

## 2023-04-22 NOTE — BH INPATIENT PSYCHIATRY PROGRESS NOTE - NSBHFUPINTERVALHXFT_PSY_A_CORE
Covering MD Note ( 4/22/23)    The pt, 1 57 yr-old WM with bipolar disorder and polysubstance use d/o was admitted to psychiatry for exacerbation of both disorders, such that surgery for his right knee septic arthritic knee with great pain to the pt had to be cancelled and now rescheduled  for 6/23 as above. The pt is motivated to follow unit and med regimen rules and he is future oriented as to his upcoming surgery.    Pt seen by physical therapy on 4/22/23 and recommendation made for pt wheelchair . Medically necessary due to pt need for non weight bearing on right leg Wheelchair ordered 4/22/23.     The pt reported surprisingly good sleep and OK appetite. He reported tolerating his current med regimen of Lithium, Wellbutrin and Tenex. The pt continues on Suboxone 8/2  BID films and Lipitor. ( QA=576).   The pt has been encouraged to attend therapy groups as tolerated. The pt continues to deny any SI /HI /AH/ VH.   Currently the pt presents as a low risk for suicide in the context of his safe readmission to inpatient psychiatry and his willingness to adhere to his medication regimen . He remains future oriented and is looking forward to pain relif after his rt knee surgery scheduled for 6/23.

## 2023-04-22 NOTE — BH SOCIAL WORK INITIAL PSYCHOSOCIAL EVALUATION - OTHER PAST PSYCHIATRIC HISTORY (INCLUDE DETAILS REGARDING ONSET, COURSE OF ILLNESS, INPATIENT/OUTPATIENT TREATMENT)
Pt reports a hx of depression, anxiety, polysubstance use, had multiple inpt hospitalizations over the yrs related to substance use, last in 2015, multiple inpt rehab programs completed, pt reports being currently seen at [facility] in [place] and has good tx compliance. Per EMR pt has a hx of SA/SI, has laid on train tracks, O/D attempt in 2020, pt admits to struggling with suicidal thoughts when faced with stressors. Pt has no hx of AH/VH, no psychosis or delusional thoughts except when substance-induced, pt has a long hx of drug use, crack/cocaine, opiates, benzos, cannabis, ETOH abuse, currently attends AA meetings, pt endorses periods of depressive sx, feelings of hopelessness & worthlessness, isolation, feelings of despair due to psychosocial stressors, has a hx of violent & aggressive behaviors towards others, had yrs of incarceration for possession, robbery/theft, assault, parole violation, pt has a medical hx of eloy in leg due to injury

## 2023-04-22 NOTE — BH SOCIAL WORK INITIAL PSYCHOSOCIAL EVALUATION - DETAILS
Pt shares parents were chronic alcoholics, had a hx of depression Pt shares family has a hx ETOH abuse, both parents, siblings struggled with depression, anxiety

## 2023-04-22 NOTE — PHYSICAL THERAPY INITIAL EVALUATION ADULT - NSACTIVITYREC_GEN_A_PT
Pt. is at his PLOF with NWB with RW, non-compliant with WB status. PT recommended Ortho consult for WB status as well as knee brace. If Pt. WB status changes as a result of Ortho evaluation, PT is available to reassess Pt or educate for new recommendation.

## 2023-04-22 NOTE — PHYSICAL THERAPY INITIAL EVALUATION ADULT - GENERAL OBSERVATIONS, REHAB EVAL
Pt. found sitting on bed with RLE ext agreeable to PT. Pt. stated has spacer in R knee w/o prosthesis and is schedule for sx on June 19th at [facility]. Pt. is NWB with RLE but is non-compliant and amb with RW TTWB RLE Ind. PT. recommended Ortho consult.

## 2023-04-22 NOTE — BH SOCIAL WORK INITIAL PSYCHOSOCIAL EVALUATION - NSCMSPTSTRENGTHS_PSY_ALL_CORE
Compliance to treatment/Expressive of emotions/Future/goal oriented/Highly motivated for treatment/Intelligence/Interpersonal skills

## 2023-04-22 NOTE — PHYSICAL THERAPY INITIAL EVALUATION ADULT - PERTINENT HX OF CURRENT PROBLEM, REHAB EVAL
58 y/o single, un-employed male, single, no dependents, domiciled in [facility]. PPHx of anxiety, depression, polysubstance abuse (heroin, alcohol, xanax, cocaine, THC) and hx of overdose x 4 was sober x 2 years, however recently relapsed. According to [facility] staff on the triage document, pt was found unconscious in bed with many pills around him. Patient has had previous psychiatric admissions relating to polysubstance abuse (last in 2015), and a PMHx R knee septic arthritis s/p multiple R knee surgeries knee prosthesis was removed, immobilizer in place presented to the ED 6/13 for R knee pain and swelling.
EMS/Patient

## 2023-04-23 PROCEDURE — 99232 SBSQ HOSP IP/OBS MODERATE 35: CPT

## 2023-04-23 RX ORDER — NICOTINE POLACRILEX 2 MG
4 GUM BUCCAL
Refills: 0 | Status: DISCONTINUED | OUTPATIENT
Start: 2023-04-23 | End: 2023-04-28

## 2023-04-23 RX ADMIN — Medication 2 MILLIGRAM(S): at 10:16

## 2023-04-23 RX ADMIN — GUANFACINE 1 MILLIGRAM(S): 3 TABLET, EXTENDED RELEASE ORAL at 18:31

## 2023-04-23 RX ADMIN — LITHIUM CARBONATE 300 MILLIGRAM(S): 300 TABLET, EXTENDED RELEASE ORAL at 13:49

## 2023-04-23 RX ADMIN — Medication 1 APPLICATION(S): at 10:13

## 2023-04-23 RX ADMIN — LITHIUM CARBONATE 300 MILLIGRAM(S): 300 TABLET, EXTENDED RELEASE ORAL at 20:05

## 2023-04-23 RX ADMIN — Medication 1 APPLICATION(S): at 20:31

## 2023-04-23 RX ADMIN — GUANFACINE 1 MILLIGRAM(S): 3 TABLET, EXTENDED RELEASE ORAL at 13:49

## 2023-04-23 RX ADMIN — Medication 4 MILLIGRAM(S): at 20:05

## 2023-04-23 RX ADMIN — ATORVASTATIN CALCIUM 40 MILLIGRAM(S): 80 TABLET, FILM COATED ORAL at 20:05

## 2023-04-23 RX ADMIN — BUPRENORPHINE AND NALOXONE 1 FILM(S): 2; .5 TABLET SUBLINGUAL at 20:06

## 2023-04-23 RX ADMIN — Medication 200 MILLIGRAM(S): at 20:05

## 2023-04-23 RX ADMIN — GUANFACINE 1 MILLIGRAM(S): 3 TABLET, EXTENDED RELEASE ORAL at 10:12

## 2023-04-23 RX ADMIN — Medication 2 MILLIGRAM(S): at 18:32

## 2023-04-23 RX ADMIN — BUPROPION HYDROCHLORIDE 300 MILLIGRAM(S): 150 TABLET, EXTENDED RELEASE ORAL at 10:13

## 2023-04-23 RX ADMIN — LITHIUM CARBONATE 300 MILLIGRAM(S): 300 TABLET, EXTENDED RELEASE ORAL at 10:12

## 2023-04-23 RX ADMIN — BUPRENORPHINE AND NALOXONE 1 FILM(S): 2; .5 TABLET SUBLINGUAL at 10:13

## 2023-04-23 NOTE — BH INPATIENT PSYCHIATRY PROGRESS NOTE - NSBHFUPINTERVALHXFT_PSY_A_CORE
Covering MD Note ( 4/23/23)    The pt, 1 57 yr-old WM with bipolar disorder and polysubstance use d/o was admitted to psychiatry for exacerbation of both disorders, such that surgery for his right knee septic arthritic knee with great pain to the pt had to be cancelled and now rescheduled  for 6/23 as above. The pt is motivated to follow unit and med regimen rules and he is future oriented as to his upcoming surgery.    Pt seen by physical therapy on 4/22/23 and recommendation made for pt wheelchair . Pt feeling less physical discomfort with wheelchair, medically necessary due to pt need for non weight bearing on right leg Wheelchair ordered 4/22/23.     The pt reported surprisingly good sleep and OK appetite. He reported tolerating his current med regimen of Lithium, Wellbutrin and Tenex. The pt continues on Suboxone 8/2  BID films and Lipitor. ( GO=334).   The pt has been encouraged to attend therapy groups as tolerated. The pt continues to deny any SI /HI /AH/ VH.   Currently the pt presents as a low risk for suicide in the context of his safe readmission to inpatient psychiatry and his willingness to adhere to his medication regimen . He remains future oriented and is looking forward to pain relief after his rt knee surgery scheduled for 6/23.

## 2023-04-24 PROCEDURE — 99233 SBSQ HOSP IP/OBS HIGH 50: CPT

## 2023-04-24 RX ADMIN — BUPRENORPHINE AND NALOXONE 1 FILM(S): 2; .5 TABLET SUBLINGUAL at 21:05

## 2023-04-24 RX ADMIN — ATORVASTATIN CALCIUM 40 MILLIGRAM(S): 80 TABLET, FILM COATED ORAL at 21:06

## 2023-04-24 RX ADMIN — Medication 1 APPLICATION(S): at 21:07

## 2023-04-24 RX ADMIN — Medication 4 MILLIGRAM(S): at 18:28

## 2023-04-24 RX ADMIN — BUPROPION HYDROCHLORIDE 300 MILLIGRAM(S): 150 TABLET, EXTENDED RELEASE ORAL at 09:33

## 2023-04-24 RX ADMIN — BUPRENORPHINE AND NALOXONE 1 FILM(S): 2; .5 TABLET SUBLINGUAL at 09:33

## 2023-04-24 RX ADMIN — LITHIUM CARBONATE 300 MILLIGRAM(S): 300 TABLET, EXTENDED RELEASE ORAL at 21:06

## 2023-04-24 RX ADMIN — GUANFACINE 1 MILLIGRAM(S): 3 TABLET, EXTENDED RELEASE ORAL at 18:25

## 2023-04-24 RX ADMIN — LITHIUM CARBONATE 300 MILLIGRAM(S): 300 TABLET, EXTENDED RELEASE ORAL at 09:33

## 2023-04-24 RX ADMIN — LITHIUM CARBONATE 300 MILLIGRAM(S): 300 TABLET, EXTENDED RELEASE ORAL at 12:47

## 2023-04-24 RX ADMIN — Medication 4 MILLIGRAM(S): at 12:48

## 2023-04-24 RX ADMIN — Medication 1 APPLICATION(S): at 09:33

## 2023-04-24 RX ADMIN — Medication 200 MILLIGRAM(S): at 21:07

## 2023-04-24 RX ADMIN — GUANFACINE 1 MILLIGRAM(S): 3 TABLET, EXTENDED RELEASE ORAL at 09:33

## 2023-04-24 RX ADMIN — Medication 4 MILLIGRAM(S): at 20:28

## 2023-04-24 RX ADMIN — GUANFACINE 1 MILLIGRAM(S): 3 TABLET, EXTENDED RELEASE ORAL at 12:47

## 2023-04-24 RX ADMIN — Medication 4 MILLIGRAM(S): at 09:36

## 2023-04-24 NOTE — BH DISCHARGE NOTE NURSING/SOCIAL WORK/PSYCH REHAB - NSDCPEWEB_GEN_ALL_CORE
[facility] for Tobacco Control website --- [email or web address] website --- [email or web address]

## 2023-04-24 NOTE — BH INPATIENT PSYCHIATRY PROGRESS NOTE - NSBHTIMEACTIVITIESPERFORMED_PSY_A_CORE
1. interviewed the pt to assess current mental status  2. reviewed medications pt with compliance with medication    3. reviewed lab results updated lithium level   4. conferred with social work  for aftercare planning   5. conferred with nursing

## 2023-04-24 NOTE — BH DISCHARGE NOTE NURSING/SOCIAL WORK/PSYCH REHAB - NSDCADDINFO1FT_PSY_ALL_CORE
Patient has an appointment with his previous therapist, Breonna GONZALEZ on Tuesday at 10am  Patient has an appointment with his previous therapist, Breonna GONZALEZ on Tuesday at 10am   Patient's substance abuse issues will be addressed by his therapist and treatment program - [facility]

## 2023-04-24 NOTE — BH DISCHARGE NOTE NURSING/SOCIAL WORK/PSYCH REHAB - NSDCPEEMAIL_GEN_ALL_CORE
[facility] for Tobacco Control email [email or web address]

## 2023-04-24 NOTE — BH INPATIENT PSYCHIATRY PROGRESS NOTE - NSBHFUPINTERVALHXFT_PSY_A_CORE
Pt wanting to go for his surgery . Pt denies any continued depression or any suicidal ideation intent or plan .  Pt needing lithium level due on wed .  Pt meeting with CSW for aftercare planning . Pt denies any side effect from medication

## 2023-04-24 NOTE — BH DISCHARGE NOTE NURSING/SOCIAL WORK/PSYCH REHAB - NSCDUDCCRISIS_PSY_A_CORE
.  [facility] - [facility] – Crisis Care for Children, Adults and Families  [address] [place] [address]  Mobile Crisis Hotline – (862) 337-7028/.National Suicide Prevention Lifeline 0 (028) 197-6100/.  [employer]  1 (501) LIFENET (416-1136)/.  [facility]  (799) 508-8791/.  [facility] Response Crisis Hotline  (256) 492-4157  24 hour telephone crisis intervention and suicide prevention hotline concerned with all mental health issues/Other.../988 Suicide and Crisis Lifeline

## 2023-04-24 NOTE — BH DISCHARGE NOTE NURSING/SOCIAL WORK/PSYCH REHAB - NSDCVIVACCINE_GEN_ALL_CORE_FT
Tdap; 29-Feb-2020 23:58; Lauren Delacruz (TASHIA); [employer]; [id number] (Exp. Date: 19-Mar-2022); IntraMuscular; Deltoid Right.; 0.5 milliLiter(s); VIS (VIS Published: 09-May-2013, VIS Presented: 29-Feb-2020);

## 2023-04-24 NOTE — CHART NOTE - NSCHARTNOTEFT_GEN_A_CORE
Met with patient to discuss discharge planning.  Completed the [facility] emergency housing application and sent to [facility] for review.  Patient to be discharged as soon as placement is provided.  Contacted [facility] and Doris Blanchard from [facility]. Saundra was working on a [facility] application and this writer offered documentation to complete the application.  Awaiting a call back.  Doris can be reached at [phone].   [facility] therapist is Breonna GONZALEZ [phone].  Called twice and left messages.  Patient has a standing appointment Tuesdays at 10am.

## 2023-04-24 NOTE — BH TREATMENT PLAN - NSTXPLANTHERAPYSESSIONSFT_PSY_ALL_CORE
04-21-23  Type of therapy: Safety planning  Type of session: Individual  Level of patient participation: Attentive,Engaged,Participates  Duration of participation: 30 minutes  Therapy conducted by: Other (specify),[facility]  Therapy Summary: [facility] met with patient to develop Safety Plan.  Patient cooperative and pleasant.  Safety plan will be updated as needed and finalized on discharge.    04-21-23  Type of therapy: Self esteem  Type of session: Group  Level of patient participation: Engaged,Participates  Duration of participation: 60 minutes  Therapy conducted by: [facility]  Therapy Summary: Patient participated in [facility] and [facility].    04-24-23  Type of therapy: Dialectical behavior therapy  Type of session: Group  Level of patient participation: Engaged,Participates  Duration of participation: 60 minutes  Therapy conducted by: [facility]hab  Therapy Summary: Patient attended group therapy today. Receptive to DBT intervention. Spoke openly about his relapse and depressive symptoms. Reported that he has support through AA. Focused on getting surgery and healing his leg. Expressed motivation for continued treatment.

## 2023-04-24 NOTE — BH TREATMENT PLAN - NSTXDCHOUSINTERSW_PSY_ALL_CORE
SW met with pt to discuss recent challenges he's been experiencing, pt mentioned attempting to get help with applying for [facility] housing, is working with a SW at a program however is running into barriers with obtaining documentation. SW advised pt will share with team if assistance is needed

## 2023-04-25 PROCEDURE — 99233 SBSQ HOSP IP/OBS HIGH 50: CPT

## 2023-04-25 RX ADMIN — GUANFACINE 1 MILLIGRAM(S): 3 TABLET, EXTENDED RELEASE ORAL at 18:11

## 2023-04-25 RX ADMIN — GUANFACINE 1 MILLIGRAM(S): 3 TABLET, EXTENDED RELEASE ORAL at 13:25

## 2023-04-25 RX ADMIN — Medication 1 APPLICATION(S): at 09:05

## 2023-04-25 RX ADMIN — LITHIUM CARBONATE 300 MILLIGRAM(S): 300 TABLET, EXTENDED RELEASE ORAL at 09:04

## 2023-04-25 RX ADMIN — Medication 4 MILLIGRAM(S): at 09:05

## 2023-04-25 RX ADMIN — GUANFACINE 1 MILLIGRAM(S): 3 TABLET, EXTENDED RELEASE ORAL at 09:04

## 2023-04-25 RX ADMIN — LITHIUM CARBONATE 300 MILLIGRAM(S): 300 TABLET, EXTENDED RELEASE ORAL at 20:50

## 2023-04-25 RX ADMIN — ATORVASTATIN CALCIUM 40 MILLIGRAM(S): 80 TABLET, FILM COATED ORAL at 20:50

## 2023-04-25 RX ADMIN — BUPROPION HYDROCHLORIDE 300 MILLIGRAM(S): 150 TABLET, EXTENDED RELEASE ORAL at 09:04

## 2023-04-25 RX ADMIN — LITHIUM CARBONATE 300 MILLIGRAM(S): 300 TABLET, EXTENDED RELEASE ORAL at 13:25

## 2023-04-25 RX ADMIN — BUPRENORPHINE AND NALOXONE 1 FILM(S): 2; .5 TABLET SUBLINGUAL at 09:04

## 2023-04-25 RX ADMIN — BUPRENORPHINE AND NALOXONE 1 FILM(S): 2; .5 TABLET SUBLINGUAL at 20:50

## 2023-04-25 RX ADMIN — Medication 200 MILLIGRAM(S): at 20:50

## 2023-04-25 NOTE — BH INPATIENT PSYCHIATRY PROGRESS NOTE - NSBHFUPINTERVALHXFT_PSY_A_CORE
Pt denies any suicidal ideation intent or plan .  He is focused on getting his surgery . Pt with euthymic mood and affect is full and mood congruent.  He denies any side effects from medication

## 2023-04-25 NOTE — BH INPATIENT PSYCHIATRY PROGRESS NOTE - NSBHTIMEACTIVITIESPERFORMED_PSY_A_CORE
1. interviewed the pt to assess current mental status  2. reviewed medications    3. reviewed lab results   4. conferred with    5. conferred with   1. interviewed the pt to assess current mental status  2. reviewed medications readjusted dose of lithium    3. reviewed lab results lithium level of 0.6   4. conferred with  social work , pt needing order of a walker in prep for discharge planning

## 2023-04-25 NOTE — BH INPATIENT PSYCHIATRY PROGRESS NOTE - NSBHFUPINTERVALHXFT_PSY_A_CORE
Pt is homeless and his surgery for his right knee is postponed. Pt with decreased mood and affect lability.  Readjusted the dose for the lithium as the level is 0.6, increase the dose to 600mg po bid. He denies any suicidal ideation intent or plan.   Increased goal directed speech.  Pt is homeless and his surgery for his right knee is postponed. Pt with decreased mood and affect lability.  Readjusted the dose for the lithium as the level is 0.6, increase the dose to 600mg po bid. He denies any suicidal ideation intent or plan.   Increased goal directed speech.   Pt came into the hospital using a walker , and was later seen by Physical therapy. Pt is to continue to use a rolling walker as he is not able to ambulate completely independent of  any mobility device.

## 2023-04-25 NOTE — BH INPATIENT PSYCHIATRY PROGRESS NOTE - NSBHCONSDANGERSELF_PSY_A_CORE
CARE MANAGEMENT INITIAL ASSESSEMENT      NAME:   Sean Serna   :     1962   MRN:     [id number]       Emergency Contact:  Extended Emergency Contact Information  Primary Emergency Contact: [address] Phone: [phone]  Relation: Spouse   needed? No    Advance Directive:  Full Code, does not have an advance directive. Sherif Beasley Healthcare Decision Maker:   Kimmy Kennedy- spouse- [phone]    Reason for Admission:  Mr. Omer Collins is a 60 y.o. male with history that includes no significant medical issues  who was emergently admitted for:  COVID 19    Patient Active Problem List   Diagnosis Code    Syncope and collapse R55    Bradycardia R00.1    Nausea and vomiting R11.2    Leg pain M79.606    Hyperglycemia R73.9    Appendicitis K37    Appendicitis with perforation K35.32    GERD (gastroesophageal reflux disease) K21.9    Constipation K59.00    Acute pulmonary embolism ([facility] [address] I26.99    COVID-19 U07.1    Hypotension I95.9    Renal insufficiency N28.9       Assessment:  Via phone with spouse Dhaval Meredith. RUR:  13%  Risk Level:  Low  Value-based purchasing:   No  Bundle patient:  No    Residency:  Private residence  Exterior Steps:  3  Interior Steps:  None    Lives With:  Spouse    Prior functioning:  Independent. Patient requires assistance with:  N/A    Prior DME required:  None    DME available:  None    Rehab history:  None    Discharge Concerns:  None      Insurer:  Payor: /      PCP: None   Name of Practice:  None   Current patient: N/A   Approximate date of last visit: N/A   Access to virtual PCP visits:  N/A    Pharmacy:  [address] [place] [address]. Pt denies any problems obtaining/taking medications.       COVID-19 vaccination status:  Not vaccinated    DC Transport:  Family      Transition of care plan:  Home with outpatient follow-up     Comments:   CM attempted to complete initial assessment via phone with Pt, however, Pt can't hear CM over the
phone. Pt asked CM to contact his spouse. CM spoke with spouse to complete assessment. Pt lives at home with spouse. Pt has no hx of HH, home O2, or equipment. Pt is independent with ADLs and ambulation. Pt denies problems with either. Spouse confirms that Pt does not have insurance. CM will provided financial assistance application for Pt/spouse to complete. Spouse also states that Pt does not have a PCP. CM will provide [facility] PCP list for review. Discharge plan is for Pt to return home. Spouse states family will transport Pt home.   _____________________________________  Dragan Galloway, [address] [place] [address] Drive Management  10/14/2021   8:41 AM      Care Management Interventions  PCP Verified by CM: Yes (NO PCP)  Mode of Transport at Discharge:  Other (see comment) (spouse)  Transition of Care Consult (CM Consult): Discharge Planning  MyChart Signup: No  Discharge Durable Medical Equipment: No  Physical Therapy Consult: Yes  Occupational Therapy Consult: Yes  Speech Therapy Consult: No  Support Systems: Spouse/Significant Other, Other Family Member(s)  Confirm Follow Up Transport: Family  Discharge Location  Discharge Placement: Home with outpatient services
suicidal ideation with plan and means

## 2023-04-25 NOTE — BH INPATIENT PSYCHIATRY PROGRESS NOTE - NSBHTIMEACTIVITIESPERFORMED_PSY_A_CORE
1. interviewed the pt to assess current mental status  2. reviewed medications    3. reviewed lab results   4. conferred with    5. conferred with

## 2023-04-26 LAB — LITHIUM SERPL-MCNC: 0.6 MMOL/L — SIGNIFICANT CHANGE UP (ref 0.6–1.2)

## 2023-04-26 PROCEDURE — 99233 SBSQ HOSP IP/OBS HIGH 50: CPT

## 2023-04-26 RX ORDER — LITHIUM CARBONATE 300 MG/1
600 TABLET, EXTENDED RELEASE ORAL
Refills: 0 | Status: DISCONTINUED | OUTPATIENT
Start: 2023-04-26 | End: 2023-04-28

## 2023-04-26 RX ADMIN — Medication 4 MILLIGRAM(S): at 20:44

## 2023-04-26 RX ADMIN — BUPROPION HYDROCHLORIDE 300 MILLIGRAM(S): 150 TABLET, EXTENDED RELEASE ORAL at 09:09

## 2023-04-26 RX ADMIN — BUPRENORPHINE AND NALOXONE 1 FILM(S): 2; .5 TABLET SUBLINGUAL at 09:09

## 2023-04-26 RX ADMIN — BUPRENORPHINE AND NALOXONE 1 FILM(S): 2; .5 TABLET SUBLINGUAL at 20:41

## 2023-04-26 RX ADMIN — Medication 1 APPLICATION(S): at 09:09

## 2023-04-26 RX ADMIN — LITHIUM CARBONATE 600 MILLIGRAM(S): 300 TABLET, EXTENDED RELEASE ORAL at 20:41

## 2023-04-26 RX ADMIN — Medication 1 APPLICATION(S): at 20:41

## 2023-04-26 RX ADMIN — ATORVASTATIN CALCIUM 40 MILLIGRAM(S): 80 TABLET, FILM COATED ORAL at 20:41

## 2023-04-26 RX ADMIN — GUANFACINE 1 MILLIGRAM(S): 3 TABLET, EXTENDED RELEASE ORAL at 12:53

## 2023-04-26 RX ADMIN — GUANFACINE 1 MILLIGRAM(S): 3 TABLET, EXTENDED RELEASE ORAL at 18:02

## 2023-04-26 RX ADMIN — GUANFACINE 1 MILLIGRAM(S): 3 TABLET, EXTENDED RELEASE ORAL at 09:08

## 2023-04-26 RX ADMIN — LITHIUM CARBONATE 300 MILLIGRAM(S): 300 TABLET, EXTENDED RELEASE ORAL at 09:09

## 2023-04-26 RX ADMIN — Medication 200 MILLIGRAM(S): at 20:41

## 2023-04-26 NOTE — BH INPATIENT PSYCHIATRY PROGRESS NOTE - NSBHFUPINTERVALHXFT_PSY_A_CORE
Pt is homeless and his surgery for his right knee is postponed. Pt with decreased mood and affect lability.  Readjusted the dose for the lithium as the level is 0.6, increase the dose to 600mg po bid. He denies any suicidal ideation intent or plan.   Increased goal directed speech.   Pt came into the hospital using a walker , and was later seen by Physical therapy. Pt is to continue to use a rolling walker as he is not able to ambulate completely independent of  any mobility device.

## 2023-04-26 NOTE — BH INPATIENT PSYCHIATRY PROGRESS NOTE - NSBHTIMEACTIVITIESPERFORMED_PSY_A_CORE
1. interviewed the pt to assess current mental status  2. reviewed medications readjusted dose of lithium    3. reviewed lab results lithium level of 0.6   4. conferred with  social work , pt needing order of a walker in prep for discharge planning

## 2023-04-27 ENCOUNTER — APPOINTMENT (OUTPATIENT)
Dept: ORTHOPEDIC SURGERY | Facility: CLINIC | Age: 58
End: 2023-04-27

## 2023-04-27 PROCEDURE — 99232 SBSQ HOSP IP/OBS MODERATE 35: CPT

## 2023-04-27 RX ADMIN — GUANFACINE 1 MILLIGRAM(S): 3 TABLET, EXTENDED RELEASE ORAL at 09:22

## 2023-04-27 RX ADMIN — Medication 200 MILLIGRAM(S): at 20:13

## 2023-04-27 RX ADMIN — Medication 4 MILLIGRAM(S): at 18:20

## 2023-04-27 RX ADMIN — Medication 1 APPLICATION(S): at 20:13

## 2023-04-27 RX ADMIN — BUPRENORPHINE AND NALOXONE 1 FILM(S): 2; .5 TABLET SUBLINGUAL at 09:23

## 2023-04-27 RX ADMIN — ATORVASTATIN CALCIUM 40 MILLIGRAM(S): 80 TABLET, FILM COATED ORAL at 20:14

## 2023-04-27 RX ADMIN — LITHIUM CARBONATE 600 MILLIGRAM(S): 300 TABLET, EXTENDED RELEASE ORAL at 09:22

## 2023-04-27 RX ADMIN — Medication 1 APPLICATION(S): at 09:23

## 2023-04-27 RX ADMIN — LITHIUM CARBONATE 600 MILLIGRAM(S): 300 TABLET, EXTENDED RELEASE ORAL at 20:13

## 2023-04-27 RX ADMIN — BUPRENORPHINE AND NALOXONE 1 FILM(S): 2; .5 TABLET SUBLINGUAL at 20:14

## 2023-04-27 RX ADMIN — Medication 4 MILLIGRAM(S): at 12:39

## 2023-04-27 RX ADMIN — BUPROPION HYDROCHLORIDE 300 MILLIGRAM(S): 150 TABLET, EXTENDED RELEASE ORAL at 09:22

## 2023-04-27 RX ADMIN — GUANFACINE 1 MILLIGRAM(S): 3 TABLET, EXTENDED RELEASE ORAL at 12:38

## 2023-04-27 RX ADMIN — GUANFACINE 1 MILLIGRAM(S): 3 TABLET, EXTENDED RELEASE ORAL at 16:57

## 2023-04-27 RX ADMIN — Medication 4 MILLIGRAM(S): at 09:25

## 2023-04-27 RX ADMIN — Medication 4 MILLIGRAM(S): at 16:05

## 2023-04-27 NOTE — BH INPATIENT PSYCHIATRY PROGRESS NOTE - PRN MEDS
MEDICATIONS  (PRN):  nicotine  Polacrilex Gum 4 milliGRAM(s) Oral every 2 hours PRN nicotine withdrawal  
MEDICATIONS  (PRN):  nicotine  Polacrilex Gum 2 milliGRAM(s) Oral every 2 hours PRN nicotine craving  
MEDICATIONS  (PRN):  nicotine  Polacrilex Gum 2 milliGRAM(s) Oral every 2 hours PRN nicotine craving  
MEDICATIONS  (PRN):  nicotine  Polacrilex Gum 4 milliGRAM(s) Oral every 2 hours PRN nicotine withdrawal

## 2023-04-27 NOTE — BH INPATIENT PSYCHIATRY PROGRESS NOTE - NSTXPROBSUBMIS_PSY_ALL_CORE
SUBSTANCE MISUSE
181.4
SUBSTANCE MISUSE

## 2023-04-27 NOTE — BH INPATIENT PSYCHIATRY PROGRESS NOTE - NSBHFUPINTERVALHXFT_PSY_A_CORE
pt claiming that he has a walker at home but is unable to get access to it . CSW worked on getting insurance clearance to get a walker.  Pt unwilling to forego the use of a wheel chair while here in the hospital explaining" well its hard for my left leg as I had to compensate for my right leg so its easier to be in a wheelchair." Pt is aware that he would risk decreased function in the left leg if he relies on the use of a wheelchair and that he is not being discharged with a wheelchair.

## 2023-04-27 NOTE — BH INPATIENT PSYCHIATRY PROGRESS NOTE - NSBHMETABOLIC_PSY_ALL_CORE_FT
BMI: BMI (kg/m2): 31.5 (04-20-23 @ 10:44)  HbA1c: A1C with Estimated Average Glucose Result: 5.7 % (04-21-23 @ 07:54)    Glucose: POCT Blood Glucose.: 121 mg/dL (11-21-22 @ 21:44)    BP: --  Lipid Panel: Date/Time: 04-21-23 @ 07:54  Cholesterol, Serum: 190  Direct LDL: --  HDL Cholesterol, Serum: 42  Total Cholesterol/HDL Ration Measurement: --  Triglycerides, Serum: 228  
BMI: BMI (kg/m2): 31.5 (04-20-23 @ 10:44)  HbA1c: A1C with Estimated Average Glucose Result: 5.7 % (04-21-23 @ 07:54)    Glucose: POCT Blood Glucose.: 121 mg/dL (11-21-22 @ 21:44)    BP: --  Lipid Panel: Date/Time: 04-21-23 @ 07:54  Cholesterol, Serum: 190  Direct LDL: --  HDL Cholesterol, Serum: 42  Total Cholesterol/HDL Ration Measurement: --  Triglycerides, Serum: 228  
BMI: BMI (kg/m2): 31.5 (04-20-23 @ 10:44)  HbA1c: A1C with Estimated Average Glucose Result: 5.7 % (04-21-23 @ 07:54)    Glucose: POCT Blood Glucose.: 121 mg/dL (11-21-22 @ 21:44)    BP: 120/65 (04-20-23 @ 16:35) (120/65 - 120/65)  Lipid Panel: Date/Time: 04-21-23 @ 07:54  Cholesterol, Serum: 190  Direct LDL: --  HDL Cholesterol, Serum: 42  Total Cholesterol/HDL Ration Measurement: --  Triglycerides, Serum: 228  
BMI: BMI (kg/m2): 31.5 (04-20-23 @ 10:44)  HbA1c: A1C with Estimated Average Glucose Result: 5.7 % (04-21-23 @ 07:54)    Glucose: POCT Blood Glucose.: 121 mg/dL (11-21-22 @ 21:44)    BP: --  Lipid Panel: Date/Time: 04-21-23 @ 07:54  Cholesterol, Serum: 190  Direct LDL: --  HDL Cholesterol, Serum: 42  Total Cholesterol/HDL Ration Measurement: --  Triglycerides, Serum: 228  
BMI: BMI (kg/m2): 31.5 (04-20-23 @ 10:44)  HbA1c: A1C with Estimated Average Glucose Result: 5.7 % (04-21-23 @ 07:54)    Glucose: POCT Blood Glucose.: 121 mg/dL (11-21-22 @ 21:44)    BP: --  Lipid Panel: Date/Time: 04-21-23 @ 07:54  Cholesterol, Serum: 190  Direct LDL: --  HDL Cholesterol, Serum: 42  Total Cholesterol/HDL Ration Measurement: --  Triglycerides, Serum: 228  
BMI: BMI (kg/m2): 31.5 (04-20-23 @ 10:44)  HbA1c: A1C with Estimated Average Glucose Result: 5.7 % (04-21-23 @ 07:54)    Glucose: POCT Blood Glucose.: 121 mg/dL (11-21-22 @ 21:44)    BP: 120/65 (04-20-23 @ 16:35) (120/65 - 143/60)  Lipid Panel: Date/Time: 04-21-23 @ 07:54  Cholesterol, Serum: 190  Direct LDL: --  HDL Cholesterol, Serum: 42  Total Cholesterol/HDL Ration Measurement: --  Triglycerides, Serum: 228  
BMI: BMI (kg/m2): 31.5 (04-20-23 @ 10:44)  HbA1c: A1C with Estimated Average Glucose Result: 5.7 % (04-21-23 @ 07:54)    Glucose: POCT Blood Glucose.: 121 mg/dL (11-21-22 @ 21:44)    BP: --  Lipid Panel: Date/Time: 04-21-23 @ 07:54  Cholesterol, Serum: 190  Direct LDL: --  HDL Cholesterol, Serum: 42  Total Cholesterol/HDL Ration Measurement: --  Triglycerides, Serum: 228

## 2023-04-27 NOTE — BH INPATIENT PSYCHIATRY PROGRESS NOTE - NSTXSUBMISINTERMD_PSY_ALL_CORE
pt encouraged to attend groups for relapse prevention plan 

## 2023-04-27 NOTE — BH INPATIENT PSYCHIATRY PROGRESS NOTE - NSBHMSETHTPROC_PSY_A_CORE
Disorganized/Tangential

## 2023-04-27 NOTE — BH INPATIENT PSYCHIATRY PROGRESS NOTE - NSDCCRITERIA_PSY_ALL_CORE
pt with stable mood and affect 

## 2023-04-27 NOTE — BH INPATIENT PSYCHIATRY PROGRESS NOTE - NSBHMSEMOOD_PSY_A_CORE
Depressed/Anxious

## 2023-04-27 NOTE — BH INPATIENT PSYCHIATRY PROGRESS NOTE - NSTXDCHOUSGOAL_PSY_ALL_CORE
Will meet with care coordinator and accept services

## 2023-04-27 NOTE — BH INPATIENT PSYCHIATRY PROGRESS NOTE - NSBHASSESSSUMMFT_PSY_ALL_CORE
RF: chronic mental illness and substance abuse    PF: engaged in treatment, sobriety, residential and employment stability  Low  High Acute Suicide Risk  Yes  () High  (  ) Moderate   (x ) Low   (  ) Unable to determine       ACUTE RISK:  pt with increased goal directed speech, increased mood and affect stability. Pt with decreased isolation and is more interactive with peers.   Mitigating factors: Pt denies any suicidal ideation intent or plan, pt is continuing with medication treatment      CHRONIC RISK FACTORS: history of aborted suicide attempt,  history of alcohol abuse, THC and cocaine, Patient with hx of  non-compliant with his psychotropic meds and reports sporadic suicidal thoughts ,but denies intent or plan. longterm time x 30 years related to drug offenses     PROTECTIVE FACTORS:  intelligent, pt is motivated , and is hopeful of increased improvement in mood and affect control, pt is verbalizing willing to attend aftercare treatment   
RF: chronic mental illness and substance abuse    PF: engaged in treatment, sobriety, residential and employment stability  Low  High Acute Suicide Risk  Yes  () High  (  ) Moderate   (x ) Low   (  ) Unable to determine       ACUTE RISK:  pt with decreased depression and increased in mod and affect stability .    Mitigating factors: Pt denies any suicidal ideation intent or plan, pt is continuing with medication treatment      CHRONIC RISK FACTORS: history of aborted suicide attempt,  history of alcohol abuse, THC and cocaine, Patient with hx of  non-compliant with his psychotropic meds and reports sporadic suicidal thoughts ,but denies intent or plan. long term time x 30 years related to drug offenses     PROTECTIVE FACTORS:  intelligent, pt is motivated , and is hopeful of increased improvement in mood and affect control.     
RF: chronic mental illness and substance abuse    PF: engaged in treatment, sobriety, residential and employment stability  Low  High Acute Suicide Risk  Yes  () High  (  ) Moderate   (x ) Low   (  ) Unable to determine       ACUTE RISK:   medication non-compliance, unemployed, still isolative, depression     CHRONIC RISK FACTORS: history of aborted suicide attempt,  history of alcohol abuse, THC and cocaine, Patient with hx of  non-compliant with his psychotropic meds and reports sporadic suicidal thoughts ,but denies intent or plan. halfway time x 30 years related to drug offenses     PROTECTIVE FACTORS:  intelligent   
RF: chronic mental illness and substance abuse    PF: engaged in treatment, sobriety, residential and employment stability  Low  High Acute Suicide Risk  Yes  () High  (  ) Moderate   (x ) Low   (  ) Unable to determine       ACUTE RISK:   medication non-compliance, unemployed, still isolative, depression     CHRONIC RISK FACTORS: history of aborted suicide attempt,  history of alcohol abuse, THC and cocaine, Patient with hx of  non-compliant with his psychotropic meds and reports sporadic suicidal thoughts ,but denies intent or plan. jail time x 30 years related to drug offenses     PROTECTIVE FACTORS:  intelligent   
RF: chronic mental illness and substance abuse    PF: engaged in treatment, sobriety, residential and employment stability  Low  High Acute Suicide Risk  Yes  () High  (  ) Moderate   (x ) Low   (  ) Unable to determine       ACUTE RISK:  pt with increased goal directed speech, increased mood and affect stability  Mitigating factors: Pt denies any suicidal ideation intent or plan, pt is continuing with medication treatment      CHRONIC RISK FACTORS: history of aborted suicide attempt,  history of alcohol abuse, THC and cocaine, Patient with hx of  non-compliant with his psychotropic meds and reports sporadic suicidal thoughts ,but denies intent or plan. correction time x 30 years related to drug offenses     PROTECTIVE FACTORS:  intelligent, pt is motivated , and is hopeful of increased improvement in mood and affect control.  
RF: chronic mental illness and substance abuse    PF: engaged in treatment, sobriety, residential and employment stability  Low  High Acute Suicide Risk  Yes  () High  (  ) Moderate   (x ) Low   (  ) Unable to determine       ACUTE RISK:  pt with increased goal directed speech, increased mood and affect stability  Mitigating factors: Pt denies any suicidal ideation intent or plan, pt is continuing with medication treatment      CHRONIC RISK FACTORS: history of aborted suicide attempt,  history of alcohol abuse, THC and cocaine, Patient with hx of  non-compliant with his psychotropic meds and reports sporadic suicidal thoughts ,but denies intent or plan. jail time x 30 years related to drug offenses     PROTECTIVE FACTORS:  intelligent, pt is motivated , and is hopeful of increased improvement in mood and affect control.  
RF: chronic mental illness and substance abuse    PF: engaged in treatment, sobriety, residential and employment stability  Low  High Acute Suicide Risk  Yes  () High  (  ) Moderate   (x ) Low   (  ) Unable to determine       ACUTE RISK:  pt with decreased depression and increased in mod and affect stability .    Mitigating factors: Pt denies any suicidal ideation intent or plan, pt is continuing with medication treatment , improved sleep. pt is more hopeful      CHRONIC RISK FACTORS: history of aborted suicide attempt,  history of alcohol abuse, THC and cocaine, Patient with hx of  non-compliant with his psychotropic meds and reports sporadic suicidal thoughts ,but denies intent or plan. senior care time x 30 years related to drug offenses     PROTECTIVE FACTORS:  intelligent, pt is motivated , and is hopeful of increased improvement in mood and affect control.

## 2023-04-27 NOTE — BH INPATIENT PSYCHIATRY PROGRESS NOTE - NSBHFUPINTERVALCCFT_PSY_A_CORE
" I've had a lot on my mind. I know it's good stuff. I just want to be there for those appointments."
"I need a walker I can't ask my friend to bring over the one I have with my belongings. "
"I have an appt for my leg surgery"
" I want to make my pre surgery appointments for my knee surgery in June."
""have you gotten me a walker yet?"
"I really would like to be able to go for my knee operation ."
"I need a walker I can't ask my friend to bring over the one I have with my belongings. "

## 2023-04-27 NOTE — BH INPATIENT PSYCHIATRY PROGRESS NOTE - NSTXDEPRESINTERMD_PSY_ALL_CORE
pt started on lexapro 

## 2023-04-27 NOTE — BH INPATIENT PSYCHIATRY PROGRESS NOTE - NSTXDEPRESGOAL_PSY_ALL_CORE
Report using a coping skill to overcome sadness and worry in order to socialize with peers daily

## 2023-04-27 NOTE — BH INPATIENT PSYCHIATRY PROGRESS NOTE - CURRENT MEDICATION
MEDICATIONS  (STANDING):  ammonium lactate 12% Lotion 1 Application(s) Topical two times a day  atorvastatin 40 milliGRAM(s) Oral at bedtime  buprenorphine 8 mG/naloxone 2 mG SL Film 1 Film(s) SubLingual two times a day  buPROPion XL (24-Hour) 300 milliGRAM(s) Oral daily  guanFACINE. 1 milliGRAM(s) Oral <User Schedule>  lithium 300 milliGRAM(s) Oral three times a day  traZODone 200 milliGRAM(s) Oral at bedtime    MEDICATIONS  (PRN):  nicotine  Polacrilex Gum 2 milliGRAM(s) Oral every 2 hours PRN nicotine craving  
MEDICATIONS  (STANDING):  ammonium lactate 12% Lotion 1 Application(s) Topical two times a day  atorvastatin 40 milliGRAM(s) Oral at bedtime  buprenorphine 8 mG/naloxone 2 mG SL Film 1 Film(s) SubLingual two times a day  buPROPion XL (24-Hour) 300 milliGRAM(s) Oral daily  guanFACINE. 1 milliGRAM(s) Oral <User Schedule>  lithium 600 milliGRAM(s) Oral two times a day  traZODone 200 milliGRAM(s) Oral at bedtime    MEDICATIONS  (PRN):  nicotine  Polacrilex Gum 4 milliGRAM(s) Oral every 2 hours PRN nicotine withdrawal  
MEDICATIONS  (STANDING):  ammonium lactate 12% Lotion 1 Application(s) Topical two times a day  atorvastatin 40 milliGRAM(s) Oral at bedtime  buprenorphine 8 mG/naloxone 2 mG SL Film 1 Film(s) SubLingual two times a day  buPROPion XL (24-Hour) 300 milliGRAM(s) Oral daily  guanFACINE. 1 milliGRAM(s) Oral <User Schedule>  lithium 300 milliGRAM(s) Oral three times a day  traZODone 200 milliGRAM(s) Oral at bedtime    MEDICATIONS  (PRN):  nicotine  Polacrilex Gum 4 milliGRAM(s) Oral every 2 hours PRN nicotine withdrawal  
MEDICATIONS  (STANDING):  ammonium lactate 12% Lotion 1 Application(s) Topical two times a day  atorvastatin 40 milliGRAM(s) Oral at bedtime  buprenorphine 8 mG/naloxone 2 mG SL Film 1 Film(s) SubLingual two times a day  buPROPion XL (24-Hour) 300 milliGRAM(s) Oral daily  guanFACINE. 1 milliGRAM(s) Oral <User Schedule>  lithium 300 milliGRAM(s) Oral three times a day  traZODone 200 milliGRAM(s) Oral at bedtime    MEDICATIONS  (PRN):  nicotine  Polacrilex Gum 4 milliGRAM(s) Oral every 2 hours PRN nicotine withdrawal  
MEDICATIONS  (STANDING):  ammonium lactate 12% Lotion 1 Application(s) Topical two times a day  atorvastatin 40 milliGRAM(s) Oral at bedtime  buprenorphine 8 mG/naloxone 2 mG SL Film 1 Film(s) SubLingual two times a day  buPROPion XL (24-Hour) 300 milliGRAM(s) Oral daily  guanFACINE. 1 milliGRAM(s) Oral <User Schedule>  lithium 600 milliGRAM(s) Oral two times a day  traZODone 200 milliGRAM(s) Oral at bedtime    MEDICATIONS  (PRN):  nicotine  Polacrilex Gum 4 milliGRAM(s) Oral every 2 hours PRN nicotine withdrawal  
MEDICATIONS  (STANDING):  ammonium lactate 12% Lotion 1 Application(s) Topical two times a day  atorvastatin 40 milliGRAM(s) Oral at bedtime  buprenorphine 8 mG/naloxone 2 mG SL Film 1 Film(s) SubLingual two times a day  buPROPion XL (24-Hour) 300 milliGRAM(s) Oral daily  guanFACINE. 1 milliGRAM(s) Oral <User Schedule>  lithium 600 milliGRAM(s) Oral two times a day  traZODone 200 milliGRAM(s) Oral at bedtime    MEDICATIONS  (PRN):  nicotine  Polacrilex Gum 4 milliGRAM(s) Oral every 2 hours PRN nicotine withdrawal  
MEDICATIONS  (STANDING):  ammonium lactate 12% Lotion 1 Application(s) Topical two times a day  atorvastatin 40 milliGRAM(s) Oral at bedtime  buprenorphine 8 mG/naloxone 2 mG SL Film 1 Film(s) SubLingual two times a day  buPROPion XL (24-Hour) 300 milliGRAM(s) Oral daily  guanFACINE. 1 milliGRAM(s) Oral <User Schedule>  lithium 300 milliGRAM(s) Oral three times a day  traZODone 200 milliGRAM(s) Oral at bedtime    MEDICATIONS  (PRN):  nicotine  Polacrilex Gum 2 milliGRAM(s) Oral every 2 hours PRN nicotine craving

## 2023-04-27 NOTE — BH INPATIENT PSYCHIATRY PROGRESS NOTE - NSBHCHARTREVIEWVS_PSY_A_CORE FT
Vital Signs Last 24 Hrs  T(C): 36.2 (04-25-23 @ 07:05), Max: 36.2 (04-25-23 @ 07:05)  T(F): 97.2 (04-25-23 @ 07:05), Max: 97.2 (04-25-23 @ 07:05)  HR: --  BP: --  BP(mean): --  RR: 16 (04-25-23 @ 07:05) (16 - 16)  SpO2: 100% (04-25-23 @ 07:05) (100% - 100%)    Orthostatic VS  04-25-23 @ 07:05  Lying BP: --/-- HR: --  Sitting BP: 114/66 HR: 59  Standing BP: 99/59 HR: 66  Site: upper right arm  Mode: electronic  Orthostatic VS  04-24-23 @ 07:51  Lying BP: --/-- HR: --  Sitting BP: 100/54 HR: 61  Standing BP: --/-- HR: --  Site: upper right arm  Mode: electronic  
Vital Signs Last 24 Hrs  T(C): 36.4 (04-26-23 @ 07:36), Max: 36.4 (04-26-23 @ 07:36)  T(F): 97.6 (04-26-23 @ 07:36), Max: 97.6 (04-26-23 @ 07:36)  HR: --  BP: --  BP(mean): --  RR: 16 (04-26-23 @ 07:36) (16 - 16)  SpO2: 98% (04-26-23 @ 07:36) (98% - 98%)    Orthostatic VS  04-26-23 @ 07:36  Lying BP: --/-- HR: --  Sitting BP: 107/57 HR: 59  Standing BP: --/-- HR: --  Site: upper right arm  Mode: electronic  Orthostatic VS  04-25-23 @ 07:05  Lying BP: --/-- HR: --  Sitting BP: 114/66 HR: 59  Standing BP: 99/59 HR: 66  Site: upper right arm  Mode: electronic  
Vital Signs Last 24 Hrs  T(C): 36.6 (04-27-23 @ 06:49), Max: 36.6 (04-27-23 @ 06:49)  T(F): 97.9 (04-27-23 @ 06:49), Max: 97.9 (04-27-23 @ 06:49)  HR: --  BP: --  BP(mean): --  RR: 16 (04-27-23 @ 06:49) (16 - 16)  SpO2: 98% (04-27-23 @ 06:49) (98% - 98%)    Orthostatic VS  04-27-23 @ 06:49  Lying BP: --/-- HR: --  Sitting BP: 117/62 HR: 60  Standing BP: --/-- HR: --  Site: upper right arm  Mode: electronic  Orthostatic VS  04-26-23 @ 07:36  Lying BP: --/-- HR: --  Sitting BP: 107/57 HR: 59  Standing BP: --/-- HR: --  Site: upper right arm  Mode: electronic  
Vital Signs Last 24 Hrs  T(C): 36.2 (04-25-23 @ 07:05), Max: 36.2 (04-25-23 @ 07:05)  T(F): 97.2 (04-25-23 @ 07:05), Max: 97.2 (04-25-23 @ 07:05)  HR: --  BP: --  BP(mean): --  RR: 16 (04-25-23 @ 07:05) (16 - 16)  SpO2: 100% (04-25-23 @ 07:05) (100% - 100%)    Orthostatic VS  04-25-23 @ 07:05  Lying BP: --/-- HR: --  Sitting BP: 114/66 HR: 59  Standing BP: 99/59 HR: 66  Site: upper right arm  Mode: electronic  Orthostatic VS  04-24-23 @ 07:51  Lying BP: --/-- HR: --  Sitting BP: 100/54 HR: 61  Standing BP: --/-- HR: --  Site: upper right arm  Mode: electronic  
Vital Signs Last 24 Hrs  T(C): 36.3 (04-22-23 @ 07:14), Max: 36.3 (04-22-23 @ 07:14)  T(F): 97.4 (04-22-23 @ 07:14), Max: 97.4 (04-22-23 @ 07:14)  HR: --  BP: --  BP(mean): --  RR: 16 (04-22-23 @ 07:14) (16 - 16)  SpO2: 100% (04-22-23 @ 07:14) (100% - 100%)    Orthostatic VS  04-22-23 @ 07:14  Lying BP: --/-- HR: --  Sitting BP: 158/81 HR: 53  Standing BP: 141/77 HR: 59  Site: upper right arm  Mode: electronic  Orthostatic VS  04-21-23 @ 07:12  Lying BP: --/-- HR: --  Sitting BP: 144/71 HR: 58  Standing BP: 155/72 HR: 70  Site: upper right arm  Mode: electronic  
Vital Signs Last 24 Hrs  T(C): 36.6 (04-27-23 @ 06:49), Max: 36.6 (04-27-23 @ 06:49)  T(F): 97.9 (04-27-23 @ 06:49), Max: 97.9 (04-27-23 @ 06:49)  HR: --  BP: --  BP(mean): --  RR: 16 (04-27-23 @ 06:49) (16 - 16)  SpO2: 98% (04-27-23 @ 06:49) (98% - 98%)    Orthostatic VS  04-27-23 @ 06:49  Lying BP: --/-- HR: --  Sitting BP: 117/62 HR: 60  Standing BP: --/-- HR: --  Site: upper right arm  Mode: electronic  Orthostatic VS  04-26-23 @ 07:36  Lying BP: --/-- HR: --  Sitting BP: 107/57 HR: 59  Standing BP: --/-- HR: --  Site: upper right arm  Mode: electronic  
Vital Signs Last 24 Hrs  T(C): 36.6 (04-23-23 @ 06:50), Max: 36.6 (04-23-23 @ 06:50)  T(F): 97.9 (04-23-23 @ 06:50), Max: 97.9 (04-23-23 @ 06:50)  HR: --  BP: --  BP(mean): --  RR: 16 (04-23-23 @ 06:50) (16 - 16)  SpO2: 100% (04-23-23 @ 06:50) (100% - 100%)    Orthostatic VS  04-23-23 @ 06:50  Lying BP: --/-- HR: --  Sitting BP: 123/63 HR: 61  Standing BP: --/-- HR: --  Site: upper right arm  Mode: electronic  Orthostatic VS  04-22-23 @ 07:14  Lying BP: --/-- HR: --  Sitting BP: 158/81 HR: 53  Standing BP: 141/77 HR: 59  Site: upper right arm  Mode: electronic

## 2023-04-27 NOTE — BH INPATIENT PSYCHIATRY PROGRESS NOTE - NSBHATTESTBILLING_PSY_A_CORE
Billing in another system
31515-Nvizvbjswi OBS or IP - moderate complexity OR 35-49 mins
06256-Yufhydvqra OBS or IP - moderate complexity OR 35-49 mins
Billing in another system

## 2023-04-27 NOTE — BH INPATIENT PSYCHIATRY PROGRESS NOTE - NSICDXBHSECONDARYDX_PSY_ALL_CORE
Polysubstance abuse   F19.10  Suicide attempt   T14.91XA  

## 2023-04-28 VITALS — OXYGEN SATURATION: 99 % | TEMPERATURE: 97 F | RESPIRATION RATE: 16 BRPM

## 2023-04-28 DIAGNOSIS — F98.8 OTHER SPECIFIED BEHAVIORAL AND EMOTIONAL DISORDERS WITH ONSET USUALLY OCCURRING IN CHILDHOOD AND ADOLESCENCE: ICD-10-CM

## 2023-04-28 PROCEDURE — 99239 HOSP IP/OBS DSCHRG MGMT >30: CPT

## 2023-04-28 RX ORDER — BUPRENORPHINE AND NALOXONE 2; .5 MG/1; MG/1
1 TABLET SUBLINGUAL
Qty: 0 | Refills: 0 | DISCHARGE

## 2023-04-28 RX ORDER — BUPRENORPHINE AND NALOXONE 2; .5 MG/1; MG/1
1 TABLET SUBLINGUAL
Qty: 1 | Refills: 0
Start: 2023-04-28 | End: 2023-05-12

## 2023-04-28 RX ORDER — BUPRENORPHINE AND NALOXONE 2; .5 MG/1; MG/1
1 TABLET SUBLINGUAL
Refills: 0 | Status: DISCONTINUED | OUTPATIENT
Start: 2023-04-28 | End: 2023-04-28

## 2023-04-28 RX ORDER — BUPRENORPHINE AND NALOXONE 2; .5 MG/1; MG/1
1 TABLET SUBLINGUAL
Qty: 30 | Refills: 0
Start: 2023-04-28 | End: 2023-05-12

## 2023-04-28 RX ORDER — NICOTINE POLACRILEX 2 MG
1 GUM BUCCAL
Qty: 4 | Refills: 1
Start: 2023-04-28 | End: 2023-05-27

## 2023-04-28 RX ORDER — TRAZODONE HCL 50 MG
2 TABLET ORAL
Qty: 0 | Refills: 0 | DISCHARGE

## 2023-04-28 RX ORDER — GUANFACINE 3 MG/1
1 TABLET, EXTENDED RELEASE ORAL
Qty: 30 | Refills: 1
Start: 2023-04-28 | End: 2023-05-27

## 2023-04-28 RX ORDER — ATORVASTATIN CALCIUM 80 MG/1
1 TABLET, FILM COATED ORAL
Qty: 0 | Refills: 0 | DISCHARGE

## 2023-04-28 RX ORDER — TRAZODONE HCL 50 MG
2 TABLET ORAL
Qty: 30 | Refills: 1
Start: 2023-04-28 | End: 2023-05-27

## 2023-04-28 RX ORDER — LITHIUM CARBONATE 300 MG/1
1 TABLET, EXTENDED RELEASE ORAL
Qty: 30 | Refills: 1
Start: 2023-04-28 | End: 2023-05-27

## 2023-04-28 RX ORDER — SOD,AMMONIUM,POTASSIUM LACTATE
1 CREAM (GRAM) TOPICAL
Qty: 1 | Refills: 1
Start: 2023-04-28 | End: 2023-05-27

## 2023-04-28 RX ORDER — BUPROPION HYDROCHLORIDE 150 MG/1
1 TABLET, EXTENDED RELEASE ORAL
Qty: 15 | Refills: 1
Start: 2023-04-28 | End: 2023-05-27

## 2023-04-28 RX ORDER — ATORVASTATIN CALCIUM 80 MG/1
1 TABLET, FILM COATED ORAL
Qty: 15 | Refills: 1
Start: 2023-04-28 | End: 2023-05-27

## 2023-04-28 RX ADMIN — Medication 4 MILLIGRAM(S): at 12:40

## 2023-04-28 RX ADMIN — GUANFACINE 1 MILLIGRAM(S): 3 TABLET, EXTENDED RELEASE ORAL at 09:26

## 2023-04-28 RX ADMIN — BUPRENORPHINE AND NALOXONE 1 FILM(S): 2; .5 TABLET SUBLINGUAL at 12:40

## 2023-04-28 RX ADMIN — BUPROPION HYDROCHLORIDE 300 MILLIGRAM(S): 150 TABLET, EXTENDED RELEASE ORAL at 09:26

## 2023-04-28 RX ADMIN — Medication 4 MILLIGRAM(S): at 09:28

## 2023-04-28 RX ADMIN — GUANFACINE 1 MILLIGRAM(S): 3 TABLET, EXTENDED RELEASE ORAL at 12:39

## 2023-04-28 RX ADMIN — LITHIUM CARBONATE 600 MILLIGRAM(S): 300 TABLET, EXTENDED RELEASE ORAL at 09:26

## 2023-04-28 NOTE — BH INPATIENT PSYCHIATRY DISCHARGE NOTE - OTHER PAST PSYCHIATRIC HISTORY (INCLUDE DETAILS REGARDING ONSET, COURSE OF ILLNESS, INPATIENT/OUTPATIENT TREATMENT)
Pt reports a hx of depression, anxiety, polysubstance use, had multiple inpt hospitalizations over the yrs related to substance use, last in 2015, multiple inpt rehab programs completed, pt reports being currently seen at [facility] in [place] and has good tx compliance. Per EMR pt has a hx of SA/SI, has laid on train tracks, O/D attempt in 2020, pt admits to struggling with suicidal thoughts when faced with stressors. Pt has no hx of AH/VH, no psychosis or delusional thoughts except when substance-induced, pt has a long hx of drug use, crack/cocaine, opiates, benzos, cannabis, ETOH abuse, currently attends AA meetings, pt endorses periods of depressive sx, feelings of hopelessness & worthlessness, isolation, feelings of despair due to psychosocial stressors, has a hx of violent & aggressive behaviors towards others, had yrs of incarceration for possession, robbery/theft, assault, parole violation, pt has a medical hx of eloy in leg due to injury

## 2023-04-28 NOTE — BH INPATIENT PSYCHIATRY DISCHARGE NOTE - HOSPITAL COURSE
Pt currently with euthymic mood and affect is full range and mood congruent. Pt denies any suicidal ideation intent or plan. Pt with increased goal directed speech with the following medications MEDICATIONS  (STANDING):  ammonium lactate 12% Lotion 1 Application(s) Topical two times a day  atorvastatin 40 milliGRAM(s) Oral at bedtime  buprenorphine 8 mG/naloxone 2 mG SL Film 1 Film(s) SubLingual two times a day  buPROPion XL (24-Hour) 300 milliGRAM(s) Oral daily  guanFACINE. 1 milliGRAM(s) Oral <User Schedule>  lithium 600 milliGRAM(s) Oral two times a day  traZODone 200 milliGRAM(s) Oral at bedtime  Pt is aware that he is to need a updated lithium level one week after discharge .

## 2023-04-28 NOTE — BH INPATIENT PSYCHIATRY DISCHARGE NOTE - NSDCMRMEDTOKEN_GEN_ALL_CORE_FT
atorvastatin 40 mg oral tablet: 1 tab(s) orally once a day (at bedtime)  buPROPion 300 mg/24 hours (XL) oral tablet, extended release: 1 tab(s) orally once a day MDD: 300mg  guanFACINE 1 mg oral tablet: 1 tab(s) orally 2 times a day  lithium 600 mg oral capsule: 1 cap(s) orally 2 times a day  nicotine 2 mg oral transmucosal lozenge: 1 lozenge by transmucosal administration 4 times a day  traZODone 100 mg oral tablet: 2 tab(s) orally once a day (at bedtime) MDD: 200   atorvastatin 40 mg oral tablet: 1 tab(s) orally once a day (at bedtime)  buprenorphine-naloxone 8 mg-2 mg sublingual film: 1 film(s) sublingually 2 times a day MDD: 16mg/4mg  buPROPion 300 mg/24 hours (XL) oral tablet, extended release: 1 tab(s) orally once a day MDD: 300mg  guanFACINE 1 mg oral tablet: 1 tab(s) orally 2 times a day  lithium 600 mg oral capsule: 1 cap(s) orally 2 times a day  nicotine 2 mg oral transmucosal lozenge: 1 lozenge by transmucosal administration 4 times a day  traZODone 100 mg oral tablet: 2 tab(s) orally once a day (at bedtime) MDD: 200

## 2023-04-28 NOTE — BH INPATIENT PSYCHIATRY DISCHARGE NOTE - NSDCCPCAREPLAN_GEN_ALL_CORE_FT
PRINCIPAL DISCHARGE DIAGNOSIS  Diagnosis: Bipolar depression  Assessment and Plan of Treatment:       SECONDARY DISCHARGE DIAGNOSES  Diagnosis: Polysubstance abuse  Assessment and Plan of Treatment:

## 2023-04-28 NOTE — CHART NOTE - NSCHARTNOTEFT_GEN_A_CORE
Patient got placed at [facility] at [address] [place] [address] [phone].  Patient will first  meds at [facility] in [place] and then to the shelter in a taxi paid for by the hospital.  Patient in agreement with the placement.

## 2023-04-28 NOTE — BH INPATIENT PSYCHIATRY DISCHARGE NOTE - HPI (INCLUDE ILLNESS QUALITY, SEVERITY, DURATION, TIMING, CONTEXT, MODIFYING FACTORS, ASSOCIATED SIGNS AND SYMPTOMS)
58 y/o single, un-employed male, single, no dependents, domiciled in [facility]. PPHx of anxiety, depression, polysubstance abuse (heroin, alcohol, xanax, cocaine, THC) and hx of overdose x 4 was sober x 2 years, however recently relapsed. 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 Patient was BIB EMS after being found lethargic this morning, surrounded by open bottles of his medication and a note that said "Fuck it all." Psychiatry consulted for evaluation.    Patient presents lethargic, but able to arouse by verbal stimuli. He initially reports he must have "dropped my medications on floor" and denies ingesting and additional doses. Patient reports the note is a song [place] that he was going to text a friend. Reports he was drinking four kamila, smoking marijuana and doing cocaine last night. Initially he denies SA. Writer was given permission to obtain patients phone for phone numbers and to view text messages with patient for collateral , there were several text messages to "Freddie" stating he was going to end his life via overdose last night. Patient states he doesn't remember because he was intoxicated but endorses feeling extremely depressed for the past year r/t relapsing, unstable house and multiple medical problems.

## 2023-04-28 NOTE — BH INPATIENT PSYCHIATRY DISCHARGE NOTE - REASON FOR ADMISSION
56 y/o single, un-employed malePPHx of anxiety, depression, polysubstance abuse (heroin, alcohol, xanax, cocaine, THC) and hx of overdose x 4 was sober x 2 years, however recently relapsed. previous psychiatric admissions relating to polysubstance abuse (last in 2015),Patient was BIB EMS after being found lethargic this morning, surrounded by open bottles of his medication and a suicide note Reports he was drinking four kamila, smoking marijuana and doing cocaine last night.

## 2023-04-28 NOTE — BH INPATIENT PSYCHIATRY DISCHARGE NOTE - NSDCPROCEDURES_PSY_ALL_CORE
There were no significant procedures or tests performed during this admission.
Bladder non-tender and non-distended. Urine clear yellow.

## 2023-04-28 NOTE — BH INPATIENT PSYCHIATRY DISCHARGE NOTE - NSBHMETABOLIC_PSY_ALL_CORE_FT
BMI: BMI (kg/m2): 31.5 (04-20-23 @ 10:44)  HbA1c: A1C with Estimated Average Glucose Result: 5.7 % (04-21-23 @ 07:54)    Glucose: POCT Blood Glucose.: 121 mg/dL (11-21-22 @ 21:44)    BP: --  Lipid Panel: Date/Time: 04-21-23 @ 07:54  Cholesterol, Serum: 190  Direct LDL: --  HDL Cholesterol, Serum: 42  Total Cholesterol/HDL Ration Measurement: --  Triglycerides, Serum: 228

## 2023-04-29 RX ORDER — BUPRENORPHINE AND NALOXONE 2; .5 MG/1; MG/1
1 TABLET SUBLINGUAL
Qty: 30 | Refills: 0
Start: 2023-04-29 | End: 2023-05-13

## 2023-04-29 NOTE — CHART NOTE - NSCHARTNOTEFT_GEN_A_CORE
Contacted [facility] @ [phone].  Staff reported he was not at the shelter. No other information available.

## 2023-05-02 DIAGNOSIS — F31.9 BIPOLAR DISORDER, UNSPECIFIED: ICD-10-CM

## 2023-05-02 DIAGNOSIS — F14.10 COCAINE ABUSE, UNCOMPLICATED: ICD-10-CM

## 2023-05-02 DIAGNOSIS — M17.0 BILATERAL PRIMARY OSTEOARTHRITIS OF KNEE: ICD-10-CM

## 2023-05-02 DIAGNOSIS — E78.5 HYPERLIPIDEMIA, UNSPECIFIED: ICD-10-CM

## 2023-05-02 DIAGNOSIS — G47.33 OBSTRUCTIVE SLEEP APNEA (ADULT) (PEDIATRIC): ICD-10-CM

## 2023-05-02 DIAGNOSIS — F13.10 SEDATIVE, HYPNOTIC OR ANXIOLYTIC ABUSE, UNCOMPLICATED: ICD-10-CM

## 2023-05-02 DIAGNOSIS — F33.9 MAJOR DEPRESSIVE DISORDER, RECURRENT, UNSPECIFIED: ICD-10-CM

## 2023-05-02 DIAGNOSIS — I10 ESSENTIAL (PRIMARY) HYPERTENSION: ICD-10-CM

## 2023-05-02 DIAGNOSIS — F11.10 OPIOID ABUSE, UNCOMPLICATED: ICD-10-CM

## 2023-05-02 DIAGNOSIS — T46.5X2A POISONING BY OTHER ANTIHYPERTENSIVE DRUGS, INTENTIONAL SELF-HARM, INITIAL ENCOUNTER: ICD-10-CM

## 2023-05-02 DIAGNOSIS — Z91.148 PATIENT'S OTHER NONCOMPLIANCE WITH MEDICATION REGIMEN FOR OTHER REASON: ICD-10-CM

## 2023-05-02 DIAGNOSIS — F10.10 ALCOHOL ABUSE, UNCOMPLICATED: ICD-10-CM

## 2023-05-02 DIAGNOSIS — R21 RASH AND OTHER NONSPECIFIC SKIN ERUPTION: ICD-10-CM
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
## 2023-05-02 DIAGNOSIS — F41.9 ANXIETY DISORDER, UNSPECIFIED: ICD-10-CM

## 2023-05-02 DIAGNOSIS — Z59.01 SHELTERED HOMELESSNESS: ICD-10-CM

## 2023-05-02 SDOH — ECONOMIC STABILITY - HOUSING INSECURITY: SHELTERED HOMELESSNESS: Z59.01

## 2023-05-09 ENCOUNTER — OUTPATIENT (OUTPATIENT)
Dept: OUTPATIENT SERVICES | Facility: HOSPITAL | Age: 58
LOS: 1 days | End: 2023-05-09
Payer: COMMERCIAL

## 2023-05-09 VITALS
HEART RATE: 64 BPM | OXYGEN SATURATION: 98 % | TEMPERATURE: 97 F | RESPIRATION RATE: 18 BRPM | SYSTOLIC BLOOD PRESSURE: 110 MMHG | WEIGHT: 206.57 LBS | HEIGHT: 68 IN | DIASTOLIC BLOOD PRESSURE: 70 MMHG

## 2023-05-09 DIAGNOSIS — M19.90 UNSPECIFIED OSTEOARTHRITIS, UNSPECIFIED SITE: ICD-10-CM

## 2023-05-09 DIAGNOSIS — Z96.651 PRESENCE OF RIGHT ARTIFICIAL KNEE JOINT: Chronic | ICD-10-CM

## 2023-05-09 DIAGNOSIS — Z29.9 ENCOUNTER FOR PROPHYLACTIC MEASURES, UNSPECIFIED: ICD-10-CM

## 2023-05-09 DIAGNOSIS — Z01.818 ENCOUNTER FOR OTHER PREPROCEDURAL EXAMINATION: ICD-10-CM

## 2023-05-09 DIAGNOSIS — E78.5 HYPERLIPIDEMIA, UNSPECIFIED: ICD-10-CM

## 2023-05-09 DIAGNOSIS — S83.512A SPRAIN OF ANTERIOR CRUCIATE LIGAMENT OF LEFT KNEE, INITIAL ENCOUNTER: Chronic | ICD-10-CM

## 2023-05-09 DIAGNOSIS — Z89.529 ACQUIRED ABSENCE OF UNSPECIFIED KNEE: ICD-10-CM

## 2023-05-09 LAB
A1C WITH ESTIMATED AVERAGE GLUCOSE RESULT: 5.3 % — SIGNIFICANT CHANGE UP (ref 4–5.6)
ANION GAP SERPL CALC-SCNC: 14 MMOL/L — SIGNIFICANT CHANGE UP (ref 5–17)
APTT BLD: 29.7 SEC — SIGNIFICANT CHANGE UP (ref 27.5–35.5)
BASOPHILS # BLD AUTO: 0.04 K/UL — SIGNIFICANT CHANGE UP (ref 0–0.2)
BASOPHILS NFR BLD AUTO: 0.5 % — SIGNIFICANT CHANGE UP (ref 0–2)
BLD GP AB SCN SERPL QL: SIGNIFICANT CHANGE UP
BUN SERPL-MCNC: 14.6 MG/DL — SIGNIFICANT CHANGE UP (ref 8–20)
CALCIUM SERPL-MCNC: 9.1 MG/DL — SIGNIFICANT CHANGE UP (ref 8.4–10.5)
CHLORIDE SERPL-SCNC: 99 MMOL/L — SIGNIFICANT CHANGE UP (ref 96–108)
CO2 SERPL-SCNC: 24 MMOL/L — SIGNIFICANT CHANGE UP (ref 22–29)
CREAT SERPL-MCNC: 1.05 MG/DL — SIGNIFICANT CHANGE UP (ref 0.5–1.3)
EGFR: 82 ML/MIN/1.73M2 — SIGNIFICANT CHANGE UP
EOSINOPHIL # BLD AUTO: 0.57 K/UL — HIGH (ref 0–0.5)
EOSINOPHIL NFR BLD AUTO: 6.7 % — HIGH (ref 0–6)
ESTIMATED AVERAGE GLUCOSE: 105 MG/DL — SIGNIFICANT CHANGE UP (ref 68–114)
GLUCOSE SERPL-MCNC: 90 MG/DL — SIGNIFICANT CHANGE UP (ref 70–99)
HCT VFR BLD CALC: 36.2 % — LOW (ref 39–50)
HGB BLD-MCNC: 12.1 G/DL — LOW (ref 13–17)
IMM GRANULOCYTES NFR BLD AUTO: 0.1 % — SIGNIFICANT CHANGE UP (ref 0–0.9)
INR BLD: 1.05 RATIO — SIGNIFICANT CHANGE UP (ref 0.88–1.16)
LYMPHOCYTES # BLD AUTO: 2.04 K/UL — SIGNIFICANT CHANGE UP (ref 1–3.3)
LYMPHOCYTES # BLD AUTO: 23.8 % — SIGNIFICANT CHANGE UP (ref 13–44)
MCHC RBC-ENTMCNC: 29.6 PG — SIGNIFICANT CHANGE UP (ref 27–34)
MCHC RBC-ENTMCNC: 33.4 GM/DL — SIGNIFICANT CHANGE UP (ref 32–36)
MCV RBC AUTO: 88.5 FL — SIGNIFICANT CHANGE UP (ref 80–100)
MONOCYTES # BLD AUTO: 0.78 K/UL — SIGNIFICANT CHANGE UP (ref 0–0.9)
MONOCYTES NFR BLD AUTO: 9.1 % — SIGNIFICANT CHANGE UP (ref 2–14)
MRSA PCR RESULT.: SIGNIFICANT CHANGE UP
NEUTROPHILS # BLD AUTO: 5.13 K/UL — SIGNIFICANT CHANGE UP (ref 1.8–7.4)
NEUTROPHILS NFR BLD AUTO: 59.8 % — SIGNIFICANT CHANGE UP (ref 43–77)
PLATELET # BLD AUTO: 289 K/UL — SIGNIFICANT CHANGE UP (ref 150–400)
POTASSIUM SERPL-MCNC: 4.5 MMOL/L — SIGNIFICANT CHANGE UP (ref 3.5–5.3)
POTASSIUM SERPL-SCNC: 4.5 MMOL/L — SIGNIFICANT CHANGE UP (ref 3.5–5.3)
PROTHROM AB SERPL-ACNC: 12.2 SEC — SIGNIFICANT CHANGE UP (ref 10.5–13.4)
RBC # BLD: 4.09 M/UL — LOW (ref 4.2–5.8)
RBC # FLD: 12.6 % — SIGNIFICANT CHANGE UP (ref 10.3–14.5)
S AUREUS DNA NOSE QL NAA+PROBE: SIGNIFICANT CHANGE UP
SODIUM SERPL-SCNC: 137 MMOL/L — SIGNIFICANT CHANGE UP (ref 135–145)
WBC # BLD: 8.57 K/UL — SIGNIFICANT CHANGE UP (ref 3.8–10.5)
WBC # FLD AUTO: 8.57 K/UL — SIGNIFICANT CHANGE UP (ref 3.8–10.5)

## 2023-05-09 PROCEDURE — G0463: CPT

## 2023-05-09 RX ORDER — SODIUM CHLORIDE 9 MG/ML
3 INJECTION INTRAMUSCULAR; INTRAVENOUS; SUBCUTANEOUS EVERY 8 HOURS
Refills: 0 | Status: DISCONTINUED | OUTPATIENT
Start: 2023-05-19 | End: 2023-05-24

## 2023-05-09 NOTE — H&P PST ADULT - HISTORY OF PRESENT ILLNESS
57 year old male presents to PST in NAD, A&Ox4. He's having a right revision total knee arthroplasty, possible antibiotic spacer Dup on 5/19/23. States his right knee is very painful wakes him up at night describes the pain as a dull pain localized to the right knee 7/10 in severity. States is very pain full with movement. He uses a walker  57 year old male presents to PST in NAD, A&Ox4. He's having a right revision total knee arthroplasty, possible antibiotic spacer Dup on 5/19/23. States his right knee is very painful wakes him up at night describes the pain as a dull pain localized to the right knee 7/10 in severity. States is very pain full with movement. He ambulates with a walker

## 2023-05-09 NOTE — H&P PST ADULT - ASSESSMENT
57 year old male presents to PST in NAD, A&Ox4. He's having a right revision total knee arthroplasty, possible antibiotic spacer Dup on 23. States his right knee is very painful wakes him up at night describes the pain as a dull pain localized to the right knee 7/10 in severity. States is very pain full with movement. He ambulates with a walker. Medical clearance pending  CAPRINI SCORE    AGE RELATED RISK FACTORS                                                             [x ] Age 41-60 years                                            (1 Point)  [ ] Age: 61-74 years                                           (2 Points)                 [ ] Age= 75 years                                                (3 Points)             DISEASE RELATED RISK FACTORS                                                       [x ] Edema in the lower extremities                 (1 Point)                     [ ] Varicose veins                                               (1 Point)                                 [ ] BMI > 25 Kg/m2                                            (1 Point)                                  [ ] Serious infection (ie PNA)                            (1 Point)                     [ ] Lung disease ( COPD, Emphysema)            (1 Point)                                                                          [ ] Acute myocardial infarction                         (1 Point)                  [ ] Congestive heart failure (in the previous month)  (1 Point)         [ ] Inflammatory bowel disease                            (1 Point)                  [ ] Central venous access, PICC or Port               (2 points)       (within the last month)                                                                [ ] Stroke (in the previous month)                        (5 Points)    [ ] Previous or present malignancy                       (2 points)                                                                                                                                                         HEMATOLOGY RELATED FACTORS                                                         [ ] Prior episodes of VTE                                     (3 Points)                     [ ] Positive family history for VTE                      (3 Points)                  [ ] Prothrombin 98590 A                                     (3 Points)                     [ ] Factor V Leiden                                                (3 Points)                        [ ] Lupus anticoagulants                                      (3 Points)                                                           [ ] Anticardiolipin antibodies                              (3 Points)                                                       [ ] High homocysteine in the blood                   (3 Points)                                             [ ] Other congenital or acquired thrombophilia      (3 Points)                                                [ ] Heparin induced thrombocytopenia                  (3 Points)                                        MOBILITY RELATED FACTORS  [ ] Bed rest                                                         (1 Point)  [ ] Plaster cast                                                    (2 points)  [ ] Bed bound for more than 72 hours           (2 Points)    GENDER SPECIFIC FACTORS  [ ] Pregnancy or had a baby within the last month   (1 Point)  [ ] Post-partum < 6 weeks                                   (1 Point)  [ ] Hormonal therapy  or oral contraception   (1 Point)  [ ] History of pregnancy complications              (1 point)  [ ] Unexplained or recurrent              (1 Point)    OTHER RISK FACTORS                                           (1 Point)  [ ] BMI >40, smoking, diabetes requiring insulin, chemotherapy  blood transfusions and length of surgery over 2 hours    SURGERY RELATED RISK FACTORS  [ ]  Section within the last month     (1 Point)  [ ] Minor surgery                                                  (1 Point)  [ ] Arthroscopic surgery                                       (2 Points)  [ ] Planned major surgery lasting more            (2 Points)      than 45 minutes     [x ] Elective hip or knee joint replacement       (5 points)       surgery                                                TRAUMA RELATED RISK FACTORS  [ ] Fracture of the hip, pelvis, or leg                       (5 Points)  [ ] Spinal cord injury resulting in paralysis             (5 points)       (in the previous month)    [ ] Paralysis  (less than 1 month)                             (5 Points)  [ ] Multiple Trauma within 1 month                        (5 Points)    Total Score [   7     ]    Caprini Score 0-2: Low Risk, NO VTE prophylaxis required for most patients, encourage ambulation  Caprini Score 3-6: Moderate Risk , pharmacologic VTE prophylaxis is indicated for most patients (in the absence of contraindications)  Caprini Score Greater than or =7: High risk, pharmocologic VTE prophylaxis indicated for most patients (in the absence of contraindications)

## 2023-05-09 NOTE — CHART NOTE - NSCHARTNOTEFT_GEN_A_CORE
Patient Demographic Information (PDI)       PDI	First Name	Last Name	Birth Date	Gender	Street Address	[facility]	Zip Code  A	Paul Sun	1965	Male	[address]	[place]	[address]  B	Paul Sun	1965	[place]	[address] [facility]	[address]  C	Paul Sun	1965	[address]	[address]	[place]	[address]  D	Paul Sun	1965	Male	[address] [facility]	[place]	[address]  E	Paul Sun	1965	Male	[address] [place]	[address]  F	Paul Sun	1965	Male	[address] [facility]	[place]	[address]    Prescription Information      PDI Filter:    PDI	Current Rx	Drug Type	Rx Written	Rx Dispensed	Drug	Quantity	Days Supply	Prescriber Name	Prescriber YEYO #	Payment Method	Dispenser  A	Y	O	04/28/2023	04/30/2023	buprenorphine-naloxone 8-2 mg sl tablet	30	15	Stacy Ashley MD	[id number]	Medicaid	[id number]  A	N	O	04/07/2023	04/12/2023	buprenorphine-naloxone 8-2 mg sl film	28	14	Lionel Gao	[id number]	Medicaid	[id number]  A	N	O	02/28/2023	03/04/2023	buprenorphine-naloxone 8-2 mg sl film	28	14	Lionel Gao	[id number]	[facility]  #[id number]  A	N	O	02/17/2023	02/20/2023	buprenorphine-naloxone 8-2 mg sl film	28	14	Lionel Gao	[id number]	Insurance	[id number][id number]  A	N	O	02/06/2023	02/08/2023	buprenorphine-naloxone 8-2 mg sl film	28	14	Lionel Gao	[id number]	Insurance	[facility]  #[id number]  A	N	O	01/27/2023	01/27/2023	buprenorphine-naloxone 8-2 mg sl film	28	14	Lionel Gao	[id number]	[facility]  #[id number]  B	N	O	11/04/2022	11/04/2022	buprenorphine-naloxone 8-2 mg sl film	58	29	Lionel Gao	[id number]	Medicaid	[id number]  C	N	O	05/12/2022	05/13/2022	oxycodone hcl (ir) 5 mg tablet	28	7	Arun Milner RPA-C	[id number]	[facility]  D	N	O	10/21/2022	10/21/2022	buprenorphine-naloxone 8-2 mg sl film	28	14	Kenney Gaoed 	[id number]	Medicaid	[id number]  D	N	O	10/07/2022	10/07/2022	buprenorphine-naloxone 8-2 mg sl film	28	14	[facility]	[id number]	Medicaid	[id number]  D	N	O	09/23/2022	09/24/2022	buprenorphine-naloxone 8-2 mg sl film	14	14	[facility]	[id number]	[id number]  D	N	O	09/12/2022	09/12/2022	buprenorphine-naloxone 8-2 mg sl film	28	14	[facility]	[id number]	Med[id number]  D	N	O	06/02/2022	06/02/2022	oxycodone hcl (ir) 5 mg tablet	20	7	Lizbet Borrego RPA-C	[id number]	Medicaid	[id number]  D	N	O	05/24/2022	05/24/2022	oxycodone hcl (ir) 5 mg tablet	28	7	Timothy Jolley	[id number]	Medicaid	[id number]  D	N	O	05/18/2022	05/18/2022	oxycodone hcl (ir) 5 mg tablet	42	7	Antolin Carbone PA-C	[id number]	Medicaid	[id number]  E	N	O	03/24/2023	04/05/2023	buprenorphine-naloxone 8-2 mg sl film	14	7	Lionel Gao 	[id number]	Medicaid	[id number]  E	N	O	03/13/2023	03/20/2023	buprenorphine-naloxone 8-2 mg sl film	28	14	Lionel Gao 	[id number]	Medicaid	[id number]  E	N	O	11/03/2022	11/04/2022	buprenorphine-naloxone 8-2 mg sl film	2	1	Gordon Ramires	[id number]	[id number]  F	N	O	01/02/2023	01/03/2023	buprenorphine-naloxone 8-2 mg sl film	60	30	Sushil Morales MD	[id number]	Medicaid	[facility]  F	N	O	12/08/2022	12/08/2022	buprenorphine-naloxone 8-2 mg sl film	60	30	Sushil Morales MD	[id number]	Medicaid	[facility]  F	N	O	12/07/2022	12/07/2022	buprenorphine-naloxone 8-2 mg sl film	10	5	AndrewSushil braun MD	[id number]	Medicaid	[facility]  F	N	O	07/08/2022	07/09/2022	buprenorphine-naloxone 8-2 mg sl tablet	60	30	AndrewSushil braun MD	[id number]	Medicaid	[facility]  F	N	O	06/23/2022	06/23/2022	buprenorphine-naloxone 8-2 mg sl tablet	30	30	Sushil Morales MD	[id number]	Medicaid	[facility]

## 2023-05-09 NOTE — ED PROVIDER NOTE - DATE/TIME 2
Suturegard Intro: Intraoperative tissue expansion was performed, utilizing the SUTUREGARD device, in order to reduce wound tension. 21-Dec-2018 16:51

## 2023-05-15 ENCOUNTER — APPOINTMENT (OUTPATIENT)
Dept: ORTHOPEDIC SURGERY | Facility: CLINIC | Age: 58
End: 2023-05-15
Payer: MEDICAID

## 2023-05-15 VITALS — DIASTOLIC BLOOD PRESSURE: 76 MMHG | HEART RATE: 65 BPM | SYSTOLIC BLOOD PRESSURE: 138 MMHG

## 2023-05-15 DIAGNOSIS — Z89.529: ICD-10-CM

## 2023-05-15 PROCEDURE — 73560 X-RAY EXAM OF KNEE 1 OR 2: CPT | Mod: RT

## 2023-05-15 PROCEDURE — 99214 OFFICE O/P EST MOD 30 MIN: CPT

## 2023-05-16 NOTE — PHYSICAL EXAM
[de-identified] : Musculoskeletal:. Right knee exam shows no effusion, ROM is not examined due to a static spacer, diffuse joint line tenderness.  There is a well-healed midline surgical incision with a small scab present over the distal lateral aspect.  No erythema drainage or discharge however there is evidence of raw skin over the lateral aspects of and medial aspect of the incision   no instability is noted to the knee.  There is mild tenderness palpation around the tibia.  Diffuse tenderness palpation around the knee no evidence of erythema drainage or discharge\par 5/5 motor strength in bilateral lower extremities. Sensory: Intact in bilateral lower extremities. DTRs: Biceps, brachioradialis, triceps, patellar, ankle and plantar 2+ and symmetric bilaterally. Pulses: dorsalis pedis, posterior tibial, femoral, popliteal, and radial 2+ and symmetric bilaterally. \par Constitutional: Alert and in no acute distress, but well-appearing. \par  [de-identified] : 2 views of the right knee obtained the office today show no acute fracture or dislocation.There is a right antibiotic spacer in appropriate line with evidence of some subsidence.  No acute fracture

## 2023-05-16 NOTE — HISTORY OF PRESENT ILLNESS
[de-identified] : Patient is a 49-year-old male with a right knee antibiotic spacer here today for follow-up.  He states he has been doing very well since his last visit.  Is been going to AA meetings.  He has stopped his drug use.  He states his back is improved and he is no longer needing to follow-up with a spine surgeon.  He has no pain.  He is very eager to try to proceed with his revision total knee arthroplasty.  He states he is turning his life around.  Denies any new trauma since his last visit.  Denies any fevers chills constitutional symptoms.  Has been going to his meetings.  States he is still drug-free.  States he has been going down on his Suboxone use.  Denies any new trauma.  States that he has been "scrubbing" the incision but has had no drainage or discharge.  Does have a small scab on the lateral aspect
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
## 2023-05-18 ENCOUNTER — TRANSCRIPTION ENCOUNTER (OUTPATIENT)
Age: 58
End: 2023-05-18

## 2023-05-18 NOTE — PATIENT PROFILE ADULT - NSVAPING PODS PER DAY_GEN_A_CORE_NU
Vitals: HTN at 156/78, otherwise WNL  Gen: AAOx3, NAD, sitting comfortably in stretcher, calm, cooperative, non-toxic  Head: ncat, perrla, eomi b/l  Neck: supple, no lymphadenopathy, no midline deviation  Heart: rrr, no m/r/g  Lungs: CTA b/l, no rales/ronchi/wheezes  Abd: soft, nontender, non-distended, no rebound or guarding  Ext: no clubbing/cyanosis/edema  Neuro: sensation and muscle strength intact b/l, steady gait with cane, CN2-12 intact b/l, no focal weakness 0.5

## 2023-05-18 NOTE — PATIENT PROFILE ADULT - FALL HARM RISK - HARM RISK INTERVENTIONS
Assistance with ambulation/Assistance OOB with selected safe patient handling equipment/Communicate Risk of Fall with Harm to all staff/Discuss with provider need for PT consult/Monitor gait and stability/Provide patient with walking aids - walker, cane, crutches/Reinforce activity limits and safety measures with patient and family/Sit up slowly, dangle for a short time, stand at bedside before walking/Tailored Fall Risk Interventions/Use of alarms - bed, chair and/or voice tab/Visual Cue: Yellow wristband and red socks/Bed in lowest position, wheels locked, appropriate side rails in place/Call bell, personal items and telephone in reach/Instruct patient to call for assistance before getting out of bed or chair/Non-slip footwear when patient is out of bed/[place] to call system/Physically safe environment - no spills, clutter or unnecessary equipment/Purposeful Proactive Rounding/Room/bathroom lighting operational, light cord in reach

## 2023-05-19 ENCOUNTER — APPOINTMENT (OUTPATIENT)
Dept: ORTHOPEDIC SURGERY | Facility: HOSPITAL | Age: 58
End: 2023-05-19

## 2023-05-19 ENCOUNTER — TRANSCRIPTION ENCOUNTER (OUTPATIENT)
Age: 58
End: 2023-05-19

## 2023-05-19 ENCOUNTER — INPATIENT (INPATIENT)
Facility: HOSPITAL | Age: 58
LOS: 4 days | Discharge: NURSING, REHAB OR LONG-TERM CARE | DRG: 467 | End: 2023-05-24
Attending: STUDENT IN AN ORGANIZED HEALTH CARE EDUCATION/TRAINING PROGRAM | Admitting: STUDENT IN AN ORGANIZED HEALTH CARE EDUCATION/TRAINING PROGRAM
Payer: COMMERCIAL

## 2023-05-19 VITALS
RESPIRATION RATE: 16 BRPM | HEIGHT: 68 IN | WEIGHT: 205.03 LBS | SYSTOLIC BLOOD PRESSURE: 169 MMHG | DIASTOLIC BLOOD PRESSURE: 94 MMHG | OXYGEN SATURATION: 98 % | HEART RATE: 62 BPM | TEMPERATURE: 97 F

## 2023-05-19 DIAGNOSIS — Z96.651 PRESENCE OF RIGHT ARTIFICIAL KNEE JOINT: Chronic | ICD-10-CM

## 2023-05-19 DIAGNOSIS — S83.512A SPRAIN OF ANTERIOR CRUCIATE LIGAMENT OF LEFT KNEE, INITIAL ENCOUNTER: Chronic | ICD-10-CM

## 2023-05-19 DIAGNOSIS — Z89.529 ACQUIRED ABSENCE OF UNSPECIFIED KNEE: ICD-10-CM

## 2023-05-19 LAB
AMPHET UR-MCNC: NEGATIVE — SIGNIFICANT CHANGE UP
BARBITURATES UR SCN-MCNC: NEGATIVE — SIGNIFICANT CHANGE UP
BENZODIAZ UR-MCNC: NEGATIVE — SIGNIFICANT CHANGE UP
COCAINE METAB.OTHER UR-MCNC: NEGATIVE — SIGNIFICANT CHANGE UP
CRP SERPL-MCNC: <4 MG/L — SIGNIFICANT CHANGE UP
ERYTHROCYTE [SEDIMENTATION RATE] IN BLOOD: 20 MM/HR — SIGNIFICANT CHANGE UP (ref 0–20)
GAS PNL BLDA: SIGNIFICANT CHANGE UP
METHADONE UR-MCNC: NEGATIVE — SIGNIFICANT CHANGE UP
OPIATES UR-MCNC: NEGATIVE — SIGNIFICANT CHANGE UP
PCP SPEC-MCNC: SIGNIFICANT CHANGE UP
PCP UR-MCNC: NEGATIVE — SIGNIFICANT CHANGE UP
THC UR QL: NEGATIVE — SIGNIFICANT CHANGE UP

## 2023-05-19 PROCEDURE — 27430 REVISION OF THIGH MUSCLES: CPT | Mod: RT

## 2023-05-19 PROCEDURE — 27430 REVISION OF THIGH MUSCLES: CPT | Mod: AS,RT

## 2023-05-19 PROCEDURE — 27487 REVISE/REPLACE KNEE JOINT: CPT | Mod: RT

## 2023-05-19 PROCEDURE — 27487 REVISE/REPLACE KNEE JOINT: CPT | Mod: AS,RT

## 2023-05-19 PROCEDURE — 73560 X-RAY EXAM OF KNEE 1 OR 2: CPT | Mod: 26,RT

## 2023-05-19 DEVICE — IMPLANTABLE DEVICE: Type: IMPLANTABLE DEVICE | Site: RIGHT | Status: FUNCTIONAL

## 2023-05-19 DEVICE — BASEPLATE TIB UNIV TRIATHLON SZ 4: Type: IMPLANTABLE DEVICE | Site: RIGHT | Status: FUNCTIONAL

## 2023-05-19 DEVICE — PIN STRL 1/8 X 3.5IN LONG: Type: IMPLANTABLE DEVICE | Site: RIGHT | Status: FUNCTIONAL

## 2023-05-19 DEVICE — IMP TIB AUGMENT SYMM CONE SZ B: Type: IMPLANTABLE DEVICE | Site: RIGHT | Status: FUNCTIONAL

## 2023-05-19 RX ORDER — GUANFACINE 3 MG/1
1 TABLET, EXTENDED RELEASE ORAL
Refills: 0 | Status: DISCONTINUED | OUTPATIENT
Start: 2023-05-19 | End: 2023-05-24

## 2023-05-19 RX ORDER — CEFAZOLIN SODIUM 1 G
2000 VIAL (EA) INJECTION ONCE
Refills: 0 | Status: DISCONTINUED | OUTPATIENT
Start: 2023-05-19 | End: 2023-05-19

## 2023-05-19 RX ORDER — APIXABAN 2.5 MG/1
2.5 TABLET, FILM COATED ORAL
Refills: 0 | Status: DISCONTINUED | OUTPATIENT
Start: 2023-05-20 | End: 2023-05-24

## 2023-05-19 RX ORDER — SODIUM CHLORIDE 9 MG/ML
1000 INJECTION, SOLUTION INTRAVENOUS
Refills: 0 | Status: DISCONTINUED | OUTPATIENT
Start: 2023-05-19 | End: 2023-05-24

## 2023-05-19 RX ORDER — BUPROPION HYDROCHLORIDE 150 MG/1
300 TABLET, EXTENDED RELEASE ORAL DAILY
Refills: 0 | Status: DISCONTINUED | OUTPATIENT
Start: 2023-05-19 | End: 2023-05-24

## 2023-05-19 RX ORDER — CELECOXIB 200 MG/1
400 CAPSULE ORAL ONCE
Refills: 0 | Status: COMPLETED | OUTPATIENT
Start: 2023-05-19 | End: 2023-05-19

## 2023-05-19 RX ORDER — ONDANSETRON 8 MG/1
4 TABLET, FILM COATED ORAL EVERY 6 HOURS
Refills: 0 | Status: DISCONTINUED | OUTPATIENT
Start: 2023-05-19 | End: 2023-05-24

## 2023-05-19 RX ORDER — KETOROLAC TROMETHAMINE 30 MG/ML
15 SYRINGE (ML) INJECTION EVERY 6 HOURS
Refills: 0 | Status: DISCONTINUED | OUTPATIENT
Start: 2023-05-19 | End: 2023-05-20

## 2023-05-19 RX ORDER — PANTOPRAZOLE SODIUM 20 MG/1
40 TABLET, DELAYED RELEASE ORAL
Refills: 0 | Status: DISCONTINUED | OUTPATIENT
Start: 2023-05-19 | End: 2023-05-24

## 2023-05-19 RX ORDER — OXYCODONE HYDROCHLORIDE 5 MG/1
10 TABLET ORAL
Refills: 0 | Status: DISCONTINUED | OUTPATIENT
Start: 2023-05-19 | End: 2023-05-24

## 2023-05-19 RX ORDER — ACETAMINOPHEN 500 MG
975 TABLET ORAL EVERY 8 HOURS
Refills: 0 | Status: DISCONTINUED | OUTPATIENT
Start: 2023-05-19 | End: 2023-05-24

## 2023-05-19 RX ORDER — SODIUM CHLORIDE 9 MG/ML
500 INJECTION INTRAMUSCULAR; INTRAVENOUS; SUBCUTANEOUS ONCE
Refills: 0 | Status: COMPLETED | OUTPATIENT
Start: 2023-05-19 | End: 2023-05-19

## 2023-05-19 RX ORDER — ACETAMINOPHEN 500 MG
975 TABLET ORAL ONCE
Refills: 0 | Status: COMPLETED | OUTPATIENT
Start: 2023-05-19 | End: 2023-05-19

## 2023-05-19 RX ORDER — OXYCODONE HYDROCHLORIDE 5 MG/1
5 TABLET ORAL
Refills: 0 | Status: DISCONTINUED | OUTPATIENT
Start: 2023-05-19 | End: 2023-05-24

## 2023-05-19 RX ORDER — ATORVASTATIN CALCIUM 80 MG/1
40 TABLET, FILM COATED ORAL AT BEDTIME
Refills: 0 | Status: DISCONTINUED | OUTPATIENT
Start: 2023-05-19 | End: 2023-05-24

## 2023-05-19 RX ORDER — APREPITANT 80 MG/1
40 CAPSULE ORAL ONCE
Refills: 0 | Status: COMPLETED | OUTPATIENT
Start: 2023-05-19 | End: 2023-05-19

## 2023-05-19 RX ORDER — CELECOXIB 200 MG/1
200 CAPSULE ORAL EVERY 12 HOURS
Refills: 0 | Status: DISCONTINUED | OUTPATIENT
Start: 2023-05-21 | End: 2023-05-24

## 2023-05-19 RX ORDER — ONDANSETRON 8 MG/1
4 TABLET, FILM COATED ORAL ONCE
Refills: 0 | Status: DISCONTINUED | OUTPATIENT
Start: 2023-05-19 | End: 2023-05-19

## 2023-05-19 RX ORDER — SENNA PLUS 8.6 MG/1
2 TABLET ORAL AT BEDTIME
Refills: 0 | Status: DISCONTINUED | OUTPATIENT
Start: 2023-05-19 | End: 2023-05-24

## 2023-05-19 RX ORDER — NICOTINE POLACRILEX 2 MG
2 GUM BUCCAL
Refills: 0 | Status: DISCONTINUED | OUTPATIENT
Start: 2023-05-19 | End: 2023-05-24

## 2023-05-19 RX ORDER — MAGNESIUM HYDROXIDE 400 MG/1
30 TABLET, CHEWABLE ORAL DAILY
Refills: 0 | Status: DISCONTINUED | OUTPATIENT
Start: 2023-05-19 | End: 2023-05-24

## 2023-05-19 RX ORDER — TRANEXAMIC ACID 100 MG/ML
1000 INJECTION, SOLUTION INTRAVENOUS ONCE
Refills: 0 | Status: DISCONTINUED | OUTPATIENT
Start: 2023-05-19 | End: 2023-05-19

## 2023-05-19 RX ORDER — LITHIUM CARBONATE 300 MG/1
600 TABLET, EXTENDED RELEASE ORAL
Refills: 0 | Status: DISCONTINUED | OUTPATIENT
Start: 2023-05-19 | End: 2023-05-20

## 2023-05-19 RX ORDER — HYDROMORPHONE HYDROCHLORIDE 2 MG/ML
4 INJECTION INTRAMUSCULAR; INTRAVENOUS; SUBCUTANEOUS
Refills: 0 | Status: DISCONTINUED | OUTPATIENT
Start: 2023-05-19 | End: 2023-05-24

## 2023-05-19 RX ORDER — POLYETHYLENE GLYCOL 3350 17 G/17G
17 POWDER, FOR SOLUTION ORAL AT BEDTIME
Refills: 0 | Status: DISCONTINUED | OUTPATIENT
Start: 2023-05-19 | End: 2023-05-24

## 2023-05-19 RX ORDER — CEFAZOLIN SODIUM 1 G
2000 VIAL (EA) INJECTION EVERY 8 HOURS
Refills: 0 | Status: DISCONTINUED | OUTPATIENT
Start: 2023-05-20 | End: 2023-05-24

## 2023-05-19 RX ORDER — ACETAMINOPHEN 500 MG
1000 TABLET ORAL ONCE
Refills: 0 | Status: COMPLETED | OUTPATIENT
Start: 2023-05-19 | End: 2023-05-20

## 2023-05-19 RX ORDER — HYDROMORPHONE HYDROCHLORIDE 2 MG/ML
1 INJECTION INTRAMUSCULAR; INTRAVENOUS; SUBCUTANEOUS
Refills: 0 | Status: DISCONTINUED | OUTPATIENT
Start: 2023-05-19 | End: 2023-05-19

## 2023-05-19 RX ORDER — FENTANYL CITRATE 50 UG/ML
50 INJECTION INTRAVENOUS
Refills: 0 | Status: DISCONTINUED | OUTPATIENT
Start: 2023-05-19 | End: 2023-05-19

## 2023-05-19 RX ORDER — SODIUM CHLORIDE 9 MG/ML
1000 INJECTION INTRAMUSCULAR; INTRAVENOUS; SUBCUTANEOUS
Refills: 0 | Status: DISCONTINUED | OUTPATIENT
Start: 2023-05-19 | End: 2023-05-24

## 2023-05-19 RX ORDER — TRAZODONE HCL 50 MG
100 TABLET ORAL AT BEDTIME
Refills: 0 | Status: DISCONTINUED | OUTPATIENT
Start: 2023-05-19 | End: 2023-05-24

## 2023-05-19 RX ORDER — ONDANSETRON 8 MG/1
4 TABLET, FILM COATED ORAL ONCE
Refills: 0 | Status: DISCONTINUED | OUTPATIENT
Start: 2023-05-19 | End: 2023-05-24

## 2023-05-19 RX ORDER — FENTANYL CITRATE 50 UG/ML
25 INJECTION INTRAVENOUS
Refills: 0 | Status: DISCONTINUED | OUTPATIENT
Start: 2023-05-19 | End: 2023-05-24

## 2023-05-19 RX ADMIN — HYDROMORPHONE HYDROCHLORIDE 1 MILLIGRAM(S): 2 INJECTION INTRAMUSCULAR; INTRAVENOUS; SUBCUTANEOUS at 20:05

## 2023-05-19 RX ADMIN — SODIUM CHLORIDE 500 MILLILITER(S): 9 INJECTION INTRAMUSCULAR; INTRAVENOUS; SUBCUTANEOUS at 18:16

## 2023-05-19 RX ADMIN — SODIUM CHLORIDE 125 MILLILITER(S): 9 INJECTION INTRAMUSCULAR; INTRAVENOUS; SUBCUTANEOUS at 22:38

## 2023-05-19 RX ADMIN — Medication 15 MILLIGRAM(S): at 18:18

## 2023-05-19 RX ADMIN — Medication 2 MILLIGRAM(S): at 19:47

## 2023-05-19 RX ADMIN — Medication 975 MILLIGRAM(S): at 08:31

## 2023-05-19 RX ADMIN — APREPITANT 40 MILLIGRAM(S): 80 CAPSULE ORAL at 08:31

## 2023-05-19 RX ADMIN — Medication 15 MILLIGRAM(S): at 18:31

## 2023-05-19 RX ADMIN — HYDROMORPHONE HYDROCHLORIDE 1 MILLIGRAM(S): 2 INJECTION INTRAMUSCULAR; INTRAVENOUS; SUBCUTANEOUS at 19:47

## 2023-05-19 RX ADMIN — HYDROMORPHONE HYDROCHLORIDE 4 MILLIGRAM(S): 2 INJECTION INTRAMUSCULAR; INTRAVENOUS; SUBCUTANEOUS at 22:38

## 2023-05-19 RX ADMIN — SENNA PLUS 2 TABLET(S): 8.6 TABLET ORAL at 22:38

## 2023-05-19 RX ADMIN — HYDROMORPHONE HYDROCHLORIDE 1 MILLIGRAM(S): 2 INJECTION INTRAMUSCULAR; INTRAVENOUS; SUBCUTANEOUS at 18:19

## 2023-05-19 RX ADMIN — HYDROMORPHONE HYDROCHLORIDE 1 MILLIGRAM(S): 2 INJECTION INTRAMUSCULAR; INTRAVENOUS; SUBCUTANEOUS at 18:32

## 2023-05-19 RX ADMIN — Medication 100 MILLIGRAM(S): at 22:38

## 2023-05-19 RX ADMIN — SODIUM CHLORIDE 125 MILLILITER(S): 9 INJECTION INTRAMUSCULAR; INTRAVENOUS; SUBCUTANEOUS at 19:39

## 2023-05-19 RX ADMIN — GUANFACINE 1 MILLIGRAM(S): 3 TABLET, EXTENDED RELEASE ORAL at 19:48

## 2023-05-19 RX ADMIN — HYDROMORPHONE HYDROCHLORIDE 1 MILLIGRAM(S): 2 INJECTION INTRAMUSCULAR; INTRAVENOUS; SUBCUTANEOUS at 18:09

## 2023-05-19 RX ADMIN — Medication 10 MILLIGRAM(S): at 19:49

## 2023-05-19 RX ADMIN — CELECOXIB 400 MILLIGRAM(S): 200 CAPSULE ORAL at 08:31

## 2023-05-19 RX ADMIN — ATORVASTATIN CALCIUM 40 MILLIGRAM(S): 80 TABLET, FILM COATED ORAL at 22:38

## 2023-05-19 RX ADMIN — HYDROMORPHONE HYDROCHLORIDE 4 MILLIGRAM(S): 2 INJECTION INTRAMUSCULAR; INTRAVENOUS; SUBCUTANEOUS at 23:38

## 2023-05-19 NOTE — DISCHARGE NOTE PROVIDER - CARE PROVIDER_API CALL
Pedro Canada)  Orthopedics  [address], [place] [id number]  Phone: [phone]  Fax: [phone]  Follow Up Time:     Jose Ceron)  Plastic Surgery; Surgery of the Hand  [address], [address]  [place] [address]  Phone: [phone]  Fax: [phone]  Follow Up Time:

## 2023-05-19 NOTE — BRIEF OPERATIVE NOTE - OPERATION/FINDINGS
Complex closure right knee 23cm, VAC
antibiotic spacer, arthrofibrosis  Removal of antibiotic spacer, VY turndown and quad lengthening, Revision TKA, wound vac and plastics closure

## 2023-05-19 NOTE — DISCHARGE NOTE PROVIDER - NSDCFUADDINST_GEN_ALL_CORE_FT
The patient will be seen in the office between 2-3 weeks for wound check.   **Your first post-operative visit has been scheduled prior to your admission. PLEASE CONTACT OFFICE TO CONFIRM THE APPOINTMENT DATE. Sutures/Staples/Tape will be removed at that time.  **  The PREVENA dressing is to be removed 7 days from the date of surgery.   ** CONTACT THE OFFICE IF THE FOLLOWING DEVELOP:  - the dressing becomes soiled or saturated  - you develop a fever greater that 101F  - the wound becomes red or you develop blistering around the wound  * Patient may shower after post-op day #3.   * The patient will continue home PT consistent with  total knee replacement protocol. Transition to outpatient PT will occur at the time of the first office visit.   * The patient is FULL weight bearing while in HECTOR LOCKED IN EXTENSION. May ROM 0-30 degrees at rest in brace.  *** The patient will continue ELIQUIS for 2 weeks and then begin ASPIRIN for blood clot prevention.   *** While on aspirin, the patient will take daily omeprazole or other similar medication to protect the stomach from irritation.   * The patient will take PAIN MEDICATION as prescribed for pain control and adjust according to prescription and patient needs. Contact the office if pain increases while taking prescribed pain medications or related concerns develop.  * Celebrex will be taken twice daily for 3 weeks for pain control and prevention of excessive bone growth. Additional prescription may be requested at your office follow-up visit.   * The patient will take Senna S while taking oxycodone to prevent narcotic associated constipation.  Additionally, increase water intake (drink at least 8 glasses of water daily) and try adding fiber to the diet by eating fruits, vegetables and foods that are rich in grains. If constipation is experienced, contact the medical/primary care provider to discuss further treatment options.  * To avoid injury at home:  - continue use of rolling walker until cleared by physical therapist  - have family or friend remove all throw rug or objects in hallways that may present a trip hazard.  - if you experience any dizziness or medical concerns, call your medical doctor or  911.  * The implant may activate metal detection devices.   *** The patient will follow up in the office with Dr Ceron in 1-2 weeks.  The patient will be seen in the office between 2-3 weeks for wound check.   **Your first post-operative visit has been scheduled prior to your admission. PLEASE CONTACT OFFICE TO CONFIRM THE APPOINTMENT DATE. Sutures/Staples/Tape will be removed at that time.  **  The PREVENA dressing is to be removed 7 days from the date of surgery.   ** CONTACT THE OFFICE IF THE FOLLOWING DEVELOP:  - the dressing becomes soiled or saturated  - you develop a fever greater that 101F  - the wound becomes red or you develop blistering around the wound  * Patient may shower after post-op day #3.   * The patient will continue home PT consistent with  total knee replacement protocol. Transition to outpatient PT will occur at the time of the first office visit.   * The patient is FULL weight bearing while in HECTOR LOCKED IN EXTENSION. May ROM 0-30 degrees at rest in brace.  *** The patient will continue ELIQUIS for 2 weeks and then begin ASPIRIN for blood clot prevention.   *** While on aspirin, the patient will take daily omeprazole or other similar medication to protect the stomach from irritation.   * The patient will take PAIN MEDICATION as prescribed for pain control and adjust according to prescription and patient needs. Contact the office if pain increases while taking prescribed pain medications or related concerns develop. DO NOT PRESCRIBED OPIOIDs on Discharge as per Pain management.   * Celebrex will be taken twice daily for 3 weeks for pain control and prevention of excessive bone growth. Additional prescription may be requested at your office follow-up visit.   * The patient will take Senna S while taking oxycodone to prevent narcotic associated constipation.  Additionally, increase water intake (drink at least 8 glasses of water daily) and try adding fiber to the diet by eating fruits, vegetables and foods that are rich in grains. If constipation is experienced, contact the medical/primary care provider to discuss further treatment options.  * To avoid injury at home:  - continue use of rolling walker until cleared by physical therapist  - have family or friend remove all throw rug or objects in hallways that may present a trip hazard.  - if you experience any dizziness or medical concerns, call your medical doctor or  911.  * The implant may activate metal detection devices.   *** The patient will follow up in the office with Dr Ceron in 1-2 weeks.  The patient will be seen in the office between 2-3 weeks for wound check.   **Your first post-operative visit has been scheduled prior to your admission. PLEASE CONTACT OFFICE TO CONFIRM THE APPOINTMENT DATE. Sutures will be removed at that time.  **  The Mepilex dressing can remain in place until follow up.   ** CONTACT THE OFFICE IF THE FOLLOWING DEVELOP:  - the dressing becomes soiled or saturated  - you develop a fever greater that 101F  - the wound becomes red or you develop blistering around the wound  * Patient may shower after post-op day #3.   * The patient will continue home PT consistent with  total knee replacement protocol. Transition to outpatient PT will occur at the time of the first office visit.   * The patient is FULL weight bearing while in HECTOR LOCKED IN EXTENSION. May ROM 0-30 degrees at rest in brace.  *** The patient will continue ELIQUIS for 2 weeks and then begin ASPIRIN 81 twice daily for 4 weeks for blood clot prevention.   *** While on aspirin, the patient will take daily omeprazole or other similar medication to protect the stomach from irritation.   * The patient will take PAIN MEDICATION as prescribed for pain control and adjust according to prescription and patient needs. Contact the office if pain increases while taking prescribed pain medications or related concerns develop. DO NOT PRESCRIBED OPIOIDs on Discharge as per Pain management.   * Celebrex will be taken twice daily for 3 weeks for pain control and prevention of excessive bone growth. Additional prescription may be requested at your office follow-up visit.   * The patient will take Senna S while taking oxycodone to prevent narcotic associated constipation.  Additionally, increase water intake (drink at least 8 glasses of water daily) and try adding fiber to the diet by eating fruits, vegetables and foods that are rich in grains. If constipation is experienced, contact the medical/primary care provider to discuss further treatment options.  * To avoid injury at home:  - continue use of rolling walker until cleared by physical therapist  - have family or friend remove all throw rug or objects in hallways that may present a trip hazard.  - if you experience any dizziness or medical concerns, call your medical doctor or  911.  * The implant may activate metal detection devices.   * The patient will follow up in the office with Dr Ceron in 1-2 weeks.

## 2023-05-19 NOTE — DISCHARGE NOTE PROVIDER - NSDCFUSCHEDAPPT_GEN_ALL_CORE_FT
Pedro Canada  [facility]  ORTHOSURG [address]  Scheduled Appointment: 06/08/2023    Pedro Canada  [facility]  ORTHORG [address]  Scheduled Appointment: 06/29/2023    Pedro Canada  [facility]  ORTHORG [address]  Scheduled Appointment: 08/10/2023

## 2023-05-19 NOTE — BRIEF OPERATIVE NOTE - NSICDXBRIEFPROCEDURE_GEN_ALL_CORE_FT
PROCEDURES:  Closure, surgical wound or dehiscence, extensive or complex, secondary 19-May-2023 17:11:16  Jose Ceron A

## 2023-05-19 NOTE — ASU PREOP CHECKLIST - ALLERGY BAND ON
NEW ADMISSION SKIN ASSESSMENT DONE WITH ETELVINA Mederos RN. NO OPEN AREAS NOTED.  BRUISED AREA NOTED ON LEFT UPPER ARM 
 shellfish/done

## 2023-05-19 NOTE — DISCHARGE NOTE PROVIDER - HOSPITAL COURSE
The patient underwent a RIGHT TOTAL KNEE REVISION on 5/19/23. The patient received antibiotics consistent with SCIP guidelines. The patient underwent the procedure and had no intra-operative complications. Post-operatively, the patient was seen by medicine and PT. The patient received ELIQUIS for DVTP. The patient received pain medications per orthopedic pain managment pathway and the pain was appropriately controlled. The patient did not have any post-operative medical complications. The patient was discharged in stable condition. The patient underwent a RIGHT TOTAL KNEE REVISION on 5/19/23. The patient received antibiotics consistent with SCIP guidelines. The patient underwent the procedure and had no intra-operative complications. Post-operatively, the patient was seen by medicine/ pain management and PT. The patient received ELIQUIS for DVTP. The patient received pain medications per orthopedic pain management protocol.

## 2023-05-19 NOTE — DISCHARGE NOTE PROVIDER - NSDCCPTREATMENT_GEN_ALL_CORE_FT
PRINCIPAL PROCEDURE  Procedure: Total knee revision  Findings and Treatment:       SECONDARY PROCEDURE  Procedure: Closure, surgical wound or dehiscence, extensive or complex, secondary  Findings and Treatment:

## 2023-05-19 NOTE — DISCHARGE NOTE PROVIDER - NSDCMRMEDTOKEN_GEN_ALL_CORE_FT
atorvastatin 40 mg oral tablet: 1 tab(s) orally once a day (at bedtime)  buPROPion 300 mg/24 hours (XL) oral tablet, extended release: 1 tab(s) orally once a day MDD: 300mg  BuSpar 10 mg oral tablet: 1 orally 2 times a day  guanFACINE 1 mg oral tablet: 1 tab(s) orally 2 times a day  lithium 600 mg oral capsule: 1 cap(s) orally 2 times a day  nicotine 2 mg oral transmucosal lozenge: 1 lozenge by transmucosal administration 4 times a day  Suboxone 8 mg-2 mg sublingual film: 1 film(s) sublingually 2 times a day MDD: 16MG/4MG  Suboxone 8 mg-2 mg sublingual film: 1 film(s) sublingually 2 times a day MDD: 16MG/4MG  traZODone 100 mg oral tablet: 2 tab(s) orally once a day (at bedtime) MDD: 200   acetaminophen 325 mg oral tablet: 3 tab(s) orally every 8 hours  atorvastatin 40 mg oral tablet: 1 tab(s) orally once a day (at bedtime)  buPROPion 300 mg/24 hours (XL) oral tablet, extended release: 1 tab(s) orally once a day MDD: 300mg  BuSpar 10 mg oral tablet: 1 orally 2 times a day  guanFACINE 1 mg oral tablet: 1 tab(s) orally 2 times a day  lithium 600 mg oral capsule: 1 cap(s) orally 2 times a day  nicotine 2 mg oral transmucosal lozenge: 1 lozenge by transmucosal administration 4 times a day  oxyCODONE 10 mg oral tablet: 1 tab(s) orally every 3 hours As needed Moderate Pain (4 - 6)  oxyCODONE 5 mg oral tablet: 1 tab(s) orally every 3 hours As needed Mild Pain (1 - 3)  traZODone 100 mg oral tablet: 2 tab(s) orally once a day (at bedtime) MDD: 200

## 2023-05-20 LAB
ANION GAP SERPL CALC-SCNC: 13 MMOL/L — SIGNIFICANT CHANGE UP (ref 5–17)
BUN SERPL-MCNC: 17.5 MG/DL — SIGNIFICANT CHANGE UP (ref 8–20)
CALCIUM SERPL-MCNC: 8 MG/DL — LOW (ref 8.4–10.5)
CHLORIDE SERPL-SCNC: 106 MMOL/L — SIGNIFICANT CHANGE UP (ref 96–108)
CO2 SERPL-SCNC: 19 MMOL/L — LOW (ref 22–29)
CREAT SERPL-MCNC: 0.74 MG/DL — SIGNIFICANT CHANGE UP (ref 0.5–1.3)
EGFR: 105 ML/MIN/1.73M2 — SIGNIFICANT CHANGE UP
GLUCOSE SERPL-MCNC: 138 MG/DL — HIGH (ref 70–99)
GRAM STN FLD: SIGNIFICANT CHANGE UP
HCT VFR BLD CALC: 29.3 % — LOW (ref 39–50)
HGB BLD-MCNC: 9.8 G/DL — LOW (ref 13–17)
MCHC RBC-ENTMCNC: 30.2 PG — SIGNIFICANT CHANGE UP (ref 27–34)
MCHC RBC-ENTMCNC: 33.4 GM/DL — SIGNIFICANT CHANGE UP (ref 32–36)
MCV RBC AUTO: 90.4 FL — SIGNIFICANT CHANGE UP (ref 80–100)
PLATELET # BLD AUTO: 210 K/UL — SIGNIFICANT CHANGE UP (ref 150–400)
POTASSIUM SERPL-MCNC: 4.1 MMOL/L — SIGNIFICANT CHANGE UP (ref 3.5–5.3)
POTASSIUM SERPL-SCNC: 4.1 MMOL/L — SIGNIFICANT CHANGE UP (ref 3.5–5.3)
RBC # BLD: 3.24 M/UL — LOW (ref 4.2–5.8)
RBC # FLD: 12.6 % — SIGNIFICANT CHANGE UP (ref 10.3–14.5)
SODIUM SERPL-SCNC: 138 MMOL/L — SIGNIFICANT CHANGE UP (ref 135–145)
SPECIMEN SOURCE: SIGNIFICANT CHANGE UP
WBC # BLD: 12.82 K/UL — HIGH (ref 3.8–10.5)
WBC # FLD AUTO: 12.82 K/UL — HIGH (ref 3.8–10.5)

## 2023-05-20 PROCEDURE — 99222 1ST HOSP IP/OBS MODERATE 55: CPT

## 2023-05-20 RX ADMIN — Medication 2000 MILLIGRAM(S): at 14:06

## 2023-05-20 RX ADMIN — HYDROMORPHONE HYDROCHLORIDE 4 MILLIGRAM(S): 2 INJECTION INTRAMUSCULAR; INTRAVENOUS; SUBCUTANEOUS at 20:29

## 2023-05-20 RX ADMIN — Medication 2 MILLIGRAM(S): at 00:24

## 2023-05-20 RX ADMIN — HYDROMORPHONE HYDROCHLORIDE 4 MILLIGRAM(S): 2 INJECTION INTRAMUSCULAR; INTRAVENOUS; SUBCUTANEOUS at 06:10

## 2023-05-20 RX ADMIN — Medication 975 MILLIGRAM(S): at 22:44

## 2023-05-20 RX ADMIN — POLYETHYLENE GLYCOL 3350 17 GRAM(S): 17 POWDER, FOR SOLUTION ORAL at 22:44

## 2023-05-20 RX ADMIN — HYDROMORPHONE HYDROCHLORIDE 4 MILLIGRAM(S): 2 INJECTION INTRAMUSCULAR; INTRAVENOUS; SUBCUTANEOUS at 03:15

## 2023-05-20 RX ADMIN — HYDROMORPHONE HYDROCHLORIDE 4 MILLIGRAM(S): 2 INJECTION INTRAMUSCULAR; INTRAVENOUS; SUBCUTANEOUS at 07:10

## 2023-05-20 RX ADMIN — HYDROMORPHONE HYDROCHLORIDE 4 MILLIGRAM(S): 2 INJECTION INTRAMUSCULAR; INTRAVENOUS; SUBCUTANEOUS at 21:29

## 2023-05-20 RX ADMIN — Medication 15 MILLIGRAM(S): at 06:10

## 2023-05-20 RX ADMIN — GUANFACINE 1 MILLIGRAM(S): 3 TABLET, EXTENDED RELEASE ORAL at 17:07

## 2023-05-20 RX ADMIN — ATORVASTATIN CALCIUM 40 MILLIGRAM(S): 80 TABLET, FILM COATED ORAL at 22:44

## 2023-05-20 RX ADMIN — Medication 15 MILLIGRAM(S): at 00:25

## 2023-05-20 RX ADMIN — Medication 975 MILLIGRAM(S): at 23:44

## 2023-05-20 RX ADMIN — HYDROMORPHONE HYDROCHLORIDE 4 MILLIGRAM(S): 2 INJECTION INTRAMUSCULAR; INTRAVENOUS; SUBCUTANEOUS at 00:37

## 2023-05-20 RX ADMIN — SODIUM CHLORIDE 3 MILLILITER(S): 9 INJECTION INTRAMUSCULAR; INTRAVENOUS; SUBCUTANEOUS at 22:41

## 2023-05-20 RX ADMIN — APIXABAN 2.5 MILLIGRAM(S): 2.5 TABLET, FILM COATED ORAL at 17:08

## 2023-05-20 RX ADMIN — HYDROMORPHONE HYDROCHLORIDE 4 MILLIGRAM(S): 2 INJECTION INTRAMUSCULAR; INTRAVENOUS; SUBCUTANEOUS at 10:15

## 2023-05-20 RX ADMIN — GUANFACINE 1 MILLIGRAM(S): 3 TABLET, EXTENDED RELEASE ORAL at 22:45

## 2023-05-20 RX ADMIN — HYDROMORPHONE HYDROCHLORIDE 4 MILLIGRAM(S): 2 INJECTION INTRAMUSCULAR; INTRAVENOUS; SUBCUTANEOUS at 02:15

## 2023-05-20 RX ADMIN — Medication 1000 MILLIGRAM(S): at 00:40

## 2023-05-20 RX ADMIN — Medication 2 MILLIGRAM(S): at 06:12

## 2023-05-20 RX ADMIN — Medication 10 MILLIGRAM(S): at 17:08

## 2023-05-20 RX ADMIN — HYDROMORPHONE HYDROCHLORIDE 4 MILLIGRAM(S): 2 INJECTION INTRAMUSCULAR; INTRAVENOUS; SUBCUTANEOUS at 15:00

## 2023-05-20 RX ADMIN — Medication 15 MILLIGRAM(S): at 00:40

## 2023-05-20 RX ADMIN — Medication 15 MILLIGRAM(S): at 06:25

## 2023-05-20 RX ADMIN — Medication 2 MILLIGRAM(S): at 14:05

## 2023-05-20 RX ADMIN — HYDROMORPHONE HYDROCHLORIDE 4 MILLIGRAM(S): 2 INJECTION INTRAMUSCULAR; INTRAVENOUS; SUBCUTANEOUS at 18:00

## 2023-05-20 RX ADMIN — Medication 2000 MILLIGRAM(S): at 06:08

## 2023-05-20 RX ADMIN — SENNA PLUS 2 TABLET(S): 8.6 TABLET ORAL at 22:44

## 2023-05-20 RX ADMIN — HYDROMORPHONE HYDROCHLORIDE 4 MILLIGRAM(S): 2 INJECTION INTRAMUSCULAR; INTRAVENOUS; SUBCUTANEOUS at 09:16

## 2023-05-20 RX ADMIN — HYDROMORPHONE HYDROCHLORIDE 4 MILLIGRAM(S): 2 INJECTION INTRAMUSCULAR; INTRAVENOUS; SUBCUTANEOUS at 23:37

## 2023-05-20 RX ADMIN — Medication 100 MILLIGRAM(S): at 22:45

## 2023-05-20 RX ADMIN — APIXABAN 2.5 MILLIGRAM(S): 2.5 TABLET, FILM COATED ORAL at 06:09

## 2023-05-20 RX ADMIN — HYDROMORPHONE HYDROCHLORIDE 4 MILLIGRAM(S): 2 INJECTION INTRAMUSCULAR; INTRAVENOUS; SUBCUTANEOUS at 14:05

## 2023-05-20 RX ADMIN — SODIUM CHLORIDE 125 MILLILITER(S): 9 INJECTION INTRAMUSCULAR; INTRAVENOUS; SUBCUTANEOUS at 06:08

## 2023-05-20 RX ADMIN — Medication 2 MILLIGRAM(S): at 23:37

## 2023-05-20 RX ADMIN — Medication 2000 MILLIGRAM(S): at 22:43

## 2023-05-20 RX ADMIN — Medication 400 MILLIGRAM(S): at 00:25

## 2023-05-20 RX ADMIN — Medication 975 MILLIGRAM(S): at 06:10

## 2023-05-20 RX ADMIN — Medication 10 MILLIGRAM(S): at 06:09

## 2023-05-20 RX ADMIN — HYDROMORPHONE HYDROCHLORIDE 4 MILLIGRAM(S): 2 INJECTION INTRAMUSCULAR; INTRAVENOUS; SUBCUTANEOUS at 17:07

## 2023-05-20 RX ADMIN — PANTOPRAZOLE SODIUM 40 MILLIGRAM(S): 20 TABLET, DELAYED RELEASE ORAL at 06:09

## 2023-05-20 RX ADMIN — MAGNESIUM HYDROXIDE 30 MILLILITER(S): 400 TABLET, CHEWABLE ORAL at 17:07

## 2023-05-20 RX ADMIN — Medication 975 MILLIGRAM(S): at 07:10

## 2023-05-20 NOTE — PHYSICAL THERAPY INITIAL EVALUATION ADULT - ORIENTATION, REHAB EVAL
oriented to person, place, time and situation
clear to auscultation bilaterally/no rales/breath sounds equal/no rhonchi/no wheezes/respirations non-labored/good air movement/airway patent

## 2023-05-20 NOTE — PHYSICAL THERAPY INITIAL EVALUATION ADULT - MANUAL MUSCLE TESTING RESULTS, REHAB EVAL
UE's grossly 5/5, left LE grossly 3+/5, right LE grossly 3/5, right knee not assessed due to immobilization

## 2023-05-20 NOTE — PHYSICAL THERAPY INITIAL EVALUATION ADULT - GENERAL OBSERVATIONS, REHAB EVAL
Patient received semi-cowan in bed, +tele, IVL, prevena, DINA drain, knee immobilizer on right LE, in NAD

## 2023-05-20 NOTE — OCCUPATIONAL THERAPY INITIAL EVALUATION ADULT - GENERAL OBSERVATIONS, REHAB EVAL
pt received in bed and left with PT, c/o pain 2/10 pre and 5/10 post tx, pt pleasant, motivated and following all commands, +knee immobilizer right LE

## 2023-05-20 NOTE — PHYSICAL THERAPY INITIAL EVALUATION ADULT - ADDITIONAL COMMENTS
Patient came from shelter, was living on main level, using wheelchair primarily to get around, sometimes used RW. Patient was independent with all ADL's

## 2023-05-20 NOTE — CONSULT NOTE ADULT - SUBJECTIVE AND OBJECTIVE BOX
Patient is a 57y old  Male who is s/p ADELA and abx spacer, right knee revision POD #1, closure by plastics. POD #1.       CC: R knee pain , hx of R TKA prosthetic infection , post op pain well controlled       HPI:  57 year old male with PMH of HTN , HLD , Anxiety and Depression , MARIBEL - not using CPAP, history of drug use , history of ETOH use , hx of R rka with PJI .. States his right knee is very painful wakes him up at night describes the pain as a dull pain localized to the right knee 7/10 in severity. States is very pain full with movement. He ambulates with a walker.       PAST MEDICAL & SURGICAL HISTORY:  Osteoarthritis      HTN (hypertension)      HLD (hyperlipidemia)      Broken internal joint prosthesis, other site, subsequent encounter      MARIBEL (obstructive sleep apnea)      History of drug abuse      History of ETOH abuse      Anxiety and depression      ACL (anterior cruciate ligament) tear, left, initial encounter      H/O total knee replacement, right  2022          Social History:  Tobacco - ex smoker , now uses e- cigarettes   ETOH - ex heavy alcohol use , sober for 2 1/2 yrs ,   relapsed recently for very short time   Illicit drug abuse - history of drug abuse ( cocaine ) ,   sober for 2 1/2 yrs     FAMILY HISTORY:  FH: diverticulitis (Mother)    FH: prostate cancer (Father)    FH: CAD (coronary artery disease)    FH: diabetes mellitus (Sibling)        Allergies    shellfish. (Hives)    Intolerances        HOME MEDICATIONS :     BuSpar 10 mg oral tablet: 1 orally 2 times a day (19 May 2023 08:29)  Atorvastatin 40 mg daily   Guatfacine ( Tenex ) 1 mg BID for BP and ADHD   Trazadone 100 mg 2 tabs daily .       REVIEW OF SYSTEMS:    R knee pain , postop pain well controlled , all other systems are reviewed and are negative .     MEDICATIONS  (STANDING):  acetaminophen     Tablet .. 975 milliGRAM(s) Oral every 8 hours  apixaban 2.5 milliGRAM(s) Oral two times a day  atorvastatin 40 milliGRAM(s) Oral at bedtime  buPROPion XL (24-Hour) . 300 milliGRAM(s) Oral daily  busPIRone 10 milliGRAM(s) Oral two times a day  ceFAZolin  Injectable. 2000 milliGRAM(s) IV Push every 8 hours  guanFACINE 1 milliGRAM(s) Oral <User Schedule>  lactated ringers. 1000 milliLiter(s) (75 mL/Hr) IV Continuous <Continuous>  lithium 600 milliGRAM(s) Oral two times a day  nicotine  Polacrilex Lozenge 2 milliGRAM(s) Oral four times a day  pantoprazole    Tablet 40 milliGRAM(s) Oral before breakfast  polyethylene glycol 3350 17 Gram(s) Oral at bedtime  senna 2 Tablet(s) Oral at bedtime  sodium chloride 0.9% lock flush 3 milliLiter(s) IV Push every 8 hours  sodium chloride 0.9%. 1000 milliLiter(s) (125 mL/Hr) IV Continuous <Continuous>  traZODone 100 milliGRAM(s) Oral at bedtime    MEDICATIONS  (PRN):  fentaNYL    Injectable 25 MICROGram(s) IV Push every 5 minutes PRN Moderate Pain (4 - 6)  HYDROmorphone   Tablet 4 milliGRAM(s) Oral every 3 hours PRN Severe Pain (7 - 10)  magnesium hydroxide Suspension 30 milliLiter(s) Oral daily PRN Constipation  ondansetron Injectable 4 milliGRAM(s) IV Push once PRN Nausea and/or Vomiting  ondansetron Injectable 4 milliGRAM(s) IV Push every 6 hours PRN Nausea and/or Vomiting  oxyCODONE    IR 10 milliGRAM(s) Oral every 3 hours PRN Moderate Pain (4 - 6)  oxyCODONE    IR 5 milliGRAM(s) Oral every 3 hours PRN Mild Pain (1 - 3)      Vital Signs Last 24 Hrs  T(C): 36.6 (20 May 2023 09:54), Max: 37.3 (19 May 2023 21:15)  T(F): 97.8 (20 May 2023 09:54), Max: 99.1 (19 May 2023 21:15)  HR: 84 (20 May 2023 09:54) (65 - 99)  BP: 138/67 (20 May 2023 09:54) (138/67 - 181/95)  BP(mean): 101 (19 May 2023 20:00) (88 - 106)  RR: 20 (20 May 2023 09:54) (16 - 22)  SpO2: 93% (20 May 2023 09:54) (93% - 99%)    Parameters below as of 20 May 2023 09:54  Patient On (Oxygen Delivery Method): room air        PHYSICAL EXAM:    GENERAL: NAD, well-groomed, well-developed  HEAD:  Atraumatic, Normocephalic  EYES: EOMI, PERRLA, conjunctiva and sclera clear  NECK: Supple, No JVD, Normal thyroid  NERVOUS SYSTEM:  Alert & Oriented X 4 , no focal deficit   CHEST/LUNG: CTA  b/l,  no rales, rhonchi, wheezing, or rubs  HEART: Regular rate and rhythm; No murmurs, rubs, or gallops  ABDOMEN: Soft, Nontender, Nondistended; Bowel sounds present  EXTREMITIES:  2+ Peripheral Pulses, No clubbing, cyanosis, or edema ,   R knee in immobilizer, dry and clean dressing +    LYMPH: No lymphadenopathy noted  SKIN: No rashes or lesions    LABS:                        9.8    12.82 )-----------( 210      ( 20 May 2023 05:03 )             29.3     05-20    138  |  106  |  17.5  ----------------------------<  138<H>  4.1   |  19.0<L>  |  0.74    Ca    8.0<L>      20 May 2023 05:03      RADIOLOGY & ADDITIONAL STUDIES:  < from: Xray Knee 1 or 2 Views, Right (05.19.23 @ 20:28) >    ACC: [id number] EXAM:  XR KNEE 1-2 VIEWS RT   ORDERED BY: ITALO STARK     PROCEDURE DATE:  05/19/2023          INTERPRETATION:  HISTORY: Postoperative  knee replacement.    TECHNIQUE: Two views of the RIGHT knee are submitted.    FINDINGS: Status post tricompartmental knee replacement with the femoral,   tibial and patellar components in anatomic alignment.  There is no fracture . Surgical drain in place.    IMPRESSION:  Knee prosthetic components in proper anatomical alignment.    --- End of Report ---    < end of copied text >

## 2023-05-20 NOTE — CONSULT NOTE ADULT - ASSESSMENT
57 year old male with PMH of HTN , HLD , Anxiety and Depression , MARIBEL - not using CPAP, history of drug use , history of ETOH use , hx of R rka with PJI .. States his right knee is very painful wakes him up at night describes the pain as a dull pain localized to the right knee 7/10 in severity. States is very pain full with movement. He ambulates with a walker.     1. R TKA PJI , s/p ADELA and abx spacer, right knee revision POD #1, closure by plastics,   PT/OT/pain mgmt  DVT prophylaxis- as per ortho  Abx as per per ortho ,   f/u OR surgery   Incentive spirometry  Prophylaxis of opioid  induced constipation.     2. HTN - on Tenex 1 mg BID - may use own     3. Hx of Anxiety/ Depression - follows with psych ,   was on Lithium but took him self off for last 3 wks- will not continue ,   advised to f/u with Psych after discharge     4. Hx of drug use , UDT- negative    5. Anemia - acute blood lost anemia - secondary to surgical blood lost ,   ebl - 300 ml , stable and asymptomatic    6.  E cigarettes smoker - counseled to quit , continue Nicotine lozenges PRN     7. DVT prophylaxis  - as per ortho protocol- on Eliquis   Opioid induced constipation  prophylaxis - bowel regimen     Thank you for the courtesy of this consult ,   will follow along with you .   D/W ortho PA .

## 2023-05-21 PROCEDURE — 99232 SBSQ HOSP IP/OBS MODERATE 35: CPT

## 2023-05-21 RX ADMIN — APIXABAN 2.5 MILLIGRAM(S): 2.5 TABLET, FILM COATED ORAL at 17:53

## 2023-05-21 RX ADMIN — Medication 975 MILLIGRAM(S): at 06:18

## 2023-05-21 RX ADMIN — Medication 2 MILLIGRAM(S): at 17:54

## 2023-05-21 RX ADMIN — Medication 10 MILLIGRAM(S): at 17:55

## 2023-05-21 RX ADMIN — SODIUM CHLORIDE 3 MILLILITER(S): 9 INJECTION INTRAMUSCULAR; INTRAVENOUS; SUBCUTANEOUS at 06:39

## 2023-05-21 RX ADMIN — Medication 2000 MILLIGRAM(S): at 06:17

## 2023-05-21 RX ADMIN — HYDROMORPHONE HYDROCHLORIDE 4 MILLIGRAM(S): 2 INJECTION INTRAMUSCULAR; INTRAVENOUS; SUBCUTANEOUS at 21:30

## 2023-05-21 RX ADMIN — POLYETHYLENE GLYCOL 3350 17 GRAM(S): 17 POWDER, FOR SOLUTION ORAL at 21:35

## 2023-05-21 RX ADMIN — HYDROMORPHONE HYDROCHLORIDE 4 MILLIGRAM(S): 2 INJECTION INTRAMUSCULAR; INTRAVENOUS; SUBCUTANEOUS at 13:11

## 2023-05-21 RX ADMIN — HYDROMORPHONE HYDROCHLORIDE 4 MILLIGRAM(S): 2 INJECTION INTRAMUSCULAR; INTRAVENOUS; SUBCUTANEOUS at 10:09

## 2023-05-21 RX ADMIN — Medication 975 MILLIGRAM(S): at 06:39

## 2023-05-21 RX ADMIN — GUANFACINE 1 MILLIGRAM(S): 3 TABLET, EXTENDED RELEASE ORAL at 21:32

## 2023-05-21 RX ADMIN — CELECOXIB 200 MILLIGRAM(S): 200 CAPSULE ORAL at 06:18

## 2023-05-21 RX ADMIN — Medication 975 MILLIGRAM(S): at 21:35

## 2023-05-21 RX ADMIN — GUANFACINE 1 MILLIGRAM(S): 3 TABLET, EXTENDED RELEASE ORAL at 10:10

## 2023-05-21 RX ADMIN — Medication 100 MILLIGRAM(S): at 21:32

## 2023-05-21 RX ADMIN — HYDROMORPHONE HYDROCHLORIDE 4 MILLIGRAM(S): 2 INJECTION INTRAMUSCULAR; INTRAVENOUS; SUBCUTANEOUS at 11:09

## 2023-05-21 RX ADMIN — SENNA PLUS 2 TABLET(S): 8.6 TABLET ORAL at 21:34

## 2023-05-21 RX ADMIN — CELECOXIB 200 MILLIGRAM(S): 200 CAPSULE ORAL at 17:53

## 2023-05-21 RX ADMIN — Medication 975 MILLIGRAM(S): at 21:30

## 2023-05-21 RX ADMIN — Medication 975 MILLIGRAM(S): at 13:11

## 2023-05-21 RX ADMIN — CELECOXIB 200 MILLIGRAM(S): 200 CAPSULE ORAL at 06:39

## 2023-05-21 RX ADMIN — HYDROMORPHONE HYDROCHLORIDE 4 MILLIGRAM(S): 2 INJECTION INTRAMUSCULAR; INTRAVENOUS; SUBCUTANEOUS at 18:55

## 2023-05-21 RX ADMIN — SODIUM CHLORIDE 3 MILLILITER(S): 9 INJECTION INTRAMUSCULAR; INTRAVENOUS; SUBCUTANEOUS at 13:11

## 2023-05-21 RX ADMIN — BUPROPION HYDROCHLORIDE 300 MILLIGRAM(S): 150 TABLET, EXTENDED RELEASE ORAL at 10:10

## 2023-05-21 RX ADMIN — Medication 2000 MILLIGRAM(S): at 21:33

## 2023-05-21 RX ADMIN — CELECOXIB 200 MILLIGRAM(S): 200 CAPSULE ORAL at 18:56

## 2023-05-21 RX ADMIN — HYDROMORPHONE HYDROCHLORIDE 4 MILLIGRAM(S): 2 INJECTION INTRAMUSCULAR; INTRAVENOUS; SUBCUTANEOUS at 22:30

## 2023-05-21 RX ADMIN — PANTOPRAZOLE SODIUM 40 MILLIGRAM(S): 20 TABLET, DELAYED RELEASE ORAL at 06:18

## 2023-05-21 RX ADMIN — SODIUM CHLORIDE 3 MILLILITER(S): 9 INJECTION INTRAMUSCULAR; INTRAVENOUS; SUBCUTANEOUS at 21:25

## 2023-05-21 RX ADMIN — HYDROMORPHONE HYDROCHLORIDE 4 MILLIGRAM(S): 2 INJECTION INTRAMUSCULAR; INTRAVENOUS; SUBCUTANEOUS at 14:14

## 2023-05-21 RX ADMIN — APIXABAN 2.5 MILLIGRAM(S): 2.5 TABLET, FILM COATED ORAL at 06:18

## 2023-05-21 RX ADMIN — Medication 2000 MILLIGRAM(S): at 13:11

## 2023-05-21 RX ADMIN — HYDROMORPHONE HYDROCHLORIDE 4 MILLIGRAM(S): 2 INJECTION INTRAMUSCULAR; INTRAVENOUS; SUBCUTANEOUS at 17:55

## 2023-05-21 RX ADMIN — Medication 10 MILLIGRAM(S): at 06:18

## 2023-05-21 RX ADMIN — HYDROMORPHONE HYDROCHLORIDE 4 MILLIGRAM(S): 2 INJECTION INTRAMUSCULAR; INTRAVENOUS; SUBCUTANEOUS at 02:52

## 2023-05-21 RX ADMIN — HYDROMORPHONE HYDROCHLORIDE 4 MILLIGRAM(S): 2 INJECTION INTRAMUSCULAR; INTRAVENOUS; SUBCUTANEOUS at 03:52

## 2023-05-21 RX ADMIN — Medication 975 MILLIGRAM(S): at 14:11

## 2023-05-21 RX ADMIN — ATORVASTATIN CALCIUM 40 MILLIGRAM(S): 80 TABLET, FILM COATED ORAL at 21:32

## 2023-05-21 RX ADMIN — Medication 2 MILLIGRAM(S): at 06:18

## 2023-05-21 RX ADMIN — HYDROMORPHONE HYDROCHLORIDE 4 MILLIGRAM(S): 2 INJECTION INTRAMUSCULAR; INTRAVENOUS; SUBCUTANEOUS at 06:22

## 2023-05-22 PROCEDURE — 99232 SBSQ HOSP IP/OBS MODERATE 35: CPT

## 2023-05-22 RX ADMIN — Medication 975 MILLIGRAM(S): at 13:11

## 2023-05-22 RX ADMIN — Medication 2000 MILLIGRAM(S): at 05:41

## 2023-05-22 RX ADMIN — Medication 975 MILLIGRAM(S): at 05:42

## 2023-05-22 RX ADMIN — SENNA PLUS 2 TABLET(S): 8.6 TABLET ORAL at 21:08

## 2023-05-22 RX ADMIN — APIXABAN 2.5 MILLIGRAM(S): 2.5 TABLET, FILM COATED ORAL at 05:41

## 2023-05-22 RX ADMIN — HYDROMORPHONE HYDROCHLORIDE 4 MILLIGRAM(S): 2 INJECTION INTRAMUSCULAR; INTRAVENOUS; SUBCUTANEOUS at 21:08

## 2023-05-22 RX ADMIN — CELECOXIB 200 MILLIGRAM(S): 200 CAPSULE ORAL at 05:42

## 2023-05-22 RX ADMIN — Medication 2000 MILLIGRAM(S): at 13:11

## 2023-05-22 RX ADMIN — Medication 975 MILLIGRAM(S): at 05:46

## 2023-05-22 RX ADMIN — Medication 2 MILLIGRAM(S): at 05:47

## 2023-05-22 RX ADMIN — HYDROMORPHONE HYDROCHLORIDE 4 MILLIGRAM(S): 2 INJECTION INTRAMUSCULAR; INTRAVENOUS; SUBCUTANEOUS at 05:42

## 2023-05-22 RX ADMIN — HYDROMORPHONE HYDROCHLORIDE 4 MILLIGRAM(S): 2 INJECTION INTRAMUSCULAR; INTRAVENOUS; SUBCUTANEOUS at 14:45

## 2023-05-22 RX ADMIN — HYDROMORPHONE HYDROCHLORIDE 4 MILLIGRAM(S): 2 INJECTION INTRAMUSCULAR; INTRAVENOUS; SUBCUTANEOUS at 06:42

## 2023-05-22 RX ADMIN — GUANFACINE 1 MILLIGRAM(S): 3 TABLET, EXTENDED RELEASE ORAL at 13:12

## 2023-05-22 RX ADMIN — CELECOXIB 200 MILLIGRAM(S): 200 CAPSULE ORAL at 17:20

## 2023-05-22 RX ADMIN — Medication 2 MILLIGRAM(S): at 23:11

## 2023-05-22 RX ADMIN — Medication 10 MILLIGRAM(S): at 05:42

## 2023-05-22 RX ADMIN — SODIUM CHLORIDE 3 MILLILITER(S): 9 INJECTION INTRAMUSCULAR; INTRAVENOUS; SUBCUTANEOUS at 05:36

## 2023-05-22 RX ADMIN — Medication 975 MILLIGRAM(S): at 14:45

## 2023-05-22 RX ADMIN — Medication 10 MILLIGRAM(S): at 17:20

## 2023-05-22 RX ADMIN — Medication 2 MILLIGRAM(S): at 17:21

## 2023-05-22 RX ADMIN — Medication 975 MILLIGRAM(S): at 21:08

## 2023-05-22 RX ADMIN — HYDROMORPHONE HYDROCHLORIDE 4 MILLIGRAM(S): 2 INJECTION INTRAMUSCULAR; INTRAVENOUS; SUBCUTANEOUS at 13:20

## 2023-05-22 RX ADMIN — Medication 2000 MILLIGRAM(S): at 21:07

## 2023-05-22 RX ADMIN — HYDROMORPHONE HYDROCHLORIDE 4 MILLIGRAM(S): 2 INJECTION INTRAMUSCULAR; INTRAVENOUS; SUBCUTANEOUS at 22:08

## 2023-05-22 RX ADMIN — APIXABAN 2.5 MILLIGRAM(S): 2.5 TABLET, FILM COATED ORAL at 17:20

## 2023-05-22 RX ADMIN — SODIUM CHLORIDE 3 MILLILITER(S): 9 INJECTION INTRAMUSCULAR; INTRAVENOUS; SUBCUTANEOUS at 20:58

## 2023-05-22 RX ADMIN — GUANFACINE 1 MILLIGRAM(S): 3 TABLET, EXTENDED RELEASE ORAL at 21:08

## 2023-05-22 RX ADMIN — POLYETHYLENE GLYCOL 3350 17 GRAM(S): 17 POWDER, FOR SOLUTION ORAL at 21:07

## 2023-05-22 RX ADMIN — PANTOPRAZOLE SODIUM 40 MILLIGRAM(S): 20 TABLET, DELAYED RELEASE ORAL at 05:41

## 2023-05-22 RX ADMIN — Medication 2 MILLIGRAM(S): at 13:12

## 2023-05-22 RX ADMIN — HYDROMORPHONE HYDROCHLORIDE 4 MILLIGRAM(S): 2 INJECTION INTRAMUSCULAR; INTRAVENOUS; SUBCUTANEOUS at 10:06

## 2023-05-22 RX ADMIN — Medication 100 MILLIGRAM(S): at 21:08

## 2023-05-22 RX ADMIN — Medication 975 MILLIGRAM(S): at 22:08

## 2023-05-22 RX ADMIN — ATORVASTATIN CALCIUM 40 MILLIGRAM(S): 80 TABLET, FILM COATED ORAL at 21:08

## 2023-05-22 RX ADMIN — SODIUM CHLORIDE 3 MILLILITER(S): 9 INJECTION INTRAMUSCULAR; INTRAVENOUS; SUBCUTANEOUS at 13:23

## 2023-05-22 RX ADMIN — CELECOXIB 200 MILLIGRAM(S): 200 CAPSULE ORAL at 05:46

## 2023-05-22 NOTE — PROGRESS NOTE ADULT - NS ATTEND AMEND GEN_ALL_CORE FT
pt seen and examined. doing well. drain as per plastics. rom 0-30, locked in ext when ambulating. dvt ppx. abx until cx final. d/c planning

## 2023-05-23 ENCOUNTER — TRANSCRIPTION ENCOUNTER (OUTPATIENT)
Age: 58
End: 2023-05-23

## 2023-05-23 LAB — SARS-COV-2 RNA SPEC QL NAA+PROBE: SIGNIFICANT CHANGE UP

## 2023-05-23 PROCEDURE — 99232 SBSQ HOSP IP/OBS MODERATE 35: CPT

## 2023-05-23 RX ADMIN — Medication 975 MILLIGRAM(S): at 12:26

## 2023-05-23 RX ADMIN — Medication 2 MILLIGRAM(S): at 06:25

## 2023-05-23 RX ADMIN — Medication 10 MILLIGRAM(S): at 06:28

## 2023-05-23 RX ADMIN — PANTOPRAZOLE SODIUM 40 MILLIGRAM(S): 20 TABLET, DELAYED RELEASE ORAL at 06:24

## 2023-05-23 RX ADMIN — HYDROMORPHONE HYDROCHLORIDE 4 MILLIGRAM(S): 2 INJECTION INTRAMUSCULAR; INTRAVENOUS; SUBCUTANEOUS at 11:26

## 2023-05-23 RX ADMIN — HYDROMORPHONE HYDROCHLORIDE 4 MILLIGRAM(S): 2 INJECTION INTRAMUSCULAR; INTRAVENOUS; SUBCUTANEOUS at 06:28

## 2023-05-23 RX ADMIN — HYDROMORPHONE HYDROCHLORIDE 4 MILLIGRAM(S): 2 INJECTION INTRAMUSCULAR; INTRAVENOUS; SUBCUTANEOUS at 17:17

## 2023-05-23 RX ADMIN — CELECOXIB 200 MILLIGRAM(S): 200 CAPSULE ORAL at 17:17

## 2023-05-23 RX ADMIN — Medication 2000 MILLIGRAM(S): at 06:25

## 2023-05-23 RX ADMIN — Medication 975 MILLIGRAM(S): at 21:59

## 2023-05-23 RX ADMIN — SODIUM CHLORIDE 3 MILLILITER(S): 9 INJECTION INTRAMUSCULAR; INTRAVENOUS; SUBCUTANEOUS at 13:39

## 2023-05-23 RX ADMIN — CELECOXIB 200 MILLIGRAM(S): 200 CAPSULE ORAL at 17:47

## 2023-05-23 RX ADMIN — Medication 2 MILLIGRAM(S): at 17:17

## 2023-05-23 RX ADMIN — CELECOXIB 200 MILLIGRAM(S): 200 CAPSULE ORAL at 06:46

## 2023-05-23 RX ADMIN — Medication 10 MILLIGRAM(S): at 17:17

## 2023-05-23 RX ADMIN — Medication 2000 MILLIGRAM(S): at 22:00

## 2023-05-23 RX ADMIN — APIXABAN 2.5 MILLIGRAM(S): 2.5 TABLET, FILM COATED ORAL at 17:17

## 2023-05-23 RX ADMIN — Medication 100 MILLIGRAM(S): at 21:59

## 2023-05-23 RX ADMIN — HYDROMORPHONE HYDROCHLORIDE 4 MILLIGRAM(S): 2 INJECTION INTRAMUSCULAR; INTRAVENOUS; SUBCUTANEOUS at 12:26

## 2023-05-23 RX ADMIN — Medication 2000 MILLIGRAM(S): at 12:26

## 2023-05-23 RX ADMIN — SODIUM CHLORIDE 3 MILLILITER(S): 9 INJECTION INTRAMUSCULAR; INTRAVENOUS; SUBCUTANEOUS at 06:22

## 2023-05-23 RX ADMIN — HYDROMORPHONE HYDROCHLORIDE 4 MILLIGRAM(S): 2 INJECTION INTRAMUSCULAR; INTRAVENOUS; SUBCUTANEOUS at 23:00

## 2023-05-23 RX ADMIN — Medication 975 MILLIGRAM(S): at 06:46

## 2023-05-23 RX ADMIN — Medication 2 MILLIGRAM(S): at 11:26

## 2023-05-23 RX ADMIN — CELECOXIB 200 MILLIGRAM(S): 200 CAPSULE ORAL at 06:24

## 2023-05-23 RX ADMIN — SODIUM CHLORIDE 3 MILLILITER(S): 9 INJECTION INTRAMUSCULAR; INTRAVENOUS; SUBCUTANEOUS at 22:18

## 2023-05-23 RX ADMIN — HYDROMORPHONE HYDROCHLORIDE 4 MILLIGRAM(S): 2 INJECTION INTRAMUSCULAR; INTRAVENOUS; SUBCUTANEOUS at 22:00

## 2023-05-23 RX ADMIN — Medication 975 MILLIGRAM(S): at 06:24

## 2023-05-23 RX ADMIN — GUANFACINE 1 MILLIGRAM(S): 3 TABLET, EXTENDED RELEASE ORAL at 11:25

## 2023-05-23 RX ADMIN — ATORVASTATIN CALCIUM 40 MILLIGRAM(S): 80 TABLET, FILM COATED ORAL at 22:00

## 2023-05-23 RX ADMIN — APIXABAN 2.5 MILLIGRAM(S): 2.5 TABLET, FILM COATED ORAL at 06:24

## 2023-05-23 RX ADMIN — Medication 975 MILLIGRAM(S): at 22:17

## 2023-05-23 RX ADMIN — GUANFACINE 1 MILLIGRAM(S): 3 TABLET, EXTENDED RELEASE ORAL at 21:59

## 2023-05-23 RX ADMIN — HYDROMORPHONE HYDROCHLORIDE 4 MILLIGRAM(S): 2 INJECTION INTRAMUSCULAR; INTRAVENOUS; SUBCUTANEOUS at 18:17

## 2023-05-23 RX ADMIN — Medication 975 MILLIGRAM(S): at 11:26

## 2023-05-23 NOTE — DISCHARGE NOTE NURSING/CASE MANAGEMENT/SOCIAL WORK - NSDCFUADDAPPT_GEN_ALL_CORE_FT
Patient is being discharged to subacute rehabilitation at the below facility:    [facility]   [address] [place], [place] [id number]

## 2023-05-23 NOTE — DISCHARGE NOTE NURSING/CASE MANAGEMENT/SOCIAL WORK - PATIENT PORTAL LINK FT
You can access the FollowMyHealth Patient Portal offered by [facility] by registering at the following website: [email or web address]. By joining [facility]’s FollowMyHealth portal, you will also be able to view your health information using other applications (apps) compatible with our system.

## 2023-05-23 NOTE — DISCHARGE NOTE NURSING/CASE MANAGEMENT/SOCIAL WORK - NSDCPEFALRISK_GEN_ALL_CORE
For information on Fall & Injury Prevention, visit: [email or web address] OR  [email or web address] OR  https://www.cdc.gov/steadi/patient.html

## 2023-05-23 NOTE — DISCHARGE NOTE NURSING/CASE MANAGEMENT/SOCIAL WORK - NSDCVIVACCINE_GEN_ALL_CORE_FT
Tdap; 29-Feb-2020 23:58; Lauren Delacruz (TASHIA); [employer]; [id number] (Exp. Date: 19-Mar-2022); IntraMuscular; Deltoid Right.; 0.5 milliLiter(s); VIS (VIS Published: 09-May-2013, VIS Presented: 29-Feb-2020);

## 2023-05-24 VITALS
OXYGEN SATURATION: 97 % | DIASTOLIC BLOOD PRESSURE: 79 MMHG | SYSTOLIC BLOOD PRESSURE: 160 MMHG | TEMPERATURE: 98 F | HEART RATE: 77 BPM | RESPIRATION RATE: 18 BRPM

## 2023-05-24 LAB
ALBUMIN SERPL ELPH-MCNC: 3.6 G/DL — SIGNIFICANT CHANGE UP (ref 3.3–5.2)
ALP SERPL-CCNC: 112 U/L — SIGNIFICANT CHANGE UP (ref 40–120)
ALT FLD-CCNC: 28 U/L — SIGNIFICANT CHANGE UP
ANION GAP SERPL CALC-SCNC: 10 MMOL/L — SIGNIFICANT CHANGE UP (ref 5–17)
AST SERPL-CCNC: 28 U/L — SIGNIFICANT CHANGE UP
BILIRUB SERPL-MCNC: <0.2 MG/DL — LOW (ref 0.4–2)
BUN SERPL-MCNC: 19.7 MG/DL — SIGNIFICANT CHANGE UP (ref 8–20)
CALCIUM SERPL-MCNC: 8.7 MG/DL — SIGNIFICANT CHANGE UP (ref 8.4–10.5)
CHLORIDE SERPL-SCNC: 101 MMOL/L — SIGNIFICANT CHANGE UP (ref 96–108)
CO2 SERPL-SCNC: 27 MMOL/L — SIGNIFICANT CHANGE UP (ref 22–29)
CREAT SERPL-MCNC: 0.77 MG/DL — SIGNIFICANT CHANGE UP (ref 0.5–1.3)
EGFR: 104 ML/MIN/1.73M2 — SIGNIFICANT CHANGE UP
GLUCOSE SERPL-MCNC: 134 MG/DL — HIGH (ref 70–99)
HCT VFR BLD CALC: 25.4 % — LOW (ref 39–50)
HGB BLD-MCNC: 8.3 G/DL — LOW (ref 13–17)
MCHC RBC-ENTMCNC: 30.6 PG — SIGNIFICANT CHANGE UP (ref 27–34)
MCHC RBC-ENTMCNC: 32.7 GM/DL — SIGNIFICANT CHANGE UP (ref 32–36)
MCV RBC AUTO: 93.7 FL — SIGNIFICANT CHANGE UP (ref 80–100)
PLATELET # BLD AUTO: 218 K/UL — SIGNIFICANT CHANGE UP (ref 150–400)
POTASSIUM SERPL-MCNC: 4.3 MMOL/L — SIGNIFICANT CHANGE UP (ref 3.5–5.3)
POTASSIUM SERPL-SCNC: 4.3 MMOL/L — SIGNIFICANT CHANGE UP (ref 3.5–5.3)
PROT SERPL-MCNC: 6.4 G/DL — LOW (ref 6.6–8.7)
RBC # BLD: 2.71 M/UL — LOW (ref 4.2–5.8)
RBC # FLD: 13.4 % — SIGNIFICANT CHANGE UP (ref 10.3–14.5)
SODIUM SERPL-SCNC: 138 MMOL/L — SIGNIFICANT CHANGE UP (ref 135–145)
WBC # BLD: 7.18 K/UL — SIGNIFICANT CHANGE UP (ref 3.8–10.5)
WBC # FLD AUTO: 7.18 K/UL — SIGNIFICANT CHANGE UP (ref 3.8–10.5)

## 2023-05-24 PROCEDURE — 36415 COLL VENOUS BLD VENIPUNCTURE: CPT

## 2023-05-24 PROCEDURE — 85652 RBC SED RATE AUTOMATED: CPT

## 2023-05-24 PROCEDURE — 87075 CULTR BACTERIA EXCEPT BLOOD: CPT

## 2023-05-24 PROCEDURE — 84295 ASSAY OF SERUM SODIUM: CPT

## 2023-05-24 PROCEDURE — 85018 HEMOGLOBIN: CPT

## 2023-05-24 PROCEDURE — 80053 COMPREHEN METABOLIC PANEL: CPT

## 2023-05-24 PROCEDURE — 85027 COMPLETE CBC AUTOMATED: CPT

## 2023-05-24 PROCEDURE — C1713: CPT

## 2023-05-24 PROCEDURE — 82330 ASSAY OF CALCIUM: CPT

## 2023-05-24 PROCEDURE — 80048 BASIC METABOLIC PNL TOTAL CA: CPT

## 2023-05-24 PROCEDURE — 84132 ASSAY OF SERUM POTASSIUM: CPT

## 2023-05-24 PROCEDURE — 83605 ASSAY OF LACTIC ACID: CPT

## 2023-05-24 PROCEDURE — 82803 BLOOD GASES ANY COMBINATION: CPT

## 2023-05-24 PROCEDURE — 80307 DRUG TEST PRSMV CHEM ANLYZR: CPT

## 2023-05-24 PROCEDURE — 82947 ASSAY GLUCOSE BLOOD QUANT: CPT

## 2023-05-24 PROCEDURE — 87070 CULTURE OTHR SPECIMN AEROBIC: CPT

## 2023-05-24 PROCEDURE — C1776: CPT

## 2023-05-24 PROCEDURE — 85014 HEMATOCRIT: CPT

## 2023-05-24 PROCEDURE — 82435 ASSAY OF BLOOD CHLORIDE: CPT

## 2023-05-24 PROCEDURE — 73560 X-RAY EXAM OF KNEE 1 OR 2: CPT

## 2023-05-24 PROCEDURE — 87635 SARS-COV-2 COVID-19 AMP PRB: CPT

## 2023-05-24 PROCEDURE — 86140 C-REACTIVE PROTEIN: CPT

## 2023-05-24 PROCEDURE — C9399: CPT

## 2023-05-24 PROCEDURE — 99232 SBSQ HOSP IP/OBS MODERATE 35: CPT

## 2023-05-24 RX ORDER — OXYCODONE HYDROCHLORIDE 5 MG/1
1 TABLET ORAL
Qty: 0 | Refills: 0 | DISCHARGE
Start: 2023-05-24

## 2023-05-24 RX ORDER — ACETAMINOPHEN 500 MG
3 TABLET ORAL
Qty: 0 | Refills: 0 | DISCHARGE
Start: 2023-05-24

## 2023-05-24 RX ORDER — FERROUS SULFATE 325(65) MG
325 TABLET ORAL DAILY
Refills: 0 | Status: DISCONTINUED | OUTPATIENT
Start: 2023-05-24 | End: 2023-05-24

## 2023-05-24 RX ADMIN — HYDROMORPHONE HYDROCHLORIDE 4 MILLIGRAM(S): 2 INJECTION INTRAMUSCULAR; INTRAVENOUS; SUBCUTANEOUS at 05:16

## 2023-05-24 RX ADMIN — Medication 975 MILLIGRAM(S): at 05:11

## 2023-05-24 RX ADMIN — APIXABAN 2.5 MILLIGRAM(S): 2.5 TABLET, FILM COATED ORAL at 05:11

## 2023-05-24 RX ADMIN — HYDROMORPHONE HYDROCHLORIDE 4 MILLIGRAM(S): 2 INJECTION INTRAMUSCULAR; INTRAVENOUS; SUBCUTANEOUS at 06:16

## 2023-05-24 RX ADMIN — SODIUM CHLORIDE 3 MILLILITER(S): 9 INJECTION INTRAMUSCULAR; INTRAVENOUS; SUBCUTANEOUS at 06:02

## 2023-05-24 RX ADMIN — CELECOXIB 200 MILLIGRAM(S): 200 CAPSULE ORAL at 06:01

## 2023-05-24 RX ADMIN — Medication 325 MILLIGRAM(S): at 11:15

## 2023-05-24 RX ADMIN — Medication 975 MILLIGRAM(S): at 06:01

## 2023-05-24 RX ADMIN — PANTOPRAZOLE SODIUM 40 MILLIGRAM(S): 20 TABLET, DELAYED RELEASE ORAL at 05:11

## 2023-05-24 RX ADMIN — Medication 10 MILLIGRAM(S): at 05:10

## 2023-05-24 RX ADMIN — Medication 2000 MILLIGRAM(S): at 05:11

## 2023-05-24 RX ADMIN — CELECOXIB 200 MILLIGRAM(S): 200 CAPSULE ORAL at 05:10

## 2023-05-24 NOTE — PROGRESS NOTE ADULT - ASSESSMENT
57 year old male with PMH of HTN , HLD , Anxiety and Depression , MARIBEL - not using CPAP, history of drug use , history of ETOH use , hx of R rka with PJI .. States his right knee is very painful wakes him up at night describes the pain as a dull pain localized to the right knee 7/10 in severity. States is very pain full with movement. He ambulates with a walker.     1. R TKA PJI , s/p ADELA and abx spacer s/p right knee revision, closure by plastics  PT/OT/pain mgmt  DVT prophylaxis- as per ortho  Abx as per per ortho   f/u OR surgery   Incentive spirometry  Prophylaxis of opioid  induced constipation.   Wound care / bledsode - as per plastic/ ortho   OR cultures no growth so for, on Empiric Ancef   Might benefit from ID consult.     2. HTN - on Tenex 1 mg BID - may use own     3. Hx of Anxiety/ Depression - follows with psych ,   was on Lithium but took him self off for last 3 wks- will not continue ,   advised to f/u with Psych after discharge     4. Hx of drug use , UDT- negative    5. Anemia - acute blood lost anemia - secondary to surgical blood lost ,   ebl - 300 ml , stable and asymptomatic. F/U CBC in am .     6.  E -cigarettes smoker - counseled to quit , continue Nicotine lozenges PRN     7. DVT prophylaxis  - as per ortho protocol- on Eliquis   Opioid induced constipation  prophylaxis - bowel regimen     Will follow along with you .     
57 year old male with PMH of HTN , HLD , Anxiety and Depression , MARIBEL - not using CPAP, history of drug use , history of ETOH use , hx of R rka with PJI .. States his right knee is very painful wakes him up at night describes the pain as a dull pain localized to the right knee 7/10 in severity. States is very pain full with movement. He ambulates with a walker.     1. R TKA PJI , s/p ADELA and abx spacer s/p right knee revision, closure by plastics  PT/OT/pain mgmt  DVT prophylaxis- as per ortho  Abx as per per ortho  f/u OR surgery   Incentive spirometry  Prophylaxis of opioid  induced constipation.   Wound care / bledsode - as per plastic/ ortho   OR cultures no growth so for, on Empiric Ancef   Might benefit from ID consult.       2. HTN - on Tenex 1 mg BID - may use own     3. Hx of Anxiety/ Depression - follows with psych ,   was on Lithium but took him self off for last 3 wks- will not continue ,   advised to f/u with Psych after discharge     4. Hx of drug use , UDT- negative    5. Anemia - acute blood lost anemia - secondary to surgical blood lost ,   ebl - 300 ml , stable and asymptomatic, Hb is 8.3, started on iron supplement    6.  E -cigarettes smoker - counseled to quit , continue Nicotine lozenges PRN     7. DVT prophylaxis  - as per ortho protocol- on Eliquis   Opioid induced constipation  prophylaxis - bowel regimen     Need to get CBC in 2 weeks by PMD, he was told about it. 
57 year old male with PMH of HTN , HLD , Anxiety and Depression , MARIBEL - not using CPAP, history of drug use , history of ETOH use , hx of R rka with PJI .. States his right knee is very painful wakes him up at night describes the pain as a dull pain localized to the right knee 7/10 in severity. States is very pain full with movement. He ambulates with a walker.     1. R TKA PJI , s/p ADELA and abx spacer, right knee revision POD #1, closure by plastics,   PT/OT/pain mgmt  DVT prophylaxis- as per ortho  Abx as per per ortho ,   f/u OR surgery   Incentive spirometry  Prophylaxis of opioid  induced constipation.   Wound care / bledsode - as per plastic/ ortho   OR cultures - sent - so far negative , on Empiric Ancef     2. HTN - on Tenex 1 mg BID - may use own     3. Hx of Anxiety/ Depression - follows with psych ,   was on Lithium but took him self off for last 3 wks- will not continue ,   advised to f/u with Psych after discharge     4. Hx of drug use , UDT- negative    5. Anemia - acute blood lost anemia - secondary to surgical blood lost ,   ebl - 300 ml , stable and asymptomatic. F/U CBC in am .     6.  E -cigarettes smoker - counseled to quit , continue Nicotine lozenges PRN     7. DVT prophylaxis  - as per ortho protocol- on Eliquis   Opioid induced constipation  prophylaxis - bowel regimen     D/W ortho PA .   Will follow along with you .     
57 year old male with PMH of HTN , HLD , Anxiety and Depression , MARIBEL - not using CPAP, history of drug use , history of ETOH use , hx of R rka with PJI .. States his right knee is very painful wakes him up at night describes the pain as a dull pain localized to the right knee 7/10 in severity. States is very pain full with movement. He ambulates with a walker.     1. R TKA PJI , s/p ADELA and abx spacer s/p right knee revision, closure by plastics  PT/OT/pain mgmt  DVT prophylaxis- as per ortho  Abx as per per ortho  f/u OR surgery   Incentive spirometry  Prophylaxis of opioid  induced constipation.   Wound care / bledsode - as per plastic/ ortho   OR cultures no growth so for, on Empiric Ancef   Might benefit from ID consult.   will repeat CBC     2. HTN - on Tenex 1 mg BID - may use own     3. Hx of Anxiety/ Depression - follows with psych ,   was on Lithium but took him self off for last 3 wks- will not continue ,   advised to f/u with Psych after discharge     4. Hx of drug use , UDT- negative    5. Anemia - acute blood lost anemia - secondary to surgical blood lost ,   ebl - 300 ml , stable and asymptomatic    6.  E -cigarettes smoker - counseled to quit , continue Nicotine lozenges PRN     7. DVT prophylaxis  - as per ortho protocol- on Eliquis   Opioid induced constipation  prophylaxis - bowel regimen     Will follow along with you .

## 2023-05-24 NOTE — PROGRESS NOTE ADULT - SUBJECTIVE AND OBJECTIVE BOX
Paul was awake and alert in bed with knee immobilizer in place. I removed the immobilizer, and fit him with a Jl [place]oi Post Op knee brace set in 0 to 30 for in bed use. Brace was applied over his ace wrap. He was instructed on locking the brace in full extension when OOB. Two femoral length sleeves were provided for use after Ace wrap is removed.   [facility]  
Ortho Post Op Check    Name: MOSES HANKINS    MR #: [id number]    Procedure: right knee revision, ADELA and removal of abx apcer  Surgeon: Dread    Pt comfortable without complaints, pain controlled  Denies CP, SOB, N/V, numbness/tingling     General Exam:  Vital Signs Last 24 Hrs  T(C): --  T(F): --  HR: --  BP: --  BP(mean): --  RR: --  SpO2: --    General: Pt Alert and oriented, NAD, controlled pain.  Dressings C/D/I. Prevena functioning, DINA drain noted with bloody output  Pulses: 2+ dorsalis pedis pulse. Cap refill < 2 sec.  Sensation: Grossly intact to light touch without deficit.  Motor: + EHL/FHL/TA/GS, knee  immobilizer in place    T(C): 36.3 (05-19-23 @ 08:18), Max: 36.3 (05-19-23 @ 08:18)  HR: 62 (05-19-23 @ 08:18) (62 - 62)  BP: 169/94 (05-19-23 @ 08:18) (169/94 - 169/94)  RR: 16 (05-19-23 @ 08:18) (16 - 16)  SpO2: 98% (05-19-23 @ 08:18) (98% - 98%)    A/P: 57yMale POD#0 s/p right knee revision  - Stable  - Pain Control  - DVT ppx: eliquis   - f/u OR Cx  - Ancef q 8 hours until OR cultures final  - WBAT RLE in knee immobilizer / shan locked in extension (may ROM 0-30 in shan at rest)  - Pain Mgmt following  
Ortho Progress note    Name: MOSES HANKINS    MR #: [id number]    Procedure: right total knee reimplantation/ plastics closure  Surgeon: Dr. Canada/ Dr. Ceron    Pt comfortable without complaints, pain controlled  Participating in PT  Denies CP, SOB, N/V, numbness/tingling     General Exam:    Vital Signs Last 24 Hrs  T(C): 36.3 (24 May 2023 05:10), Max: 36.9 (23 May 2023 09:21)  T(F): 97.4 (24 May 2023 05:10), Max: 98.4 (23 May 2023 09:21)  HR: 64 (24 May 2023 05:10) (64 - 72)  BP: 128/70 (24 May 2023 05:10) (128/70 - 172/68)  BP(mean): --  RR: 18 (24 May 2023 05:10) (18 - 19)  SpO2: 95% (24 May 2023 05:10) (95% - 97%)    Parameters below as of 24 May 2023 05:10  Patient On (Oxygen Delivery Method): room air    General: Pt Alert and oriented, NAD, controlled pain.  [place] brace in place  Prevena Dressing intact and functioning well  Prevena dressing removed  Incision healing well, sutures in place  no active drainage or discharge  DINA drain in place and functioning well, 30cc output overnight  DINA drain removed  Mepilex dressing applied  Pulses: 2+ dorsalis pedis pulse. Cap refill < 2 sec.  Sensation: Grossly intact to light touch without deficit.  Motor: + EHL/ FHL/ TA/ GS    A/P: 57y Male POD#5 s/p right total knee reimplantation/ plastics closure    - Pain Control per pain management  - DVT ppx: Eliquis as prescribed  - Post op abx: Ancef until OR cultures final  - Continue PT  - Weight bearing status: WBAT in [place] brace locked in extension while ambulating, 0-30 ROM while in bed  - Medical care per hospitalist   - Anticipated DC to [facility] today      
Patient seen and examined at bedside. comfortable in bed, pain controlled. [place] brace delivered yesterday. Denies fever/chills, SOB/chest pain, abdominal pain, numbness/tingling. no complaints.    Vital Signs Last 24 Hrs  T(C): 36.5 (21 May 2023 05:44), Max: 36.9 (20 May 2023 16:34)  T(F): 97.7 (21 May 2023 05:44), Max: 98.5 (20 May 2023 16:34)  HR: 79 (21 May 2023 05:44) (70 - 84)  BP: 107/65 (21 May 2023 05:44) (107/65 - 150/77)  BP(mean): --  RR: 18 (21 May 2023 05:44) (18 - 20)  SpO2: 98% (21 May 2023 05:44) (93% - 98%)    Parameters below as of 21 May 2023 05:44  Patient On (Oxygen Delivery Method): room air    General: NAD  RLE: [place] noted, in place. Prevena functioning, 0 cc in cannister. DINA functioning. Ace bandage dressing C/D/I, Removed, Prevena and Dressing around drain site C/D/I. SILT. + dorsi/plantarflexion. DP 2+. calf soft, NT B/L. compartments soft, compressible throughout.                          9.8    12.82 )-----------( 210      ( 20 May 2023 05:03 )             29.3     Culture - Tissue with Gram Stain (05.19.23 @ 15:21)    Gram Stain:   Rare polymorphonuclear leukocytes per low power field  No organisms seen per oil power field   Specimen Source: .Tissue Deep Wound 3 Right Knee   Culture Results:   No growth to date.    Culture - Tissue with Gram Stain (05.19.23 @ 15:21)    Gram Stain:   Rare polymorphonuclear leukocytes per low power field  No organisms seen per oil power field   Specimen Source: .Tissue Deep Wound 2 Right Knee   Culture Results:   No growth to date.      Culture - Tissue with Gram Stain (05.19.23 @ 15:20)    Gram Stain:   No polymorphonuclear cells seen per low power field  No organisms seen per oil power field   Specimen Source: .Tissue Deep Wound 1 Right Knee   Culture Results:   No growth to date.    drain output: 90cc overnight, 180 cc over 24 hours    A/P: 57 y.o M s/p right knee ADELA and abx spacer, right knee revision POD #2, closure by plastics  - WBAT in bledsode locked in ext, ROM 0-30 at rest. reviewed with patient  - DVTP (Eliquis)  - abx until cultures final  - monitor drain  - f/u OR cx
   MOSES HANKINS  [id number]    History: 57y Male is status post right knee revision, ADELA and abx spacer, closed by Dr. Ceron POD #3. Patient is doing well and is comfortable. The patient's pain is controlled using the prescribed pain medications. The patient is participating in physical therapy. Denies CP, SOB, dizziness, HA, fever/chills, numbness or tingling. No new complaints.    Vital Signs Last 24 Hrs  T(C): 36.6 (22 May 2023 05:25), Max: 36.8 (21 May 2023 16:16)  T(F): 97.8 (22 May 2023 05:25), Max: 98.3 (21 May 2023 16:16)  HR: 68 (22 May 2023 05:25) (68 - 84)  BP: 130/77 (22 May 2023 05:25) (120/65 - 132/75)  BP(mean): --  RR: 16 (22 May 2023 05:25) (16 - 18)  SpO2: 98% (22 May 2023 05:25) (96% - 98%)    Parameters below as of 22 May 2023 05:25  Patient On (Oxygen Delivery Method): room air        General: Alert, awake, NAD  Physical exam: +  [place] noted, in place. Prevena functioning, 0 cc in cannister. DINA functioning - 30cc overnight and 80cc over 24 hours. Compartments soft, compressible throughout.   The right knee dressing is clean, dry and intact. No drainage, discharge noted. Drain site dressing clean, dry, intact.   The calf is supple nontender b/l.   SILT. +AT/GC/EHL/FHL.   2+ DP pulse. BCR. No cyanosis.    Culture - Tissue with Gram Stain (05.19.23 @ 15:21)    Gram Stain:   Rare polymorphonuclear leukocytes per low power field  No organisms seen per oil power field   Specimen Source: .Tissue Deep Wound 3 Right Knee   Culture Results:   No growth to date.    Culture - Tissue with Gram Stain (05.19.23 @ 15:21)    Gram Stain:   Rare polymorphonuclear leukocytes per low power field  No organisms seen per oil power field   Specimen Source: .Tissue Deep Wound 2 Right Knee   Culture Results:   No growth to date.    Culture - Tissue with Gram Stain (05.19.23 @ 15:20)    Gram Stain:   No polymorphonuclear cells seen per low power field  No organisms seen per oil power field   Specimen Source: .Tissue Deep Wound 1 Right Knee   Culture Results:   No growth to date.        Plan:   - WBAT in bledsode locked in ext, ROM 0-30 at rest. reviewed with patient  - DVTP (Eliquis)  - abx until cultures final  - monitor drain, prevena  - f/u OR cx
Ortho Progress note    Name: MOSES HANKINS    MR #: [id number]    Procedure: right total knee reimplantation/ plastics closure  Surgeon: Dr. Canada/ Dr. Ceron    Pt comfortable without complaints, pain controlled  Participating in PT  Denies CP, SOB, N/V, numbness/tingling     General Exam:  Vital Signs Last 24 Hrs  T(C): 36.8 (05-23-23 @ 05:08), Max: 36.8 (05-23-23 @ 05:08)  T(F): 98.2 (05-23-23 @ 05:08), Max: 98.2 (05-23-23 @ 05:08)  HR: 75 (05-23-23 @ 05:08) (75 - 75)  BP: 124/76 (05-23-23 @ 05:08) (124/76 - 124/76)  BP(mean): --  RR: 18 (05-23-23 @ 05:08) (18 - 18)  SpO2: 96% (05-23-23 @ 05:08) (96% - 96%)    General: Pt Alert and oriented, NAD, controlled pain.  [place] brace in place  Prevena Dressing intact and functioning well  DINA drain in place and functioning well, 30cc output overnight  Pulses: 2+ dorsalis pedis pulse. Cap refill < 2 sec.  Sensation: Grossly intact to light touch without deficit.  Motor: + EHL/FHL/TA/GS    A/P: 57yMale POD#4 s/p right total knee reimplantation/ plastics closure    - Pain Control per pain management  - Monitor drain output  - DVT ppx: Eliquis as prescribed  - Post op abx: Ancef until OR cultures final  - Continue PT  - Weight bearing status: WBAT in [place] brace locked in extension while ambulating, 0-30 ROM while in bed  - Medical care per hospitalist   - Anticipated DC to [facility]
Patient seen and examined at bedside. comfortable in bed, pain controlled. Denies fever/chills, SOB/chest pain, abdominal pain, numbness/tingling. no complaints.    Vital Signs Last 24 Hrs  T(C): 36.4 (20 May 2023 05:57), Max: 37.3 (19 May 2023 21:15)  T(F): 97.5 (20 May 2023 05:57), Max: 99.1 (19 May 2023 21:15)  HR: 95 (20 May 2023 05:57) (65 - 99)  BP: 145/83 (20 May 2023 05:57) (145/83 - 181/95)  BP(mean): 101 (19 May 2023 20:00) (88 - 106)  RR: 18 (20 May 2023 05:57) (16 - 22)  SpO2: 95% (20 May 2023 05:57) (93% - 99%)    Parameters below as of 20 May 2023 05:57  Patient On (Oxygen Delivery Method): room air    General: NAD  RLE: KI in place, functioning. DINA in place, functioning. Prevena in place, functioning. 0 cc in cannister. SILT. + dorsi/plantarflexion. DP 2+. calf soft NT B/L.                          9.8    12.82 )-----------( 210      ( 20 May 2023 05:03 )             29.3     A/P: 57 y.o M s/p ADELA and abx spacer, right knee revision POD #1, closure by plastics  - shan ordered, awaiting arrival. can WBAT in bledsode locked in ext, ROM 0-30 at rest  - DVTP (Eliquis, Chelsy)  - pain meds per pain management  - Ancef ATC  - f/u OR cx
MOSES HANKINS    [id number]    57y      Male    Patient is a 57y old  Male who presents with a chief complaint of s/p (21 May 2023 10:38)      INTERVAL HPI/OVERNIGHT EVENTS:      patient has no complains, has no fever spikes, has no chest pain, sob, dizziness, fever, chills, nausea, vomiting       Vital Signs Last 24 Hrs  T(C): 36.9 (23 May 2023 09:21), Max: 36.9 (23 May 2023 09:21)  T(F): 98.4 (23 May 2023 09:21), Max: 98.4 (23 May 2023 09:21)  HR: 72 (23 May 2023 09:21) (69 - 80)  BP: 172/68 (23 May 2023 09:21) (111/58 - 172/68)  RR: 18 (23 May 2023 09:21) (18 - 20)  SpO2: 96% (23 May 2023 09:21) (95% - 97%)    Parameters below as of 23 May 2023 09:21  Patient On (Oxygen Delivery Method): room air        PHYSICAL EXAM:    GENERAL: Middle age male looking comfortable   HEENT: PERRL, +EOMI  NECK: soft, Supple, No JVD  CHEST/LUNG: Clear to auscultate bilaterally; No wheezing  HEART: S1S2+, Regular rate and rhythm; No murmurs  ABDOMEN: Soft, Nontender, Nondistended; Bowel sounds present  EXTREMITIES:  1+ Peripheral Pulses, No edema, right knee with clean dressings on, hemo vac drain in place   SKIN: No rashes or lesions  NEURO: AAOX3  PSYCH: normal mood        LABS:                  I&O's Summary    22 May 2023 07:01  -  23 May 2023 07:00  --------------------------------------------------------  IN: 0 mL / OUT: 130 mL / NET: -130 mL        MEDICATIONS  (STANDING):  acetaminophen     Tablet .. 975 milliGRAM(s) Oral every 8 hours  apixaban 2.5 milliGRAM(s) Oral two times a day  atorvastatin 40 milliGRAM(s) Oral at bedtime  buPROPion XL (24-Hour) . 300 milliGRAM(s) Oral daily  busPIRone 10 milliGRAM(s) Oral two times a day  ceFAZolin  Injectable. 2000 milliGRAM(s) IV Push every 8 hours  celecoxib 200 milliGRAM(s) Oral every 12 hours  guanFACINE 1 milliGRAM(s) Oral <User Schedule>  lactated ringers. 1000 milliLiter(s) (75 mL/Hr) IV Continuous <Continuous>  nicotine  Polacrilex Lozenge 2 milliGRAM(s) Oral four times a day  pantoprazole    Tablet 40 milliGRAM(s) Oral before breakfast  polyethylene glycol 3350 17 Gram(s) Oral at bedtime  senna 2 Tablet(s) Oral at bedtime  sodium chloride 0.9% lock flush 3 milliLiter(s) IV Push every 8 hours  sodium chloride 0.9%. 1000 milliLiter(s) (125 mL/Hr) IV Continuous <Continuous>  traZODone 100 milliGRAM(s) Oral at bedtime    MEDICATIONS  (PRN):  bisacodyl Suppository 10 milliGRAM(s) Rectal once PRN Constipation  fentaNYL    Injectable 25 MICROGram(s) IV Push every 5 minutes PRN Moderate Pain (4 - 6)  HYDROmorphone   Tablet 4 milliGRAM(s) Oral every 3 hours PRN Severe Pain (7 - 10)  magnesium hydroxide Suspension 30 milliLiter(s) Oral daily PRN Constipation  ondansetron Injectable 4 milliGRAM(s) IV Push every 6 hours PRN Nausea and/or Vomiting  ondansetron Injectable 4 milliGRAM(s) IV Push once PRN Nausea and/or Vomiting  oxyCODONE    IR 5 milliGRAM(s) Oral every 3 hours PRN Mild Pain (1 - 3)  oxyCODONE    IR 10 milliGRAM(s) Oral every 3 hours PRN Moderate Pain (4 - 6)        
MOSES HANKINS    [id number]    57y      Male    Patient is a 57y old  Male who presents with a chief complaint of s/p (21 May 2023 10:38)      INTERVAL HPI/OVERNIGHT EVENTS:    Drains are out, pain is well controlled, denies fever, chills, chest pain     Vital Signs Last 24 Hrs  T(C): 36.6 (24 May 2023 08:41), Max: 36.7 (23 May 2023 20:36)  T(F): 97.8 (24 May 2023 08:41), Max: 98 (23 May 2023 20:36)  HR: 77 (24 May 2023 08:41) (64 - 77)  BP: 160/79 (24 May 2023 08:41) (128/70 - 160/79)  BP(mean): --  RR: 18 (24 May 2023 08:41) (18 - 19)  SpO2: 97% (24 May 2023 08:41) (95% - 97%)    Parameters below as of 24 May 2023 08:41  Patient On (Oxygen Delivery Method): room air        PHYSICAL EXAM:      GENERAL: Middle age male looking comfortable   HEENT: PERRL, +EOMI  NECK: soft, Supple, No JVD  CHEST/LUNG: Clear to auscultate bilaterally; No wheezing  HEART: S1S2+, Regular rate and rhythm; No murmurs  ABDOMEN: Soft, Nontender, Nondistended; Bowel sounds present  EXTREMITIES:  1+ Peripheral Pulses, No edema, right knee with clean dressings on   SKIN: No rashes or lesions  NEURO: AAOX3  PSYCH: normal mood    LABS:                        8.3    7.18  )-----------( 218      ( 24 May 2023 05:30 )             25.4     05-24    138  |  101  |  19.7  ----------------------------<  134<H>  4.3   |  27.0  |  0.77    Ca    8.7      24 May 2023 05:30    TPro  6.4<L>  /  Alb  3.6  /  TBili  <0.2<L>  /  DBili  x   /  AST  28  /  ALT  28  /  AlkPhos  112  05-24            I&O's Summary    23 May 2023 07:01  -  24 May 2023 07:00  --------------------------------------------------------  IN: 600 mL / OUT: 1180 mL / NET: -580 mL        MEDICATIONS  (STANDING):  acetaminophen     Tablet .. 975 milliGRAM(s) Oral every 8 hours  apixaban 2.5 milliGRAM(s) Oral two times a day  atorvastatin 40 milliGRAM(s) Oral at bedtime  buPROPion XL (24-Hour) . 300 milliGRAM(s) Oral daily  busPIRone 10 milliGRAM(s) Oral two times a day  ceFAZolin  Injectable. 2000 milliGRAM(s) IV Push every 8 hours  celecoxib 200 milliGRAM(s) Oral every 12 hours  ferrous    sulfate 325 milliGRAM(s) Oral daily  guanFACINE 1 milliGRAM(s) Oral <User Schedule>  lactated ringers. 1000 milliLiter(s) (75 mL/Hr) IV Continuous <Continuous>  nicotine  Polacrilex Lozenge 2 milliGRAM(s) Oral four times a day  pantoprazole    Tablet 40 milliGRAM(s) Oral before breakfast  polyethylene glycol 3350 17 Gram(s) Oral at bedtime  senna 2 Tablet(s) Oral at bedtime  sodium chloride 0.9% lock flush 3 milliLiter(s) IV Push every 8 hours  sodium chloride 0.9%. 1000 milliLiter(s) (125 mL/Hr) IV Continuous <Continuous>  traZODone 100 milliGRAM(s) Oral at bedtime    MEDICATIONS  (PRN):  bisacodyl Suppository 10 milliGRAM(s) Rectal once PRN Constipation  fentaNYL    Injectable 25 MICROGram(s) IV Push every 5 minutes PRN Moderate Pain (4 - 6)  HYDROmorphone   Tablet 4 milliGRAM(s) Oral every 3 hours PRN Severe Pain (7 - 10)  magnesium hydroxide Suspension 30 milliLiter(s) Oral daily PRN Constipation  ondansetron Injectable 4 milliGRAM(s) IV Push every 6 hours PRN Nausea and/or Vomiting  ondansetron Injectable 4 milliGRAM(s) IV Push once PRN Nausea and/or Vomiting  oxyCODONE    IR 5 milliGRAM(s) Oral every 3 hours PRN Mild Pain (1 - 3)  oxyCODONE    IR 10 milliGRAM(s) Oral every 3 hours PRN Moderate Pain (4 - 6)        
MOSES HANKINS    [id number]    57y      Male    Patient is a 57y old  Male who presents with a chief complaint of s/p (21 May 2023 10:38)      INTERVAL HPI/OVERNIGHT EVENTS:    No fever spikes, has no chest pain, sob, dizziness, fever, chills, nausea, vomiting         Vital Signs Last 24 Hrs  T(C): 36.7 (22 May 2023 09:13), Max: 36.8 (21 May 2023 16:16)  T(F): 98 (22 May 2023 09:13), Max: 98.3 (21 May 2023 16:16)  HR: 66 (22 May 2023 09:13) (66 - 84)  BP: 112/64 (22 May 2023 09:13) (112/64 - 132/75)  RR: 17 (22 May 2023 09:13) (16 - 18)  SpO2: 97% (22 May 2023 09:13) (96% - 98%)    Parameters below as of 22 May 2023 09:13  Patient On (Oxygen Delivery Method): room air        PHYSICAL EXAM:    GENERAL: Middle age male looking comfortable   HEENT: PERRL, +EOMI  NECK: soft, Supple, No JVD  CHEST/LUNG: Clear to auscultate bilaterally; No wheezing  HEART: S1S2+, Regular rate and rhythm; No murmurs  ABDOMEN: Soft, Nontender, Nondistended; Bowel sounds present  EXTREMITIES:  1+ Peripheral Pulses, No edema, right knee with clean dressings on, hemo vac drain in place   SKIN: No rashes or lesions  NEURO: AAOX3  PSYCH: normal mood      LABS:                  I&O's Summary    21 May 2023 07:01  -  22 May 2023 07:00  --------------------------------------------------------  IN: 480 mL / OUT: 2130 mL / NET: -1650 mL        MEDICATIONS  (STANDING):  acetaminophen     Tablet .. 975 milliGRAM(s) Oral every 8 hours  apixaban 2.5 milliGRAM(s) Oral two times a day  atorvastatin 40 milliGRAM(s) Oral at bedtime  buPROPion XL (24-Hour) . 300 milliGRAM(s) Oral daily  busPIRone 10 milliGRAM(s) Oral two times a day  ceFAZolin  Injectable. 2000 milliGRAM(s) IV Push every 8 hours  celecoxib 200 milliGRAM(s) Oral every 12 hours  guanFACINE 1 milliGRAM(s) Oral <User Schedule>  lactated ringers. 1000 milliLiter(s) (75 mL/Hr) IV Continuous <Continuous>  nicotine  Polacrilex Lozenge 2 milliGRAM(s) Oral four times a day  pantoprazole    Tablet 40 milliGRAM(s) Oral before breakfast  polyethylene glycol 3350 17 Gram(s) Oral at bedtime  senna 2 Tablet(s) Oral at bedtime  sodium chloride 0.9% lock flush 3 milliLiter(s) IV Push every 8 hours  sodium chloride 0.9%. 1000 milliLiter(s) (125 mL/Hr) IV Continuous <Continuous>  traZODone 100 milliGRAM(s) Oral at bedtime    MEDICATIONS  (PRN):  bisacodyl Suppository 10 milliGRAM(s) Rectal once PRN Constipation  fentaNYL    Injectable 25 MICROGram(s) IV Push every 5 minutes PRN Moderate Pain (4 - 6)  HYDROmorphone   Tablet 4 milliGRAM(s) Oral every 3 hours PRN Severe Pain (7 - 10)  magnesium hydroxide Suspension 30 milliLiter(s) Oral daily PRN Constipation  ondansetron Injectable 4 milliGRAM(s) IV Push once PRN Nausea and/or Vomiting  ondansetron Injectable 4 milliGRAM(s) IV Push every 6 hours PRN Nausea and/or Vomiting  oxyCODONE    IR 10 milliGRAM(s) Oral every 3 hours PRN Moderate Pain (4 - 6)  oxyCODONE    IR 5 milliGRAM(s) Oral every 3 hours PRN Mild Pain (1 - 3)        
s/p ADELA and abx spacer, right knee revision POD #1, closure by plastics,     Patient seen and examined . S/p  ADELA and abx spacer, right knee revision  closure by plastics, POD #2.   Pain well controlled , denies n/v , voiding , tolerating diet .      CC : R knee pain post op well controlled     MEDICATIONS  (STANDING):  acetaminophen     Tablet .. 975 milliGRAM(s) Oral every 8 hours  apixaban 2.5 milliGRAM(s) Oral two times a day  atorvastatin 40 milliGRAM(s) Oral at bedtime  buPROPion XL (24-Hour) . 300 milliGRAM(s) Oral daily  busPIRone 10 milliGRAM(s) Oral two times a day  ceFAZolin  Injectable. 2000 milliGRAM(s) IV Push every 8 hours  celecoxib 200 milliGRAM(s) Oral every 12 hours  guanFACINE 1 milliGRAM(s) Oral <User Schedule>  lactated ringers. 1000 milliLiter(s) (75 mL/Hr) IV Continuous <Continuous>  nicotine  Polacrilex Lozenge 2 milliGRAM(s) Oral four times a day  pantoprazole    Tablet 40 milliGRAM(s) Oral before breakfast  polyethylene glycol 3350 17 Gram(s) Oral at bedtime  senna 2 Tablet(s) Oral at bedtime  sodium chloride 0.9% lock flush 3 milliLiter(s) IV Push every 8 hours  sodium chloride 0.9%. 1000 milliLiter(s) (125 mL/Hr) IV Continuous <Continuous>  traZODone 100 milliGRAM(s) Oral at bedtime    MEDICATIONS  (PRN):  bisacodyl Suppository 10 milliGRAM(s) Rectal once PRN Constipation  fentaNYL    Injectable 25 MICROGram(s) IV Push every 5 minutes PRN Moderate Pain (4 - 6)  HYDROmorphone   Tablet 4 milliGRAM(s) Oral every 3 hours PRN Severe Pain (7 - 10)  magnesium hydroxide Suspension 30 milliLiter(s) Oral daily PRN Constipation  ondansetron Injectable 4 milliGRAM(s) IV Push once PRN Nausea and/or Vomiting  ondansetron Injectable 4 milliGRAM(s) IV Push every 6 hours PRN Nausea and/or Vomiting  oxyCODONE    IR 5 milliGRAM(s) Oral every 3 hours PRN Mild Pain (1 - 3)  oxyCODONE    IR 10 milliGRAM(s) Oral every 3 hours PRN Moderate Pain (4 - 6)      LABS:                          9.8    12.82 )-----------( 210      ( 20 May 2023 05:03 )             29.3     05-20    138  |  106  |  17.5  ----------------------------<  138<H>  4.1   |  19.0<L>  |  0.74    Ca    8.0<L>      20 May 2023 05:03    Culture - Tissue with Gram Stain (05.19.23 @ 15:21)    Gram Stain:   Rare polymorphonuclear leukocytes per low power field  No organisms seen per oil power field   Specimen Source: .Tissue Deep Wound 3 Right Knee   Culture Results:   No growth to date.    Culture - Tissue with Gram Stain (05.19.23 @ 15:21)    Gram Stain:   Rare polymorphonuclear leukocytes per low power field  No organisms seen per oil power field   Specimen Source: .Tissue Deep Wound 2 Right Knee   Culture Results:   No growth to date.    Culture - Tissue with Gram Stain (05.19.23 @ 15:20)    Gram Stain:   No polymorphonuclear cells seen per low power field  No organisms seen per oil power field   Specimen Source: .Tissue Deep Wound 1 Right Knee   Culture Results:   No growth to date.      RADIOLOGY & ADDITIONAL TESTS:    < from: Xray Knee 1 or 2 Views, Right (05.19.23 @ 20:28) >    ACC: [id number] EXAM:  XR KNEE 1-2 VIEWS RT   ORDERED BY: ITALO STARK     PROCEDURE DATE:  05/19/2023          INTERPRETATION:  HISTORY: Postoperative  knee replacement.    TECHNIQUE: Two views of the RIGHT knee are submitted.    FINDINGS: Status post tricompartmental knee replacement with the femoral,   tibial and patellar components in anatomic alignment.  There is no fracture . Surgical drain in place.    IMPRESSION:  Knee prosthetic components in proper anatomical alignment.    --- End of Report ---            MAYO WARD MD; Attending Radiologist  This document has been electronically signed. May 20 2023 12:41PM    < end of copied text >        REVIEW OF SYSTEMS:  R knee pain postop well controlled , all other systems are reviewed and are negative .     Vital Signs Last 24 Hrs  T(C): 36.7 (21 May 2023 10:16), Max: 36.9 (20 May 2023 16:34)  T(F): 98 (21 May 2023 10:16), Max: 98.5 (20 May 2023 16:34)  HR: 84 (21 May 2023 10:16) (70 - 84)  BP: 120/65 (21 May 2023 10:16) (107/65 - 150/77)  RR: 18 (21 May 2023 10:16) (18 - 18)  SpO2: 96% (21 May 2023 10:16) (94% - 98%)    Parameters below as of 21 May 2023 10:16  Patient On (Oxygen Delivery Method): room air      PHYSICAL EXAM:    GENERAL: NAD, well-groomed, well-developed  HEAD:  Atraumatic, Normocephalic  EYES: EOMI, PERRLA, conjunctiva and sclera clear  NECK: Supple, No JVD, Normal thyroid  NERVOUS SYSTEM:  Alert & Oriented X3, no focal deficit  CHEST/LUNG: CTA b/l ,  no  rales, rhonchi, wheezing, or rubs  HEART: Regular rate and rhythm; No murmurs, rubs, or gallops  ABDOMEN: Soft, Nontender, Nondistended; Bowel sounds present  EXTREMITIES:  2+ Peripheral Pulses, No clubbing, cyanosis, or edema,   R knee wound vac+ , DINA drain + and is draining ,bledsode locked in ext,  LYMPH: No lymphadenopathy noted  SKIN: No rashes or lesions

## 2023-05-25 ENCOUNTER — NON-APPOINTMENT (OUTPATIENT)
Age: 58
End: 2023-05-25

## 2023-06-02 LAB
CULTURE RESULTS: SIGNIFICANT CHANGE UP
SPECIMEN SOURCE: SIGNIFICANT CHANGE UP

## 2023-06-02 NOTE — ED ADULT TRIAGE NOTE - NS ED NURSE DIRECT TO ROOM YN
[Symptom and Test Evaluation] : symptom and test evaluation [Hyperlipidemia] : hyperlipidemia [Hypertension] : hypertension Yes

## 2023-06-08 ENCOUNTER — APPOINTMENT (OUTPATIENT)
Dept: ORTHOPEDIC SURGERY | Facility: CLINIC | Age: 58
End: 2023-06-08
Payer: MEDICAID

## 2023-06-08 ENCOUNTER — APPOINTMENT (OUTPATIENT)
Dept: ORTHOPEDIC SURGERY | Facility: CLINIC | Age: 58
End: 2023-06-08

## 2023-06-08 PROCEDURE — 73562 X-RAY EXAM OF KNEE 3: CPT | Mod: RT

## 2023-06-08 PROCEDURE — 99024 POSTOP FOLLOW-UP VISIT: CPT

## 2023-06-08 NOTE — HISTORY OF PRESENT ILLNESS
[de-identified] : Patient is a 57-year-old male here today for follow-up 3 weeks status post right revision total knee arthroplasty VJOSSIE khan.  He has been doing well since his surgery.  He has been compliant with his brace use.  He states that he has not had any issues with the incision that was closed by plastic surgery.  Has been taking his antibiotics.  He states he is been able to bend his knee to 30 degrees with the rehab facility.  Denies any falls or trauma.  Denies any fevers chills or constitutional symptoms [de-identified] : Right lower extremity: Incision is well-healed without erythema drainage or discharge, stitches were removed, knee range of motion is 0-35, there is a 0 to 5 degree extensor lag, no instability to varus valgus stress, 5 out of 5 strength in foot plantarflexion dorsiflexion, sensation intact light touch over the foot, foot warm well-perfused brisk cap refill [de-identified] : 4 views of the right knee obtained the office today show no acute fracture or dislocation.  There is a right revision total knee arthroplasty in appropriate alignment without evidence of fracture dislocation or hardware complication [de-identified] : Patient is a 57-year-old male here today 3 weeks status post right revision total knee arthroplasty as well as a VY turndown due to arthrofibrosis.  Overall he is doing very well.  I recommend he continue to wear his brace at all times and keep it locked past 30 degrees when ambulating.  In 1 week's time he may begin to advance his range of motion 5 to 10 degrees/week with the assistance of the physical therapist.  I recommend that he continue to take his oral antibiotics and have refilled his prescription for that.  I have recommended that he continue with his DVT prophylaxis.  I will see him back in 3 weeks for repeat evaluation and x-ray.  All questions were asked and answered with his caregiver.  I would also like to trend his inflammatory markers and have ordered a CRP and ESR.

## 2023-06-29 ENCOUNTER — APPOINTMENT (OUTPATIENT)
Dept: ORTHOPEDIC SURGERY | Facility: CLINIC | Age: 58
End: 2023-06-29
Payer: MEDICAID

## 2023-06-29 PROCEDURE — 73562 X-RAY EXAM OF KNEE 3: CPT | Mod: RT

## 2023-06-29 PROCEDURE — 99024 POSTOP FOLLOW-UP VISIT: CPT

## 2023-06-29 NOTE — HISTORY OF PRESENT ILLNESS
[Procedure: ___] : status post [unfilled] [] : right Knee Arthroplasty Revision [___ Weeks Post Op] : [unfilled] weeks post op [Healed] : healed [Xray (Date:___)] : [unfilled] Xray -  [Hardware in Good Position] : hardware in good position [No Obvious Fractures] : no obvious fractures [Good Overall Alignment] : good overall alignment [Doing Well] : is doing well [Excellent Pain Control] : has excellent pain control [No Sign of Infection] : is showing no signs of infection [Chills] : no chills [Fever] : no fever [Erythema] : not erythematous [Discharge] : absent of discharge [Dehiscence] : not dehisced [de-identified] : status post right knee revision (DOS 5/19/23) [de-identified] : 57 year old male here today for follow-up 6 weeks status post right revision total knee arthroplasty CHERIE khan.  He has been doing well since his surgery.  He has been compliant with his brace use.  He states that he has not had any issues with the incision that was closed by plastic surgery.  Has been taking his antibiotics.  Patient presented in a wheelchair, but states he walks with a walker. States the knee is still buckling however this has improved since his last. Patient is walking and able to bear full weight on the knee. Denies any falls or trauma.  Denies any fevers chills or constitutional symptoms. [de-identified] : Right lower extremity: Incision is well-healed without erythema drainage or discharge, stitches were removed, knee range of motion is 0-50, there is a 5-10 degree extensor lag, no instability to varus valgus stress, 5 out of 5 strength in foot plantarflexion dorsiflexion, sensation intact light touch over the foot, foot warm well-perfused brisk cap refill. [de-identified] : 4 views of the right knee obtained the office today show no acute fracture or dislocation.  There is a right revision total knee arthroplasty in appropriate alignment without evidence of fracture dislocation or hardware complication. [de-identified] : 57 year old male here today 6 weeks status post right revision total knee arthroplasty as well as a VY turndown due to arthrofibrosis.  Overall he is doing very well.  I recommend he continue to wear his brace at all times and keep it locked past 90 degrees when ambulating.  He will continue to advance his range of motion as tolerated.  We did discuss his minor extensor lag and this will likely be present due to the nature of the procedure that he had.  We also discussed that the buckling is likely due to quadriceps weakness due to the fact that he had a spacer for over a year.  He continues to have no constitutional symptoms.  I recommend he continue taking his antibiotics due to his history of chronic infection.  I will see him back in 6 weeks for repeat evaluation and x-ray.  All questions were asked and answered

## 2023-06-29 NOTE — ADDENDUM
[FreeTextEntry1] : This note was written by Dulce Ivy, acting as the  for Dr. Canada. This note accurately reflects the work and decisions made by Dr. Canada.

## 2023-06-30 NOTE — ED PROVIDER NOTE - CROS ED PSYCH ALL NEG
June 30, 2023      Shiraz Boykin  [address]  [facility] [id number]        To Whom It May Concern:    Shiraz Boykin was seen in our clinic. She may return to work with the following: once symptoms clear for 24 hours. Diagnosed with hand foot and mouth.      Sincerely,        Kerry Paez, CNP          
negative...

## 2023-07-25 ENCOUNTER — APPOINTMENT (OUTPATIENT)
Dept: ORTHOPEDIC SURGERY | Facility: CLINIC | Age: 58
End: 2023-07-25
Payer: MEDICAID

## 2023-07-25 VITALS
SYSTOLIC BLOOD PRESSURE: 170 MMHG | BODY MASS INDEX: 31.22 KG/M2 | WEIGHT: 206 LBS | DIASTOLIC BLOOD PRESSURE: 80 MMHG | HEART RATE: 69 BPM | HEIGHT: 68 IN

## 2023-07-25 DIAGNOSIS — T84.82XA FIBROSIS DUE TO INTERNAL ORTHOPEDIC PROSTHETIC DEVICES, IMPLANTS AND GRAFTS, INITIAL ENCOUNTER: ICD-10-CM

## 2023-07-25 PROCEDURE — 73564 X-RAY EXAM KNEE 4 OR MORE: CPT | Mod: LT

## 2023-07-25 PROCEDURE — 20610 DRAIN/INJ JOINT/BURSA W/O US: CPT | Mod: 79,LT

## 2023-07-25 PROCEDURE — 99214 OFFICE O/P EST MOD 30 MIN: CPT | Mod: 25

## 2023-07-25 NOTE — PROCEDURE
[de-identified] : I injected his left knee. I discussed at length with the patient the planned steroid and lidocaine injection. The risks, benefits, convalescence and alternatives were reviewed. The possible side effects discussed included but were not limited to: pain, swelling, heat, bleeding, and redness. Symptoms are generally mild but if they are extensive then contact the office. Giving pain relievers by mouth such as NSAIDs or Tylenol can generally treat the reactions to steroid and lidocaine. Rare cases of infection have been noted. Rash, hives and itching may occur post injection. If you have muscle pain or cramps, flushing and or swelling of the face, rapid heart beat, nausea, dizziness, fever, chills, headache, difficulty breathing, swelling in the arms or legs, or have a prickly feeling of your skin, contact a health care provider immediately. Following this discussion, the knee was prepped with Alcohol and under sterile condition the 80 mg Depo-Medrol and 6 cc Lidocaine injection was performed with a 20 gauge needle through a superolateral injection site. The needle was introduced into the joint, aspiration was performed to ensure intra-articular placement and the medication was injected. Upon withdrawal of the needle the site was cleaned with alcohol and a band aid applied. The patient tolerated the injection well and there were no adverse effects. Post injection instructions included no strenuous activity for 24 hours, cryotherapy and if there are any adverse effects to contact the office.

## 2023-07-25 NOTE — PHYSICAL EXAM
[de-identified] : Left knee exam shows moderate effusion, ROM is  degrees, no instability, pain with Pam, medial and lateral joint line tenderness.  There is a well-healed midline surgical incision without evidence of erythema drainage or discharge\par 5/5 motor strength in bilateral lower extremities. Sensory: Intact in bilateral lower extremities. DTRs: Biceps, brachioradialis, triceps, patellar, ankle and plantar 2+ and symmetric bilaterally. Pulses: dorsalis pedis, posterior tibial, femoral, popliteal, and radial 2+ and symmetric bilaterally. [de-identified] : 4 views of the left knee obtained the office today show no acute fracture or dislocation.  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

## 2023-07-25 NOTE — DISCUSSION/SUMMARY
[Medication Risks Reviewed] : Medication risks reviewed [Surgical risks reviewed] : Surgical risks reviewed [Other: ____] : in [unfilled] [de-identified] : Patient is a 57-year-old male with severe left knee osteoarthritis presenting today for initial evaluation.  We have thorough discussion about conservative and surgical treatment options.  Due to his history of recurrent infections in his right knee as well as the fact that he is still in the postoperative period is not sufficiently recovered range of motion and strength or complete his antibiotics for his right knee I do not think that he is a surgical candidate for his left knee at this time.  I would therefore recommend he continue with conservative treatment.  I gave him an injection of cortisone left knee which he tolerated well.  I recommended physical therapy.  We discussed low impact activity and exercises.  Take Tylenol and NSAIDs as needed for the pain.  I will see him back in 3 months for repeat evaluation possible repeat cortisone injection.  All questions were asked and answered.

## 2023-07-25 NOTE — HISTORY OF PRESENT ILLNESS
[Pain Location] : pain [] : left knee [de-identified] : 57 year old male patient presents today for follow-up of his left knee pain.  Patient states that he has a previous history of multiple surgeries on his left knee many years ago.  He has had difficulty going up and down stairs and rising reseated position.  States his knee does not bend normally.  Often swells.  Feels like there is a grinding.  States that he feels like it is out of alignment.  Also feels like there is instability.  Denies any pain in his hip.  Denies any neurovascular compromise.  Continues to take his antibiotics for his right revision total knee arthroplasty for infection.  Denies any new issues with his right knee.

## 2023-08-10 ENCOUNTER — APPOINTMENT (OUTPATIENT)
Dept: ORTHOPEDIC SURGERY | Facility: CLINIC | Age: 58
End: 2023-08-10
Payer: MEDICAID

## 2023-08-10 PROCEDURE — 73562 X-RAY EXAM OF KNEE 3: CPT | Mod: RT

## 2023-08-10 PROCEDURE — 99024 POSTOP FOLLOW-UP VISIT: CPT

## 2023-08-10 NOTE — HISTORY OF PRESENT ILLNESS
[Procedure: ___] : status post [unfilled] [] : right Knee Arthroplasty Revision [___ Weeks Post Op] : [unfilled] weeks post op [Healed] : healed [Xray (Date:___)] : [unfilled] Xray -  [Hardware in Good Position] : hardware in good position [No Obvious Fractures] : no obvious fractures [Good Overall Alignment] : good overall alignment [Doing Well] : is doing well [Excellent Pain Control] : has excellent pain control [No Sign of Infection] : is showing no signs of infection [Chills] : no chills [Fever] : no fever [Erythema] : not erythematous [Discharge] : absent of discharge [Dehiscence] : not dehisced [de-identified] : status post right knee revision (DOS 5/19/23) [de-identified] : 57 year old male here today for follow-up 12 weeks status post right revision total knee arthroplasty CHERIE khan.  He has been doing well since his surgery.  He has been compliant with his brace use.  He states that he has not had any issues with the incision that was closed by plastic surgery.  Has been taking his antibiotics.  Patient presented in a wheelchair, but states he walks with a cane. States the knee is still buckling however this has improved since his last. Patient is walking and able to bear full weight on the knee. Denies any falls or trauma.  Denies any fevers chills or constitutional symptoms. [de-identified] : Right lower extremity: Incision is well-healed without erythema drainage or discharge, stitches were removed, knee range of motion is 0-60, there is a 5-10 degree extensor lag, no instability to varus valgus stress, 5 out of 5 strength in foot plantarflexion dorsiflexion, sensation intact light touch over the foot, foot warm well-perfused brisk cap refill. [de-identified] : 4 views of the right knee obtained the office today show no acute fracture or dislocation.  There is a right revision total knee arthroplasty in appropriate alignment without evidence of fracture dislocation or hardware complication. [de-identified] : 57 year old male here today 12 weeks status post right revision total knee arthroplasty as well as a VY turndown due to arthrofibrosis.  Overall, he is doing very well.  I recommend he continue to wear his brace at all times and keep it locked past 90 degrees when ambulating.  He will continue to advance his range of motion as tolerated.  We did discuss his minor extensor lag and this will likely be present due to the nature of the procedure that he had.  We also discussed that the buckling is likely due to quadriceps weakness due to the fact that he had a spacer for over a year.  He continues to have no constitutional symptoms.  I recommend he continue taking his antibiotics due to his history of chronic infection. I provided the patient with a new knee brace. I will see him back in 3 months for repeat evaluation and x-ray.  All questions were asked and answered. The patient and his formal caregiver verbalized understanding the plan.

## 2023-08-19 ENCOUNTER — NON-APPOINTMENT (OUTPATIENT)
Age: 58
End: 2023-08-19

## 2023-08-20 ENCOUNTER — NON-APPOINTMENT (OUTPATIENT)
Age: 58
End: 2023-08-20

## 2023-08-27 NOTE — ED PROVIDER NOTE - PSYCHIATRIC, MLM
unknown
Alert and oriented to person, place, time/situation. normal mood and affect. no apparent risk to self or others.

## 2023-09-12 NOTE — ED ADULT NURSE NOTE - IN THE PAST 12 MONTHS HAVE YOU USED DRUGS OTHER THAN THOSE REQUIRED FOR MEDICAL REASON?
SCRIBE #1 NOTE: I, Penny Thurston, am scribing for, and in the presence of, Page Hernandez MD. I have scribed the entire note.      History      Chief Complaint   Patient presents with    Flank Pain     C/o left side flank pain. Denies blood in urine, dysuria.        Review of patient's allergies indicates:   Allergen Reactions    Latex, natural rubber         HPI   HPI    9/12/2023, 1:50 PM   History obtained from the patient      History of Present Illness: Jordan Altman is a 49 y.o. male patient with a PMHx of CKD, decompensated hepatic cirrhosis, HTN and SBP who presents to the Emergency Department for L flank pain which onset gradually 3 days ago. Pt denies having any recent injuries or falls, but states that he has been doing yard work recently. Symptoms are constant and moderate in severity. No mitigating or exacerbating factors reported. Associated sxs include fatigue and constipation. Patient denies any fever, chills, N/V/D, difficulty urinating, CP, SOB, and all other sxs at this time. No prior Tx reported. No further complaints or concerns at this time.         Arrival mode: Personal vehicle    PCP: Zohra Walters FNP       Past Medical History:  Past Medical History:   Diagnosis Date    Decompensated hepatic cirrhosis     HCV acquired through organ donation, treated / cured     svr12 - 6/2021    Hypertension     SBP (spontaneous bacterial peritonitis)        Past Surgical History:  Past Surgical History:   Procedure Laterality Date    ERCP N/A 10/1/2020    Procedure: ERCP (ENDOSCOPIC RETROGRADE CHOLANGIOPANCREATOGRAPHY);  Surgeon: Marcos Ramirez MD;  Location: [address]);  Service: Endoscopy;  Laterality: N/A;    ESOPHAGOGASTRODUODENOSCOPY N/A 10/13/2020    Procedure: EGD (ESOPHAGOGASTRODUODENOSCOPY);  Surgeon: Prasanth Jerry MD;  Location: [facility] ([address] [place] [address]);  Service: Endoscopy;  Laterality: N/A;    EXPLORATORY LAPAROTOMY AFTER LIVER TRANSPLANTATION N/A 10/15/2020     Procedure: LAPAROTOMY, EXPLORATORY, AFTER LIVER TRANSPLANT, Possible redo of artery, possible portal thrombectomy. IntraOperative US.;  Surgeon: Curtis Zavaleta MD;  Location: [facility] OR [address] [place] [address];  Service: Transplant;  Laterality: N/A;  With intraoperative ultrasound    LIVER TRANSPLANT N/A 10/14/2020    Procedure: TRANSPLANT, LIVER;  Surgeon: Curtis Zavaleta MD;  [facility];  Service: Transplant;  Laterality: N/A;  Intra op HD    RIGHT HEART CATHETERIZATION Right 9/23/2020    Procedure: INSERTION, CATHETER, RIGHT HEART;  Surgeon: Mikel Costa Jr., MD;  Location: [facility] CATH LAB;  Service: Cardiology;  Laterality: Right;    THORACENTESIS  2011    THROMBECTOMY  10/15/2020    Procedure: THROMBECTOMY portal vein;  Surgeon: Curtis Zavaleta MD;  Location: [facility] OR [address] [place] [address];  Service: Transplant;;         Family History:  Family History   Problem Relation Age of Onset    COPD Mother        Social History:  Social History     Tobacco Use    Smoking status: Some Days    Smokeless tobacco: Current   Substance and Sexual Activity    Alcohol use: Yes     Alcohol/week: 112.0 standard drinks of alcohol     Types: 112 Shots of liquor per week     Comment: fifth of vodka a day. LAST DRINK 7/25/2020 per pt     Drug use: Yes     Frequency: 3.0 times per week     Types: Marijuana    Sexual activity: Yes       ROS   Review of Systems   Constitutional:  Positive for fatigue. Negative for chills and fever.   HENT:  Negative for sore throat.    Respiratory:  Negative for shortness of breath.    Cardiovascular:  Negative for chest pain.   Gastrointestinal:  Positive for constipation. Negative for diarrhea, nausea and vomiting.   Genitourinary:  Positive for flank pain (L). Negative for difficulty urinating and dysuria.   Musculoskeletal:  Negative for back pain.   Skin:  Negative for rash.   Neurological:  Negative for weakness.   Hematological:  Does not bruise/bleed easily.   All other systems reviewed and are  negative.      Physical Exam      Initial Vitals [09/12/23 1303]   BP Pulse Resp Temp SpO2   132/83 80 20 97.9 °F (36.6 °C) (!) 93 %      MAP       --          Physical Exam  Nursing Notes and Vital Signs Reviewed.  Constitutional: Patient is in no acute distress. Well-developed and well-nourished.  Head: Atraumatic. Normocephalic.  Eyes: PERRL. EOM intact. Conjunctivae are not pale. No scleral icterus.  ENT: Mucous membranes are moist. Oropharynx is clear and symmetric.    Neck: Supple. Full ROM. No lymphadenopathy.  Cardiovascular: Regular rate. Regular rhythm. No murmurs, rubs, or gallops. Distal pulses are 2+ and symmetric.  Pulmonary/Chest: No respiratory distress. Clear to auscultation bilaterally. No wheezing or rales.  Abdominal: Soft and non-distended.  There is no tenderness.  No rebound, guarding, or rigidity.   Musculoskeletal: Moves all extremities. No obvious deformities. No edema.  Skin: Warm and dry.  Neurological:  Alert, awake, and appropriate.  Normal speech.  No acute focal neurological deficits are appreciated.  Psychiatric: Normal affect. Good eye contact. Appropriate in content.    ED Course    Procedures  ED Vital Signs:  Vitals:    09/12/23 1303 09/12/23 1304 09/12/23 1452 09/12/23 1500   BP: 132/83   123/74   Pulse: 80   83   Resp: 20  16 20   Temp: 97.9 °F (36.6 °C)      TempSrc: Oral      SpO2: (!) 93% (!) 94%  95%   Weight: 129.8 kg (286 lb 2.5 oz)       09/12/23 1530 09/12/23 1600   BP: 110/78 113/84   Pulse: 84 81   Resp: 18 20   Temp:  98.3 °F (36.8 °C)   TempSrc:  Oral   SpO2: 98% 98%   Weight:         Abnormal Lab Results:  Labs Reviewed   CBC W/ AUTO DIFFERENTIAL - Abnormal; Notable for the following components:       Result Value    MCHC 31.9 (*)     Platelets 132 (*)     Gran # (ANC) 1.3 (*)     Gran % 28.3 (*)     Lymph % 58.5 (*)     All other components within normal limits   COMPREHENSIVE METABOLIC PANEL - Abnormal; Notable for the following components:    CO2 20 (*)      Glucose 149 (*)     Calcium 8.6 (*)     AST 79 (*)     All other components within normal limits   URINALYSIS, REFLEX TO URINE CULTURE - Abnormal; Notable for the following components:    Specific Gravity, UA >1.030 (*)     Protein, UA 1+ (*)     Ketones, UA Trace (*)     Urobilinogen, UA 4.0-6.0 (*)     All other components within normal limits    Narrative:     Specimen Source->Urine   URINALYSIS MICROSCOPIC - Abnormal; Notable for the following components:    Hyaline Casts, UA 6 (*)     All other components within normal limits    Narrative:     Specimen Source->Urine   LIPASE        All Lab Results:  Results for orders placed or performed during the hospital encounter of 09/12/23   CBC auto differential   Result Value Ref Range    WBC 4.43 3.90 - 12.70 K/uL    RBC 4.79 4.60 - 6.20 M/uL    Hemoglobin 14.5 14.0 - 18.0 g/dL    Hematocrit 45.4 40.0 - 54.0 %    MCV 95 82 - 98 fL    MCH 30.3 27.0 - 31.0 pg    MCHC 31.9 (L) 32.0 - 36.0 g/dL    RDW 13.3 11.5 - 14.5 %    Platelets 132 (L) 150 - 450 K/uL    MPV 9.9 9.2 - 12.9 fL    Immature Granulocytes 0.5 0.0 - 0.5 %    Gran # (ANC) 1.3 (L) 1.8 - 7.7 K/uL    Immature Grans (Abs) 0.02 0.00 - 0.04 K/uL    Lymph # 2.6 1.0 - 4.8 K/uL    Mono # 0.5 0.3 - 1.0 K/uL    Eos # 0.0 0.0 - 0.5 K/uL    Baso # 0.03 0.00 - 0.20 K/uL    nRBC 0 0 /100 WBC    Gran % 28.3 (L) 38.0 - 73.0 %    Lymph % 58.5 (H) 18.0 - 48.0 %    Mono % 11.5 4.0 - 15.0 %    Eosinophil % 0.5 0.0 - 8.0 %    Basophil % 0.7 0.0 - 1.9 %    Differential Method Automated    Comprehensive metabolic panel   Result Value Ref Range    Sodium 139 136 - 145 mmol/L    Potassium 4.3 3.5 - 5.1 mmol/L    Chloride 107 95 - 110 mmol/L    CO2 20 (L) 23 - 29 mmol/L    Glucose 149 (H) 70 - 110 mg/dL    BUN 18 6 - 20 mg/dL    Creatinine 1.3 0.5 - 1.4 mg/dL    Calcium 8.6 (L) 8.7 - 10.5 mg/dL    Total Protein 6.5 6.0 - 8.4 g/dL    Albumin 3.6 3.5 - 5.2 g/dL    Total Bilirubin 0.9 0.1 - 1.0 mg/dL    Alkaline Phosphatase 89 55 - 135  U/L    AST 79 (H) 10 - 40 U/L    ALT 33 10 - 44 U/L    eGFR >60 >60 mL/min/1.73 m^2    Anion Gap 12 8 - 16 mmol/L   Urinalysis, Reflex to Urine Culture Urine, Clean Catch    Specimen: Urine   Result Value Ref Range    Specimen UA Urine, Clean Catch     Color, UA Yellow Yellow, Straw, Mela    Appearance, UA Clear Clear    pH, UA 6.0 5.0 - 8.0    Specific Gravity, UA >1.030 (A) 1.005 - 1.030    Protein, UA 1+ (A) Negative    Glucose, UA Negative Negative    Ketones, UA Trace (A) Negative    Bilirubin (UA) Negative Negative    Occult Blood UA Negative Negative    Nitrite, UA Negative Negative    Urobilinogen, UA 4.0-6.0 (A) <2.0 EU/dL    Leukocytes, UA Negative Negative   Lipase   Result Value Ref Range    Lipase 40 4 - 60 U/L   Urinalysis Microscopic   Result Value Ref Range    RBC, UA 0 0 - 4 /hpf    WBC, UA 0 0 - 5 /hpf    Bacteria None None-Occ /hpf    Hyaline Casts, UA 6 (A) 0-1/lpf /lpf    Microscopic Comment SEE COMMENT      *Note: Due to a large number of results and/or encounters for the requested time period, some results have not been displayed. A complete set of results can be found in Results Review.         Imaging Results:  Imaging Results              CT Renal Stone Study ABD Pelvis WO (Final result)  Result time 09/12/23 14:47:56      Final result by Hemanth Lantigua MD (09/12/23 14:47:56)                   Impression:      No obstructive uropathy.    Fatty liver with indeterminate hyperdense lesions in the right and left hepatic lobes.  Recommend MRI of the liver with and without intravenous gadolinium contrast for further evaluation.    Colonic diverticulosis.    All CT scans at this facility are performed  using dose modulation techniques as appropriate to performed exam including the following:  automated exposure control; adjustment of mA and/or kV according to the patients size (this includes techniques or standardized protocols for targeted exams where dose is matched to indication/reason for  exam: i.e. extremities or head);  iterative reconstruction technique.      Electronically signed by: Hemanth Lantigua MD  Date:    09/12/2023  Time:    14:47               Narrative:    EXAMINATION:  CT RENAL STONE STUDY ABD PELVIS WO    CLINICAL HISTORY:  Flank pain, kidney stone suspected;    TECHNIQUE:  Axial CT images performed through the abdomen and pelvis without intravenous contrast. Multiplanar reformats were performed and interpreted.    COMPARISON:  03/20/2023    FINDINGS:  Lung bases are clear.    No urolithiasis, hydronephrosis, or perinephric stranding.    Markedly fatty liver.  Indeterminate 1.1 cm slightly hyperdense lesion in the subcapsular region of the right hepatic lobe.  Similar hyperdense lesion in the left hepatic dome measuring less than a cm on axial image 41.  These lesions have not significantly changed since the prior study.    The gallbladder has been removed.    The kidneys, adrenal glands, spleen, and pancreas are within normal limits.  No urolithiasis or hydronephrosis.    No free air or ascites.    Colonic diverticulosis.  The appendix is normal.  The small bowel is within normal limits.  Multiple retroperitoneal varices present.    The bowel is nondistended and within normal limits.    The abdominal aorta is normal in caliber.    The urinary bladder is unremarkable.    Bilateral L5 spondylolysis with grade 1 anterolisthesis present.  Transitional lumbar vertebral body.                                              The Emergency Provider reviewed the vital signs and test results, which are outlined above.    ED Discussion     3:05 PM: Reassessed pt at this time.  Discussed with pt all pertinent ED information and results. Discussed pt dx and plan of tx. Gave pt all f/u and return to the ED instructions. All questions and concerns were addressed at this time. Pt expresses understanding of information and instructions, and is comfortable with plan to discharge. Pt is stable for  discharge.    I discussed with patient and/or family/caretaker that evaluation in the ED does not suggest any emergent or life threatening medical conditions requiring immediate intervention beyond what was provided in the ED, and I believe patient is safe for discharge.  Regardless, an unremarkable evaluation in the ED does not preclude the development or presence of a serious of life threatening condition. As such, patient was instructed to return immediately for any worsening or change in current symptoms.           ED Medication(s):  Medications   morphine injection 4 mg (4 mg Intravenous Given 9/12/23 1500)   ondansetron injection 4 mg (4 mg Intravenous Given 9/12/23 1500)       Discharge Medication List as of 9/12/2023  3:07 PM           Follow-up Information       Zohra Walters FNP. Schedule an appointment as soon as possible for a visit in 2 days.    Specialty: Family Medicine  Why: Return to the emergency room, If symptoms worsen  Contact information:  [id number]   [address]  [facility]  [facility] [address]  [phone]                               Medical Decision Making    Medical Decision Making  Kidney stone  Dehydration  UTI  Muscle strain    Presents with non-traumatic left flank pain, no fever chills, no urinary symptoms, vital signs stable, exam grossly normal, lab work reviewed and otherwise normal, CT scan reviewed and no acute findings noted, no obvious source for pain, no indication patient needs further workup in the ER, stable for discharge with outpatient follow up.     Amount and/or Complexity of Data Reviewed  Labs: ordered. Decision-making details documented in ED Course.  Radiology: ordered. Decision-making details documented in ED Course.    Risk  Prescription drug management.                Scribe Attestation:   Scribe #1: I performed the above scribed service and the documentation accurately describes the services I performed. I attest to the accuracy  of the note.    Attending:   Physician Attestation Statement for Scribe #1: I, Page Hernandez MD, personally performed the services described in this documentation, as scribed by Penny Thurston, in my presence, and it is both accurate and complete.          Clinical Impression       ICD-10-CM ICD-9-CM   1. Left flank pain  R10.9 789.09   2. History of liver transplant  Z94.4 V42.7       Disposition:   Disposition: Discharged  Condition: Stable         Page Hernandez MD  09/12/23 2536     No

## 2023-09-18 ENCOUNTER — INPATIENT (INPATIENT)
Facility: HOSPITAL | Age: 58
LOS: 2 days | Discharge: NURSING, REHAB OR LONG-TERM CARE | DRG: 343 | End: 2023-09-21
Attending: SURGERY | Admitting: SURGERY
Payer: COMMERCIAL

## 2023-09-18 VITALS
HEIGHT: 68 IN | HEART RATE: 87 BPM | SYSTOLIC BLOOD PRESSURE: 147 MMHG | OXYGEN SATURATION: 94 % | WEIGHT: 235.01 LBS | RESPIRATION RATE: 20 BRPM | DIASTOLIC BLOOD PRESSURE: 85 MMHG | TEMPERATURE: 98 F

## 2023-09-18 DIAGNOSIS — Z96.651 PRESENCE OF RIGHT ARTIFICIAL KNEE JOINT: Chronic | ICD-10-CM

## 2023-09-18 DIAGNOSIS — S83.512A SPRAIN OF ANTERIOR CRUCIATE LIGAMENT OF LEFT KNEE, INITIAL ENCOUNTER: Chronic | ICD-10-CM

## 2023-09-18 LAB
ALBUMIN SERPL ELPH-MCNC: 3.8 G/DL — SIGNIFICANT CHANGE UP (ref 3.3–5.2)
ALP SERPL-CCNC: 100 U/L — SIGNIFICANT CHANGE UP (ref 40–120)
ALT FLD-CCNC: 59 U/L — HIGH
AMPHET UR-MCNC: NEGATIVE — SIGNIFICANT CHANGE UP
ANION GAP SERPL CALC-SCNC: 15 MMOL/L — SIGNIFICANT CHANGE UP (ref 5–17)
APPEARANCE UR: CLEAR — SIGNIFICANT CHANGE UP
AST SERPL-CCNC: 44 U/L — HIGH
BACTERIA # UR AUTO: NEGATIVE — SIGNIFICANT CHANGE UP
BARBITURATES UR SCN-MCNC: NEGATIVE — SIGNIFICANT CHANGE UP
BASE EXCESS BLDV CALC-SCNC: 2.5 MMOL/L — SIGNIFICANT CHANGE UP (ref -2–3)
BASOPHILS # BLD AUTO: 0.03 K/UL — SIGNIFICANT CHANGE UP (ref 0–0.2)
BASOPHILS NFR BLD AUTO: 0.4 % — SIGNIFICANT CHANGE UP (ref 0–2)
BENZODIAZ UR-MCNC: POSITIVE
BILIRUB SERPL-MCNC: <0.2 MG/DL — LOW (ref 0.4–2)
BILIRUB UR-MCNC: NEGATIVE — SIGNIFICANT CHANGE UP
BUN SERPL-MCNC: 18.7 MG/DL — SIGNIFICANT CHANGE UP (ref 8–20)
CA-I SERPL-SCNC: 1.05 MMOL/L — LOW (ref 1.15–1.33)
CALCIUM SERPL-MCNC: 8.9 MG/DL — SIGNIFICANT CHANGE UP (ref 8.4–10.5)
CHLORIDE BLDV-SCNC: 106 MMOL/L — SIGNIFICANT CHANGE UP (ref 96–108)
CHLORIDE SERPL-SCNC: 103 MMOL/L — SIGNIFICANT CHANGE UP (ref 96–108)
CO2 SERPL-SCNC: 22 MMOL/L — SIGNIFICANT CHANGE UP (ref 22–29)
COCAINE METAB.OTHER UR-MCNC: NEGATIVE — SIGNIFICANT CHANGE UP
COLOR SPEC: YELLOW — SIGNIFICANT CHANGE UP
CREAT SERPL-MCNC: 0.77 MG/DL — SIGNIFICANT CHANGE UP (ref 0.5–1.3)
DIFF PNL FLD: NEGATIVE — SIGNIFICANT CHANGE UP
EGFR: 104 ML/MIN/1.73M2 — SIGNIFICANT CHANGE UP
EOSINOPHIL # BLD AUTO: 0.47 K/UL — SIGNIFICANT CHANGE UP (ref 0–0.5)
EOSINOPHIL NFR BLD AUTO: 6.7 % — HIGH (ref 0–6)
EPI CELLS # UR: NEGATIVE — SIGNIFICANT CHANGE UP
ETHANOL SERPL-MCNC: <10 MG/DL — SIGNIFICANT CHANGE UP (ref 0–9)
GAS PNL BLDV: 135 MMOL/L — LOW (ref 136–145)
GAS PNL BLDV: SIGNIFICANT CHANGE UP
GLUCOSE BLDV-MCNC: 100 MG/DL — HIGH (ref 70–99)
GLUCOSE SERPL-MCNC: 113 MG/DL — HIGH (ref 70–99)
GLUCOSE UR QL: NEGATIVE MG/DL — SIGNIFICANT CHANGE UP
HCO3 BLDV-SCNC: 27 MMOL/L — SIGNIFICANT CHANGE UP (ref 22–29)
HCT VFR BLD CALC: 38.4 % — LOW (ref 39–50)
HCT VFR BLDA CALC: 40 % — SIGNIFICANT CHANGE UP
HGB BLD CALC-MCNC: 13.3 G/DL — SIGNIFICANT CHANGE UP (ref 12.6–17.4)
HGB BLD-MCNC: 13.1 G/DL — SIGNIFICANT CHANGE UP (ref 13–17)
IMM GRANULOCYTES NFR BLD AUTO: 0.3 % — SIGNIFICANT CHANGE UP (ref 0–0.9)
KETONES UR-MCNC: NEGATIVE — SIGNIFICANT CHANGE UP
LACTATE BLDV-MCNC: 1.6 MMOL/L — SIGNIFICANT CHANGE UP (ref 0.5–2)
LEUKOCYTE ESTERASE UR-ACNC: NEGATIVE — SIGNIFICANT CHANGE UP
LIDOCAIN IGE QN: 14 U/L — LOW (ref 22–51)
LYMPHOCYTES # BLD AUTO: 2.25 K/UL — SIGNIFICANT CHANGE UP (ref 1–3.3)
LYMPHOCYTES # BLD AUTO: 32.1 % — SIGNIFICANT CHANGE UP (ref 13–44)
MCHC RBC-ENTMCNC: 30.8 PG — SIGNIFICANT CHANGE UP (ref 27–34)
MCHC RBC-ENTMCNC: 34.1 GM/DL — SIGNIFICANT CHANGE UP (ref 32–36)
MCV RBC AUTO: 90.1 FL — SIGNIFICANT CHANGE UP (ref 80–100)
METHADONE UR-MCNC: NEGATIVE — SIGNIFICANT CHANGE UP
MONOCYTES # BLD AUTO: 0.83 K/UL — SIGNIFICANT CHANGE UP (ref 0–0.9)
MONOCYTES NFR BLD AUTO: 11.9 % — SIGNIFICANT CHANGE UP (ref 2–14)
NEUTROPHILS # BLD AUTO: 3.4 K/UL — SIGNIFICANT CHANGE UP (ref 1.8–7.4)
NEUTROPHILS NFR BLD AUTO: 48.6 % — SIGNIFICANT CHANGE UP (ref 43–77)
NITRITE UR-MCNC: NEGATIVE — SIGNIFICANT CHANGE UP
OPIATES UR-MCNC: NEGATIVE — SIGNIFICANT CHANGE UP
PCO2 BLDV: 44 MMHG — SIGNIFICANT CHANGE UP (ref 42–55)
PCP SPEC-MCNC: SIGNIFICANT CHANGE UP
PCP UR-MCNC: NEGATIVE — SIGNIFICANT CHANGE UP
PH BLDV: 7.4 — SIGNIFICANT CHANGE UP (ref 7.32–7.43)
PH UR: 6.5 — SIGNIFICANT CHANGE UP (ref 5–8)
PLATELET # BLD AUTO: 247 K/UL — SIGNIFICANT CHANGE UP (ref 150–400)
PO2 BLDV: 174 MMHG — HIGH (ref 25–45)
POTASSIUM BLDV-SCNC: 4.8 MMOL/L — SIGNIFICANT CHANGE UP (ref 3.5–5.1)
POTASSIUM SERPL-MCNC: 4.8 MMOL/L — SIGNIFICANT CHANGE UP (ref 3.5–5.3)
POTASSIUM SERPL-SCNC: 4.8 MMOL/L — SIGNIFICANT CHANGE UP (ref 3.5–5.3)
PROT SERPL-MCNC: 6.9 G/DL — SIGNIFICANT CHANGE UP (ref 6.6–8.7)
PROT UR-MCNC: 15
RBC # BLD: 4.26 M/UL — SIGNIFICANT CHANGE UP (ref 4.2–5.8)
RBC # FLD: 12.7 % — SIGNIFICANT CHANGE UP (ref 10.3–14.5)
RBC CASTS # UR COMP ASSIST: SIGNIFICANT CHANGE UP /HPF (ref 0–4)
SAO2 % BLDV: 100 % — SIGNIFICANT CHANGE UP
SODIUM SERPL-SCNC: 140 MMOL/L — SIGNIFICANT CHANGE UP (ref 135–145)
SP GR SPEC: 1.01 — SIGNIFICANT CHANGE UP (ref 1.01–1.02)
THC UR QL: NEGATIVE — SIGNIFICANT CHANGE UP
UROBILINOGEN FLD QL: NEGATIVE MG/DL — SIGNIFICANT CHANGE UP
WBC # BLD: 7 K/UL — SIGNIFICANT CHANGE UP (ref 3.8–10.5)
WBC # FLD AUTO: 7 K/UL — SIGNIFICANT CHANGE UP (ref 3.8–10.5)
WBC UR QL: NEGATIVE /HPF — SIGNIFICANT CHANGE UP (ref 0–5)

## 2023-09-18 PROCEDURE — 74177 CT ABD & PELVIS W/CONTRAST: CPT | Mod: 26,MA

## 2023-09-18 PROCEDURE — 99285 EMERGENCY DEPT VISIT HI MDM: CPT

## 2023-09-18 RX ORDER — ACETAMINOPHEN 500 MG
1000 TABLET ORAL ONCE
Refills: 0 | Status: COMPLETED | OUTPATIENT
Start: 2023-09-18 | End: 2023-09-18

## 2023-09-18 RX ORDER — SODIUM CHLORIDE 9 MG/ML
1000 INJECTION INTRAMUSCULAR; INTRAVENOUS; SUBCUTANEOUS ONCE
Refills: 0 | Status: COMPLETED | OUTPATIENT
Start: 2023-09-18 | End: 2023-09-18

## 2023-09-18 RX ADMIN — Medication 1000 MILLIGRAM(S): at 17:47

## 2023-09-18 RX ADMIN — SODIUM CHLORIDE 1000 MILLILITER(S): 9 INJECTION INTRAMUSCULAR; INTRAVENOUS; SUBCUTANEOUS at 16:47

## 2023-09-18 RX ADMIN — Medication 400 MILLIGRAM(S): at 16:47

## 2023-09-18 NOTE — ED PROVIDER NOTE - OBJECTIVE STATEMENT
Pt is a 56 yo M with PMH of diverticulitis, HTN, anxiety, alcohol use disorder, heroine use, s/p multiple bl knee sx years ago (left complicated by infection), presenting with abdominal pain. Pt has had abdominal pain for 2 weeks, originally in LLQ. Pt was seen by PCP at [place] 1 week ago, thought to be diverticulitis and given cipro and flagyl. Pt's LLQ pain has somewhat improved but traveled to [facility] since and pt saw a PCP for persistent abdominal pain yesterday and had US done which showed gallstones. Pt reports of associated waterry diarrhea, intermittent vomiting, nausea and chills for a week. Pt denies SOB, chest pain, fever, urinary symptoms.

## 2023-09-18 NOTE — ED PROVIDER NOTE - ATTENDING CONTRIBUTION TO CARE
I personally saw the patient with the resident, and completed the key components of the history and physical exam. I then discussed the management plan with the resident.    56 y/o M with PMH diverticulitis, HTN, anxiety, EtOH abuse presents for abdominal pain, nausea, vomiting, diarrhea x 1 week - was started on Cipro/Flagyl for possible diverticulitis. Pain started in the LLQ, migrated to RLQ and now in RUQ. Not affected by eating. Had outpatient US showing gallstones. Feels similar to diverticulitis in the past, except on the right side.    PE - NAD, well appearing, abd soft, + RUQ and right middle abdominal TTP with guarding, no McBurney's point TTP, no CVAT.    labs, CT A/P, fluids, ofirmev - reassess.

## 2023-09-18 NOTE — ED PROVIDER NOTE - SHIFT CHANGE DETAILS
Patient signed out pending CT A/P, all further work up and management at the discretion of receiving physician.

## 2023-09-18 NOTE — ED ADULT NURSE REASSESSMENT NOTE - NS ED NURSE REASSESS COMMENT FT1
received report from day RN, assumed care of pt at change of shift.  pt is AOX4, awaiting ct scan of the abdomen.  pt is reminded to stay NPO, no s/s acute distress noted

## 2023-09-18 NOTE — ED PROVIDER NOTE - PROGRESS NOTE DETAILS
57-year-old male currently at [place] [facility] [facility] for rehab currently awaiting CT scan for evaluation of several days of lower abdominal pain for presumed diverticulitis for the past day or so as per sign-out from Dr. Barrera. Seen by surgery for acute uncomplicated  appendicitis.  Will admit to general surgery. Hilary Sanchez MD PGY-3

## 2023-09-18 NOTE — ED ADULT NURSE NOTE - OBJECTIVE STATEMENT
pt a+ox3, sent to ED by PMD for possible doretha s/p outpt sono. pt in no apparent distress, requesting meal.

## 2023-09-18 NOTE — ED PROVIDER NOTE - CLINICAL SUMMARY MEDICAL DECISION MAKING FREE TEXT BOX
Pt is a 56 yo M with PMH of diverticulitis, HTN, anxiety, alcohol use disorder, heroine use, s/p multiple bl knee sx years ago (left complicated by infection), presenting with abdominal pain. Pt has had abdominal pain for 2 weeks, originally in LLQ. Pt was seen by PCP at [place] 1 week ago, thought to be diverticulitis and given cipro and flagyl. Pt's LLQ pain has somewhat improved but traveled to [facility] since and pt saw a PCP for persistent abdominal pain yesterday and had US done which showed gallstones. Pt reports of associated waterry diarrhea, intermittent vomiting, nausea and chills for a week. Pt denies SOB, chest pain, fever, urinary symptoms. Bloodwork, CT, NS bolus, tylenol. Pt is a 56 yo M with PMH of diverticulitis, HTN, anxiety, alcohol use disorder, heroine use, s/p multiple bl knee sx years ago (left complicated by infection), presenting with abdominal pain. Pt has had abdominal pain for 2 weeks, originally in LLQ. Pt was seen by PCP at [place] 1 week ago, thought to be diverticulitis and given cipro and flagyl. Pt's LLQ pain has somewhat improved but traveled to [facility] since and pt saw a PCP for persistent abdominal pain yesterday and had US done which showed gallstones. Pt reports of associated waterry diarrhea, intermittent vomiting, nausea and chills for a week. Nausea does not come with eating. Pt denies SOB, chest pain, fever, urinary symptoms. NS bolus, tylenol. Bloodwork WNL. CT abd pelvis IV pending to determine gallbladder pathology vs appendicitis vs diverticulitis.

## 2023-09-18 NOTE — ED ADULT TRIAGE NOTE - CHIEF COMPLAINT QUOTE
C/o RUQ/RLQ abdominal pain. Pt states, "I have to have my gallbladder removed I had an ultrasound yesterday". +Nausea, vomiting, and diarrhea. Hx of diverticulitis.

## 2023-09-19 ENCOUNTER — TRANSCRIPTION ENCOUNTER (OUTPATIENT)
Age: 58
End: 2023-09-19

## 2023-09-19 DIAGNOSIS — K35.80 UNSPECIFIED ACUTE APPENDICITIS: ICD-10-CM

## 2023-09-19 LAB
ANION GAP SERPL CALC-SCNC: 12 MMOL/L — SIGNIFICANT CHANGE UP (ref 5–17)
APTT BLD: 29.3 SEC — SIGNIFICANT CHANGE UP (ref 24.5–35.6)
BASOPHILS # BLD AUTO: 0.03 K/UL — SIGNIFICANT CHANGE UP (ref 0–0.2)
BASOPHILS NFR BLD AUTO: 0.4 % — SIGNIFICANT CHANGE UP (ref 0–2)
BLD GP AB SCN SERPL QL: SIGNIFICANT CHANGE UP
BUN SERPL-MCNC: 17.8 MG/DL — SIGNIFICANT CHANGE UP (ref 8–20)
CALCIUM SERPL-MCNC: 8.8 MG/DL — SIGNIFICANT CHANGE UP (ref 8.4–10.5)
CHLORIDE SERPL-SCNC: 102 MMOL/L — SIGNIFICANT CHANGE UP (ref 96–108)
CO2 SERPL-SCNC: 24 MMOL/L — SIGNIFICANT CHANGE UP (ref 22–29)
CREAT SERPL-MCNC: 0.94 MG/DL — SIGNIFICANT CHANGE UP (ref 0.5–1.3)
EGFR: 94 ML/MIN/1.73M2 — SIGNIFICANT CHANGE UP
EOSINOPHIL # BLD AUTO: 0.56 K/UL — HIGH (ref 0–0.5)
EOSINOPHIL NFR BLD AUTO: 7.9 % — HIGH (ref 0–6)
GLUCOSE SERPL-MCNC: 99 MG/DL — SIGNIFICANT CHANGE UP (ref 70–99)
HCT VFR BLD CALC: 37.8 % — LOW (ref 39–50)
HGB BLD-MCNC: 12.4 G/DL — LOW (ref 13–17)
IMM GRANULOCYTES NFR BLD AUTO: 0.4 % — SIGNIFICANT CHANGE UP (ref 0–0.9)
INR BLD: 0.94 RATIO — SIGNIFICANT CHANGE UP (ref 0.85–1.18)
LYMPHOCYTES # BLD AUTO: 2.44 K/UL — SIGNIFICANT CHANGE UP (ref 1–3.3)
LYMPHOCYTES # BLD AUTO: 34.5 % — SIGNIFICANT CHANGE UP (ref 13–44)
MCHC RBC-ENTMCNC: 29.8 PG — SIGNIFICANT CHANGE UP (ref 27–34)
MCHC RBC-ENTMCNC: 32.8 GM/DL — SIGNIFICANT CHANGE UP (ref 32–36)
MCV RBC AUTO: 90.9 FL — SIGNIFICANT CHANGE UP (ref 80–100)
MONOCYTES # BLD AUTO: 0.7 K/UL — SIGNIFICANT CHANGE UP (ref 0–0.9)
MONOCYTES NFR BLD AUTO: 9.9 % — SIGNIFICANT CHANGE UP (ref 2–14)
MRSA PCR RESULT.: SIGNIFICANT CHANGE UP
NEUTROPHILS # BLD AUTO: 3.32 K/UL — SIGNIFICANT CHANGE UP (ref 1.8–7.4)
NEUTROPHILS NFR BLD AUTO: 46.9 % — SIGNIFICANT CHANGE UP (ref 43–77)
PLATELET # BLD AUTO: 252 K/UL — SIGNIFICANT CHANGE UP (ref 150–400)
POTASSIUM SERPL-MCNC: 4.2 MMOL/L — SIGNIFICANT CHANGE UP (ref 3.5–5.3)
POTASSIUM SERPL-SCNC: 4.2 MMOL/L — SIGNIFICANT CHANGE UP (ref 3.5–5.3)
PROTHROM AB SERPL-ACNC: 10.5 SEC — SIGNIFICANT CHANGE UP (ref 9.5–13)
RBC # BLD: 4.16 M/UL — LOW (ref 4.2–5.8)
RBC # FLD: 12.5 % — SIGNIFICANT CHANGE UP (ref 10.3–14.5)
S AUREUS DNA NOSE QL NAA+PROBE: SIGNIFICANT CHANGE UP
SODIUM SERPL-SCNC: 138 MMOL/L — SIGNIFICANT CHANGE UP (ref 135–145)
WBC # BLD: 7.08 K/UL — SIGNIFICANT CHANGE UP (ref 3.8–10.5)
WBC # FLD AUTO: 7.08 K/UL — SIGNIFICANT CHANGE UP (ref 3.8–10.5)

## 2023-09-19 PROCEDURE — 93010 ELECTROCARDIOGRAM REPORT: CPT

## 2023-09-19 PROCEDURE — 88304 TISSUE EXAM BY PATHOLOGIST: CPT | Mod: 26

## 2023-09-19 PROCEDURE — 99223 1ST HOSP IP/OBS HIGH 75: CPT

## 2023-09-19 RX ORDER — ENOXAPARIN SODIUM 100 MG/ML
40 INJECTION SUBCUTANEOUS EVERY 24 HOURS
Refills: 0 | Status: DISCONTINUED | OUTPATIENT
Start: 2023-09-19 | End: 2023-09-21

## 2023-09-19 RX ORDER — KETOROLAC TROMETHAMINE 30 MG/ML
15 SYRINGE (ML) INJECTION EVERY 6 HOURS
Refills: 0 | Status: DISCONTINUED | OUTPATIENT
Start: 2023-09-19 | End: 2023-09-19

## 2023-09-19 RX ORDER — CEFOTETAN DISODIUM 1 G
2 VIAL (EA) INJECTION ONCE
Refills: 0 | Status: COMPLETED | OUTPATIENT
Start: 2023-09-19 | End: 2023-09-19

## 2023-09-19 RX ORDER — CEFOTETAN DISODIUM 1 G
VIAL (EA) INJECTION
Refills: 0 | Status: DISCONTINUED | OUTPATIENT
Start: 2023-09-19 | End: 2023-09-20

## 2023-09-19 RX ORDER — TRAZODONE HCL 50 MG
100 TABLET ORAL AT BEDTIME
Refills: 0 | Status: DISCONTINUED | OUTPATIENT
Start: 2023-09-19 | End: 2023-09-21

## 2023-09-19 RX ORDER — NICOTINE POLACRILEX 2 MG
2 GUM BUCCAL
Refills: 0 | Status: DISCONTINUED | OUTPATIENT
Start: 2023-09-19 | End: 2023-09-21

## 2023-09-19 RX ORDER — BUPRENORPHINE AND NALOXONE 2; .5 MG/1; MG/1
1 TABLET SUBLINGUAL DAILY
Refills: 0 | Status: DISCONTINUED | OUTPATIENT
Start: 2023-09-19 | End: 2023-09-21

## 2023-09-19 RX ORDER — SODIUM CHLORIDE 9 MG/ML
1000 INJECTION, SOLUTION INTRAVENOUS
Refills: 0 | Status: DISCONTINUED | OUTPATIENT
Start: 2023-09-19 | End: 2023-09-20

## 2023-09-19 RX ORDER — ONDANSETRON 8 MG/1
4 TABLET, FILM COATED ORAL EVERY 6 HOURS
Refills: 0 | Status: DISCONTINUED | OUTPATIENT
Start: 2023-09-19 | End: 2023-09-21

## 2023-09-19 RX ORDER — SENNA PLUS 8.6 MG/1
2 TABLET ORAL AT BEDTIME
Refills: 0 | Status: DISCONTINUED | OUTPATIENT
Start: 2023-09-19 | End: 2023-09-21

## 2023-09-19 RX ORDER — FERROUS SULFATE 325(65) MG
325 TABLET ORAL DAILY
Refills: 0 | Status: DISCONTINUED | OUTPATIENT
Start: 2023-09-19 | End: 2023-09-21

## 2023-09-19 RX ORDER — LANOLIN ALCOHOL/MO/W.PET/CERES
5 CREAM (GRAM) TOPICAL AT BEDTIME
Refills: 0 | Status: DISCONTINUED | OUTPATIENT
Start: 2023-09-19 | End: 2023-09-21

## 2023-09-19 RX ORDER — ATORVASTATIN CALCIUM 80 MG/1
40 TABLET, FILM COATED ORAL AT BEDTIME
Refills: 0 | Status: DISCONTINUED | OUTPATIENT
Start: 2023-09-19 | End: 2023-09-21

## 2023-09-19 RX ORDER — ACETAMINOPHEN 500 MG
1000 TABLET ORAL EVERY 6 HOURS
Refills: 0 | Status: COMPLETED | OUTPATIENT
Start: 2023-09-19 | End: 2023-09-19

## 2023-09-19 RX ORDER — GUANFACINE 3 MG/1
1 TABLET, EXTENDED RELEASE ORAL DAILY
Refills: 0 | Status: DISCONTINUED | OUTPATIENT
Start: 2023-09-19 | End: 2023-09-21

## 2023-09-19 RX ORDER — CEFOTETAN DISODIUM 1 G
2 VIAL (EA) INJECTION EVERY 12 HOURS
Refills: 0 | Status: DISCONTINUED | OUTPATIENT
Start: 2023-09-19 | End: 2023-09-20

## 2023-09-19 RX ADMIN — Medication 400 MILLIGRAM(S): at 05:46

## 2023-09-19 RX ADMIN — ENOXAPARIN SODIUM 40 MILLIGRAM(S): 100 INJECTION SUBCUTANEOUS at 05:45

## 2023-09-19 RX ADMIN — ATORVASTATIN CALCIUM 40 MILLIGRAM(S): 80 TABLET, FILM COATED ORAL at 22:30

## 2023-09-19 RX ADMIN — Medication 100 MILLIGRAM(S): at 22:31

## 2023-09-19 RX ADMIN — Medication 2 MILLIGRAM(S): at 23:25

## 2023-09-19 RX ADMIN — Medication 15 MILLIGRAM(S): at 23:25

## 2023-09-19 RX ADMIN — Medication 15 MILLIGRAM(S): at 22:31

## 2023-09-19 RX ADMIN — Medication 2 MILLIGRAM(S): at 11:36

## 2023-09-19 RX ADMIN — SENNA PLUS 2 TABLET(S): 8.6 TABLET ORAL at 01:52

## 2023-09-19 RX ADMIN — Medication 1000 MILLIGRAM(S): at 18:58

## 2023-09-19 RX ADMIN — Medication 2 MILLIGRAM(S): at 17:58

## 2023-09-19 RX ADMIN — Medication 325 MILLIGRAM(S): at 05:47

## 2023-09-19 RX ADMIN — Medication 15 MILLIGRAM(S): at 18:58

## 2023-09-19 RX ADMIN — Medication 100 MILLIGRAM(S): at 01:52

## 2023-09-19 RX ADMIN — Medication 2 MILLIGRAM(S): at 05:48

## 2023-09-19 RX ADMIN — Medication 15 MILLIGRAM(S): at 13:08

## 2023-09-19 RX ADMIN — SODIUM CHLORIDE 100 MILLILITER(S): 9 INJECTION, SOLUTION INTRAVENOUS at 02:40

## 2023-09-19 RX ADMIN — Medication 5 MILLIGRAM(S): at 22:31

## 2023-09-19 RX ADMIN — Medication 15 MILLIGRAM(S): at 17:58

## 2023-09-19 RX ADMIN — Medication 15 MILLIGRAM(S): at 05:46

## 2023-09-19 RX ADMIN — ATORVASTATIN CALCIUM 40 MILLIGRAM(S): 80 TABLET, FILM COATED ORAL at 01:51

## 2023-09-19 RX ADMIN — Medication 100 GRAM(S): at 01:51

## 2023-09-19 RX ADMIN — Medication 100 GRAM(S): at 13:08

## 2023-09-19 RX ADMIN — Medication 400 MILLIGRAM(S): at 11:07

## 2023-09-19 RX ADMIN — BUPRENORPHINE AND NALOXONE 1 TABLET(S): 2; .5 TABLET SUBLINGUAL at 01:58

## 2023-09-19 RX ADMIN — Medication 15 MILLIGRAM(S): at 11:07

## 2023-09-19 RX ADMIN — Medication 400 MILLIGRAM(S): at 23:25

## 2023-09-19 RX ADMIN — Medication 15 MILLIGRAM(S): at 12:00

## 2023-09-19 RX ADMIN — Medication 1000 MILLIGRAM(S): at 12:00

## 2023-09-19 RX ADMIN — SODIUM CHLORIDE 100 MILLILITER(S): 9 INJECTION, SOLUTION INTRAVENOUS at 22:30

## 2023-09-19 RX ADMIN — Medication 400 MILLIGRAM(S): at 17:58

## 2023-09-19 RX ADMIN — GUANFACINE 1 MILLIGRAM(S): 3 TABLET, EXTENDED RELEASE ORAL at 05:47

## 2023-09-19 NOTE — H&P ADULT - ASSESSMENT
57 year old M, PMH of diverticulitis, HTN, Anxiety, ETOH abuse, H/o Heroin abuse, Bilateral knee surgery, recent surgery on the R knee, presenting with abdominal pain for two weeks, he was seen at [facility] where he is at for rehab and there was concerns he was having a flare of diverticulitis, he was started on cipro and flagyl which ended 9/15 however had persistent R sided lower abdominal pain, initially the pain was L sided then radiated to the Right, no associated nausea or vomiting, no fever or chills, the pain was severe and prompted him to come to hospital for evaluation.  He was worked up and found to have a thickened appendix which measures up to 1.3 cm distally, filled with fluid, with periappendiceal fat stranding primarily at the tip, compatible with acute uncomplicated appendicitis    Admit to Surgery under Dr. Onesimo Ledbetter   Add on for the OR in the AM for a lap appendectomy   NPO, IVF  IV Abx   VTE ppx

## 2023-09-19 NOTE — H&P ADULT - HISTORY OF PRESENT ILLNESS
57 year old M, PMH of diverticulitis, HTN, Anxiety, ETOH abuse, H/o Heroin abuse, Bilateral knee surgery, recent surgery on the R knee, presenting with abdominal pain for two weeks, he was seen at [facility] where he is at for rehab and there was concerns he was having a flare of diverticulitis, he was started on cipro and flagyl which ended 9/15 however had persistent R sided lower abdominal pain, initially the pain was L sided then radiated to the Right, no associated nausea or vomiting, no fever or chills, the pain was severe and prompted him to come to hospital for evaluation.

## 2023-09-19 NOTE — CONSULT NOTE ADULT - SUBJECTIVE AND OBJECTIVE BOX
Patient is a 57y old  Male who presents with a chief complaint of Appendicitis . Admitted to surgical service ,   planned for surgery today . Medicine consulted for medical optimization / clearance .   Patient seen and examined . RLQ pain better , feels nauseated , denies vomiting , had BM this am - loose .   Denies sob / chest pain .     CC: As above       HPI:  57 year old M, PMH  HTN,HLD , MARIBEL - uses mouth piece ,  Anxiety and Depression , ETOH abuse- last use in may , H/o Heroin abuse- last use in MAY , Bilateral knee surgery, Hx of R knee prosthetic joint , s/p ADELA and Abx cement spacer placement on  5/19/23 by Dr Canada. Last time seen by Dr Canada on 8/10/23- as per note continue Cefadroxil 500mg BID .    Presented  with abdominal pain for two weeks, he was seen at [facility] where he is at for rehab and there was concerns he was having a flare of diverticulitis, he was started on cipro and flagyl which ended 9/15 however had persistent R sided lower abdominal pain, initially the pain was L sided then radiated to the Right,       PAST MEDICAL & SURGICAL HISTORY:  Osteoarthritis    Hx of R PJI       HTN (hypertension)      HLD (hyperlipidemia)      Broken internal joint prosthesis, other site, subsequent encounter      MARIBEL (obstructive sleep apnea)      History of drug abuse- last use 5/23      History of ETOH abuse- last use 5/23      Anxiety and depression      ACL (anterior cruciate ligament) tear, left, initial encounter      H/O total knee replacement, right  2022          Social History:  Tobacco - ex smoker , quit 3 yrs ago   ETOH - ex heavy user , quit 5/23   Illicit drug abuse - heroin user - last use 5/23     FAMILY HISTORY:  FH: diverticulitis (Mother)    FH: prostate cancer (Father)    FH: CAD (coronary artery disease)    FH: diabetes mellitus (Sibling)        Allergies    shellfish. (Hives)    Intolerances        HOME MEDICATIONS :     Home Medications:  acetaminophen 325 mg oral tablet: 3 tab(s) orally every 8 hours (24 May 2023 11:14)  buprenorphine-naloxone 8 mg-2 mg sublingual tablet: 2 sublingually 2 times a day (19 Sep 2023 01:18)  BuSpar 10 mg oral tablet: 1 orally 2 times a day (19 May 2023 08:29)  ferrous sulfate 325 mg (65 mg elemental iron) oral tablet: 1 orally once a day (19 Sep 2023 01:18)  nicotine 2 mg oral transmucosal gum: 1 chewed 4 times a day (19 Sep 2023 01:18)  oxyCODONE 10 mg oral tablet: 1 tab(s) orally every 3 hours As needed Moderate Pain (4 - 6) (24 May 2023 11:14)  oxyCODONE 5 mg oral tablet: 1 tab(s) orally every 3 hours As needed Mild Pain (1 - 3) (24 May 2023 11:14)  senna (sennosides) 8.6 mg oral tablet: 1 orally once a day (19 Sep 2023 01:18)      REVIEW OF SYSTEMS:    As above , all other systems are reviewed and are negative .     MEDICATIONS  (STANDING):  acetaminophen   IVPB .. 1000 milliGRAM(s) IV Intermittent every 6 hours  atorvastatin 40 milliGRAM(s) Oral at bedtime  buprenorphine 8 mG/naloxone 2 mG SL  Tablet 1 Tablet(s) SubLingual daily  busPIRone 15 milliGRAM(s) Oral three times a day  cefoTEtan  IVPB 2 Gram(s) IV Intermittent every 12 hours  cefoTEtan  IVPB      enoxaparin Injectable 40 milliGRAM(s) SubCutaneous every 24 hours  ferrous    sulfate 325 milliGRAM(s) Oral daily  guanFACINE. 1 milliGRAM(s) Oral daily  ketorolac   Injectable 15 milliGRAM(s) IV Push every 6 hours  lactated ringers. 1000 milliLiter(s) (100 mL/Hr) IV Continuous <Continuous>  melatonin 5 milliGRAM(s) Oral at bedtime  nicotine  Polacrilex Gum 2 milliGRAM(s) Oral four times a day  senna 2 Tablet(s) Oral at bedtime  traZODone 100 milliGRAM(s) Oral at bedtime    MEDICATIONS  (PRN):  ondansetron Injectable 4 milliGRAM(s) IV Push every 6 hours PRN Nausea      Vital Signs Last 24 Hrs  T(C): 36.3 (19 Sep 2023 08:48), Max: 36.9 (18 Sep 2023 20:00)  T(F): 97.3 (19 Sep 2023 08:48), Max: 98.5 (18 Sep 2023 20:00)  HR: 57 (19 Sep 2023 08:48) (57 - 87)  BP: 125/77 (19 Sep 2023 08:48) (125/77 - 165/87)  BP(mean): --  RR: 18 (19 Sep 2023 08:48) (18 - 20)  SpO2: 93% (19 Sep 2023 08:48) (93% - 96%)    Parameters below as of 19 Sep 2023 08:48  Patient On (Oxygen Delivery Method): room air        PHYSICAL EXAM:    GENERAL: NAD, well-groomed, well-developed  HEAD:  Atraumatic, Normocephalic  EYES: EOMI, PERRLA, conjunctiva and sclera clear  NECK: Supple, No JVD, Normal thyroid  NERVOUS SYSTEM:  Alert & Oriented X3, no focal deficit   CHEST/LUNG: CTA  b/l,  no rales, rhonchi, wheezing, or rubs  HEART: Regular rate and rhythm; No murmurs, rubs, or gallops  ABDOMEN: Soft, RLQ tenderness + , no rebound , no guarding .  , Nondistended; Bowel sounds present  EXTREMITIES:  2+ Peripheral Pulses, No clubbing, cyanosis, or edema ,   old healed scars of both knees +   LYMPH: No lymphadenopathy noted  SKIN: No rashes or lesions    LABS:                        12.4   7.08  )-----------( 252      ( 19 Sep 2023 06:22 )             37.8     09-19    138  |  102  |  17.8  ----------------------------<  99  4.2   |  24.0  |  0.94    Ca    8.8      19 Sep 2023 06:22    TPro  6.9  /  Alb  3.8  /  TBili  <0.2<L>  /  DBili  x   /  AST  44<H>  /  ALT  59<H>  /  AlkPhos  100  09-18    PT/INR - ( 19 Sep 2023 06:22 )   PT: 10.5 sec;   INR: 0.94 ratio         PTT - ( 19 Sep 2023 06:22 )  PTT:29.3 sec            RADIOLOGY & ADDITIONAL STUDIES:    < from: CT Abdomen and Pelvis w/ IV Cont (09.18.23 @ 21:52) >    ACC: [id number] EXAM:  CT ABDOMEN AND PELVIS IC   ORDERED BY: VERONICA POWERS     PROCEDURE DATE:  09/18/2023          INTERPRETATION:  CLINICAL INFORMATION: Abdominal pain    COMPARISON: CT abdomen pelvis 3/26/2011    < end of copied text >  < from: CT Abdomen and Pelvis w/ IV Cont (09.18.23 @ 21:52) >  FINDINGS:  LOWER CHEST: Coronary artery calcifications. Linear subsegmental   atelectasis in the right middle and lower lobes.    LIVER: Steatosis.  BILE DUCTS: Normal caliber.  GALLBLADDER: Partially contracted.  SPLEEN: Within normal limits.  PANCREAS: Within normal limits.  ADRENALS: Within normal limits.  KIDNEYS/URETERS: A left renal cyst. Symmetric renal enhancement. No   hydronephrosis.    BLADDER: Within normal limits.  REPRODUCTIVE ORGANS: Prostate within normal limits.    BOWEL: Periampullary duodenal diverticulum. No bowel obstruction.   Appendix is thickened and measures up to 1.3 cm distally, filled with   fluid, with periappendiceal fat stranding primarily at the tip,   compatible with acute uncomplicated appendicitis. Colonic diverticulosis   without evidence for diverticulitis.  PERITONEUM: No ascites.  VESSELS: Atherosclerotic changes.  RETROPERITONEUM/LYMPH NODES: No lymphadenopathy.  ABDOMINAL WALL: Small fat-containing umbilical hernia.  BONES: Degenerative changes in the spine.    IMPRESSION:  Acute uncomplicated appendicitis.        --- End of Report ---    < end of copied text >      < from: 12 Lead ECG (04.20.23 @ 16:04) >    Ventricular Rate 63 BPM    Atrial Rate 63 BPM    P-R Interval 170 ms    QRS Duration 104 ms    Q-T Interval 464 ms    QTC Calculation(Bazett) 474 ms    P Axis 37 degrees    R Axis 68 degrees    T Axis 72 degrees    Diagnosis Line Normal sinus rhythm  Normal ECG    < end of copied text >      EKG - NSR

## 2023-09-19 NOTE — CONSULT NOTE ADULT - ASSESSMENT
57 year old M, PMH  HTN,HLD , MARIBEL - uses mouth piece ,  Anxiety and Depression , ETOH abuse- last use in may , H/o Heroin abuse- last use in MAY , Bilateral knee surgery, Hx of R knee prosthetic joint , s/p ADELA and Abx cement spacer placement on  5/19/23 by Dr Canada. Last time seen by Dr Canada on 8/10/23- as per note continue Cefadroxil 500mg BID .    Presented  with abdominal pain for two weeks, he was seen at [facility] where he is at for rehab and there was concerns he was having a flare of diverticulitis, he was started on cipro and flagyl which ended 9/15 however had persistent R sided lower abdominal pain, initially the pain was L sided then radiated to the Right,     1. Acute appendicitis -   planned for surgery - today   No leucocytosis , no fever ,   On iv Abx - Cefotetan    2. Hx of Anxiety and Depression - continue home dose Buspirone     3. Hx of MARIBEL - uses mouth piece - may use own     4. Hx of b/l knee OA , S/P b/l TKA,   R TKA - failed due to inflection ,   s/p ADELA and abx spacer placement , patient states he is on PO Abx BID -   likely till November   as per chart - ast time seen by Dr Canada 8/10/23 -   continue cefadroxil 500 mg BID . Please call ortho and confirm how long patient needs to be   on Abx .     5. Mild transaminitis noted - patient with hx of heavy alcohol use - avoid hepatotoxic medications .     6 . Hx of heroin use - sober since 5/23        Labs/ EKG, CT abd - noted and reviewed .    Revised Cardiac Risk Assessment   [ - ]History of ischemic heart disease   [-  ]History of congestive heart failure   [  -]History of cerebrovascular disease (stroke or transient ischemic attack)   [-  ]History of diabetes requiring preoperative insulin use   [-  ]Chronic kidney disease (creatinine > 2 mg/dL)   [ + ]Undergoing suprainguinal vascular, intraperitoneal, or intrathoracic surgery     0 predictors = 0.4%, 1 predictor = 1.0%, 2 predictors = 2.4%, > 3 predictors = 5.4%    * Overall this patient has a  1% % risk of cardiac death, nonfatal myocardial infarction, and nonfatal cardiac arrest perioperatively.     Patient does not have any known history of severe valvular disease and is not in decompensated CHF.   No recent MI. Baseline functional capacity is <  4 METS due to both knees issues.     Patient is currently hemodynamically stable. Denies sob/ chest pain  Patient is medically optimized for planned procedure .    Thank you for the courtesy of this consult .     Will sign off , please recall if needed .

## 2023-09-19 NOTE — H&P ADULT - NSHPPHYSICALEXAM_GEN_ALL_CORE
General: NAD  CV: Normotensive   RS: Nonlabored   Abd: Soft, Tender in RLQ, no rebound or guarding   CNS: Alert, Ox3

## 2023-09-19 NOTE — H&P ADULT - NSHPLABSRESULTS_GEN_ALL_CORE
.  LABS:                         13.1   7.00  )-----------( 247      ( 18 Sep 2023 14:55 )             38.4         140  |  103  |  18.7  ----------------------------<  113<H>  4.8   |  22.0  |  0.77    Ca    8.9      18 Sep 2023 14:55    TPro  6.9  /  Alb  3.8  /  TBili  <0.2<L>  /  DBili  x   /  AST  44<H>  /  ALT  59<H>  /  AlkPhos  100        Urinalysis Basic - ( 18 Sep 2023 22:29 )    Color: Yellow / Appearance: Clear / S.010 / pH: x  Gluc: x / Ketone: Negative  / Bili: Negative / Urobili: Negative mg/dL   Blood: x / Protein: 15 / Nitrite: Negative   Leuk Esterase: Negative / RBC: 0-2 /HPF / WBC Negative /HPF   Sq Epi: x / Non Sq Epi: x / Bacteria: Negative            RADIOLOGY, EKG & ADDITIONAL TESTS: Reviewed.     FINDINGS:  LOWER CHEST: Coronary artery calcifications. Linear subsegmental atelectasis in the right middle and lower lobes.    LIVER: Steatosis.  BILE DUCTS: Normal caliber.  GALLBLADDER: Partially contracted.  SPLEEN: Within normal limits.  PANCREAS: Within normal limits.  ADRENALS: Within normal limits.  KIDNEYS/URETERS: A left renal cyst. Symmetric renal enhancement. No hydronephrosis.    BLADDER: Within normal limits.  REPRODUCTIVE ORGANS: Prostate within normal limits.    BOWEL: Periampullary duodenal diverticulum. No bowel obstruction. Appendix is thickened and measures up to 1.3 cm distally, filled with fluid, with periappendiceal fat stranding primarily at the tip, compatible with acute uncomplicated appendicitis. Colonic diverticulosis without evidence for diverticulitis.  PERITONEUM: No ascites.  VESSELS: Atherosclerotic changes.  RETROPERITONEUM/LYMPH NODES: No lymphadenopathy.  ABDOMINAL WALL: Small fat-containing umbilical hernia.  BONES: Degenerative changes in the spine.    IMPRESSION:  Acute uncomplicated appendicitis.

## 2023-09-20 LAB
ANION GAP SERPL CALC-SCNC: 15 MMOL/L — SIGNIFICANT CHANGE UP (ref 5–17)
BUN SERPL-MCNC: 18 MG/DL — SIGNIFICANT CHANGE UP (ref 8–20)
CALCIUM SERPL-MCNC: 8.8 MG/DL — SIGNIFICANT CHANGE UP (ref 8.4–10.5)
CHLORIDE SERPL-SCNC: 97 MMOL/L — SIGNIFICANT CHANGE UP (ref 96–108)
CO2 SERPL-SCNC: 25 MMOL/L — SIGNIFICANT CHANGE UP (ref 22–29)
CREAT SERPL-MCNC: 0.98 MG/DL — SIGNIFICANT CHANGE UP (ref 0.5–1.3)
CULTURE RESULTS: SIGNIFICANT CHANGE UP
EGFR: 89 ML/MIN/1.73M2 — SIGNIFICANT CHANGE UP
GLUCOSE SERPL-MCNC: 155 MG/DL — HIGH (ref 70–99)
HCT VFR BLD CALC: 40.2 % — SIGNIFICANT CHANGE UP (ref 39–50)
HGB BLD-MCNC: 13.9 G/DL — SIGNIFICANT CHANGE UP (ref 13–17)
MAGNESIUM SERPL-MCNC: 2.1 MG/DL — SIGNIFICANT CHANGE UP (ref 1.8–2.6)
MCHC RBC-ENTMCNC: 30.2 PG — SIGNIFICANT CHANGE UP (ref 27–34)
MCHC RBC-ENTMCNC: 34.6 GM/DL — SIGNIFICANT CHANGE UP (ref 32–36)
MCV RBC AUTO: 87.4 FL — SIGNIFICANT CHANGE UP (ref 80–100)
PHOSPHATE SERPL-MCNC: 2.8 MG/DL — SIGNIFICANT CHANGE UP (ref 2.4–4.7)
PLATELET # BLD AUTO: 263 K/UL — SIGNIFICANT CHANGE UP (ref 150–400)
POTASSIUM SERPL-MCNC: 3.9 MMOL/L — SIGNIFICANT CHANGE UP (ref 3.5–5.3)
POTASSIUM SERPL-SCNC: 3.9 MMOL/L — SIGNIFICANT CHANGE UP (ref 3.5–5.3)
RBC # BLD: 4.6 M/UL — SIGNIFICANT CHANGE UP (ref 4.2–5.8)
RBC # FLD: 12.3 % — SIGNIFICANT CHANGE UP (ref 10.3–14.5)
SODIUM SERPL-SCNC: 137 MMOL/L — SIGNIFICANT CHANGE UP (ref 135–145)
SPECIMEN SOURCE: SIGNIFICANT CHANGE UP
WBC # BLD: 9.34 K/UL — SIGNIFICANT CHANGE UP (ref 3.8–10.5)
WBC # FLD AUTO: 9.34 K/UL — SIGNIFICANT CHANGE UP (ref 3.8–10.5)

## 2023-09-20 DEVICE — CLIP APPLIER COVIDIEN ENDOCLIP III 5MM: Type: IMPLANTABLE DEVICE | Status: FUNCTIONAL

## 2023-09-20 DEVICE — STAPLER COVIDIEN TRI-STAPLE CURVED 45MM TAN RELOAD: Type: IMPLANTABLE DEVICE | Status: FUNCTIONAL

## 2023-09-20 RX ORDER — CALCIUM CARBONATE 500(1250)
1 TABLET ORAL THREE TIMES A DAY
Refills: 0 | Status: DISCONTINUED | OUTPATIENT
Start: 2023-09-20 | End: 2023-09-21

## 2023-09-20 RX ORDER — SODIUM CHLORIDE 9 MG/ML
1000 INJECTION, SOLUTION INTRAVENOUS
Refills: 0 | Status: DISCONTINUED | OUTPATIENT
Start: 2023-09-20 | End: 2023-09-20

## 2023-09-20 RX ORDER — ACETAMINOPHEN 500 MG
975 TABLET ORAL EVERY 6 HOURS
Refills: 0 | Status: DISCONTINUED | OUTPATIENT
Start: 2023-09-20 | End: 2023-09-21

## 2023-09-20 RX ORDER — HYDROMORPHONE HYDROCHLORIDE 2 MG/ML
0.5 INJECTION INTRAMUSCULAR; INTRAVENOUS; SUBCUTANEOUS
Refills: 0 | Status: DISCONTINUED | OUTPATIENT
Start: 2023-09-20 | End: 2023-09-21

## 2023-09-20 RX ORDER — POTASSIUM PHOSPHATE, MONOBASIC POTASSIUM PHOSPHATE, DIBASIC 236; 224 MG/ML; MG/ML
15 INJECTION, SOLUTION INTRAVENOUS ONCE
Refills: 0 | Status: COMPLETED | OUTPATIENT
Start: 2023-09-20 | End: 2023-09-20

## 2023-09-20 RX ORDER — FAMOTIDINE 10 MG/ML
20 INJECTION INTRAVENOUS ONCE
Refills: 0 | Status: COMPLETED | OUTPATIENT
Start: 2023-09-20 | End: 2023-09-20

## 2023-09-20 RX ORDER — ONDANSETRON 8 MG/1
4 TABLET, FILM COATED ORAL ONCE
Refills: 0 | Status: DISCONTINUED | OUTPATIENT
Start: 2023-09-20 | End: 2023-09-21

## 2023-09-20 RX ADMIN — ATORVASTATIN CALCIUM 40 MILLIGRAM(S): 80 TABLET, FILM COATED ORAL at 21:52

## 2023-09-20 RX ADMIN — Medication 15 MILLIGRAM(S): at 21:52

## 2023-09-20 RX ADMIN — POTASSIUM PHOSPHATE, MONOBASIC POTASSIUM PHOSPHATE, DIBASIC 62.5 MILLIMOLE(S): 236; 224 INJECTION, SOLUTION INTRAVENOUS at 11:47

## 2023-09-20 RX ADMIN — Medication 975 MILLIGRAM(S): at 11:45

## 2023-09-20 RX ADMIN — Medication 100 MILLIGRAM(S): at 21:52

## 2023-09-20 RX ADMIN — Medication 2 MILLIGRAM(S): at 05:47

## 2023-09-20 RX ADMIN — Medication 2 MILLIGRAM(S): at 11:45

## 2023-09-20 RX ADMIN — Medication 5 MILLIGRAM(S): at 21:52

## 2023-09-20 RX ADMIN — Medication 975 MILLIGRAM(S): at 18:34

## 2023-09-20 RX ADMIN — Medication 975 MILLIGRAM(S): at 17:34

## 2023-09-20 RX ADMIN — FAMOTIDINE 20 MILLIGRAM(S): 10 INJECTION INTRAVENOUS at 21:51

## 2023-09-20 RX ADMIN — Medication 15 MILLIGRAM(S): at 13:28

## 2023-09-20 RX ADMIN — Medication 975 MILLIGRAM(S): at 12:45

## 2023-09-20 RX ADMIN — Medication 325 MILLIGRAM(S): at 11:46

## 2023-09-20 RX ADMIN — Medication 15 MILLIGRAM(S): at 05:47

## 2023-09-20 RX ADMIN — Medication 1 TABLET(S): at 11:45

## 2023-09-20 RX ADMIN — Medication 15 MILLIGRAM(S): at 00:25

## 2023-09-20 RX ADMIN — ENOXAPARIN SODIUM 40 MILLIGRAM(S): 100 INJECTION SUBCUTANEOUS at 05:47

## 2023-09-20 RX ADMIN — BUPRENORPHINE AND NALOXONE 1 TABLET(S): 2; .5 TABLET SUBLINGUAL at 11:45

## 2023-09-20 RX ADMIN — SODIUM CHLORIDE 75 MILLILITER(S): 9 INJECTION, SOLUTION INTRAVENOUS at 05:48

## 2023-09-20 RX ADMIN — Medication 1000 MILLIGRAM(S): at 00:25

## 2023-09-20 RX ADMIN — GUANFACINE 1 MILLIGRAM(S): 3 TABLET, EXTENDED RELEASE ORAL at 05:47

## 2023-09-20 RX ADMIN — SENNA PLUS 2 TABLET(S): 8.6 TABLET ORAL at 21:52

## 2023-09-20 RX ADMIN — Medication 1 TABLET(S): at 17:34

## 2023-09-20 NOTE — DISCHARGE NOTE PROVIDER - HOSPITAL COURSE
57 year old M, PMH of diverticulitis, HTN, Anxiety, ETOH abuse, H/o Heroin abuse, Bilateral knee surgery, recent surgery on the R knee, presenting with abdominal pain for two weeks. Found to have acute appendicitis on CT. He was made NPO/IVF and placed on cefotetan. He was taken to the OR overnight on HD2 for a lap appendectomy which was uncomplicated and he tolerated the surgery well. Post op, his pain was well controlled, he was tolerating a regular diet, having bowel function, voiding spontaneously and ambulating without issue. He was eligible for dc at this time with outpatient f/u with Dr. Ledbetter in clinic.

## 2023-09-20 NOTE — DISCHARGE NOTE PROVIDER - CARE PROVIDER_API CALL
Onesimo Ledbetter  Vascular Surgery  [address] [place] [address] [address]  [place] [address]  Phone: [phone]  Fax: [phone]  Follow Up Time:

## 2023-09-20 NOTE — DISCHARGE NOTE PROVIDER - NSDCFUSCHEDAPPT_GEN_ALL_CORE_FT
Pedro Canada  [facility]  ORTHOSURG [address]  Scheduled Appointment: 10/23/2023    Pedro Canada  [facility] [facility]  ORTHOSURG [address]  Scheduled Appointment: 11/16/2023

## 2023-09-20 NOTE — PROGRESS NOTE ADULT - ASSESSMENT
ASSESSMENT: 57M with pmh of HTN, HL, MARIBEL, anxiety and depression presented with acute appendicitis now s/p uncomplicated lap appy.     PLAN:  - d/c home today  - outpatient f/u with Dr. Ledbetter

## 2023-09-20 NOTE — DISCHARGE NOTE PROVIDER - NSDCCPCAREPLAN_GEN_ALL_CORE_FT
PRINCIPAL DISCHARGE DIAGNOSIS  Diagnosis: Uncomplicated acute appendicitis  Assessment and Plan of Treatment: Follow Up: Please call to make an appointment 10-14 days after discharge. Also, please call to make an appointment with your primary care physician as per your usual schedule.   Activity: May return to normal activities as tolerated, however refrain from heavy lifting >10-15 pounds.   Diet: May continue regular diet.  Medications: Please take all home medications as prescribed by your primary care doctor. You may take tylenol/ibuprofen as needed for pain.   Wound Care: Please, keep wound site clean and dry. You may shower, but do not bathe.   Patient is advised to RETURN TO THE EMERGENCY DEPARTMENT for any of the following - worsening pain, fever/chills, nausea/vomiting, alterned mental status, chest pain, shortness of breath, or any other new/worsening symptoms.  You had acute appendicitis, which is inflammation and infection of your appendix. Your surgery was uncomplicated and went well, however it is normal to have some pain for the next couple of days. Should your pain worsen or be severe or you have any of the above symptoms please return to the ED.

## 2023-09-20 NOTE — DISCHARGE NOTE PROVIDER - NSDCMRMEDTOKEN_GEN_ALL_CORE_FT
acetaminophen 325 mg oral tablet: 3 tab(s) orally every 8 hours  atorvastatin 40 mg oral tablet: 1 tab(s) orally once a day (at bedtime)  buprenorphine-naloxone 8 mg-2 mg sublingual tablet: 2 sublingually 2 times a day  buPROPion 300 mg/24 hours (XL) oral tablet, extended release: 1 tab(s) orally once a day MDD: 300mg  BuSpar 10 mg oral tablet: 1 orally 2 times a day  ferrous sulfate 325 mg (65 mg elemental iron) oral tablet: 1 orally once a day  guanFACINE 1 mg oral tablet: 1 tab(s) orally 2 times a day  lithium 600 mg oral capsule: 1 cap(s) orally 2 times a day  nicotine 2 mg oral transmucosal gum: 1 chewed 4 times a day  nicotine 2 mg oral transmucosal lozenge: 1 lozenge by transmucosal administration 4 times a day  oxyCODONE 10 mg oral tablet: 1 tab(s) orally every 3 hours As needed Moderate Pain (4 - 6)  oxyCODONE 5 mg oral tablet: 1 tab(s) orally every 3 hours As needed Mild Pain (1 - 3)  senna (sennosides) 8.6 mg oral tablet: 1 orally once a day  traZODone 100 mg oral tablet: 2 tab(s) orally once a day (at bedtime) MDD: 200   acetaminophen 325 mg oral tablet: 3 tab(s) orally every 6 hours  atorvastatin 40 mg oral tablet: 1 tab(s) orally once a day (at bedtime)  buprenorphine-naloxone 8 mg-2 mg sublingual tablet: 2 sublingually 2 times a day  buPROPion 300 mg/24 hours (XL) oral tablet, extended release: 1 tab(s) orally once a day MDD: 300mg  BuSpar 10 mg oral tablet: 1 orally 2 times a day  cefadroxil 500 mg oral capsule: 1 cap(s) orally 2 times a day Please take one tab every 12 hours for two weeks  ferrous sulfate 325 mg (65 mg elemental iron) oral tablet: 1 orally once a day  guanFACINE 1 mg oral tablet: 1 tab(s) orally 2 times a day  lithium 600 mg oral capsule: 1 cap(s) orally 2 times a day  nicotine 2 mg oral transmucosal gum: 1 chewed 4 times a day  nicotine 2 mg oral transmucosal lozenge: 1 lozenge by transmucosal administration 4 times a day  oxyCODONE 10 mg oral tablet: 1 tab(s) orally every 3 hours As needed Moderate Pain (4 - 6)  oxyCODONE 5 mg oral tablet: 1 tab(s) orally every 3 hours As needed Mild Pain (1 - 3)  senna (sennosides) 8.6 mg oral tablet: 1 orally once a day  traZODone 100 mg oral tablet: 2 tab(s) orally once a day (at bedtime) MDD: 200

## 2023-09-20 NOTE — PHYSICAL THERAPY INITIAL EVALUATION ADULT - PERTINENT HX OF CURRENT PROBLEM, REHAB EVAL
as per medical chart: presenting with abdominal pain for two weeks, he was seen at [facility] presented with acute appendicitis now s/p uncomplicated lap appy.

## 2023-09-20 NOTE — PHYSICAL THERAPY INITIAL EVALUATION ADULT - ACTIVE RANGE OF MOTION EXAMINATION, REHAB EVAL
right LE limited due to previous injury/surgery (+) knee brace, resistance not applied/bilateral upper extremity Active ROM was WFL (within functional limits)/Left LE Active ROM was WFL (within functional limits)

## 2023-09-21 ENCOUNTER — TRANSCRIPTION ENCOUNTER (OUTPATIENT)
Age: 58
End: 2023-09-21

## 2023-09-21 VITALS — HEART RATE: 69 BPM | OXYGEN SATURATION: 95 % | RESPIRATION RATE: 19 BRPM | TEMPERATURE: 98 F

## 2023-09-21 PROCEDURE — C1889: CPT

## 2023-09-21 PROCEDURE — 85027 COMPLETE CBC AUTOMATED: CPT

## 2023-09-21 PROCEDURE — 81001 URINALYSIS AUTO W/SCOPE: CPT

## 2023-09-21 PROCEDURE — 85025 COMPLETE CBC W/AUTO DIFF WBC: CPT

## 2023-09-21 PROCEDURE — 82330 ASSAY OF CALCIUM: CPT

## 2023-09-21 PROCEDURE — 83735 ASSAY OF MAGNESIUM: CPT

## 2023-09-21 PROCEDURE — 93005 ELECTROCARDIOGRAM TRACING: CPT

## 2023-09-21 PROCEDURE — 36415 COLL VENOUS BLD VENIPUNCTURE: CPT

## 2023-09-21 PROCEDURE — 86850 RBC ANTIBODY SCREEN: CPT

## 2023-09-21 PROCEDURE — 80053 COMPREHEN METABOLIC PANEL: CPT

## 2023-09-21 PROCEDURE — 74177 CT ABD & PELVIS W/CONTRAST: CPT | Mod: MA

## 2023-09-21 PROCEDURE — 83690 ASSAY OF LIPASE: CPT

## 2023-09-21 PROCEDURE — 83605 ASSAY OF LACTIC ACID: CPT

## 2023-09-21 PROCEDURE — 82947 ASSAY GLUCOSE BLOOD QUANT: CPT

## 2023-09-21 PROCEDURE — 82435 ASSAY OF BLOOD CHLORIDE: CPT

## 2023-09-21 PROCEDURE — C9399: CPT

## 2023-09-21 PROCEDURE — 85610 PROTHROMBIN TIME: CPT

## 2023-09-21 PROCEDURE — 84100 ASSAY OF PHOSPHORUS: CPT

## 2023-09-21 PROCEDURE — 84132 ASSAY OF SERUM POTASSIUM: CPT

## 2023-09-21 PROCEDURE — 87641 MR-STAPH DNA AMP PROBE: CPT

## 2023-09-21 PROCEDURE — 80307 DRUG TEST PRSMV CHEM ANLYZR: CPT

## 2023-09-21 PROCEDURE — 88304 TISSUE EXAM BY PATHOLOGIST: CPT

## 2023-09-21 PROCEDURE — 85018 HEMOGLOBIN: CPT

## 2023-09-21 PROCEDURE — 86900 BLOOD TYPING SEROLOGIC ABO: CPT

## 2023-09-21 PROCEDURE — 80048 BASIC METABOLIC PNL TOTAL CA: CPT

## 2023-09-21 PROCEDURE — 87640 STAPH A DNA AMP PROBE: CPT

## 2023-09-21 PROCEDURE — 96374 THER/PROPH/DIAG INJ IV PUSH: CPT

## 2023-09-21 PROCEDURE — 84295 ASSAY OF SERUM SODIUM: CPT

## 2023-09-21 PROCEDURE — 87086 URINE CULTURE/COLONY COUNT: CPT

## 2023-09-21 PROCEDURE — 99285 EMERGENCY DEPT VISIT HI MDM: CPT | Mod: 25

## 2023-09-21 PROCEDURE — 86901 BLOOD TYPING SEROLOGIC RH(D): CPT

## 2023-09-21 PROCEDURE — 85730 THROMBOPLASTIN TIME PARTIAL: CPT

## 2023-09-21 PROCEDURE — 85014 HEMATOCRIT: CPT

## 2023-09-21 PROCEDURE — 82803 BLOOD GASES ANY COMBINATION: CPT

## 2023-09-21 RX ORDER — ACETAMINOPHEN 500 MG
3 TABLET ORAL
Qty: 0 | Refills: 0 | DISCHARGE
Start: 2023-09-21

## 2023-09-21 RX ADMIN — Medication 15 MILLIGRAM(S): at 06:30

## 2023-09-21 RX ADMIN — GUANFACINE 1 MILLIGRAM(S): 3 TABLET, EXTENDED RELEASE ORAL at 06:30

## 2023-09-21 RX ADMIN — Medication 975 MILLIGRAM(S): at 07:03

## 2023-09-21 RX ADMIN — Medication 2 MILLIGRAM(S): at 06:31

## 2023-09-21 RX ADMIN — Medication 975 MILLIGRAM(S): at 06:30

## 2023-09-21 NOTE — DISCHARGE NOTE NURSING/CASE MANAGEMENT/SOCIAL WORK - PATIENT PORTAL LINK FT
You can access the FollowMyHealth Patient Portal offered by [facility] by registering at the following website: [email or web address]. By joining [facility]’s FollowMyHealth portal, you will also be able to view your health information using other applications (apps) compatible with our system.

## 2023-09-21 NOTE — DISCHARGE NOTE NURSING/CASE MANAGEMENT/SOCIAL WORK - NSDCFUADDAPPT_GEN_ALL_CORE_FT
Please follow-up with your primary doctor and Dr. Ledbetter in the next two weeks.  Please follow-up with your primary doctor and Dr. Ledbetter in the next two weeks.       Wendy Bell

## 2023-09-21 NOTE — DISCHARGE NOTE NURSING/CASE MANAGEMENT/SOCIAL WORK - NSDCVIVACCINE_GEN_ALL_CORE_FT
Tdap; 29-Feb-2020 23:58; Lauren Delacruz (TASHIA); [employer]; [id number] (Exp. Date: 19-Mar-2022); IntraMuscular; Deltoid Right.; 0.5 milliLiter(s); VIS (VIS Published: 09-May-2013, VIS Presented: 29-Feb-2020);

## 2023-09-21 NOTE — PROGRESS NOTE ADULT - SUBJECTIVE AND OBJECTIVE BOX
HPI/Overnight Events:   Patient seen and examined at bedside this AM. No overnight events. No complaints. Denies fever, chills, nausea, vomiting, chest pain, SOB, dizziness, abd pain or any other concerning symptoms.    Vital Signs Last 24 Hrs  T(C): 36.8 (21 Sep 2023 05:12), Max: 37.2 (20 Sep 2023 10:06)  T(F): 98.3 (21 Sep 2023 05:12), Max: 98.9 (20 Sep 2023 10:06)  HR: 95 (21 Sep 2023 05:12) (88 - 95)  BP: 133/74 (21 Sep 2023 05:12) (133/74 - 161/85)  BP(mean): --  RR: 18 (21 Sep 2023 05:12) (18 - 19)  SpO2: 95% (21 Sep 2023 05:12) (93% - 96%)    Parameters below as of 21 Sep 2023 05:12  Patient On (Oxygen Delivery Method): room air        I&O's Detail    19 Sep 2023 07:01  -  20 Sep 2023 07:00  --------------------------------------------------------  IN:  Total IN: 0 mL    OUT:    Voided (mL): 650 mL  Total OUT: 650 mL    Total NET: -650 mL      20 Sep 2023 07:01  -  21 Sep 2023 06:52  --------------------------------------------------------  IN:  Total IN: 0 mL    OUT:    Voided (mL): 300 mL  Total OUT: 300 mL    Total NET: -300 mL          MEDICATIONS  (STANDING):  acetaminophen     Tablet .. 975 milliGRAM(s) Oral every 6 hours  atorvastatin 40 milliGRAM(s) Oral at bedtime  buprenorphine 8 mG/naloxone 2 mG SL  Tablet 1 Tablet(s) SubLingual daily  busPIRone 15 milliGRAM(s) Oral three times a day  enoxaparin Injectable 40 milliGRAM(s) SubCutaneous every 24 hours  ferrous    sulfate 325 milliGRAM(s) Oral daily  guanFACINE. 1 milliGRAM(s) Oral daily  melatonin 5 milliGRAM(s) Oral at bedtime  nicotine  Polacrilex Gum 2 milliGRAM(s) Oral four times a day  senna 2 Tablet(s) Oral at bedtime  traZODone 100 milliGRAM(s) Oral at bedtime    MEDICATIONS  (PRN):  calcium carbonate    500 mG (Tums) Chewable 1 Tablet(s) Chew three times a day PRN Heartburn  HYDROmorphone  Injectable 0.5 milliGRAM(s) IV Push every 10 minutes PRN Moderate Pain (4 - 6)  ondansetron Injectable 4 milliGRAM(s) IV Push every 6 hours PRN Nausea  ondansetron Injectable 4 milliGRAM(s) IV Push once PRN Nausea and/or Vomiting      Physical Exam  Constitutional: patient resting comfortably in bed, in no acute distress  HEENT: EOMI, MMM  Respiratory: Non labored breathing on RA  Cardiovascular: RRR  Gastrointestinal: Abdomen soft, non-tender, non-distended, no rebound tenderness / guarding. Abdominal incisions c/d/i.  Musculoskeletal: No joint pain, swelling or deformity; no limitation of movement  Vascular: Extremities warm and well perfused    LABS:                        13.9   9.34  )-----------( 263      ( 20 Sep 2023 06:19 )             40.2     09-20    137  |  97  |  18.0  ----------------------------<  155<H>  3.9   |  25.0  |  0.98    Ca    8.8      20 Sep 2023 06:19  Phos  2.8     09-20  Mg     2.1     09-20        Urinalysis Basic - ( 20 Sep 2023 06:19 )    Color: x / Appearance: x / SG: x / pH: x  Gluc: 155 mg/dL / Ketone: x  / Bili: x / Urobili: x   Blood: x / Protein: x / Nitrite: x   Leuk Esterase: x / RBC: x / WBC x   Sq Epi: x / Non Sq Epi: x / Bacteria: x        STUDIES:   EKG, CXR, U/S, CT, MRI     MICRO:   Cultures     Assessment:  57M with pmh of HTN, HL, MARIBEL, anxiety and depression presented with acute appendicitis now s/p uncomplicated lap appy.     PLAN:  - d/c home today  - outpatient f/u with Dr. Khloe Paz, MS4
**This documentation to serve as both progress note and POC**    SUBJECTIVE: Resting comfortably, pain controlled. Tolerating diet. Hemodynamically stable.     Vitals:  T(C): 36.6 (09-20-23 @ 05:00), Max: 36.8 (09-19-23 @ 20:05)  T(F): 97.8 (09-20-23 @ 05:00), Max: 98.2 (09-19-23 @ 20:05)  HR: 77 (09-20-23 @ 05:00) (56 - 77)  BP: 135/77 (09-20-23 @ 05:00) (119/75 - 170/89)  ABP: --  ABP(mean): --  RR: 18 (09-20-23 @ 05:00) (13 - 20)  SpO2: 95% (09-20-23 @ 05:00) (93% - 97%)    LABS:  cret                        13.9   9.34  )-----------( 263      ( 20 Sep 2023 06:19 )             40.2     09-19    138  |  102  |  17.8  ----------------------------<  99  4.2   |  24.0  |  0.94    Ca    8.8      19 Sep 2023 06:22    TPro  6.9  /  Alb  3.8  /  TBili  <0.2<L>  /  DBili  x   /  AST  44<H>  /  ALT  59<H>  /  AlkPhos  100  09-18    PT/INR - ( 19 Sep 2023 06:22 )   PT: 10.5 sec;   INR: 0.94 ratio         PTT - ( 19 Sep 2023 06:22 )  PTT:29.3 sec    GENERAL: NAD, lying in bed comfortably  HEAD:  Atraumatic, normocephalic  NECK: Supple, no JVD  HEART: RRR  LUNGS: Unlabored respirations. No conversational dyspnea.   ABDOMEN: Soft, appropriately tender to palpation around incision sites, nondistended   EXTREMITIES: No clubbing, cyanosis, or edema  NERVOUS SYSTEM:  A&Ox3, no focal deficits   SKIN: No rashes or lesions

## 2023-09-26 NOTE — ED STATDOCS - ENMT, MLM
Nasal mucosa clear.  Mouth with normal mucosa  Throat has no vesicles, no oropharyngeal exudates and uvula is midline. 4 = No assist / stand by assistance

## 2023-09-27 LAB — SURGICAL PATHOLOGY STUDY: SIGNIFICANT CHANGE UP

## 2023-10-08 NOTE — BH CONSULTATION LIAISON ASSESSMENT NOTE - NSBHMSEIMPULSE_PSY_A_CORE
Discussed labs at 10/6/23 appointment.  Urine microalbumin likely higher due to worse DM II control.  Will continue to monitor.  WBC a bit high, will recheck CBC at next lab draw.    Lev Tristan MD     Normal

## 2023-10-16 ENCOUNTER — APPOINTMENT (OUTPATIENT)
Dept: ORTHOPEDIC SURGERY | Facility: CLINIC | Age: 58
End: 2023-10-16
Payer: OTHER MISCELLANEOUS

## 2023-10-16 VITALS
WEIGHT: 206 LBS | BODY MASS INDEX: 31.22 KG/M2 | HEART RATE: 62 BPM | HEIGHT: 68 IN | DIASTOLIC BLOOD PRESSURE: 81 MMHG | SYSTOLIC BLOOD PRESSURE: 155 MMHG

## 2023-10-16 PROCEDURE — 99455 WORK RELATED DISABILITY EXAM: CPT

## 2023-10-16 PROCEDURE — 73562 X-RAY EXAM OF KNEE 3: CPT | Mod: RT

## 2023-10-16 RX ORDER — CEFADROXIL 500 MG/1
500 CAPSULE ORAL TWICE DAILY
Qty: 120 | Refills: 0 | Status: ACTIVE | COMMUNITY
Start: 2023-06-08 | End: 1900-01-01

## 2023-10-27 NOTE — ED ADULT TRIAGE NOTE - MODE OF ARRIVAL
[facility]S [facility]ulance Partial Purse String (Simple) Text: Given the location of the defect and the characteristics of the surrounding skin a simple purse string closure was deemed most appropriate.  Undermining was performed circumferentially around the surgical defect.  A purse string suture was then placed and tightened. Wound tension only allowed a partial closure of the circular defect.

## 2023-10-30 ENCOUNTER — APPOINTMENT (OUTPATIENT)
Dept: ORTHOPEDIC SURGERY | Facility: CLINIC | Age: 58
End: 2023-10-30
Payer: OTHER MISCELLANEOUS

## 2023-10-30 VITALS
WEIGHT: 206 LBS | BODY MASS INDEX: 31.22 KG/M2 | SYSTOLIC BLOOD PRESSURE: 119 MMHG | DIASTOLIC BLOOD PRESSURE: 71 MMHG | HEIGHT: 68 IN | HEART RATE: 59 BPM

## 2023-10-30 DIAGNOSIS — Z96.659 BROKEN INTERNAL JOINT PROSTHESIS, OTHER SITE, SUBSEQUENT ENCOUNTER: ICD-10-CM

## 2023-10-30 DIAGNOSIS — T84.018D BROKEN INTERNAL JOINT PROSTHESIS, OTHER SITE, SUBSEQUENT ENCOUNTER: ICD-10-CM

## 2023-10-30 PROCEDURE — 99213 OFFICE O/P EST LOW 20 MIN: CPT
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
## 2023-11-14 ENCOUNTER — APPOINTMENT (OUTPATIENT)
Dept: ORTHOPEDIC SURGERY | Facility: CLINIC | Age: 58
End: 2023-11-14

## 2023-11-16 ENCOUNTER — APPOINTMENT (OUTPATIENT)
Dept: ORTHOPEDIC SURGERY | Facility: CLINIC | Age: 58
End: 2023-11-16

## 2023-11-20 NOTE — ED PROVIDER NOTE - PATIENT PORTAL LINK FT
You can access the FollowMyHealth Patient Portal offered by [facility] by registering at the following website: [email or web address]. By joining [employer]s FollowMyHealth portal, you will also be able to view your health information using other applications (apps) compatible with our system. No

## 2023-11-29 ENCOUNTER — APPOINTMENT (OUTPATIENT)
Dept: ORTHOPEDIC SURGERY | Facility: CLINIC | Age: 58
End: 2023-11-29
Payer: MEDICAID

## 2023-11-29 VITALS
WEIGHT: 206 LBS | SYSTOLIC BLOOD PRESSURE: 116 MMHG | HEIGHT: 68 IN | HEART RATE: 61 BPM | DIASTOLIC BLOOD PRESSURE: 66 MMHG | BODY MASS INDEX: 31.22 KG/M2

## 2023-11-29 PROCEDURE — 99213 OFFICE O/P EST LOW 20 MIN: CPT

## 2023-12-12 NOTE — BH SOCIAL WORK INITIAL PSYCHOSOCIAL EVALUATION - FAMILY HISTORY OF SUBSTANCE ABUSE
Received denial for surgery due to lack of dietary visits spanning over 180 days, and lack of documentation of morbid obesity for greater than 2 years. [address], spoke to Clemencia Santos to ask process for appeal. Patient does have documentation of BMI over 35 with associated comorbidities going back to 2021, and she worked with Dr. Teo Giron for 11 months prior to deciding to pursue surgery. Per Clemencia Santos, appeal can be faxed to [phone]. All notes and appeal request faxed and patient was updated on status. Yes

## 2023-12-24 ENCOUNTER — EMERGENCY (EMERGENCY)
Facility: HOSPITAL | Age: 58
LOS: 1 days | End: 2023-12-24
Attending: EMERGENCY MEDICINE
Payer: COMMERCIAL

## 2023-12-24 VITALS
SYSTOLIC BLOOD PRESSURE: 129 MMHG | RESPIRATION RATE: 16 BRPM | DIASTOLIC BLOOD PRESSURE: 76 MMHG | OXYGEN SATURATION: 98 % | TEMPERATURE: 98 F | HEART RATE: 64 BPM

## 2023-12-24 VITALS — HEIGHT: 67 IN | WEIGHT: 184.97 LBS

## 2023-12-24 DIAGNOSIS — S83.512A SPRAIN OF ANTERIOR CRUCIATE LIGAMENT OF LEFT KNEE, INITIAL ENCOUNTER: Chronic | ICD-10-CM

## 2023-12-24 DIAGNOSIS — Z96.651 PRESENCE OF RIGHT ARTIFICIAL KNEE JOINT: Chronic | ICD-10-CM

## 2023-12-24 PROCEDURE — 99283 EMERGENCY DEPT VISIT LOW MDM: CPT

## 2023-12-24 RX ORDER — CARBAMIDE PEROXIDE 81.86 MG/ML
5 SOLUTION/ DROPS AURICULAR (OTIC)
Qty: 1 | Refills: 0
Start: 2023-12-24 | End: 2023-12-28

## 2023-12-24 NOTE — ED PROVIDER NOTE - PATIENT PORTAL LINK FT
You can access the FollowMyHealth Patient Portal offered by [facility] by registering at the following website: [email or web address]. By joining [employer]s FollowMyHealth portal, you will also be able to view your health information using other applications (apps) compatible with our system. You can access the FollowMyHealth Patient Portal offered by [facility] by registering at the following website: [email or web address]. By joining [employer]’s FollowMyHealth portal, you will also be able to view your health information using other applications (apps) compatible with our system.

## 2023-12-24 NOTE — CHART NOTE - NSCHARTNOTEFT_GEN_A_CORE
SW Note: Worker alerted via handoff that pt is medically cleared for d/c- pt resides at [facility] in [place]. Return confirmed w/ nursing supervisor (Matt) who provided number for fax- [phone]. Facility confirmed ambulette appropriate for pt- ambulette arranged via [facility] ([facility]) for next available- bill back provided. Pt aware- provided transport letter. No other SW needs. SW Note: Worker alerted via handoff that pt is medically cleared for d/c- pt resides at [facility] in [place]. Return confirmed w/ nursing supervisor (Matt) who provided number for fax- [phone]. Facility confirmed ambulette appropriate for pt- ambulette arranged via [facility] ([facility]) for next available- bill back provided. Pt aware- provided transport letter. No other SW needs.

## 2023-12-24 NOTE — ED PROVIDER NOTE - NSFOLLOWUPINSTRUCTIONS_ED_ALL_ED_FT
Patient education: Ear wax impaction (The Basics)  Written by the doctors and editors at [facility]  Please read the Disclaimer at the end of this page.    What is ear wax impaction?  Ear wax impaction is when ear wax builds up enough to cause symptoms. Normally, ear wax helps to protect the insides of the ears and prevents injury or infection (figure 1). But having too much ear wax can cause symptoms like trouble hearing and sometimes pain. The medical term for ear wax is "cerumen."    Young children and older adults are more likely than others to have ear wax impaction.    What causes ear wax impaction?  Several different things can cause ear wax impaction:    ?Diseases that affect the ear – Some health problems can affect the shape of the inside of the ear, and make it hard for wax to move out. For example, skin problems that cause skin cells to shed a lot can lead to wax buildup in the ears.    ?Narrow ear canal (figure 2) – In some people, the ear canals are narrower than in others. These people might be more likely to have ear wax impaction. A person's ear canal can become narrower after an ear injury or after severe or multiple ear infections.    ?Changes in ear wax and lining due to aging – As people get older, their ear wax gets harder and thicker. This makes it more difficult for the wax to move out of the ear as it normally should.    ?Ear-cleaning habits – Some people try to clean their ears using cotton swabs (Q-Tips) or other tools. This can actually push the wax deeper into the ear instead of getting it out. Over time, this can cause ear wax impaction.    ?Making too much ear wax – Some people make more ear wax than others. This can happen when water gets trapped in the ear, or when the ear is injured. But some people just have a lot of ear wax for no obvious reason.    ?Using devices that go into the ear – Hearing aids, "ear bud"-style headphones, and ear plugs can cause ear wax impaction if they are used over a long period of time.    What are the symptoms of ear wax impaction?  The symptoms include:    ?Trouble hearing    ?Pain in the ear    ?Feeling like the ear is blocked or plugged    ?Hearing a ringing noise in the ear    These symptoms can happen in 1 or both ears.    Should I see a doctor or nurse?  Yes. If you or your child have any of these symptoms, see a doctor or nurse. They can check the insides of the ears to figure out if the symptoms are caused by ear wax impaction or another problem, such as an ear infection.    Should I clean my (or my child's) ears at home?  No. The insides of the ears do not usually need to be cleaned. Sticking anything into the ears can push the wax in deeper and cause impaction.    "Ear candling" involves lighting 1 end of a hollow candle, and putting the other end in the ear. Ear candling is not recommended for ear wax removal. It does not remove ear wax and can even cause ear injuries and burns.    How is ear wax impaction treated?  There are several treatments to remove impacted ear wax. Doctors and nurses offer these treatments only to people who have bothersome symptoms. They do not recommend treatments for removing ear wax in people who have no symptoms, even if they have ear wax impaction.    In some cases, doctors and nurses will remove ear wax in people whose ears are impacted and who aren't able to let others know if they have symptoms or not. This can include young children, and people who are confused or have trouble communicating, including some older adults.    There are several different ways to remove ear wax:    ?Ear drops – Special ear drops can soften ear wax and help it to drain out. Ear drops are not usually safe for people with an ear infection or damage to the eardrum.    ?Rinsing – In some cases, a doctor or nurse can remove impacted ear wax by squirting water (or another liquid) into the ear to rinse it out.    ?Special tools – A doctor or nurse might use a special tool to remove ear wax. There are different types of tools that can do this safely. These include small sticks, hooks, and spoons. There are also tools that use suction to pull the wax out.    Can ear wax impaction be prevented?  It depends. If you have repeated problems with ear wax impaction, talk to your doctor or nurse. They might be able to suggest ways to help prevent future impactions. For example, they might suggest using mineral oil to soften the ear wax, removing hearing aids overnight if you use them, or getting your ears cleaned regularly by a doctor or nurse.    What problems should I watch for?  Call for advice if:    ?You have signs of infection – These include fever of 100.4°F (38°C) or higher or chills.    ?Your ear is bleeding or draining pus.    ?You have pain, ringing in the ear, or hearing loss that is new or worse than before.    ?Your symptoms are not getting better after a few days, if you are using ear drop treatments. Patient education: Ear wax impaction (The Basics)  Written by the doctors and editors at [facility]  Please read the Disclaimer at the end of this page.    What is ear wax impaction?  Ear wax impaction is when ear wax builds up enough to cause symptoms. Normally, ear wax helps to protect the insides of the ears and prevents injury or infection (figure 1). But having too much ear wax can cause symptoms like trouble hearing and sometimes pain. The medical term for ear wax is "cerumen."    Young children and older adults are more likely than others to have ear wax impaction.    What causes ear wax impaction?  Several different things can cause ear wax impaction:    ?Diseases that affect the ear – Some health problems can affect the shape of the inside of the ear, and make it hard for wax to move out. For example, skin problems that cause skin cells to shed a lot can lead to wax buildup in the ears.    ?Narrow ear canal (figure 2) – In some people, the ear canals are narrower than in others. These people might be more likely to have ear wax impaction. A person's ear canal can become narrower after an ear injury or after severe or multiple ear infections.    ?Changes in ear wax and lining due to aging – As people get older, their ear wax gets harder and thicker. This makes it more difficult for the wax to move out of the ear as it normally should.    ?Ear-cleaning habits – Some people try to clean their ears using cotton swabs (Q-Tips) or other tools. This can actually push the wax deeper into the ear instead of getting it out. Over time, this can cause ear wax impaction.    ?Making too much ear wax – Some people make more ear wax than others. This can happen when water gets trapped in the ear, or when the ear is injured. But some people just have a lot of ear wax for no obvious reason.    ?Using devices that go into the ear – Hearing aids, "ear bud"-style headphones, and ear plugs can cause ear wax impaction if they are used over a long period of time.    What are the symptoms of ear wax impaction?  The symptoms include:    ?Trouble hearing    ?Pain in the ear    ?Feeling like the ear is blocked or plugged    ?Hearing a ringing noise in the ear    These symptoms can happen in 1 or both ears.    Should I see a doctor or nurse?  Yes. If you or your child have any of these symptoms, see a doctor or nurse. They can check the insides of the ears to figure out if the symptoms are caused by ear wax impaction or another problem, such as an ear infection.    Should I clean my (or my child's) ears at home?  No. The insides of the ears do not usually need to be cleaned. Sticking anything into the ears can push the wax in deeper and cause impaction.    "Ear candling" involves lighting 1 end of a hollow candle, and putting the other end in the ear. Ear candling is not recommended for ear wax removal. It does not remove ear wax and can even cause ear injuries and burns.    How is ear wax impaction treated?  There are several treatments to remove impacted ear wax. Doctors and nurses offer these treatments only to people who have bothersome symptoms. They do not recommend treatments for removing ear wax in people who have no symptoms, even if they have ear wax impaction.    In some cases, doctors and nurses will remove ear wax in people whose ears are impacted and who aren't able to let others know if they have symptoms or not. This can include young children, and people who are confused or have trouble communicating, including some older adults.    There are several different ways to remove ear wax:    ?Ear drops – Special ear drops can soften ear wax and help it to drain out. Ear drops are not usually safe for people with an ear infection or damage to the eardrum.    ?Rinsing – In some cases, a doctor or nurse can remove impacted ear wax by squirting water (or another liquid) into the ear to rinse it out.    ?Special tools – A doctor or nurse might use a special tool to remove ear wax. There are different types of tools that can do this safely. These include small sticks, hooks, and spoons. There are also tools that use suction to pull the wax out.    Can ear wax impaction be prevented?  It depends. If you have repeated problems with ear wax impaction, talk to your doctor or nurse. They might be able to suggest ways to help prevent future impactions. For example, they might suggest using mineral oil to soften the ear wax, removing hearing aids overnight if you use them, or getting your ears cleaned regularly by a doctor or nurse.    What problems should I watch for?  Call for advice if:    ?You have signs of infection – These include fever of 100.4°F (38°C) or higher or chills.    ?Your ear is bleeding or draining pus.    ?You have pain, ringing in the ear, or hearing loss that is new or worse than before.    ?Your symptoms are not getting better after a few days, if you are using ear drop treatments.

## 2023-12-24 NOTE — ED PROVIDER NOTE - CARE PROVIDER_API CALL
Ted Segovia  Otolaryngology  [address] [place] [address], [address]  [place] [address]  Phone: [phone]  Fax: [phone]  Follow Up Time:    Ted Segovia  Otolaryngology  [address] [place] [address], [address]  [place] [address]  Phone: [phone]  Fax: [phone]  Follow Up Time:

## 2023-12-24 NOTE — ED PROVIDER NOTE - CLINICAL SUMMARY MEDICAL DECISION MAKING FREE TEXT BOX
PT with no SPMHx presents to the ED with complaint of Rt ear pain and difficult hearing that started few days ago. Pt states that he had a gradual onset of symptoms over 5 days with mild discomfort from ear, Pt dines fever, chills, HA, dizziness, OSB, diff breathing. PT with stable VS, no acute distress, non toxic appearing, tolerating PO in the ED, PT with gross auditory acuity intact, RVSB of removing discussed with pt recommend to have hydrogen peroxide placed at this time dc home with ear drops, follow up to ENT to prevent TM trama and injury, pt verbalizes that he understands risk and reason for not performing the procedure pt will be DC with above Recs, follow up to ENT, educated about when to return to the ED if needed. PT verbalizes that he understands all instructions and results. Pt educated about the risks vs benefits of imaging at this time and agrees that not warranted for their symptoms, and PE

## 2023-12-24 NOTE — ED PROVIDER NOTE - ADDITIONAL NOTES AND INSTRUCTIONS:
PT was evaluated At [facility] ED and was found to have a condition that warranted time of to rest and heal from WORK/SCHOOL.   Aldo Gallagher PA-C PT was evaluated At [facility] ED and was found to have a condition that warranted time of to rest and heal from WORK/SCHOOL.   Adlo Gallagher PA-C

## 2024-01-08 ENCOUNTER — APPOINTMENT (OUTPATIENT)
Dept: GASTROENTEROLOGY | Facility: CLINIC | Age: 59
End: 2024-01-08
Payer: MEDICAID

## 2024-01-08 VITALS
SYSTOLIC BLOOD PRESSURE: 110 MMHG | OXYGEN SATURATION: 97 % | DIASTOLIC BLOOD PRESSURE: 70 MMHG | HEART RATE: 63 BPM | BODY MASS INDEX: 34.1 KG/M2 | WEIGHT: 225 LBS | HEIGHT: 68 IN | RESPIRATION RATE: 16 BRPM

## 2024-01-08 DIAGNOSIS — Z13.9 ENCOUNTER FOR SCREENING, UNSPECIFIED: ICD-10-CM

## 2024-01-08 PROCEDURE — 99203 OFFICE O/P NEW LOW 30 MIN: CPT

## 2024-01-08 RX ORDER — BUPRENORPHINE HYDROCHLORIDE, NALOXONE HYDROCHLORIDE 8; 2 MG/1; MG/1
8-2 FILM, SOLUBLE BUCCAL; SUBLINGUAL
Refills: 0 | Status: ACTIVE | COMMUNITY

## 2024-01-08 RX ORDER — TRAZODONE HYDROCHLORIDE 100 MG/1
100 TABLET ORAL
Refills: 0 | Status: ACTIVE | COMMUNITY

## 2024-01-08 RX ORDER — POLYETHYLENE GLYCOL 3350 AND ELECTROLYTES WITH LEMON FLAVOR 236; 22.74; 6.74; 5.86; 2.97 G/4L; G/4L; G/4L; G/4L; G/4L
236 POWDER, FOR SOLUTION ORAL
Qty: 1 | Refills: 0 | Status: ACTIVE | COMMUNITY
Start: 2024-01-08 | End: 1900-01-01

## 2024-01-08 RX ORDER — BUSPIRONE HYDROCHLORIDE 10 MG/1
10 TABLET ORAL
Refills: 0 | Status: ACTIVE | COMMUNITY

## 2024-01-08 NOTE — PHYSICAL EXAM
[Alert] : alert [Normal Voice/Communication] : normal voice/communication [Healthy Appearing] : healthy appearing [No Acute Distress] : no acute distress [Sclera] : the sclera and conjunctiva were normal [Hearing Threshold Finger Rub Not [place]] : hearing was normal [Normal Appearance] : the appearance of the neck was normal [No Respiratory Distress] : no respiratory distress [No Acc Muscle Use] : no accessory muscle use [Respiration, Rhythm And Depth] : normal respiratory rhythm and effort [Heart Rate And Rhythm] : heart rate was normal and rhythm regular [Abdomen Tenderness] : non-tender [No Masses] : no abdominal mass palpated [Abdomen Soft] : soft [Abnormal Walk] : normal gait [Normal Color / Pigmentation] : normal skin color and pigmentation [No Focal Deficits] : no focal deficits [Oriented To Time, Place, And Person] : oriented to person, place, and time

## 2024-01-11 NOTE — ASSESSMENT
[FreeTextEntry1] : Pt doesn't have reports, but has had polyps in the past and gets colonoscopies every 4 years. Last colonoscopy was over 4 years ago.

## 2024-01-11 NOTE — HISTORY OF PRESENT ILLNESS
[de-identified] : 4 years ago. Hiatal hernia per pt, otherwise it was normal.  [FreeTextEntry1] : History of colon polyps. Doesn't have reports, but gets colonoscopies every 4 years. Last was over 4 years ago. -

## 2024-01-12 ENCOUNTER — APPOINTMENT (OUTPATIENT)
Dept: OTOLARYNGOLOGY | Facility: CLINIC | Age: 59
End: 2024-01-12
Payer: MEDICAID

## 2024-01-12 VITALS — HEIGHT: 68 IN | WEIGHT: 224 LBS | BODY MASS INDEX: 33.95 KG/M2

## 2024-01-12 DIAGNOSIS — H61.23 IMPACTED CERUMEN, BILATERAL: ICD-10-CM

## 2024-01-12 DIAGNOSIS — H69.91 UNSPECIFIED EUSTACHIAN TUBE DISORDER, RIGHT EAR: ICD-10-CM

## 2024-01-12 DIAGNOSIS — H90.3 SENSORINEURAL HEARING LOSS, BILATERAL: ICD-10-CM

## 2024-01-12 PROCEDURE — 92557 COMPREHENSIVE HEARING TEST: CPT

## 2024-01-12 PROCEDURE — 99203 OFFICE O/P NEW LOW 30 MIN: CPT | Mod: 25

## 2024-01-12 PROCEDURE — 92567 TYMPANOMETRY: CPT

## 2024-01-12 RX ORDER — PREDNISONE 10 MG/1
10 TABLET ORAL TWICE DAILY
Qty: 15 | Refills: 2 | Status: ACTIVE | COMMUNITY
Start: 2024-01-12 | End: 1900-01-01

## 2024-01-12 NOTE — PHYSICAL EXAM
[Midline] : trachea located in midline position [Normal] : no rashes [de-identified] : cerumen as

## 2024-01-12 NOTE — DATA REVIEWED
[de-identified] : Left ear-normal to mild SNHL; Type A Right ear-normal to moderate SNHL; Type As Asymmetry

## 2024-01-23 ENCOUNTER — TRANSCRIPTION ENCOUNTER (OUTPATIENT)
Age: 59
End: 2024-01-23

## 2024-01-24 ENCOUNTER — APPOINTMENT (OUTPATIENT)
Dept: GASTROENTEROLOGY | Facility: GI CENTER | Age: 59
End: 2024-01-24
Payer: MEDICAID

## 2024-01-24 ENCOUNTER — OUTPATIENT (OUTPATIENT)
Dept: OUTPATIENT SERVICES | Facility: HOSPITAL | Age: 59
LOS: 1 days | End: 2024-01-24
Payer: COMMERCIAL

## 2024-01-24 DIAGNOSIS — S83.512A SPRAIN OF ANTERIOR CRUCIATE LIGAMENT OF LEFT KNEE, INITIAL ENCOUNTER: Chronic | ICD-10-CM

## 2024-01-24 DIAGNOSIS — Z86.010 PERSONAL HISTORY OF COLONIC POLYPS: ICD-10-CM

## 2024-01-24 DIAGNOSIS — Z96.651 PRESENCE OF RIGHT ARTIFICIAL KNEE JOINT: Chronic | ICD-10-CM

## 2024-01-24 PROCEDURE — G0105: CPT

## 2024-01-24 PROCEDURE — 45378 DIAGNOSTIC COLONOSCOPY: CPT | Mod: 53,33

## 2024-01-24 NOTE — PHYSICAL EXAM
[Alert] : alert [Normal Voice/Communication] : normal voice/communication [Healthy Appearing] : healthy appearing [No Acute Distress] : no acute distress [Sclera] : the sclera and conjunctiva were normal [Hearing Threshold Finger Rub Not [place]] : hearing was normal [Normal Appearance] : the appearance of the neck was normal [No Respiratory Distress] : no respiratory distress [No Acc Muscle Use] : no accessory muscle use [Respiration, Rhythm And Depth] : normal respiratory rhythm and effort [Heart Rate And Rhythm] : heart rate was normal and rhythm regular [Abdomen Tenderness] : non-tender [No Masses] : no abdominal mass palpated [Abdomen Soft] : soft [Abnormal Walk] : normal gait [Normal Color / Pigmentation] : normal skin color and pigmentation [No Focal Deficits] : no focal deficits [Oriented To Time, Place, And Person] : oriented to person, place, and time

## 2024-01-24 NOTE — HISTORY OF PRESENT ILLNESS
[de-identified] : 4 years ago. Hiatal hernia per pt, otherwise it was normal.  [FreeTextEntry1] : History of colon polyps. Doesn't have reports, but gets colonoscopies every 4 years. Last was over 4 years ago. -

## 2024-01-25 ENCOUNTER — APPOINTMENT (OUTPATIENT)
Dept: DERMATOLOGY | Facility: CLINIC | Age: 59
End: 2024-01-25

## 2024-01-25 NOTE — ED PROVIDER NOTE - DISCHARGE REVIEW MATERIAL PRESENTED
Detail Level: Simple Render In Strict Bullet Format?: No Continue Regimen: Minoxidil 5% foam, can increase to bid if desired\\nWeem hair supplement .

## 2024-02-13 NOTE — ED PROVIDER NOTE - NS_BEDUNITTYPES_ED_ALL_ED
For heel/foot pain - stretch consistently/pilates, cold pop can massaging, Antonette Crocs/inside shoes, massager/roller, over-the-counter orthotics.    For sinus congestion - start Flonase nasal spray over-the-counter (generic is fine).    Return to clinic in 1 year.   ANY BED

## 2024-02-22 NOTE — H&P PST ADULT - NSHP PST DIAGOTHER LIST_GEN_A_CORE
weeks  Early GTT- 133      Hypothyroid 03/04/2022    Impaired glucose metabolism 03/04/2022     Early 1 hr GTT: 133  Early 3 hr GTT: **      Anxiety 05/02/2017     No meds      PCOS      Was previously on metformin      GERD 07/14/2011       PLAN:  Return in about 4 months (around 6/19/2024) for follow up problem from today visit.  PNV/Folic acid  Semen analysis referral provided   Discussed options in detail and will avoid treatment at this time as she is trying for pregnancy.  Discussed ADRIANO if needed, semen analysis, and possible laparoscopy if worsens.  Repeat Annual every 1 year  Cervical Cytology Evaluation begins at 20 years old.  If Negative Cytology, Follow-up screening per current guidelines.   Return to the office in 4 months.  Counseled on preventative health maintenance follow-up.  No orders of the defined types were placed in this encounter.          The patient, Magda Alegria is a 31 y.o. female, was seen with a total time spent of 20 minutes for the visit on this date of service by the E/M provider. The time component had both face to face and non face to face time spent in determining the total time component.  Counseling and education regarding her diagnosis listed below and her options regarding those diagnoses were also included in determining her time component.      Diagnosis Orders   1. Endometriosis        2. Dysmenorrhea        3. PCOS        4. Pelvic pain        5. Encounter for preconception consultation  Prenatal Vit-Fe Fumarate-FA (PRENATAL VITAMINS) 28-0.8 MG TABS    folic acid (FOLVITE) 1 MG tablet           The patient had her preventative health maintenance recommendations and follow-up reviewed with her at the completion of her visit.   
2/27/2023: mupirocin ordered, patient educated on its use; abnormal labs reported to Dr Canada and PCP DR Hinojosa. Brisa Tay NP

## 2024-02-27 NOTE — H&P PST ADULT - CARDIOVASCULAR
The patient's goals for the shift include      The clinical goals for the shift include No dizziness      Problem: Pain - Adult  Goal: Verbalizes/displays adequate comfort level or baseline comfort level  Outcome: Progressing     Problem: Safety - Adult  Goal: Free from fall injury  Outcome: Progressing     Problem: Discharge Planning  Goal: Discharge to home or other facility with appropriate resources  Outcome: Progressing     Problem: Chronic Conditions and Co-morbidities  Goal: Patient's chronic conditions and co-morbidity symptoms are monitored and maintained or improved  Outcome: Progressing     Problem: Fall/Injury  Goal: Not fall by end of shift  Outcome: Progressing  Goal: Be free from injury by end of the shift  Outcome: Progressing      regular rate and rhythm/S1 S2 present/no gallops/no rub/no murmur/no JVD/no pedal edema/vascular details…

## 2024-03-18 NOTE — ED BEHAVIORAL HEALTH ASSESSMENT NOTE - SHELTER
SURGEON:  Becky Mata M.D.    PREOPERATIVE DIAGNOSIS:    Nuclear Sclerotic Cataract Right Eye    POSTOPERATIVE DIAGNOSIS:    Nuclear Sclerotic Cataract Right Eye    PROCEDURES:    Phacoemulsification with  intraocular lens, Right eye (35776)    DATE OF SURGERY: 03/18/2024    IMPLANT: [id number] 22.0    ANESTHESIA:  moderate sedation with topical Lidocaine. Patient ID confirmed and re-evaluated the patient and anesthesia plan confirming it is suitable for the patient's condition and procedure.    COMPLICATIONS:  None    ESTIMATED BLOOD LOSS: None    SPECIMENS: None    INDICATIONS:    The patient has a history of painless progressive visual loss and difficulty with activities of daily living, which specifically include difficult driving at night due to glare and difficulty reading small print, secondary to cataract formation.  After a thorough discussion of the risks, benefits, and alternatives to cataract surgery, including, but not limited to, the rare risks of infection, retinal detachment, hemorrhage, need for additional surgery, loss of vision, and even loss of the eye, the patient voices understanding and desires to proceed.    DESCRIPTION OF PROCEDURE:    The patients IOL calculations were reviewed, and the lens selection confirmed.   After verification and marking of the proper eye in the preop holding area, the patient was brought to the operating room in supine position where the eye was prepped and draped in standard sterile fashion with 5% Betadine and a lid speculum placed in the eye.   Topical 4% Lidocaine was used in addition to the preoperative anesthesia and the procedure was begun by the creation of a paracentesis incision through which viscoelastic was used to fill the anterior chamber.  Next, a keratome blade was used to create a triplanar temporal clear corneal incision and a cystotome and Utrata forceps used to fashion a continuous curvilinear capsulorrhexis.  Hydrodissection was carried out using  the Manrique hydrodissection cannula and the nucleus was found to be mobile.  Phacoemulsification of the nucleus was carried out using a quick chop technique, and all remaining epinuclear and cortical material was removed.  The eye was then reformed with Viscoelastic and the  intraocular lens was implanted into the capsular bag.  All remaining viscoelastics were removed from the eye and at the end of the case the pupil was round, the lens was well-centered within the capsular bag and all wounds were found to be water tight.  Drops of Vigamox and Pred Forte were instilled and a shield was placed over the eye. The patient will follow up with Dr. Mata in the morning.       TLC

## 2024-04-08 ENCOUNTER — APPOINTMENT (OUTPATIENT)
Dept: ORTHOPEDIC SURGERY | Facility: CLINIC | Age: 59
End: 2024-04-08
Payer: MEDICAID

## 2024-04-08 VITALS
SYSTOLIC BLOOD PRESSURE: 135 MMHG | BODY MASS INDEX: 31.07 KG/M2 | DIASTOLIC BLOOD PRESSURE: 66 MMHG | WEIGHT: 205 LBS | HEART RATE: 58 BPM | HEIGHT: 68 IN

## 2024-04-08 PROCEDURE — 99213 OFFICE O/P EST LOW 20 MIN: CPT | Mod: 25

## 2024-04-08 PROCEDURE — 73562 X-RAY EXAM OF KNEE 3: CPT | Mod: RT

## 2024-04-08 RX ORDER — CEFADROXIL 500 MG/1
500 CAPSULE ORAL TWICE DAILY
Qty: 180 | Refills: 0 | Status: ACTIVE | COMMUNITY
Start: 2023-05-30 | End: 1900-01-01

## 2024-04-08 NOTE — DISCUSSION/SUMMARY
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  Overall the patient is doing well, his pain is much improved.  He should continue to take Tylenol as needed.  He should continue with physical therapy and continue weightbearing as tolerated.  I have ordered a repeat CRP.  He should continue his Duricef and I have reordered that today.  We will determine the interval of his next follow-up after the results of the CRP are received.  The patient should have the results faxed to the office.  All questions were addressed, the patient verbalized understanding is agreement the plan.

## 2024-04-08 NOTE — REVIEW OF SYSTEMS
[Joint Stiffness] : joint stiffness [Negative] : Heme/Lymph [Joint Pain] : no joint pain [Joint Swelling] : no joint swelling [FreeTextEntry9] : Status post revision right total knee arthroplasty

## 2024-04-08 NOTE — PHYSICAL EXAM
[Walker] : ambulates with walker [Wheelchair] : uses a wheelchair [de-identified] : Right lower extremity: Incision is well-healed without erythema drainage or discharge or rashes noted.  Knee range of motion is 0-85, there is a 5-10 degree extensor lag, no instability to varus valgus stress, 5 out of 5 strength in foot plantarflexion dorsiflexion, sensation intact light touch over the foot, foot warm well-perfused brisk cap refill.   [de-identified] : 3 views of the right knee taken the office today show no acute fracture dislocations there is a right revision total knee arthroplasty in appropriate line without evidence of hardware compromise or loosening.

## 2024-04-08 NOTE — DISCUSSION/SUMMARY
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  Overall the patient is doing well, his pain is much improved.  He should continue to take Tylenol as needed.  He should continue with physical therapy and continue weightbearing as tolerated.  I have ordered a repeat CRP.  He should continue his Duricef and I have reordered that today.  We will determine the interval of his next follow-up after the results of the CRP are received.  The patient should have the results faxed to the office.  All questions were addressed, the patient verbalized understanding is agreement the plan.

## 2024-04-08 NOTE — PHYSICAL EXAM
[Walker] : ambulates with walker [Wheelchair] : uses a wheelchair [de-identified] : Right lower extremity: Incision is well-healed without erythema drainage or discharge or rashes noted.  Knee range of motion is 0-85, there is a 5-10 degree extensor lag, no instability to varus valgus stress, 5 out of 5 strength in foot plantarflexion dorsiflexion, sensation intact light touch over the foot, foot warm well-perfused brisk cap refill.   [de-identified] : 3 views of the right knee taken the office today show no acute fracture dislocations there is a right revision total knee arthroplasty in appropriate line without evidence of hardware compromise or loosening.

## 2024-04-08 NOTE — HISTORY OF PRESENT ILLNESS
[FreeTextEntry1] : 58 year old male here today for follow-up 11 months status post right revision total knee arthroplasty CHERIE khan, date of surgery 5/19/2023. He has been participating physical therapy at the rehab facility. States that he ambulates with the use of a walker. States that rashes around his incision have cleared. States that his pain has significantly improved, takes Tylenol occasionally. Taking duricef daily. Denies any recent injuries falls or trauma, incisional issues, erythema or drainage discharge or rashes, fever/chills, feelings of instability.

## 2024-04-25 ENCOUNTER — EMERGENCY (EMERGENCY)
Facility: HOSPITAL | Age: 59
LOS: 1 days | Discharge: OTHER INSTITUTION | End: 2024-04-25
Attending: EMERGENCY MEDICINE
Payer: COMMERCIAL

## 2024-04-25 VITALS
OXYGEN SATURATION: 93 % | DIASTOLIC BLOOD PRESSURE: 75 MMHG | SYSTOLIC BLOOD PRESSURE: 131 MMHG | TEMPERATURE: 98 F | HEIGHT: 68 IN | HEART RATE: 73 BPM | WEIGHT: 223.99 LBS | RESPIRATION RATE: 16 BRPM

## 2024-04-25 DIAGNOSIS — S83.512A SPRAIN OF ANTERIOR CRUCIATE LIGAMENT OF LEFT KNEE, INITIAL ENCOUNTER: Chronic | ICD-10-CM

## 2024-04-25 DIAGNOSIS — F19.10 OTHER PSYCHOACTIVE SUBSTANCE ABUSE, UNCOMPLICATED: ICD-10-CM

## 2024-04-25 DIAGNOSIS — Z96.651 PRESENCE OF RIGHT ARTIFICIAL KNEE JOINT: Chronic | ICD-10-CM

## 2024-04-25 DIAGNOSIS — F32.A DEPRESSION, UNSPECIFIED: ICD-10-CM

## 2024-04-25 LAB
AMPHET UR-MCNC: POSITIVE
ANION GAP SERPL CALC-SCNC: 12 MMOL/L — SIGNIFICANT CHANGE UP (ref 5–17)
APAP SERPL-MCNC: <3 UG/ML — LOW (ref 10–26)
APPEARANCE UR: CLEAR — SIGNIFICANT CHANGE UP
BARBITURATES UR SCN-MCNC: NEGATIVE — SIGNIFICANT CHANGE UP
BASOPHILS # BLD AUTO: 0.04 K/UL — SIGNIFICANT CHANGE UP (ref 0–0.2)
BASOPHILS NFR BLD AUTO: 0.5 % — SIGNIFICANT CHANGE UP (ref 0–2)
BENZODIAZ UR-MCNC: POSITIVE
BILIRUB UR-MCNC: NEGATIVE — SIGNIFICANT CHANGE UP
BUN SERPL-MCNC: 11.5 MG/DL — SIGNIFICANT CHANGE UP (ref 8–20)
CALCIUM SERPL-MCNC: 8.5 MG/DL — SIGNIFICANT CHANGE UP (ref 8.4–10.5)
CHLORIDE SERPL-SCNC: 101 MMOL/L — SIGNIFICANT CHANGE UP (ref 96–108)
CO2 SERPL-SCNC: 25 MMOL/L — SIGNIFICANT CHANGE UP (ref 22–29)
COCAINE METAB.OTHER UR-MCNC: POSITIVE
COLOR SPEC: YELLOW — SIGNIFICANT CHANGE UP
CREAT SERPL-MCNC: 0.84 MG/DL — SIGNIFICANT CHANGE UP (ref 0.5–1.3)
DIFF PNL FLD: NEGATIVE — SIGNIFICANT CHANGE UP
EGFR: 100 ML/MIN/1.73M2 — SIGNIFICANT CHANGE UP
EOSINOPHIL # BLD AUTO: 0.35 K/UL — SIGNIFICANT CHANGE UP (ref 0–0.5)
EOSINOPHIL NFR BLD AUTO: 4.6 % — SIGNIFICANT CHANGE UP (ref 0–6)
ETHANOL SERPL-MCNC: 20 MG/DL — HIGH (ref 0–9)
FLUAV AG NPH QL: SIGNIFICANT CHANGE UP
FLUBV AG NPH QL: SIGNIFICANT CHANGE UP
GLUCOSE SERPL-MCNC: 97 MG/DL — SIGNIFICANT CHANGE UP (ref 70–99)
GLUCOSE UR QL: NEGATIVE MG/DL — SIGNIFICANT CHANGE UP
HCT VFR BLD CALC: 34.9 % — LOW (ref 39–50)
HGB BLD-MCNC: 11.9 G/DL — LOW (ref 13–17)
IMM GRANULOCYTES NFR BLD AUTO: 0.3 % — SIGNIFICANT CHANGE UP (ref 0–0.9)
KETONES UR-MCNC: ABNORMAL MG/DL
LEUKOCYTE ESTERASE UR-ACNC: NEGATIVE — SIGNIFICANT CHANGE UP
LYMPHOCYTES # BLD AUTO: 2.07 K/UL — SIGNIFICANT CHANGE UP (ref 1–3.3)
LYMPHOCYTES # BLD AUTO: 27.3 % — SIGNIFICANT CHANGE UP (ref 13–44)
MCHC RBC-ENTMCNC: 31.8 PG — SIGNIFICANT CHANGE UP (ref 27–34)
MCHC RBC-ENTMCNC: 34.1 GM/DL — SIGNIFICANT CHANGE UP (ref 32–36)
MCV RBC AUTO: 93.3 FL — SIGNIFICANT CHANGE UP (ref 80–100)
METHADONE UR-MCNC: NEGATIVE — SIGNIFICANT CHANGE UP
MONOCYTES # BLD AUTO: 0.67 K/UL — SIGNIFICANT CHANGE UP (ref 0–0.9)
MONOCYTES NFR BLD AUTO: 8.8 % — SIGNIFICANT CHANGE UP (ref 2–14)
NEUTROPHILS # BLD AUTO: 4.43 K/UL — SIGNIFICANT CHANGE UP (ref 1.8–7.4)
NEUTROPHILS NFR BLD AUTO: 58.5 % — SIGNIFICANT CHANGE UP (ref 43–77)
NITRITE UR-MCNC: NEGATIVE — SIGNIFICANT CHANGE UP
OPIATES UR-MCNC: NEGATIVE — SIGNIFICANT CHANGE UP
PCP SPEC-MCNC: SIGNIFICANT CHANGE UP
PCP UR-MCNC: NEGATIVE — SIGNIFICANT CHANGE UP
PH UR: 5.5 — SIGNIFICANT CHANGE UP (ref 5–8)
PLATELET # BLD AUTO: 208 K/UL — SIGNIFICANT CHANGE UP (ref 150–400)
POTASSIUM SERPL-MCNC: 3.8 MMOL/L — SIGNIFICANT CHANGE UP (ref 3.5–5.3)
POTASSIUM SERPL-SCNC: 3.8 MMOL/L — SIGNIFICANT CHANGE UP (ref 3.5–5.3)
PROT UR-MCNC: NEGATIVE MG/DL — SIGNIFICANT CHANGE UP
RBC # BLD: 3.74 M/UL — LOW (ref 4.2–5.8)
RBC # FLD: 12.5 % — SIGNIFICANT CHANGE UP (ref 10.3–14.5)
RSV RNA NPH QL NAA+NON-PROBE: SIGNIFICANT CHANGE UP
SALICYLATES SERPL-MCNC: <0.6 MG/DL — LOW (ref 10–20)
SARS-COV-2 RNA SPEC QL NAA+PROBE: SIGNIFICANT CHANGE UP
SODIUM SERPL-SCNC: 138 MMOL/L — SIGNIFICANT CHANGE UP (ref 135–145)
SP GR SPEC: 1.02 — SIGNIFICANT CHANGE UP (ref 1–1.03)
THC UR QL: NEGATIVE — SIGNIFICANT CHANGE UP
UROBILINOGEN FLD QL: 0.2 MG/DL — SIGNIFICANT CHANGE UP (ref 0.2–1)
WBC # BLD: 7.58 K/UL — SIGNIFICANT CHANGE UP (ref 3.8–10.5)
WBC # FLD AUTO: 7.58 K/UL — SIGNIFICANT CHANGE UP (ref 3.8–10.5)

## 2024-04-25 PROCEDURE — 99223 1ST HOSP IP/OBS HIGH 75: CPT | Mod: 25

## 2024-04-25 PROCEDURE — 93010 ELECTROCARDIOGRAM REPORT: CPT

## 2024-04-25 PROCEDURE — 90792 PSYCH DIAG EVAL W/MED SRVCS: CPT

## 2024-04-25 RX ORDER — FERROUS SULFATE 325(65) MG
1 TABLET ORAL
Refills: 0 | DISCHARGE

## 2024-04-25 RX ORDER — SENNA PLUS 8.6 MG/1
1 TABLET ORAL
Refills: 0 | Status: DISCONTINUED | OUTPATIENT
Start: 2024-04-25 | End: 2024-05-02

## 2024-04-25 RX ORDER — LANOLIN ALCOHOL/MO/W.PET/CERES
1 CREAM (GRAM) TOPICAL
Refills: 0 | DISCHARGE

## 2024-04-25 RX ORDER — ONDANSETRON 8 MG/1
4 TABLET, FILM COATED ORAL EVERY 8 HOURS
Refills: 0 | Status: DISCONTINUED | OUTPATIENT
Start: 2024-04-25 | End: 2024-05-02

## 2024-04-25 RX ORDER — LANOLIN ALCOHOL/MO/W.PET/CERES
10 CREAM (GRAM) TOPICAL AT BEDTIME
Refills: 0 | Status: DISCONTINUED | OUTPATIENT
Start: 2024-04-25 | End: 2024-05-02

## 2024-04-25 RX ORDER — LACTOBACILLUS ACIDOPHILUS 100MM CELL
1 CAPSULE ORAL
Refills: 0 | Status: DISCONTINUED | OUTPATIENT
Start: 2024-04-25 | End: 2024-05-02

## 2024-04-25 RX ORDER — ACETAMINOPHEN 500 MG
650 TABLET ORAL EVERY 6 HOURS
Refills: 0 | Status: DISCONTINUED | OUTPATIENT
Start: 2024-04-25 | End: 2024-05-02

## 2024-04-25 RX ORDER — GUANFACINE 3 MG/1
1 TABLET, EXTENDED RELEASE ORAL
Refills: 0 | Status: DISCONTINUED | OUTPATIENT
Start: 2024-04-25 | End: 2024-05-02

## 2024-04-25 RX ORDER — BUPROPION HYDROCHLORIDE 150 MG/1
300 TABLET, EXTENDED RELEASE ORAL DAILY
Refills: 0 | Status: DISCONTINUED | OUTPATIENT
Start: 2024-04-25 | End: 2024-05-02

## 2024-04-25 RX ORDER — TRAZODONE HCL 50 MG
200 TABLET ORAL AT BEDTIME
Refills: 0 | Status: DISCONTINUED | OUTPATIENT
Start: 2024-04-25 | End: 2024-05-02

## 2024-04-25 RX ORDER — NICOTINE POLACRILEX 2 MG
1 GUM BUCCAL
Refills: 0 | DISCHARGE

## 2024-04-25 RX ORDER — BUPRENORPHINE AND NALOXONE 2; .5 MG/1; MG/1
1 TABLET SUBLINGUAL
Refills: 0 | Status: COMPLETED | OUTPATIENT
Start: 2024-04-25 | End: 2024-05-02

## 2024-04-25 RX ORDER — SENNA PLUS 8.6 MG/1
1 TABLET ORAL
Refills: 0 | DISCHARGE

## 2024-04-25 RX ORDER — LORATADINE 10 MG/1
10 TABLET ORAL
Refills: 0 | Status: DISCONTINUED | OUTPATIENT
Start: 2024-04-25 | End: 2024-05-02

## 2024-04-25 RX ORDER — TRAZODONE HCL 50 MG
50 TABLET ORAL AT BEDTIME
Refills: 0 | Status: DISCONTINUED | OUTPATIENT
Start: 2024-04-25 | End: 2024-05-02

## 2024-04-25 RX ORDER — NICOTINE POLACRILEX 2 MG
2 GUM BUCCAL
Refills: 0 | Status: DISCONTINUED | OUTPATIENT
Start: 2024-04-25 | End: 2024-05-02

## 2024-04-25 RX ORDER — ESCITALOPRAM OXALATE 10 MG/1
20 TABLET, FILM COATED ORAL
Refills: 0 | Status: DISCONTINUED | OUTPATIENT
Start: 2024-04-25 | End: 2024-05-02

## 2024-04-25 RX ORDER — BUPRENORPHINE AND NALOXONE 2; .5 MG/1; MG/1
2 TABLET SUBLINGUAL
Refills: 0 | DISCHARGE

## 2024-04-25 RX ORDER — LORATADINE 10 MG/1
1 TABLET ORAL
Refills: 0 | DISCHARGE

## 2024-04-25 RX ORDER — ESCITALOPRAM OXALATE 10 MG/1
1 TABLET, FILM COATED ORAL
Refills: 0 | DISCHARGE

## 2024-04-25 RX ORDER — TRAZODONE HCL 50 MG
250 TABLET ORAL
Refills: 0 | DISCHARGE

## 2024-04-25 RX ORDER — FERROUS SULFATE 325(65) MG
325 TABLET ORAL DAILY
Refills: 0 | Status: DISCONTINUED | OUTPATIENT
Start: 2024-04-25 | End: 2024-05-02

## 2024-04-25 RX ADMIN — Medication 0.1 MILLIGRAM(S): at 10:09

## 2024-04-25 RX ADMIN — LORATADINE 10 MILLIGRAM(S): 10 TABLET ORAL at 10:10

## 2024-04-25 RX ADMIN — Medication 0.1 MILLIGRAM(S): at 21:13

## 2024-04-25 RX ADMIN — Medication 10 MILLIGRAM(S): at 10:09

## 2024-04-25 RX ADMIN — BUPRENORPHINE AND NALOXONE 1 TABLET(S): 2; .5 TABLET SUBLINGUAL at 10:09

## 2024-04-25 RX ADMIN — SENNA PLUS 1 TABLET(S): 8.6 TABLET ORAL at 21:13

## 2024-04-25 RX ADMIN — Medication 50 MILLIGRAM(S): at 21:12

## 2024-04-25 RX ADMIN — GUANFACINE 1 MILLIGRAM(S): 3 TABLET, EXTENDED RELEASE ORAL at 10:09

## 2024-04-25 RX ADMIN — Medication 500 MILLIGRAM(S): at 21:12

## 2024-04-25 RX ADMIN — Medication 325 MILLIGRAM(S): at 10:09

## 2024-04-25 RX ADMIN — ESCITALOPRAM OXALATE 20 MILLIGRAM(S): 10 TABLET, FILM COATED ORAL at 10:10

## 2024-04-25 RX ADMIN — Medication 1 TABLET(S): at 10:09

## 2024-04-25 RX ADMIN — Medication 10 MILLIGRAM(S): at 21:12

## 2024-04-25 RX ADMIN — BUPRENORPHINE AND NALOXONE 1 TABLET(S): 2; .5 TABLET SUBLINGUAL at 21:13

## 2024-04-25 RX ADMIN — Medication 200 MILLIGRAM(S): at 21:12

## 2024-04-25 RX ADMIN — BUPROPION HYDROCHLORIDE 300 MILLIGRAM(S): 150 TABLET, EXTENDED RELEASE ORAL at 12:26

## 2024-04-25 RX ADMIN — Medication 500 MILLIGRAM(S): at 10:09

## 2024-04-25 NOTE — ED BEHAVIORAL HEALTH ASSESSMENT NOTE - VIOLENCE RISK FACTORS:
Substance abuse/Community stressors that increase the risk of destabilization/History of violation of a legal mandate (e.g., parole, probation, AOT)

## 2024-04-25 NOTE — ED ADULT NURSE REASSESSMENT NOTE - COMFORT CARE
ambulated to bathroom/meal provided/plan of care explained
meal provided/plan of care explained
darkened lights
ambulated to bathroom/plan of care explained/po fluids offered

## 2024-04-25 NOTE — ED BEHAVIORAL HEALTH ASSESSMENT NOTE - DETAILS
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
## 2024-04-25 NOTE — ED ADULT NURSE NOTE - NSICDXPASTMEDICALHX_GEN_ALL_CORE_FT
PAST MEDICAL HISTORY:  Anxiety and depression     Broken internal joint prosthesis, other site, subsequent encounter     Diverticulitis     GERD (gastroesophageal reflux disease)     H/O bipolar disorder     History of drug abuse     History of ETOH abuse     HLD (hyperlipidemia)     HTN (hypertension)     MARIBEL (obstructive sleep apnea)     Osteoarthritis

## 2024-04-25 NOTE — ED BEHAVIORAL HEALTH ASSESSMENT NOTE - DESCRIPTION
Vital Signs Last 24 Hrs  T(C): 36.6 (25 Apr 2024 04:57), Max: 36.6 (25 Apr 2024 04:57)  T(F): 97.8 (25 Apr 2024 04:57), Max: 97.8 (25 Apr 2024 04:57)  HR: 67 (25 Apr 2024 04:57) (67 - 73)  BP: 146/93 (25 Apr 2024 04:57) (131/75 - 146/93)  BP(mean): --  RR: 18 (25 Apr 2024 04:57) (16 - 18)  SpO2: 97% (25 Apr 2024 04:57) (93% - 97%)    Parameters below as of 25 Apr 2024 04:57  Patient On (Oxygen Delivery Method): room air 57 y/o male [facility], unemployed HTN, diverticulitis, multiple knee surgeries HTN, diverticulitis, multiple knee surgeries, h/o osteomyelitis (resolved)

## 2024-04-25 NOTE — ED PROVIDER NOTE - CLINICAL SUMMARY MEDICAL DECISION MAKING FREE TEXT BOX
59yo M hx HTH, diverticulitis, multiple knee surgeries BIBA from [place] where he was getting rehab for recent knee surgery presents w/ SI. Pt has had a plan for past 2 wks to get heroin and overdose until someone interfered. Pt states he has made a previous attempt last year, was at [facility]. Pt states he is battling depression, lost his place of residency.    Pending labs, ekg, urine drug screen, pending psych eval and inpatient psych admission.

## 2024-04-25 NOTE — ED BEHAVIORAL HEALTH ASSESSMENT NOTE - DIFFERENTIAL
Major depressive disorder  Substance abuse related depression Major depressive disorder  Substance abuse related depression  Depressive disorder
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
## 2024-04-25 NOTE — ED BEHAVIORAL HEALTH ASSESSMENT NOTE - NSTXRELFACTOR_PSY_ALL_CORE
Change in provider or treatment (i.e., medications, psychotherapy, milieu)/Hopeless about or dissatisfied with provider or treatment
(4) rarely moist

## 2024-04-25 NOTE — ED ADULT NURSE NOTE - CHIEF COMPLAINT QUOTE
pt BIBA from [facility] getting rehab for knee sgy, presents SI, has had a plan for past 2 wks to get heroin and overdose by shooting up until someone interfered.  States he has made a previous attempt last year, was at [facility].  Hx of [facility], diverticulitis.  Pt states battling depression, lost his place of residency.

## 2024-04-25 NOTE — ED PROVIDER NOTE - ATTENDING CONTRIBUTION TO CARE
I performed a history and physical exam of the patient and discussed their management with the resident. I reviewed the resident's note and agree with the documented findings and plan of care. My medical decision making and observations are found below.      68-year-old male with past medical history of hypertension, diverticulitis, multiple knee surgeries brought in by ambulance from [place] with depression and suicidal ideation.  Plan to obtain psych screening labs, have psychiatry evaluate patient.

## 2024-04-25 NOTE — ED ADULT NURSE NOTE - HPI (INCLUDE ILLNESS QUALITY, SEVERITY, DURATION, TIMING, CONTEXT, MODIFYING FACTORS, ASSOCIATED SIGNS AND SYMPTOMS)
received pt in gown from [facility] ed.  waned by security for contraband.  pt is ao.  stating he has been feeling depressed and having suivdal thoughts for a while.  asper pt 2-3 weeks ago wrote a note stating disposition of his property. he also had a plan to get a "bunch of dope and shot it up"   as per pt he feels like his life is over and he has nothing to live for.  he has been in [facility] since may of last year and has had multiple knee sx. pt admits to relapsing and drinking last 2 days and also using coke. .  he states his last drink was last may .  he also admits at that time he had a s/a od on his medication and drinking in which he was hospitalized at .   AS per pt spent 27 years in [facility] and is now off parole.

## 2024-04-25 NOTE — ED CDU PROVIDER INITIAL DAY NOTE - PHYSICAL EXAMINATION
General: Awake, alert, lying in bed in NAD  HEENT: Normocephalic, atraumatic. No scleral icterus or conjunctival injection. EOMI. Moist mucous membranes. Oropharynx clear.   Neck:. Soft and supple.  Cardiac: RRR, Peripheral pulses 2+ and symmetric. No LE edema.  Resp: Lungs CTAB. No accessory muscle use  Abd: Soft, non-tender, non-distended. No guarding, rebound, or rigidity.  Back: Spine midline and non-tender.   Skin: R knee scar, L knee older scar. No rashes, abrasions, or lacerations.  Neuro: AO x 4. Moves all extremities symmetrically. Motor strength and sensation grossly intact.  Psych: Appropriate mood and affect Moderate aortic stenosis

## 2024-04-25 NOTE — ED ADULT NURSE NOTE - NS_SISCREENINGSR_GEN_ALL_ED
Positive Consent (Near Eyelid Margin)/Introductory Paragraph: The rationale for Mohs was explained to the patient and consent was obtained. The risks, benefits and alternatives to therapy were discussed in detail. Specifically, the risks of ectropion or eyelid deformity, infection, scarring, bleeding, prolonged wound healing, incomplete removal, allergy to anesthesia, nerve injury and recurrence were addressed. Prior to the procedure, the treatment site was clearly identified and confirmed by the patient. All components of Universal Protocol/PAUSE Rule completed.

## 2024-04-25 NOTE — ED ADULT TRIAGE NOTE - CHIEF COMPLAINT QUOTE
pt BIBA from [place] getting rehab for knee sgy, presents SI, has had a plan for past 2 wks to get heroin and overdose by shooting up until someone interfered.  States he has made a previous attempt last year, was at [facility].  Hx of [facility], diverticulitis.  Pt states battling depression, lost his place of residency.

## 2024-04-25 NOTE — ED PROVIDER NOTE - PHYSICAL EXAMINATION
General: Awake, alert, lying in bed in NAD  HEENT: Normocephalic, atraumatic. No scleral icterus or conjunctival injection. EOMI. Moist mucous membranes. Oropharynx clear.   Neck:. Soft and supple.  Cardiac: RRR, Peripheral pulses 2+ and symmetric. No LE edema.  Resp: Lungs CTAB. No accessory muscle use  Abd: Soft, non-tender, non-distended. No guarding, rebound, or rigidity.  Back: Spine midline and non-tender.   Skin: R knee scar, L knee older scar. No rashes, abrasions, or lacerations.  Neuro: AO x 4. Moves all extremities symmetrically. Motor strength and sensation grossly intact.  Psych: Appropriate mood and affect

## 2024-04-25 NOTE — ED CDU PROVIDER INITIAL DAY NOTE - OBJECTIVE STATEMENT
59yo M hx HTH, diverticulitis, multiple knee surgeries BIBA from [place] where he was getting rehab for recent knee surgery presents w/ SI. Pt has had a plan for past 2 wks to get heroin and overdose until someone interfered. Pt states he has made a previous attempt last year, was at [facility]. Pt states he is battling depression, lost his place of residency. No fever/chills, No headache, No photophobia/eye pain/changes in vision, No ear pain/sore throat/dysphagia, No chest pain/palpitations, no SOB/cough/wheeze/stridor, No abdominal pain, No N/V/D, no dysuria/frequency/discharge, No neck/back pain, no rash, no changes in neurological status/function. No travel, No sick contacts. Not taking blood thinners.

## 2024-04-25 NOTE — ED BEHAVIORAL HEALTH ASSESSMENT NOTE - SUMMARY
"       HPI:       Marina Reyes is a 9 month old  female brought in today accompanied by Mother for eczema follow up. Seen 12/20 with plan to stop all steroids (oral and topical) and see how her skin looked after just moisturization for 2 weeks as eczema patches were felt to be healing at last visit. Mom reports worsening with cracked, bleeding skin without 2 days of stopping steroids. A friend gave her a tube of a \"special cream\" to help her symptoms which mom has been applying twice daily since -- mom has with today and is fluocinonide cream. Is still doing every other day baths without soap and lukewarm water, wearing long shirts/pants to bed to help scratching. Did not change laundry detergents as we had discussed but was able to  humidifier. Mom continues to breastfeed exclusively as Marina refuses bottles.          PMHX:     Patient Active Problem List   Diagnosis     Healthy infant     Infantile eczema       Current Outpatient Prescriptions   Medication Sig Dispense Refill     Emollient (AQUAPHOR ADVANCED THERAPY) OINT Externally apply topically 2 times daily Apply liberally from head to toe at least twice daily. 396 g 3     ibuprofen (CHILD IBUPROFEN) 100 MG/5ML suspension Take 4 mLs (80 mg) by mouth every 4 hours as needed for fever or moderate pain       acetaminophen (TYLENOL) 160 MG/5ML solution Take 2.5 mLs (80 mg) by mouth every 4 hours as needed for fever or mild pain 473 mL 1       Family History   Problem Relation Age of Onset     Asthma Mother      Hypertension Other      DIABETES Other      Coronary Artery Disease No family hx of      Breast Cancer No family hx of      Colon Cancer No family hx of      Prostate Cancer No family hx of      Other Cancer No family hx of        Social History     Social History Narrative    Lives with parents Lorenzo Whitten and Carmela Reyes.               No Known Allergies    No results found for this or any previous visit (from the past 24 hour(s)).         Physical " "Exam:     Filed Vitals:    01/03/17 1441   Temp: 95.5  F (35.3  C)   TempSrc: Tympanic   Height: 2' 4\" (71.1 cm)   Weight: 17 lb 15.5 oz (8.151 kg)   HC: 43.2 cm (17.01\")    No blood pressure reading on file for this encounter.  Body mass index is 16.12 kg/(m^2).  Normalized BMI data available only for age 2 to 20 years.    GENERAL: Active, alert,  no  distress.  SKIN: Flexeril eczematous patches on extremities stable in appearance from last visit. Bilateral cheek erythema much improved, small area of flaking skin on right side of top lip with some minor erythema. Right nipple crusting is stable to slightly improved. Marina pulling at nipple during exam and scratching skin intermittently.  No signs of secondary infection.    Assessment and Plan   1. Infantile eczema  Again discussed that I think skin is in healing stages and NOT active eczema, thus we again will STOP all steroids and give skin some time to heal. Encouraged continued other good skin care including moisture at least twice daily head to toe (will switch to Aquaphor), baths every other day without soap, and humidifer in bedroom. Encouraged long sleeves and pants to help prevent scratching. If noting scratching, apply more moisturization. Continue close monitoring, especially of atypical nipple lesions. Will see in 1 week to assess for \"flaring\" that mom reports once steroids are stopped.     Follow up in 1 week.     Options for treatment and follow-up care were reviewed with the patient and/or guardian. Marina Reyes and/or guardian engaged in the decision making process and verbalized understanding of the options discussed and agreed with the final plan.    Tiffani Mahmood MD  [facility] Resident  Pager# [phone]    Precepted with: Radhika Morrison,             " 59 y/o male domiciled at [facility], unemployed, single, PPHx depression, anxiety, PTSD, multiple past suicide attempts, multiple inpatient psych admissions BIBEMS for suicidal ideation. Pt states that he has had multiple knee surgeries leading to long-term placement at [facility], has been dealing with depression for "all of adulthood" but has been feeling suicidal x 2 weeks. Pt states that about 2 weeks ago, he wrote a suicide note with instructions for his debit cards/money to be sent to his sister/ ex Wanda. States that his plan was to go out and buy "5-6 bags of heroin" when he found time to leave [facility] alone. Pt states that his friend found the note and ripped it up, and that he never found time to acquire heroin but "absolutely" would have killed himself eventually. States that he has suicidal thoughts each night, and prays he will go to sleep and never wake up each night. Pt endorses relapse with alcohol and drugs starting 2 days ago, after being sober since May 2023.  Denies current SI, but states that he does not feel safe leaving because he may hurt himself once he starts feeling hopeless again.

## 2024-04-25 NOTE — ED BEHAVIORAL HEALTH ASSESSMENT NOTE - RISK ASSESSMENT
RF: male, hx depression, anxiety, PTSD, multiple inpatient psych admissions, multiple prior SA, SA within last 2 weeks, unemployed, lack of support system, substance abuse, alcohol dependency   PF: none RF: male, hx depression, anxiety, PTSD, multiple inpatient psych admissions, multiple prior SA, SA within last 2 weeks, unemployed, lack of support system, substance abuse, alcohol dependency, legal  PF: help seeking, voluntary admission

## 2024-04-25 NOTE — ED BEHAVIORAL HEALTH ASSESSMENT NOTE - CURRENT MEDICATION
tf ferrous sulfate 325 mg QD, guanfacine 1 mg BID, nicotine gum 2 mg QID, senna 8.6 mg QHS, buprenorphine-naloxone 8-2 mg BID, clonidine 0.1 mg BID, guaifenesin PRN, tylenol PRN, loratidine 10 mg QD/PRN, Jose-Lanta 30 mL PRN, trazodone 100 mg 2 tablets QHS, trazodone 50 mg QHS, melatonin 10 mg QHS, lactulose 30 mL QD/PRN, atorvastatin 40 mg QD, zofran 4 mg QID/PRN, cefadroxil 500 mg BID, lactobacillus BID

## 2024-04-25 NOTE — ED BEHAVIORAL HEALTH ASSESSMENT NOTE - PAST PSYCHOTROPIC MEDICATION
Buspar 10mg BID  Wellbutrin 300 XR QD  Lexapro 10mg QD (Initiated 2 weeks ago, meant to increase dose to 20mg this week but has not yet)

## 2024-04-25 NOTE — ED PROVIDER NOTE - OBJECTIVE STATEMENT
59yo M hx HTH, diverticulitis, multiple knee surgeries BIBA from [place] where he was getting rehab for recent knee surgery presents w/ SI. Pt has had a plan for past 2 wks to get heroin and overdose until someone interfered. Pt states he has made a previous attempt last year, was at [facility]. Pt states he is battling depression, lost his place of residency. No fever/chills, No headache, No photophobia/eye pain/changes in vision, No ear pain/sore throat/dysphagia, No chest pain/palpitations, no SOB/cough/wheeze/stridor, No abdominal pain, No N/V/D, no dysuria/frequency/discharge, No neck/back pain, no rash, no changes in neurological status/function. No travel, No sick contacts. Not taking blood thinners.

## 2024-04-25 NOTE — ED BEHAVIORAL HEALTH ASSESSMENT NOTE - NSACTIVEVENT_PSY_ALL_CORE
Triggering events leading to humiliation, shame, and/or despair (e.g., Loss of relationship, financial or health status) (real or anticipated)/Current or pending social isolation/Chronic pain or other acute medical condition/Substance intoxication or withdrawal/Inadequate social supports/Perceived burden on family or others

## 2024-04-26 ENCOUNTER — INPATIENT (INPATIENT)
Facility: HOSPITAL | Age: 59
LOS: 32 days | Discharge: HOME | End: 2024-05-29
Attending: STUDENT IN AN ORGANIZED HEALTH CARE EDUCATION/TRAINING PROGRAM | Admitting: STUDENT IN AN ORGANIZED HEALTH CARE EDUCATION/TRAINING PROGRAM
Payer: MEDICAID

## 2024-04-26 VITALS
OXYGEN SATURATION: 94 % | SYSTOLIC BLOOD PRESSURE: 137 MMHG | TEMPERATURE: 97 F | RESPIRATION RATE: 19 BRPM | DIASTOLIC BLOOD PRESSURE: 75 MMHG | HEART RATE: 64 BPM

## 2024-04-26 VITALS — RESPIRATION RATE: 16 BRPM | TEMPERATURE: 98 F | OXYGEN SATURATION: 96 % | WEIGHT: 225.97 LBS

## 2024-04-26 DIAGNOSIS — Z96.651 PRESENCE OF RIGHT ARTIFICIAL KNEE JOINT: Chronic | ICD-10-CM

## 2024-04-26 DIAGNOSIS — S83.512A SPRAIN OF ANTERIOR CRUCIATE LIGAMENT OF LEFT KNEE, INITIAL ENCOUNTER: Chronic | ICD-10-CM

## 2024-04-26 DIAGNOSIS — F33.3 MAJOR DEPRESSIVE DISORDER, RECURRENT, SEVERE WITH PSYCHOTIC SYMPTOMS: ICD-10-CM

## 2024-04-26 DIAGNOSIS — F43.10 POST-TRAUMATIC STRESS DISORDER, UNSPECIFIED: ICD-10-CM

## 2024-04-26 LAB
ALBUMIN SERPL ELPH-MCNC: 3.7 G/DL — SIGNIFICANT CHANGE UP (ref 3.3–5.2)
ALP SERPL-CCNC: 113 U/L — SIGNIFICANT CHANGE UP (ref 40–120)
ALT FLD-CCNC: 61 U/L — HIGH
ANION GAP SERPL CALC-SCNC: 13 MMOL/L — SIGNIFICANT CHANGE UP (ref 5–17)
APTT BLD: 30.2 SEC — SIGNIFICANT CHANGE UP (ref 24.5–35.6)
AST SERPL-CCNC: 44 U/L — HIGH
BILIRUB SERPL-MCNC: 0.2 MG/DL — LOW (ref 0.4–2)
BUN SERPL-MCNC: 15.1 MG/DL — SIGNIFICANT CHANGE UP (ref 8–20)
CALCIUM SERPL-MCNC: 8.7 MG/DL — SIGNIFICANT CHANGE UP (ref 8.4–10.5)
CHLORIDE SERPL-SCNC: 102 MMOL/L — SIGNIFICANT CHANGE UP (ref 96–108)
CO2 SERPL-SCNC: 25 MMOL/L — SIGNIFICANT CHANGE UP (ref 22–29)
CREAT SERPL-MCNC: 0.85 MG/DL — SIGNIFICANT CHANGE UP (ref 0.5–1.3)
EGFR: 100 ML/MIN/1.73M2 — SIGNIFICANT CHANGE UP
GLUCOSE SERPL-MCNC: 125 MG/DL — HIGH (ref 70–99)
INR BLD: 0.95 RATIO — SIGNIFICANT CHANGE UP (ref 0.85–1.18)
POTASSIUM SERPL-MCNC: 4.4 MMOL/L — SIGNIFICANT CHANGE UP (ref 3.5–5.3)
POTASSIUM SERPL-SCNC: 4.4 MMOL/L — SIGNIFICANT CHANGE UP (ref 3.5–5.3)
PROT SERPL-MCNC: 6.1 G/DL — LOW (ref 6.6–8.7)
PROTHROM AB SERPL-ACNC: 10.6 SEC — SIGNIFICANT CHANGE UP (ref 9.5–13)
SODIUM SERPL-SCNC: 140 MMOL/L — SIGNIFICANT CHANGE UP (ref 135–145)

## 2024-04-26 PROCEDURE — 99215 OFFICE O/P EST HI 40 MIN: CPT | Mod: 95

## 2024-04-26 PROCEDURE — 99232 SBSQ HOSP IP/OBS MODERATE 35: CPT

## 2024-04-26 PROCEDURE — 85610 PROTHROMBIN TIME: CPT

## 2024-04-26 PROCEDURE — 86140 C-REACTIVE PROTEIN: CPT

## 2024-04-26 PROCEDURE — 99284 EMERGENCY DEPT VISIT MOD MDM: CPT | Mod: 25

## 2024-04-26 PROCEDURE — 36415 COLL VENOUS BLD VENIPUNCTURE: CPT

## 2024-04-26 PROCEDURE — 80048 BASIC METABOLIC PNL TOTAL CA: CPT

## 2024-04-26 PROCEDURE — 85025 COMPLETE CBC W/AUTO DIFF WBC: CPT

## 2024-04-26 PROCEDURE — 85730 THROMBOPLASTIN TIME PARTIAL: CPT

## 2024-04-26 PROCEDURE — 99233 SBSQ HOSP IP/OBS HIGH 50: CPT

## 2024-04-26 PROCEDURE — 93005 ELECTROCARDIOGRAM TRACING: CPT

## 2024-04-26 PROCEDURE — 99223 1ST HOSP IP/OBS HIGH 75: CPT

## 2024-04-26 PROCEDURE — 87637 SARSCOV2&INF A&B&RSV AMP PRB: CPT

## 2024-04-26 PROCEDURE — G0378: CPT

## 2024-04-26 PROCEDURE — 80053 COMPREHEN METABOLIC PANEL: CPT

## 2024-04-26 PROCEDURE — 80307 DRUG TEST PRSMV CHEM ANLYZR: CPT

## 2024-04-26 PROCEDURE — 81003 URINALYSIS AUTO W/O SCOPE: CPT

## 2024-04-26 RX ORDER — BUPRENORPHINE AND NALOXONE 2; .5 MG/1; MG/1
1 TABLET SUBLINGUAL
Refills: 0 | Status: DISCONTINUED | OUTPATIENT
Start: 2024-04-26 | End: 2024-05-02

## 2024-04-26 RX ORDER — FERROUS SULFATE 325(65) MG
325 TABLET ORAL DAILY
Refills: 0 | Status: DISCONTINUED | OUTPATIENT
Start: 2024-04-26 | End: 2024-05-29

## 2024-04-26 RX ORDER — ACETAMINOPHEN 500 MG
650 TABLET ORAL EVERY 6 HOURS
Refills: 0 | Status: DISCONTINUED | OUTPATIENT
Start: 2024-04-26 | End: 2024-05-29

## 2024-04-26 RX ORDER — LANOLIN ALCOHOL/MO/W.PET/CERES
5 CREAM (GRAM) TOPICAL AT BEDTIME
Refills: 0 | Status: DISCONTINUED | OUTPATIENT
Start: 2024-04-26 | End: 2024-05-29

## 2024-04-26 RX ORDER — ESCITALOPRAM OXALATE 10 MG/1
20 TABLET, FILM COATED ORAL DAILY
Refills: 0 | Status: DISCONTINUED | OUTPATIENT
Start: 2024-04-26 | End: 2024-05-29

## 2024-04-26 RX ORDER — BUPROPION HYDROCHLORIDE 150 MG/1
300 TABLET, EXTENDED RELEASE ORAL DAILY
Refills: 0 | Status: DISCONTINUED | OUTPATIENT
Start: 2024-04-26 | End: 2024-05-03

## 2024-04-26 RX ORDER — BUPRENORPHINE AND NALOXONE 2; .5 MG/1; MG/1
1 TABLET SUBLINGUAL ONCE
Refills: 0 | Status: DISCONTINUED | OUTPATIENT
Start: 2024-04-26 | End: 2024-04-26

## 2024-04-26 RX ORDER — GUANFACINE 3 MG/1
1 TABLET, EXTENDED RELEASE ORAL
Refills: 0 | Status: DISCONTINUED | OUTPATIENT
Start: 2024-04-26 | End: 2024-05-29

## 2024-04-26 RX ORDER — ATORVASTATIN CALCIUM 80 MG/1
40 TABLET, FILM COATED ORAL AT BEDTIME
Refills: 0 | Status: DISCONTINUED | OUTPATIENT
Start: 2024-04-26 | End: 2024-05-29

## 2024-04-26 RX ORDER — LIDOCAINE 4 G/100G
1 CREAM TOPICAL EVERY 24 HOURS
Refills: 0 | Status: DISCONTINUED | OUTPATIENT
Start: 2024-04-26 | End: 2024-05-29

## 2024-04-26 RX ORDER — DIPHENHYDRAMINE HCL 50 MG
50 CAPSULE ORAL ONCE
Refills: 0 | Status: COMPLETED | OUTPATIENT
Start: 2024-04-26 | End: 2024-04-26

## 2024-04-26 RX ORDER — LACTULOSE 10 G/15ML
10 SOLUTION ORAL
Refills: 0 | Status: DISCONTINUED | OUTPATIENT
Start: 2024-04-26 | End: 2024-05-29

## 2024-04-26 RX ORDER — SENNA PLUS 8.6 MG/1
2 TABLET ORAL AT BEDTIME
Refills: 0 | Status: DISCONTINUED | OUTPATIENT
Start: 2024-04-26 | End: 2024-05-29

## 2024-04-26 RX ORDER — NICOTINE POLACRILEX 2 MG
2 GUM BUCCAL
Refills: 0 | Status: DISCONTINUED | OUTPATIENT
Start: 2024-04-26 | End: 2024-05-29

## 2024-04-26 RX ORDER — LACTOBACILLUS ACIDOPHILUS 100MM CELL
1 CAPSULE ORAL
Refills: 0 | Status: DISCONTINUED | OUTPATIENT
Start: 2024-04-26 | End: 2024-05-29

## 2024-04-26 RX ORDER — TRAZODONE HCL 50 MG
200 TABLET ORAL AT BEDTIME
Refills: 0 | Status: DISCONTINUED | OUTPATIENT
Start: 2024-04-26 | End: 2024-05-07

## 2024-04-26 RX ADMIN — Medication 2 MILLIGRAM(S): at 19:45

## 2024-04-26 RX ADMIN — SENNA PLUS 2 TABLET(S): 8.6 TABLET ORAL at 20:40

## 2024-04-26 RX ADMIN — BUPRENORPHINE AND NALOXONE 1 TABLET(S): 2; .5 TABLET SUBLINGUAL at 20:41

## 2024-04-26 RX ADMIN — Medication 2 MILLIGRAM(S): at 22:34

## 2024-04-26 RX ADMIN — Medication 50 MILLIGRAM(S): at 21:29

## 2024-04-26 RX ADMIN — Medication 0.1 MILLIGRAM(S): at 20:40

## 2024-04-26 RX ADMIN — ATORVASTATIN CALCIUM 40 MILLIGRAM(S): 80 TABLET, FILM COATED ORAL at 20:40

## 2024-04-26 RX ADMIN — Medication 10 MILLIGRAM(S): at 20:41

## 2024-04-26 RX ADMIN — Medication 200 MILLIGRAM(S): at 20:40

## 2024-04-26 RX ADMIN — LIDOCAINE 1 PATCH: 4 CREAM TOPICAL at 21:30

## 2024-04-26 RX ADMIN — Medication 5 MILLIGRAM(S): at 22:35

## 2024-04-26 RX ADMIN — GUANFACINE 1 MILLIGRAM(S): 3 TABLET, EXTENDED RELEASE ORAL at 20:41

## 2024-04-26 RX ADMIN — BUPRENORPHINE AND NALOXONE 1 TABLET(S): 2; .5 TABLET SUBLINGUAL at 15:58

## 2024-04-26 RX ADMIN — Medication 650 MILLIGRAM(S): at 15:58

## 2024-04-26 RX ADMIN — Medication 1 TABLET(S): at 05:26

## 2024-04-26 RX ADMIN — GUANFACINE 1 MILLIGRAM(S): 3 TABLET, EXTENDED RELEASE ORAL at 05:25

## 2024-04-26 NOTE — BH INPATIENT PSYCHIATRY ASSESSMENT NOTE - NSBHMETABOLIC_PSY_ALL_CORE_FT
BMI:   HbA1c:   Glucose:   BP: --Vital Signs Last 24 Hrs  T(C): 36.4 (04-26-24 @ 08:05), Max: 36.4 (04-26-24 @ 08:05)  T(F): 97.6 (04-26-24 @ 08:05), Max: 97.6 (04-26-24 @ 08:05)  HR: --  BP: --  BP(mean): --  RR: 16 (04-26-24 @ 08:05) (16 - 16)  SpO2: 96% (04-26-24 @ 08:05) (96% - 96%)    Orthostatic VS  04-26-24 @ 08:05  Lying BP: --/-- HR: --  Sitting BP: 124/66 HR: 57  Standing BP: 100/54 HR: 68  Site: --  Mode: --    Lipid Panel:  Ori Eden BMI:   HbA1c: A1C with Estimated Average Glucose Result: 5.9 % (04-27-24 @ 10:21)    Glucose:   BP: --Vital Signs Last 24 Hrs  T(C): 36.7 (04-29-24 @ 08:30), Max: 36.7 (04-28-24 @ 19:48)  T(F): 98 (04-29-24 @ 08:30), Max: 98 (04-28-24 @ 19:48)  HR: --  BP: --  BP(mean): --  RR: 17 (04-29-24 @ 08:30) (17 - 17)  SpO2: --    Orthostatic VS  04-29-24 @ 08:30  Lying BP: --/-- HR: --  Sitting BP: 136/70 HR: 68  Standing BP: 129/68 HR: 71  Site: --  Mode: --  Orthostatic VS  04-28-24 @ 19:48  Lying BP: --/-- HR: --  Sitting BP: 122/62 HR: 72  Standing BP: 121/66 HR: 71  Site: --  Mode: electronic  Orthostatic VS  04-28-24 @ 07:45  Lying BP: --/-- HR: --  Sitting BP: 114/66 HR: 69  Standing BP: 118/63 HR: 81  Site: --  Mode: electronic  Orthostatic VS  04-27-24 @ 19:19  Lying BP: --/-- HR: --  Sitting BP: 171/82 HR: 73  Standing BP: 153/81 HR: 74  Site: --  Mode: --    Lipid Panel: Date/Time: 04-27-24 @ 10:21  Cholesterol, Serum: 139  LDL Cholesterol Calculated: 39  HDL Cholesterol, Serum: 38  Total Cholesterol/HDL Ration Measurement: --  Triglycerides, Serum: 308

## 2024-04-26 NOTE — BH INPATIENT PSYCHIATRY ASSESSMENT NOTE - CURRENT MEDICATION
MEDICATIONS  (STANDING):  atorvastatin 40 milliGRAM(s) Oral at bedtime  buprenorphine 8 mG/naloxone 2 mG SL  Tablet 1 Tablet(s) SubLingual two times a day  buPROPion XL (24-Hour) 300 milliGRAM(s) Oral daily  busPIRone 10 milliGRAM(s) Oral two times a day  cloNIDine 0.1 milliGRAM(s) Oral two times a day  escitalopram 20 milliGRAM(s) Oral daily  ferrous    sulfate 325 milliGRAM(s) Oral daily  guanFACINE. 1 milliGRAM(s) Oral <User Schedule>  lactobacillus acidophilus 1 Tablet(s) Oral two times a day with meals  senna 2 Tablet(s) Oral at bedtime  traZODone 200 milliGRAM(s) Oral at bedtime    MEDICATIONS  (PRN):  acetaminophen     Tablet .. 650 milliGRAM(s) Oral every 6 hours PRN Mild Pain (1 - 3), Moderate Pain (4 - 6)  lactulose Syrup 10 Gram(s) Oral two times a day PRN constipation  melatonin. 5 milliGRAM(s) Oral at bedtime PRN Insomnia  nicotine  Polacrilex Gum 2 milliGRAM(s) Oral every 2 hours PRN Smoking Cessation   MEDICATIONS  (STANDING):  atorvastatin 40 milliGRAM(s) Oral at bedtime  buprenorphine 8 mG/naloxone 2 mG SL  Tablet 1 Tablet(s) SubLingual two times a day  buPROPion XL (24-Hour) 300 milliGRAM(s) Oral daily  busPIRone 10 milliGRAM(s) Oral two times a day  cloNIDine 0.1 milliGRAM(s) Oral two times a day  escitalopram 20 milliGRAM(s) Oral daily  ferrous    sulfate 325 milliGRAM(s) Oral daily  guanFACINE. 1 milliGRAM(s) Oral <User Schedule>  lactobacillus acidophilus 1 Tablet(s) Oral two times a day with meals  senna 2 Tablet(s) Oral at bedtime  traZODone 200 milliGRAM(s) Oral at bedtime    MEDICATIONS  (PRN):  acetaminophen     Tablet .. 650 milliGRAM(s) Oral every 6 hours PRN Mild Pain (1 - 3), Moderate Pain (4 - 6)  aluminum hydroxide/magnesium hydroxide/simethicone Suspension 30 milliLiter(s) Oral every 6 hours PRN Dyspepsia  lactulose Syrup 10 Gram(s) Oral two times a day PRN constipation  lidocaine   4% Patch 1 Patch Transdermal every 24 hours PRN chronic L knee pain  LORazepam     Tablet 2 milliGRAM(s) Oral every 2 hours PRN CIWA score increase by 2 points and current CIWA score GREATER THAN 9  LORazepam   Injectable 2 milliGRAM(s) IntraMuscular every 2 hours PRN CIWA score increase by 2 points and current CIWA score GREATER THAN 9  melatonin. 5 milliGRAM(s) Oral at bedtime PRN Insomnia  nicotine  Polacrilex Gum 2 milliGRAM(s) Oral every 2 hours PRN Smoking Cessation  nicotine -   7 mG/24Hr(s) Patch 1 Patch Transdermal daily PRN nicotine dependence

## 2024-04-26 NOTE — ED BEHAVIORAL HEALTH PROGRESS NOTE - STANDING MEDS RECEIVED IN ED DETAILS FREE TEXT
Suboxone, Wellbutrin, Buspar, Cefadroxil, Clonidine, lexapro, ferrous sulfate, guanfacine, lactobacillus, loratadine, senna, trazodone

## 2024-04-26 NOTE — ED BEHAVIORAL HEALTH PROGRESS NOTE - NSBHATTESTBILLBASEDTIME_PSY_A_CORE
Subjective   Earlene is a 51 year old female who presents with lumbar spine pain   PCP: Poonam Alejo MD      Chief Complaint:   Chief Complaint   Patient presents with   • Spine - Pain   • Office Visit        HPI: The patient presents today with lumbar spine pain. The patient states that she has pain when she stands for longer than 4 or 5 minutes; she has been experiencing this most recent episode for the last 2 weeks without mechanism of injury. The pain is located across the lumbar spine and does not radiate. She has general aching and dull pain, but she gets intermittent sharp pain. She uses naproxen and icy hot for pain relief; she finds that these provide temporary pain relief. The patient has been to PT in the past for her lumbar pain which does give some relief when she attends. No injections. She has lumbar xrays from November 2019.      Date of Injury: chronic, most recent 2 weeks ago  Work Related: no  Current Position: retired    Patient's medications, allergies, past medical, surgical, social and family histories were reviewed and updated as appropriate.    Review of Systems   Constitutional: Negative.    HENT: Negative.    Eyes: Negative.    Respiratory: Negative.    Cardiovascular: Negative.    Gastrointestinal: Negative.    Endocrine: Negative.    Genitourinary: Negative.    Musculoskeletal: Positive for arthralgias.   Skin: Negative.    Allergic/Immunologic: Negative.    Neurological: Negative.    Hematological: Negative.    Psychiatric/Behavioral: Negative.    All other systems reviewed and are negative.    Objective     Physical Examination:     Constitutional:  Well-developed, well-nourished female in no acute distress.  Psychiatric:  Normal affect  Skin: Warm, dry, intact without rash or lesion.  Neck: Normal appearing neck  Pulmonary: No labored respirations  CV: normal rate  Musculoskeletal:    Lumbar spine: No visible swelling or deformity, no evidence of scoliosis. No ecchymosis. Skin intact.   There is tenderness along the RIGHT side of the spine.  AROM: FF=30° with pain, EXT=20°with pain,  lateral bend is with pain.  Straight Leg Raise is negative on both the affected and unaffected sides.  YESI Testing is negative in both the affected and unaffected sides.  Strength: Hip Flexion 5/5, Quad 5/5, Hamstring 5/5, Ankle DF 5/5, Ankle PF 5/5, EHL 5/5.  Sensation intact distally in the SP/DP/saph/sural/tib distributions. Patellar Tendon and Achilles Tendon reflexes are equal bilateral.  DP pulses 2+ bilaterally.    Imaging Reading/Results:  4 view radiographs of the lumbar spine from [facility] dated 11/13/2019 and reviewed by myself: There is no degenerative joint disease, no fracture or dislocation, no scoliosis, no spondylolisthesis    Assessment   Problem List Items Addressed This Visit        Musculoskeletal    Lumbar strain, initial encounter - Primary    Relevant Orders    SERVICE TO PHYSICAL THERAPY          Plan:  1.  Recommend formal physical therapy to work on lumbar stretching and strengthening, flexibility, and core strengthening with modalities as needed.  I also strongly recommend weight loss.    2.  Use tylenol as needed.  Patient is currently using Coumadin and therefore cannot take NSAIDs.  3.  Use heat as needed  4.  Use pain to guide gradual return back to all activities  5.  The patient will return on an as-needed basis.  If she goes to physical therapy and the pain seems to be more centralized and not related to the muscle we may consider an MRI, but at this time physical therapy and weight loss are the best treatment options for her.    All questions were answered.  The patient understands the plan and wishes to proceed as such.      Javi Pérez MD  Orthopaedic Surgeon, Adult & Pediatric Sports Medicine  [facility]  P: [phone]  F: [phone]     Time based billing

## 2024-04-26 NOTE — BH SOCIAL WORK INITIAL PSYCHOSOCIAL EVALUATION - OTHER PAST PSYCHIATRIC HISTORY (INCLUDE DETAILS REGARDING ONSET, COURSE OF ILLNESS, INPATIENT/OUTPATIENT TREATMENT)
LMSW met with pt to introduce self and obtain collateral. Pt reports that he has an orthopedic appointment scheduled with a surgeon on Monday to discuss possible knee surgery. Pt reports that he has the contact in his phone and that he needs to get numbers out of his phone. RN is aware and will advise nurse manager of this. Pt reports that prior to admission he has been living at [facility] which he says is a physical rehab. He reports that he is homeless as he has not been able to work so has no income. He reports that his family were looking to get him an apartment but given possibility of up-coming knee surgery they have not been searching. Pt would like to return to [facility] is able to after discharge with plan to get total knee replacement. Pt minimized reasoning for admission and was guarded when discussing events leading to admission. He denies SAs and reported cocaine use but did not specify the amount. Pt also reports vaping and was requesting Nicorette gum. Pt did not provide consent to speak with anyone at this time.  LMSW met with pt to introduce self and obtain collateral. Pt reports that he has an orthopedic appointment scheduled with a surgeon on Monday to discuss possible knee surgery. Pt reports that he has the contact in his phone and that he needs to get numbers out of his phone. RN is aware and will advise nurse manager of this. Pt reports that prior to admission he has been living at [facility] which he says is a physical rehab. He reports that he is homeless as he has not been able to work so has no income. He reports that his family were looking to get him an apartment but given possibility of up-coming knee surgery they have not been searching. Pt would like to return to [facility] is able to after discharge with plan to get total knee replacement. Pt minimized reasoning for admission and was guarded when discussing events leading to admission. He denies SAs and reported cocaine use but did not specify the amount. Pt also reports vaping and was requesting Nicorette gum. Pt did not provide consent to speak with anyone at this time.     LMSW called and left voicemail for [facility] inquiring about pt's ability to return there after discharge.

## 2024-04-26 NOTE — BH PATIENT PROFILE - HOME MEDICATIONS
No Rx/Hx Recorded atorvastatin 40 mg oral tablet , 1 tab(s) orally once a day (at bedtime)  fluticasone 50 mcg/inh nasal spray , 1 spray(s) nasal every 12 hours as needed for congestion  lactobacillus acidophilus oral capsule , 1 tab(s) orally 2 times a day (with meals)  senna leaf extract oral tablet , 2 tab(s) orally once a day (at bedtime)  lactulose 10 g/15 mL oral syrup , 15 milliliter(s) orally 2 times a day As needed constipation  ferrous sulfate 325 mg (65 mg elemental iron) oral tablet , 1 tab(s) orally once a day  busPIRone 10 mg oral tablet , 1 tab(s) orally 2 times a day  QUEtiapine 50 mg oral tablet , 1 tab(s) orally once a day (at bedtime)  escitalopram 20 mg oral tablet , 1 tab(s) orally once a day  guanFACINE 1 mg oral tablet , 1 tab(s) orally 2 times a day  cloNIDine 0.1 mg oral tablet , 1 tab(s) orally 2 times a day  buprenorphine-naloxone 8 mg-2 mg sublingual tablet , 1 tab(s) sublingual 2 times a day  acetaminophen 325 mg oral tablet , 2 tab(s) orally every 6 hours As needed Mild Pain (1 - 3), Moderate Pain (4 - 6)

## 2024-04-26 NOTE — BH INPATIENT PSYCHIATRY ASSESSMENT NOTE - NSBHASSESSSUMMFT_PSY_ALL_CORE
Patient is a 59 y/o male domiciled at [facility], unemployed, single, PPHx depression, anxiety, PTSD, multiple past suicide attempts, multiple inpatient psych admissions BIBEMS for suicidal ideation. Patient BIB EMS after endorsing SI to staff at nursing home.        Working diagnosis:  MDD, severe w/o psychotic features  PTSD      Plan:  >Legal: 9.13  >Obs: Routine observation, denies active SIIP and is able to engage in safety planning.      >Psychiatric Meds:  Continue Wellbutrin XL 300mg, AM for depression  Continue Lexapro 20mg, AM for depression  Continue Buspirone 10mg, BID for anxiety  Continue Guanfacine 1mg, BID for impulsivity  Continue Trazodone 200mg, HS for mood/insomnia      >PRN Meds:  Melatonin 5mg PO, QHS for insomnia  Atarax 50mg PO, QHS for anxiety  Lorazepam 2mg IM/PO, Q6H for severe anxiety      >Labs: Admission labs reviewed, no acute findings. Hold antipsychotics if QTc >500  >Medical: No acute concerns. Patient placed on CIWA, no visible physical symptoms of withdrawal. During the course of treatment, will collaborate with medical team to manage medical issues.  >Diet: Regular  >Social: Milieu/structured therapy  >Treatment Interventions: Groups and Individual Therapy/CBT.  >Dispo: pending symptom improvement, SW to pursue discharge planning.

## 2024-04-26 NOTE — CHART NOTE - NSCHARTNOTEFT_GEN_A_CORE
Screening Medical Evaluation    Patient Admitted from: [facility] ED    [facility] admitting diagnosis: Severe episode of recurrent major depressive disorder, with psychotic features      PAST MEDICAL & SURGICAL HISTORY:  Chronic knee pain  HLD  HTN  MARIBEL    ALLERGIES:  shellfish (Unknown)  No Known Drug Allergies    SOCIAL HISTORY:  On interview, patient states that he only vapes and drinks alcohol. However, upon chart review Utox + for cocaine/crack, amphetamines, benzos    FAMILY HISTORY:  No known pertinent family history in 1st degree relatives      MEDICATIONS  (STANDING):  atorvastatin 40 milliGRAM(s) Oral at bedtime  buprenorphine 8 mG/naloxone 2 mG SL  Tablet 1 Tablet(s) SubLingual two times a day  buPROPion XL (24-Hour) 300 milliGRAM(s) Oral daily  busPIRone 10 milliGRAM(s) Oral two times a day  cloNIDine 0.1 milliGRAM(s) Oral two times a day  escitalopram 20 milliGRAM(s) Oral daily  ferrous    sulfate 325 milliGRAM(s) Oral daily  guanFACINE. 1 milliGRAM(s) Oral <User Schedule>  lactobacillus acidophilus 1 Tablet(s) Oral two times a day with meals  senna 2 Tablet(s) Oral at bedtime  traZODone 200 milliGRAM(s) Oral at bedtime    MEDICATIONS  (PRN):  acetaminophen     Tablet .. 650 milliGRAM(s) Oral every 6 hours PRN Mild Pain (1 - 3), Moderate Pain (4 - 6)  lactulose Syrup 10 Gram(s) Oral two times a day PRN constipation  lidocaine   4% Patch 1 Patch Transdermal every 24 hours PRN chronic L knee pain  LORazepam     Tablet 2 milliGRAM(s) Oral every 2 hours PRN CIWA score increase by 2 points and current CIWA score GREATER THAN 9  LORazepam   Injectable 2 milliGRAM(s) IntraMuscular every 2 hours PRN CIWA score increase by 2 points and current CIWA score GREATER THAN 9  melatonin. 5 milliGRAM(s) Oral at bedtime PRN Insomnia  nicotine  Polacrilex Gum 2 milliGRAM(s) Oral every 2 hours PRN Smoking Cessation      Vital Signs Last 24 Hrs  T(F): 98.3 (26 Apr 2024 19:17)  HR: 57 (26 Apr 2024 08:05)   BP: 124/66 (26 Apr 2024 08:05)   RR: 16 (26 Apr 2024 08:05)   SpO2: 96% (26 Apr 2024 08:05)       PHYSICAL EXAM:  GENERAL: NAD  HEAD:  Atraumatic, Normocephalic  EYES: EOMI, PERRLA, conjunctiva and sclera clear  NECK: Supple, No JVD  CHEST/LUNG: Clear to auscultation bilaterally; No wheeze  HEART: Regular rate and rhythm; No murmurs, rubs, or gallops  EXTREMITIES: 2+ Peripheral Pulses, No clubbing, cyanosis, or edema  NEUROLOGY: non-focal  SKIN: No rashes or lesions      Assessment and Plan:  58M admitted to [facility] for worsening depression and SI w/ PMHx of chronic knee pain pending L knee replacement (uses walker, PT consult ordered to assess gait), HLD, HTN, MARIBEL not on CPAP seen at bedside for medical screening evaluation. Patient has no acute medical complaints at this time. Patient denies fever, chills, headache, dizziness, lightheadedness, N/V, SOB, cough, congestion, chest pain, abdominal pain, dysuria, hematuria, diarrhea, constipation. Physical exam unremarkable, VSS. Admission labs pending. F/u EKG in AM.    1.) Severe episode of recurrent major depressive disorder, with psychotic features: Refer to primary team documentation for recs  2.) HLD: Continue atorvastatin 40mg QHS. F/u lipid panel  3.) HTN: Continue clonidine 0.1mg BID. Monitor BP Screening Medical Evaluation    Patient Admitted from: [facility] ED    [facility] admitting diagnosis: Severe episode of recurrent major depressive disorder, with psychotic features      PAST MEDICAL & SURGICAL HISTORY:  Chronic knee pain  HLD  HTN  MARIBEL  ACL (anterior cruciate ligament) tear, left, initial encounter  H/O total knee replacement, right 2022.    ALLERGIES:  shellfish (Unknown)  No Known Drug Allergies    SOCIAL HISTORY:  On interview, patient states that he only vapes and drinks alcohol. However, upon chart review Utox + for cocaine/crack, amphetamines, benzos    FAMILY HISTORY:  No known pertinent family history in 1st degree relatives      MEDICATIONS  (STANDING):  atorvastatin 40 milliGRAM(s) Oral at bedtime  buprenorphine 8 mG/naloxone 2 mG SL  Tablet 1 Tablet(s) SubLingual two times a day  buPROPion XL (24-Hour) 300 milliGRAM(s) Oral daily  busPIRone 10 milliGRAM(s) Oral two times a day  cloNIDine 0.1 milliGRAM(s) Oral two times a day  escitalopram 20 milliGRAM(s) Oral daily  ferrous    sulfate 325 milliGRAM(s) Oral daily  guanFACINE. 1 milliGRAM(s) Oral <User Schedule>  lactobacillus acidophilus 1 Tablet(s) Oral two times a day with meals  senna 2 Tablet(s) Oral at bedtime  traZODone 200 milliGRAM(s) Oral at bedtime    MEDICATIONS  (PRN):  acetaminophen     Tablet .. 650 milliGRAM(s) Oral every 6 hours PRN Mild Pain (1 - 3), Moderate Pain (4 - 6)  lactulose Syrup 10 Gram(s) Oral two times a day PRN constipation  lidocaine   4% Patch 1 Patch Transdermal every 24 hours PRN chronic L knee pain  LORazepam     Tablet 2 milliGRAM(s) Oral every 2 hours PRN CIWA score increase by 2 points and current CIWA score GREATER THAN 9  LORazepam   Injectable 2 milliGRAM(s) IntraMuscular every 2 hours PRN CIWA score increase by 2 points and current CIWA score GREATER THAN 9  melatonin. 5 milliGRAM(s) Oral at bedtime PRN Insomnia  nicotine  Polacrilex Gum 2 milliGRAM(s) Oral every 2 hours PRN Smoking Cessation      Vital Signs Last 24 Hrs  T(F): 98.3 (26 Apr 2024 19:17)  HR: 57 (26 Apr 2024 08:05)   BP: 124/66 (26 Apr 2024 08:05)   RR: 16 (26 Apr 2024 08:05)   SpO2: 96% (26 Apr 2024 08:05)       PHYSICAL EXAM:  GENERAL: NAD  HEAD:  Atraumatic, Normocephalic  EYES: EOMI, PERRLA, conjunctiva and sclera clear  NECK: Supple, No JVD  CHEST/LUNG: Clear to auscultation bilaterally; No wheeze  HEART: Regular rate and rhythm; No murmurs, rubs, or gallops  EXTREMITIES: 2+ Peripheral Pulses, No clubbing, cyanosis, or edema  NEUROLOGY: non-focal  SKIN: No rashes or lesions      Assessment and Plan:  58M admitted to [facility] for worsening depression and SI w/ PMHx of chronic knee pain pending L knee replacement (uses walker, PT consult ordered to assess gait), HLD, HTN, MARIBEL not on CPAP seen at bedside for medical screening evaluation. Patient has no acute medical complaints at this time. Patient denies fever, chills, headache, dizziness, lightheadedness, N/V, SOB, cough, congestion, chest pain, abdominal pain, dysuria, hematuria, diarrhea, constipation. Physical exam unremarkable, VSS. Admission labs pending. F/u EKG in AM.    1.) Severe episode of recurrent major depressive disorder, with psychotic features: Refer to primary team documentation for recs  2.) HLD: Continue atorvastatin 40mg QHS. F/u lipid panel  3.) HTN: Continue clonidine 0.1mg BID. Monitor BP

## 2024-04-26 NOTE — ED ADULT NURSE REASSESSMENT NOTE - NS ED NURSE REASSESS COMMENT FT1
Assumed care of patient at 07:30.  Patient resting in bed appears to bed sleeping with no distress and regular nonlabored breathing.  Safety of patient maintained.
pt accepted at [facility]. dr juana trujillo 4 report caled to [facility] ems arranged
telepsych requesting legals fax to same
Patient awoken for medications.  Patient compliant with medications.  Patient provided breakfast at bedside.  Patient incontinent of small amount of loose stool.  Provided supplies and he is showering independently.   Safety of patient maintained.
Patient ate 100% of his lunch.  Patient out of his room to the bathroom.  Patient ambulating with a slow steady, independent gait.  Patient agrees to plan for inpatient hospital when a bed is available.  Safety of patient maintained.
pt awake and alert self isolating to his room. pt consumed meals 100%. pt ambulating independently with a steady gait. No attempts to harm self or others.

## 2024-04-26 NOTE — ED CDU PROVIDER DISPOSITION NOTE - WET READ LAUNCH FT
Addended by: TRIP PASTOR on: 1/24/2022 07:02 AM     Modules accepted: Orders    
There are no Wet Read(s) to document.

## 2024-04-26 NOTE — ED BEHAVIORAL HEALTH PROGRESS NOTE - DETAILS:
Chart reviewed. Sign-out received from day team. Pt reassessed over tele-doc.    On interview, the pt presents as calm and cooperative, states he's been feeling depressed for the last few weeks, confirms he wrote a suicide note in which he left instructions on how to handle his accounts after he killed himself, and states he tried obtaining heroin within the last 2 weeks with the intent to kill himself, but was unable to obtain the drug. He states he then relapsed 2 days ago on alcohol/ccn, denies a hx of complicated withdrawal, denies current SI with plan or intent, HI, A/VH, or PI. Pt states he is still interested in psychiatric admission and was able to contract in the hospital for safety.

## 2024-04-26 NOTE — ED BEHAVIORAL HEALTH PROGRESS NOTE - NSBHADMITCOUNSEL_PSY_A_CORE
risks and benefits of treatment options/instructions for management, treatment and follow up/importance of adherence to chosen treatment

## 2024-04-26 NOTE — BH INPATIENT PSYCHIATRY ASSESSMENT NOTE - ATTENDING COMMENTS
Chart was reviewed and case discussed with the Psych NP. I agree with the assessment and plan as documented in the Psych NP's assessment note and was directly involved in medical decision making.   57 y/o male domiciled at [facility], unemployed, single, PPHx depression, anxiety, PTSD, multiple past suicide attempts, multiple inpatient psych admissions BIBEMS for SI, endorsing to staff at nursing homes plans to OD on heroin if left the home. Endorses worsening knee pain, worsening depressed mood, inncreased SI in context of psychosocial life stressors, denies active SI/SP on unit, future oriented to seek treatment, admitted under voluntary status.     Continue Wellbutrin XL 300mg, AM for depression  Continue Lexapro 20mg, AM for depression  Continue Buspirone 10mg, BID for anxiety  Continue Guanfacine 1mg, BID for impulsivity  Continue Trazodone 200mg, HS for mood/insomnia

## 2024-04-26 NOTE — ED BEHAVIORAL HEALTH PROGRESS NOTE - RISK ASSESSMENT
Pt remains at an elevated risk of harm secondary to numerous static risk factors(prior psych hx/admissions, pSA, male gender, age, medical comorbidities) and modifiable risk factors(depressive symptoms, stress tied to medical issues, hopelessness) and warrants inpatient psychiatric hospitalization for safety and stabilization.

## 2024-04-26 NOTE — BH INPATIENT PSYCHIATRY ASSESSMENT NOTE - NSBHCHARTREVIEWVS_PSY_A_CORE FT
Vital Signs Last 24 Hrs  T(C): 36.4 (04-26-24 @ 08:05), Max: 36.4 (04-26-24 @ 08:05)  T(F): 97.6 (04-26-24 @ 08:05), Max: 97.6 (04-26-24 @ 08:05)  HR: --  BP: --  BP(mean): --  RR: 16 (04-26-24 @ 08:05) (16 - 16)  SpO2: 96% (04-26-24 @ 08:05) (96% - 96%)    Orthostatic VS  04-26-24 @ 08:05  Lying BP: --/-- HR: --  Sitting BP: 124/66 HR: 57  Standing BP: 100/54 HR: 68  Site: --  Mode: --   Vital Signs Last 24 Hrs  T(C): 36.7 (04-29-24 @ 08:30), Max: 36.7 (04-28-24 @ 19:48)  T(F): 98 (04-29-24 @ 08:30), Max: 98 (04-28-24 @ 19:48)  HR: --  BP: --  BP(mean): --  RR: 17 (04-29-24 @ 08:30) (17 - 17)  SpO2: --    Orthostatic VS  04-29-24 @ 08:30  Lying BP: --/-- HR: --  Sitting BP: 136/70 HR: 68  Standing BP: 129/68 HR: 71  Site: --  Mode: --  Orthostatic VS  04-28-24 @ 19:48  Lying BP: --/-- HR: --  Sitting BP: 122/62 HR: 72  Standing BP: 121/66 HR: 71  Site: --  Mode: electronic  Orthostatic VS  04-28-24 @ 07:45  Lying BP: --/-- HR: --  Sitting BP: 114/66 HR: 69  Standing BP: 118/63 HR: 81  Site: --  Mode: electronic  Orthostatic VS  04-27-24 @ 19:19  Lying BP: --/-- HR: --  Sitting BP: 171/82 HR: 73  Standing BP: 153/81 HR: 74  Site: --  Mode: --

## 2024-04-26 NOTE — ED ADULT NURSE REASSESSMENT NOTE - GENERAL PATIENT STATE
comfortable appearance/cooperative
comfortable appearance/resting/sleeping
comfortable appearance/cooperative
comfortable appearance/cooperative
cooperative

## 2024-04-26 NOTE — BH INPATIENT PSYCHIATRY ASSESSMENT NOTE - RISK ASSESSMENT
Patient is at moderate acute suicide risk, had SI w/ plan to OD on heroin prior to admission. He reports 1 prior SA via OD. Denies access to lethal methods. He is at elevated chronic suicide risk due to hx of mood d/o, medical issues & pending knee surgery, hx of SA and trauma. He is at low risk for potential violence.

## 2024-04-26 NOTE — BH INPATIENT PSYCHIATRY ASSESSMENT NOTE - HPI (INCLUDE ILLNESS QUALITY, SEVERITY, DURATION, TIMING, CONTEXT, MODIFYING FACTORS, ASSOCIATED SIGNS AND SYMPTOMS)
59 y/o male domiciled at [facility], unemployed, single, PPHx depression, anxiety, PTSD, multiple past suicide attempts, multiple inpatient psych admissions BIBEMS for suicidal ideation. Pt states that he has had multiple knee surgeries leading to long-term placement at [facility], has been dealing with depression for "all of adulthood" but has been feeling suicidal x 2 weeks. Pt states that about 2 weeks ago, he wrote a suicide note with instructions for his debit cards/money to be sent to his sister/ ex Wanda. States that his plan was to go out and buy "5-6 bags of heroin" when he found time to leave [facility] alone. Pt states that his friend found the note and ripped it up, and that he never found time to acquire heroin but "absolutely" would have killed himself eventually. States that he has suicidal thoughts each night, and prays he will go to sleep and never wake up each night. Pt endorses relapse with alcohol and drugs starting 2 days ago, after being sober since May 2023. States that the reason he relapsed was because of his multiple knee surgeries, being out of work, feeling hopeless, has "very little" active psychiatric care in the facility, and states it was a "steady buildup". 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 When asked about cocaine and alcohol use, states "I just snorted a ton of coke", and that he was drinking about a pint of vodka per day since relapsing. Endorses getting the cocaine and alcohol from the streets, stating he leaves the facility quite often for AA/other reasons. Patient also states "I am not used to being alone" and wishes he had someone to rely on. Support system consists of friends from AA and ex-girlfriend Wanda, but does not have family in the area. Denies avh, paranoia, HI, self mutilation, current SI.   Patient is a 57 y/o male domiciled at [facility], unemployed, single, PPHx depression, anxiety, PTSD, multiple past suicide attempts, multiple inpatient psych admissions BIBEMS for suicidal ideation. Patient BIB EMS after endorsing SI to staff at nursing home.      As per behavioral health assessment note:  "Pt states that he has had multiple knee surgeries leading to long-term placement at [facility], has been dealing with depression for "all of adulthood" but has been feeling suicidal x 2 weeks. Pt states that about 2 weeks ago, he wrote a suicide note with instructions for his debit cards/money to be sent to his sister/ ex Wanda. States that his plan was to go out and buy "5-6 bags of heroin" when he found time to leave [facility] alone. Pt states that his friend found the note and ripped it up, and that he never found time to acquire heroin but "absolutely" would have killed himself eventually. States that he has suicidal thoughts each night, and prays he will go to sleep and never wake up each night. Pt endorses relapse with alcohol and drugs starting 2 days ago, after being sober since May 2023. States that the reason he relapsed was because of his multiple knee surgeries, being out of work, feeling hopeless, has "very little" active psychiatric care in the facility, and states it was a "steady buildup". 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 When asked about cocaine and alcohol use, states "I just snorted a ton of coke", and that he was drinking about a pint of vodka per day since relapsing. Endorses getting the cocaine and alcohol from the streets, stating he leaves the facility quite often for AA/other reasons. Patient also states "I am not used to being alone" and wishes he had someone to rely on. Support system consists of friends from AA and ex-girlfriend Wanda, but does not have family in the area. Denies avh, paranoia, HI, self mutilation, current SI."      On unit:  Patient was seen and is evaluated on the unit. He presents logical and linear in TP, calm and cooperative w/ interview. He states that he has been feeling depressed for the past 10 months at [facility] and increasingly so over the past 4 months. He verbalizes being homeless and has been staying at the facility due to rehabbing from multiple knee surgeries, reports that he is pending left knee replacement and was scheduled for an appt w/ his orthopaedic surgeon for Mon. 4/29, which he will now have to miss. Discussed that treatment team can assist in rescheduling this appt. He endorses stressors such as increased anxiety related to surgery and dwelling on not being able to work or take care of himself. Reports that he has SI w/ plan to OD on heroin, but was unable to obtain heroin. He had written a suicide note w/ contained details to access his accounts, founds by a friend of his who had then ripped up this note. He discusses that he was not receiving much support for his depressive sx at this nursing home, did state that he was started on Lexapro and had noticed some improvement of sx in relation to decreased crying spells. He verbalizes that he relapsed on substances 2 days ago, using $50 of cocaine daily and drinking 1 pint of alcohol daily. He is unsure of meth use and positive result on UTOX. States that prior to relapsing he had been sober since May 2023, has been active w/ AA and has a sponsor. He denies any sx of ETOH withdrawal, ordered CIWA. He currently denies SHIIP. Denies any AVH or delusional thoughts. He reports depressive sx such as low energy, trouble focusing, anhedonia, hopelessness, and poor sleep. He reports having hx of sleep apnea, does not use CPAP at facility, discussed use of wedge and PT consult for gait impairment.

## 2024-04-26 NOTE — ED ADULT NURSE REASSESSMENT NOTE - STATUS
awaiting transfer, no change
awaiting consult
awaiting consult
awaiting transfer, no change
awaiting transfer, no change

## 2024-04-26 NOTE — ED BEHAVIORAL HEALTH PROGRESS NOTE - SUMMARY
As per initial assessment: "57 y/o male domiciled at [facility], unemployed, single, PPHx depression, anxiety, PTSD, multiple past suicide attempts, multiple inpatient psych admissions BIBEMS for suicidal ideation. Pt states that he has had multiple knee surgeries leading to long-term placement at [facility], has been dealing with depression for "all of adulthood" but has been feeling suicidal x 2 weeks. Pt states that about 2 weeks ago, he wrote a suicide note with instructions for his debit cards/money to be sent to his sister/ ex Wanda. States that his plan was to go out and buy "5-6 bags of heroin" when he found time to leave [facility] alone. Pt states that his friend found the note and ripped it up, and that he never found time to acquire heroin but "absolutely" would have killed himself eventually. States that he has suicidal thoughts each night, and prays he will go to sleep and never wake up each night. Pt endorses relapse with alcohol and drugs starting 2 days ago, after being sober since May 2023.  Denies current SI, but states that he does not feel safe leaving because he may hurt himself once he starts feeling hopeless again"

## 2024-04-27 LAB
A1C WITH ESTIMATED AVERAGE GLUCOSE RESULT: 5.9 % — HIGH (ref 4–5.6)
ALBUMIN SERPL ELPH-MCNC: 4.2 G/DL — SIGNIFICANT CHANGE UP (ref 3.3–5)
ALP SERPL-CCNC: 120 U/L — SIGNIFICANT CHANGE UP (ref 40–120)
ALT FLD-CCNC: 57 U/L — HIGH (ref 4–41)
ANION GAP SERPL CALC-SCNC: 14 MMOL/L — SIGNIFICANT CHANGE UP (ref 7–14)
AST SERPL-CCNC: 36 U/L — SIGNIFICANT CHANGE UP (ref 4–40)
BASOPHILS # BLD AUTO: 0.06 K/UL — SIGNIFICANT CHANGE UP (ref 0–0.2)
BASOPHILS NFR BLD AUTO: 0.8 % — SIGNIFICANT CHANGE UP (ref 0–2)
BILIRUB SERPL-MCNC: 0.3 MG/DL — SIGNIFICANT CHANGE UP (ref 0.2–1.2)
BUN SERPL-MCNC: 19 MG/DL — SIGNIFICANT CHANGE UP (ref 7–23)
CALCIUM SERPL-MCNC: 8.8 MG/DL — SIGNIFICANT CHANGE UP (ref 8.4–10.5)
CHLORIDE SERPL-SCNC: 101 MMOL/L — SIGNIFICANT CHANGE UP (ref 98–107)
CHOLEST SERPL-MCNC: 139 MG/DL — SIGNIFICANT CHANGE UP
CO2 SERPL-SCNC: 24 MMOL/L — SIGNIFICANT CHANGE UP (ref 22–31)
CREAT SERPL-MCNC: 0.83 MG/DL — SIGNIFICANT CHANGE UP (ref 0.5–1.3)
EGFR: 101 ML/MIN/1.73M2 — SIGNIFICANT CHANGE UP
EOSINOPHIL # BLD AUTO: 0.58 K/UL — HIGH (ref 0–0.5)
EOSINOPHIL NFR BLD AUTO: 7.5 % — HIGH (ref 0–6)
ESTIMATED AVERAGE GLUCOSE: 123 — SIGNIFICANT CHANGE UP
GLUCOSE SERPL-MCNC: 156 MG/DL — HIGH (ref 70–99)
HCT VFR BLD CALC: 40.8 % — SIGNIFICANT CHANGE UP (ref 39–50)
HDLC SERPL-MCNC: 38 MG/DL — LOW
HGB BLD-MCNC: 13.2 G/DL — SIGNIFICANT CHANGE UP (ref 13–17)
IANC: 4.17 K/UL — SIGNIFICANT CHANGE UP (ref 1.8–7.4)
IMM GRANULOCYTES NFR BLD AUTO: 0.5 % — SIGNIFICANT CHANGE UP (ref 0–0.9)
LIPID PNL WITH DIRECT LDL SERPL: 39 MG/DL — SIGNIFICANT CHANGE UP
LYMPHOCYTES # BLD AUTO: 2.21 K/UL — SIGNIFICANT CHANGE UP (ref 1–3.3)
LYMPHOCYTES # BLD AUTO: 28.7 % — SIGNIFICANT CHANGE UP (ref 13–44)
MCHC RBC-ENTMCNC: 31 PG — SIGNIFICANT CHANGE UP (ref 27–34)
MCHC RBC-ENTMCNC: 32.4 GM/DL — SIGNIFICANT CHANGE UP (ref 32–36)
MCV RBC AUTO: 95.8 FL — SIGNIFICANT CHANGE UP (ref 80–100)
MONOCYTES # BLD AUTO: 0.64 K/UL — SIGNIFICANT CHANGE UP (ref 0–0.9)
MONOCYTES NFR BLD AUTO: 8.3 % — SIGNIFICANT CHANGE UP (ref 2–14)
NEUTROPHILS # BLD AUTO: 4.17 K/UL — SIGNIFICANT CHANGE UP (ref 1.8–7.4)
NEUTROPHILS NFR BLD AUTO: 54.2 % — SIGNIFICANT CHANGE UP (ref 43–77)
NON HDL CHOLESTEROL: 101 MG/DL — SIGNIFICANT CHANGE UP
NRBC # BLD: 0 /100 WBCS — SIGNIFICANT CHANGE UP (ref 0–0)
NRBC # FLD: 0 K/UL — SIGNIFICANT CHANGE UP (ref 0–0)
PLATELET # BLD AUTO: 219 K/UL — SIGNIFICANT CHANGE UP (ref 150–400)
POTASSIUM SERPL-MCNC: 4.4 MMOL/L — SIGNIFICANT CHANGE UP (ref 3.5–5.3)
POTASSIUM SERPL-SCNC: 4.4 MMOL/L — SIGNIFICANT CHANGE UP (ref 3.5–5.3)
PROT SERPL-MCNC: 7.3 G/DL — SIGNIFICANT CHANGE UP (ref 6–8.3)
RBC # BLD: 4.26 M/UL — SIGNIFICANT CHANGE UP (ref 4.2–5.8)
RBC # FLD: 12.9 % — SIGNIFICANT CHANGE UP (ref 10.3–14.5)
SODIUM SERPL-SCNC: 139 MMOL/L — SIGNIFICANT CHANGE UP (ref 135–145)
TRIGL SERPL-MCNC: 308 MG/DL — HIGH
TSH SERPL-MCNC: 2.77 UIU/ML — SIGNIFICANT CHANGE UP (ref 0.27–4.2)
WBC # BLD: 7.7 K/UL — SIGNIFICANT CHANGE UP (ref 3.8–10.5)
WBC # FLD AUTO: 7.7 K/UL — SIGNIFICANT CHANGE UP (ref 3.8–10.5)

## 2024-04-27 PROCEDURE — 99232 SBSQ HOSP IP/OBS MODERATE 35: CPT

## 2024-04-27 RX ADMIN — Medication 325 MILLIGRAM(S): at 09:41

## 2024-04-27 RX ADMIN — Medication 2 MILLIGRAM(S): at 20:13

## 2024-04-27 RX ADMIN — Medication 30 MILLILITER(S): at 02:47

## 2024-04-27 RX ADMIN — ESCITALOPRAM OXALATE 20 MILLIGRAM(S): 10 TABLET, FILM COATED ORAL at 09:42

## 2024-04-27 RX ADMIN — GUANFACINE 1 MILLIGRAM(S): 3 TABLET, EXTENDED RELEASE ORAL at 20:16

## 2024-04-27 RX ADMIN — BUPRENORPHINE AND NALOXONE 1 TABLET(S): 2; .5 TABLET SUBLINGUAL at 20:13

## 2024-04-27 RX ADMIN — Medication 650 MILLIGRAM(S): at 10:13

## 2024-04-27 RX ADMIN — SENNA PLUS 2 TABLET(S): 8.6 TABLET ORAL at 20:13

## 2024-04-27 RX ADMIN — LIDOCAINE 1 PATCH: 4 CREAM TOPICAL at 09:13

## 2024-04-27 RX ADMIN — Medication 0.1 MILLIGRAM(S): at 20:14

## 2024-04-27 RX ADMIN — Medication 1 TABLET(S): at 09:40

## 2024-04-27 RX ADMIN — BUPRENORPHINE AND NALOXONE 1 TABLET(S): 2; .5 TABLET SUBLINGUAL at 09:41

## 2024-04-27 RX ADMIN — Medication 10 MILLIGRAM(S): at 20:13

## 2024-04-27 RX ADMIN — GUANFACINE 1 MILLIGRAM(S): 3 TABLET, EXTENDED RELEASE ORAL at 09:41

## 2024-04-27 RX ADMIN — Medication 200 MILLIGRAM(S): at 20:13

## 2024-04-27 RX ADMIN — Medication 2 MILLIGRAM(S): at 13:23

## 2024-04-27 RX ADMIN — BUPROPION HYDROCHLORIDE 300 MILLIGRAM(S): 150 TABLET, EXTENDED RELEASE ORAL at 09:41

## 2024-04-27 RX ADMIN — LIDOCAINE 1 PATCH: 4 CREAM TOPICAL at 07:45

## 2024-04-27 RX ADMIN — ATORVASTATIN CALCIUM 40 MILLIGRAM(S): 80 TABLET, FILM COATED ORAL at 20:13

## 2024-04-27 RX ADMIN — Medication 1 TABLET(S): at 16:13

## 2024-04-27 RX ADMIN — Medication 5 MILLIGRAM(S): at 20:13

## 2024-04-27 RX ADMIN — Medication 10 MILLIGRAM(S): at 09:40

## 2024-04-27 RX ADMIN — Medication 2 MILLIGRAM(S): at 16:14

## 2024-04-27 RX ADMIN — Medication 650 MILLIGRAM(S): at 09:39

## 2024-04-27 RX ADMIN — Medication 0.1 MILLIGRAM(S): at 09:41

## 2024-04-27 RX ADMIN — LACTULOSE 10 GRAM(S): 10 SOLUTION ORAL at 09:38

## 2024-04-27 NOTE — BH INPATIENT PSYCHIATRY PROGRESS NOTE - NSBHMETABOLIC_PSY_ALL_CORE_FT
BMI:   HbA1c: A1C with Estimated Average Glucose Result: 5.9 % (04-27-24 @ 10:21)    Glucose:   BP: --Vital Signs Last 24 Hrs  T(C): 36.5 (04-27-24 @ 08:36), Max: 36.8 (04-26-24 @ 19:17)  T(F): 97.7 (04-27-24 @ 08:36), Max: 98.3 (04-26-24 @ 19:17)  HR: --  BP: --  BP(mean): --  RR: 18 (04-27-24 @ 08:36) (18 - 18)  SpO2: --    Orthostatic VS  04-27-24 @ 08:36  Lying BP: --/-- HR: --  Sitting BP: 122/60 HR: 74  Standing BP: --/-- HR: --  Site: --  Mode: --  Orthostatic VS  04-26-24 @ 19:17  Lying BP: --/-- HR: --  Sitting BP: 158/84 HR: 70  Standing BP: 149/73 HR: 71  Site: --  Mode: --  Orthostatic VS  04-26-24 @ 08:05  Lying BP: --/-- HR: --  Sitting BP: 124/66 HR: 57  Standing BP: 100/54 HR: 68  Site: --  Mode: --    Lipid Panel: Date/Time: 04-27-24 @ 10:21  Cholesterol, Serum: 139  LDL Cholesterol Calculated: 39  HDL Cholesterol, Serum: 38  Total Cholesterol/HDL Ration Measurement: --  Triglycerides, Serum: 308

## 2024-04-27 NOTE — BH INPATIENT PSYCHIATRY PROGRESS NOTE - CURRENT MEDICATION
MEDICATIONS  (STANDING):  atorvastatin 40 milliGRAM(s) Oral at bedtime  buprenorphine 8 mG/naloxone 2 mG SL  Tablet 1 Tablet(s) SubLingual two times a day  buPROPion XL (24-Hour) 300 milliGRAM(s) Oral daily  busPIRone 10 milliGRAM(s) Oral two times a day  cloNIDine 0.1 milliGRAM(s) Oral two times a day  escitalopram 20 milliGRAM(s) Oral daily  ferrous    sulfate 325 milliGRAM(s) Oral daily  guanFACINE. 1 milliGRAM(s) Oral <User Schedule>  lactobacillus acidophilus 1 Tablet(s) Oral two times a day with meals  senna 2 Tablet(s) Oral at bedtime  traZODone 200 milliGRAM(s) Oral at bedtime    MEDICATIONS  (PRN):  acetaminophen     Tablet .. 650 milliGRAM(s) Oral every 6 hours PRN Mild Pain (1 - 3), Moderate Pain (4 - 6)  aluminum hydroxide/magnesium hydroxide/simethicone Suspension 30 milliLiter(s) Oral every 6 hours PRN Dyspepsia  lactulose Syrup 10 Gram(s) Oral two times a day PRN constipation  lidocaine   4% Patch 1 Patch Transdermal every 24 hours PRN chronic L knee pain  LORazepam     Tablet 2 milliGRAM(s) Oral every 2 hours PRN CIWA score increase by 2 points and current CIWA score GREATER THAN 9  LORazepam   Injectable 2 milliGRAM(s) IntraMuscular every 2 hours PRN CIWA score increase by 2 points and current CIWA score GREATER THAN 9  melatonin. 5 milliGRAM(s) Oral at bedtime PRN Insomnia  nicotine  Polacrilex Gum 2 milliGRAM(s) Oral every 2 hours PRN Smoking Cessation

## 2024-04-27 NOTE — BH INPATIENT PSYCHIATRY PROGRESS NOTE - NSBHCHARTREVIEWVS_PSY_A_CORE FT
Vital Signs Last 24 Hrs  T(C): 36.5 (04-27-24 @ 08:36), Max: 36.8 (04-26-24 @ 19:17)  T(F): 97.7 (04-27-24 @ 08:36), Max: 98.3 (04-26-24 @ 19:17)  HR: --  BP: --  BP(mean): --  RR: 18 (04-27-24 @ 08:36) (18 - 18)  SpO2: --    Orthostatic VS  04-27-24 @ 08:36  Lying BP: --/-- HR: --  Sitting BP: 122/60 HR: 74  Standing BP: --/-- HR: --  Site: --  Mode: --  Orthostatic VS  04-26-24 @ 19:17  Lying BP: --/-- HR: --  Sitting BP: 158/84 HR: 70  Standing BP: 149/73 HR: 71  Site: --  Mode: --  Orthostatic VS  04-26-24 @ 08:05  Lying BP: --/-- HR: --  Sitting BP: 124/66 HR: 57  Standing BP: 100/54 HR: 68  Site: --  Mode: --

## 2024-04-27 NOTE — PSYCHIATRIC REHAB INITIAL EVALUATION - NSBHALCSUBCHOICE_PSY_ALL_CORE
Per chart, pt has extensive polysubstance use- Alcohol; Amphetamines...; Benzodiazepines; Cocaine/Crack

## 2024-04-27 NOTE — PSYCHIATRIC REHAB INITIAL EVALUATION - NSBHALCSUBTREAT_PSY_ALL_CORE
-prior tx at rehab facilities  -multiple inpatient psych admissions/Substance abuse program (specify)

## 2024-04-27 NOTE — PSYCHIATRIC REHAB INITIAL EVALUATION - NSBHPRRECOMMEND_PSY_ALL_CORE
Patient was seen individually by psych rehab staff to orient him to the unit and introduce him to psych rehab department and its functions as well as to assess functioning. Upon approach, patient was willing to meet with psych rehab staff. Patient was provided with a unit schedule and encouraged to attend daily psychiatric rehabilitation groups for improved insight and symptom management. Patient’s appearance was kempt and was observed to be dressed casually. Patient presents as calm. Patient was engaged and cooperative with interview.   When asked his reason for admission, patient states he's been having worsening depression and SI due to various stressors i.e. having various knee surgeries, being out of work, being homeless, and not receiving adequate psychiatric care. This corroborates the Psychiatry Assessment Note which states: "Patient is a 59 y/o male domiciled at [facility], unemployed, single, PPHx depression, anxiety, PTSD, multiple past suicide attempts, multiple inpatient psych admissions BIBEMS for suicidal ideation. Patient BIB EMS after endorsing SI to staff at nursing home."  Patient and psych rehab staff were able to identify a collaborative goal which is identifying and utilizing 2 coping skills in the context of suicidality. Psych rehab staff will provide counseling and daily group therapy to assist patient in achieving therapeutic goals. Psych rehab staff will also continue to engage patient daily in order to build therapeutic rapport. Patient currently denies SI/HI/AH/VH. Psych rehab recommends that patient attend individual and group therapy for support, psychoeducation and skill-integration as well as to facilitate progress towards specified goal.

## 2024-04-27 NOTE — BH INPATIENT PSYCHIATRY PROGRESS NOTE - PRN MEDS
MEDICATIONS  (PRN):  acetaminophen     Tablet .. 650 milliGRAM(s) Oral every 6 hours PRN Mild Pain (1 - 3), Moderate Pain (4 - 6)  aluminum hydroxide/magnesium hydroxide/simethicone Suspension 30 milliLiter(s) Oral every 6 hours PRN Dyspepsia  lactulose Syrup 10 Gram(s) Oral two times a day PRN constipation  lidocaine   4% Patch 1 Patch Transdermal every 24 hours PRN chronic L knee pain  LORazepam     Tablet 2 milliGRAM(s) Oral every 2 hours PRN CIWA score increase by 2 points and current CIWA score GREATER THAN 9  LORazepam   Injectable 2 milliGRAM(s) IntraMuscular every 2 hours PRN CIWA score increase by 2 points and current CIWA score GREATER THAN 9  melatonin. 5 milliGRAM(s) Oral at bedtime PRN Insomnia  nicotine  Polacrilex Gum 2 milliGRAM(s) Oral every 2 hours PRN Smoking Cessation

## 2024-04-27 NOTE — BH INPATIENT PSYCHIATRY PROGRESS NOTE - NSBHFUPINTERVALHXFT_PSY_A_CORE
f/up recurrent depression, VSS, no overnight issues. Patient reported feeling "down", does not report suicidal ideation, reported that he had to detach from his nursing home. Patient reported feeling suffocated there. reported fair sleep and appetite. Patient open to individual therapy. Patient also attributed his low mood to his physical limitations, ambulating well with walker.

## 2024-04-27 NOTE — BH INPATIENT PSYCHIATRY PROGRESS NOTE - NSBHASSESSSUMMFT_PSY_ALL_CORE
Patient is a 57 y/o male domiciled at [facility], unemployed, single, PPHx depression, anxiety, PTSD, multiple past suicide attempts, multiple inpatient psych admissions BIBEMS for suicidal ideation. Patient BIB EMS after endorsing SI to staff at nursing home.        Working diagnosis:  MDD, severe w/o psychotic features  PTSD      Plan:  >Legal: 9.13  >Obs: Routine observation, denies active SIIP and is able to engage in safety planning.      >Psychiatric Meds:  Continue Wellbutrin XL 300mg, AM for depression  Continue Lexapro 20mg, AM for depression  Continue Buspirone 10mg, BID for anxiety  Continue Guanfacine 1mg, BID for impulsivity  Continue Trazodone 200mg, HS for mood/insomnia      >PRN Meds:  Melatonin 5mg PO, QHS for insomnia  Atarax 50mg PO, QHS for anxiety  Lorazepam 2mg IM/PO, Q6H for severe anxiety      >Labs: Admission labs reviewed, no acute findings. Hold antipsychotics if QTc >500  >Medical: No acute concerns. Patient placed on CIWA, no visible physical symptoms of withdrawal. During the course of treatment, will collaborate with medical team to manage medical issues.  >Diet: Regular  >Social: Milieu/structured therapy  >Treatment Interventions: Groups and Individual Therapy/CBT.  >Dispo: pending symptom improvement, SW to pursue discharge planning.

## 2024-04-28 PROCEDURE — 99232 SBSQ HOSP IP/OBS MODERATE 35: CPT

## 2024-04-28 RX ORDER — NICOTINE POLACRILEX 2 MG
1 GUM BUCCAL DAILY
Refills: 0 | Status: DISCONTINUED | OUTPATIENT
Start: 2024-04-28 | End: 2024-05-29

## 2024-04-28 RX ADMIN — Medication 650 MILLIGRAM(S): at 19:52

## 2024-04-28 RX ADMIN — Medication 0.1 MILLIGRAM(S): at 21:30

## 2024-04-28 RX ADMIN — Medication 325 MILLIGRAM(S): at 08:47

## 2024-04-28 RX ADMIN — Medication 0.1 MILLIGRAM(S): at 08:47

## 2024-04-28 RX ADMIN — ATORVASTATIN CALCIUM 40 MILLIGRAM(S): 80 TABLET, FILM COATED ORAL at 21:30

## 2024-04-28 RX ADMIN — LIDOCAINE 1 PATCH: 4 CREAM TOPICAL at 19:52

## 2024-04-28 RX ADMIN — Medication 2 MILLIGRAM(S): at 04:34

## 2024-04-28 RX ADMIN — SENNA PLUS 2 TABLET(S): 8.6 TABLET ORAL at 21:30

## 2024-04-28 RX ADMIN — Medication 2 MILLIGRAM(S): at 10:21

## 2024-04-28 RX ADMIN — Medication 10 MILLIGRAM(S): at 21:30

## 2024-04-28 RX ADMIN — Medication 5 MILLIGRAM(S): at 21:30

## 2024-04-28 RX ADMIN — BUPRENORPHINE AND NALOXONE 1 TABLET(S): 2; .5 TABLET SUBLINGUAL at 08:46

## 2024-04-28 RX ADMIN — Medication 2 MILLIGRAM(S): at 19:52

## 2024-04-28 RX ADMIN — BUPROPION HYDROCHLORIDE 300 MILLIGRAM(S): 150 TABLET, EXTENDED RELEASE ORAL at 08:46

## 2024-04-28 RX ADMIN — GUANFACINE 1 MILLIGRAM(S): 3 TABLET, EXTENDED RELEASE ORAL at 22:11

## 2024-04-28 RX ADMIN — Medication 1 TABLET(S): at 08:47

## 2024-04-28 RX ADMIN — BUPRENORPHINE AND NALOXONE 1 TABLET(S): 2; .5 TABLET SUBLINGUAL at 21:30

## 2024-04-28 RX ADMIN — GUANFACINE 1 MILLIGRAM(S): 3 TABLET, EXTENDED RELEASE ORAL at 08:49

## 2024-04-28 RX ADMIN — Medication 2 MILLIGRAM(S): at 17:51

## 2024-04-28 RX ADMIN — ESCITALOPRAM OXALATE 20 MILLIGRAM(S): 10 TABLET, FILM COATED ORAL at 08:47

## 2024-04-28 RX ADMIN — Medication 1 TABLET(S): at 16:27

## 2024-04-28 RX ADMIN — Medication 10 MILLIGRAM(S): at 08:47

## 2024-04-28 RX ADMIN — Medication 200 MILLIGRAM(S): at 21:29

## 2024-04-28 NOTE — BH INPATIENT PSYCHIATRY PROGRESS NOTE - NSBHCHARTREVIEWVS_PSY_A_CORE FT
Vital Signs Last 24 Hrs  T(C): 36.7 (04-28-24 @ 07:45), Max: 36.7 (04-27-24 @ 19:19)  T(F): 98 (04-28-24 @ 07:45), Max: 98 (04-27-24 @ 19:19)  HR: --  BP: --  BP(mean): --  RR: 18 (04-28-24 @ 07:45) (18 - 18)  SpO2: --    Orthostatic VS  04-28-24 @ 07:45  Lying BP: --/-- HR: --  Sitting BP: 114/66 HR: 69  Standing BP: 118/63 HR: 81  Site: --  Mode: electronic  Orthostatic VS  04-27-24 @ 19:19  Lying BP: --/-- HR: --  Sitting BP: 171/82 HR: 73  Standing BP: 153/81 HR: 74  Site: --  Mode: --  Orthostatic VS  04-27-24 @ 08:36  Lying BP: --/-- HR: --  Sitting BP: 122/60 HR: 74  Standing BP: --/-- HR: --  Site: --  Mode: --  Orthostatic VS  04-26-24 @ 19:17  Lying BP: --/-- HR: --  Sitting BP: 158/84 HR: 70  Standing BP: 149/73 HR: 71  Site: --  Mode: --

## 2024-04-28 NOTE — BH INPATIENT PSYCHIATRY PROGRESS NOTE - NSBHMETABOLIC_PSY_ALL_CORE_FT
BMI:   HbA1c: A1C with Estimated Average Glucose Result: 5.9 % (04-27-24 @ 10:21)    Glucose:   BP: --Vital Signs Last 24 Hrs  T(C): 36.7 (04-28-24 @ 07:45), Max: 36.7 (04-27-24 @ 19:19)  T(F): 98 (04-28-24 @ 07:45), Max: 98 (04-27-24 @ 19:19)  HR: --  BP: --  BP(mean): --  RR: 18 (04-28-24 @ 07:45) (18 - 18)  SpO2: --    Orthostatic VS  04-28-24 @ 07:45  Lying BP: --/-- HR: --  Sitting BP: 114/66 HR: 69  Standing BP: 118/63 HR: 81  Site: --  Mode: electronic  Orthostatic VS  04-27-24 @ 19:19  Lying BP: --/-- HR: --  Sitting BP: 171/82 HR: 73  Standing BP: 153/81 HR: 74  Site: --  Mode: --  Orthostatic VS  04-27-24 @ 08:36  Lying BP: --/-- HR: --  Sitting BP: 122/60 HR: 74  Standing BP: --/-- HR: --  Site: --  Mode: --  Orthostatic VS  04-26-24 @ 19:17  Lying BP: --/-- HR: --  Sitting BP: 158/84 HR: 70  Standing BP: 149/73 HR: 71  Site: --  Mode: --    Lipid Panel: Date/Time: 04-27-24 @ 10:21  Cholesterol, Serum: 139  LDL Cholesterol Calculated: 39  HDL Cholesterol, Serum: 38  Total Cholesterol/HDL Ration Measurement: --  Triglycerides, Serum: 308

## 2024-04-28 NOTE — BH INPATIENT PSYCHIATRY PROGRESS NOTE - PRN MEDS
MEDICATIONS  (PRN):  acetaminophen     Tablet .. 650 milliGRAM(s) Oral every 6 hours PRN Mild Pain (1 - 3), Moderate Pain (4 - 6)  aluminum hydroxide/magnesium hydroxide/simethicone Suspension 30 milliLiter(s) Oral every 6 hours PRN Dyspepsia  lactulose Syrup 10 Gram(s) Oral two times a day PRN constipation  lidocaine   4% Patch 1 Patch Transdermal every 24 hours PRN chronic L knee pain  LORazepam     Tablet 2 milliGRAM(s) Oral every 2 hours PRN CIWA score increase by 2 points and current CIWA score GREATER THAN 9  LORazepam   Injectable 2 milliGRAM(s) IntraMuscular every 2 hours PRN CIWA score increase by 2 points and current CIWA score GREATER THAN 9  melatonin. 5 milliGRAM(s) Oral at bedtime PRN Insomnia  nicotine  Polacrilex Gum 2 milliGRAM(s) Oral every 2 hours PRN Smoking Cessation  nicotine -   7 mG/24Hr(s) Patch 1 Patch Transdermal daily PRN nicotine dependence

## 2024-04-28 NOTE — BH INPATIENT PSYCHIATRY PROGRESS NOTE - NSBHFUPINTERVALHXFT_PSY_A_CORE
f/up recurrent depression, VSS, no overnight issues. Patient reported working and spoke of his physical limitations of not being able to walk long distances. reported fair sleep and appetite. ambulating well with walker. denied SI/I/P

## 2024-04-28 NOTE — BH INPATIENT PSYCHIATRY PROGRESS NOTE - CURRENT MEDICATION
MEDICATIONS  (STANDING):  atorvastatin 40 milliGRAM(s) Oral at bedtime  buprenorphine 8 mG/naloxone 2 mG SL  Tablet 1 Tablet(s) SubLingual two times a day  buPROPion XL (24-Hour) 300 milliGRAM(s) Oral daily  busPIRone 10 milliGRAM(s) Oral two times a day  cloNIDine 0.1 milliGRAM(s) Oral two times a day  escitalopram 20 milliGRAM(s) Oral daily  ferrous    sulfate 325 milliGRAM(s) Oral daily  guanFACINE. 1 milliGRAM(s) Oral <User Schedule>  lactobacillus acidophilus 1 Tablet(s) Oral two times a day with meals  senna 2 Tablet(s) Oral at bedtime  traZODone 200 milliGRAM(s) Oral at bedtime    MEDICATIONS  (PRN):  acetaminophen     Tablet .. 650 milliGRAM(s) Oral every 6 hours PRN Mild Pain (1 - 3), Moderate Pain (4 - 6)  aluminum hydroxide/magnesium hydroxide/simethicone Suspension 30 milliLiter(s) Oral every 6 hours PRN Dyspepsia  lactulose Syrup 10 Gram(s) Oral two times a day PRN constipation  lidocaine   4% Patch 1 Patch Transdermal every 24 hours PRN chronic L knee pain  LORazepam     Tablet 2 milliGRAM(s) Oral every 2 hours PRN CIWA score increase by 2 points and current CIWA score GREATER THAN 9  LORazepam   Injectable 2 milliGRAM(s) IntraMuscular every 2 hours PRN CIWA score increase by 2 points and current CIWA score GREATER THAN 9  melatonin. 5 milliGRAM(s) Oral at bedtime PRN Insomnia  nicotine  Polacrilex Gum 2 milliGRAM(s) Oral every 2 hours PRN Smoking Cessation  nicotine -   7 mG/24Hr(s) Patch 1 Patch Transdermal daily PRN nicotine dependence

## 2024-04-28 NOTE — BH INPATIENT PSYCHIATRY PROGRESS NOTE - NSBHASSESSSUMMFT_PSY_ALL_CORE
Patient is a 57 y/o male domiciled at [facility], unemployed, single, PPHx depression, anxiety, PTSD, multiple past suicide attempts, multiple inpatient psych admissions BIBEMS for suicidal ideation. Patient BIB EMS after endorsing SI to staff at nursing home.        Working diagnosis:  MDD, severe w/o psychotic features  PTSD      Plan:  >Legal: 9.13  >Obs: Routine observation, denies active SIIP and is able to engage in safety planning.      >Psychiatric Meds:  Continue Wellbutrin XL 300mg, AM for depression  Continue Lexapro 20mg, AM for depression  Continue Buspirone 10mg, BID for anxiety  Continue Guanfacine 1mg, BID for impulsivity  Continue Trazodone 200mg, HS for mood/insomnia      >PRN Meds:  Melatonin 5mg PO, QHS for insomnia  Atarax 50mg PO, QHS for anxiety  Lorazepam 2mg IM/PO, Q6H for severe anxiety      >Labs: Admission labs reviewed, no acute findings. Hold antipsychotics if QTc >500  >Medical: No acute concerns. Patient placed on CIWA, no visible physical symptoms of withdrawal. During the course of treatment, will collaborate with medical team to manage medical issues.  >Diet: Regular  >Social: Milieu/structured therapy  >Treatment Interventions: Groups and Individual Therapy/CBT.  >Dispo: pending symptom improvement, SW to pursue discharge planning.

## 2024-04-29 ENCOUNTER — APPOINTMENT (OUTPATIENT)
Dept: ORTHOPEDIC SURGERY | Facility: CLINIC | Age: 59
End: 2024-04-29

## 2024-04-29 PROCEDURE — 99232 SBSQ HOSP IP/OBS MODERATE 35: CPT

## 2024-04-29 RX ADMIN — Medication 30 MILLILITER(S): at 20:19

## 2024-04-29 RX ADMIN — SENNA PLUS 2 TABLET(S): 8.6 TABLET ORAL at 20:18

## 2024-04-29 RX ADMIN — BUPRENORPHINE AND NALOXONE 1 TABLET(S): 2; .5 TABLET SUBLINGUAL at 20:19

## 2024-04-29 RX ADMIN — Medication 1 TABLET(S): at 16:39

## 2024-04-29 RX ADMIN — Medication 325 MILLIGRAM(S): at 08:20

## 2024-04-29 RX ADMIN — ESCITALOPRAM OXALATE 20 MILLIGRAM(S): 10 TABLET, FILM COATED ORAL at 08:20

## 2024-04-29 RX ADMIN — LIDOCAINE 1 PATCH: 4 CREAM TOPICAL at 21:12

## 2024-04-29 RX ADMIN — Medication 650 MILLIGRAM(S): at 18:46

## 2024-04-29 RX ADMIN — Medication 200 MILLIGRAM(S): at 20:19

## 2024-04-29 RX ADMIN — Medication 1 PATCH: at 08:21

## 2024-04-29 RX ADMIN — ATORVASTATIN CALCIUM 40 MILLIGRAM(S): 80 TABLET, FILM COATED ORAL at 20:19

## 2024-04-29 RX ADMIN — GUANFACINE 1 MILLIGRAM(S): 3 TABLET, EXTENDED RELEASE ORAL at 08:54

## 2024-04-29 RX ADMIN — BUPRENORPHINE AND NALOXONE 1 TABLET(S): 2; .5 TABLET SUBLINGUAL at 08:21

## 2024-04-29 RX ADMIN — Medication 0.1 MILLIGRAM(S): at 08:21

## 2024-04-29 RX ADMIN — Medication 10 MILLIGRAM(S): at 08:20

## 2024-04-29 RX ADMIN — BUPROPION HYDROCHLORIDE 300 MILLIGRAM(S): 150 TABLET, EXTENDED RELEASE ORAL at 08:21

## 2024-04-29 RX ADMIN — Medication 0.1 MILLIGRAM(S): at 20:19

## 2024-04-29 RX ADMIN — LIDOCAINE 1 PATCH: 4 CREAM TOPICAL at 10:36

## 2024-04-29 RX ADMIN — Medication 650 MILLIGRAM(S): at 17:57

## 2024-04-29 RX ADMIN — LIDOCAINE 1 PATCH: 4 CREAM TOPICAL at 07:36

## 2024-04-29 RX ADMIN — Medication 1 TABLET(S): at 08:20

## 2024-04-29 RX ADMIN — Medication 10 MILLIGRAM(S): at 20:19

## 2024-04-29 RX ADMIN — Medication 2 MILLIGRAM(S): at 20:25

## 2024-04-29 RX ADMIN — Medication 5 MILLIGRAM(S): at 20:24

## 2024-04-29 RX ADMIN — GUANFACINE 1 MILLIGRAM(S): 3 TABLET, EXTENDED RELEASE ORAL at 20:24

## 2024-04-29 NOTE — BH INPATIENT PSYCHIATRY PROGRESS NOTE - NSBHCHARTREVIEWVS_PSY_A_CORE FT
Vital Signs Last 24 Hrs  T(C): 36.7 (04-29-24 @ 08:30), Max: 36.7 (04-28-24 @ 19:48)  T(F): 98 (04-29-24 @ 08:30), Max: 98 (04-28-24 @ 19:48)  HR: --  BP: --  BP(mean): --  RR: 17 (04-29-24 @ 08:30) (17 - 17)  SpO2: --    Orthostatic VS  04-29-24 @ 08:30  Lying BP: --/-- HR: --  Sitting BP: 136/70 HR: 68  Standing BP: 129/68 HR: 71  Site: --  Mode: --  Orthostatic VS  04-28-24 @ 19:48  Lying BP: --/-- HR: --  Sitting BP: 122/62 HR: 72  Standing BP: 121/66 HR: 71  Site: --  Mode: electronic  Orthostatic VS  04-28-24 @ 07:45  Lying BP: --/-- HR: --  Sitting BP: 114/66 HR: 69  Standing BP: 118/63 HR: 81  Site: --  Mode: electronic  Orthostatic VS  04-27-24 @ 19:19  Lying BP: --/-- HR: --  Sitting BP: 171/82 HR: 73  Standing BP: 153/81 HR: 74  Site: --  Mode: --

## 2024-04-29 NOTE — BH INPATIENT PSYCHIATRY PROGRESS NOTE - PRN MEDS
MEDICATIONS  (PRN):  acetaminophen     Tablet .. 650 milliGRAM(s) Oral every 6 hours PRN Mild Pain (1 - 3), Moderate Pain (4 - 6)  aluminum hydroxide/magnesium hydroxide/simethicone Suspension 30 milliLiter(s) Oral every 6 hours PRN Dyspepsia  lactulose Syrup 10 Gram(s) Oral two times a day PRN constipation  lidocaine   4% Patch 1 Patch Transdermal every 24 hours PRN chronic L knee pain  melatonin. 5 milliGRAM(s) Oral at bedtime PRN Insomnia  nicotine  Polacrilex Gum 2 milliGRAM(s) Oral every 2 hours PRN Smoking Cessation  nicotine -   7 mG/24Hr(s) Patch 1 Patch Transdermal daily PRN nicotine dependence

## 2024-04-29 NOTE — BH INPATIENT PSYCHIATRY PROGRESS NOTE - NSBHASSESSSUMMFT_PSY_ALL_CORE
Patient is a 57 y/o male domiciled at [facility], unemployed, single, PPHx depression, anxiety, PTSD, multiple past suicide attempts, multiple inpatient psych admissions BIBEMS for suicidal ideation. Patient BIB EMS after endorsing SI to staff at nursing home.        Working diagnosis:  MDD, severe w/o psychotic features  PTSD      Plan:  >Legal: 9.13  >Obs: Routine observation, denies active SIIP and is able to engage in safety planning.      >Psychiatric Meds:  Continue Wellbutrin XL 300mg, AM for depression  Continue Lexapro 20mg, AM for depression  Continue Buspirone 10mg, BID for anxiety  Continue Guanfacine 1mg, BID for impulsivity  Continue Trazodone 200mg, HS for mood/insomnia      >PRN Meds:  Melatonin 5mg PO, QHS for insomnia  Atarax 50mg PO, QHS for anxiety  Lorazepam 2mg IM/PO, Q6H for severe anxiety      >Labs: Admission labs reviewed, no acute findings. Hold antipsychotics if QTc >500  >Medical: No acute concerns. Patient placed on CIWA, no visible physical symptoms of withdrawal. During the course of treatment, will collaborate with medical team to manage medical issues.  >Diet: Regular  >Social: Milieu/structured therapy  >Treatment Interventions: Groups and Individual Therapy/CBT.  >Dispo: pending symptom improvement, SW to pursue discharge planning.

## 2024-04-29 NOTE — BH INPATIENT PSYCHIATRY PROGRESS NOTE - NSBHMETABOLIC_PSY_ALL_CORE_FT
BMI:   HbA1c: A1C with Estimated Average Glucose Result: 5.9 % (04-27-24 @ 10:21)    Glucose:   BP: --Vital Signs Last 24 Hrs  T(C): 36.7 (04-29-24 @ 08:30), Max: 36.7 (04-28-24 @ 19:48)  T(F): 98 (04-29-24 @ 08:30), Max: 98 (04-28-24 @ 19:48)  HR: --  BP: --  BP(mean): --  RR: 17 (04-29-24 @ 08:30) (17 - 17)  SpO2: --    Orthostatic VS  04-29-24 @ 08:30  Lying BP: --/-- HR: --  Sitting BP: 136/70 HR: 68  Standing BP: 129/68 HR: 71  Site: --  Mode: --  Orthostatic VS  04-28-24 @ 19:48  Lying BP: --/-- HR: --  Sitting BP: 122/62 HR: 72  Standing BP: 121/66 HR: 71  Site: --  Mode: electronic  Orthostatic VS  04-28-24 @ 07:45  Lying BP: --/-- HR: --  Sitting BP: 114/66 HR: 69  Standing BP: 118/63 HR: 81  Site: --  Mode: electronic  Orthostatic VS  04-27-24 @ 19:19  Lying BP: --/-- HR: --  Sitting BP: 171/82 HR: 73  Standing BP: 153/81 HR: 74  Site: --  Mode: --    Lipid Panel: Date/Time: 04-27-24 @ 10:21  Cholesterol, Serum: 139  LDL Cholesterol Calculated: 39  HDL Cholesterol, Serum: 38  Total Cholesterol/HDL Ration Measurement: --  Triglycerides, Serum: 308

## 2024-04-29 NOTE — BH INPATIENT PSYCHIATRY PROGRESS NOTE - NSBHFUPINTERVALHXFT_PSY_A_CORE
Patient is followed up for mood/SI. Chart, medications and labs reviewed. Patient is discussed during morning brief, no overnight events, has been in good behavioral control.          Patient was seen and is evaluated on the unit. He reports having good appetite and sleep over the weekend. States that he feels more social being in the hospital, as he is able to interact more readily w/ others. Continues to express anxiety related to upcoming surgery. He verbalizes wanting to discuss relapse w/ his AA sponsor and does not want this information shared to others. He has been compliant with standing medications, denies SE. He denies any SIIP. No acute medical concerns, VSS.

## 2024-04-29 NOTE — BH INPATIENT PSYCHIATRY PROGRESS NOTE - CURRENT MEDICATION
MEDICATIONS  (STANDING):  atorvastatin 40 milliGRAM(s) Oral at bedtime  buprenorphine 8 mG/naloxone 2 mG SL  Tablet 1 Tablet(s) SubLingual two times a day  buPROPion XL (24-Hour) 300 milliGRAM(s) Oral daily  busPIRone 10 milliGRAM(s) Oral two times a day  cloNIDine 0.1 milliGRAM(s) Oral two times a day  escitalopram 20 milliGRAM(s) Oral daily  ferrous    sulfate 325 milliGRAM(s) Oral daily  guanFACINE. 1 milliGRAM(s) Oral <User Schedule>  lactobacillus acidophilus 1 Tablet(s) Oral two times a day with meals  senna 2 Tablet(s) Oral at bedtime  traZODone 200 milliGRAM(s) Oral at bedtime    MEDICATIONS  (PRN):  acetaminophen     Tablet .. 650 milliGRAM(s) Oral every 6 hours PRN Mild Pain (1 - 3), Moderate Pain (4 - 6)  aluminum hydroxide/magnesium hydroxide/simethicone Suspension 30 milliLiter(s) Oral every 6 hours PRN Dyspepsia  lactulose Syrup 10 Gram(s) Oral two times a day PRN constipation  lidocaine   4% Patch 1 Patch Transdermal every 24 hours PRN chronic L knee pain  melatonin. 5 milliGRAM(s) Oral at bedtime PRN Insomnia  nicotine  Polacrilex Gum 2 milliGRAM(s) Oral every 2 hours PRN Smoking Cessation  nicotine -   7 mG/24Hr(s) Patch 1 Patch Transdermal daily PRN nicotine dependence

## 2024-04-30 PROCEDURE — 99232 SBSQ HOSP IP/OBS MODERATE 35: CPT

## 2024-04-30 RX ADMIN — Medication 325 MILLIGRAM(S): at 08:15

## 2024-04-30 RX ADMIN — BUPROPION HYDROCHLORIDE 300 MILLIGRAM(S): 150 TABLET, EXTENDED RELEASE ORAL at 08:15

## 2024-04-30 RX ADMIN — ESCITALOPRAM OXALATE 20 MILLIGRAM(S): 10 TABLET, FILM COATED ORAL at 08:15

## 2024-04-30 RX ADMIN — ATORVASTATIN CALCIUM 40 MILLIGRAM(S): 80 TABLET, FILM COATED ORAL at 20:51

## 2024-04-30 RX ADMIN — BUPRENORPHINE AND NALOXONE 1 TABLET(S): 2; .5 TABLET SUBLINGUAL at 09:14

## 2024-04-30 RX ADMIN — Medication 2 MILLIGRAM(S): at 20:56

## 2024-04-30 RX ADMIN — Medication 10 MILLIGRAM(S): at 08:15

## 2024-04-30 RX ADMIN — Medication 1 TABLET(S): at 17:20

## 2024-04-30 RX ADMIN — Medication 10 MILLIGRAM(S): at 20:51

## 2024-04-30 RX ADMIN — Medication 0.1 MILLIGRAM(S): at 20:51

## 2024-04-30 RX ADMIN — Medication 1 PATCH: at 08:42

## 2024-04-30 RX ADMIN — Medication 1 TABLET(S): at 08:14

## 2024-04-30 RX ADMIN — BUPRENORPHINE AND NALOXONE 1 TABLET(S): 2; .5 TABLET SUBLINGUAL at 20:51

## 2024-04-30 RX ADMIN — GUANFACINE 1 MILLIGRAM(S): 3 TABLET, EXTENDED RELEASE ORAL at 20:51

## 2024-04-30 RX ADMIN — Medication 30 MILLILITER(S): at 18:31

## 2024-04-30 RX ADMIN — GUANFACINE 1 MILLIGRAM(S): 3 TABLET, EXTENDED RELEASE ORAL at 08:15

## 2024-04-30 RX ADMIN — Medication 200 MILLIGRAM(S): at 20:51

## 2024-04-30 RX ADMIN — SENNA PLUS 2 TABLET(S): 8.6 TABLET ORAL at 20:51

## 2024-04-30 RX ADMIN — Medication 2 MILLIGRAM(S): at 17:25

## 2024-04-30 RX ADMIN — Medication 0.1 MILLIGRAM(S): at 08:16

## 2024-04-30 RX ADMIN — Medication 2 MILLIGRAM(S): at 12:13

## 2024-04-30 NOTE — BH INPATIENT PSYCHIATRY PROGRESS NOTE - NSBHMETABOLIC_PSY_ALL_CORE_FT
BMI:   HbA1c: A1C with Estimated Average Glucose Result: 5.9 % (04-27-24 @ 10:21)    Glucose:   BP: --Vital Signs Last 24 Hrs  T(C): 36.3 (04-30-24 @ 08:30), Max: 36.3 (04-29-24 @ 19:17)  T(F): 97.3 (04-30-24 @ 08:30), Max: 97.4 (04-29-24 @ 19:17)  HR: --  BP: --  BP(mean): --  RR: 16 (04-29-24 @ 21:19) (16 - 16)  SpO2: --    Orthostatic VS  04-30-24 @ 08:30  Lying BP: --/-- HR: --  Sitting BP: 125/70 HR: 68  Standing BP: 122/60 HR: 67  Site: --  Mode: --  Orthostatic VS  04-29-24 @ 19:17  Lying BP: --/-- HR: --  Sitting BP: 145/72 HR: 76  Standing BP: 133/86 HR: 78  Site: --  Mode: --  Orthostatic VS  04-29-24 @ 08:30  Lying BP: --/-- HR: --  Sitting BP: 136/70 HR: 68  Standing BP: 129/68 HR: 71  Site: --  Mode: --  Orthostatic VS  04-28-24 @ 19:48  Lying BP: --/-- HR: --  Sitting BP: 122/62 HR: 72  Standing BP: 121/66 HR: 71  Site: --  Mode: electronic    Lipid Panel: Date/Time: 04-27-24 @ 10:21  Cholesterol, Serum: 139  LDL Cholesterol Calculated: 39  HDL Cholesterol, Serum: 38  Total Cholesterol/HDL Ration Measurement: --  Triglycerides, Serum: 308   BMI:   HbA1c: A1C with Estimated Average Glucose Result: 5.9 % (04-27-24 @ 10:21)    Glucose:   BP: --Vital Signs Last 24 Hrs  T(C): 36.6 (05-01-24 @ 08:13), Max: 36.7 (04-30-24 @ 19:27)  T(F): 97.9 (05-01-24 @ 08:13), Max: 98 (04-30-24 @ 19:27)  HR: --  BP: --  BP(mean): --  RR: --  SpO2: --    Orthostatic VS  05-01-24 @ 08:13  Lying BP: --/-- HR: --  Sitting BP: 135/76 HR: 62  Standing BP: 128/74 HR: 62  Site: --  Mode: --  Orthostatic VS  04-30-24 @ 19:27  Lying BP: --/-- HR: --  Sitting BP: 137/74 HR: 63  Standing BP: 124/65 HR: 67  Site: --  Mode: --  Orthostatic VS  04-30-24 @ 08:30  Lying BP: --/-- HR: --  Sitting BP: 125/70 HR: 68  Standing BP: 122/60 HR: 67  Site: --  Mode: --  Orthostatic VS  04-29-24 @ 19:17  Lying BP: --/-- HR: --  Sitting BP: 145/72 HR: 76  Standing BP: 133/86 HR: 78  Site: --  Mode: --    Lipid Panel: Date/Time: 04-27-24 @ 10:21  Cholesterol, Serum: 139  LDL Cholesterol Calculated: 39  HDL Cholesterol, Serum: 38  Total Cholesterol/HDL Ration Measurement: --  Triglycerides, Serum: 308

## 2024-04-30 NOTE — BH INPATIENT PSYCHIATRY PROGRESS NOTE - NSBHCHARTREVIEWVS_PSY_A_CORE FT
Vital Signs Last 24 Hrs  T(C): 36.3 (04-30-24 @ 08:30), Max: 36.3 (04-29-24 @ 19:17)  T(F): 97.3 (04-30-24 @ 08:30), Max: 97.4 (04-29-24 @ 19:17)  HR: --  BP: --  BP(mean): --  RR: 16 (04-29-24 @ 21:19) (16 - 16)  SpO2: --    Orthostatic VS  04-30-24 @ 08:30  Lying BP: --/-- HR: --  Sitting BP: 125/70 HR: 68  Standing BP: 122/60 HR: 67  Site: --  Mode: --  Orthostatic VS  04-29-24 @ 19:17  Lying BP: --/-- HR: --  Sitting BP: 145/72 HR: 76  Standing BP: 133/86 HR: 78  Site: --  Mode: --  Orthostatic VS  04-29-24 @ 08:30  Lying BP: --/-- HR: --  Sitting BP: 136/70 HR: 68  Standing BP: 129/68 HR: 71  Site: --  Mode: --  Orthostatic VS  04-28-24 @ 19:48  Lying BP: --/-- HR: --  Sitting BP: 122/62 HR: 72  Standing BP: 121/66 HR: 71  Site: --  Mode: electronic   Vital Signs Last 24 Hrs  T(C): 36.6 (05-01-24 @ 08:13), Max: 36.7 (04-30-24 @ 19:27)  T(F): 97.9 (05-01-24 @ 08:13), Max: 98 (04-30-24 @ 19:27)  HR: --  BP: --  BP(mean): --  RR: --  SpO2: --    Orthostatic VS  05-01-24 @ 08:13  Lying BP: --/-- HR: --  Sitting BP: 135/76 HR: 62  Standing BP: 128/74 HR: 62  Site: --  Mode: --  Orthostatic VS  04-30-24 @ 19:27  Lying BP: --/-- HR: --  Sitting BP: 137/74 HR: 63  Standing BP: 124/65 HR: 67  Site: --  Mode: --  Orthostatic VS  04-30-24 @ 08:30  Lying BP: --/-- HR: --  Sitting BP: 125/70 HR: 68  Standing BP: 122/60 HR: 67  Site: --  Mode: --  Orthostatic VS  04-29-24 @ 19:17  Lying BP: --/-- HR: --  Sitting BP: 145/72 HR: 76  Standing BP: 133/86 HR: 78  Site: --  Mode: --

## 2024-04-30 NOTE — BH INPATIENT PSYCHIATRY PROGRESS NOTE - NSBHASSESSSUMMFT_PSY_ALL_CORE
Patient is a 59 y/o male domiciled at [facility], unemployed, single, PPHx depression, anxiety, PTSD, multiple past suicide attempts, multiple inpatient psych admissions BIBEMS for suicidal ideation. Patient BIB EMS after endorsing SI to staff at nursing home.        Working diagnosis:  MDD, severe w/o psychotic features  PTSD      Plan:  >Legal: 9.13  >Obs: Routine observation, denies active SIIP and is able to engage in safety planning.      >Psychiatric Meds:  Continue Wellbutrin XL 300mg, AM for depression  Continue Lexapro 20mg, AM for depression  Continue Buspirone 10mg, BID for anxiety  Continue Guanfacine 1mg, BID for impulsivity  Continue Trazodone 200mg, HS for mood/insomnia      >PRN Meds:  Melatonin 5mg PO, QHS for insomnia  Atarax 50mg PO, QHS for anxiety  Lorazepam 2mg IM/PO, Q6H for severe anxiety      >Labs: Admission labs reviewed, no acute findings. Hold antipsychotics if QTc >500  >Medical: No acute concerns. Patient placed on CIWA, no visible physical symptoms of withdrawal. During the course of treatment, will collaborate with medical team to manage medical issues.  >Diet: Regular  >Social: Milieu/structured therapy  >Treatment Interventions: Groups and Individual Therapy/CBT.  >Dispo: pending symptom improvement, SW to pursue discharge planning.

## 2024-04-30 NOTE — BH INPATIENT PSYCHIATRY PROGRESS NOTE - NSBHFUPINTERVALHXFT_PSY_A_CORE
Patient is followed up for mood/SI. Chart, medications and labs reviewed. Patient is discussed during morning brief, no overnight events, has been in good behavioral control.          Patient was seen and is evaluated on the unit, ambulating w/ walker. He reports having good appetite and sleep. Does report some improvement in mood, has been trying to engage in groups and w/ peers on the unit. Continues to endorse anxiety related to knee surgeries and pending knee replacement. He has been compliant with standing medications, denies SE. He denies any SIIP. No acute medical concerns, VSS.

## 2024-05-01 PROCEDURE — 99232 SBSQ HOSP IP/OBS MODERATE 35: CPT

## 2024-05-01 RX ADMIN — LIDOCAINE 1 PATCH: 4 CREAM TOPICAL at 22:05

## 2024-05-01 RX ADMIN — Medication 325 MILLIGRAM(S): at 08:34

## 2024-05-01 RX ADMIN — Medication 2 MILLIGRAM(S): at 17:34

## 2024-05-01 RX ADMIN — Medication 0.1 MILLIGRAM(S): at 08:35

## 2024-05-01 RX ADMIN — Medication 200 MILLIGRAM(S): at 20:25

## 2024-05-01 RX ADMIN — Medication 10 MILLIGRAM(S): at 08:34

## 2024-05-01 RX ADMIN — Medication 1 TABLET(S): at 16:28

## 2024-05-01 RX ADMIN — Medication 0.1 MILLIGRAM(S): at 20:24

## 2024-05-01 RX ADMIN — Medication 30 MILLILITER(S): at 07:15

## 2024-05-01 RX ADMIN — GUANFACINE 1 MILLIGRAM(S): 3 TABLET, EXTENDED RELEASE ORAL at 20:24

## 2024-05-01 RX ADMIN — Medication 10 MILLIGRAM(S): at 20:24

## 2024-05-01 RX ADMIN — GUANFACINE 1 MILLIGRAM(S): 3 TABLET, EXTENDED RELEASE ORAL at 08:35

## 2024-05-01 RX ADMIN — SENNA PLUS 2 TABLET(S): 8.6 TABLET ORAL at 20:24

## 2024-05-01 RX ADMIN — BUPRENORPHINE AND NALOXONE 1 TABLET(S): 2; .5 TABLET SUBLINGUAL at 20:24

## 2024-05-01 RX ADMIN — BUPROPION HYDROCHLORIDE 300 MILLIGRAM(S): 150 TABLET, EXTENDED RELEASE ORAL at 08:47

## 2024-05-01 RX ADMIN — ATORVASTATIN CALCIUM 40 MILLIGRAM(S): 80 TABLET, FILM COATED ORAL at 20:24

## 2024-05-01 RX ADMIN — Medication 2 MILLIGRAM(S): at 13:53

## 2024-05-01 RX ADMIN — Medication 5 MILLIGRAM(S): at 20:24

## 2024-05-01 RX ADMIN — ESCITALOPRAM OXALATE 20 MILLIGRAM(S): 10 TABLET, FILM COATED ORAL at 08:35

## 2024-05-01 RX ADMIN — Medication 2 MILLIGRAM(S): at 22:06

## 2024-05-01 RX ADMIN — LACTULOSE 10 GRAM(S): 10 SOLUTION ORAL at 10:45

## 2024-05-01 RX ADMIN — BUPRENORPHINE AND NALOXONE 1 TABLET(S): 2; .5 TABLET SUBLINGUAL at 08:36

## 2024-05-01 RX ADMIN — Medication 1 TABLET(S): at 08:34

## 2024-05-01 NOTE — BH INPATIENT PSYCHIATRY PROGRESS NOTE - NSBHASSESSSUMMFT_PSY_ALL_CORE
Patient is a 57 y/o male domiciled at [facility], unemployed, single, PPHx depression, anxiety, PTSD, multiple past suicide attempts, multiple inpatient psych admissions BIBEMS for suicidal ideation. Patient BIB EMS after endorsing SI to staff at nursing home.        Working diagnosis:  MDD, severe w/o psychotic features  PTSD      Plan:  >Legal: 9.13  >Obs: Routine observation, denies active SIIP and is able to engage in safety planning.      >Psychiatric Meds:  Continue Wellbutrin XL 300mg, AM for depression  Continue Lexapro 20mg, AM for depression  Continue Buspirone 10mg, BID for anxiety  Continue Guanfacine 1mg, BID for impulsivity  Continue Trazodone 200mg, HS for mood/insomnia      >PRN Meds:  Melatonin 5mg PO, QHS for insomnia  Atarax 50mg PO, QHS for anxiety  Lorazepam 2mg IM/PO, Q6H for severe anxiety      >Labs: Admission labs reviewed, no acute findings. Hold antipsychotics if QTc >500  >Medical: No acute concerns. Patient placed on CIWA, no visible physical symptoms of withdrawal. During the course of treatment, will collaborate with medical team to manage medical issues.  >Diet: Regular  >Social: Milieu/structured therapy  >Treatment Interventions: Groups and Individual Therapy/CBT.  >Dispo: pending symptom improvement, SW to pursue discharge planning.

## 2024-05-01 NOTE — PHYSICAL THERAPY INITIAL EVALUATION ADULT - RANGE OF MOTION EXAMINATION, REHAB EVAL
+R knee swelling; pt reports chronic due to hx of multiple surgeries/bilateral upper extremity ROM was WFL (within functional limits)/bilateral lower extremity ROM was WFL (within functional limits)

## 2024-05-01 NOTE — BH INPATIENT PSYCHIATRY PROGRESS NOTE - NSBHCHARTREVIEWVS_PSY_A_CORE FT
Vital Signs Last 24 Hrs  T(C): 36.6 (05-01-24 @ 08:13), Max: 36.7 (04-30-24 @ 19:27)  T(F): 97.9 (05-01-24 @ 08:13), Max: 98 (04-30-24 @ 19:27)  HR: --  BP: --  BP(mean): --  RR: --  SpO2: --    Orthostatic VS  05-01-24 @ 08:13  Lying BP: --/-- HR: --  Sitting BP: 135/76 HR: 62  Standing BP: 128/74 HR: 62  Site: --  Mode: --  Orthostatic VS  04-30-24 @ 19:27  Lying BP: --/-- HR: --  Sitting BP: 137/74 HR: 63  Standing BP: 124/65 HR: 67  Site: --  Mode: --  Orthostatic VS  04-30-24 @ 08:30  Lying BP: --/-- HR: --  Sitting BP: 125/70 HR: 68  Standing BP: 122/60 HR: 67  Site: --  Mode: --  Orthostatic VS  04-29-24 @ 19:17  Lying BP: --/-- HR: --  Sitting BP: 145/72 HR: 76  Standing BP: 133/86 HR: 78  Site: --  Mode: --   Vital Signs Last 24 Hrs  T(C): 36.4 (05-02-24 @ 08:31), Max: 36.7 (05-01-24 @ 19:20)  T(F): 97.6 (05-02-24 @ 08:31), Max: 98.1 (05-01-24 @ 19:20)  HR: --  BP: --  BP(mean): --  RR: --  SpO2: --    Orthostatic VS  05-02-24 @ 08:31  Lying BP: --/-- HR: --  Sitting BP: 146/77 HR: 61  Standing BP: 120/63 HR: 68  Site: upper left arm  Mode: electronic  Orthostatic VS  05-01-24 @ 19:20  Lying BP: --/-- HR: --  Sitting BP: 113/60 HR: 61  Standing BP: 112/60 HR: 68  Site: --  Mode: --  Orthostatic VS  05-01-24 @ 08:13  Lying BP: --/-- HR: --  Sitting BP: 135/76 HR: 62  Standing BP: 128/74 HR: 62  Site: --  Mode: --  Orthostatic VS  04-30-24 @ 19:27  Lying BP: --/-- HR: --  Sitting BP: 137/74 HR: 63  Standing BP: 124/65 HR: 67  Site: --  Mode: --

## 2024-05-01 NOTE — BH INPATIENT PSYCHIATRY PROGRESS NOTE - NSBHMETABOLIC_PSY_ALL_CORE_FT
BMI:   HbA1c: A1C with Estimated Average Glucose Result: 5.9 % (04-27-24 @ 10:21)    Glucose:   BP: --Vital Signs Last 24 Hrs  T(C): 36.6 (05-01-24 @ 08:13), Max: 36.7 (04-30-24 @ 19:27)  T(F): 97.9 (05-01-24 @ 08:13), Max: 98 (04-30-24 @ 19:27)  HR: --  BP: --  BP(mean): --  RR: --  SpO2: --    Orthostatic VS  05-01-24 @ 08:13  Lying BP: --/-- HR: --  Sitting BP: 135/76 HR: 62  Standing BP: 128/74 HR: 62  Site: --  Mode: --  Orthostatic VS  04-30-24 @ 19:27  Lying BP: --/-- HR: --  Sitting BP: 137/74 HR: 63  Standing BP: 124/65 HR: 67  Site: --  Mode: --  Orthostatic VS  04-30-24 @ 08:30  Lying BP: --/-- HR: --  Sitting BP: 125/70 HR: 68  Standing BP: 122/60 HR: 67  Site: --  Mode: --  Orthostatic VS  04-29-24 @ 19:17  Lying BP: --/-- HR: --  Sitting BP: 145/72 HR: 76  Standing BP: 133/86 HR: 78  Site: --  Mode: --    Lipid Panel: Date/Time: 04-27-24 @ 10:21  Cholesterol, Serum: 139  LDL Cholesterol Calculated: 39  HDL Cholesterol, Serum: 38  Total Cholesterol/HDL Ration Measurement: --  Triglycerides, Serum: 308   BMI:   HbA1c: A1C with Estimated Average Glucose Result: 5.9 % (04-27-24 @ 10:21)    Glucose:   BP: --Vital Signs Last 24 Hrs  T(C): 36.4 (05-02-24 @ 08:31), Max: 36.7 (05-01-24 @ 19:20)  T(F): 97.6 (05-02-24 @ 08:31), Max: 98.1 (05-01-24 @ 19:20)  HR: --  BP: --  BP(mean): --  RR: --  SpO2: --    Orthostatic VS  05-02-24 @ 08:31  Lying BP: --/-- HR: --  Sitting BP: 146/77 HR: 61  Standing BP: 120/63 HR: 68  Site: upper left arm  Mode: electronic  Orthostatic VS  05-01-24 @ 19:20  Lying BP: --/-- HR: --  Sitting BP: 113/60 HR: 61  Standing BP: 112/60 HR: 68  Site: --  Mode: --  Orthostatic VS  05-01-24 @ 08:13  Lying BP: --/-- HR: --  Sitting BP: 135/76 HR: 62  Standing BP: 128/74 HR: 62  Site: --  Mode: --  Orthostatic VS  04-30-24 @ 19:27  Lying BP: --/-- HR: --  Sitting BP: 137/74 HR: 63  Standing BP: 124/65 HR: 67  Site: --  Mode: --    Lipid Panel: Date/Time: 04-27-24 @ 10:21  Cholesterol, Serum: 139  LDL Cholesterol Calculated: 39  HDL Cholesterol, Serum: 38  Total Cholesterol/HDL Ration Measurement: --  Triglycerides, Serum: 308

## 2024-05-01 NOTE — PHYSICAL THERAPY INITIAL EVALUATION ADULT - PERTINENT HX OF CURRENT PROBLEM, REHAB EVAL
"Patient is a 57 y/o male domiciled at [facility], unemployed, single, PPHx depression, anxiety, PTSD, multiple past suicide attempts, multiple inpatient psych admissions BIBEMS for suicidal ideation. Patient BIB EMS after endorsing SI to staff at nursing home."

## 2024-05-01 NOTE — PHYSICAL THERAPY INITIAL EVALUATION ADULT - GAIT TRAINING, PT EVAL
Patient will ambulate >300 feet independently with rolling walker by 2 weeks to allow for increased independence in the community.

## 2024-05-01 NOTE — BH INPATIENT PSYCHIATRY PROGRESS NOTE - NSBHFUPINTERVALHXFT_PSY_A_CORE
Patient is followed up for mood/SI. Chart, medications and labs reviewed. Patient is discussed during morning brief, no overnight events, has been in good behavioral control.          Patient was seen and is evaluated on the unit, ambulating w/ walker. He reports having good appetite and fair sleep, hx of sleep apnea and has been using wedge on the unit. He reports improvement in depressive sx, states that he enjoys being social w/ peers and expresses interest in individual therapy. Continues to endorse anxiety related to knee surgeries and pending knee replacement. He does report feeling constipated, received PRN of Lactulose today and encouraged to increase fluid intake. He has been compliant with standing medications, denies SE. He denies any SIIP. No acute medical concerns, VSS.

## 2024-05-01 NOTE — PHYSICAL THERAPY INITIAL EVALUATION ADULT - ADDITIONAL COMMENTS
Patient is undomiciled; reports he has been living at [facility] for ~11 months. Pt reports he is overall independent however ambulates with a rollator for stability due to history of multiple R knee surgeries. Pt admits last fall ~1 month ago. Pt states he is being evaluated for possible L TKR.

## 2024-05-02 PROCEDURE — 99232 SBSQ HOSP IP/OBS MODERATE 35: CPT

## 2024-05-02 RX ORDER — BUPRENORPHINE AND NALOXONE 2; .5 MG/1; MG/1
1 TABLET SUBLINGUAL
Refills: 0 | Status: DISCONTINUED | OUTPATIENT
Start: 2024-05-02 | End: 2024-05-09

## 2024-05-02 RX ADMIN — BUPROPION HYDROCHLORIDE 300 MILLIGRAM(S): 150 TABLET, EXTENDED RELEASE ORAL at 08:45

## 2024-05-02 RX ADMIN — Medication 2 MILLIGRAM(S): at 20:46

## 2024-05-02 RX ADMIN — Medication 0.1 MILLIGRAM(S): at 08:45

## 2024-05-02 RX ADMIN — ATORVASTATIN CALCIUM 40 MILLIGRAM(S): 80 TABLET, FILM COATED ORAL at 20:17

## 2024-05-02 RX ADMIN — Medication 650 MILLIGRAM(S): at 16:03

## 2024-05-02 RX ADMIN — Medication 30 MILLILITER(S): at 03:25

## 2024-05-02 RX ADMIN — Medication 0.1 MILLIGRAM(S): at 20:17

## 2024-05-02 RX ADMIN — SENNA PLUS 2 TABLET(S): 8.6 TABLET ORAL at 20:17

## 2024-05-02 RX ADMIN — Medication 10 MILLIGRAM(S): at 20:17

## 2024-05-02 RX ADMIN — Medication 200 MILLIGRAM(S): at 20:17

## 2024-05-02 RX ADMIN — Medication 10 MILLIGRAM(S): at 08:45

## 2024-05-02 RX ADMIN — Medication 650 MILLIGRAM(S): at 16:33

## 2024-05-02 RX ADMIN — Medication 1 TABLET(S): at 08:44

## 2024-05-02 RX ADMIN — Medication 2 MILLIGRAM(S): at 08:43

## 2024-05-02 RX ADMIN — LIDOCAINE 1 PATCH: 4 CREAM TOPICAL at 07:20

## 2024-05-02 RX ADMIN — GUANFACINE 1 MILLIGRAM(S): 3 TABLET, EXTENDED RELEASE ORAL at 08:45

## 2024-05-02 RX ADMIN — BUPRENORPHINE AND NALOXONE 1 TABLET(S): 2; .5 TABLET SUBLINGUAL at 20:17

## 2024-05-02 RX ADMIN — GUANFACINE 1 MILLIGRAM(S): 3 TABLET, EXTENDED RELEASE ORAL at 20:24

## 2024-05-02 RX ADMIN — Medication 325 MILLIGRAM(S): at 08:45

## 2024-05-02 RX ADMIN — BUPRENORPHINE AND NALOXONE 1 TABLET(S): 2; .5 TABLET SUBLINGUAL at 08:44

## 2024-05-02 RX ADMIN — Medication 1 TABLET(S): at 16:03

## 2024-05-02 RX ADMIN — LIDOCAINE 1 PATCH: 4 CREAM TOPICAL at 11:03

## 2024-05-02 RX ADMIN — Medication 2 MILLIGRAM(S): at 16:07

## 2024-05-02 RX ADMIN — ESCITALOPRAM OXALATE 20 MILLIGRAM(S): 10 TABLET, FILM COATED ORAL at 08:45

## 2024-05-02 NOTE — BH INPATIENT PSYCHIATRY PROGRESS NOTE - NSBHMETABOLIC_PSY_ALL_CORE_FT
BMI:   HbA1c: A1C with Estimated Average Glucose Result: 5.9 % (04-27-24 @ 10:21)    Glucose:   BP: --Vital Signs Last 24 Hrs  T(C): 36.4 (05-02-24 @ 08:31), Max: 36.7 (05-01-24 @ 19:20)  T(F): 97.6 (05-02-24 @ 08:31), Max: 98.1 (05-01-24 @ 19:20)  HR: --  BP: --  BP(mean): --  RR: 16 (05-02-24 @ 08:31) (16 - 16)  SpO2: --    Orthostatic VS  05-02-24 @ 08:31  Lying BP: --/-- HR: --  Sitting BP: 146/77 HR: 61  Standing BP: 120/63 HR: 68  Site: upper left arm  Mode: electronic  Orthostatic VS  05-01-24 @ 19:20  Lying BP: --/-- HR: --  Sitting BP: 113/60 HR: 61  Standing BP: 112/60 HR: 68  Site: --  Mode: --  Orthostatic VS  05-01-24 @ 08:13  Lying BP: --/-- HR: --  Sitting BP: 135/76 HR: 62  Standing BP: 128/74 HR: 62  Site: --  Mode: --  Orthostatic VS  04-30-24 @ 19:27  Lying BP: --/-- HR: --  Sitting BP: 137/74 HR: 63  Standing BP: 124/65 HR: 67  Site: --  Mode: --    Lipid Panel: Date/Time: 04-27-24 @ 10:21  Cholesterol, Serum: 139  LDL Cholesterol Calculated: 39  HDL Cholesterol, Serum: 38  Total Cholesterol/HDL Ration Measurement: --  Triglycerides, Serum: 308   BMI:   HbA1c: A1C with Estimated Average Glucose Result: 5.9 % (04-27-24 @ 10:21)    Glucose:   BP: --Vital Signs Last 24 Hrs  T(C): 36.7 (05-03-24 @ 07:54), Max: 36.7 (05-03-24 @ 07:54)  T(F): 98 (05-03-24 @ 07:54), Max: 98 (05-03-24 @ 07:54)  HR: --  BP: --  BP(mean): --  RR: --  SpO2: --    Orthostatic VS  05-03-24 @ 07:54  Lying BP: --/-- HR: --  Sitting BP: 122/76 HR: 61  Standing BP: 120/83 HR: 77  Site: upper left arm  Mode: electronic  Orthostatic VS  05-02-24 @ 19:07  Lying BP: --/-- HR: --  Sitting BP: 121/65 HR: 65  Standing BP: 134/66 HR: 75  Site: --  Mode: --  Orthostatic VS  05-02-24 @ 08:31  Lying BP: --/-- HR: --  Sitting BP: 146/77 HR: 61  Standing BP: 120/63 HR: 68  Site: upper left arm  Mode: electronic  Orthostatic VS  05-01-24 @ 19:20  Lying BP: --/-- HR: --  Sitting BP: 113/60 HR: 61  Standing BP: 112/60 HR: 68  Site: --  Mode: --    Lipid Panel: Date/Time: 04-27-24 @ 10:21  Cholesterol, Serum: 139  LDL Cholesterol Calculated: 39  HDL Cholesterol, Serum: 38  Total Cholesterol/HDL Ration Measurement: --  Triglycerides, Serum: 308

## 2024-05-02 NOTE — BH INPATIENT PSYCHIATRY PROGRESS NOTE - NSBHCHARTREVIEWVS_PSY_A_CORE FT
Vital Signs Last 24 Hrs  T(C): 36.4 (05-02-24 @ 08:31), Max: 36.7 (05-01-24 @ 19:20)  T(F): 97.6 (05-02-24 @ 08:31), Max: 98.1 (05-01-24 @ 19:20)  HR: --  BP: --  BP(mean): --  RR: 16 (05-02-24 @ 08:31) (16 - 16)  SpO2: --    Orthostatic VS  05-02-24 @ 08:31  Lying BP: --/-- HR: --  Sitting BP: 146/77 HR: 61  Standing BP: 120/63 HR: 68  Site: upper left arm  Mode: electronic  Orthostatic VS  05-01-24 @ 19:20  Lying BP: --/-- HR: --  Sitting BP: 113/60 HR: 61  Standing BP: 112/60 HR: 68  Site: --  Mode: --  Orthostatic VS  05-01-24 @ 08:13  Lying BP: --/-- HR: --  Sitting BP: 135/76 HR: 62  Standing BP: 128/74 HR: 62  Site: --  Mode: --  Orthostatic VS  04-30-24 @ 19:27  Lying BP: --/-- HR: --  Sitting BP: 137/74 HR: 63  Standing BP: 124/65 HR: 67  Site: --  Mode: --   Vital Signs Last 24 Hrs  T(C): 36.7 (05-03-24 @ 07:54), Max: 36.7 (05-03-24 @ 07:54)  T(F): 98 (05-03-24 @ 07:54), Max: 98 (05-03-24 @ 07:54)  HR: --  BP: --  BP(mean): --  RR: --  SpO2: --    Orthostatic VS  05-03-24 @ 07:54  Lying BP: --/-- HR: --  Sitting BP: 122/76 HR: 61  Standing BP: 120/83 HR: 77  Site: upper left arm  Mode: electronic  Orthostatic VS  05-02-24 @ 19:07  Lying BP: --/-- HR: --  Sitting BP: 121/65 HR: 65  Standing BP: 134/66 HR: 75  Site: --  Mode: --  Orthostatic VS  05-02-24 @ 08:31  Lying BP: --/-- HR: --  Sitting BP: 146/77 HR: 61  Standing BP: 120/63 HR: 68  Site: upper left arm  Mode: electronic  Orthostatic VS  05-01-24 @ 19:20  Lying BP: --/-- HR: --  Sitting BP: 113/60 HR: 61  Standing BP: 112/60 HR: 68  Site: --  Mode: --

## 2024-05-02 NOTE — BH INPATIENT PSYCHIATRY PROGRESS NOTE - NSBHFUPINTERVALHXFT_PSY_A_CORE
Patient is followed up for mood/SI. Chart, medications and labs reviewed. Patient is discussed during morning brief, no overnight events, has been in good behavioral control.          Patient was seen and is evaluated on the unit, ambulating w/ walker. He reports having good appetite and fair sleep, hx of sleep apnea and has been using wedge on the unit. He remain active on the unit and sociable w/ peers. Continues to endorse improvement in mood. States that he is trying to get in touch w/ his AA sponsor so he can see him in the hospital. He has been compliant with standing medications, denies SE. He denies any SIIP or urges to self harm. No acute medical concerns, VSS.

## 2024-05-02 NOTE — BH INPATIENT PSYCHIATRY PROGRESS NOTE - NSBHASSESSSUMMFT_PSY_ALL_CORE
Patient is a 59 y/o male domiciled at [facility], unemployed, single, PPHx depression, anxiety, PTSD, multiple past suicide attempts, multiple inpatient psych admissions BIBEMS for suicidal ideation. Patient BIB EMS after endorsing SI to staff at nursing home.        Working diagnosis:  MDD, severe w/o psychotic features  PTSD      Plan:  >Legal: 9.13  >Obs: Routine observation, denies active SIIP and is able to engage in safety planning.      >Psychiatric Meds:  Continue Wellbutrin XL 300mg, AM for depression  Continue Lexapro 20mg, AM for depression  Continue Buspirone 10mg, BID for anxiety  Continue Guanfacine 1mg, BID for impulsivity  Continue Trazodone 200mg, HS for mood/insomnia      >PRN Meds:  Melatonin 5mg PO, QHS for insomnia  Atarax 50mg PO, QHS for anxiety  Lorazepam 2mg IM/PO, Q6H for severe anxiety      >Labs: Admission labs reviewed, no acute findings. Hold antipsychotics if QTc >500  >Medical: No acute concerns. Patient placed on CIWA, no visible physical symptoms of withdrawal. During the course of treatment, will collaborate with medical team to manage medical issues.  >Diet: Regular  >Social: Milieu/structured therapy  >Treatment Interventions: Groups and Individual Therapy/CBT.  >Dispo: pending symptom improvement, SW to pursue discharge planning.

## 2024-05-03 PROCEDURE — 99232 SBSQ HOSP IP/OBS MODERATE 35: CPT

## 2024-05-03 RX ORDER — BUPROPION HYDROCHLORIDE 150 MG/1
150 TABLET, EXTENDED RELEASE ORAL DAILY
Refills: 0 | Status: DISCONTINUED | OUTPATIENT
Start: 2024-05-04 | End: 2024-05-07

## 2024-05-03 RX ADMIN — Medication 1 TABLET(S): at 09:18

## 2024-05-03 RX ADMIN — BUPRENORPHINE AND NALOXONE 1 TABLET(S): 2; .5 TABLET SUBLINGUAL at 09:17

## 2024-05-03 RX ADMIN — Medication 200 MILLIGRAM(S): at 19:55

## 2024-05-03 RX ADMIN — Medication 2 MILLIGRAM(S): at 20:00

## 2024-05-03 RX ADMIN — ATORVASTATIN CALCIUM 40 MILLIGRAM(S): 80 TABLET, FILM COATED ORAL at 19:56

## 2024-05-03 RX ADMIN — Medication 10 MILLIGRAM(S): at 19:56

## 2024-05-03 RX ADMIN — Medication 650 MILLIGRAM(S): at 19:54

## 2024-05-03 RX ADMIN — Medication 325 MILLIGRAM(S): at 09:18

## 2024-05-03 RX ADMIN — Medication 1 TABLET(S): at 16:21

## 2024-05-03 RX ADMIN — ESCITALOPRAM OXALATE 20 MILLIGRAM(S): 10 TABLET, FILM COATED ORAL at 09:17

## 2024-05-03 RX ADMIN — GUANFACINE 1 MILLIGRAM(S): 3 TABLET, EXTENDED RELEASE ORAL at 09:18

## 2024-05-03 RX ADMIN — Medication 0.1 MILLIGRAM(S): at 09:17

## 2024-05-03 RX ADMIN — GUANFACINE 1 MILLIGRAM(S): 3 TABLET, EXTENDED RELEASE ORAL at 19:55

## 2024-05-03 RX ADMIN — Medication 5 MILLIGRAM(S): at 23:28

## 2024-05-03 RX ADMIN — Medication 0.1 MILLIGRAM(S): at 19:55

## 2024-05-03 RX ADMIN — BUPRENORPHINE AND NALOXONE 1 TABLET(S): 2; .5 TABLET SUBLINGUAL at 19:55

## 2024-05-03 RX ADMIN — Medication 650 MILLIGRAM(S): at 13:45

## 2024-05-03 RX ADMIN — BUPROPION HYDROCHLORIDE 300 MILLIGRAM(S): 150 TABLET, EXTENDED RELEASE ORAL at 09:17

## 2024-05-03 RX ADMIN — LIDOCAINE 1 PATCH: 4 CREAM TOPICAL at 20:00

## 2024-05-03 RX ADMIN — SENNA PLUS 2 TABLET(S): 8.6 TABLET ORAL at 19:55

## 2024-05-03 RX ADMIN — Medication 2 MILLIGRAM(S): at 23:28

## 2024-05-03 RX ADMIN — Medication 10 MILLIGRAM(S): at 09:17

## 2024-05-03 RX ADMIN — Medication 650 MILLIGRAM(S): at 12:45

## 2024-05-03 NOTE — BH INPATIENT PSYCHIATRY PROGRESS NOTE - NSBHMETABOLIC_PSY_ALL_CORE_FT
BMI:   HbA1c: A1C with Estimated Average Glucose Result: 5.9 % (04-27-24 @ 10:21)    Glucose:   BP: --Vital Signs Last 24 Hrs  T(C): 36.7 (05-03-24 @ 07:54), Max: 36.7 (05-03-24 @ 07:54)  T(F): 98 (05-03-24 @ 07:54), Max: 98 (05-03-24 @ 07:54)  HR: --  BP: --  BP(mean): --  RR: --  SpO2: --    Orthostatic VS  05-03-24 @ 07:54  Lying BP: --/-- HR: --  Sitting BP: 122/76 HR: 61  Standing BP: 120/83 HR: 77  Site: upper left arm  Mode: electronic  Orthostatic VS  05-02-24 @ 19:07  Lying BP: --/-- HR: --  Sitting BP: 121/65 HR: 65  Standing BP: 134/66 HR: 75  Site: --  Mode: --  Orthostatic VS  05-02-24 @ 08:31  Lying BP: --/-- HR: --  Sitting BP: 146/77 HR: 61  Standing BP: 120/63 HR: 68  Site: upper left arm  Mode: electronic  Orthostatic VS  05-01-24 @ 19:20  Lying BP: --/-- HR: --  Sitting BP: 113/60 HR: 61  Standing BP: 112/60 HR: 68  Site: --  Mode: --    Lipid Panel: Date/Time: 04-27-24 @ 10:21  Cholesterol, Serum: 139  LDL Cholesterol Calculated: 39  HDL Cholesterol, Serum: 38  Total Cholesterol/HDL Ration Measurement: --  Triglycerides, Serum: 308   BMI:   HbA1c: A1C with Estimated Average Glucose Result: 5.9 % (04-27-24 @ 10:21)    Glucose:   BP: --Vital Signs Last 24 Hrs  T(C): 36.3 (05-06-24 @ 08:21), Max: 36.5 (05-05-24 @ 19:27)  T(F): 97.3 (05-06-24 @ 08:21), Max: 97.7 (05-05-24 @ 19:27)  HR: --  BP: --  BP(mean): --  RR: 17 (05-05-24 @ 21:00) (17 - 17)  SpO2: --    Orthostatic VS  05-06-24 @ 08:21  Lying BP: --/-- HR: --  Sitting BP: 138/68 HR: 58  Standing BP: 123/69 HR: 69  Site: --  Mode: --  Orthostatic VS  05-05-24 @ 19:27  Lying BP: --/-- HR: --  Sitting BP: 138/67 HR: 63  Standing BP: 134/72 HR: 66  Site: --  Mode: --  Orthostatic VS  05-05-24 @ 08:00  Lying BP: --/-- HR: --  Sitting BP: 156/80 HR: 66  Standing BP: 160/77 HR: 61  Site: upper left arm  Mode: electronic  Orthostatic VS  05-04-24 @ 19:18  Lying BP: --/-- HR: --  Sitting BP: 137/60 HR: 61  Standing BP: 124/60 HR: 65  Site: --  Mode: --    Lipid Panel: Date/Time: 04-27-24 @ 10:21  Cholesterol, Serum: 139  LDL Cholesterol Calculated: 39  HDL Cholesterol, Serum: 38  Total Cholesterol/HDL Ration Measurement: --  Triglycerides, Serum: 308

## 2024-05-03 NOTE — BH INPATIENT PSYCHIATRY PROGRESS NOTE - NSBHFUPINTERVALHXFT_PSY_A_CORE
Patient is followed up for mood/SI. Chart, medications and labs reviewed. Patient is discussed during morning brief, no overnight events, has been in good behavioral control.          Patient was seen and is evaluated on the unit, ambulating w/ walker. He reports having good appetite and fair sleep, hx of sleep apnea and has been using wedge on the unit. States that he has been feeling "well" and does well when interacting with others, has been sociable on the unit and engages in groups. Does continue to report anxiety related to knee surgery and outcome. He has been compliant with standing medications, denies SE. He denies any SIIP or urges to self harm. No acute medical concerns, VSS.

## 2024-05-03 NOTE — BH INPATIENT PSYCHIATRY PROGRESS NOTE - NSBHCHARTREVIEWVS_PSY_A_CORE FT
Vital Signs Last 24 Hrs  T(C): 36.7 (05-03-24 @ 07:54), Max: 36.7 (05-03-24 @ 07:54)  T(F): 98 (05-03-24 @ 07:54), Max: 98 (05-03-24 @ 07:54)  HR: --  BP: --  BP(mean): --  RR: --  SpO2: --    Orthostatic VS  05-03-24 @ 07:54  Lying BP: --/-- HR: --  Sitting BP: 122/76 HR: 61  Standing BP: 120/83 HR: 77  Site: upper left arm  Mode: electronic  Orthostatic VS  05-02-24 @ 19:07  Lying BP: --/-- HR: --  Sitting BP: 121/65 HR: 65  Standing BP: 134/66 HR: 75  Site: --  Mode: --  Orthostatic VS  05-02-24 @ 08:31  Lying BP: --/-- HR: --  Sitting BP: 146/77 HR: 61  Standing BP: 120/63 HR: 68  Site: upper left arm  Mode: electronic  Orthostatic VS  05-01-24 @ 19:20  Lying BP: --/-- HR: --  Sitting BP: 113/60 HR: 61  Standing BP: 112/60 HR: 68  Site: --  Mode: --   Vital Signs Last 24 Hrs  T(C): 36.3 (05-06-24 @ 08:21), Max: 36.5 (05-05-24 @ 19:27)  T(F): 97.3 (05-06-24 @ 08:21), Max: 97.7 (05-05-24 @ 19:27)  HR: --  BP: --  BP(mean): --  RR: 17 (05-05-24 @ 21:00) (17 - 17)  SpO2: --    Orthostatic VS  05-06-24 @ 08:21  Lying BP: --/-- HR: --  Sitting BP: 138/68 HR: 58  Standing BP: 123/69 HR: 69  Site: --  Mode: --  Orthostatic VS  05-05-24 @ 19:27  Lying BP: --/-- HR: --  Sitting BP: 138/67 HR: 63  Standing BP: 134/72 HR: 66  Site: --  Mode: --  Orthostatic VS  05-05-24 @ 08:00  Lying BP: --/-- HR: --  Sitting BP: 156/80 HR: 66  Standing BP: 160/77 HR: 61  Site: upper left arm  Mode: electronic  Orthostatic VS  05-04-24 @ 19:18  Lying BP: --/-- HR: --  Sitting BP: 137/60 HR: 61  Standing BP: 124/60 HR: 65  Site: --  Mode: --

## 2024-05-03 NOTE — BH INPATIENT PSYCHIATRY PROGRESS NOTE - NSBHASSESSSUMMFT_PSY_ALL_CORE
Patient is a 59 y/o male domiciled at [facility], unemployed, single, PPHx depression, anxiety, PTSD, multiple past suicide attempts, multiple inpatient psych admissions BIBEMS for suicidal ideation. Patient BIB EMS after endorsing SI to staff at nursing home.        Working diagnosis:  MDD, severe w/o psychotic features  PTSD      Plan:  >Legal: 9.13  >Obs: Routine observation, denies active SIIP and is able to engage in safety planning.      >Psychiatric Meds:  Decrease Wellbutrin XL 150mg, AM for depression  Continue Lexapro 20mg, AM for depression  Continue Buspirone 10mg, BID for anxiety  Continue Guanfacine 1mg, BID for impulsivity  Continue Trazodone 200mg, HS for mood/insomnia      >PRN Meds:  Melatonin 5mg PO, QHS for insomnia  Atarax 50mg PO, QHS for anxiety  Lorazepam 2mg IM/PO, Q6H for severe anxiety      >Labs: Admission labs reviewed, no acute findings. Hold antipsychotics if QTc >500  >Medical: No acute concerns. Patient placed on CIWA, no visible physical symptoms of withdrawal. During the course of treatment, will collaborate with medical team to manage medical issues.  >Diet: Regular  >Social: Milieu/structured therapy  >Treatment Interventions: Groups and Individual Therapy/CBT.  >Dispo: pending symptom improvement, SW to pursue discharge planning.

## 2024-05-03 NOTE — BH INPATIENT PSYCHIATRY PROGRESS NOTE - CURRENT MEDICATION
MEDICATIONS  (STANDING):  atorvastatin 40 milliGRAM(s) Oral at bedtime  buprenorphine 8 mG/naloxone 2 mG SL  Tablet 1 Tablet(s) SubLingual two times a day  busPIRone 10 milliGRAM(s) Oral two times a day  cloNIDine 0.1 milliGRAM(s) Oral two times a day  escitalopram 20 milliGRAM(s) Oral daily  ferrous    sulfate 325 milliGRAM(s) Oral daily  guanFACINE. 1 milliGRAM(s) Oral <User Schedule>  lactobacillus acidophilus 1 Tablet(s) Oral two times a day with meals  senna 2 Tablet(s) Oral at bedtime  traZODone 200 milliGRAM(s) Oral at bedtime    MEDICATIONS  (PRN):  acetaminophen     Tablet .. 650 milliGRAM(s) Oral every 6 hours PRN Mild Pain (1 - 3), Moderate Pain (4 - 6)  aluminum hydroxide/magnesium hydroxide/simethicone Suspension 30 milliLiter(s) Oral every 6 hours PRN Dyspepsia  lactulose Syrup 10 Gram(s) Oral two times a day PRN constipation  lidocaine   4% Patch 1 Patch Transdermal every 24 hours PRN chronic L knee pain  melatonin. 5 milliGRAM(s) Oral at bedtime PRN Insomnia  nicotine  Polacrilex Gum 2 milliGRAM(s) Oral every 2 hours PRN Smoking Cessation  nicotine -   7 mG/24Hr(s) Patch 1 Patch Transdermal daily PRN nicotine dependence   MEDICATIONS  (STANDING):  atorvastatin 40 milliGRAM(s) Oral at bedtime  buprenorphine 8 mG/naloxone 2 mG SL  Tablet 1 Tablet(s) SubLingual two times a day  buPROPion XL (24-Hour) 150 milliGRAM(s) Oral daily  busPIRone 10 milliGRAM(s) Oral two times a day  cloNIDine 0.1 milliGRAM(s) Oral two times a day  escitalopram 20 milliGRAM(s) Oral daily  ferrous    sulfate 325 milliGRAM(s) Oral daily  guanFACINE. 1 milliGRAM(s) Oral <User Schedule>  lactobacillus acidophilus 1 Tablet(s) Oral two times a day with meals  senna 2 Tablet(s) Oral at bedtime  traZODone 200 milliGRAM(s) Oral at bedtime    MEDICATIONS  (PRN):  acetaminophen     Tablet .. 650 milliGRAM(s) Oral every 6 hours PRN Mild Pain (1 - 3), Moderate Pain (4 - 6)  aluminum hydroxide/magnesium hydroxide/simethicone Suspension 30 milliLiter(s) Oral every 6 hours PRN Dyspepsia  lactulose Syrup 10 Gram(s) Oral two times a day PRN constipation  lidocaine   4% Patch 1 Patch Transdermal every 24 hours PRN chronic L knee pain  melatonin. 5 milliGRAM(s) Oral at bedtime PRN Insomnia  nicotine  Polacrilex Gum 2 milliGRAM(s) Oral every 2 hours PRN Smoking Cessation  nicotine -   7 mG/24Hr(s) Patch 1 Patch Transdermal daily PRN nicotine dependence

## 2024-05-04 RX ADMIN — BUPRENORPHINE AND NALOXONE 1 TABLET(S): 2; .5 TABLET SUBLINGUAL at 08:20

## 2024-05-04 RX ADMIN — Medication 650 MILLIGRAM(S): at 13:31

## 2024-05-04 RX ADMIN — Medication 0.1 MILLIGRAM(S): at 20:22

## 2024-05-04 RX ADMIN — Medication 1 PATCH: at 08:35

## 2024-05-04 RX ADMIN — Medication 5 MILLIGRAM(S): at 20:23

## 2024-05-04 RX ADMIN — Medication 650 MILLIGRAM(S): at 14:31

## 2024-05-04 RX ADMIN — BUPRENORPHINE AND NALOXONE 1 TABLET(S): 2; .5 TABLET SUBLINGUAL at 20:22

## 2024-05-04 RX ADMIN — BUPROPION HYDROCHLORIDE 150 MILLIGRAM(S): 150 TABLET, EXTENDED RELEASE ORAL at 08:19

## 2024-05-04 RX ADMIN — Medication 325 MILLIGRAM(S): at 08:20

## 2024-05-04 RX ADMIN — Medication 1 TABLET(S): at 16:46

## 2024-05-04 RX ADMIN — Medication 2 MILLIGRAM(S): at 06:00

## 2024-05-04 RX ADMIN — Medication 1 TABLET(S): at 08:19

## 2024-05-04 RX ADMIN — Medication 0.1 MILLIGRAM(S): at 08:20

## 2024-05-04 RX ADMIN — Medication 200 MILLIGRAM(S): at 20:22

## 2024-05-04 RX ADMIN — ATORVASTATIN CALCIUM 40 MILLIGRAM(S): 80 TABLET, FILM COATED ORAL at 20:22

## 2024-05-04 RX ADMIN — Medication 2 MILLIGRAM(S): at 20:22

## 2024-05-04 RX ADMIN — GUANFACINE 1 MILLIGRAM(S): 3 TABLET, EXTENDED RELEASE ORAL at 09:17

## 2024-05-04 RX ADMIN — ESCITALOPRAM OXALATE 20 MILLIGRAM(S): 10 TABLET, FILM COATED ORAL at 08:20

## 2024-05-04 RX ADMIN — LIDOCAINE 1 PATCH: 4 CREAM TOPICAL at 20:21

## 2024-05-04 RX ADMIN — GUANFACINE 1 MILLIGRAM(S): 3 TABLET, EXTENDED RELEASE ORAL at 20:22

## 2024-05-04 RX ADMIN — LIDOCAINE 1 PATCH: 4 CREAM TOPICAL at 09:09

## 2024-05-04 RX ADMIN — Medication 10 MILLIGRAM(S): at 20:23

## 2024-05-04 RX ADMIN — SENNA PLUS 2 TABLET(S): 8.6 TABLET ORAL at 20:22

## 2024-05-04 RX ADMIN — Medication 30 MILLILITER(S): at 01:49

## 2024-05-04 RX ADMIN — Medication 2 MILLIGRAM(S): at 12:56

## 2024-05-04 RX ADMIN — Medication 10 MILLIGRAM(S): at 08:20

## 2024-05-05 RX ADMIN — Medication 0.1 MILLIGRAM(S): at 20:03

## 2024-05-05 RX ADMIN — Medication 1 PATCH: at 08:00

## 2024-05-05 RX ADMIN — BUPRENORPHINE AND NALOXONE 1 TABLET(S): 2; .5 TABLET SUBLINGUAL at 20:03

## 2024-05-05 RX ADMIN — LIDOCAINE 1 PATCH: 4 CREAM TOPICAL at 08:00

## 2024-05-05 RX ADMIN — GUANFACINE 1 MILLIGRAM(S): 3 TABLET, EXTENDED RELEASE ORAL at 08:25

## 2024-05-05 RX ADMIN — Medication 1 PATCH: at 07:30

## 2024-05-05 RX ADMIN — Medication 2 MILLIGRAM(S): at 08:35

## 2024-05-05 RX ADMIN — Medication 5 MILLIGRAM(S): at 20:03

## 2024-05-05 RX ADMIN — ESCITALOPRAM OXALATE 20 MILLIGRAM(S): 10 TABLET, FILM COATED ORAL at 08:25

## 2024-05-05 RX ADMIN — BUPRENORPHINE AND NALOXONE 1 TABLET(S): 2; .5 TABLET SUBLINGUAL at 08:25

## 2024-05-05 RX ADMIN — Medication 10 MILLIGRAM(S): at 20:02

## 2024-05-05 RX ADMIN — Medication 2 MILLIGRAM(S): at 10:53

## 2024-05-05 RX ADMIN — Medication 200 MILLIGRAM(S): at 20:02

## 2024-05-05 RX ADMIN — Medication 1 TABLET(S): at 08:25

## 2024-05-05 RX ADMIN — Medication 0.1 MILLIGRAM(S): at 08:26

## 2024-05-05 RX ADMIN — ATORVASTATIN CALCIUM 40 MILLIGRAM(S): 80 TABLET, FILM COATED ORAL at 20:03

## 2024-05-05 RX ADMIN — Medication 1 TABLET(S): at 16:15

## 2024-05-05 RX ADMIN — Medication 650 MILLIGRAM(S): at 10:45

## 2024-05-05 RX ADMIN — SENNA PLUS 2 TABLET(S): 8.6 TABLET ORAL at 20:03

## 2024-05-05 RX ADMIN — Medication 650 MILLIGRAM(S): at 20:03

## 2024-05-05 RX ADMIN — Medication 1 PATCH: at 08:30

## 2024-05-05 RX ADMIN — Medication 2 MILLIGRAM(S): at 18:07

## 2024-05-05 RX ADMIN — Medication 10 MILLIGRAM(S): at 08:25

## 2024-05-05 RX ADMIN — LIDOCAINE 1 PATCH: 4 CREAM TOPICAL at 07:45

## 2024-05-05 RX ADMIN — GUANFACINE 1 MILLIGRAM(S): 3 TABLET, EXTENDED RELEASE ORAL at 20:03

## 2024-05-05 RX ADMIN — Medication 2 MILLIGRAM(S): at 20:04

## 2024-05-05 RX ADMIN — Medication 650 MILLIGRAM(S): at 11:30

## 2024-05-05 RX ADMIN — Medication 650 MILLIGRAM(S): at 20:51

## 2024-05-05 RX ADMIN — Medication 325 MILLIGRAM(S): at 08:25

## 2024-05-05 RX ADMIN — BUPROPION HYDROCHLORIDE 150 MILLIGRAM(S): 150 TABLET, EXTENDED RELEASE ORAL at 08:25

## 2024-05-06 PROCEDURE — 99232 SBSQ HOSP IP/OBS MODERATE 35: CPT

## 2024-05-06 RX ORDER — FLUTICASONE PROPIONATE 50 MCG
1 SPRAY, SUSPENSION NASAL EVERY 12 HOURS
Refills: 0 | Status: DISCONTINUED | OUTPATIENT
Start: 2024-05-06 | End: 2024-05-29

## 2024-05-06 RX ORDER — QUETIAPINE FUMARATE 200 MG/1
50 TABLET, FILM COATED ORAL AT BEDTIME
Refills: 0 | Status: DISCONTINUED | OUTPATIENT
Start: 2024-05-06 | End: 2024-05-17

## 2024-05-06 RX ADMIN — LIDOCAINE 1 PATCH: 4 CREAM TOPICAL at 21:17

## 2024-05-06 RX ADMIN — Medication 1 PATCH: at 08:34

## 2024-05-06 RX ADMIN — Medication 30 MILLILITER(S): at 00:01

## 2024-05-06 RX ADMIN — Medication 325 MILLIGRAM(S): at 08:29

## 2024-05-06 RX ADMIN — GUANFACINE 1 MILLIGRAM(S): 3 TABLET, EXTENDED RELEASE ORAL at 21:16

## 2024-05-06 RX ADMIN — ATORVASTATIN CALCIUM 40 MILLIGRAM(S): 80 TABLET, FILM COATED ORAL at 21:17

## 2024-05-06 RX ADMIN — Medication 650 MILLIGRAM(S): at 13:41

## 2024-05-06 RX ADMIN — Medication 2 MILLIGRAM(S): at 11:55

## 2024-05-06 RX ADMIN — GUANFACINE 1 MILLIGRAM(S): 3 TABLET, EXTENDED RELEASE ORAL at 08:33

## 2024-05-06 RX ADMIN — Medication 1 PATCH: at 08:30

## 2024-05-06 RX ADMIN — Medication 2 MILLIGRAM(S): at 04:13

## 2024-05-06 RX ADMIN — Medication 0.1 MILLIGRAM(S): at 21:15

## 2024-05-06 RX ADMIN — Medication 1 TABLET(S): at 08:28

## 2024-05-06 RX ADMIN — Medication 650 MILLIGRAM(S): at 21:16

## 2024-05-06 RX ADMIN — Medication 10 MILLIGRAM(S): at 08:29

## 2024-05-06 RX ADMIN — Medication 2 MILLIGRAM(S): at 17:28

## 2024-05-06 RX ADMIN — Medication 0.1 MILLIGRAM(S): at 08:30

## 2024-05-06 RX ADMIN — Medication 1 SPRAY(S): at 14:45

## 2024-05-06 RX ADMIN — ESCITALOPRAM OXALATE 20 MILLIGRAM(S): 10 TABLET, FILM COATED ORAL at 08:29

## 2024-05-06 RX ADMIN — BUPROPION HYDROCHLORIDE 150 MILLIGRAM(S): 150 TABLET, EXTENDED RELEASE ORAL at 08:29

## 2024-05-06 RX ADMIN — Medication 1 TABLET(S): at 16:45

## 2024-05-06 RX ADMIN — QUETIAPINE FUMARATE 50 MILLIGRAM(S): 200 TABLET, FILM COATED ORAL at 21:17

## 2024-05-06 RX ADMIN — BUPRENORPHINE AND NALOXONE 1 TABLET(S): 2; .5 TABLET SUBLINGUAL at 08:29

## 2024-05-06 RX ADMIN — Medication 10 MILLIGRAM(S): at 21:17

## 2024-05-06 RX ADMIN — BUPRENORPHINE AND NALOXONE 1 TABLET(S): 2; .5 TABLET SUBLINGUAL at 21:17

## 2024-05-06 RX ADMIN — Medication 200 MILLIGRAM(S): at 21:16

## 2024-05-06 RX ADMIN — SENNA PLUS 2 TABLET(S): 8.6 TABLET ORAL at 21:16

## 2024-05-06 RX ADMIN — Medication 650 MILLIGRAM(S): at 13:11

## 2024-05-06 RX ADMIN — Medication 2 MILLIGRAM(S): at 08:34

## 2024-05-06 RX ADMIN — Medication 1 PATCH: at 08:20

## 2024-05-06 NOTE — BH INPATIENT PSYCHIATRY PROGRESS NOTE - NSBHCHARTREVIEWVS_PSY_A_CORE FT
Vital Signs Last 24 Hrs  T(C): 36.3 (05-06-24 @ 08:21), Max: 36.5 (05-05-24 @ 19:27)  T(F): 97.3 (05-06-24 @ 08:21), Max: 97.7 (05-05-24 @ 19:27)  HR: --  BP: --  BP(mean): --  RR: 18 (05-06-24 @ 08:21) (17 - 18)  SpO2: --    Orthostatic VS  05-06-24 @ 08:21  Lying BP: --/-- HR: --  Sitting BP: 138/68 HR: 58  Standing BP: 123/69 HR: 69  Site: --  Mode: --  Orthostatic VS  05-05-24 @ 19:27  Lying BP: --/-- HR: --  Sitting BP: 138/67 HR: 63  Standing BP: 134/72 HR: 66  Site: --  Mode: --  Orthostatic VS  05-05-24 @ 08:00  Lying BP: --/-- HR: --  Sitting BP: 156/80 HR: 66  Standing BP: 160/77 HR: 61  Site: upper left arm  Mode: electronic  Orthostatic VS  05-04-24 @ 19:18  Lying BP: --/-- HR: --  Sitting BP: 137/60 HR: 61  Standing BP: 124/60 HR: 65  Site: --  Mode: --   Vital Signs Last 24 Hrs  T(C): 36.4 (05-07-24 @ 08:07), Max: 36.7 (05-06-24 @ 19:05)  T(F): 97.5 (05-07-24 @ 08:07), Max: 98 (05-06-24 @ 19:05)  HR: --  BP: --  BP(mean): --  RR: --  SpO2: --    Orthostatic VS  05-07-24 @ 08:07  Lying BP: --/-- HR: --  Sitting BP: 134/76 HR: 56  Standing BP: 118/82 HR: 68  Site: --  Mode: --  Orthostatic VS  05-06-24 @ 19:05  Lying BP: --/-- HR: --  Sitting BP: 129/63 HR: 64  Standing BP: 124/56 HR: 66  Site: --  Mode: --  Orthostatic VS  05-06-24 @ 08:21  Lying BP: --/-- HR: --  Sitting BP: 138/68 HR: 58  Standing BP: 123/69 HR: 69  Site: --  Mode: --  Orthostatic VS  05-05-24 @ 19:27  Lying BP: --/-- HR: --  Sitting BP: 138/67 HR: 63  Standing BP: 134/72 HR: 66  Site: --  Mode: --

## 2024-05-06 NOTE — BH INPATIENT PSYCHIATRY PROGRESS NOTE - NSBHASSESSSUMMFT_PSY_ALL_CORE
Patient is a 59 y/o male domiciled at [facility], unemployed, single, PPHx depression, anxiety, PTSD, multiple past suicide attempts, multiple inpatient psych admissions BIBEMS for suicidal ideation. Patient BIB EMS after endorsing SI to staff at nursing home.        Working diagnosis:  MDD, severe w/o psychotic features  PTSD      Plan:  >Legal: 9.13  >Obs: Routine observation, denies active SIIP and is able to engage in safety planning.      >Psychiatric Meds:  Decrease Wellbutrin XL 150mg, AM for depression  Continue Lexapro 20mg, AM for depression  Continue Buspirone 10mg, BID for anxiety  Continue Guanfacine 1mg, BID for impulsivity  Continue Trazodone 200mg, HS for mood/insomnia      >PRN Meds:  Melatonin 5mg PO, QHS for insomnia  Atarax 50mg PO, QHS for anxiety  Lorazepam 2mg IM/PO, Q6H for severe anxiety      >Labs: Admission labs reviewed, no acute findings. Hold antipsychotics if QTc >500  >Medical: No acute concerns. Patient placed on CIWA, no visible physical symptoms of withdrawal. During the course of treatment, will collaborate with medical team to manage medical issues.  >Diet: Regular  >Social: Milieu/structured therapy  >Treatment Interventions: Groups and Individual Therapy/CBT.  >Dispo: pending symptom improvement, SW to pursue discharge planning.

## 2024-05-06 NOTE — BH SAFETY PLAN - WARNING SIGN 3
SBAR OUT Report: Mother    Verbal report given to Salvatore Albarran RN (full name & credentials) on this patient, who is now being transferred to MIU (unit) for routine progression of care. The patient is not wearing a green \"Anesthesia-Duramorph\" band. Report consisted of patient's Situation, Background, Assessment and Recommendations (SBAR).  ID bands were compared with the identification form, and verified with the patient and receiving nurse. Information from the SBAR, Procedure Summary, Intake/Output and Recent Results and the [place] Report was reviewed with the receiving nurse; opportunity for questions and clarification provided. I have to force myself to take care of my hygiene

## 2024-05-06 NOTE — BH INPATIENT PSYCHIATRY PROGRESS NOTE - PRN MEDS
MEDICATIONS  (PRN):  acetaminophen     Tablet .. 650 milliGRAM(s) Oral every 6 hours PRN Mild Pain (1 - 3), Moderate Pain (4 - 6)  aluminum hydroxide/magnesium hydroxide/simethicone Suspension 30 milliLiter(s) Oral every 6 hours PRN Dyspepsia  fluticasone propionate 50 MICROgram(s)/spray Nasal Spray 1 Spray(s) Both Nostrils every 12 hours PRN congestion  lactulose Syrup 10 Gram(s) Oral two times a day PRN constipation  lidocaine   4% Patch 1 Patch Transdermal every 24 hours PRN chronic L knee pain  melatonin. 5 milliGRAM(s) Oral at bedtime PRN Insomnia  nicotine  Polacrilex Gum 2 milliGRAM(s) Oral every 2 hours PRN Smoking Cessation  nicotine -   7 mG/24Hr(s) Patch 1 Patch Transdermal daily PRN nicotine dependence

## 2024-05-06 NOTE — BH INPATIENT PSYCHIATRY PROGRESS NOTE - CURRENT MEDICATION
MEDICATIONS  (STANDING):  atorvastatin 40 milliGRAM(s) Oral at bedtime  buprenorphine 8 mG/naloxone 2 mG SL  Tablet 1 Tablet(s) SubLingual two times a day  buPROPion XL (24-Hour) 150 milliGRAM(s) Oral daily  busPIRone 10 milliGRAM(s) Oral two times a day  cloNIDine 0.1 milliGRAM(s) Oral two times a day  escitalopram 20 milliGRAM(s) Oral daily  ferrous    sulfate 325 milliGRAM(s) Oral daily  guanFACINE. 1 milliGRAM(s) Oral <User Schedule>  lactobacillus acidophilus 1 Tablet(s) Oral two times a day with meals  QUEtiapine 50 milliGRAM(s) Oral at bedtime  senna 2 Tablet(s) Oral at bedtime  traZODone 200 milliGRAM(s) Oral at bedtime    MEDICATIONS  (PRN):  acetaminophen     Tablet .. 650 milliGRAM(s) Oral every 6 hours PRN Mild Pain (1 - 3), Moderate Pain (4 - 6)  aluminum hydroxide/magnesium hydroxide/simethicone Suspension 30 milliLiter(s) Oral every 6 hours PRN Dyspepsia  fluticasone propionate 50 MICROgram(s)/spray Nasal Spray 1 Spray(s) Both Nostrils every 12 hours PRN congestion  lactulose Syrup 10 Gram(s) Oral two times a day PRN constipation  lidocaine   4% Patch 1 Patch Transdermal every 24 hours PRN chronic L knee pain  melatonin. 5 milliGRAM(s) Oral at bedtime PRN Insomnia  nicotine  Polacrilex Gum 2 milliGRAM(s) Oral every 2 hours PRN Smoking Cessation  nicotine -   7 mG/24Hr(s) Patch 1 Patch Transdermal daily PRN nicotine dependence

## 2024-05-06 NOTE — BH INPATIENT PSYCHIATRY PROGRESS NOTE - NSBHFUPINTERVALHXFT_PSY_A_CORE
Patient is followed up for mood/SI. Chart, medications and labs reviewed. Patient is discussed during morning brief, no overnight events, has been in good behavioral control.          Patient was seen and is evaluated on the unit, ambulating w/ walker. He reports having good appetite and fair sleep, hx of sleep apnea and has been using wedge on the unit. States that he has been feeling "good" and does well when interacting with others, has been sociable on the unit and engages in groups. He uses humor as a coping mechanism and states he likes to make others happy. Does continue to report anxiety related to knee surgery and outcome. He has been compliant with standing medications, denies SE. He denies any SIIP or urges to self harm. No acute medical concerns, VSS. Complained of nasal congestion and nose spray ordered.

## 2024-05-06 NOTE — BH INPATIENT PSYCHIATRY PROGRESS NOTE - NSBHMETABOLIC_PSY_ALL_CORE_FT
BMI:   HbA1c: A1C with Estimated Average Glucose Result: 5.9 % (04-27-24 @ 10:21)    Glucose:   BP: --Vital Signs Last 24 Hrs  T(C): 36.3 (05-06-24 @ 08:21), Max: 36.5 (05-05-24 @ 19:27)  T(F): 97.3 (05-06-24 @ 08:21), Max: 97.7 (05-05-24 @ 19:27)  HR: --  BP: --  BP(mean): --  RR: 18 (05-06-24 @ 08:21) (17 - 18)  SpO2: --    Orthostatic VS  05-06-24 @ 08:21  Lying BP: --/-- HR: --  Sitting BP: 138/68 HR: 58  Standing BP: 123/69 HR: 69  Site: --  Mode: --  Orthostatic VS  05-05-24 @ 19:27  Lying BP: --/-- HR: --  Sitting BP: 138/67 HR: 63  Standing BP: 134/72 HR: 66  Site: --  Mode: --  Orthostatic VS  05-05-24 @ 08:00  Lying BP: --/-- HR: --  Sitting BP: 156/80 HR: 66  Standing BP: 160/77 HR: 61  Site: upper left arm  Mode: electronic  Orthostatic VS  05-04-24 @ 19:18  Lying BP: --/-- HR: --  Sitting BP: 137/60 HR: 61  Standing BP: 124/60 HR: 65  Site: --  Mode: --    Lipid Panel: Date/Time: 04-27-24 @ 10:21  Cholesterol, Serum: 139  LDL Cholesterol Calculated: 39  HDL Cholesterol, Serum: 38  Total Cholesterol/HDL Ration Measurement: --  Triglycerides, Serum: 308   BMI:   HbA1c: A1C with Estimated Average Glucose Result: 5.9 % (04-27-24 @ 10:21)    Glucose:   BP: --Vital Signs Last 24 Hrs  T(C): 36.4 (05-07-24 @ 08:07), Max: 36.7 (05-06-24 @ 19:05)  T(F): 97.5 (05-07-24 @ 08:07), Max: 98 (05-06-24 @ 19:05)  HR: --  BP: --  BP(mean): --  RR: --  SpO2: --    Orthostatic VS  05-07-24 @ 08:07  Lying BP: --/-- HR: --  Sitting BP: 134/76 HR: 56  Standing BP: 118/82 HR: 68  Site: --  Mode: --  Orthostatic VS  05-06-24 @ 19:05  Lying BP: --/-- HR: --  Sitting BP: 129/63 HR: 64  Standing BP: 124/56 HR: 66  Site: --  Mode: --  Orthostatic VS  05-06-24 @ 08:21  Lying BP: --/-- HR: --  Sitting BP: 138/68 HR: 58  Standing BP: 123/69 HR: 69  Site: --  Mode: --  Orthostatic VS  05-05-24 @ 19:27  Lying BP: --/-- HR: --  Sitting BP: 138/67 HR: 63  Standing BP: 134/72 HR: 66  Site: --  Mode: --    Lipid Panel: Date/Time: 04-27-24 @ 10:21  Cholesterol, Serum: 139  LDL Cholesterol Calculated: 39  HDL Cholesterol, Serum: 38  Total Cholesterol/HDL Ration Measurement: --  Triglycerides, Serum: 308

## 2024-05-07 PROCEDURE — 99232 SBSQ HOSP IP/OBS MODERATE 35: CPT

## 2024-05-07 RX ORDER — ACETAMINOPHEN 500 MG
2 TABLET ORAL
Qty: 0 | Refills: 0 | DISCHARGE
Start: 2024-05-07

## 2024-05-07 RX ORDER — LACTULOSE 10 G/15ML
15 SOLUTION ORAL
Qty: 0 | Refills: 0 | DISCHARGE
Start: 2024-05-07

## 2024-05-07 RX ORDER — SENNA PLUS 8.6 MG/1
2 TABLET ORAL
Qty: 0 | Refills: 0 | DISCHARGE
Start: 2024-05-07

## 2024-05-07 RX ORDER — ESCITALOPRAM OXALATE 10 MG/1
1 TABLET, FILM COATED ORAL
Qty: 30 | Refills: 0
Start: 2024-05-07 | End: 2024-06-05

## 2024-05-07 RX ORDER — GUANFACINE 3 MG/1
1 TABLET, EXTENDED RELEASE ORAL
Qty: 60 | Refills: 0
Start: 2024-05-07 | End: 2024-06-05

## 2024-05-07 RX ORDER — LACTOBACILLUS ACIDOPHILUS 100MM CELL
1 CAPSULE ORAL
Qty: 0 | Refills: 0 | DISCHARGE
Start: 2024-05-07

## 2024-05-07 RX ORDER — FLUTICASONE PROPIONATE 50 MCG
1 SPRAY, SUSPENSION NASAL
Qty: 1 | Refills: 0
Start: 2024-05-07 | End: 2024-06-05

## 2024-05-07 RX ORDER — ATORVASTATIN CALCIUM 80 MG/1
1 TABLET, FILM COATED ORAL
Qty: 0 | Refills: 0 | DISCHARGE
Start: 2024-05-07

## 2024-05-07 RX ORDER — TRAZODONE HCL 50 MG
100 TABLET ORAL AT BEDTIME
Refills: 0 | Status: DISCONTINUED | OUTPATIENT
Start: 2024-05-07 | End: 2024-05-29

## 2024-05-07 RX ORDER — FERROUS SULFATE 325(65) MG
1 TABLET ORAL
Qty: 0 | Refills: 0 | DISCHARGE
Start: 2024-05-07

## 2024-05-07 RX ORDER — BUPRENORPHINE AND NALOXONE 2; .5 MG/1; MG/1
1 TABLET SUBLINGUAL
Qty: 0 | Refills: 0 | DISCHARGE
Start: 2024-05-07

## 2024-05-07 RX ORDER — QUETIAPINE FUMARATE 200 MG/1
1 TABLET, FILM COATED ORAL
Qty: 30 | Refills: 0
Start: 2024-05-07 | End: 2024-06-05

## 2024-05-07 RX ADMIN — Medication 325 MILLIGRAM(S): at 08:49

## 2024-05-07 RX ADMIN — Medication 650 MILLIGRAM(S): at 21:14

## 2024-05-07 RX ADMIN — QUETIAPINE FUMARATE 50 MILLIGRAM(S): 200 TABLET, FILM COATED ORAL at 21:13

## 2024-05-07 RX ADMIN — ATORVASTATIN CALCIUM 40 MILLIGRAM(S): 80 TABLET, FILM COATED ORAL at 21:15

## 2024-05-07 RX ADMIN — ESCITALOPRAM OXALATE 20 MILLIGRAM(S): 10 TABLET, FILM COATED ORAL at 08:50

## 2024-05-07 RX ADMIN — BUPRENORPHINE AND NALOXONE 1 TABLET(S): 2; .5 TABLET SUBLINGUAL at 21:14

## 2024-05-07 RX ADMIN — SENNA PLUS 2 TABLET(S): 8.6 TABLET ORAL at 21:13

## 2024-05-07 RX ADMIN — Medication 10 MILLIGRAM(S): at 08:50

## 2024-05-07 RX ADMIN — Medication 1 TABLET(S): at 08:49

## 2024-05-07 RX ADMIN — Medication 1 PATCH: at 08:49

## 2024-05-07 RX ADMIN — Medication 2 MILLIGRAM(S): at 08:50

## 2024-05-07 RX ADMIN — BUPRENORPHINE AND NALOXONE 1 TABLET(S): 2; .5 TABLET SUBLINGUAL at 08:50

## 2024-05-07 RX ADMIN — Medication 0.1 MILLIGRAM(S): at 21:14

## 2024-05-07 RX ADMIN — GUANFACINE 1 MILLIGRAM(S): 3 TABLET, EXTENDED RELEASE ORAL at 21:14

## 2024-05-07 RX ADMIN — Medication 0.1 MILLIGRAM(S): at 08:49

## 2024-05-07 RX ADMIN — LIDOCAINE 1 PATCH: 4 CREAM TOPICAL at 21:15

## 2024-05-07 RX ADMIN — BUPROPION HYDROCHLORIDE 150 MILLIGRAM(S): 150 TABLET, EXTENDED RELEASE ORAL at 08:53

## 2024-05-07 RX ADMIN — Medication 2 MILLIGRAM(S): at 21:14

## 2024-05-07 RX ADMIN — Medication 10 MILLIGRAM(S): at 21:15

## 2024-05-07 RX ADMIN — Medication 1 TABLET(S): at 18:24

## 2024-05-07 RX ADMIN — GUANFACINE 1 MILLIGRAM(S): 3 TABLET, EXTENDED RELEASE ORAL at 08:53

## 2024-05-07 NOTE — BH DISCHARGE NOTE NURSING/SOCIAL WORK/PSYCH REHAB - NSDCPRGOAL_PSY_ALL_CORE
Pt showed progress towards her discharge. Pt has demonstrated daily compliant with medication regimen and dosage. Pt reported less anxious, less depressed, no more suicidal thoughts. Pt has identified his coping skills such as go to AA meeting, talk to his , talk to his peers, pray, listen to music, watch YouTube, and read current events to manage symptoms. Pt currently denies SI, HI, AH and VH. During this hospitalization, pt showed 90% of group attendance. During the group session, pt was able to tolerate group structure, actively participated with relevant feedback. Pt was visible on the unit, interacts well with others. Pt maintained fair ADLs. Pt has participated in his safety plan. Pt showed progress towards her discharge. Pt has demonstrated daily compliant with medication regimen and dosage. Pt reported less anxious, less depressed, no more suicidal thoughts. Pt has identified his coping skills such as go to AA meeting, talk to his , talk to his peers, pray, listen to music, watch YouTube, and read current events to manage symptoms. Pt admitted worry about his upcoming surgery and mobility in the future at time. Writer provided support and encourage pt to utilize identified coping skills, pt was receptive. Pt currently denies SI, HI, AH and VH. During this hospitalization, pt showed 90% of group attendance. During the group session, pt was able to tolerate group structure, actively participated with relevant feedback. Pt was visible on the unit, interacts well with others. Pt maintained fair ADLs. Pt has participated in his safety plan.

## 2024-05-07 NOTE — BH DISCHARGE NOTE NURSING/SOCIAL WORK/PSYCH REHAB - NSCDUDCCRISIS_PSY_A_CORE
[facility] Well  1 (091) [facility]-WELL ([phone])  Text "WELL" to 49975  Website: [email or web address]/.National Suicide Prevention Lifeline 1 (630) 821-3580/.  [employer]  1 (444) LIFENET (435-2955)/.  [facility]  [address] [place] [phone]   Hours:  Monday through Friday from 9 AM to 3 PM

## 2024-05-07 NOTE — BH INPATIENT PSYCHIATRY PROGRESS NOTE - NSBHFUPINTERVALHXFT_PSY_A_CORE
Patient is followed up for mood/SI. Chart, medications and labs reviewed. Patient is discussed during morning brief, no overnight events, has been in good behavioral control.          Patient was seen and is evaluated on the unit, ambulating w/ walker. He reports having good appetite and sleep, hx of sleep apnea and has been using wedge on the unit. He verbalizes that he plans to spend more times going to groups and playing bingo at [employer] to keep himself from being isolative. Also discusses going to weekly AA meetings on Thursday. We discussed discharge scheduled for tomorrow. He has been compliant with standing medications, denies SE. He denies any SIIP or urges to self harm. No acute medical concerns, VSS.

## 2024-05-07 NOTE — BH INPATIENT PSYCHIATRY PROGRESS NOTE - PRN MEDS
MEDICATIONS  (PRN):  acetaminophen     Tablet .. 650 milliGRAM(s) Oral every 6 hours PRN Mild Pain (1 - 3), Moderate Pain (4 - 6)  aluminum hydroxide/magnesium hydroxide/simethicone Suspension 30 milliLiter(s) Oral every 6 hours PRN Dyspepsia  fluticasone propionate 50 MICROgram(s)/spray Nasal Spray 1 Spray(s) Both Nostrils every 12 hours PRN congestion  lactulose Syrup 10 Gram(s) Oral two times a day PRN constipation  lidocaine   4% Patch 1 Patch Transdermal every 24 hours PRN chronic L knee pain  melatonin. 5 milliGRAM(s) Oral at bedtime PRN Insomnia  nicotine  Polacrilex Gum 2 milliGRAM(s) Oral every 2 hours PRN Smoking Cessation  nicotine -   7 mG/24Hr(s) Patch 1 Patch Transdermal daily PRN nicotine dependence  traZODone 100 milliGRAM(s) Oral at bedtime PRN insomnia

## 2024-05-07 NOTE — BH DISCHARGE NOTE NURSING/SOCIAL WORK/PSYCH REHAB - NSDCVIVACCINE_GEN_ALL_CORE_FT
No Vaccines Administered. Tdap; 29-Feb-2020 23:58; Lauren Delacruz (TASHIA); [employer]; [id number] (Exp. Date: 19-Mar-2022); IntraMuscular; Deltoid Right.; 0.5 milliLiter(s); VIS (VIS Published: 09-May-2013, VIS Presented: 29-Feb-2020);

## 2024-05-07 NOTE — BH INPATIENT PSYCHIATRY PROGRESS NOTE - CURRENT MEDICATION
MEDICATIONS  (STANDING):  atorvastatin 40 milliGRAM(s) Oral at bedtime  buprenorphine 8 mG/naloxone 2 mG SL  Tablet 1 Tablet(s) SubLingual two times a day  busPIRone 10 milliGRAM(s) Oral two times a day  cloNIDine 0.1 milliGRAM(s) Oral two times a day  escitalopram 20 milliGRAM(s) Oral daily  ferrous    sulfate 325 milliGRAM(s) Oral daily  guanFACINE. 1 milliGRAM(s) Oral <User Schedule>  lactobacillus acidophilus 1 Tablet(s) Oral two times a day with meals  QUEtiapine 50 milliGRAM(s) Oral at bedtime  senna 2 Tablet(s) Oral at bedtime    MEDICATIONS  (PRN):  acetaminophen     Tablet .. 650 milliGRAM(s) Oral every 6 hours PRN Mild Pain (1 - 3), Moderate Pain (4 - 6)  aluminum hydroxide/magnesium hydroxide/simethicone Suspension 30 milliLiter(s) Oral every 6 hours PRN Dyspepsia  fluticasone propionate 50 MICROgram(s)/spray Nasal Spray 1 Spray(s) Both Nostrils every 12 hours PRN congestion  lactulose Syrup 10 Gram(s) Oral two times a day PRN constipation  lidocaine   4% Patch 1 Patch Transdermal every 24 hours PRN chronic L knee pain  melatonin. 5 milliGRAM(s) Oral at bedtime PRN Insomnia  nicotine  Polacrilex Gum 2 milliGRAM(s) Oral every 2 hours PRN Smoking Cessation  nicotine -   7 mG/24Hr(s) Patch 1 Patch Transdermal daily PRN nicotine dependence  traZODone 100 milliGRAM(s) Oral at bedtime PRN insomnia

## 2024-05-07 NOTE — BH INPATIENT PSYCHIATRY PROGRESS NOTE - NSBHASSESSSUMMFT_PSY_ALL_CORE
Patient is a 59 y/o male domiciled at [facility], unemployed, single, PPHx depression, anxiety, PTSD, multiple past suicide attempts, multiple inpatient psych admissions BIBEMS for suicidal ideation. Patient BIB EMS after endorsing SI to staff at nursing home.        Working diagnosis:  MDD, severe w/o psychotic features  PTSD      Plan:  >Legal: 9.13  >Obs: Routine observation, denies active SIIP and is able to engage in safety planning.      >Psychiatric Meds:  Discontinue Wellbutrin XL 150mg, AM for depression  Continue Lexapro 20mg, AM for depression  Continue Buspirone 10mg, BID for anxiety  Continue Guanfacine 1mg, BID for impulsivity  Discontinue Trazodone 200mg, HS for mood/insomnia      >PRN Meds:  Trazodone 100mg, QHS for insomnia  Melatonin 5mg PO, QHS for insomnia  Atarax 50mg PO, QHS for anxiety  Lorazepam 2mg IM/PO, Q6H for severe anxiety      >Labs: Admission labs reviewed, no acute findings. Hold antipsychotics if QTc >500  >Medical: No acute concerns. Patient placed on CIWA, no visible physical symptoms of withdrawal. During the course of treatment, will collaborate with medical team to manage medical issues.  >Diet: Regular  >Social: Milieu/structured therapy  >Treatment Interventions: Groups and Individual Therapy/CBT.  >Dispo: pending symptom improvement, SW to pursue discharge planning.

## 2024-05-07 NOTE — BH DISCHARGE NOTE NURSING/SOCIAL WORK/PSYCH REHAB - NSDCPEWEB_GEN_ALL_CORE
[facility] for Tobacco Control website --- [email or web address] website --- [email or web address]

## 2024-05-07 NOTE — BH DISCHARGE NOTE NURSING/SOCIAL WORK/PSYCH REHAB - NSDCPRRECOMMEND_PSY_ALL_CORE
Please follow up treatment with [facility] on 5/13/2024 at 9:30 AM.  Please follow up treatment with [facility] on 6/1/2024 at 3 PM.

## 2024-05-07 NOTE — BH DISCHARGE NOTE NURSING/SOCIAL WORK/PSYCH REHAB - PATIENT PORTAL LINK FT
You can access the FollowMyHealth Patient Portal offered by [facility] by registering at the following website: [email or web address]. By joining [employer]’s FollowMyHealth portal, you will also be able to view your health information using other applications (apps) compatible with our system.

## 2024-05-07 NOTE — BH INPATIENT PSYCHIATRY PROGRESS NOTE - NSBHMETABOLIC_PSY_ALL_CORE_FT
BMI:   HbA1c: A1C with Estimated Average Glucose Result: 5.9 % (04-27-24 @ 10:21)    Glucose:   BP: --Vital Signs Last 24 Hrs  T(C): 36.4 (05-07-24 @ 08:07), Max: 36.7 (05-06-24 @ 19:05)  T(F): 97.5 (05-07-24 @ 08:07), Max: 98 (05-06-24 @ 19:05)  HR: --  BP: --  BP(mean): --  RR: --  SpO2: --    Orthostatic VS  05-07-24 @ 08:07  Lying BP: --/-- HR: --  Sitting BP: 134/76 HR: 56  Standing BP: 118/82 HR: 68  Site: --  Mode: --  Orthostatic VS  05-06-24 @ 19:05  Lying BP: --/-- HR: --  Sitting BP: 129/63 HR: 64  Standing BP: 124/56 HR: 66  Site: --  Mode: --  Orthostatic VS  05-06-24 @ 08:21  Lying BP: --/-- HR: --  Sitting BP: 138/68 HR: 58  Standing BP: 123/69 HR: 69  Site: --  Mode: --  Orthostatic VS  05-05-24 @ 19:27  Lying BP: --/-- HR: --  Sitting BP: 138/67 HR: 63  Standing BP: 134/72 HR: 66  Site: --  Mode: --    Lipid Panel: Date/Time: 04-27-24 @ 10:21  Cholesterol, Serum: 139  LDL Cholesterol Calculated: 39  HDL Cholesterol, Serum: 38  Total Cholesterol/HDL Ration Measurement: --  Triglycerides, Serum: 308   BMI:   HbA1c: A1C with Estimated Average Glucose Result: 5.9 % (04-27-24 @ 10:21)    Glucose:   BP: --Vital Signs Last 24 Hrs  T(C): 36.4 (05-08-24 @ 09:01), Max: 36.6 (05-07-24 @ 19:27)  T(F): 97.6 (05-08-24 @ 09:01), Max: 97.8 (05-07-24 @ 19:27)  HR: --  BP: --  BP(mean): --  RR: --  SpO2: --    Orthostatic VS  05-08-24 @ 09:01  Lying BP: --/-- HR: --  Sitting BP: 144/61 HR: 58  Standing BP: 153/76 HR: 72  Site: --  Mode: --  Orthostatic VS  05-07-24 @ 19:27  Lying BP: --/-- HR: --  Sitting BP: 141/77 HR: 66  Standing BP: 124/68 HR: 71  Site: --  Mode: --  Orthostatic VS  05-07-24 @ 08:07  Lying BP: --/-- HR: --  Sitting BP: 134/76 HR: 56  Standing BP: 118/82 HR: 68  Site: --  Mode: --  Orthostatic VS  05-06-24 @ 19:05  Lying BP: --/-- HR: --  Sitting BP: 129/63 HR: 64  Standing BP: 124/56 HR: 66  Site: --  Mode: --    Lipid Panel: Date/Time: 04-27-24 @ 10:21  Cholesterol, Serum: 139  LDL Cholesterol Calculated: 39  HDL Cholesterol, Serum: 38  Total Cholesterol/HDL Ration Measurement: --  Triglycerides, Serum: 308

## 2024-05-07 NOTE — BH INPATIENT PSYCHIATRY PROGRESS NOTE - NSBHCHARTREVIEWVS_PSY_A_CORE FT
Vital Signs Last 24 Hrs  T(C): 36.4 (05-07-24 @ 08:07), Max: 36.7 (05-06-24 @ 19:05)  T(F): 97.5 (05-07-24 @ 08:07), Max: 98 (05-06-24 @ 19:05)  HR: --  BP: --  BP(mean): --  RR: --  SpO2: --    Orthostatic VS  05-07-24 @ 08:07  Lying BP: --/-- HR: --  Sitting BP: 134/76 HR: 56  Standing BP: 118/82 HR: 68  Site: --  Mode: --  Orthostatic VS  05-06-24 @ 19:05  Lying BP: --/-- HR: --  Sitting BP: 129/63 HR: 64  Standing BP: 124/56 HR: 66  Site: --  Mode: --  Orthostatic VS  05-06-24 @ 08:21  Lying BP: --/-- HR: --  Sitting BP: 138/68 HR: 58  Standing BP: 123/69 HR: 69  Site: --  Mode: --  Orthostatic VS  05-05-24 @ 19:27  Lying BP: --/-- HR: --  Sitting BP: 138/67 HR: 63  Standing BP: 134/72 HR: 66  Site: --  Mode: --   Vital Signs Last 24 Hrs  T(C): 36.4 (05-08-24 @ 09:01), Max: 36.6 (05-07-24 @ 19:27)  T(F): 97.6 (05-08-24 @ 09:01), Max: 97.8 (05-07-24 @ 19:27)  HR: --  BP: --  BP(mean): --  RR: --  SpO2: --    Orthostatic VS  05-08-24 @ 09:01  Lying BP: --/-- HR: --  Sitting BP: 144/61 HR: 58  Standing BP: 153/76 HR: 72  Site: --  Mode: --  Orthostatic VS  05-07-24 @ 19:27  Lying BP: --/-- HR: --  Sitting BP: 141/77 HR: 66  Standing BP: 124/68 HR: 71  Site: --  Mode: --  Orthostatic VS  05-07-24 @ 08:07  Lying BP: --/-- HR: --  Sitting BP: 134/76 HR: 56  Standing BP: 118/82 HR: 68  Site: --  Mode: --  Orthostatic VS  05-06-24 @ 19:05  Lying BP: --/-- HR: --  Sitting BP: 129/63 HR: 64  Standing BP: 124/56 HR: 66  Site: --  Mode: --

## 2024-05-07 NOTE — BH DISCHARGE NOTE NURSING/SOCIAL WORK/PSYCH REHAB - NSDCBELONGINGSDISPO_PSY_ALL_CORE
Dr Maharaj,    [facility] completed  Spoke with patient, doing fairly well, states he feels a little more confused this morning than normal  Son from [place] is with him until tomorrow    He is covid positive, tested on Sunday  Wanting to see you tomorrow for hospital follow up  I did schedule  Please advise as patient is Covid positivie.    Ok to keep?  I will follow up with patient to confirm appointment.    Admission 8/7  Discharge 8/10    Discharge Diagnoses:  There are no hospital problems to display for this patient.  Acute hypoxemic respiratory failure  COVID-19 pneumonia  Hyperkalemia  History of CAD status post PCI  Essential hypertension  Hyperlipidemia  CKD stage IV  History of COPD  Chronic anemia    Discharge Disposition:  Home or Self Care     Discharge Medications:  Current Discharge Medication List     Plan of Care:     Follow Up: Follow-up with PCP in 3 to 5 days with follow-up BMP in 1 week  Follow-up with nephrology in 1 week  Follow-up with cardiology in 1 to 2 weeks  Pending Labs/Imaging: BMP IN 1 WEEK      Test on Sunday covid positive  Symptoms started Saturday       TRANSITIONAL CARE MANAGEMENT - HOSPITAL DISCHARGE FOLLOW-UP    Contacted Mr. Camacho regarding follow-up for Hypoxia, Covid + after hospital discharge. He was discharged from the hospital on 8/10/2022. Review of the After Visit Summary from the recent hospitalization indicates that the patient needs to Follow-up with PCP in 3 to 5 days with follow-up BMP in 1 week  Follow-up with nephrology in 1 week  Follow-up with cardiology in 1 to 2 weeks .    He feels that he is doing fairly well at home.   His diet concern is poor appetite. Overall, the patient is not eating well.   Ambulation: staying the same  Fever: is not present  Pain: none  Activities of Daily Living (global): Partial assistance   Patient states that he does have sufficient family support. He feels that he is able to ask for assistance when needed.  Patient has son in [facility],  Jonatan that helps with patient, also patients other son is in town from [place] until tomorrow.    Additional patient/family concerns: None .    Discharge medications were verified with the patient. He is fully compliant with the medication regimen prescribed at the time of discharge. He reports that he is not experiencing any medication side effects.      Advance Directive:   The patient has the following documents:  No Advance Directives on file. Patient offered documents.    Patient has an appointment on 8/12 with Abhinav Maharaj MD. Mr. Camacho was reminded about the importance of keeping this appointment.      With patient

## 2024-05-07 NOTE — BH DISCHARGE NOTE NURSING/SOCIAL WORK/PSYCH REHAB - DISCHARGE INSTRUCTIONS AFTERCARE APPOINTMENTS
In order to check the location, date, or time of your aftercare appointment, please refer to your Discharge Instructions Document given to you upon leaving the hospital.  If you have lost the instructions please call [phone]

## 2024-05-07 NOTE — BH DISCHARGE NOTE NURSING/SOCIAL WORK/PSYCH REHAB - NSDCPEEMAIL_GEN_ALL_CORE
[facility] for Tobacco Control email [email or web address]

## 2024-05-08 PROCEDURE — 99238 HOSP IP/OBS DSCHRG MGMT 30/<: CPT

## 2024-05-08 RX ADMIN — Medication 10 MILLIGRAM(S): at 20:02

## 2024-05-08 RX ADMIN — BUPRENORPHINE AND NALOXONE 1 TABLET(S): 2; .5 TABLET SUBLINGUAL at 20:01

## 2024-05-08 RX ADMIN — ESCITALOPRAM OXALATE 20 MILLIGRAM(S): 10 TABLET, FILM COATED ORAL at 08:55

## 2024-05-08 RX ADMIN — Medication 325 MILLIGRAM(S): at 08:54

## 2024-05-08 RX ADMIN — GUANFACINE 1 MILLIGRAM(S): 3 TABLET, EXTENDED RELEASE ORAL at 20:03

## 2024-05-08 RX ADMIN — Medication 30 MILLILITER(S): at 22:19

## 2024-05-08 RX ADMIN — GUANFACINE 1 MILLIGRAM(S): 3 TABLET, EXTENDED RELEASE ORAL at 08:55

## 2024-05-08 RX ADMIN — Medication 0.1 MILLIGRAM(S): at 20:00

## 2024-05-08 RX ADMIN — Medication 2 MILLIGRAM(S): at 20:00

## 2024-05-08 RX ADMIN — QUETIAPINE FUMARATE 50 MILLIGRAM(S): 200 TABLET, FILM COATED ORAL at 20:00

## 2024-05-08 RX ADMIN — SENNA PLUS 2 TABLET(S): 8.6 TABLET ORAL at 20:02

## 2024-05-08 RX ADMIN — Medication 0.1 MILLIGRAM(S): at 09:06

## 2024-05-08 RX ADMIN — Medication 650 MILLIGRAM(S): at 21:05

## 2024-05-08 RX ADMIN — Medication 1 TABLET(S): at 08:57

## 2024-05-08 RX ADMIN — ATORVASTATIN CALCIUM 40 MILLIGRAM(S): 80 TABLET, FILM COATED ORAL at 20:02

## 2024-05-08 RX ADMIN — Medication 10 MILLIGRAM(S): at 08:55

## 2024-05-08 RX ADMIN — BUPRENORPHINE AND NALOXONE 1 TABLET(S): 2; .5 TABLET SUBLINGUAL at 08:55

## 2024-05-08 RX ADMIN — Medication 650 MILLIGRAM(S): at 20:01

## 2024-05-08 NOTE — BH INPATIENT PSYCHIATRY PROGRESS NOTE - NSBHCHARTREVIEWVS_PSY_A_CORE FT
Vital Signs Last 24 Hrs  T(C): 36.4 (05-09-24 @ 08:20), Max: 36.6 (05-08-24 @ 19:41)  T(F): 97.6 (05-09-24 @ 08:20), Max: 97.9 (05-08-24 @ 19:41)  HR: --  BP: --  BP(mean): --  RR: 17 (05-09-24 @ 08:20) (16 - 17)  SpO2: 99% (05-08-24 @ 15:26) (99% - 99%)    Orthostatic VS  05-09-24 @ 08:20  Lying BP: --/-- HR: --  Sitting BP: 124/68 HR: 62  Standing BP: 132/60 HR: 64  Site: upper left arm  Mode: electronic  Orthostatic VS  05-08-24 @ 19:41  Lying BP: --/-- HR: --  Sitting BP: 129/76 HR: 113  Standing BP: 93/81 HR: 74  Site: --  Mode: --  Orthostatic VS  05-08-24 @ 15:26  Lying BP: --/-- HR: --  Sitting BP: 143/72 HR: 79  Standing BP: 141/83 HR: 88  Site: --  Mode: --  Orthostatic VS  05-08-24 @ 09:01  Lying BP: --/-- HR: --  Sitting BP: 144/61 HR: 58  Standing BP: 153/76 HR: 72  Site: --  Mode: --  Orthostatic VS  05-07-24 @ 19:27  Lying BP: --/-- HR: --  Sitting BP: 141/77 HR: 66  Standing BP: 124/68 HR: 71  Site: --  Mode: --   Vital Signs Last 24 Hrs  T(C): 36.8 (05-10-24 @ 08:38), Max: 36.8 (05-10-24 @ 08:38)  T(F): 98.2 (05-10-24 @ 08:38), Max: 98.2 (05-10-24 @ 08:38)  HR: --  BP: --  BP(mean): --  RR: 17 (05-10-24 @ 08:38) (17 - 17)  SpO2: --    Orthostatic VS  05-10-24 @ 08:38  Lying BP: --/-- HR: --  Sitting BP: 149/64 HR: 63  Standing BP: 137/72 HR: 71  Site: --  Mode: --  Orthostatic VS  05-09-24 @ 19:29  Lying BP: --/-- HR: --  Sitting BP: 127/83 HR: 73  Standing BP: 140/69 HR: 75  Site: --  Mode: --  Orthostatic VS  05-09-24 @ 08:20  Lying BP: --/-- HR: --  Sitting BP: 124/68 HR: 62  Standing BP: 132/60 HR: 64  Site: upper left arm  Mode: electronic  Orthostatic VS  05-08-24 @ 19:41  Lying BP: --/-- HR: --  Sitting BP: 129/76 HR: 113  Standing BP: 93/81 HR: 74  Site: --  Mode: --  Orthostatic VS  05-08-24 @ 15:26  Lying BP: --/-- HR: --  Sitting BP: 143/72 HR: 79  Standing BP: 141/83 HR: 88  Site: --  Mode: --

## 2024-05-08 NOTE — BH INPATIENT PSYCHIATRY PROGRESS NOTE - NSBHFUPINTERVALHXFT_PSY_A_CORE
Patient is followed up for mood/SI. Chart, medications and labs reviewed. Patient is discussed during morning brief, no overnight events, has been in good behavioral control.          Patient was seen and is evaluated on the unit, ambulating w/ walker. He reports having good appetite and sleep, hx of sleep apnea and has been using wedge on the unit. Reports improved sleep w/ addition of Seroquel. He verbalizes that he is looking forward to being discharged today and will spend more time engaging w/ others including his AA sponsor. He has been compliant with standing medications, denies SE. He denies any SIIP or urges to self harm. No acute medical concerns, VSS.  Pt was unable to be discharged to [place] due to missing documentation and returned back to the unit at approx. 3PM. Writer discussed that team will submit documentation and reattempt discharge at a later time.

## 2024-05-08 NOTE — BH INPATIENT PSYCHIATRY DISCHARGE NOTE - NSBHFUPINTERVALHXFT_PSY_A_CORE
... Patient is followed up for mood/SI. Chart, medications and labs reviewed. Patient is discussed during morning brief, no overnight events, has been in good behavioral control.          Patient was seen and is evaluated on the unit, ambulating w/ walker. He reports having good appetite and sleep, hx of sleep apnea and has been using wedge on the unit. Reports improved sleep w/ addition of Seroquel. He verbalizes that he is looking forward to being discharged today and will spend more time engaging w/ others including his AA sponsor. He has been compliant with standing medications, denies SE. He denies any SIIP or urges to self harm. No acute medical concerns, VSS.   Patient is following-up for depression. Compliant with medications, no overnight events. Patient seen and examined on the unit, reports having good appetite including breakfast today, and fair sleep, though he continues to struggle to use MARIBEL wedge. He was informed that team was able to obtain insurance authorization this morning and will be discharged to [facility] this afternoon via ambulance. He reports excitement in being discharged, states that he plans on attending AA meeting tomorrow. He continues to deny any SIIP. Has not exhibited any drug-seeking behaviors during hospitalization. He reports decreased pain in knee w/ PT sessions and has been utilizing Voltaren cream. No acute medical concerns, VSS.

## 2024-05-08 NOTE — BH INPATIENT PSYCHIATRY DISCHARGE NOTE - ATTENDING DISCHARGE PHYSICAL EXAMINATION:
I have personally seen and evaluated patient prior to discharge. Chart reviewed and discharge plan discussed with the NP as follows. Pt prior to discharge was calm, cooperative, friendly and smiling. Patient has been attending groups with active participation.  Pt interacting well with others. No recent behavioral problems and no episodes of aggressive or dangerous behavior. No problems with sleep, appetite or energy level. Denied any active and current manic, hypomanic, depressive, psychotic or anxiety symptoms. Denied any current SI/HI/AH/VH/PI. No overt delusions.  Patient is organized and commits to safety and to ongoing outpatient treatment.   Pt asks relevant questions about his discharge plan and about the medication regimen. Pt articulate a plan for living life after discharge. Medication risks/benefits/side effects were discussed with the patient.  Patient expressed understanding and agreed with the treatment plan. Further, instructed the patient to seek help immediately by dialing "911" or by going to the nearest ER if symptoms worsen.   Chronic risk factors include hx of MDD, anxiety, PTSD, hx of SI, hx of substance use d/o, hx of multiple knee surgeries   Protective factors include current sobriety, supportive friends/family network, compliant with psychiatric medications, motivated to engage in outpateint psychiatric treatment, future oriented thinking, expresses concern for well being, prior high level of functioning, much aspirations for return to prior baseline functioning and wish to pursue pleasurable activities.  As such, its chronic risk factors are mitigated by their protective factors. Pt does not pose an acute elevated risk of imminent danger to harm to self or others. Does not require further inpatient psychiatric admisison at this time. Psychiatrically cleared for discharge.   Pt urged to avoid using any alcohol or street drugs, particularly marijuana & K2, cocaine and methamphetamine (crystal meth) once discharged.  Safety plan filled out by patient and submitted into chart.

## 2024-05-08 NOTE — BH INPATIENT PSYCHIATRY DISCHARGE NOTE - NSBHMETABOLIC_PSY_ALL_CORE_FT
BMI:   HbA1c: A1C with Estimated Average Glucose Result: 5.9 % (04-27-24 @ 10:21)    Glucose:   BP: --Vital Signs Last 24 Hrs  T(C): 36.4 (05-08-24 @ 09:01), Max: 36.6 (05-07-24 @ 19:27)  T(F): 97.6 (05-08-24 @ 09:01), Max: 97.8 (05-07-24 @ 19:27)  HR: --  BP: --  BP(mean): --  RR: --  SpO2: --    Orthostatic VS  05-08-24 @ 09:01  Lying BP: --/-- HR: --  Sitting BP: 144/61 HR: 58  Standing BP: 153/76 HR: 72  Site: --  Mode: --  Orthostatic VS  05-07-24 @ 19:27  Lying BP: --/-- HR: --  Sitting BP: 141/77 HR: 66  Standing BP: 124/68 HR: 71  Site: --  Mode: --  Orthostatic VS  05-07-24 @ 08:07  Lying BP: --/-- HR: --  Sitting BP: 134/76 HR: 56  Standing BP: 118/82 HR: 68  Site: --  Mode: --  Orthostatic VS  05-06-24 @ 19:05  Lying BP: --/-- HR: --  Sitting BP: 129/63 HR: 64  Standing BP: 124/56 HR: 66  Site: --  Mode: --    Lipid Panel: Date/Time: 04-27-24 @ 10:21  Cholesterol, Serum: 139  LDL Cholesterol Calculated: 39  HDL Cholesterol, Serum: 38  Total Cholesterol/HDL Ration Measurement: --  Triglycerides, Serum: 308

## 2024-05-08 NOTE — BH INPATIENT PSYCHIATRY DISCHARGE NOTE - NSBHASSESSSUMMFT_PSY_ALL_CORE
... Patient is a 59 y/o male domiciled at [facility], unemployed, single, PPHx depression, anxiety, PTSD, multiple past suicide attempts, multiple inpatient psych admissions BIBEMS for suicidal ideation. Patient BIB EMS after endorsing SI to staff at nursing home.        Working diagnosis:  MDD, severe w/o psychotic features  PTSD      Plan:  >Discharge patient      >Psychiatric Meds:  Continue Lexapro 20mg, AM for depression  Continue Buspirone 10mg, BID for anxiety  Continue Guanfacine 1mg, BID for impulsivity        >Dispo: [place]

## 2024-05-08 NOTE — BH INPATIENT PSYCHIATRY DISCHARGE NOTE - NSDCMRMEDTOKEN_GEN_ALL_CORE_FT
acetaminophen 325 mg oral tablet: 2 tab(s) orally every 6 hours As needed Mild Pain (1 - 3), Moderate Pain (4 - 6)  atorvastatin 40 mg oral tablet: 1 tab(s) orally once a day (at bedtime)  buprenorphine-naloxone 8 mg-2 mg sublingual tablet: 1 tab(s) sublingual 2 times a day  busPIRone 10 mg oral tablet: 1 tab(s) orally 2 times a day  cloNIDine 0.1 mg oral tablet: 1 tab(s) orally 2 times a day  escitalopram 20 mg oral tablet: 1 tab(s) orally once a day  ferrous sulfate 325 mg (65 mg elemental iron) oral tablet: 1 tab(s) orally once a day  fluticasone 50 mcg/inh nasal spray: 1 spray(s) nasal every 12 hours as needed for congestion  guanFACINE 1 mg oral tablet: 1 tab(s) orally 2 times a day  lactobacillus acidophilus oral capsule: 1 tab(s) orally 2 times a day (with meals)  lactulose 10 g/15 mL oral syrup: 15 milliliter(s) orally 2 times a day As needed constipation  QUEtiapine 50 mg oral tablet: 1 tab(s) orally once a day (at bedtime)  senna leaf extract oral tablet: 2 tab(s) orally once a day (at bedtime)   acetaminophen 325 mg oral tablet: 2 tab(s) orally every 6 hours As needed Mild Pain (1 - 3), Moderate Pain (4 - 6)  acetaminophen 325 mg oral tablet: 3 tab(s) orally every 6 hours  atorvastatin 40 mg oral tablet: 1 tab(s) orally once a day (at bedtime)  atorvastatin 40 mg oral tablet: 1 tab(s) orally once a day (at bedtime)  buprenorphine-naloxone 8 mg-2 mg sublingual tablet: 2 sublingually 2 times a day  buprenorphine-naloxone 8 mg-2 mg sublingual tablet: 1 tab(s) sublingual 2 times a day  buPROPion 300 mg/24 hours (XL) oral tablet, extended release: 1 tab(s) orally once a day MDD: 300mg  BuSpar 10 mg oral tablet: 1 orally 2 times a day  busPIRone 10 mg oral tablet: 1 tab(s) orally 2 times a day  cefadroxil 500 mg oral capsule: 1 cap(s) orally every 12 hours acute osteomyelitis  cloNIDine 0.1 mg oral tablet: 1 tab(s) orally 2 times a day hold sbp&lt;110  cloNIDine 0.1 mg oral tablet: 1 tab(s) orally 2 times a day  escitalopram 20 mg oral tablet: 1 tab(s) orally once a day  ferrous sulfate 325 mg (65 mg elemental iron) oral tablet: 1 tab(s) orally once a day  ferrous sulfate 325 mg (65 mg elemental iron) oral tablet: 1 orally once a day  fluticasone 50 mcg/inh nasal spray: 1 spray(s) nasal every 12 hours as needed for congestion  guanFACINE 1 mg oral tablet: 1 tab(s) orally 2 times a day  guanFACINE 1 mg oral tablet: 1 tab(s) orally 2 times a day  lactobacillus acidophilus oral capsule: 1 tab(s) orally 2 times a day (with meals)  lactulose 10 g/15 mL oral syrup: 15 milliliter(s) orally 2 times a day As needed constipation  Lexapro 20 mg oral tablet: 1 tab(s) orally once a day  loratadine 10 mg oral tablet: 1 tab(s) orally once a day  melatonin 10 mg oral capsule: 1 cap(s) orally once a day (at bedtime)  nicotine 2 mg oral transmucosal gum: 1 chewed 4 times a day  QUEtiapine 50 mg oral tablet: 1 tab(s) orally once a day (at bedtime)  senna leaf extract oral tablet: 2 tab(s) orally once a day (at bedtime)  traZODone 100 mg oral tablet: 250 milligram(s) orally once a day (at bedtime)

## 2024-05-08 NOTE — BH INPATIENT PSYCHIATRY DISCHARGE NOTE - NSBHFUPINTERVALCCFT_PSY_A_CORE
Discharge Progress Note Date and Time: 05-08-24 @ 09:05    follow up for mood  Discharge Progress Note Date and Time: 05-29-24 @ 12:03    follow up for mood

## 2024-05-08 NOTE — BH INPATIENT PSYCHIATRY PROGRESS NOTE - NSBHASSESSSUMMFT_PSY_ALL_CORE
Patient is a 59 y/o male domiciled at [facility], unemployed, single, PPHx depression, anxiety, PTSD, multiple past suicide attempts, multiple inpatient psych admissions BIBEMS for suicidal ideation. Patient BIB EMS after endorsing SI to staff at nursing home.        Working diagnosis:  MDD, severe w/o psychotic features  PTSD      Plan:  >Legal: 9.13  >Obs: Routine observation, denies active SIIP and is able to engage in safety planning.      >Psychiatric Meds:  Continue Lexapro 20mg, AM for depression  Continue Buspirone 10mg, BID for anxiety  Continue Guanfacine 1mg, BID for impulsivity        >PRN Meds:  Trazodone 100mg, QHS for insomnia  Melatonin 5mg PO, QHS for insomnia  Atarax 50mg PO, QHS for anxiety  Lorazepam 2mg IM/PO, Q6H for severe anxiety      >Labs: Admission labs reviewed, no acute findings. Hold antipsychotics if QTc >500  >Medical: No acute concerns. Patient placed on CIWA, no visible physical symptoms of withdrawal. During the course of treatment, will collaborate with medical team to manage medical issues.  >Diet: Regular  >Social: Milieu/structured therapy  >Treatment Interventions: Groups and Individual Therapy/CBT.  >Dispo: pending symptom improvement, SW to pursue discharge planning.

## 2024-05-08 NOTE — BH CHART NOTE - NSEVENTNOTEFT_PSY_ALL_CORE
Pt returned to [facility] after failed discharge to rehab. Vitals are stable, pt was seen and there are no acute issues. Pt coping well with frustration over failed discharge.

## 2024-05-08 NOTE — BH INPATIENT PSYCHIATRY DISCHARGE NOTE - HOSPITAL COURSE
Patient is a 59 y/o male domiciled at [facility], unemployed, single, PPHx depression, anxiety, PTSD, multiple past suicide attempts, multiple inpatient psych admissions, hx of polysubstance abuse. Patient BIB EMS after endorsing SI to staff at nursing home.  On initial evaluation, he presents logical and linear in TP, calm and cooperative w/ interview. He states that he has been feeling depressed for the past 10 months at [facility] and increasingly so over the past 4 months. He verbalizes being homeless and has been staying at the facility due to rehabbing from multiple knee surgeries, reports that he is pending left knee replacement and was scheduled for an appt w/ his orthopaedic surgeon for Mon. 4/29, which he will now have to miss. Discussed that treatment team can assist in rescheduling this appt. He endorses stressors such as increased anxiety related to surgery and dwelling on not being able to work or take care of himself. Reports that he had SI w/ plan to OD on heroin, but was unable to obtain heroin. He had written a suicide note w/ contained details to access his accounts, founds by a friend of his who had then ripped up this note. He discusses that he was not receiving much support for his depressive sx at this nursing home, did state that he was started on Lexapro and had noticed some improvement of sx in relation to decreased crying spells. He verbalizes that he relapsed on substances 2 days ago, using $50 of cocaine daily and drinking 1 pint of alcohol daily. He is unsure of meth use and positive result on UTOX. States that prior to relapsing he had been sober since May 2023, has been active w/ AA and has a sponsor. He denies any sx of ETOH withdrawal, ordered CIWA. He currently denies SHIIP. Denies any AVH or delusional thoughts. He reports depressive sx such as low energy, trouble focusing, anhedonia, hopelessness, and poor sleep. He reports having hx of sleep apnea, does not use CPAP at facility, discussed use of wedge and PT consult for gait impairment.  During hospitalization, pt was seen by PT and given walker to ambulate with. He was continued on standing medications from [facility] including Lexapro 20mg, Clonidine 0.1mg BID, Tenex 1mg BID, Buspar 10mg BID, Trazodone 250mg HS, Suboxone 8/2mg 1 tablet BID, ferrous sulfate 325mg AM, Senna 2 tablets HS, Atorvastatin 40mg HS, and PRN's of Tylenol 650mg q6h for pain, lactulose 10g q12h for constipation, a lidocaine 4% patch q24h for knee pain.   Patient is a 57 y/o male domiciled at [facility], unemployed, single, PPHx depression, anxiety, PTSD, multiple past suicide attempts, multiple inpatient psych admissions, hx of polysubstance abuse. Patient BIB EMS after endorsing SI to staff at nursing home.  On initial evaluation, he presents logical and linear in TP, calm and cooperative w/ interview. He states that he has been feeling depressed for the past 10 months at [facility] and increasingly so over the past 4 months. He verbalizes being homeless and has been staying at the facility due to rehabbing from multiple knee surgeries, reports that he is pending left knee replacement and was scheduled for an appt w/ his orthopaedic surgeon for Mon. 4/29, which he will now have to miss. Discussed that treatment team can assist in rescheduling this appt. He endorses stressors such as increased anxiety related to surgery and dwelling on not being able to work or take care of himself. Reports that he had SI w/ plan to OD on heroin, but was unable to obtain heroin. He had written a suicide note w/ contained details to access his accounts, founds by a friend of his who had then ripped up this note. He discusses that he was not receiving much support for his depressive sx at this nursing home, did state that he was started on Lexapro and had noticed some improvement of sx in relation to decreased crying spells. He verbalizes that he relapsed on substances 2 days ago, using $50 of cocaine daily and drinking 1 pint of alcohol daily. He is unsure of meth use and positive result on UTOX. States that prior to relapsing he had been sober since May 2023, has been active w/ AA and has a sponsor. He denies any sx of ETOH withdrawal, ordered CIWA. He currently denies SHIIP. Denies any AVH or delusional thoughts. He reports depressive sx such as low energy, trouble focusing, anhedonia, hopelessness, and poor sleep. He reports having hx of sleep apnea, does not use CPAP at facility, discussed use of wedge and PT consult for gait impairment.  During hospitalization, pt was seen by PT and given walker to ambulate with. He was continued on standing medications from [facility] including Lexapro 20mg, Clonidine 0.1mg BID, Tenex 1mg BID, Buspar 10mg BID, Trazodone 250mg HS, Suboxone 8/2mg 1 tablet BID, ferrous sulfate 325mg AM, Senna 2 tablets HS, Atorvastatin 40mg HS, and PRN's of Tylenol 650mg q6h for pain, lactulose 10g q12h for constipation, a lidocaine 4% patch q24h for knee pain. He was noted to be visible on the unit, would engage is discussions w/ peers and participate in groups. He verbalized that he does well being sociable with others and telling jokes. Did have good appetite and interrupted sleep during the night. Denied having any active or passive SIIP, urges to engage in SIB. He started reporting improvement in mood/depressive sx, verbalized that he was having crying spells while at [facility] which had started improving after he was started on Lexapro. He verbalizes shame in relapsing, was offered and denied substance use tx, stated that he actively participates in AA meetings on Thursdays and will continue working w/ his sponsor. He was agreeable to discontinuing Trazodone and starting Seroquel 50mg for sleep. Pt was noted to have improved sleep and able to sleep more consistently throughout the night. He continued to deny any SIIP. Pt has been is good behavioral control while on the unit, has not received any PRN's for agitation/aggression. He verbalized wanting to engage in more groups at [facility] to keep himself occupied and social. Pt at this time no longer meets criteria for continued inpatient hospitalization and can safely be discharged to the community. On day of discharge pt adamantly denies having any SHIIP or urges to engage in self harm. He denies AVH or delusional thoughts, no evidence of psychosis noted or elicited during hospitalization. He reports improvement in mood and is future oriented, discusses some fears related to knee surgery although will schedule appt after returning to the facility. Upon discharge pt is provided with 30 days supply of psychiatric medications and is given outpatient appt.   Patient was provided with extensive psychoeducation on treatment options and motivational counseling targeting healthy lifestyle. Patient was instructed on actions for crisis situations, understood and agreed to follow instructions for handling crisis, including coming to ER or calling 911 should the patient or their family feel that they are in danger of hurting self or others. Patient also was given Suicide Prevention Lifeline number 1-676.975.3951 and provided with instructions on its use.   Patient is a 57 y/o male domiciled at [facility], unemployed, single, PPHx depression, anxiety, PTSD, multiple past suicide attempts, multiple inpatient psych admissions, hx of polysubstance abuse. Patient BIB EMS after endorsing SI to staff at nursing home.  On initial evaluation, he presents logical and linear in TP, calm and cooperative w/ interview. He states that he has been feeling depressed for the past 10 months at [facility] and increasingly so over the past 4 months. He verbalizes being homeless and has been staying at the facility due to rehabbing from multiple knee surgeries, reports that he is pending left knee replacement and was scheduled for an appt w/ his orthopaedic surgeon for Mon. 4/29, which he will now have to miss. Discussed that treatment team can assist in rescheduling this appt. He endorses stressors such as increased anxiety related to surgery and dwelling on not being able to work or take care of himself. Reports that he had SI w/ plan to OD on heroin, but was unable to obtain heroin. He had written a suicide note w/ contained details to access his accounts, founds by a friend of his who had then ripped up this note. He discusses that he was not receiving much support for his depressive sx at this nursing home, did state that he was started on Lexapro and had noticed some improvement of sx in relation to decreased crying spells. He verbalizes that he relapsed on substances 2 days ago, using $50 of cocaine daily and drinking 1 pint of alcohol daily. He is unsure of meth use and positive result on UTOX. States that prior to relapsing he had been sober since May 2023, has been active w/ AA and has a sponsor. He denies any sx of ETOH withdrawal, ordered CIWA. He currently denies SHIIP. Denies any AVH or delusional thoughts. He reports depressive sx such as low energy, trouble focusing, anhedonia, hopelessness, and poor sleep. He reports having hx of sleep apnea, does not use CPAP at facility, discussed use of wedge and PT consult for gait impairment.  During hospitalization, pt was seen by PT and given walker to ambulate with. He was continued on standing medications from [facility] including Lexapro 20mg, Clonidine 0.1mg BID, Tenex 1mg BID, Buspar 10mg BID, Trazodone 250mg HS, Suboxone 8/2mg 1 tablet BID, ferrous sulfate 325mg AM, Senna 2 tablets HS, Atorvastatin 40mg HS, and PRN's of Tylenol 650mg q6h for pain, lactulose 10g q12h for constipation, a lidocaine 4% patch q24h for knee pain. He was noted to be visible on the unit, would engage is discussions w/ peers and participate in groups. He verbalized that he does well being sociable with others and telling jokes. Did have good appetite and interrupted sleep during the night. Denied having any active or passive SIIP, urges to engage in SIB. He started reporting improvement in mood/depressive sx, verbalized that he was having crying spells while at [facility] which had started improving after he was started on Lexapro. He verbalizes shame in relapsing, was offered and denied substance use tx, stated that he actively participates in AA meetings on Thursdays and will continue working w/ his sponsor. He was agreeable to discontinuing Trazodone and starting Seroquel 50mg for sleep. Pt was noted to have improved sleep and able to sleep more consistently throughout the night. He continued to deny any SIIP. Pt has been is good behavioral control while on the unit, has not received any PRN's for agitation/aggression. He verbalized wanting to engage in more groups at [facility] to keep himself occupied and social. He was discharged to [facility] on 5/8, although returned to the unit several hours later due to incomplete documentation. Facility requested a SANTOS and ASCEND screening. He expressed some disappointment and frustration w/ having to return to the unit, although remained in good spirits. He kept attending group sessions and remained engaged with other peers on the unit. He was continued on the same medications, seen by PT, given Voltaren cream PRN for knee pain. SANTOS was sent to facility, and ASCEND screening was completed, insurance authorization was obtained on 5/29. Pt at this time no longer meets criteria for continued inpatient hospitalization and can safely be discharged to the community. On day of discharge pt adamantly denies having any SHIIP or urges to engage in self harm. He denies AVH or delusional thoughts, no evidence of psychosis noted or elicited during hospitalization. He reports improvement in mood and is future oriented, discusses some fears related to knee surgery although will schedule appt after returning to the facility. Upon discharge pt is provided with 30 days supply of psychiatric medications and is given outpatient appt.   Patient was provided with extensive psychoeducation on treatment options and motivational counseling targeting healthy lifestyle. Patient was instructed on actions for crisis situations, understood and agreed to follow instructions for handling crisis, including coming to ER or calling 911 should the patient or their family feel that they are in danger of hurting self or others. Patient also was given Suicide Prevention Lifeline number 1-372.882.9870 and provided with instructions on its use.

## 2024-05-08 NOTE — BH INPATIENT PSYCHIATRY DISCHARGE NOTE - NSDCCPCAREPLAN_GEN_ALL_CORE_FT
PRINCIPAL DISCHARGE DIAGNOSIS  Diagnosis: MDD (major depressive disorder), recurrent episode, severe  Assessment and Plan of Treatment:       SECONDARY DISCHARGE DIAGNOSES  Diagnosis: Post traumatic stress disorder (PTSD)  Assessment and Plan of Treatment:

## 2024-05-08 NOTE — BH INPATIENT PSYCHIATRY DISCHARGE NOTE - HPI (INCLUDE ILLNESS QUALITY, SEVERITY, DURATION, TIMING, CONTEXT, MODIFYING FACTORS, ASSOCIATED SIGNS AND SYMPTOMS)
Patient is a 59 y/o male domiciled at [facility], unemployed, single, PPHx depression, anxiety, PTSD, multiple past suicide attempts, multiple inpatient psych admissions BIBEMS for suicidal ideation. Patient BIB EMS after endorsing SI to staff at nursing home.      As per behavioral health assessment note:  "Pt states that he has had multiple knee surgeries leading to long-term placement at [facility], has been dealing with depression for "all of adulthood" but has been feeling suicidal x 2 weeks. Pt states that about 2 weeks ago, he wrote a suicide note with instructions for his debit cards/money to be sent to his sister/ ex Wanda. States that his plan was to go out and buy "5-6 bags of heroin" when he found time to leave [facility] alone. Pt states that his friend found the note and ripped it up, and that he never found time to acquire heroin but "absolutely" would have killed himself eventually. States that he has suicidal thoughts each night, and prays he will go to sleep and never wake up each night. Pt endorses relapse with alcohol and drugs starting 2 days ago, after being sober since May 2023. States that the reason he relapsed was because of his multiple knee surgeries, being out of work, feeling hopeless, has "very little" active psychiatric care in the facility, and states it was a "steady buildup". 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 When asked about cocaine and alcohol use, states "I just snorted a ton of coke", and that he was drinking about a pint of vodka per day since relapsing. Endorses getting the cocaine and alcohol from the streets, stating he leaves the facility quite often for AA/other reasons. Patient also states "I am not used to being alone" and wishes he had someone to rely on. Support system consists of friends from AA and ex-girlfriend Wanda, but does not have family in the area. Denies avh, paranoia, HI, self mutilation, current SI."      On unit:  Patient was seen and is evaluated on the unit. He presents logical and linear in TP, calm and cooperative w/ interview. He states that he has been feeling depressed for the past 10 months at [facility] and increasingly so over the past 4 months. He verbalizes being homeless and has been staying at the facility due to rehabbing from multiple knee surgeries, reports that he is pending left knee replacement and was scheduled for an appt w/ his orthopaedic surgeon for Mon. 4/29, which he will now have to miss. Discussed that treatment team can assist in rescheduling this appt. He endorses stressors such as increased anxiety related to surgery and dwelling on not being able to work or take care of himself. Reports that he has SI w/ plan to OD on heroin, but was unable to obtain heroin. He had written a suicide note w/ contained details to access his accounts, founds by a friend of his who had then ripped up this note. He discusses that he was not receiving much support for his depressive sx at this nursing home, did state that he was started on Lexapro and had noticed some improvement of sx in relation to decreased crying spells. He verbalizes that he relapsed on substances 2 days ago, using $50 of cocaine daily and drinking 1 pint of alcohol daily. He is unsure of meth use and positive result on UTOX. States that prior to relapsing he had been sober since May 2023, has been active w/ AA and has a sponsor. He denies any sx of ETOH withdrawal, ordered CIWA. He currently denies SHIIP. Denies any AVH or delusional thoughts. He reports depressive sx such as low energy, trouble focusing, anhedonia, hopelessness, and poor sleep. He reports having hx of sleep apnea, does not use CPAP at facility, discussed use of wedge and PT consult for gait impairment.

## 2024-05-09 PROCEDURE — 99232 SBSQ HOSP IP/OBS MODERATE 35: CPT

## 2024-05-09 RX ORDER — CALCIUM CARBONATE 500(1250)
2 TABLET ORAL ONCE
Refills: 0 | Status: COMPLETED | OUTPATIENT
Start: 2024-05-09 | End: 2024-05-09

## 2024-05-09 RX ORDER — BUPRENORPHINE AND NALOXONE 2; .5 MG/1; MG/1
1 TABLET SUBLINGUAL
Refills: 0 | Status: DISCONTINUED | OUTPATIENT
Start: 2024-05-09 | End: 2024-05-16

## 2024-05-09 RX ADMIN — ESCITALOPRAM OXALATE 20 MILLIGRAM(S): 10 TABLET, FILM COATED ORAL at 08:47

## 2024-05-09 RX ADMIN — Medication 10 MILLIGRAM(S): at 20:27

## 2024-05-09 RX ADMIN — BUPRENORPHINE AND NALOXONE 1 TABLET(S): 2; .5 TABLET SUBLINGUAL at 08:48

## 2024-05-09 RX ADMIN — BUPRENORPHINE AND NALOXONE 1 TABLET(S): 2; .5 TABLET SUBLINGUAL at 20:27

## 2024-05-09 RX ADMIN — ATORVASTATIN CALCIUM 40 MILLIGRAM(S): 80 TABLET, FILM COATED ORAL at 20:27

## 2024-05-09 RX ADMIN — Medication 1 PATCH: at 08:48

## 2024-05-09 RX ADMIN — Medication 0.1 MILLIGRAM(S): at 08:47

## 2024-05-09 RX ADMIN — Medication 1 TABLET(S): at 08:47

## 2024-05-09 RX ADMIN — Medication 10 MILLIGRAM(S): at 08:47

## 2024-05-09 RX ADMIN — QUETIAPINE FUMARATE 50 MILLIGRAM(S): 200 TABLET, FILM COATED ORAL at 20:27

## 2024-05-09 RX ADMIN — Medication 0.1 MILLIGRAM(S): at 20:27

## 2024-05-09 RX ADMIN — Medication 2 MILLIGRAM(S): at 11:05

## 2024-05-09 RX ADMIN — Medication 2 MILLIGRAM(S): at 20:27

## 2024-05-09 RX ADMIN — Medication 2 MILLIGRAM(S): at 18:08

## 2024-05-09 RX ADMIN — Medication 1 TABLET(S): at 18:08

## 2024-05-09 RX ADMIN — SENNA PLUS 2 TABLET(S): 8.6 TABLET ORAL at 20:26

## 2024-05-09 RX ADMIN — Medication 2 TABLET(S): at 02:04

## 2024-05-09 RX ADMIN — Medication 325 MILLIGRAM(S): at 08:47

## 2024-05-09 RX ADMIN — GUANFACINE 1 MILLIGRAM(S): 3 TABLET, EXTENDED RELEASE ORAL at 21:33

## 2024-05-09 NOTE — BH INPATIENT PSYCHIATRY PROGRESS NOTE - NSBHASSESSSUMMFT_PSY_ALL_CORE
Patient is a 57 y/o male domiciled at [facility], unemployed, single, PPHx depression, anxiety, PTSD, multiple past suicide attempts, multiple inpatient psych admissions BIBEMS for suicidal ideation. Patient BIB EMS after endorsing SI to staff at nursing home.        Working diagnosis:  MDD, severe w/o psychotic features  PTSD      Plan:  >Legal: 9.13  >Obs: Routine observation, denies active SIIP and is able to engage in safety planning.      >Psychiatric Meds:  Continue Lexapro 20mg, AM for depression  Continue Buspirone 10mg, BID for anxiety  Continue Guanfacine 1mg, BID for impulsivity        >PRN Meds:  Trazodone 100mg, QHS for insomnia  Melatonin 5mg PO, QHS for insomnia  Atarax 50mg PO, QHS for anxiety  Lorazepam 2mg IM/PO, Q6H for severe anxiety      >Labs: Admission labs reviewed, no acute findings. Hold antipsychotics if QTc >500  >Medical: No acute concerns. Patient placed on CIWA, no visible physical symptoms of withdrawal. During the course of treatment, will collaborate with medical team to manage medical issues.  >Diet: Regular  >Social: Milieu/structured therapy  >Treatment Interventions: Groups and Individual Therapy/CBT.  >Dispo: pending symptom improvement, SW to pursue discharge planning.

## 2024-05-09 NOTE — BH INPATIENT PSYCHIATRY PROGRESS NOTE - NSBHFUPINTERVALHXFT_PSY_A_CORE
Patient is followed up for mood/SI. Chart, medications and labs reviewed. Patient is discussed during morning brief, no overnight events, has been in good behavioral control.          Patient was seen and is evaluated on the unit, ambulating w/ walker. He reports having good appetite and sleep, hx of sleep apnea and has been utilizing wedge on the unit. He verbalizes being frustrated yesterday due to failed discharge, discussed that team will submit necessary documents, pt also awaiting ASCEND screening. He has been compliant with standing medications, denies SE. He denies any SIIP or urges to self harm. No acute medical concerns, VSS.

## 2024-05-10 PROCEDURE — 99232 SBSQ HOSP IP/OBS MODERATE 35: CPT

## 2024-05-10 RX ADMIN — Medication 0.1 MILLIGRAM(S): at 20:03

## 2024-05-10 RX ADMIN — SENNA PLUS 2 TABLET(S): 8.6 TABLET ORAL at 20:03

## 2024-05-10 RX ADMIN — Medication 1 PATCH: at 07:36

## 2024-05-10 RX ADMIN — QUETIAPINE FUMARATE 50 MILLIGRAM(S): 200 TABLET, FILM COATED ORAL at 20:03

## 2024-05-10 RX ADMIN — Medication 1 SPRAY(S): at 08:42

## 2024-05-10 RX ADMIN — Medication 10 MILLIGRAM(S): at 08:36

## 2024-05-10 RX ADMIN — Medication 0.1 MILLIGRAM(S): at 08:36

## 2024-05-10 RX ADMIN — Medication 2 MILLIGRAM(S): at 15:17

## 2024-05-10 RX ADMIN — Medication 2 MILLIGRAM(S): at 20:03

## 2024-05-10 RX ADMIN — ATORVASTATIN CALCIUM 40 MILLIGRAM(S): 80 TABLET, FILM COATED ORAL at 20:03

## 2024-05-10 RX ADMIN — BUPRENORPHINE AND NALOXONE 1 TABLET(S): 2; .5 TABLET SUBLINGUAL at 08:36

## 2024-05-10 RX ADMIN — BUPRENORPHINE AND NALOXONE 1 TABLET(S): 2; .5 TABLET SUBLINGUAL at 20:04

## 2024-05-10 RX ADMIN — Medication 5 MILLIGRAM(S): at 22:07

## 2024-05-10 RX ADMIN — Medication 1 TABLET(S): at 08:36

## 2024-05-10 RX ADMIN — Medication 1 PATCH: at 08:41

## 2024-05-10 RX ADMIN — GUANFACINE 1 MILLIGRAM(S): 3 TABLET, EXTENDED RELEASE ORAL at 08:36

## 2024-05-10 RX ADMIN — GUANFACINE 1 MILLIGRAM(S): 3 TABLET, EXTENDED RELEASE ORAL at 20:05

## 2024-05-10 RX ADMIN — Medication 1 TABLET(S): at 16:22

## 2024-05-10 RX ADMIN — Medication 650 MILLIGRAM(S): at 13:17

## 2024-05-10 RX ADMIN — Medication 650 MILLIGRAM(S): at 12:42

## 2024-05-10 RX ADMIN — Medication 10 MILLIGRAM(S): at 20:03

## 2024-05-10 RX ADMIN — ESCITALOPRAM OXALATE 20 MILLIGRAM(S): 10 TABLET, FILM COATED ORAL at 08:36

## 2024-05-10 RX ADMIN — Medication 325 MILLIGRAM(S): at 08:36

## 2024-05-10 RX ADMIN — Medication 2 MILLIGRAM(S): at 12:42

## 2024-05-10 RX ADMIN — Medication 2 MILLIGRAM(S): at 07:36

## 2024-05-10 NOTE — BH INPATIENT PSYCHIATRY PROGRESS NOTE - NSBHASSESSSUMMFT_PSY_ALL_CORE
Patient is a 59 y/o male domiciled at [facility], unemployed, single, PPHx depression, anxiety, PTSD, multiple past suicide attempts, multiple inpatient psych admissions BIBEMS for suicidal ideation. Patient BIB EMS after endorsing SI to staff at nursing home.        Working diagnosis:  MDD, severe w/o psychotic features  PTSD      Plan:  >Legal: 9.13  >Obs: Routine observation, denies active SIIP and is able to engage in safety planning.      >Psychiatric Meds:  Continue Lexapro 20mg, AM for depression  Continue Buspirone 10mg, BID for anxiety  Continue Guanfacine 1mg, BID for impulsivity        >PRN Meds:  Trazodone 100mg, QHS for insomnia  Melatonin 5mg PO, QHS for insomnia  Atarax 50mg PO, QHS for anxiety  Lorazepam 2mg IM/PO, Q6H for severe anxiety      >Labs: Admission labs reviewed, no acute findings. Hold antipsychotics if QTc >500  >Medical: No acute concerns. Patient placed on CIWA, no visible physical symptoms of withdrawal. During the course of treatment, will collaborate with medical team to manage medical issues.  >Diet: Regular  >Social: Milieu/structured therapy  >Treatment Interventions: Groups and Individual Therapy/CBT.  >Dispo: pending symptom improvement, SW to pursue discharge planning.

## 2024-05-10 NOTE — BH INPATIENT PSYCHIATRY PROGRESS NOTE - NSBHFUPINTERVALHXFT_PSY_A_CORE
Patient seen and examined. Case discussed with treatment plan. Chart reviewed. No acute overnight events reported. Patient remains medication complaint, tolerating it well without reported side effects. Denies AVH/SI/HI. Sleeping was erratic last night but reports feeling rested with good appetite. In good behavioral control. Pt future oriented to return back to rehab.

## 2024-05-10 NOTE — BH INPATIENT PSYCHIATRY PROGRESS NOTE - NSBHCHARTREVIEWVS_PSY_A_CORE FT
Vital Signs Last 24 Hrs  T(C): 36.8 (05-10-24 @ 08:38), Max: 36.8 (05-10-24 @ 08:38)  T(F): 98.2 (05-10-24 @ 08:38), Max: 98.2 (05-10-24 @ 08:38)  HR: --  BP: --  BP(mean): --  RR: 17 (05-10-24 @ 08:38) (17 - 17)  SpO2: --    Orthostatic VS  05-10-24 @ 08:38  Lying BP: --/-- HR: --  Sitting BP: 149/64 HR: 63  Standing BP: 137/72 HR: 71  Site: --  Mode: --  Orthostatic VS  05-09-24 @ 19:29  Lying BP: --/-- HR: --  Sitting BP: 127/83 HR: 73  Standing BP: 140/69 HR: 75  Site: --  Mode: --  Orthostatic VS  05-09-24 @ 08:20  Lying BP: --/-- HR: --  Sitting BP: 124/68 HR: 62  Standing BP: 132/60 HR: 64  Site: upper left arm  Mode: electronic  Orthostatic VS  05-08-24 @ 19:41  Lying BP: --/-- HR: --  Sitting BP: 129/76 HR: 113  Standing BP: 93/81 HR: 74  Site: --  Mode: --  Orthostatic VS  05-08-24 @ 15:26  Lying BP: --/-- HR: --  Sitting BP: 143/72 HR: 79  Standing BP: 141/83 HR: 88  Site: --  Mode: --

## 2024-05-11 RX ADMIN — ATORVASTATIN CALCIUM 40 MILLIGRAM(S): 80 TABLET, FILM COATED ORAL at 20:13

## 2024-05-11 RX ADMIN — ESCITALOPRAM OXALATE 20 MILLIGRAM(S): 10 TABLET, FILM COATED ORAL at 08:57

## 2024-05-11 RX ADMIN — SENNA PLUS 2 TABLET(S): 8.6 TABLET ORAL at 20:13

## 2024-05-11 RX ADMIN — Medication 650 MILLIGRAM(S): at 09:45

## 2024-05-11 RX ADMIN — Medication 2 MILLIGRAM(S): at 12:39

## 2024-05-11 RX ADMIN — Medication 10 MILLIGRAM(S): at 20:12

## 2024-05-11 RX ADMIN — QUETIAPINE FUMARATE 50 MILLIGRAM(S): 200 TABLET, FILM COATED ORAL at 20:13

## 2024-05-11 RX ADMIN — Medication 10 MILLIGRAM(S): at 08:57

## 2024-05-11 RX ADMIN — Medication 2 MILLIGRAM(S): at 08:57

## 2024-05-11 RX ADMIN — GUANFACINE 1 MILLIGRAM(S): 3 TABLET, EXTENDED RELEASE ORAL at 09:45

## 2024-05-11 RX ADMIN — GUANFACINE 1 MILLIGRAM(S): 3 TABLET, EXTENDED RELEASE ORAL at 20:18

## 2024-05-11 RX ADMIN — BUPRENORPHINE AND NALOXONE 1 TABLET(S): 2; .5 TABLET SUBLINGUAL at 20:12

## 2024-05-11 RX ADMIN — Medication 1 TABLET(S): at 08:58

## 2024-05-11 RX ADMIN — Medication 650 MILLIGRAM(S): at 08:58

## 2024-05-11 RX ADMIN — BUPRENORPHINE AND NALOXONE 1 TABLET(S): 2; .5 TABLET SUBLINGUAL at 08:57

## 2024-05-11 RX ADMIN — Medication 1 TABLET(S): at 16:54

## 2024-05-11 RX ADMIN — Medication 325 MILLIGRAM(S): at 08:57

## 2024-05-11 RX ADMIN — Medication 0.1 MILLIGRAM(S): at 08:57

## 2024-05-11 RX ADMIN — Medication 0.1 MILLIGRAM(S): at 20:18

## 2024-05-12 RX ADMIN — ESCITALOPRAM OXALATE 20 MILLIGRAM(S): 10 TABLET, FILM COATED ORAL at 08:57

## 2024-05-12 RX ADMIN — GUANFACINE 1 MILLIGRAM(S): 3 TABLET, EXTENDED RELEASE ORAL at 08:57

## 2024-05-12 RX ADMIN — Medication 2 MILLIGRAM(S): at 20:33

## 2024-05-12 RX ADMIN — Medication 2 MILLIGRAM(S): at 17:23

## 2024-05-12 RX ADMIN — Medication 325 MILLIGRAM(S): at 08:57

## 2024-05-12 RX ADMIN — LIDOCAINE 1 PATCH: 4 CREAM TOPICAL at 08:56

## 2024-05-12 RX ADMIN — Medication 0.1 MILLIGRAM(S): at 08:57

## 2024-05-12 RX ADMIN — Medication 2 MILLIGRAM(S): at 08:59

## 2024-05-12 RX ADMIN — ATORVASTATIN CALCIUM 40 MILLIGRAM(S): 80 TABLET, FILM COATED ORAL at 20:30

## 2024-05-12 RX ADMIN — Medication 1 TABLET(S): at 17:22

## 2024-05-12 RX ADMIN — Medication 1 PATCH: at 08:57

## 2024-05-12 RX ADMIN — Medication 0.1 MILLIGRAM(S): at 20:29

## 2024-05-12 RX ADMIN — Medication 10 MILLIGRAM(S): at 20:30

## 2024-05-12 RX ADMIN — Medication 2 MILLIGRAM(S): at 11:21

## 2024-05-12 RX ADMIN — Medication 1 TABLET(S): at 08:57

## 2024-05-12 RX ADMIN — BUPRENORPHINE AND NALOXONE 1 TABLET(S): 2; .5 TABLET SUBLINGUAL at 20:29

## 2024-05-12 RX ADMIN — Medication 100 MILLIGRAM(S): at 23:05

## 2024-05-12 RX ADMIN — GUANFACINE 1 MILLIGRAM(S): 3 TABLET, EXTENDED RELEASE ORAL at 20:29

## 2024-05-12 RX ADMIN — Medication 10 MILLIGRAM(S): at 08:57

## 2024-05-12 RX ADMIN — SENNA PLUS 2 TABLET(S): 8.6 TABLET ORAL at 20:29

## 2024-05-12 RX ADMIN — BUPRENORPHINE AND NALOXONE 1 TABLET(S): 2; .5 TABLET SUBLINGUAL at 08:57

## 2024-05-12 RX ADMIN — Medication 30 MILLILITER(S): at 01:48

## 2024-05-12 RX ADMIN — QUETIAPINE FUMARATE 50 MILLIGRAM(S): 200 TABLET, FILM COATED ORAL at 20:29

## 2024-05-13 PROCEDURE — 99232 SBSQ HOSP IP/OBS MODERATE 35: CPT

## 2024-05-13 RX ADMIN — Medication 2 MILLIGRAM(S): at 12:44

## 2024-05-13 RX ADMIN — Medication 1 TABLET(S): at 17:08

## 2024-05-13 RX ADMIN — Medication 2 MILLIGRAM(S): at 20:48

## 2024-05-13 RX ADMIN — Medication 2 MILLIGRAM(S): at 15:28

## 2024-05-13 RX ADMIN — Medication 0.1 MILLIGRAM(S): at 20:42

## 2024-05-13 RX ADMIN — ESCITALOPRAM OXALATE 20 MILLIGRAM(S): 10 TABLET, FILM COATED ORAL at 08:44

## 2024-05-13 RX ADMIN — GUANFACINE 1 MILLIGRAM(S): 3 TABLET, EXTENDED RELEASE ORAL at 20:48

## 2024-05-13 RX ADMIN — Medication 325 MILLIGRAM(S): at 08:43

## 2024-05-13 RX ADMIN — BUPRENORPHINE AND NALOXONE 1 TABLET(S): 2; .5 TABLET SUBLINGUAL at 21:25

## 2024-05-13 RX ADMIN — Medication 0.1 MILLIGRAM(S): at 08:43

## 2024-05-13 RX ADMIN — ATORVASTATIN CALCIUM 40 MILLIGRAM(S): 80 TABLET, FILM COATED ORAL at 20:43

## 2024-05-13 RX ADMIN — Medication 1 PATCH: at 08:45

## 2024-05-13 RX ADMIN — SENNA PLUS 2 TABLET(S): 8.6 TABLET ORAL at 20:43

## 2024-05-13 RX ADMIN — Medication 1 TABLET(S): at 08:42

## 2024-05-13 RX ADMIN — Medication 2 MILLIGRAM(S): at 08:45

## 2024-05-13 RX ADMIN — Medication 10 MILLIGRAM(S): at 20:42

## 2024-05-13 RX ADMIN — Medication 1 PATCH: at 19:43

## 2024-05-13 RX ADMIN — Medication 10 MILLIGRAM(S): at 08:43

## 2024-05-13 RX ADMIN — Medication 100 MILLIGRAM(S): at 20:42

## 2024-05-13 RX ADMIN — QUETIAPINE FUMARATE 50 MILLIGRAM(S): 200 TABLET, FILM COATED ORAL at 20:43

## 2024-05-13 RX ADMIN — GUANFACINE 1 MILLIGRAM(S): 3 TABLET, EXTENDED RELEASE ORAL at 08:42

## 2024-05-13 RX ADMIN — BUPRENORPHINE AND NALOXONE 1 TABLET(S): 2; .5 TABLET SUBLINGUAL at 08:43

## 2024-05-13 NOTE — BH INPATIENT PSYCHIATRY PROGRESS NOTE - NSBHFUPINTERVALHXFT_PSY_A_CORE
Patient is followed up for mood/SI. Chart, medications and labs reviewed. Patient is discussed during morning brief, no overnight events, has been in good behavioral control.          Patient was seen and is evaluated on the unit, ambulating w/ walker. He reports having good appetite and fair sleep over the weekend, hx of sleep apnea and has been utilizing wedge on the unit. He continues to verbalize frustration over being in the hospital. Team discussed process required before pt is sent back to [place]. He has been compliant with standing medications, denies SE. He denies any SIIP or urges to self harm. Ordered PT consult. No acute medical concerns, VSS.

## 2024-05-13 NOTE — BH INPATIENT PSYCHIATRY PROGRESS NOTE - NSBHASSESSSUMMFT_PSY_ALL_CORE
Patient is a 57 y/o male domiciled at [facility], unemployed, single, PPHx depression, anxiety, PTSD, multiple past suicide attempts, multiple inpatient psych admissions BIBEMS for suicidal ideation. Patient BIB EMS after endorsing SI to staff at nursing home.        Working diagnosis:  MDD, severe w/o psychotic features  PTSD      Plan:  >Legal: 9.13  >Obs: Routine observation, denies active SIIP and is able to engage in safety planning.      >Psychiatric Meds:  Continue Lexapro 20mg, AM for depression  Continue Buspirone 10mg, BID for anxiety  Continue Guanfacine 1mg, BID for impulsivity        >PRN Meds:  Trazodone 100mg, QHS for insomnia  Melatonin 5mg PO, QHS for insomnia  Atarax 50mg PO, QHS for anxiety  Lorazepam 2mg IM/PO, Q6H for severe anxiety      >Labs: Admission labs reviewed, no acute findings. Hold antipsychotics if QTc >500  >Medical: No acute concerns. Patient placed on CIWA, no visible physical symptoms of withdrawal. During the course of treatment, will collaborate with medical team to manage medical issues.  >Diet: Regular  >Social: Milieu/structured therapy  >Treatment Interventions: Groups and Individual Therapy/CBT.  >Dispo: [place]

## 2024-05-13 NOTE — BH INPATIENT PSYCHIATRY PROGRESS NOTE - NSBHMETABOLIC_PSY_ALL_CORE_FT
BMI:   HbA1c: A1C with Estimated Average Glucose Result: 5.9 % (04-27-24 @ 10:21)    Glucose:   BP: --Vital Signs Last 24 Hrs  T(C): 36.6 (05-13-24 @ 08:40), Max: 36.7 (05-12-24 @ 19:43)  T(F): 97.8 (05-13-24 @ 08:40), Max: 98.1 (05-12-24 @ 19:43)  HR: --  BP: --  BP(mean): --  RR: 16 (05-13-24 @ 08:40) (16 - 17)  SpO2: --    Orthostatic VS  05-13-24 @ 08:40  Lying BP: --/-- HR: --  Sitting BP: 130/65 HR: 64  Standing BP: 128/63 HR: 68  Site: --  Mode: --  Orthostatic VS  05-12-24 @ 19:43  Lying BP: --/-- HR: --  Sitting BP: 150/75 HR: 65  Standing BP: 125/64 HR: 70  Site: --  Mode: electronic  Orthostatic VS  05-12-24 @ 09:06  Lying BP: --/-- HR: --  Sitting BP: 107/66 HR: 61  Standing BP: 107/55 HR: 67  Site: --  Mode: electronic  Orthostatic VS  05-11-24 @ 19:14  Lying BP: --/-- HR: --  Sitting BP: 133/67 HR: 66  Standing BP: 135/62 HR: 69  Site: --  Mode: --    Lipid Panel: Date/Time: 04-27-24 @ 10:21  Cholesterol, Serum: 139  LDL Cholesterol Calculated: 39  HDL Cholesterol, Serum: 38  Total Cholesterol/HDL Ration Measurement: --  Triglycerides, Serum: 308   BMI:   HbA1c: A1C with Estimated Average Glucose Result: 5.9 % (04-27-24 @ 10:21)    Glucose:   BP: --Vital Signs Last 24 Hrs  T(C): 36.7 (05-14-24 @ 08:32), Max: 36.7 (05-14-24 @ 08:32)  T(F): 98.1 (05-14-24 @ 08:32), Max: 98.1 (05-14-24 @ 08:32)  HR: --  BP: --  BP(mean): --  RR: 17 (05-14-24 @ 08:32) (17 - 17)  SpO2: --    Orthostatic VS  05-14-24 @ 08:32  Lying BP: --/-- HR: --  Sitting BP: 156/72 HR: 61  Standing BP: 144/64 HR: 74  Site: --  Mode: --  Orthostatic VS  05-13-24 @ 19:03  Lying BP: --/-- HR: --  Sitting BP: 146/68 HR: 66  Standing BP: 136/73 HR: 75  Site: --  Mode: --  Orthostatic VS  05-13-24 @ 08:40  Lying BP: --/-- HR: --  Sitting BP: 130/65 HR: 64  Standing BP: 128/63 HR: 68  Site: --  Mode: --  Orthostatic VS  05-12-24 @ 19:43  Lying BP: --/-- HR: --  Sitting BP: 150/75 HR: 65  Standing BP: 125/64 HR: 70  Site: --  Mode: electronic    Lipid Panel: Date/Time: 04-27-24 @ 10:21  Cholesterol, Serum: 139  LDL Cholesterol Calculated: 39  HDL Cholesterol, Serum: 38  Total Cholesterol/HDL Ration Measurement: --  Triglycerides, Serum: 308

## 2024-05-13 NOTE — BH INPATIENT PSYCHIATRY PROGRESS NOTE - NSBHCHARTREVIEWVS_PSY_A_CORE FT
Vital Signs Last 24 Hrs  T(C): 36.6 (05-13-24 @ 08:40), Max: 36.7 (05-12-24 @ 19:43)  T(F): 97.8 (05-13-24 @ 08:40), Max: 98.1 (05-12-24 @ 19:43)  HR: --  BP: --  BP(mean): --  RR: 16 (05-13-24 @ 08:40) (16 - 17)  SpO2: --    Orthostatic VS  05-13-24 @ 08:40  Lying BP: --/-- HR: --  Sitting BP: 130/65 HR: 64  Standing BP: 128/63 HR: 68  Site: --  Mode: --  Orthostatic VS  05-12-24 @ 19:43  Lying BP: --/-- HR: --  Sitting BP: 150/75 HR: 65  Standing BP: 125/64 HR: 70  Site: --  Mode: electronic  Orthostatic VS  05-12-24 @ 09:06  Lying BP: --/-- HR: --  Sitting BP: 107/66 HR: 61  Standing BP: 107/55 HR: 67  Site: --  Mode: electronic  Orthostatic VS  05-11-24 @ 19:14  Lying BP: --/-- HR: --  Sitting BP: 133/67 HR: 66  Standing BP: 135/62 HR: 69  Site: --  Mode: --   Vital Signs Last 24 Hrs  T(C): 36.7 (05-14-24 @ 08:32), Max: 36.7 (05-14-24 @ 08:32)  T(F): 98.1 (05-14-24 @ 08:32), Max: 98.1 (05-14-24 @ 08:32)  HR: --  BP: --  BP(mean): --  RR: 17 (05-14-24 @ 08:32) (17 - 17)  SpO2: --    Orthostatic VS  05-14-24 @ 08:32  Lying BP: --/-- HR: --  Sitting BP: 156/72 HR: 61  Standing BP: 144/64 HR: 74  Site: --  Mode: --  Orthostatic VS  05-13-24 @ 19:03  Lying BP: --/-- HR: --  Sitting BP: 146/68 HR: 66  Standing BP: 136/73 HR: 75  Site: --  Mode: --  Orthostatic VS  05-13-24 @ 08:40  Lying BP: --/-- HR: --  Sitting BP: 130/65 HR: 64  Standing BP: 128/63 HR: 68  Site: --  Mode: --  Orthostatic VS  05-12-24 @ 19:43  Lying BP: --/-- HR: --  Sitting BP: 150/75 HR: 65  Standing BP: 125/64 HR: 70  Site: --  Mode: electronic

## 2024-05-14 PROCEDURE — 99232 SBSQ HOSP IP/OBS MODERATE 35: CPT

## 2024-05-14 RX ADMIN — Medication 2 MILLIGRAM(S): at 16:03

## 2024-05-14 RX ADMIN — ATORVASTATIN CALCIUM 40 MILLIGRAM(S): 80 TABLET, FILM COATED ORAL at 20:27

## 2024-05-14 RX ADMIN — BUPRENORPHINE AND NALOXONE 1 TABLET(S): 2; .5 TABLET SUBLINGUAL at 20:33

## 2024-05-14 RX ADMIN — BUPRENORPHINE AND NALOXONE 1 TABLET(S): 2; .5 TABLET SUBLINGUAL at 08:19

## 2024-05-14 RX ADMIN — Medication 0.1 MILLIGRAM(S): at 20:27

## 2024-05-14 RX ADMIN — Medication 1 TABLET(S): at 08:19

## 2024-05-14 RX ADMIN — Medication 0.1 MILLIGRAM(S): at 08:20

## 2024-05-14 RX ADMIN — Medication 325 MILLIGRAM(S): at 08:19

## 2024-05-14 RX ADMIN — Medication 1 PATCH: at 07:40

## 2024-05-14 RX ADMIN — ESCITALOPRAM OXALATE 20 MILLIGRAM(S): 10 TABLET, FILM COATED ORAL at 08:20

## 2024-05-14 RX ADMIN — Medication 1 PATCH: at 08:00

## 2024-05-14 RX ADMIN — Medication 1 PATCH: at 08:20

## 2024-05-14 RX ADMIN — QUETIAPINE FUMARATE 50 MILLIGRAM(S): 200 TABLET, FILM COATED ORAL at 20:27

## 2024-05-14 RX ADMIN — GUANFACINE 1 MILLIGRAM(S): 3 TABLET, EXTENDED RELEASE ORAL at 21:41

## 2024-05-14 RX ADMIN — Medication 2 MILLIGRAM(S): at 08:20

## 2024-05-14 RX ADMIN — Medication 10 MILLIGRAM(S): at 20:27

## 2024-05-14 RX ADMIN — Medication 10 MILLIGRAM(S): at 08:19

## 2024-05-14 RX ADMIN — Medication 1 TABLET(S): at 16:01

## 2024-05-14 RX ADMIN — GUANFACINE 1 MILLIGRAM(S): 3 TABLET, EXTENDED RELEASE ORAL at 08:19

## 2024-05-14 RX ADMIN — SENNA PLUS 2 TABLET(S): 8.6 TABLET ORAL at 20:27

## 2024-05-14 RX ADMIN — Medication 2 MILLIGRAM(S): at 20:32

## 2024-05-14 RX ADMIN — Medication 2 MILLIGRAM(S): at 12:54

## 2024-05-14 NOTE — BH INPATIENT PSYCHIATRY ASSESSMENT NOTE - LEGAL HISTORY
Referral placed per requested by Dr Oconnor in [facility] prior to PE tube placement and visit.   
Home
incarcerated in total for 27+ years, robbery & drug possession

## 2024-05-14 NOTE — BH INPATIENT PSYCHIATRY PROGRESS NOTE - NSBHFUPINTERVALHXFT_PSY_A_CORE
Patient is followed up for mood/SI. Chart, medications and labs reviewed. Patient is discussed during morning brief, no overnight events, has been in good behavioral control.          Patient was seen and is evaluated on the unit, ambulating w/ walker. He reports having good appetite and fair sleep, hx of sleep apnea and has been utilizing wedge on the unit. States that he is going to contact the [employer] regarding disability and was given their contact info. He has been compliant with standing medications, denies SE. He denies any SIIP or urges to self harm. He was able to be seen by PT this morning. No acute medical concerns, VSS.

## 2024-05-14 NOTE — BH INPATIENT PSYCHIATRY PROGRESS NOTE - ATTENDING COMMENTS
Chart was reviewed and case discussed with the Psych NP. I agree with the assessment and plan as documented in the Psych NP's progress note and was directly involved in medical decision making.    Chart was reviewed and case discussed with the Psych NP. I agree with the assessment and plan as documented in the Psych NP's progress note and was directly involved in medical decision making.

## 2024-05-14 NOTE — BH INPATIENT PSYCHIATRY PROGRESS NOTE - NSBHASSESSSUMMFT_PSY_ALL_CORE
Patient is a 59 y/o male domiciled at [facility], unemployed, single, PPHx depression, anxiety, PTSD, multiple past suicide attempts, multiple inpatient psych admissions BIBEMS for suicidal ideation. Patient BIB EMS after endorsing SI to staff at nursing home.        Working diagnosis:  MDD, severe w/o psychotic features  PTSD      Plan:  >Legal: 9.13  >Obs: Routine observation, denies active SIIP and is able to engage in safety planning.      >Psychiatric Meds:  Continue Lexapro 20mg, AM for depression  Continue Buspirone 10mg, BID for anxiety  Continue Guanfacine 1mg, BID for impulsivity        >PRN Meds:  Trazodone 100mg, QHS for insomnia  Melatonin 5mg PO, QHS for insomnia  Atarax 50mg PO, QHS for anxiety  Lorazepam 2mg IM/PO, Q6H for severe anxiety      >Labs: Admission labs reviewed, no acute findings. Hold antipsychotics if QTc >500  >Medical: No acute concerns. Patient placed on CIWA, no visible physical symptoms of withdrawal. During the course of treatment, will collaborate with medical team to manage medical issues.  >Diet: Regular  >Social: Milieu/structured therapy  >Treatment Interventions: Groups and Individual Therapy/CBT.  >Dispo: [place] Patient is a 57 y/o male domiciled at [facility], unemployed, single, PPHx depression, anxiety, PTSD, multiple past suicide attempts, multiple inpatient psych admissions BIBEMS for suicidal ideation. Patient BIB EMS after endorsing SI to staff at nursing home.         Working diagnosis:  MDD, severe w/o psychotic features  PTSD      Plan:  >Legal: 9.13  >Obs: Routine observation, denies active SIIP and is able to engage in safety planning.      >Psychiatric Meds:  Continue Lexapro 20mg, AM for depression  Continue Buspirone 10mg, BID for anxiety  Continue Guanfacine 1mg, BID for impulsivity        >PRN Meds:  Trazodone 100mg, QHS for insomnia  Melatonin 5mg PO, QHS for insomnia  Atarax 50mg PO, QHS for anxiety  Lorazepam 2mg IM/PO, Q6H for severe anxiety      >Labs: Admission labs reviewed, no acute findings. Hold antipsychotics if QTc >500  >Medical: No acute concerns. Patient placed on CIWA, no visible physical symptoms of withdrawal. During the course of treatment, will collaborate with medical team to manage medical issues.  >Diet: Regular  >Social: Milieu/structured therapy  >Treatment Interventions: Groups and Individual Therapy/CBT.  >Dispo: [place]

## 2024-05-14 NOTE — BH INPATIENT PSYCHIATRY PROGRESS NOTE - NSBHCHARTREVIEWVS_PSY_A_CORE FT
Vital Signs Last 24 Hrs  T(C): 36.7 (05-14-24 @ 08:32), Max: 36.7 (05-14-24 @ 08:32)  T(F): 98.1 (05-14-24 @ 08:32), Max: 98.1 (05-14-24 @ 08:32)  HR: --  BP: --  BP(mean): --  RR: 17 (05-14-24 @ 08:32) (17 - 17)  SpO2: --    Orthostatic VS  05-14-24 @ 08:32  Lying BP: --/-- HR: --  Sitting BP: 156/72 HR: 61  Standing BP: 144/64 HR: 74  Site: --  Mode: --  Orthostatic VS  05-13-24 @ 19:03  Lying BP: --/-- HR: --  Sitting BP: 146/68 HR: 66  Standing BP: 136/73 HR: 75  Site: --  Mode: --  Orthostatic VS  05-13-24 @ 08:40  Lying BP: --/-- HR: --  Sitting BP: 130/65 HR: 64  Standing BP: 128/63 HR: 68  Site: --  Mode: --  Orthostatic VS  05-12-24 @ 19:43  Lying BP: --/-- HR: --  Sitting BP: 150/75 HR: 65  Standing BP: 125/64 HR: 70  Site: --  Mode: electronic

## 2024-05-14 NOTE — BH INPATIENT PSYCHIATRY PROGRESS NOTE - NSBHMETABOLIC_PSY_ALL_CORE_FT
BMI:   HbA1c: A1C with Estimated Average Glucose Result: 5.9 % (04-27-24 @ 10:21)    Glucose:   BP: --Vital Signs Last 24 Hrs  T(C): 36.7 (05-14-24 @ 08:32), Max: 36.7 (05-14-24 @ 08:32)  T(F): 98.1 (05-14-24 @ 08:32), Max: 98.1 (05-14-24 @ 08:32)  HR: --  BP: --  BP(mean): --  RR: 17 (05-14-24 @ 08:32) (17 - 17)  SpO2: --    Orthostatic VS  05-14-24 @ 08:32  Lying BP: --/-- HR: --  Sitting BP: 156/72 HR: 61  Standing BP: 144/64 HR: 74  Site: --  Mode: --  Orthostatic VS  05-13-24 @ 19:03  Lying BP: --/-- HR: --  Sitting BP: 146/68 HR: 66  Standing BP: 136/73 HR: 75  Site: --  Mode: --  Orthostatic VS  05-13-24 @ 08:40  Lying BP: --/-- HR: --  Sitting BP: 130/65 HR: 64  Standing BP: 128/63 HR: 68  Site: --  Mode: --  Orthostatic VS  05-12-24 @ 19:43  Lying BP: --/-- HR: --  Sitting BP: 150/75 HR: 65  Standing BP: 125/64 HR: 70  Site: --  Mode: electronic    Lipid Panel: Date/Time: 04-27-24 @ 10:21  Cholesterol, Serum: 139  LDL Cholesterol Calculated: 39  HDL Cholesterol, Serum: 38  Total Cholesterol/HDL Ration Measurement: --  Triglycerides, Serum: 308

## 2024-05-15 PROCEDURE — 99232 SBSQ HOSP IP/OBS MODERATE 35: CPT

## 2024-05-15 RX ADMIN — BUPRENORPHINE AND NALOXONE 1 TABLET(S): 2; .5 TABLET SUBLINGUAL at 08:19

## 2024-05-15 RX ADMIN — QUETIAPINE FUMARATE 50 MILLIGRAM(S): 200 TABLET, FILM COATED ORAL at 21:11

## 2024-05-15 RX ADMIN — Medication 325 MILLIGRAM(S): at 08:19

## 2024-05-15 RX ADMIN — Medication 650 MILLIGRAM(S): at 12:56

## 2024-05-15 RX ADMIN — GUANFACINE 1 MILLIGRAM(S): 3 TABLET, EXTENDED RELEASE ORAL at 21:12

## 2024-05-15 RX ADMIN — Medication 1 TABLET(S): at 17:20

## 2024-05-15 RX ADMIN — ATORVASTATIN CALCIUM 40 MILLIGRAM(S): 80 TABLET, FILM COATED ORAL at 21:11

## 2024-05-15 RX ADMIN — Medication 2 MILLIGRAM(S): at 12:56

## 2024-05-15 RX ADMIN — Medication 0.1 MILLIGRAM(S): at 21:11

## 2024-05-15 RX ADMIN — Medication 2 MILLIGRAM(S): at 17:20

## 2024-05-15 RX ADMIN — SENNA PLUS 2 TABLET(S): 8.6 TABLET ORAL at 21:10

## 2024-05-15 RX ADMIN — Medication 1 PATCH: at 08:00

## 2024-05-15 RX ADMIN — Medication 10 MILLIGRAM(S): at 08:19

## 2024-05-15 RX ADMIN — Medication 650 MILLIGRAM(S): at 13:56

## 2024-05-15 RX ADMIN — BUPRENORPHINE AND NALOXONE 1 TABLET(S): 2; .5 TABLET SUBLINGUAL at 21:10

## 2024-05-15 RX ADMIN — Medication 1 PATCH: at 07:25

## 2024-05-15 RX ADMIN — GUANFACINE 1 MILLIGRAM(S): 3 TABLET, EXTENDED RELEASE ORAL at 08:27

## 2024-05-15 RX ADMIN — Medication 100 MILLIGRAM(S): at 21:10

## 2024-05-15 RX ADMIN — Medication 1 TABLET(S): at 08:18

## 2024-05-15 RX ADMIN — Medication 1 PATCH: at 08:19

## 2024-05-15 RX ADMIN — Medication 5 MILLIGRAM(S): at 00:27

## 2024-05-15 RX ADMIN — Medication 2 MILLIGRAM(S): at 19:51

## 2024-05-15 RX ADMIN — Medication 10 MILLIGRAM(S): at 21:11

## 2024-05-15 RX ADMIN — ESCITALOPRAM OXALATE 20 MILLIGRAM(S): 10 TABLET, FILM COATED ORAL at 08:19

## 2024-05-15 RX ADMIN — Medication 0.1 MILLIGRAM(S): at 08:19

## 2024-05-15 RX ADMIN — Medication 2 MILLIGRAM(S): at 08:19

## 2024-05-15 NOTE — BH INPATIENT PSYCHIATRY PROGRESS NOTE - NSBHCHARTREVIEWVS_PSY_A_CORE FT
Vital Signs Last 24 Hrs  T(C): 36.7 (05-15-24 @ 06:25), Max: 36.7 (05-14-24 @ 19:17)  T(F): 98 (05-15-24 @ 06:25), Max: 98 (05-14-24 @ 19:17)  HR: --  BP: --  BP(mean): --  RR: 17 (05-15-24 @ 06:25) (17 - 17)  SpO2: --    Orthostatic VS  05-15-24 @ 06:25  Lying BP: --/-- HR: --  Sitting BP: 148/67 HR: 56  Standing BP: 135/60 HR: 64  Site: --  Mode: --  Orthostatic VS  05-14-24 @ 19:17  Lying BP: --/-- HR: --  Sitting BP: 133/73 HR: 76  Standing BP: 124/68 HR: 79  Site: --  Mode: --  Orthostatic VS  05-14-24 @ 08:32  Lying BP: --/-- HR: --  Sitting BP: 156/72 HR: 61  Standing BP: 144/64 HR: 74  Site: --  Mode: --  Orthostatic VS  05-13-24 @ 19:03  Lying BP: --/-- HR: --  Sitting BP: 146/68 HR: 66  Standing BP: 136/73 HR: 75  Site: --  Mode: --   Vital Signs Last 24 Hrs  T(C): 36.7 (05-15-24 @ 19:38), Max: 36.7 (05-15-24 @ 06:25)  T(F): 98.1 (05-15-24 @ 19:38), Max: 98.1 (05-15-24 @ 19:38)  HR: --  BP: --  BP(mean): --  RR: 17 (05-15-24 @ 06:25) (17 - 17)  SpO2: --    Orthostatic VS  05-15-24 @ 19:38  Lying BP: --/-- HR: --  Sitting BP: 126/70 HR: 72  Standing BP: 130/64 HR: 73  Site: --  Mode: --  Orthostatic VS  05-15-24 @ 06:25  Lying BP: --/-- HR: --  Sitting BP: 148/67 HR: 56  Standing BP: 135/60 HR: 64  Site: --  Mode: --  Orthostatic VS  05-14-24 @ 19:17  Lying BP: --/-- HR: --  Sitting BP: 133/73 HR: 76  Standing BP: 124/68 HR: 79  Site: --  Mode: --  Orthostatic VS  05-14-24 @ 08:32  Lying BP: --/-- HR: --  Sitting BP: 156/72 HR: 61  Standing BP: 144/64 HR: 74  Site: --  Mode: --

## 2024-05-15 NOTE — BH INPATIENT PSYCHIATRY PROGRESS NOTE - NSBHMETABOLIC_PSY_ALL_CORE_FT
BMI:   HbA1c: A1C with Estimated Average Glucose Result: 5.9 % (04-27-24 @ 10:21)    Glucose:   BP: --Vital Signs Last 24 Hrs  T(C): 36.7 (05-15-24 @ 06:25), Max: 36.7 (05-14-24 @ 19:17)  T(F): 98 (05-15-24 @ 06:25), Max: 98 (05-14-24 @ 19:17)  HR: --  BP: --  BP(mean): --  RR: 17 (05-15-24 @ 06:25) (17 - 17)  SpO2: --    Orthostatic VS  05-15-24 @ 06:25  Lying BP: --/-- HR: --  Sitting BP: 148/67 HR: 56  Standing BP: 135/60 HR: 64  Site: --  Mode: --  Orthostatic VS  05-14-24 @ 19:17  Lying BP: --/-- HR: --  Sitting BP: 133/73 HR: 76  Standing BP: 124/68 HR: 79  Site: --  Mode: --  Orthostatic VS  05-14-24 @ 08:32  Lying BP: --/-- HR: --  Sitting BP: 156/72 HR: 61  Standing BP: 144/64 HR: 74  Site: --  Mode: --  Orthostatic VS  05-13-24 @ 19:03  Lying BP: --/-- HR: --  Sitting BP: 146/68 HR: 66  Standing BP: 136/73 HR: 75  Site: --  Mode: --    Lipid Panel: Date/Time: 04-27-24 @ 10:21  Cholesterol, Serum: 139  LDL Cholesterol Calculated: 39  HDL Cholesterol, Serum: 38  Total Cholesterol/HDL Ration Measurement: --  Triglycerides, Serum: 308   BMI:   HbA1c: A1C with Estimated Average Glucose Result: 5.9 % (04-27-24 @ 10:21)    Glucose:   BP: --Vital Signs Last 24 Hrs  T(C): 36.7 (05-15-24 @ 19:38), Max: 36.7 (05-15-24 @ 06:25)  T(F): 98.1 (05-15-24 @ 19:38), Max: 98.1 (05-15-24 @ 19:38)  HR: --  BP: --  BP(mean): --  RR: 17 (05-15-24 @ 06:25) (17 - 17)  SpO2: --    Orthostatic VS  05-15-24 @ 19:38  Lying BP: --/-- HR: --  Sitting BP: 126/70 HR: 72  Standing BP: 130/64 HR: 73  Site: --  Mode: --  Orthostatic VS  05-15-24 @ 06:25  Lying BP: --/-- HR: --  Sitting BP: 148/67 HR: 56  Standing BP: 135/60 HR: 64  Site: --  Mode: --  Orthostatic VS  05-14-24 @ 19:17  Lying BP: --/-- HR: --  Sitting BP: 133/73 HR: 76  Standing BP: 124/68 HR: 79  Site: --  Mode: --  Orthostatic VS  05-14-24 @ 08:32  Lying BP: --/-- HR: --  Sitting BP: 156/72 HR: 61  Standing BP: 144/64 HR: 74  Site: --  Mode: --    Lipid Panel: Date/Time: 04-27-24 @ 10:21  Cholesterol, Serum: 139  LDL Cholesterol Calculated: 39  HDL Cholesterol, Serum: 38  Total Cholesterol/HDL Ration Measurement: --  Triglycerides, Serum: 308

## 2024-05-15 NOTE — BH INPATIENT PSYCHIATRY PROGRESS NOTE - ATTENDING COMMENTS
Chart was reviewed and case discussed with the Psych NP. I agree with the assessment and plan as documented in the Psych NP's progress note and was directly involved in medical decision making.

## 2024-05-15 NOTE — BH INPATIENT PSYCHIATRY PROGRESS NOTE - NSBHASSESSSUMMFT_PSY_ALL_CORE
Patient is a 59 y/o male domiciled at [facility], unemployed, single, PPHx depression, anxiety, PTSD, multiple past suicide attempts, multiple inpatient psych admissions BIBEMS for suicidal ideation. Patient BIB EMS after endorsing SI to staff at nursing home.         Working diagnosis:  MDD, severe w/o psychotic features  PTSD      Plan:  >Legal: 9.13  >Obs: Routine observation, denies active SIIP and is able to engage in safety planning.      >Psychiatric Meds:  Continue Lexapro 20mg, AM for depression  Continue Buspirone 10mg, BID for anxiety  Continue Guanfacine 1mg, BID for impulsivity        >PRN Meds:  Trazodone 100mg, QHS for insomnia  Melatonin 5mg PO, QHS for insomnia  Atarax 50mg PO, QHS for anxiety  Lorazepam 2mg IM/PO, Q6H for severe anxiety      >Labs: Admission labs reviewed, no acute findings. Hold antipsychotics if QTc >500  >Medical: No acute concerns. Patient placed on CIWA, no visible physical symptoms of withdrawal. During the course of treatment, will collaborate with medical team to manage medical issues.  >Diet: Regular  >Social: Milieu/structured therapy  >Treatment Interventions: Groups and Individual Therapy/CBT.  >Dispo: [place]

## 2024-05-16 PROCEDURE — 99232 SBSQ HOSP IP/OBS MODERATE 35: CPT

## 2024-05-16 RX ORDER — BUPRENORPHINE AND NALOXONE 2; .5 MG/1; MG/1
1 TABLET SUBLINGUAL
Refills: 0 | Status: DISCONTINUED | OUTPATIENT
Start: 2024-05-16 | End: 2024-05-23

## 2024-05-16 RX ADMIN — Medication 2 MILLIGRAM(S): at 20:11

## 2024-05-16 RX ADMIN — Medication 0.1 MILLIGRAM(S): at 08:31

## 2024-05-16 RX ADMIN — GUANFACINE 1 MILLIGRAM(S): 3 TABLET, EXTENDED RELEASE ORAL at 20:10

## 2024-05-16 RX ADMIN — Medication 1 TABLET(S): at 16:02

## 2024-05-16 RX ADMIN — QUETIAPINE FUMARATE 50 MILLIGRAM(S): 200 TABLET, FILM COATED ORAL at 20:10

## 2024-05-16 RX ADMIN — Medication 325 MILLIGRAM(S): at 08:31

## 2024-05-16 RX ADMIN — ESCITALOPRAM OXALATE 20 MILLIGRAM(S): 10 TABLET, FILM COATED ORAL at 08:31

## 2024-05-16 RX ADMIN — Medication 1 TABLET(S): at 08:30

## 2024-05-16 RX ADMIN — Medication 1 PATCH: at 08:30

## 2024-05-16 RX ADMIN — Medication 2 MILLIGRAM(S): at 13:08

## 2024-05-16 RX ADMIN — BUPRENORPHINE AND NALOXONE 1 TABLET(S): 2; .5 TABLET SUBLINGUAL at 20:11

## 2024-05-16 RX ADMIN — Medication 2 MILLIGRAM(S): at 08:31

## 2024-05-16 RX ADMIN — Medication 10 MILLIGRAM(S): at 08:31

## 2024-05-16 RX ADMIN — Medication 100 MILLIGRAM(S): at 20:10

## 2024-05-16 RX ADMIN — Medication 2 MILLIGRAM(S): at 17:48

## 2024-05-16 RX ADMIN — ATORVASTATIN CALCIUM 40 MILLIGRAM(S): 80 TABLET, FILM COATED ORAL at 20:11

## 2024-05-16 RX ADMIN — Medication 10 MILLIGRAM(S): at 20:11

## 2024-05-16 RX ADMIN — GUANFACINE 1 MILLIGRAM(S): 3 TABLET, EXTENDED RELEASE ORAL at 08:31

## 2024-05-16 RX ADMIN — Medication 650 MILLIGRAM(S): at 17:47

## 2024-05-16 RX ADMIN — SENNA PLUS 2 TABLET(S): 8.6 TABLET ORAL at 20:11

## 2024-05-16 RX ADMIN — BUPRENORPHINE AND NALOXONE 1 TABLET(S): 2; .5 TABLET SUBLINGUAL at 08:31

## 2024-05-16 RX ADMIN — Medication 0.1 MILLIGRAM(S): at 20:10

## 2024-05-16 NOTE — BH PSYCHOLOGY - GROUP THERAPY NOTE - NSPSYCHOLGRPCOGPROB_PSY_A_CORE FT
Anxiety, Depression, Emotion dysregulation, Lack of coping skills, Psycho-social stressors, Self care, Problem solving  

## 2024-05-16 NOTE — BH PSYCHOLOGY - GROUP THERAPY NOTE - TOKEN PULL-DIAGNOSIS
Primary Diagnosis:  MDD (major depressive disorder), recurrent episode, severe [F33.2]        Problem Dx:   Post traumatic stress disorder (PTSD) [F43.10]      

## 2024-05-16 NOTE — BH PSYCHOLOGY - GROUP THERAPY NOTE - NSBHPSYCHOLPARTICIPCOMMENT_PSY_A_CORE FT
Patient was actively engaged in group discussion 

## 2024-05-16 NOTE — BH PSYCHOLOGY - GROUP THERAPY NOTE - NSBHPSYCHOLRESPCOMMENT_PSY_A_CORE FT
Patient discussed how he enjoys going to AA and forming connections with people 
Patient discussed how his dream house would be [place]. Patient told a story about an issue he had with someone in his life and how he dealt with it 
Patient discussed how he attends AA and  enjoys listening to music. Patient discussed his difficulty with accepting his new normal functioning with his knee injuries.

## 2024-05-16 NOTE — BH INPATIENT PSYCHIATRY PROGRESS NOTE - NSBHMETABOLIC_PSY_ALL_CORE_FT
BMI:   HbA1c: A1C with Estimated Average Glucose Result: 5.9 % (04-27-24 @ 10:21)    Glucose:   BP: --Vital Signs Last 24 Hrs  T(C): 36.6 (05-16-24 @ 08:38), Max: 36.7 (05-15-24 @ 19:38)  T(F): 97.8 (05-16-24 @ 08:38), Max: 98.1 (05-15-24 @ 19:38)  HR: --  BP: --  BP(mean): --  RR: 17 (05-16-24 @ 08:38) (17 - 17)  SpO2: --    Orthostatic VS  05-16-24 @ 08:38  Lying BP: --/-- HR: --  Sitting BP: 146/72 HR: 62  Standing BP: 130/75 HR: 68  Site: --  Mode: --  Orthostatic VS  05-15-24 @ 19:38  Lying BP: --/-- HR: --  Sitting BP: 126/70 HR: 72  Standing BP: 130/64 HR: 73  Site: --  Mode: --  Orthostatic VS  05-15-24 @ 06:25  Lying BP: --/-- HR: --  Sitting BP: 148/67 HR: 56  Standing BP: 135/60 HR: 64  Site: --  Mode: --  Orthostatic VS  05-14-24 @ 19:17  Lying BP: --/-- HR: --  Sitting BP: 133/73 HR: 76  Standing BP: 124/68 HR: 79  Site: --  Mode: --    Lipid Panel: Date/Time: 04-27-24 @ 10:21  Cholesterol, Serum: 139  LDL Cholesterol Calculated: 39  HDL Cholesterol, Serum: 38  Total Cholesterol/HDL Ration Measurement: --  Triglycerides, Serum: 308   BMI:   HbA1c: A1C with Estimated Average Glucose Result: 5.9 % (04-27-24 @ 10:21)    Glucose:   BP: --Vital Signs Last 24 Hrs  T(C): 36.5 (05-16-24 @ 19:11), Max: 36.6 (05-16-24 @ 08:38)  T(F): 97.7 (05-16-24 @ 19:11), Max: 97.8 (05-16-24 @ 08:38)  HR: --  BP: --  BP(mean): --  RR: 17 (05-16-24 @ 08:38) (17 - 17)  SpO2: --    Orthostatic VS  05-16-24 @ 19:11  Lying BP: --/-- HR: --  Sitting BP: 124/67 HR: 68  Standing BP: 123/62 HR: 69  Site: --  Mode: --  Orthostatic VS  05-16-24 @ 08:38  Lying BP: --/-- HR: --  Sitting BP: 146/72 HR: 62  Standing BP: 130/75 HR: 68  Site: --  Mode: --  Orthostatic VS  05-15-24 @ 19:38  Lying BP: --/-- HR: --  Sitting BP: 126/70 HR: 72  Standing BP: 130/64 HR: 73  Site: --  Mode: --  Orthostatic VS  05-15-24 @ 06:25  Lying BP: --/-- HR: --  Sitting BP: 148/67 HR: 56  Standing BP: 135/60 HR: 64  Site: --  Mode: --    Lipid Panel: Date/Time: 04-27-24 @ 10:21  Cholesterol, Serum: 139  LDL Cholesterol Calculated: 39  HDL Cholesterol, Serum: 38  Total Cholesterol/HDL Ration Measurement: --  Triglycerides, Serum: 308

## 2024-05-16 NOTE — BH PSYCHOLOGY - GROUP THERAPY NOTE - NSPSYCHOLGRPCOGPT_PSY_A_CORE FT
Patient attended Cognitive Behavioral Therapy Group utilizing concepts and skills from Acceptance and Commitment Therapy (ACT). The group started with a brief check in where patients were asked to share what their dream house would be. The group then focused on the topic of being in control of their life. Patients shared how they deal with disagreements with others. Patients also spoke about how they can use their values to guide them. The  explained concepts, reinforced participation, and engaged patients in the discussion. 
Patient attended Cognitive Behavioral Therapy Group utilizing concepts and skills from Acceptance and Commitment Therapy (ACT). The group started with a brief check in where patients were asked to share their favorite way to spend the day. The group then focused on the topics of healthy perspectives of emotion. Patients discussed how to cope with difficult emotions. Patients spoke about their values and how they can incorporate their values into their life. The  explained concepts, reinforced participation, and engaged patients in the discussion. 
Patient attended Cognitive Behavioral Therapy Group utilizing concepts and skills from Acceptance and Commitment Therapy (ACT). The group started with a brief check in where patients were asked to share their favorite way to socialize. The group then focused on the topic of taking care of your mind by taking care of your body. Patients shared how they take care of themselves. Patients also spoke about what they find difficult to do. The  and co leader explained concepts, reinforced participation, and engaged patients in the discussion.

## 2024-05-16 NOTE — BH INPATIENT PSYCHIATRY PROGRESS NOTE - NSBHASSESSSUMMFT_PSY_ALL_CORE
Patient is a 59 y/o male domiciled at [facility], unemployed, single, PPHx depression, anxiety, PTSD, multiple past suicide attempts, multiple inpatient psych admissions BIBEMS for suicidal ideation. Patient BIB EMS after endorsing SI to staff at nursing home.         Working diagnosis:  MDD, severe w/o psychotic features  PTSD      Plan:  >Legal: 9.13  >Obs: Routine observation, denies active SIIP and is able to engage in safety planning.      >Psychiatric Meds:  Continue Lexapro 20mg, AM for depression  Continue Buspirone 10mg, BID for anxiety  Continue Guanfacine 1mg, BID for impulsivity        >PRN Meds:  Trazodone 100mg, QHS for insomnia  Melatonin 5mg PO, QHS for insomnia  Atarax 50mg PO, QHS for anxiety  Lorazepam 2mg IM/PO, Q6H for severe anxiety      >Labs: Admission labs reviewed, no acute findings. Hold antipsychotics if QTc >500  >Medical: No acute concerns. Patient placed on CIWA, no visible physical symptoms of withdrawal. During the course of treatment, will collaborate with medical team to manage medical issues.  >Diet: Regular  >Social: Milieu/structured therapy  >Treatment Interventions: Groups and Individual Therapy/CBT.  >Dispo: [place]

## 2024-05-16 NOTE — BH INPATIENT PSYCHIATRY PROGRESS NOTE - NSBHCHARTREVIEWVS_PSY_A_CORE FT
Vital Signs Last 24 Hrs  T(C): 36.6 (05-16-24 @ 08:38), Max: 36.7 (05-15-24 @ 19:38)  T(F): 97.8 (05-16-24 @ 08:38), Max: 98.1 (05-15-24 @ 19:38)  HR: --  BP: --  BP(mean): --  RR: 17 (05-16-24 @ 08:38) (17 - 17)  SpO2: --    Orthostatic VS  05-16-24 @ 08:38  Lying BP: --/-- HR: --  Sitting BP: 146/72 HR: 62  Standing BP: 130/75 HR: 68  Site: --  Mode: --  Orthostatic VS  05-15-24 @ 19:38  Lying BP: --/-- HR: --  Sitting BP: 126/70 HR: 72  Standing BP: 130/64 HR: 73  Site: --  Mode: --  Orthostatic VS  05-15-24 @ 06:25  Lying BP: --/-- HR: --  Sitting BP: 148/67 HR: 56  Standing BP: 135/60 HR: 64  Site: --  Mode: --  Orthostatic VS  05-14-24 @ 19:17  Lying BP: --/-- HR: --  Sitting BP: 133/73 HR: 76  Standing BP: 124/68 HR: 79  Site: --  Mode: --   Vital Signs Last 24 Hrs  T(C): 36.5 (05-16-24 @ 19:11), Max: 36.6 (05-16-24 @ 08:38)  T(F): 97.7 (05-16-24 @ 19:11), Max: 97.8 (05-16-24 @ 08:38)  HR: --  BP: --  BP(mean): --  RR: 17 (05-16-24 @ 08:38) (17 - 17)  SpO2: --    Orthostatic VS  05-16-24 @ 19:11  Lying BP: --/-- HR: --  Sitting BP: 124/67 HR: 68  Standing BP: 123/62 HR: 69  Site: --  Mode: --  Orthostatic VS  05-16-24 @ 08:38  Lying BP: --/-- HR: --  Sitting BP: 146/72 HR: 62  Standing BP: 130/75 HR: 68  Site: --  Mode: --  Orthostatic VS  05-15-24 @ 19:38  Lying BP: --/-- HR: --  Sitting BP: 126/70 HR: 72  Standing BP: 130/64 HR: 73  Site: --  Mode: --  Orthostatic VS  05-15-24 @ 06:25  Lying BP: --/-- HR: --  Sitting BP: 148/67 HR: 56  Standing BP: 135/60 HR: 64  Site: --  Mode: --

## 2024-05-16 NOTE — BH INPATIENT PSYCHIATRY PROGRESS NOTE - PRN MEDS
-- MEDICATIONS  (PRN):  acetaminophen     Tablet .. 650 milliGRAM(s) Oral every 6 hours PRN Mild Pain (1 - 3), Moderate Pain (4 - 6)  aluminum hydroxide/magnesium hydroxide/simethicone Suspension 30 milliLiter(s) Oral every 6 hours PRN Dyspepsia  fluticasone propionate 50 MICROgram(s)/spray Nasal Spray 1 Spray(s) Both Nostrils every 12 hours PRN congestion  lactulose Syrup 10 Gram(s) Oral two times a day PRN constipation  lidocaine   4% Patch 1 Patch Transdermal every 24 hours PRN chronic L knee pain  melatonin. 5 milliGRAM(s) Oral at bedtime PRN Insomnia  nicotine  Polacrilex Gum 2 milliGRAM(s) Oral every 2 hours PRN Smoking Cessation  nicotine -   7 mG/24Hr(s) Patch 1 Patch Transdermal daily PRN nicotine dependence  traZODone 100 milliGRAM(s) Oral at bedtime PRN insomnia

## 2024-05-16 NOTE — BH INPATIENT PSYCHIATRY PROGRESS NOTE - NSBHFUPINTERVALHXFT_PSY_A_CORE
Patient is followed up for mood/SI. Chart, medications and labs reviewed. Patient is discussed during morning brief, no overnight events, has been in good behavioral control.          Patient was seen and is evaluated on the unit, ambulating w/ walker. He reports having good appetite and fair sleep, hx of sleep apnea and has been utilizing wedge on the unit. Pt was able to have [facility] screening done on the unit yesterday evening. He does not offer and concerns or complaints. He has been compliant with standing medications, denies SE. He denies any SIIP or urges to self harm. No acute medical concerns, VSS.

## 2024-05-16 NOTE — BH PSYCHOLOGY - GROUP THERAPY NOTE - NSPSYCHOLGRPCOGINT_PSY_A_CORE FT
Cognitive/behavioral therapy, Acceptance and Commitment Therapy, Emotion regulation/coping skills taught, Psychoed  

## 2024-05-17 PROCEDURE — 99232 SBSQ HOSP IP/OBS MODERATE 35: CPT

## 2024-05-17 RX ORDER — QUETIAPINE FUMARATE 200 MG/1
75 TABLET, FILM COATED ORAL AT BEDTIME
Refills: 0 | Status: DISCONTINUED | OUTPATIENT
Start: 2024-05-17 | End: 2024-05-29

## 2024-05-17 RX ADMIN — ESCITALOPRAM OXALATE 20 MILLIGRAM(S): 10 TABLET, FILM COATED ORAL at 08:49

## 2024-05-17 RX ADMIN — Medication 1 PATCH: at 08:35

## 2024-05-17 RX ADMIN — Medication 100 MILLIGRAM(S): at 20:21

## 2024-05-17 RX ADMIN — Medication 10 MILLIGRAM(S): at 08:49

## 2024-05-17 RX ADMIN — Medication 325 MILLIGRAM(S): at 08:49

## 2024-05-17 RX ADMIN — Medication 0.1 MILLIGRAM(S): at 20:21

## 2024-05-17 RX ADMIN — Medication 2 MILLIGRAM(S): at 08:48

## 2024-05-17 RX ADMIN — BUPRENORPHINE AND NALOXONE 1 TABLET(S): 2; .5 TABLET SUBLINGUAL at 08:50

## 2024-05-17 RX ADMIN — SENNA PLUS 2 TABLET(S): 8.6 TABLET ORAL at 20:21

## 2024-05-17 RX ADMIN — QUETIAPINE FUMARATE 75 MILLIGRAM(S): 200 TABLET, FILM COATED ORAL at 20:21

## 2024-05-17 RX ADMIN — Medication 1 TABLET(S): at 15:44

## 2024-05-17 RX ADMIN — GUANFACINE 1 MILLIGRAM(S): 3 TABLET, EXTENDED RELEASE ORAL at 20:21

## 2024-05-17 RX ADMIN — Medication 10 MILLIGRAM(S): at 20:21

## 2024-05-17 RX ADMIN — Medication 1 TABLET(S): at 08:48

## 2024-05-17 RX ADMIN — Medication 2 MILLIGRAM(S): at 17:20

## 2024-05-17 RX ADMIN — BUPRENORPHINE AND NALOXONE 1 TABLET(S): 2; .5 TABLET SUBLINGUAL at 20:22

## 2024-05-17 RX ADMIN — Medication 1 PATCH: at 08:36

## 2024-05-17 RX ADMIN — Medication 1 PATCH: at 08:50

## 2024-05-17 RX ADMIN — Medication 30 MILLILITER(S): at 17:20

## 2024-05-17 RX ADMIN — GUANFACINE 1 MILLIGRAM(S): 3 TABLET, EXTENDED RELEASE ORAL at 08:49

## 2024-05-17 RX ADMIN — Medication 0.1 MILLIGRAM(S): at 08:49

## 2024-05-17 RX ADMIN — ATORVASTATIN CALCIUM 40 MILLIGRAM(S): 80 TABLET, FILM COATED ORAL at 20:20

## 2024-05-17 NOTE — BH INPATIENT PSYCHIATRY PROGRESS NOTE - CURRENT MEDICATION
MEDICATIONS  (STANDING):  atorvastatin 40 milliGRAM(s) Oral at bedtime  buprenorphine 8 mG/naloxone 2 mG SL  Tablet 1 Tablet(s) SubLingual two times a day  busPIRone 10 milliGRAM(s) Oral two times a day  cloNIDine 0.1 milliGRAM(s) Oral two times a day  escitalopram 20 milliGRAM(s) Oral daily  ferrous    sulfate 325 milliGRAM(s) Oral daily  guanFACINE. 1 milliGRAM(s) Oral <User Schedule>  lactobacillus acidophilus 1 Tablet(s) Oral two times a day with meals  QUEtiapine 50 milliGRAM(s) Oral at bedtime  senna 2 Tablet(s) Oral at bedtime    MEDICATIONS  (PRN):  acetaminophen     Tablet .. 650 milliGRAM(s) Oral every 6 hours PRN Mild Pain (1 - 3), Moderate Pain (4 - 6)  aluminum hydroxide/magnesium hydroxide/simethicone Suspension 30 milliLiter(s) Oral every 6 hours PRN Dyspepsia  fluticasone propionate 50 MICROgram(s)/spray Nasal Spray 1 Spray(s) Both Nostrils every 12 hours PRN congestion  lactulose Syrup 10 Gram(s) Oral two times a day PRN constipation  lidocaine   4% Patch 1 Patch Transdermal every 24 hours PRN chronic L knee pain  melatonin. 5 milliGRAM(s) Oral at bedtime PRN Insomnia  nicotine  Polacrilex Gum 2 milliGRAM(s) Oral every 2 hours PRN Smoking Cessation  nicotine -   7 mG/24Hr(s) Patch 1 Patch Transdermal daily PRN nicotine dependence  traZODone 100 milliGRAM(s) Oral at bedtime PRN insomnia   MEDICATIONS  (STANDING):  atorvastatin 40 milliGRAM(s) Oral at bedtime  buprenorphine 8 mG/naloxone 2 mG SL  Tablet 1 Tablet(s) SubLingual two times a day  busPIRone 10 milliGRAM(s) Oral two times a day  cloNIDine 0.1 milliGRAM(s) Oral two times a day  escitalopram 20 milliGRAM(s) Oral daily  ferrous    sulfate 325 milliGRAM(s) Oral daily  guanFACINE. 1 milliGRAM(s) Oral <User Schedule>  lactobacillus acidophilus 1 Tablet(s) Oral two times a day with meals  QUEtiapine 75 milliGRAM(s) Oral at bedtime  senna 2 Tablet(s) Oral at bedtime    MEDICATIONS  (PRN):  acetaminophen     Tablet .. 650 milliGRAM(s) Oral every 6 hours PRN Mild Pain (1 - 3), Moderate Pain (4 - 6)  aluminum hydroxide/magnesium hydroxide/simethicone Suspension 30 milliLiter(s) Oral every 6 hours PRN Dyspepsia  fluticasone propionate 50 MICROgram(s)/spray Nasal Spray 1 Spray(s) Both Nostrils every 12 hours PRN congestion  lactulose Syrup 10 Gram(s) Oral two times a day PRN constipation  lidocaine   4% Patch 1 Patch Transdermal every 24 hours PRN chronic L knee pain  melatonin. 5 milliGRAM(s) Oral at bedtime PRN Insomnia  nicotine  Polacrilex Gum 2 milliGRAM(s) Oral every 2 hours PRN Smoking Cessation  nicotine -   7 mG/24Hr(s) Patch 1 Patch Transdermal daily PRN nicotine dependence  traZODone 100 milliGRAM(s) Oral at bedtime PRN insomnia

## 2024-05-17 NOTE — BH INPATIENT PSYCHIATRY PROGRESS NOTE - NSCGIIMPROVESX_PSY_ALL_CORE
3 = Minimally improved - slightly better with little or no clinically meaningful reduction of symptoms.  Represents very little change in basic clinical status, level of care, or functional capacity.

## 2024-05-17 NOTE — BH INPATIENT PSYCHIATRY PROGRESS NOTE - NSBHMETABOLIC_PSY_ALL_CORE_FT
BMI:   HbA1c: A1C with Estimated Average Glucose Result: 5.9 % (04-27-24 @ 10:21)    Glucose:   BP: --Vital Signs Last 24 Hrs  T(C): 36.2 (05-17-24 @ 08:26), Max: 36.5 (05-16-24 @ 19:11)  T(F): 97.2 (05-17-24 @ 08:26), Max: 97.7 (05-16-24 @ 19:11)  HR: --  BP: --  BP(mean): --  RR: 16 (05-17-24 @ 08:26) (16 - 16)  SpO2: --    Orthostatic VS  05-17-24 @ 08:26  Lying BP: --/-- HR: --  Sitting BP: 145/68 HR: 62  Standing BP: 126/77 HR: 74  Site: --  Mode: electronic  Orthostatic VS  05-16-24 @ 19:11  Lying BP: --/-- HR: --  Sitting BP: 124/67 HR: 68  Standing BP: 123/62 HR: 69  Site: --  Mode: --  Orthostatic VS  05-16-24 @ 08:38  Lying BP: --/-- HR: --  Sitting BP: 146/72 HR: 62  Standing BP: 130/75 HR: 68  Site: --  Mode: --  Orthostatic VS  05-15-24 @ 19:38  Lying BP: --/-- HR: --  Sitting BP: 126/70 HR: 72  Standing BP: 130/64 HR: 73  Site: --  Mode: --    Lipid Panel: Date/Time: 04-27-24 @ 10:21  Cholesterol, Serum: 139  LDL Cholesterol Calculated: 39  HDL Cholesterol, Serum: 38  Total Cholesterol/HDL Ration Measurement: --  Triglycerides, Serum: 308

## 2024-05-17 NOTE — BH INPATIENT PSYCHIATRY PROGRESS NOTE - NSBHFUPINTERVALHXFT_PSY_A_CORE
16-May-2024 Patient is followed up for mood/SI. Chart, medications and labs reviewed. Patient is discussed during morning brief, no overnight events, has been in good behavioral control.          Patient was seen and is evaluated on the unit, ambulating w/ walker. He reports having good appetite and fair sleep, hx of sleep apnea and has been utilizing wedge on the unit. Pt awaiting [facility] screening, does not offer any concerns or complaints at this time. He has been compliant with standing medications, denies SE. He denies any SIIP or urges to self harm. No acute medical concerns, VSS.

## 2024-05-17 NOTE — BH INPATIENT PSYCHIATRY PROGRESS NOTE - NSBHASSESSSUMMFT_PSY_ALL_CORE
Patient is a 57 y/o male domiciled at [facility], unemployed, single, PPHx depression, anxiety, PTSD, multiple past suicide attempts, multiple inpatient psych admissions BIBEMS for suicidal ideation. Patient BIB EMS after endorsing SI to staff at nursing home.         Working diagnosis:  MDD, severe w/o psychotic features  PTSD      Plan:  >Legal: 9.13  >Obs: Routine observation, denies active SIIP and is able to engage in safety planning.      >Psychiatric Meds:  Continue Lexapro 20mg, AM for depression  Continue Buspirone 10mg, BID for anxiety  Continue Guanfacine 1mg, BID for impulsivity        >PRN Meds:  Trazodone 100mg, QHS for insomnia  Melatonin 5mg PO, QHS for insomnia  Atarax 50mg PO, QHS for anxiety  Lorazepam 2mg IM/PO, Q6H for severe anxiety      >Labs: Admission labs reviewed, no acute findings. Hold antipsychotics if QTc >500  >Medical: No acute concerns. Patient placed on CIWA, no visible physical symptoms of withdrawal. During the course of treatment, will collaborate with medical team to manage medical issues.  >Diet: Regular  >Social: Milieu/structured therapy  >Treatment Interventions: Groups and Individual Therapy/CBT.  >Dispo: [place]

## 2024-05-17 NOTE — BH INPATIENT PSYCHIATRY PROGRESS NOTE - NSCGISEVERILLNESS_PSY_ALL_CORE
5 = Markedly ill - intrusive symptoms that distinctly impair social/occupational function or cause intrusive levels of distress

## 2024-05-17 NOTE — BH INPATIENT PSYCHIATRY PROGRESS NOTE - NSBHCONSDANGERSELF_PSY_A_CORE
suicidal behavior
Graft Donor Site Bandage (Optional-Leave Blank If You Don't Want In Note): Steri-strips and a pressure bandage were applied to the donor site.
suicidal behavior

## 2024-05-17 NOTE — BH INPATIENT PSYCHIATRY PROGRESS NOTE - NSBHFUPINTERVALHXFT_PSY_A_CORE
Patient is followed up for mood/SI. Chart, medications and labs reviewed. Patient is discussed during morning brief, no overnight events, has been in good behavioral control.          Patient was seen and is evaluated on the unit, ambulating w/ walker. He reports having good appetite and fair sleep, hx of sleep apnea and has been utilizing wedge on the unit. He states that he is doing "good", although does feel depressed intermittently while thinking about mobility in his knee. Pt was able to have [facility] screening completed on 5/15. He has been compliant with standing medications, denies SE. He denies any SIIP or urges to self harm. No acute medical concerns, VSS.

## 2024-05-17 NOTE — BH INPATIENT PSYCHIATRY PROGRESS NOTE - NSBHCHARTREVIEWVS_PSY_A_CORE FT
Vital Signs Last 24 Hrs  T(C): 36.2 (05-17-24 @ 08:26), Max: 36.5 (05-16-24 @ 19:11)  T(F): 97.2 (05-17-24 @ 08:26), Max: 97.7 (05-16-24 @ 19:11)  HR: --  BP: --  BP(mean): --  RR: 16 (05-17-24 @ 08:26) (16 - 16)  SpO2: --    Orthostatic VS  05-17-24 @ 08:26  Lying BP: --/-- HR: --  Sitting BP: 145/68 HR: 62  Standing BP: 126/77 HR: 74  Site: --  Mode: electronic  Orthostatic VS  05-16-24 @ 19:11  Lying BP: --/-- HR: --  Sitting BP: 124/67 HR: 68  Standing BP: 123/62 HR: 69  Site: --  Mode: --  Orthostatic VS  05-16-24 @ 08:38  Lying BP: --/-- HR: --  Sitting BP: 146/72 HR: 62  Standing BP: 130/75 HR: 68  Site: --  Mode: --  Orthostatic VS  05-15-24 @ 19:38  Lying BP: --/-- HR: --  Sitting BP: 126/70 HR: 72  Standing BP: 130/64 HR: 73  Site: --  Mode: --

## 2024-05-18 RX ADMIN — ESCITALOPRAM OXALATE 20 MILLIGRAM(S): 10 TABLET, FILM COATED ORAL at 08:29

## 2024-05-18 RX ADMIN — Medication 100 MILLIGRAM(S): at 20:07

## 2024-05-18 RX ADMIN — Medication 2 MILLIGRAM(S): at 17:21

## 2024-05-18 RX ADMIN — Medication 2 MILLIGRAM(S): at 14:35

## 2024-05-18 RX ADMIN — Medication 10 MILLIGRAM(S): at 08:29

## 2024-05-18 RX ADMIN — Medication 1 TABLET(S): at 08:29

## 2024-05-18 RX ADMIN — Medication 0.1 MILLIGRAM(S): at 08:30

## 2024-05-18 RX ADMIN — Medication 1 TABLET(S): at 16:31

## 2024-05-18 RX ADMIN — ATORVASTATIN CALCIUM 40 MILLIGRAM(S): 80 TABLET, FILM COATED ORAL at 20:06

## 2024-05-18 RX ADMIN — BUPRENORPHINE AND NALOXONE 1 TABLET(S): 2; .5 TABLET SUBLINGUAL at 20:08

## 2024-05-18 RX ADMIN — Medication 5 MILLIGRAM(S): at 01:39

## 2024-05-18 RX ADMIN — Medication 2 MILLIGRAM(S): at 08:30

## 2024-05-18 RX ADMIN — Medication 0.1 MILLIGRAM(S): at 20:07

## 2024-05-18 RX ADMIN — GUANFACINE 1 MILLIGRAM(S): 3 TABLET, EXTENDED RELEASE ORAL at 08:29

## 2024-05-18 RX ADMIN — BUPRENORPHINE AND NALOXONE 1 TABLET(S): 2; .5 TABLET SUBLINGUAL at 08:29

## 2024-05-18 RX ADMIN — Medication 10 MILLIGRAM(S): at 20:06

## 2024-05-18 RX ADMIN — Medication 1 PATCH: at 08:30

## 2024-05-18 RX ADMIN — Medication 30 MILLILITER(S): at 01:40

## 2024-05-18 RX ADMIN — Medication 325 MILLIGRAM(S): at 08:30

## 2024-05-18 RX ADMIN — QUETIAPINE FUMARATE 75 MILLIGRAM(S): 200 TABLET, FILM COATED ORAL at 20:06

## 2024-05-18 RX ADMIN — SENNA PLUS 2 TABLET(S): 8.6 TABLET ORAL at 20:06

## 2024-05-18 RX ADMIN — GUANFACINE 1 MILLIGRAM(S): 3 TABLET, EXTENDED RELEASE ORAL at 20:06

## 2024-05-18 RX ADMIN — Medication 2 MILLIGRAM(S): at 20:08

## 2024-05-19 RX ADMIN — Medication 10 MILLIGRAM(S): at 20:34

## 2024-05-19 RX ADMIN — QUETIAPINE FUMARATE 75 MILLIGRAM(S): 200 TABLET, FILM COATED ORAL at 20:34

## 2024-05-19 RX ADMIN — SENNA PLUS 2 TABLET(S): 8.6 TABLET ORAL at 20:34

## 2024-05-19 RX ADMIN — Medication 1 TABLET(S): at 08:12

## 2024-05-19 RX ADMIN — Medication 0.1 MILLIGRAM(S): at 08:12

## 2024-05-19 RX ADMIN — Medication 650 MILLIGRAM(S): at 16:17

## 2024-05-19 RX ADMIN — GUANFACINE 1 MILLIGRAM(S): 3 TABLET, EXTENDED RELEASE ORAL at 08:12

## 2024-05-19 RX ADMIN — ESCITALOPRAM OXALATE 20 MILLIGRAM(S): 10 TABLET, FILM COATED ORAL at 08:12

## 2024-05-19 RX ADMIN — BUPRENORPHINE AND NALOXONE 1 TABLET(S): 2; .5 TABLET SUBLINGUAL at 20:34

## 2024-05-19 RX ADMIN — Medication 325 MILLIGRAM(S): at 08:12

## 2024-05-19 RX ADMIN — GUANFACINE 1 MILLIGRAM(S): 3 TABLET, EXTENDED RELEASE ORAL at 20:34

## 2024-05-19 RX ADMIN — Medication 10 MILLIGRAM(S): at 08:12

## 2024-05-19 RX ADMIN — BUPRENORPHINE AND NALOXONE 1 TABLET(S): 2; .5 TABLET SUBLINGUAL at 08:12

## 2024-05-19 RX ADMIN — Medication 650 MILLIGRAM(S): at 18:17

## 2024-05-19 RX ADMIN — Medication 2 MILLIGRAM(S): at 12:46

## 2024-05-19 RX ADMIN — Medication 0.1 MILLIGRAM(S): at 20:34

## 2024-05-19 RX ADMIN — Medication 1 TABLET(S): at 16:17

## 2024-05-19 RX ADMIN — Medication 2 MILLIGRAM(S): at 08:40

## 2024-05-19 RX ADMIN — ATORVASTATIN CALCIUM 40 MILLIGRAM(S): 80 TABLET, FILM COATED ORAL at 20:34

## 2024-05-19 RX ADMIN — Medication 2 MILLIGRAM(S): at 16:17

## 2024-05-20 RX ADMIN — Medication 0.1 MILLIGRAM(S): at 08:22

## 2024-05-20 RX ADMIN — Medication 2 MILLIGRAM(S): at 10:32

## 2024-05-20 RX ADMIN — Medication 10 MILLIGRAM(S): at 08:21

## 2024-05-20 RX ADMIN — Medication 325 MILLIGRAM(S): at 08:22

## 2024-05-20 RX ADMIN — GUANFACINE 1 MILLIGRAM(S): 3 TABLET, EXTENDED RELEASE ORAL at 20:53

## 2024-05-20 RX ADMIN — GUANFACINE 1 MILLIGRAM(S): 3 TABLET, EXTENDED RELEASE ORAL at 08:21

## 2024-05-20 RX ADMIN — Medication 1 TABLET(S): at 07:45

## 2024-05-20 RX ADMIN — Medication 100 MILLIGRAM(S): at 00:47

## 2024-05-20 RX ADMIN — Medication 100 MILLIGRAM(S): at 22:29

## 2024-05-20 RX ADMIN — BUPRENORPHINE AND NALOXONE 1 TABLET(S): 2; .5 TABLET SUBLINGUAL at 08:21

## 2024-05-20 RX ADMIN — QUETIAPINE FUMARATE 75 MILLIGRAM(S): 200 TABLET, FILM COATED ORAL at 20:52

## 2024-05-20 RX ADMIN — Medication 10 MILLIGRAM(S): at 20:53

## 2024-05-20 RX ADMIN — Medication 30 MILLILITER(S): at 00:48

## 2024-05-20 RX ADMIN — Medication 2 MILLIGRAM(S): at 20:53

## 2024-05-20 RX ADMIN — Medication 30 MILLILITER(S): at 21:01

## 2024-05-20 RX ADMIN — Medication 650 MILLIGRAM(S): at 18:24

## 2024-05-20 RX ADMIN — Medication 1 TABLET(S): at 16:27

## 2024-05-20 RX ADMIN — ESCITALOPRAM OXALATE 20 MILLIGRAM(S): 10 TABLET, FILM COATED ORAL at 08:21

## 2024-05-20 RX ADMIN — LIDOCAINE 1 PATCH: 4 CREAM TOPICAL at 22:29

## 2024-05-20 RX ADMIN — Medication 2 MILLIGRAM(S): at 12:48

## 2024-05-20 RX ADMIN — ATORVASTATIN CALCIUM 40 MILLIGRAM(S): 80 TABLET, FILM COATED ORAL at 20:53

## 2024-05-20 RX ADMIN — BUPRENORPHINE AND NALOXONE 1 TABLET(S): 2; .5 TABLET SUBLINGUAL at 20:53

## 2024-05-20 RX ADMIN — Medication 0.1 MILLIGRAM(S): at 20:53

## 2024-05-20 RX ADMIN — Medication 2 MILLIGRAM(S): at 08:31

## 2024-05-20 RX ADMIN — Medication 2 MILLIGRAM(S): at 18:24

## 2024-05-20 RX ADMIN — Medication 2 MILLIGRAM(S): at 16:27

## 2024-05-20 RX ADMIN — SENNA PLUS 2 TABLET(S): 8.6 TABLET ORAL at 20:53

## 2024-05-20 NOTE — DIETITIAN INITIAL EVALUATION ADULT - PERTINENT LABORATORY DATA
Glucose: 156 mg/dL (04.27.24 @ 10:21)     A1C with Estimated Average Glucose Result: 5.9 % (04-27-24 @ 10:21)    Lipid Panel: Date/Time: 04-27-24 @ 10:21  Cholesterol, Serum: 139  LDL Cholesterol Calculated: 39  HDL Cholesterol, Serum: 38  Total Cholesterol/HDL Ration Measurement: --  Triglycerides, Serum: 308

## 2024-05-20 NOTE — BH INPATIENT PSYCHIATRY PROGRESS NOTE - NSBHMETABOLIC_PSY_ALL_CORE_FT
BMI:   HbA1c: A1C with Estimated Average Glucose Result: 5.9 % (04-27-24 @ 10:21)    Glucose:   BP: --Vital Signs Last 24 Hrs  T(C): 36.7 (05-20-24 @ 08:07), Max: 36.7 (05-20-24 @ 08:07)  T(F): 98 (05-20-24 @ 08:07), Max: 98 (05-20-24 @ 08:07)  HR: --  BP: --  BP(mean): --  RR: 18 (05-20-24 @ 07:55) (16 - 18)  SpO2: --    Orthostatic VS  05-20-24 @ 08:07  Lying BP: --/-- HR: --  Sitting BP: 122/74 HR: 64  Standing BP: 119/67 HR: 70  Site: --  Mode: --  Orthostatic VS  05-19-24 @ 19:38  Lying BP: --/-- HR: --  Sitting BP: 126/64 HR: 71  Standing BP: 139/64 HR: 73  Site: --  Mode: --  Orthostatic VS  05-19-24 @ 07:52  Lying BP: --/-- HR: --  Sitting BP: 132/74 HR: 68  Standing BP: 119/70 HR: 71  Site: --  Mode: --  Orthostatic VS  05-18-24 @ 19:29  Lying BP: --/-- HR: --  Sitting BP: 135/69 HR: 70  Standing BP: 142/81 HR: 89  Site: --  Mode: --    Lipid Panel: Date/Time: 04-27-24 @ 10:21  Cholesterol, Serum: 139  LDL Cholesterol Calculated: 39  HDL Cholesterol, Serum: 38  Total Cholesterol/HDL Ration Measurement: --  Triglycerides, Serum: 308   BMI:   HbA1c: A1C with Estimated Average Glucose Result: 5.9 % (04-27-24 @ 10:21)    Glucose:   BP: --Vital Signs Last 24 Hrs  T(C): 36.3 (05-21-24 @ 19:12), Max: 36.3 (05-21-24 @ 08:18)  T(F): 97.3 (05-21-24 @ 19:12), Max: 97.3 (05-21-24 @ 08:18)  HR: --  BP: --  BP(mean): --  RR: 17 (05-21-24 @ 08:18) (17 - 17)  SpO2: --    Orthostatic VS  05-21-24 @ 19:12  Lying BP: --/-- HR: --  Sitting BP: 141/85 HR: 86  Standing BP: 136/73 HR: 91  Site: --  Mode: --  Orthostatic VS  05-21-24 @ 08:18  Lying BP: --/-- HR: --  Sitting BP: 142/79 HR: 61  Standing BP: 150/77 HR: 71  Site: upper left arm  Mode: electronic  Orthostatic VS  05-20-24 @ 19:38  Lying BP: --/-- HR: --  Sitting BP: 141/73 HR: 73  Standing BP: 135/72 HR: 77  Site: --  Mode: --  Orthostatic VS  05-20-24 @ 08:07  Lying BP: --/-- HR: --  Sitting BP: 122/74 HR: 64  Standing BP: 119/67 HR: 70  Site: --  Mode: --    Lipid Panel: Date/Time: 04-27-24 @ 10:21  Cholesterol, Serum: 139  LDL Cholesterol Calculated: 39  HDL Cholesterol, Serum: 38  Total Cholesterol/HDL Ration Measurement: --  Triglycerides, Serum: 308

## 2024-05-20 NOTE — BH INPATIENT PSYCHIATRY PROGRESS NOTE - CURRENT MEDICATION
MEDICATIONS  (STANDING):  atorvastatin 40 milliGRAM(s) Oral at bedtime  buprenorphine 8 mG/naloxone 2 mG SL  Tablet 1 Tablet(s) SubLingual two times a day  busPIRone 10 milliGRAM(s) Oral two times a day  cloNIDine 0.1 milliGRAM(s) Oral two times a day  escitalopram 20 milliGRAM(s) Oral daily  ferrous    sulfate 325 milliGRAM(s) Oral daily  guanFACINE. 1 milliGRAM(s) Oral <User Schedule>  lactobacillus acidophilus 1 Tablet(s) Oral two times a day with meals  QUEtiapine 75 milliGRAM(s) Oral at bedtime  senna 2 Tablet(s) Oral at bedtime    MEDICATIONS  (PRN):  acetaminophen     Tablet .. 650 milliGRAM(s) Oral every 6 hours PRN Mild Pain (1 - 3), Moderate Pain (4 - 6)  aluminum hydroxide/magnesium hydroxide/simethicone Suspension 30 milliLiter(s) Oral every 6 hours PRN Dyspepsia  fluticasone propionate 50 MICROgram(s)/spray Nasal Spray 1 Spray(s) Both Nostrils every 12 hours PRN congestion  lactulose Syrup 10 Gram(s) Oral two times a day PRN constipation  lidocaine   4% Patch 1 Patch Transdermal every 24 hours PRN chronic L knee pain  melatonin. 5 milliGRAM(s) Oral at bedtime PRN Insomnia  nicotine  Polacrilex Gum 2 milliGRAM(s) Oral every 2 hours PRN Smoking Cessation  nicotine -   7 mG/24Hr(s) Patch 1 Patch Transdermal daily PRN nicotine dependence  traZODone 100 milliGRAM(s) Oral at bedtime PRN insomnia   MEDICATIONS  (STANDING):  atorvastatin 40 milliGRAM(s) Oral at bedtime  buprenorphine 8 mG/naloxone 2 mG SL  Tablet 1 Tablet(s) SubLingual two times a day  busPIRone 10 milliGRAM(s) Oral two times a day  cloNIDine 0.1 milliGRAM(s) Oral two times a day  escitalopram 20 milliGRAM(s) Oral daily  ferrous    sulfate 325 milliGRAM(s) Oral daily  guanFACINE. 1 milliGRAM(s) Oral <User Schedule>  lactobacillus acidophilus 1 Tablet(s) Oral two times a day with meals  QUEtiapine 75 milliGRAM(s) Oral at bedtime  senna 2 Tablet(s) Oral at bedtime    MEDICATIONS  (PRN):  acetaminophen     Tablet .. 650 milliGRAM(s) Oral every 6 hours PRN Mild Pain (1 - 3), Moderate Pain (4 - 6)  aluminum hydroxide/magnesium hydroxide/simethicone Suspension 30 milliLiter(s) Oral every 6 hours PRN Dyspepsia  diclofenac sodium 1% Gel 4 Gram(s) Topical every 8 hours PRN Knee Pain  fluticasone propionate 50 MICROgram(s)/spray Nasal Spray 1 Spray(s) Both Nostrils every 12 hours PRN congestion  lactulose Syrup 10 Gram(s) Oral two times a day PRN constipation  lidocaine   4% Patch 1 Patch Transdermal every 24 hours PRN chronic L knee pain  melatonin. 5 milliGRAM(s) Oral at bedtime PRN Insomnia  nicotine  Polacrilex Gum 2 milliGRAM(s) Oral every 2 hours PRN Smoking Cessation  nicotine -   7 mG/24Hr(s) Patch 1 Patch Transdermal daily PRN nicotine dependence  traZODone 100 milliGRAM(s) Oral at bedtime PRN insomnia

## 2024-05-20 NOTE — DIETITIAN INITIAL EVALUATION ADULT - PERTINENT MEDS FT
MEDICATIONS  (STANDING):  atorvastatin 40 milliGRAM(s) Oral at bedtime  buprenorphine 8 mG/naloxone 2 mG SL  Tablet 1 Tablet(s) SubLingual two times a day  busPIRone 10 milliGRAM(s) Oral two times a day  cloNIDine 0.1 milliGRAM(s) Oral two times a day  escitalopram 20 milliGRAM(s) Oral daily  ferrous    sulfate 325 milliGRAM(s) Oral daily  guanFACINE. 1 milliGRAM(s) Oral <User Schedule>  lactobacillus acidophilus 1 Tablet(s) Oral two times a day with meals  QUEtiapine 75 milliGRAM(s) Oral at bedtime  senna 2 Tablet(s) Oral at bedtime    MEDICATIONS  (PRN):  acetaminophen     Tablet .. 650 milliGRAM(s) Oral every 6 hours PRN Mild Pain (1 - 3), Moderate Pain (4 - 6)  aluminum hydroxide/magnesium hydroxide/simethicone Suspension 30 milliLiter(s) Oral every 6 hours PRN Dyspepsia  fluticasone propionate 50 MICROgram(s)/spray Nasal Spray 1 Spray(s) Both Nostrils every 12 hours PRN congestion  lactulose Syrup 10 Gram(s) Oral two times a day PRN constipation  lidocaine   4% Patch 1 Patch Transdermal every 24 hours PRN chronic L knee pain  melatonin. 5 milliGRAM(s) Oral at bedtime PRN Insomnia  nicotine  Polacrilex Gum 2 milliGRAM(s) Oral every 2 hours PRN Smoking Cessation  nicotine -   7 mG/24Hr(s) Patch 1 Patch Transdermal daily PRN nicotine dependence  traZODone 100 milliGRAM(s) Oral at bedtime PRN insomnia

## 2024-05-20 NOTE — DIETITIAN INITIAL EVALUATION ADULT - OTHER INFO
Patient is a 57 y/o male, domiciled at [facility], unemployed, single, PPHx depression, anxiety, PTSD, multiple past suicide attempts, multiple inpatient psych admissions BIBEMS for suicidal ideation. Patient BIB EMS after endorsing SI to staff at nursing home.    Met with patient on the unit today who ambulates with a walker. Patient reports his appetite has been good with good PO intake at meals, denies chewing/ swallowing difficulties on current diet. Allergic to shellfish. Noted patient with 6lb/2.6% wt gain since adm to [facility], and patient with multiple food items previously requested in addition to his regular meal tray. Food preferences reviewed with patient today and encouraged healthier food options. Also discussed heart healthy diet and portion control for his blood lipids (elevated TG and low HDL) and wt mgmt. Patient verbalized understanding and accepted ALA Lifestyle Changes to Reduce Triglyceride handout provided.     Current wt: 235lb (5/18), 228lb (5/4), 229lb (4/24) -- wt gain likely related to excessive energy intake. Reported Ht = 5'8".

## 2024-05-20 NOTE — BH INPATIENT PSYCHIATRY PROGRESS NOTE - PRN MEDS
MEDICATIONS  (PRN):  acetaminophen     Tablet .. 650 milliGRAM(s) Oral every 6 hours PRN Mild Pain (1 - 3), Moderate Pain (4 - 6)  aluminum hydroxide/magnesium hydroxide/simethicone Suspension 30 milliLiter(s) Oral every 6 hours PRN Dyspepsia  fluticasone propionate 50 MICROgram(s)/spray Nasal Spray 1 Spray(s) Both Nostrils every 12 hours PRN congestion  lactulose Syrup 10 Gram(s) Oral two times a day PRN constipation  lidocaine   4% Patch 1 Patch Transdermal every 24 hours PRN chronic L knee pain  melatonin. 5 milliGRAM(s) Oral at bedtime PRN Insomnia  nicotine  Polacrilex Gum 2 milliGRAM(s) Oral every 2 hours PRN Smoking Cessation  nicotine -   7 mG/24Hr(s) Patch 1 Patch Transdermal daily PRN nicotine dependence  traZODone 100 milliGRAM(s) Oral at bedtime PRN insomnia   MEDICATIONS  (PRN):  acetaminophen     Tablet .. 650 milliGRAM(s) Oral every 6 hours PRN Mild Pain (1 - 3), Moderate Pain (4 - 6)  aluminum hydroxide/magnesium hydroxide/simethicone Suspension 30 milliLiter(s) Oral every 6 hours PRN Dyspepsia  diclofenac sodium 1% Gel 4 Gram(s) Topical every 8 hours PRN Knee Pain  fluticasone propionate 50 MICROgram(s)/spray Nasal Spray 1 Spray(s) Both Nostrils every 12 hours PRN congestion  lactulose Syrup 10 Gram(s) Oral two times a day PRN constipation  lidocaine   4% Patch 1 Patch Transdermal every 24 hours PRN chronic L knee pain  melatonin. 5 milliGRAM(s) Oral at bedtime PRN Insomnia  nicotine  Polacrilex Gum 2 milliGRAM(s) Oral every 2 hours PRN Smoking Cessation  nicotine -   7 mG/24Hr(s) Patch 1 Patch Transdermal daily PRN nicotine dependence  traZODone 100 milliGRAM(s) Oral at bedtime PRN insomnia

## 2024-05-20 NOTE — BH INPATIENT PSYCHIATRY PROGRESS NOTE - NSBHATTESTTYPEVISIT_PSY_A_CORE
On-site Attending supervising KELSEY (99XXX codes) On-site Attending with Resident/Fellow/Student and KELSEY (99XXX codes)

## 2024-05-20 NOTE — BH INPATIENT PSYCHIATRY PROGRESS NOTE - NSBHASSESSSUMMFT_PSY_ALL_CORE
Patient is a 57 yo Male domiciled at [facility], unemployed, single, PPHx MDD, DEANA, PTSD, mult. SA's in the past, mult. admissions to inpatient psychiatry, who was BIBEMS for SI after endorsing SI to staff at nursing home.    Working diagnosis:  MDD, severe w/o psychotic features  PTSD      Plan:  >Legal: 9.13  >Obs: Routine observation, denies active SIIP and is able to engage in safety planning.      >Psychiatric Meds:  Continue Lexapro 20mg, AM for depression  Continue Buspirone 10mg, BID for anxiety  Continue Guanfacine 1mg, BID for impulsivity        >PRN Meds:  Trazodone 100mg, QHS for insomnia  Melatonin 5mg PO, QHS for insomnia  Atarax 50mg PO, QHS for anxiety  Lorazepam 2mg IM/PO, Q6H for severe anxiety      >Labs: Admission labs reviewed, no acute findings. Hold antipsychotics if QTc >500  >Medical: No acute concerns. Patient placed on CIWA, no visible physical symptoms of withdrawal. During the course of treatment, will collaborate with medical team to manage medical issues.  >Diet: Regular  >Social: Milieu/structured therapy  >Treatment Interventions: Groups and Individual Therapy/CBT.  >Dispo: [place]

## 2024-05-20 NOTE — BH INPATIENT PSYCHIATRY PROGRESS NOTE - NSBHFUPINTERVALHXFT_PSY_A_CORE
Patient is following-up for mood/SI symptoms.  Compliant with medications, no overnight events.  Patient seen and examined on the unit, reports having good appetite and fair sleep.  He reports Hx of sleep apnea and has been continuing to utilize wedge pillow on the unit.  He states he is "doing good" but does feel depressed intermittently when considering mobility and knee pain.  Continuing to sort-out contact with [facility].

## 2024-05-20 NOTE — DIETITIAN INITIAL EVALUATION ADULT - ADD RECOMMEND
c/w ferrous sulfate, probiotics per MD order.  Encourage diet adherence and portion control.   Monitor PO intake/tolerance, weights, labs, BM's, and skin integrity.

## 2024-05-20 NOTE — DIETITIAN INITIAL EVALUATION ADULT - NSFNSGIASSESSMENTFT_GEN_A_CORE
Denies GI distress (nausea/vomiting/diarrhea/ constipation) at this time, on daily senna and probiotics, and lactulose PRN per MD order.

## 2024-05-20 NOTE — BH INPATIENT PSYCHIATRY PROGRESS NOTE - NSBHCHARTREVIEWVS_PSY_A_CORE FT
Vital Signs Last 24 Hrs  T(C): 36.7 (05-20-24 @ 08:07), Max: 36.7 (05-20-24 @ 08:07)  T(F): 98 (05-20-24 @ 08:07), Max: 98 (05-20-24 @ 08:07)  HR: --  BP: --  BP(mean): --  RR: 18 (05-20-24 @ 07:55) (16 - 18)  SpO2: --    Orthostatic VS  05-20-24 @ 08:07  Lying BP: --/-- HR: --  Sitting BP: 122/74 HR: 64  Standing BP: 119/67 HR: 70  Site: --  Mode: --  Orthostatic VS  05-19-24 @ 19:38  Lying BP: --/-- HR: --  Sitting BP: 126/64 HR: 71  Standing BP: 139/64 HR: 73  Site: --  Mode: --  Orthostatic VS  05-19-24 @ 07:52  Lying BP: --/-- HR: --  Sitting BP: 132/74 HR: 68  Standing BP: 119/70 HR: 71  Site: --  Mode: --  Orthostatic VS  05-18-24 @ 19:29  Lying BP: --/-- HR: --  Sitting BP: 135/69 HR: 70  Standing BP: 142/81 HR: 89  Site: --  Mode: --   Vital Signs Last 24 Hrs  T(C): 36.3 (05-21-24 @ 19:12), Max: 36.3 (05-21-24 @ 08:18)  T(F): 97.3 (05-21-24 @ 19:12), Max: 97.3 (05-21-24 @ 08:18)  HR: --  BP: --  BP(mean): --  RR: 17 (05-21-24 @ 08:18) (17 - 17)  SpO2: --    Orthostatic VS  05-21-24 @ 19:12  Lying BP: --/-- HR: --  Sitting BP: 141/85 HR: 86  Standing BP: 136/73 HR: 91  Site: --  Mode: --  Orthostatic VS  05-21-24 @ 08:18  Lying BP: --/-- HR: --  Sitting BP: 142/79 HR: 61  Standing BP: 150/77 HR: 71  Site: upper left arm  Mode: electronic  Orthostatic VS  05-20-24 @ 19:38  Lying BP: --/-- HR: --  Sitting BP: 141/73 HR: 73  Standing BP: 135/72 HR: 77  Site: --  Mode: --  Orthostatic VS  05-20-24 @ 08:07  Lying BP: --/-- HR: --  Sitting BP: 122/74 HR: 64  Standing BP: 119/67 HR: 70  Site: --  Mode: --

## 2024-05-21 PROCEDURE — 99232 SBSQ HOSP IP/OBS MODERATE 35: CPT

## 2024-05-21 RX ORDER — DICLOFENAC SODIUM 30 MG/G
4 GEL TOPICAL EVERY 8 HOURS
Refills: 0 | Status: DISCONTINUED | OUTPATIENT
Start: 2024-05-21 | End: 2024-05-29

## 2024-05-21 RX ADMIN — BUPRENORPHINE AND NALOXONE 1 TABLET(S): 2; .5 TABLET SUBLINGUAL at 08:20

## 2024-05-21 RX ADMIN — LIDOCAINE 1 PATCH: 4 CREAM TOPICAL at 07:44

## 2024-05-21 RX ADMIN — Medication 1 TABLET(S): at 08:20

## 2024-05-21 RX ADMIN — Medication 0.1 MILLIGRAM(S): at 08:20

## 2024-05-21 RX ADMIN — QUETIAPINE FUMARATE 75 MILLIGRAM(S): 200 TABLET, FILM COATED ORAL at 21:44

## 2024-05-21 RX ADMIN — Medication 2 MILLIGRAM(S): at 19:54

## 2024-05-21 RX ADMIN — Medication 325 MILLIGRAM(S): at 08:20

## 2024-05-21 RX ADMIN — BUPRENORPHINE AND NALOXONE 1 TABLET(S): 2; .5 TABLET SUBLINGUAL at 21:44

## 2024-05-21 RX ADMIN — Medication 2 MILLIGRAM(S): at 11:15

## 2024-05-21 RX ADMIN — Medication 2 MILLIGRAM(S): at 15:39

## 2024-05-21 RX ADMIN — GUANFACINE 1 MILLIGRAM(S): 3 TABLET, EXTENDED RELEASE ORAL at 21:44

## 2024-05-21 RX ADMIN — Medication 650 MILLIGRAM(S): at 19:53

## 2024-05-21 RX ADMIN — Medication 0.1 MILLIGRAM(S): at 21:44

## 2024-05-21 RX ADMIN — ESCITALOPRAM OXALATE 20 MILLIGRAM(S): 10 TABLET, FILM COATED ORAL at 08:20

## 2024-05-21 RX ADMIN — Medication 10 MILLIGRAM(S): at 21:45

## 2024-05-21 RX ADMIN — Medication 1 TABLET(S): at 15:59

## 2024-05-21 RX ADMIN — Medication 2 MILLIGRAM(S): at 08:20

## 2024-05-21 RX ADMIN — LIDOCAINE 1 PATCH: 4 CREAM TOPICAL at 10:26

## 2024-05-21 RX ADMIN — Medication 10 MILLIGRAM(S): at 08:20

## 2024-05-21 RX ADMIN — Medication 100 MILLIGRAM(S): at 21:44

## 2024-05-21 RX ADMIN — GUANFACINE 1 MILLIGRAM(S): 3 TABLET, EXTENDED RELEASE ORAL at 08:20

## 2024-05-21 RX ADMIN — Medication 650 MILLIGRAM(S): at 11:14

## 2024-05-21 RX ADMIN — ATORVASTATIN CALCIUM 40 MILLIGRAM(S): 80 TABLET, FILM COATED ORAL at 21:44

## 2024-05-21 RX ADMIN — Medication 650 MILLIGRAM(S): at 21:10

## 2024-05-21 RX ADMIN — Medication 1 PATCH: at 08:21

## 2024-05-21 RX ADMIN — SENNA PLUS 2 TABLET(S): 8.6 TABLET ORAL at 21:44

## 2024-05-21 NOTE — BH INPATIENT PSYCHIATRY PROGRESS NOTE - NSBHCHARTREVIEWVS_PSY_A_CORE FT
Vital Signs Last 24 Hrs  T(C): 36.3 (05-21-24 @ 08:18), Max: 36.6 (05-20-24 @ 19:38)  T(F): 97.3 (05-21-24 @ 08:18), Max: 97.9 (05-20-24 @ 19:38)  HR: --  BP: --  BP(mean): --  RR: 17 (05-21-24 @ 08:18) (17 - 17)  SpO2: --    Orthostatic VS  05-21-24 @ 08:18  Lying BP: --/-- HR: --  Sitting BP: 142/79 HR: 61  Standing BP: 150/77 HR: 71  Site: upper left arm  Mode: electronic  Orthostatic VS  05-20-24 @ 19:38  Lying BP: --/-- HR: --  Sitting BP: 141/73 HR: 73  Standing BP: 135/72 HR: 77  Site: --  Mode: --  Orthostatic VS  05-20-24 @ 08:07  Lying BP: --/-- HR: --  Sitting BP: 122/74 HR: 64  Standing BP: 119/67 HR: 70  Site: --  Mode: --  Orthostatic VS  05-19-24 @ 19:38  Lying BP: --/-- HR: --  Sitting BP: 126/64 HR: 71  Standing BP: 139/64 HR: 73  Site: --  Mode: --   Vital Signs Last 24 Hrs  T(C): 36.3 (05-21-24 @ 19:12), Max: 36.3 (05-21-24 @ 08:18)  T(F): 97.3 (05-21-24 @ 19:12), Max: 97.3 (05-21-24 @ 08:18)  HR: --  BP: --  BP(mean): --  RR: 17 (05-21-24 @ 08:18) (17 - 17)  SpO2: --    Orthostatic VS  05-21-24 @ 19:12  Lying BP: --/-- HR: --  Sitting BP: 141/85 HR: 86  Standing BP: 136/73 HR: 91  Site: --  Mode: --  Orthostatic VS  05-21-24 @ 08:18  Lying BP: --/-- HR: --  Sitting BP: 142/79 HR: 61  Standing BP: 150/77 HR: 71  Site: upper left arm  Mode: electronic  Orthostatic VS  05-20-24 @ 19:38  Lying BP: --/-- HR: --  Sitting BP: 141/73 HR: 73  Standing BP: 135/72 HR: 77  Site: --  Mode: --  Orthostatic VS  05-20-24 @ 08:07  Lying BP: --/-- HR: --  Sitting BP: 122/74 HR: 64  Standing BP: 119/67 HR: 70  Site: --  Mode: --

## 2024-05-21 NOTE — BH INPATIENT PSYCHIATRY PROGRESS NOTE - NSBHMETABOLIC_PSY_ALL_CORE_FT
BMI:   HbA1c: A1C with Estimated Average Glucose Result: 5.9 % (04-27-24 @ 10:21)    Glucose:   BP: --Vital Signs Last 24 Hrs  T(C): 36.3 (05-21-24 @ 08:18), Max: 36.6 (05-20-24 @ 19:38)  T(F): 97.3 (05-21-24 @ 08:18), Max: 97.9 (05-20-24 @ 19:38)  HR: --  BP: --  BP(mean): --  RR: 17 (05-21-24 @ 08:18) (17 - 17)  SpO2: --    Orthostatic VS  05-21-24 @ 08:18  Lying BP: --/-- HR: --  Sitting BP: 142/79 HR: 61  Standing BP: 150/77 HR: 71  Site: upper left arm  Mode: electronic  Orthostatic VS  05-20-24 @ 19:38  Lying BP: --/-- HR: --  Sitting BP: 141/73 HR: 73  Standing BP: 135/72 HR: 77  Site: --  Mode: --  Orthostatic VS  05-20-24 @ 08:07  Lying BP: --/-- HR: --  Sitting BP: 122/74 HR: 64  Standing BP: 119/67 HR: 70  Site: --  Mode: --  Orthostatic VS  05-19-24 @ 19:38  Lying BP: --/-- HR: --  Sitting BP: 126/64 HR: 71  Standing BP: 139/64 HR: 73  Site: --  Mode: --    Lipid Panel: Date/Time: 04-27-24 @ 10:21  Cholesterol, Serum: 139  LDL Cholesterol Calculated: 39  HDL Cholesterol, Serum: 38  Total Cholesterol/HDL Ration Measurement: --  Triglycerides, Serum: 308   BMI:   HbA1c: A1C with Estimated Average Glucose Result: 5.9 % (04-27-24 @ 10:21)    Glucose:   BP: --Vital Signs Last 24 Hrs  T(C): 36.3 (05-21-24 @ 19:12), Max: 36.3 (05-21-24 @ 08:18)  T(F): 97.3 (05-21-24 @ 19:12), Max: 97.3 (05-21-24 @ 08:18)  HR: --  BP: --  BP(mean): --  RR: 17 (05-21-24 @ 08:18) (17 - 17)  SpO2: --    Orthostatic VS  05-21-24 @ 19:12  Lying BP: --/-- HR: --  Sitting BP: 141/85 HR: 86  Standing BP: 136/73 HR: 91  Site: --  Mode: --  Orthostatic VS  05-21-24 @ 08:18  Lying BP: --/-- HR: --  Sitting BP: 142/79 HR: 61  Standing BP: 150/77 HR: 71  Site: upper left arm  Mode: electronic  Orthostatic VS  05-20-24 @ 19:38  Lying BP: --/-- HR: --  Sitting BP: 141/73 HR: 73  Standing BP: 135/72 HR: 77  Site: --  Mode: --  Orthostatic VS  05-20-24 @ 08:07  Lying BP: --/-- HR: --  Sitting BP: 122/74 HR: 64  Standing BP: 119/67 HR: 70  Site: --  Mode: --    Lipid Panel: Date/Time: 04-27-24 @ 10:21  Cholesterol, Serum: 139  LDL Cholesterol Calculated: 39  HDL Cholesterol, Serum: 38  Total Cholesterol/HDL Ration Measurement: --  Triglycerides, Serum: 308

## 2024-05-21 NOTE — BH INPATIENT PSYCHIATRY PROGRESS NOTE - NSBHFUPINTERVALHXFT_PSY_A_CORE
Patient is following-up for mood/SI symptoms.  Compliant with medications, no overnight events.  Patient seen and examined on the unit, reports having good appetite including breakfast today, and fair sleep, though he continues to struggle to use MARIBEL wedge. He states he is "doing good" today and has no new concerns.  He has expressed feelings of depression related to his mobility and knee pain; was seen by PT today and provided with pain relief cream/IcyHot.

## 2024-05-21 NOTE — BH INPATIENT PSYCHIATRY PROGRESS NOTE - NSBHASSESSSUMMFT_PSY_ALL_CORE
Patient is a 57 yo Male domiciled at [facility], unemployed, single, PPHx MDD, DEANA, PTSD, mult. SA's in the past, mult. admissions to inpatient psychiatry, who was BIBEMS for SI after endorsing SI to staff at nursing home.    Working diagnosis:  MDD, severe w/o psychotic features  PTSD      Plan:  >Legal: 9.13  >Obs: Routine observation, denies active SIIP and is able to engage in safety planning.      >Psychiatric Meds:  Continue Lexapro 20mg, AM for depression  Continue Buspirone 10mg, BID for anxiety  Continue Guanfacine 1mg, BID for impulsivity        >PRN Meds:  Trazodone 100mg, QHS for insomnia  Melatonin 5mg PO, QHS for insomnia  Atarax 50mg PO, QHS for anxiety  Lorazepam 2mg IM/PO, Q6H for severe anxiety      >Labs: Admission labs reviewed, no acute findings. Hold antipsychotics if QTc >500  >Medical: No acute concerns. Patient placed on CIWA, no visible physical symptoms of withdrawal. During the course of treatment, will collaborate with medical team to manage medical issues.  >Diet: Regular  >Social: Milieu/structured therapy  >Treatment Interventions: Groups and Individual Therapy/CBT.  >Dispo: [place]

## 2024-05-22 LAB — SARS-COV-2 RNA SPEC QL NAA+PROBE: SIGNIFICANT CHANGE UP

## 2024-05-22 RX ADMIN — Medication 2 MILLIGRAM(S): at 20:46

## 2024-05-22 RX ADMIN — Medication 2 MILLIGRAM(S): at 09:46

## 2024-05-22 RX ADMIN — Medication 2 MILLIGRAM(S): at 18:03

## 2024-05-22 RX ADMIN — DICLOFENAC SODIUM 4 GRAM(S): 30 GEL TOPICAL at 13:06

## 2024-05-22 RX ADMIN — Medication 10 MILLIGRAM(S): at 20:45

## 2024-05-22 RX ADMIN — BUPRENORPHINE AND NALOXONE 1 TABLET(S): 2; .5 TABLET SUBLINGUAL at 20:45

## 2024-05-22 RX ADMIN — Medication 0.1 MILLIGRAM(S): at 08:34

## 2024-05-22 RX ADMIN — Medication 100 MILLIGRAM(S): at 20:46

## 2024-05-22 RX ADMIN — Medication 0.1 MILLIGRAM(S): at 20:45

## 2024-05-22 RX ADMIN — Medication 2 MILLIGRAM(S): at 13:06

## 2024-05-22 RX ADMIN — Medication 30 MILLILITER(S): at 00:32

## 2024-05-22 RX ADMIN — SENNA PLUS 2 TABLET(S): 8.6 TABLET ORAL at 20:45

## 2024-05-22 RX ADMIN — Medication 325 MILLIGRAM(S): at 08:33

## 2024-05-22 RX ADMIN — Medication 1 TABLET(S): at 08:34

## 2024-05-22 RX ADMIN — Medication 1 TABLET(S): at 17:24

## 2024-05-22 RX ADMIN — ESCITALOPRAM OXALATE 20 MILLIGRAM(S): 10 TABLET, FILM COATED ORAL at 08:34

## 2024-05-22 RX ADMIN — Medication 10 MILLIGRAM(S): at 08:34

## 2024-05-22 RX ADMIN — ATORVASTATIN CALCIUM 40 MILLIGRAM(S): 80 TABLET, FILM COATED ORAL at 20:45

## 2024-05-22 RX ADMIN — GUANFACINE 1 MILLIGRAM(S): 3 TABLET, EXTENDED RELEASE ORAL at 20:44

## 2024-05-22 RX ADMIN — BUPRENORPHINE AND NALOXONE 1 TABLET(S): 2; .5 TABLET SUBLINGUAL at 08:34

## 2024-05-22 RX ADMIN — GUANFACINE 1 MILLIGRAM(S): 3 TABLET, EXTENDED RELEASE ORAL at 08:34

## 2024-05-22 RX ADMIN — QUETIAPINE FUMARATE 75 MILLIGRAM(S): 200 TABLET, FILM COATED ORAL at 20:45

## 2024-05-22 NOTE — BH INPATIENT PSYCHIATRY PROGRESS NOTE - NSBHCHARTREVIEWVS_PSY_A_CORE FT
Vital Signs Last 24 Hrs  T(C): 36.3 (05-22-24 @ 06:13), Max: 36.3 (05-21-24 @ 19:12)  T(F): 97.3 (05-22-24 @ 06:13), Max: 97.3 (05-21-24 @ 19:12)  HR: --  BP: --  BP(mean): --  RR: --  SpO2: --    Orthostatic VS  05-22-24 @ 06:13  Lying BP: --/-- HR: --  Sitting BP: 137/62 HR: 66  Standing BP: 146/75 HR: 76  Site: --  Mode: --  Orthostatic VS  05-21-24 @ 19:12  Lying BP: --/-- HR: --  Sitting BP: 141/85 HR: 86  Standing BP: 136/73 HR: 91  Site: --  Mode: --  Orthostatic VS  05-21-24 @ 08:18  Lying BP: --/-- HR: --  Sitting BP: 142/79 HR: 61  Standing BP: 150/77 HR: 71  Site: upper left arm  Mode: electronic  Orthostatic VS  05-20-24 @ 19:38  Lying BP: --/-- HR: --  Sitting BP: 141/73 HR: 73  Standing BP: 135/72 HR: 77  Site: --  Mode: --   Vital Signs Last 24 Hrs  T(C): 36.6 (05-23-24 @ 07:59), Max: 36.7 (05-22-24 @ 19:07)  T(F): 97.9 (05-23-24 @ 07:59), Max: 98 (05-22-24 @ 19:07)  HR: --  BP: --  BP(mean): --  RR: 18 (05-23-24 @ 08:44) (18 - 18)  SpO2: --    Orthostatic VS  05-23-24 @ 07:59  Lying BP: --/-- HR: --  Sitting BP: 136/65 HR: 61  Standing BP: 125/61 HR: 68  Site: --  Mode: --  Orthostatic VS  05-22-24 @ 19:07  Lying BP: --/-- HR: --  Sitting BP: 132/69 HR: 78  Standing BP: 138/73 HR: 89  Site: --  Mode: --  Orthostatic VS  05-22-24 @ 06:13  Lying BP: --/-- HR: --  Sitting BP: 137/62 HR: 66  Standing BP: 146/75 HR: 76  Site: --  Mode: --  Orthostatic VS  05-21-24 @ 19:12  Lying BP: --/-- HR: --  Sitting BP: 141/85 HR: 86  Standing BP: 136/73 HR: 91  Site: --  Mode: --

## 2024-05-22 NOTE — BH INPATIENT PSYCHIATRY PROGRESS NOTE - PRN MEDS
MEDICATIONS  (PRN):  acetaminophen     Tablet .. 650 milliGRAM(s) Oral every 6 hours PRN Mild Pain (1 - 3), Moderate Pain (4 - 6)  aluminum hydroxide/magnesium hydroxide/simethicone Suspension 30 milliLiter(s) Oral every 6 hours PRN Dyspepsia  diclofenac sodium 1% Gel 4 Gram(s) Topical every 8 hours PRN Knee Pain  fluticasone propionate 50 MICROgram(s)/spray Nasal Spray 1 Spray(s) Both Nostrils every 12 hours PRN congestion  lactulose Syrup 10 Gram(s) Oral two times a day PRN constipation  lidocaine   4% Patch 1 Patch Transdermal every 24 hours PRN chronic L knee pain  melatonin. 5 milliGRAM(s) Oral at bedtime PRN Insomnia  nicotine  Polacrilex Gum 2 milliGRAM(s) Oral every 2 hours PRN Smoking Cessation  nicotine -   7 mG/24Hr(s) Patch 1 Patch Transdermal daily PRN nicotine dependence  traZODone 100 milliGRAM(s) Oral at bedtime PRN insomnia

## 2024-05-22 NOTE — BH INPATIENT PSYCHIATRY PROGRESS NOTE - NSBHMETABOLIC_PSY_ALL_CORE_FT
BMI:   HbA1c: A1C with Estimated Average Glucose Result: 5.9 % (04-27-24 @ 10:21)    Glucose:   BP: --Vital Signs Last 24 Hrs  T(C): 36.3 (05-22-24 @ 06:13), Max: 36.3 (05-21-24 @ 19:12)  T(F): 97.3 (05-22-24 @ 06:13), Max: 97.3 (05-21-24 @ 19:12)  HR: --  BP: --  BP(mean): --  RR: --  SpO2: --    Orthostatic VS  05-22-24 @ 06:13  Lying BP: --/-- HR: --  Sitting BP: 137/62 HR: 66  Standing BP: 146/75 HR: 76  Site: --  Mode: --  Orthostatic VS  05-21-24 @ 19:12  Lying BP: --/-- HR: --  Sitting BP: 141/85 HR: 86  Standing BP: 136/73 HR: 91  Site: --  Mode: --  Orthostatic VS  05-21-24 @ 08:18  Lying BP: --/-- HR: --  Sitting BP: 142/79 HR: 61  Standing BP: 150/77 HR: 71  Site: upper left arm  Mode: electronic  Orthostatic VS  05-20-24 @ 19:38  Lying BP: --/-- HR: --  Sitting BP: 141/73 HR: 73  Standing BP: 135/72 HR: 77  Site: --  Mode: --    Lipid Panel: Date/Time: 04-27-24 @ 10:21  Cholesterol, Serum: 139  LDL Cholesterol Calculated: 39  HDL Cholesterol, Serum: 38  Total Cholesterol/HDL Ration Measurement: --  Triglycerides, Serum: 308   BMI:   HbA1c: A1C with Estimated Average Glucose Result: 5.9 % (04-27-24 @ 10:21)    Glucose:   BP: --Vital Signs Last 24 Hrs  T(C): 36.6 (05-23-24 @ 07:59), Max: 36.7 (05-22-24 @ 19:07)  T(F): 97.9 (05-23-24 @ 07:59), Max: 98 (05-22-24 @ 19:07)  HR: --  BP: --  BP(mean): --  RR: 18 (05-23-24 @ 08:44) (18 - 18)  SpO2: --    Orthostatic VS  05-23-24 @ 07:59  Lying BP: --/-- HR: --  Sitting BP: 136/65 HR: 61  Standing BP: 125/61 HR: 68  Site: --  Mode: --  Orthostatic VS  05-22-24 @ 19:07  Lying BP: --/-- HR: --  Sitting BP: 132/69 HR: 78  Standing BP: 138/73 HR: 89  Site: --  Mode: --  Orthostatic VS  05-22-24 @ 06:13  Lying BP: --/-- HR: --  Sitting BP: 137/62 HR: 66  Standing BP: 146/75 HR: 76  Site: --  Mode: --  Orthostatic VS  05-21-24 @ 19:12  Lying BP: --/-- HR: --  Sitting BP: 141/85 HR: 86  Standing BP: 136/73 HR: 91  Site: --  Mode: --    Lipid Panel: Date/Time: 04-27-24 @ 10:21  Cholesterol, Serum: 139  LDL Cholesterol Calculated: 39  HDL Cholesterol, Serum: 38  Total Cholesterol/HDL Ration Measurement: --  Triglycerides, Serum: 308

## 2024-05-22 NOTE — BH INPATIENT PSYCHIATRY PROGRESS NOTE - NSBHFUPINTERVALHXFT_PSY_A_CORE
Patient is following-up for mood/SI symptoms.  Compliant with medications, no overnight events.  Patient seen and examined on the unit, reports having good appetite including breakfast today, and fair sleep, though he continues to struggle to use MARIBEL wedge. He states he is "doing good" today and has no new concerns.  He has expressed feelings of depression related to his mobility and knee pain; was seen by PT today and provided with pain relief cream/IcyHot. Patient is following-up for mood/SI symptoms. Compliant with medications, no overnight events. Patient seen and examined on the unit, reports having good appetite including breakfast today, and fair sleep, though he continues to struggle to use MARIBEL wedge. He states he is "doing good" today and has no new concerns, reports improvement in mood. Continues to deny any SIIP. We discussed tentative discharge date, pt to be returning to [place]. Ordered COVID test for discharge screening, pt asymptomatic. He reports decreased pain in knee w/ PT sessions and has been utilizing Voltaren scream. No acute medical concerns, VSS.

## 2024-05-22 NOTE — BH INPATIENT PSYCHIATRY PROGRESS NOTE - CURRENT MEDICATION
MEDICATIONS  (STANDING):  atorvastatin 40 milliGRAM(s) Oral at bedtime  buprenorphine 8 mG/naloxone 2 mG SL  Tablet 1 Tablet(s) SubLingual two times a day  busPIRone 10 milliGRAM(s) Oral two times a day  cloNIDine 0.1 milliGRAM(s) Oral two times a day  escitalopram 20 milliGRAM(s) Oral daily  ferrous    sulfate 325 milliGRAM(s) Oral daily  guanFACINE. 1 milliGRAM(s) Oral <User Schedule>  lactobacillus acidophilus 1 Tablet(s) Oral two times a day with meals  QUEtiapine 75 milliGRAM(s) Oral at bedtime  senna 2 Tablet(s) Oral at bedtime    MEDICATIONS  (PRN):  acetaminophen     Tablet .. 650 milliGRAM(s) Oral every 6 hours PRN Mild Pain (1 - 3), Moderate Pain (4 - 6)  aluminum hydroxide/magnesium hydroxide/simethicone Suspension 30 milliLiter(s) Oral every 6 hours PRN Dyspepsia  diclofenac sodium 1% Gel 4 Gram(s) Topical every 8 hours PRN Knee Pain  fluticasone propionate 50 MICROgram(s)/spray Nasal Spray 1 Spray(s) Both Nostrils every 12 hours PRN congestion  lactulose Syrup 10 Gram(s) Oral two times a day PRN constipation  lidocaine   4% Patch 1 Patch Transdermal every 24 hours PRN chronic L knee pain  melatonin. 5 milliGRAM(s) Oral at bedtime PRN Insomnia  nicotine  Polacrilex Gum 2 milliGRAM(s) Oral every 2 hours PRN Smoking Cessation  nicotine -   7 mG/24Hr(s) Patch 1 Patch Transdermal daily PRN nicotine dependence  traZODone 100 milliGRAM(s) Oral at bedtime PRN insomnia

## 2024-05-22 NOTE — BH INPATIENT PSYCHIATRY PROGRESS NOTE - NSBHASSESSSUMMFT_PSY_ALL_CORE
Patient is a 59 yo Male domiciled at [facility], unemployed, single, PPHx MDD, DEANA, PTSD, mult. SA's in the past, mult. admissions to inpatient psychiatry, who was BIBEMS for SI after endorsing SI to staff at nursing home.    Working diagnosis:  MDD, severe w/o psychotic features  PTSD      Plan:  >Legal: 9.13  >Obs: Routine observation, denies active SIIP and is able to engage in safety planning.      >Psychiatric Meds:  Continue Lexapro 20mg, AM for depression  Continue Buspirone 10mg, BID for anxiety  Continue Guanfacine 1mg, BID for impulsivity        >PRN Meds:  Trazodone 100mg, QHS for insomnia  Melatonin 5mg PO, QHS for insomnia  Atarax 50mg PO, QHS for anxiety  Lorazepam 2mg IM/PO, Q6H for severe anxiety      >Labs: Admission labs reviewed, no acute findings. Hold antipsychotics if QTc >500  >Medical: No acute concerns. Patient placed on CIWA, no visible physical symptoms of withdrawal. During the course of treatment, will collaborate with medical team to manage medical issues.  >Diet: Regular  >Social: Milieu/structured therapy  >Treatment Interventions: Groups and Individual Therapy/CBT.  >Dispo: [place]

## 2024-05-23 PROCEDURE — 99232 SBSQ HOSP IP/OBS MODERATE 35: CPT

## 2024-05-23 RX ORDER — BUPRENORPHINE AND NALOXONE 2; .5 MG/1; MG/1
1 TABLET SUBLINGUAL
Refills: 0 | Status: DISCONTINUED | OUTPATIENT
Start: 2024-05-23 | End: 2024-05-29

## 2024-05-23 RX ADMIN — Medication 1 SPRAY(S): at 08:26

## 2024-05-23 RX ADMIN — Medication 0.1 MILLIGRAM(S): at 08:26

## 2024-05-23 RX ADMIN — Medication 1 TABLET(S): at 15:37

## 2024-05-23 RX ADMIN — Medication 10 MILLIGRAM(S): at 20:04

## 2024-05-23 RX ADMIN — QUETIAPINE FUMARATE 75 MILLIGRAM(S): 200 TABLET, FILM COATED ORAL at 20:04

## 2024-05-23 RX ADMIN — Medication 650 MILLIGRAM(S): at 15:37

## 2024-05-23 RX ADMIN — DICLOFENAC SODIUM 4 GRAM(S): 30 GEL TOPICAL at 08:26

## 2024-05-23 RX ADMIN — Medication 5 MILLIGRAM(S): at 20:04

## 2024-05-23 RX ADMIN — Medication 2 MILLIGRAM(S): at 11:16

## 2024-05-23 RX ADMIN — Medication 2 MILLIGRAM(S): at 08:26

## 2024-05-23 RX ADMIN — SENNA PLUS 2 TABLET(S): 8.6 TABLET ORAL at 20:04

## 2024-05-23 RX ADMIN — ATORVASTATIN CALCIUM 40 MILLIGRAM(S): 80 TABLET, FILM COATED ORAL at 20:04

## 2024-05-23 RX ADMIN — GUANFACINE 1 MILLIGRAM(S): 3 TABLET, EXTENDED RELEASE ORAL at 20:03

## 2024-05-23 RX ADMIN — Medication 10 MILLIGRAM(S): at 08:25

## 2024-05-23 RX ADMIN — Medication 100 MILLIGRAM(S): at 20:04

## 2024-05-23 RX ADMIN — Medication 325 MILLIGRAM(S): at 08:25

## 2024-05-23 RX ADMIN — Medication 0.1 MILLIGRAM(S): at 20:04

## 2024-05-23 RX ADMIN — BUPRENORPHINE AND NALOXONE 1 TABLET(S): 2; .5 TABLET SUBLINGUAL at 08:26

## 2024-05-23 RX ADMIN — GUANFACINE 1 MILLIGRAM(S): 3 TABLET, EXTENDED RELEASE ORAL at 08:25

## 2024-05-23 RX ADMIN — Medication 2 MILLIGRAM(S): at 20:05

## 2024-05-23 RX ADMIN — Medication 1 TABLET(S): at 08:25

## 2024-05-23 RX ADMIN — ESCITALOPRAM OXALATE 20 MILLIGRAM(S): 10 TABLET, FILM COATED ORAL at 08:25

## 2024-05-23 RX ADMIN — Medication 2 MILLIGRAM(S): at 15:40

## 2024-05-23 RX ADMIN — BUPRENORPHINE AND NALOXONE 1 TABLET(S): 2; .5 TABLET SUBLINGUAL at 20:05

## 2024-05-23 NOTE — BH INPATIENT PSYCHIATRY PROGRESS NOTE - NSBHASSESSSUMMFT_PSY_ALL_CORE
Patient is a 59 yo Male domiciled at [facility], unemployed, single, PPHx MDD, DEANA, PTSD, mult. SA's in the past, mult. admissions to inpatient psychiatry, who was BIBEMS for SI after endorsing SI to staff at nursing home.    Working diagnosis:  MDD, severe w/o psychotic features  PTSD      Plan:  >Legal: 9.13  >Obs: Routine observation, denies active SIIP and is able to engage in safety planning.      >Psychiatric Meds:  Continue Lexapro 20mg, AM for depression  Continue Buspirone 10mg, BID for anxiety  Continue Guanfacine 1mg, BID for impulsivity        >PRN Meds:  Trazodone 100mg, QHS for insomnia  Melatonin 5mg PO, QHS for insomnia  Atarax 50mg PO, QHS for anxiety  Lorazepam 2mg IM/PO, Q6H for severe anxiety      >Labs: Admission labs reviewed, no acute findings. Hold antipsychotics if QTc >500  >Medical: No acute concerns. Patient placed on CIWA, no visible physical symptoms of withdrawal. During the course of treatment, will collaborate with medical team to manage medical issues.  >Diet: Regular  >Social: Milieu/structured therapy  >Treatment Interventions: Groups and Individual Therapy/CBT.  >Dispo: [place]

## 2024-05-23 NOTE — BH INPATIENT PSYCHIATRY PROGRESS NOTE - NSBHCHARTREVIEWVS_PSY_A_CORE FT
Vital Signs Last 24 Hrs  T(C): 36.6 (05-23-24 @ 07:59), Max: 36.7 (05-22-24 @ 19:07)  T(F): 97.9 (05-23-24 @ 07:59), Max: 98 (05-22-24 @ 19:07)  HR: --  BP: --  BP(mean): --  RR: 18 (05-23-24 @ 08:44) (18 - 18)  SpO2: --    Orthostatic VS  05-23-24 @ 07:59  Lying BP: --/-- HR: --  Sitting BP: 136/65 HR: 61  Standing BP: 125/61 HR: 68  Site: --  Mode: --  Orthostatic VS  05-22-24 @ 19:07  Lying BP: --/-- HR: --  Sitting BP: 132/69 HR: 78  Standing BP: 138/73 HR: 89  Site: --  Mode: --  Orthostatic VS  05-22-24 @ 06:13  Lying BP: --/-- HR: --  Sitting BP: 137/62 HR: 66  Standing BP: 146/75 HR: 76  Site: --  Mode: --  Orthostatic VS  05-21-24 @ 19:12  Lying BP: --/-- HR: --  Sitting BP: 141/85 HR: 86  Standing BP: 136/73 HR: 91  Site: --  Mode: --   Vital Signs Last 24 Hrs  T(C): 36.4 (05-24-24 @ 08:41), Max: 36.6 (05-23-24 @ 19:02)  T(F): 97.6 (05-24-24 @ 08:41), Max: 97.9 (05-23-24 @ 19:02)  HR: --  BP: --  BP(mean): --  RR: 16 (05-23-24 @ 20:27) (16 - 16)  SpO2: --    Orthostatic VS  05-24-24 @ 08:41  Lying BP: --/-- HR: --  Sitting BP: 147/69 HR: 61  Standing BP: 150/79 HR: 79  Site: --  Mode: electronic  Orthostatic VS  05-23-24 @ 19:02  Lying BP: --/-- HR: --  Sitting BP: 131/68 HR: 100  Standing BP: 153/69 HR: 76  Site: --  Mode: --  Orthostatic VS  05-23-24 @ 07:59  Lying BP: --/-- HR: --  Sitting BP: 136/65 HR: 61  Standing BP: 125/61 HR: 68  Site: --  Mode: --  Orthostatic VS  05-22-24 @ 19:07  Lying BP: --/-- HR: --  Sitting BP: 132/69 HR: 78  Standing BP: 138/73 HR: 89  Site: --  Mode: --

## 2024-05-23 NOTE — BH INPATIENT PSYCHIATRY PROGRESS NOTE - NSBHFUPINTERVALHXFT_PSY_A_CORE
Never smoker Patient is following-up for depression. Compliant with medications, no overnight events. Patient seen and examined on the unit, reports having good appetite including breakfast today, and fair sleep, though he continues to struggle to use MARBIEL wedge. He states he is "feeling good" today and does not offer any new concerns, reports improvement in mood and looks forward to returning to [place]. He continues to deny any SIIP. Has not exhibited any drug-seeking behaviors during hospitalization. Was able to have COVID test done yesterday, resulting negative. He reports decreased pain in knee w/ PT sessions and has been utilizing Voltaren cream. No acute medical concerns, VSS.

## 2024-05-23 NOTE — BH INPATIENT PSYCHIATRY PROGRESS NOTE - NSBHMETABOLIC_PSY_ALL_CORE_FT
BMI:   HbA1c: A1C with Estimated Average Glucose Result: 5.9 % (04-27-24 @ 10:21)    Glucose:   BP: --Vital Signs Last 24 Hrs  T(C): 36.6 (05-23-24 @ 07:59), Max: 36.7 (05-22-24 @ 19:07)  T(F): 97.9 (05-23-24 @ 07:59), Max: 98 (05-22-24 @ 19:07)  HR: --  BP: --  BP(mean): --  RR: 18 (05-23-24 @ 08:44) (18 - 18)  SpO2: --    Orthostatic VS  05-23-24 @ 07:59  Lying BP: --/-- HR: --  Sitting BP: 136/65 HR: 61  Standing BP: 125/61 HR: 68  Site: --  Mode: --  Orthostatic VS  05-22-24 @ 19:07  Lying BP: --/-- HR: --  Sitting BP: 132/69 HR: 78  Standing BP: 138/73 HR: 89  Site: --  Mode: --  Orthostatic VS  05-22-24 @ 06:13  Lying BP: --/-- HR: --  Sitting BP: 137/62 HR: 66  Standing BP: 146/75 HR: 76  Site: --  Mode: --  Orthostatic VS  05-21-24 @ 19:12  Lying BP: --/-- HR: --  Sitting BP: 141/85 HR: 86  Standing BP: 136/73 HR: 91  Site: --  Mode: --    Lipid Panel: Date/Time: 04-27-24 @ 10:21  Cholesterol, Serum: 139  LDL Cholesterol Calculated: 39  HDL Cholesterol, Serum: 38  Total Cholesterol/HDL Ration Measurement: --  Triglycerides, Serum: 308   BMI:   HbA1c: A1C with Estimated Average Glucose Result: 5.9 % (04-27-24 @ 10:21)    Glucose:   BP: --Vital Signs Last 24 Hrs  T(C): 36.4 (05-24-24 @ 08:41), Max: 36.6 (05-23-24 @ 19:02)  T(F): 97.6 (05-24-24 @ 08:41), Max: 97.9 (05-23-24 @ 19:02)  HR: --  BP: --  BP(mean): --  RR: 16 (05-23-24 @ 20:27) (16 - 16)  SpO2: --    Orthostatic VS  05-24-24 @ 08:41  Lying BP: --/-- HR: --  Sitting BP: 147/69 HR: 61  Standing BP: 150/79 HR: 79  Site: --  Mode: electronic  Orthostatic VS  05-23-24 @ 19:02  Lying BP: --/-- HR: --  Sitting BP: 131/68 HR: 100  Standing BP: 153/69 HR: 76  Site: --  Mode: --  Orthostatic VS  05-23-24 @ 07:59  Lying BP: --/-- HR: --  Sitting BP: 136/65 HR: 61  Standing BP: 125/61 HR: 68  Site: --  Mode: --  Orthostatic VS  05-22-24 @ 19:07  Lying BP: --/-- HR: --  Sitting BP: 132/69 HR: 78  Standing BP: 138/73 HR: 89  Site: --  Mode: --    Lipid Panel: Date/Time: 04-27-24 @ 10:21  Cholesterol, Serum: 139  LDL Cholesterol Calculated: 39  HDL Cholesterol, Serum: 38  Total Cholesterol/HDL Ration Measurement: --  Triglycerides, Serum: 308

## 2024-05-23 NOTE — BH INPATIENT PSYCHIATRY PROGRESS NOTE - NSBHATTESTTYPEVISIT_PSY_A_CORE
On-site Attending with Resident/Fellow/Student and KELSEY (99XXX codes) On-site Attending supervising KELSEY (99XXX codes)

## 2024-05-24 PROCEDURE — 99232 SBSQ HOSP IP/OBS MODERATE 35: CPT

## 2024-05-24 RX ADMIN — Medication 325 MILLIGRAM(S): at 08:35

## 2024-05-24 RX ADMIN — Medication 2 MILLIGRAM(S): at 07:15

## 2024-05-24 RX ADMIN — Medication 1 TABLET(S): at 08:35

## 2024-05-24 RX ADMIN — QUETIAPINE FUMARATE 75 MILLIGRAM(S): 200 TABLET, FILM COATED ORAL at 20:06

## 2024-05-24 RX ADMIN — Medication 1 TABLET(S): at 16:31

## 2024-05-24 RX ADMIN — SENNA PLUS 2 TABLET(S): 8.6 TABLET ORAL at 20:05

## 2024-05-24 RX ADMIN — BUPRENORPHINE AND NALOXONE 1 TABLET(S): 2; .5 TABLET SUBLINGUAL at 20:05

## 2024-05-24 RX ADMIN — Medication 0.1 MILLIGRAM(S): at 08:35

## 2024-05-24 RX ADMIN — BUPRENORPHINE AND NALOXONE 1 TABLET(S): 2; .5 TABLET SUBLINGUAL at 08:36

## 2024-05-24 RX ADMIN — Medication 30 MILLILITER(S): at 01:11

## 2024-05-24 RX ADMIN — Medication 100 MILLIGRAM(S): at 20:05

## 2024-05-24 RX ADMIN — Medication 2 MILLIGRAM(S): at 12:49

## 2024-05-24 RX ADMIN — Medication 10 MILLIGRAM(S): at 20:06

## 2024-05-24 RX ADMIN — DICLOFENAC SODIUM 4 GRAM(S): 30 GEL TOPICAL at 12:49

## 2024-05-24 RX ADMIN — GUANFACINE 1 MILLIGRAM(S): 3 TABLET, EXTENDED RELEASE ORAL at 20:05

## 2024-05-24 RX ADMIN — ESCITALOPRAM OXALATE 20 MILLIGRAM(S): 10 TABLET, FILM COATED ORAL at 08:35

## 2024-05-24 RX ADMIN — Medication 2 MILLIGRAM(S): at 16:57

## 2024-05-24 RX ADMIN — Medication 0.1 MILLIGRAM(S): at 20:05

## 2024-05-24 RX ADMIN — Medication 650 MILLIGRAM(S): at 16:56

## 2024-05-24 RX ADMIN — GUANFACINE 1 MILLIGRAM(S): 3 TABLET, EXTENDED RELEASE ORAL at 08:35

## 2024-05-24 RX ADMIN — ATORVASTATIN CALCIUM 40 MILLIGRAM(S): 80 TABLET, FILM COATED ORAL at 20:05

## 2024-05-24 RX ADMIN — Medication 2 MILLIGRAM(S): at 20:06

## 2024-05-24 RX ADMIN — Medication 5 MILLIGRAM(S): at 20:05

## 2024-05-24 RX ADMIN — Medication 10 MILLIGRAM(S): at 08:35

## 2024-05-24 NOTE — BH INPATIENT PSYCHIATRY PROGRESS NOTE - NSBHCHARTREVIEWVS_PSY_A_CORE FT
Vital Signs Last 24 Hrs  T(C): 36.4 (05-24-24 @ 08:41), Max: 36.6 (05-23-24 @ 19:02)  T(F): 97.6 (05-24-24 @ 08:41), Max: 97.9 (05-23-24 @ 19:02)  HR: --  BP: --  BP(mean): --  RR: 16 (05-23-24 @ 20:27) (16 - 16)  SpO2: --    Orthostatic VS  05-24-24 @ 08:41  Lying BP: --/-- HR: --  Sitting BP: 147/69 HR: 61  Standing BP: 150/79 HR: 79  Site: --  Mode: electronic  Orthostatic VS  05-23-24 @ 19:02  Lying BP: --/-- HR: --  Sitting BP: 131/68 HR: 100  Standing BP: 153/69 HR: 76  Site: --  Mode: --  Orthostatic VS  05-23-24 @ 07:59  Lying BP: --/-- HR: --  Sitting BP: 136/65 HR: 61  Standing BP: 125/61 HR: 68  Site: --  Mode: --  Orthostatic VS  05-22-24 @ 19:07  Lying BP: --/-- HR: --  Sitting BP: 132/69 HR: 78  Standing BP: 138/73 HR: 89  Site: --  Mode: --   Vital Signs Last 24 Hrs  T(C): 36.4 (05-24-24 @ 08:41), Max: 36.6 (05-23-24 @ 19:02)  T(F): 97.6 (05-24-24 @ 08:41), Max: 97.9 (05-23-24 @ 19:02)  HR: --  BP: --  BP(mean): --  RR: 17 (05-24-24 @ 14:23) (16 - 17)  SpO2: --    Orthostatic VS  05-24-24 @ 08:41  Lying BP: --/-- HR: --  Sitting BP: 147/69 HR: 61  Standing BP: 150/79 HR: 79  Site: --  Mode: electronic  Orthostatic VS  05-23-24 @ 19:02  Lying BP: --/-- HR: --  Sitting BP: 131/68 HR: 100  Standing BP: 153/69 HR: 76  Site: --  Mode: --  Orthostatic VS  05-23-24 @ 07:59  Lying BP: --/-- HR: --  Sitting BP: 136/65 HR: 61  Standing BP: 125/61 HR: 68  Site: --  Mode: --  Orthostatic VS  05-22-24 @ 19:07  Lying BP: --/-- HR: --  Sitting BP: 132/69 HR: 78  Standing BP: 138/73 HR: 89  Site: --  Mode: --

## 2024-05-24 NOTE — BH INPATIENT PSYCHIATRY PROGRESS NOTE - NSBHMETABOLIC_PSY_ALL_CORE_FT
BMI:   HbA1c: A1C with Estimated Average Glucose Result: 5.9 % (04-27-24 @ 10:21)    Glucose:   BP: --Vital Signs Last 24 Hrs  T(C): 36.4 (05-24-24 @ 08:41), Max: 36.6 (05-23-24 @ 19:02)  T(F): 97.6 (05-24-24 @ 08:41), Max: 97.9 (05-23-24 @ 19:02)  HR: --  BP: --  BP(mean): --  RR: 16 (05-23-24 @ 20:27) (16 - 16)  SpO2: --    Orthostatic VS  05-24-24 @ 08:41  Lying BP: --/-- HR: --  Sitting BP: 147/69 HR: 61  Standing BP: 150/79 HR: 79  Site: --  Mode: electronic  Orthostatic VS  05-23-24 @ 19:02  Lying BP: --/-- HR: --  Sitting BP: 131/68 HR: 100  Standing BP: 153/69 HR: 76  Site: --  Mode: --  Orthostatic VS  05-23-24 @ 07:59  Lying BP: --/-- HR: --  Sitting BP: 136/65 HR: 61  Standing BP: 125/61 HR: 68  Site: --  Mode: --  Orthostatic VS  05-22-24 @ 19:07  Lying BP: --/-- HR: --  Sitting BP: 132/69 HR: 78  Standing BP: 138/73 HR: 89  Site: --  Mode: --    Lipid Panel: Date/Time: 04-27-24 @ 10:21  Cholesterol, Serum: 139  LDL Cholesterol Calculated: 39  HDL Cholesterol, Serum: 38  Total Cholesterol/HDL Ration Measurement: --  Triglycerides, Serum: 308   BMI:   HbA1c: A1C with Estimated Average Glucose Result: 5.9 % (04-27-24 @ 10:21)    Glucose:   BP: --Vital Signs Last 24 Hrs  T(C): 36.4 (05-24-24 @ 08:41), Max: 36.6 (05-23-24 @ 19:02)  T(F): 97.6 (05-24-24 @ 08:41), Max: 97.9 (05-23-24 @ 19:02)  HR: --  BP: --  BP(mean): --  RR: 17 (05-24-24 @ 14:23) (16 - 17)  SpO2: --    Orthostatic VS  05-24-24 @ 08:41  Lying BP: --/-- HR: --  Sitting BP: 147/69 HR: 61  Standing BP: 150/79 HR: 79  Site: --  Mode: electronic  Orthostatic VS  05-23-24 @ 19:02  Lying BP: --/-- HR: --  Sitting BP: 131/68 HR: 100  Standing BP: 153/69 HR: 76  Site: --  Mode: --  Orthostatic VS  05-23-24 @ 07:59  Lying BP: --/-- HR: --  Sitting BP: 136/65 HR: 61  Standing BP: 125/61 HR: 68  Site: --  Mode: --  Orthostatic VS  05-22-24 @ 19:07  Lying BP: --/-- HR: --  Sitting BP: 132/69 HR: 78  Standing BP: 138/73 HR: 89  Site: --  Mode: --    Lipid Panel: Date/Time: 04-27-24 @ 10:21  Cholesterol, Serum: 139  LDL Cholesterol Calculated: 39  HDL Cholesterol, Serum: 38  Total Cholesterol/HDL Ration Measurement: --  Triglycerides, Serum: 308

## 2024-05-24 NOTE — BH INPATIENT PSYCHIATRY PROGRESS NOTE - NSBHFUPINTERVALHXFT_PSY_A_CORE
Patient is following-up for depression. Compliant with medications, no overnight events. Patient seen and examined on the unit, reports having good appetite including breakfast today, and fair sleep, though he continues to struggle to use MARIBEL wedge. He states he is "feeling good" today and does not offer any new concerns, reports improvement in mood and looks forward to returning to [place]. Currently pending insurance authorization. He continues to deny any SIIP. Has not exhibited any drug-seeking behaviors during hospitalization. COVID test done on 5/22, resulting negative. He reports decreased pain in knee w/ PT sessions and has been utilizing Voltaren cream. No acute medical concerns, VSS.

## 2024-05-24 NOTE — BH INPATIENT PSYCHIATRY PROGRESS NOTE - NSBHASSESSSUMMFT_PSY_ALL_CORE
Patient is a 59 yo Male domiciled at [facility], unemployed, single, PPHx MDD, DEANA, PTSD, mult. SA's in the past, mult. admissions to inpatient psychiatry, who was BIBEMS for SI after endorsing SI to staff at nursing home.    Working diagnosis:  MDD, severe w/o psychotic features  PTSD      Plan:  >Legal: 9.13  >Obs: Routine observation, denies active SIIP and is able to engage in safety planning.      >Psychiatric Meds:  Continue Lexapro 20mg, AM for depression  Continue Buspirone 10mg, BID for anxiety  Continue Guanfacine 1mg, BID for impulsivity        >PRN Meds:  Trazodone 100mg, QHS for insomnia  Melatonin 5mg PO, QHS for insomnia  Atarax 50mg PO, QHS for anxiety  Lorazepam 2mg IM/PO, Q6H for severe anxiety      >Labs: Admission labs reviewed, no acute findings. Hold antipsychotics if QTc >500  >Medical: No acute concerns. Patient placed on CIWA, no visible physical symptoms of withdrawal. During the course of treatment, will collaborate with medical team to manage medical issues.  >Diet: Regular  >Social: Milieu/structured therapy  >Treatment Interventions: Groups and Individual Therapy/CBT.  >Dispo: [place]

## 2024-05-25 RX ORDER — BACITRACIN ZINC 500 UNIT/G
1 OINTMENT IN PACKET (EA) TOPICAL DAILY
Refills: 0 | Status: DISCONTINUED | OUTPATIENT
Start: 2024-05-25 | End: 2024-05-29

## 2024-05-25 RX ADMIN — Medication 650 MILLIGRAM(S): at 15:49

## 2024-05-25 RX ADMIN — DICLOFENAC SODIUM 4 GRAM(S): 30 GEL TOPICAL at 09:08

## 2024-05-25 RX ADMIN — Medication 1 TABLET(S): at 17:10

## 2024-05-25 RX ADMIN — ESCITALOPRAM OXALATE 20 MILLIGRAM(S): 10 TABLET, FILM COATED ORAL at 08:13

## 2024-05-25 RX ADMIN — Medication 1 PATCH: at 09:07

## 2024-05-25 RX ADMIN — Medication 650 MILLIGRAM(S): at 15:19

## 2024-05-25 RX ADMIN — Medication 100 MILLIGRAM(S): at 21:10

## 2024-05-25 RX ADMIN — Medication 1 TABLET(S): at 08:13

## 2024-05-25 RX ADMIN — Medication 0.1 MILLIGRAM(S): at 21:10

## 2024-05-25 RX ADMIN — ATORVASTATIN CALCIUM 40 MILLIGRAM(S): 80 TABLET, FILM COATED ORAL at 21:10

## 2024-05-25 RX ADMIN — SENNA PLUS 2 TABLET(S): 8.6 TABLET ORAL at 21:10

## 2024-05-25 RX ADMIN — BUPRENORPHINE AND NALOXONE 1 TABLET(S): 2; .5 TABLET SUBLINGUAL at 08:15

## 2024-05-25 RX ADMIN — Medication 2 MILLIGRAM(S): at 15:24

## 2024-05-25 RX ADMIN — Medication 2 MILLIGRAM(S): at 21:24

## 2024-05-25 RX ADMIN — QUETIAPINE FUMARATE 75 MILLIGRAM(S): 200 TABLET, FILM COATED ORAL at 21:11

## 2024-05-25 RX ADMIN — Medication 0.1 MILLIGRAM(S): at 08:14

## 2024-05-25 RX ADMIN — Medication 10 MILLIGRAM(S): at 21:10

## 2024-05-25 RX ADMIN — Medication 2 MILLIGRAM(S): at 12:55

## 2024-05-25 RX ADMIN — Medication 5 MILLIGRAM(S): at 21:10

## 2024-05-25 RX ADMIN — Medication 10 MILLIGRAM(S): at 08:13

## 2024-05-25 RX ADMIN — BUPRENORPHINE AND NALOXONE 1 TABLET(S): 2; .5 TABLET SUBLINGUAL at 21:10

## 2024-05-25 RX ADMIN — GUANFACINE 1 MILLIGRAM(S): 3 TABLET, EXTENDED RELEASE ORAL at 21:12

## 2024-05-25 RX ADMIN — Medication 325 MILLIGRAM(S): at 08:14

## 2024-05-25 RX ADMIN — GUANFACINE 1 MILLIGRAM(S): 3 TABLET, EXTENDED RELEASE ORAL at 08:13

## 2024-05-25 RX ADMIN — Medication 2 MILLIGRAM(S): at 09:07

## 2024-05-25 RX ADMIN — Medication 2 MILLIGRAM(S): at 17:32

## 2024-05-26 RX ADMIN — QUETIAPINE FUMARATE 75 MILLIGRAM(S): 200 TABLET, FILM COATED ORAL at 20:46

## 2024-05-26 RX ADMIN — Medication 1 TABLET(S): at 17:24

## 2024-05-26 RX ADMIN — Medication 1 PATCH: at 08:19

## 2024-05-26 RX ADMIN — BUPRENORPHINE AND NALOXONE 1 TABLET(S): 2; .5 TABLET SUBLINGUAL at 20:46

## 2024-05-26 RX ADMIN — Medication 0.1 MILLIGRAM(S): at 08:16

## 2024-05-26 RX ADMIN — Medication 30 MILLILITER(S): at 01:09

## 2024-05-26 RX ADMIN — Medication 2 MILLIGRAM(S): at 08:19

## 2024-05-26 RX ADMIN — Medication 10 MILLIGRAM(S): at 08:16

## 2024-05-26 RX ADMIN — SENNA PLUS 2 TABLET(S): 8.6 TABLET ORAL at 20:46

## 2024-05-26 RX ADMIN — GUANFACINE 1 MILLIGRAM(S): 3 TABLET, EXTENDED RELEASE ORAL at 10:06

## 2024-05-26 RX ADMIN — DICLOFENAC SODIUM 4 GRAM(S): 30 GEL TOPICAL at 08:16

## 2024-05-26 RX ADMIN — ESCITALOPRAM OXALATE 20 MILLIGRAM(S): 10 TABLET, FILM COATED ORAL at 08:16

## 2024-05-26 RX ADMIN — Medication 1 PATCH: at 07:45

## 2024-05-26 RX ADMIN — Medication 1 TABLET(S): at 08:16

## 2024-05-26 RX ADMIN — GUANFACINE 1 MILLIGRAM(S): 3 TABLET, EXTENDED RELEASE ORAL at 20:46

## 2024-05-26 RX ADMIN — BUPRENORPHINE AND NALOXONE 1 TABLET(S): 2; .5 TABLET SUBLINGUAL at 08:15

## 2024-05-26 RX ADMIN — Medication 0.1 MILLIGRAM(S): at 20:46

## 2024-05-26 RX ADMIN — Medication 1 APPLICATION(S): at 08:16

## 2024-05-26 RX ADMIN — Medication 325 MILLIGRAM(S): at 08:15

## 2024-05-26 RX ADMIN — ATORVASTATIN CALCIUM 40 MILLIGRAM(S): 80 TABLET, FILM COATED ORAL at 20:46

## 2024-05-26 RX ADMIN — Medication 10 MILLIGRAM(S): at 20:46

## 2024-05-26 RX ADMIN — Medication 2 MILLIGRAM(S): at 17:28

## 2024-05-27 RX ADMIN — Medication 2 MILLIGRAM(S): at 12:43

## 2024-05-27 RX ADMIN — Medication 1 PATCH: at 08:15

## 2024-05-27 RX ADMIN — Medication 1 PATCH: at 08:08

## 2024-05-27 RX ADMIN — Medication 1 TABLET(S): at 16:23

## 2024-05-27 RX ADMIN — ESCITALOPRAM OXALATE 20 MILLIGRAM(S): 10 TABLET, FILM COATED ORAL at 08:08

## 2024-05-27 RX ADMIN — GUANFACINE 1 MILLIGRAM(S): 3 TABLET, EXTENDED RELEASE ORAL at 08:07

## 2024-05-27 RX ADMIN — Medication 30 MILLILITER(S): at 22:30

## 2024-05-27 RX ADMIN — Medication 0.1 MILLIGRAM(S): at 08:07

## 2024-05-27 RX ADMIN — ATORVASTATIN CALCIUM 40 MILLIGRAM(S): 80 TABLET, FILM COATED ORAL at 20:26

## 2024-05-27 RX ADMIN — Medication 2 MILLIGRAM(S): at 18:45

## 2024-05-27 RX ADMIN — SENNA PLUS 2 TABLET(S): 8.6 TABLET ORAL at 20:25

## 2024-05-27 RX ADMIN — DICLOFENAC SODIUM 4 GRAM(S): 30 GEL TOPICAL at 12:43

## 2024-05-27 RX ADMIN — Medication 650 MILLIGRAM(S): at 14:41

## 2024-05-27 RX ADMIN — Medication 2 MILLIGRAM(S): at 08:08

## 2024-05-27 RX ADMIN — QUETIAPINE FUMARATE 75 MILLIGRAM(S): 200 TABLET, FILM COATED ORAL at 20:26

## 2024-05-27 RX ADMIN — Medication 30 MILLILITER(S): at 00:25

## 2024-05-27 RX ADMIN — Medication 1 TABLET(S): at 08:07

## 2024-05-27 RX ADMIN — Medication 10 MILLIGRAM(S): at 08:08

## 2024-05-27 RX ADMIN — Medication 5 MILLIGRAM(S): at 20:25

## 2024-05-27 RX ADMIN — BUPRENORPHINE AND NALOXONE 1 TABLET(S): 2; .5 TABLET SUBLINGUAL at 08:08

## 2024-05-27 RX ADMIN — Medication 650 MILLIGRAM(S): at 12:43

## 2024-05-27 RX ADMIN — Medication 325 MILLIGRAM(S): at 08:07

## 2024-05-27 RX ADMIN — Medication 10 MILLIGRAM(S): at 20:25

## 2024-05-27 RX ADMIN — Medication 1 APPLICATION(S): at 08:07

## 2024-05-27 RX ADMIN — Medication 0.1 MILLIGRAM(S): at 20:25

## 2024-05-27 RX ADMIN — GUANFACINE 1 MILLIGRAM(S): 3 TABLET, EXTENDED RELEASE ORAL at 20:25

## 2024-05-27 RX ADMIN — BUPRENORPHINE AND NALOXONE 1 TABLET(S): 2; .5 TABLET SUBLINGUAL at 20:25

## 2024-05-27 RX ADMIN — Medication 1 PATCH: at 07:49

## 2024-05-28 PROBLEM — K21.9 GASTRO-ESOPHAGEAL REFLUX DISEASE WITHOUT ESOPHAGITIS: Chronic | Status: ACTIVE | Noted: 2024-04-25

## 2024-05-28 PROBLEM — K57.92 DIVERTICULITIS OF INTESTINE, PART UNSPECIFIED, WITHOUT PERFORATION OR ABSCESS WITHOUT BLEEDING: Chronic | Status: ACTIVE | Noted: 2024-04-25

## 2024-05-28 PROBLEM — Z86.59 PERSONAL HISTORY OF OTHER MENTAL AND BEHAVIORAL DISORDERS: Chronic | Status: ACTIVE | Noted: 2024-04-25

## 2024-05-28 PROCEDURE — 99232 SBSQ HOSP IP/OBS MODERATE 35: CPT

## 2024-05-28 RX ADMIN — Medication 2 MILLIGRAM(S): at 11:09

## 2024-05-28 RX ADMIN — Medication 325 MILLIGRAM(S): at 08:35

## 2024-05-28 RX ADMIN — Medication 2 MILLIGRAM(S): at 08:38

## 2024-05-28 RX ADMIN — QUETIAPINE FUMARATE 75 MILLIGRAM(S): 200 TABLET, FILM COATED ORAL at 20:37

## 2024-05-28 RX ADMIN — Medication 1 PATCH: at 08:00

## 2024-05-28 RX ADMIN — ATORVASTATIN CALCIUM 40 MILLIGRAM(S): 80 TABLET, FILM COATED ORAL at 20:38

## 2024-05-28 RX ADMIN — Medication 1 APPLICATION(S): at 08:34

## 2024-05-28 RX ADMIN — BUPRENORPHINE AND NALOXONE 1 TABLET(S): 2; .5 TABLET SUBLINGUAL at 08:34

## 2024-05-28 RX ADMIN — Medication 10 MILLIGRAM(S): at 08:35

## 2024-05-28 RX ADMIN — GUANFACINE 1 MILLIGRAM(S): 3 TABLET, EXTENDED RELEASE ORAL at 08:39

## 2024-05-28 RX ADMIN — Medication 0.1 MILLIGRAM(S): at 20:37

## 2024-05-28 RX ADMIN — BUPRENORPHINE AND NALOXONE 1 TABLET(S): 2; .5 TABLET SUBLINGUAL at 20:42

## 2024-05-28 RX ADMIN — Medication 5 MILLIGRAM(S): at 20:38

## 2024-05-28 RX ADMIN — Medication 2 MILLIGRAM(S): at 18:08

## 2024-05-28 RX ADMIN — Medication 1 TABLET(S): at 08:35

## 2024-05-28 RX ADMIN — Medication 2 MILLIGRAM(S): at 20:38

## 2024-05-28 RX ADMIN — DICLOFENAC SODIUM 4 GRAM(S): 30 GEL TOPICAL at 08:41

## 2024-05-28 RX ADMIN — ESCITALOPRAM OXALATE 20 MILLIGRAM(S): 10 TABLET, FILM COATED ORAL at 08:35

## 2024-05-28 RX ADMIN — GUANFACINE 1 MILLIGRAM(S): 3 TABLET, EXTENDED RELEASE ORAL at 20:37

## 2024-05-28 RX ADMIN — Medication 10 MILLIGRAM(S): at 20:37

## 2024-05-28 RX ADMIN — SENNA PLUS 2 TABLET(S): 8.6 TABLET ORAL at 20:37

## 2024-05-28 RX ADMIN — Medication 0.1 MILLIGRAM(S): at 08:35

## 2024-05-28 RX ADMIN — Medication 1 PATCH: at 08:38

## 2024-05-28 NOTE — BH INPATIENT PSYCHIATRY PROGRESS NOTE - NSICDXBHPRIMARYDX_PSY_ALL_CORE
MDD (major depressive disorder), recurrent episode, severe   F33.2  

## 2024-05-28 NOTE — BH INPATIENT PSYCHIATRY PROGRESS NOTE - NSTXSUICIDDATEEST_PSY_ALL_CORE
22-May-2024
26-Apr-2024
09-May-2024
20-Apr-2024
22-May-2024
09-May-2024
20-Apr-2024
27-Apr-2024
09-May-2024
27-Apr-2024
26-Apr-2024
09-May-2024
26-Apr-2024
01-May-2024
01-May-2024
09-May-2024
27-Apr-2024
09-May-2024
27-Apr-2024
01-May-2024
27-Apr-2024
09-May-2024
22-May-2024

## 2024-05-28 NOTE — BH INPATIENT PSYCHIATRY PROGRESS NOTE - NSDCCRITERIA_PSY_ALL_CORE
symptom improvement

## 2024-05-28 NOTE — BH INPATIENT PSYCHIATRY PROGRESS NOTE - NSBHMSEMOOD_PSY_A_CORE
Normal
Depressed
Normal
Depressed
Depressed
Depressed/Other
Depressed
Depressed
Normal
Depressed
Normal

## 2024-05-28 NOTE — BH INPATIENT PSYCHIATRY PROGRESS NOTE - NSBHMSEINTELL_PSY_A_CORE
Average

## 2024-05-28 NOTE — BH INPATIENT PSYCHIATRY PROGRESS NOTE - NSBHCHARTREVIEWVS_PSY_A_CORE FT
Vital Signs Last 24 Hrs  T(C): 36.4 (05-28-24 @ 08:13), Max: 36.4 (05-27-24 @ 19:08)  T(F): 97.6 (05-28-24 @ 08:13), Max: 97.6 (05-28-24 @ 08:13)  HR: --  BP: --  BP(mean): --  RR: 16 (05-27-24 @ 19:08) (16 - 16)  SpO2: --    Orthostatic VS  05-28-24 @ 08:13  Lying BP: --/-- HR: --  Sitting BP: 118/83 HR: 74  Standing BP: 121/65 HR: 73  Site: --  Mode: --  Orthostatic VS  05-27-24 @ 19:08  Lying BP: --/-- HR: --  Sitting BP: 137/65 HR: 76  Standing BP: 134/66 HR: 80  Site: --  Mode: --  Orthostatic VS  05-27-24 @ 09:29  Lying BP: --/-- HR: --  Sitting BP: 143/73 HR: 73  Standing BP: 144/86 HR: 78  Site: --  Mode: --  Orthostatic VS  05-27-24 @ 06:30  Lying BP: --/-- HR: --  Sitting BP: 147/75 HR: 67  Standing BP: 159/87 HR: 73  Site: --  Mode: --  Orthostatic VS  05-26-24 @ 19:33  Lying BP: --/-- HR: --  Sitting BP: 143/98 HR: 110  Standing BP: 135/95 HR: 107  Site: --  Mode: electronic   Vital Signs Last 24 Hrs  T(C): 36.6 (05-29-24 @ 05:53), Max: 36.6 (05-29-24 @ 05:53)  T(F): 97.9 (05-29-24 @ 05:53), Max: 97.9 (05-29-24 @ 05:53)  HR: --  BP: --  BP(mean): --  RR: 16 (05-28-24 @ 19:08) (16 - 16)  SpO2: --    Orthostatic VS  05-29-24 @ 05:53  Lying BP: --/-- HR: --  Sitting BP: 129/84 HR: 67  Standing BP: 139/72 HR: 72  Site: upper left arm  Mode: electronic  Orthostatic VS  05-28-24 @ 19:08  Lying BP: --/-- HR: --  Sitting BP: 150/60 HR: 81  Standing BP: 150/72 HR: 79  Site: --  Mode: --  Orthostatic VS  05-28-24 @ 08:13  Lying BP: --/-- HR: --  Sitting BP: 118/83 HR: 74  Standing BP: 121/65 HR: 73  Site: --  Mode: --  Orthostatic VS  05-27-24 @ 19:08  Lying BP: --/-- HR: --  Sitting BP: 137/65 HR: 76  Standing BP: 134/66 HR: 80  Site: --  Mode: --

## 2024-05-28 NOTE — BH INPATIENT PSYCHIATRY PROGRESS NOTE - NSTXDEPRESDATETRGT_PSY_ALL_CORE
03-May-2024

## 2024-05-28 NOTE — BH INPATIENT PSYCHIATRY PROGRESS NOTE - NSTXDCOPLKDATEEST_PSY_ALL_CORE
17-May-2024
26-Apr-2024
17-May-2024
10-May-2024
03-May-2024
03-May-2024
26-Apr-2024
17-May-2024
17-May-2024
10-May-2024
03-May-2024
17-May-2024
24-May-2024
26-Apr-2024
03-May-2024
03-May-2024
17-May-2024
26-Apr-2024
10-May-2024
26-Apr-2024
03-May-2024
26-Apr-2024
10-May-2024

## 2024-05-28 NOTE — BH INPATIENT PSYCHIATRY PROGRESS NOTE - CURRENT MEDICATION
MEDICATIONS  (STANDING):  atorvastatin 40 milliGRAM(s) Oral at bedtime  bacitracin   Ointment 1 Application(s) Topical daily  buprenorphine 8 mG/naloxone 2 mG SL  Tablet 1 Tablet(s) SubLingual two times a day  busPIRone 10 milliGRAM(s) Oral two times a day  cloNIDine 0.1 milliGRAM(s) Oral two times a day  escitalopram 20 milliGRAM(s) Oral daily  ferrous    sulfate 325 milliGRAM(s) Oral daily  guanFACINE. 1 milliGRAM(s) Oral <User Schedule>  lactobacillus acidophilus 1 Tablet(s) Oral two times a day with meals  QUEtiapine 75 milliGRAM(s) Oral at bedtime  senna 2 Tablet(s) Oral at bedtime    MEDICATIONS  (PRN):  acetaminophen     Tablet .. 650 milliGRAM(s) Oral every 6 hours PRN Mild Pain (1 - 3), Moderate Pain (4 - 6)  aluminum hydroxide/magnesium hydroxide/simethicone Suspension 30 milliLiter(s) Oral every 6 hours PRN Dyspepsia  diclofenac sodium 1% Gel 4 Gram(s) Topical every 8 hours PRN Knee Pain  fluticasone propionate 50 MICROgram(s)/spray Nasal Spray 1 Spray(s) Both Nostrils every 12 hours PRN congestion  lactulose Syrup 10 Gram(s) Oral two times a day PRN constipation  lidocaine   4% Patch 1 Patch Transdermal every 24 hours PRN chronic L knee pain  melatonin. 5 milliGRAM(s) Oral at bedtime PRN Insomnia  nicotine  Polacrilex Gum 2 milliGRAM(s) Oral every 2 hours PRN Smoking Cessation  nicotine -   7 mG/24Hr(s) Patch 1 Patch Transdermal daily PRN nicotine dependence  traZODone 100 milliGRAM(s) Oral at bedtime PRN insomnia

## 2024-05-28 NOTE — BH INPATIENT PSYCHIATRY PROGRESS NOTE - NSBHASSESSSUMMFT_PSY_ALL_CORE
Patient is a 59 yo Male domiciled at [facility], unemployed, single, PPHx MDD, DEANA, PTSD, mult. SA's in the past, mult. admissions to inpatient psychiatry, who was BIBEMS for SI after endorsing SI to staff at nursing home.    Working diagnosis:  MDD, severe w/o psychotic features  PTSD      Plan:  >Legal: 9.13  >Obs: Routine observation, denies active SIIP and is able to engage in safety planning.      >Psychiatric Meds:  Continue Lexapro 20mg, AM for depression  Continue Buspirone 10mg, BID for anxiety  Continue Guanfacine 1mg, BID for impulsivity        >PRN Meds:  Trazodone 100mg, QHS for insomnia  Melatonin 5mg PO, QHS for insomnia  Atarax 50mg PO, QHS for anxiety  Lorazepam 2mg IM/PO, Q6H for severe anxiety      >Labs: Admission labs reviewed, no acute findings. Hold antipsychotics if QTc >500  >Medical: No acute concerns. Patient placed on CIWA, no visible physical symptoms of withdrawal. During the course of treatment, will collaborate with medical team to manage medical issues.  >Diet: Regular  >Social: Milieu/structured therapy  >Treatment Interventions: Groups and Individual Therapy/CBT.  >Dispo: [place]

## 2024-05-28 NOTE — BH INPATIENT PSYCHIATRY PROGRESS NOTE - NSTXSUICIDGOAL_PSY_ALL_CORE
Will identify and utilize 2 coping skills
Will verbalize a decrease in preoccupation with suicidal thoughts and / or intent to commit suicide to 2 on a 10-point scale
Will verbalize a decrease in preoccupation with suicidal thoughts and / or intent to commit suicide to 2 on a 10-point scale
Will identify and utilize 2 coping skills
Will identify and utilize 2 coping skills
Will identify 1 trigger / stressor that exacerbates suicidal
Will identify 1 trigger / stressor that exacerbates suicidal
Talk to staff and ask for assistance when having suicidal wishes instead of acting out
Will identify 1 trigger / stressor that exacerbates suicidal
Will identify and utilize 2 coping skills
Will verbalize a decrease in preoccupation with suicidal thoughts and / or intent to commit suicide to 2 on a 10-point scale
Talk to staff and ask for assistance when having suicidal wishes instead of acting out
Will identify 1 trigger / stressor that exacerbates suicidal
Will identify and utilize 2 coping skills
Will verbalize a decrease in preoccupation with suicidal thoughts and / or intent to commit suicide to 2 on a 10-point scale
Will identify and utilize 2 coping skills
Will identify 1 trigger / stressor that exacerbates suicidal
Will identify and utilize 2 coping skills
Will verbalize a decrease in preoccupation with suicidal thoughts and / or intent to commit suicide to 2 on a 10-point scale
Will verbalize a decrease in preoccupation with suicidal thoughts and / or intent to commit suicide to 2 on a 10-point scale
Talk to staff and ask for assistance when having suicidal wishes instead of acting out
Will identify and utilize 2 coping skills
Will identify 1 trigger / stressor that exacerbates suicidal

## 2024-05-28 NOTE — BH INPATIENT PSYCHIATRY PROGRESS NOTE - NSBHATTESTATTENDPERFORM_PSY_A_CORE
Medical Decision Making

## 2024-05-28 NOTE — BH INPATIENT PSYCHIATRY PROGRESS NOTE - NSBHMETABOLIC_PSY_ALL_CORE_FT
BMI:   HbA1c: A1C with Estimated Average Glucose Result: 5.9 % (04-27-24 @ 10:21)    Glucose:   BP: --Vital Signs Last 24 Hrs  T(C): 36.4 (05-24-24 @ 08:41), Max: 36.6 (05-23-24 @ 19:02)  T(F): 97.6 (05-24-24 @ 08:41), Max: 97.9 (05-23-24 @ 19:02)  HR: --  BP: --  BP(mean): --  RR: 17 (05-24-24 @ 14:23) (16 - 17)  SpO2: --    Orthostatic VS  05-24-24 @ 08:41  Lying BP: --/-- HR: --  Sitting BP: 147/69 HR: 61  Standing BP: 150/79 HR: 79  Site: --  Mode: electronic  Orthostatic VS  05-23-24 @ 19:02  Lying BP: --/-- HR: --  Sitting BP: 131/68 HR: 100  Standing BP: 153/69 HR: 76  Site: --  Mode: --  Orthostatic VS  05-23-24 @ 07:59  Lying BP: --/-- HR: --  Sitting BP: 136/65 HR: 61  Standing BP: 125/61 HR: 68  Site: --  Mode: --  Orthostatic VS  05-22-24 @ 19:07  Lying BP: --/-- HR: --  Sitting BP: 132/69 HR: 78  Standing BP: 138/73 HR: 89  Site: --  Mode: --    Lipid Panel: Date/Time: 04-27-24 @ 10:21  Cholesterol, Serum: 139  LDL Cholesterol Calculated: 39  HDL Cholesterol, Serum: 38  Total Cholesterol/HDL Ration Measurement: --  Triglycerides, Serum: 308   BMI: BMI (kg/m2): 34.8 (04-27-24 @ 16:15)  HbA1c: A1C with Estimated Average Glucose Result: 5.9 % (04-27-24 @ 10:21)    Glucose:   BP: --Vital Signs Last 24 Hrs  T(C): 36.6 (05-29-24 @ 05:53), Max: 36.6 (05-29-24 @ 05:53)  T(F): 97.9 (05-29-24 @ 05:53), Max: 97.9 (05-29-24 @ 05:53)  HR: --  BP: --  BP(mean): --  RR: 16 (05-28-24 @ 19:08) (16 - 16)  SpO2: --    Orthostatic VS  05-29-24 @ 05:53  Lying BP: --/-- HR: --  Sitting BP: 129/84 HR: 67  Standing BP: 139/72 HR: 72  Site: upper left arm  Mode: electronic  Orthostatic VS  05-28-24 @ 19:08  Lying BP: --/-- HR: --  Sitting BP: 150/60 HR: 81  Standing BP: 150/72 HR: 79  Site: --  Mode: --  Orthostatic VS  05-28-24 @ 08:13  Lying BP: --/-- HR: --  Sitting BP: 118/83 HR: 74  Standing BP: 121/65 HR: 73  Site: --  Mode: --  Orthostatic VS  05-27-24 @ 19:08  Lying BP: --/-- HR: --  Sitting BP: 137/65 HR: 76  Standing BP: 134/66 HR: 80  Site: --  Mode: --    Lipid Panel: Date/Time: 04-27-24 @ 10:21  Cholesterol, Serum: 139  LDL Cholesterol Calculated: 39  HDL Cholesterol, Serum: 38  Total Cholesterol/HDL Ration Measurement: --  Triglycerides, Serum: 308

## 2024-05-28 NOTE — BH INPATIENT PSYCHIATRY PROGRESS NOTE - NSBHROSSYSTEMS_PSY_ALL_CORE
Psychiatric

## 2024-05-28 NOTE — BH INPATIENT PSYCHIATRY PROGRESS NOTE - NSTXDCOPLKPROGRES_PSY_ALL_CORE
No Change
Improving
No Change
Improving
No Change
Improving
Improving
No Change
Improving

## 2024-05-28 NOTE — BH INPATIENT PSYCHIATRY PROGRESS NOTE - NSBHFUPINTERVALHXFT_PSY_A_CORE
Patient is following-up for depression. Compliant with medications, no overnight events. Patient seen and examined on the unit, reports having good appetite including breakfast today, and fair sleep, though he continues to struggle to use MARIBEL wedge. He states he is doing "okay" today, does verbalize being upset w/ another peer on the unit who has been disruptive. He is currently pending insurance authorization for return to [place]. He continues to deny any SIIP. Has not exhibited any drug-seeking behaviors during hospitalization. He reports decreased pain in knee w/ PT sessions and has been utilizing Voltaren cream. No acute medical concerns, VSS.

## 2024-05-28 NOTE — BH INPATIENT PSYCHIATRY PROGRESS NOTE - NSBHMSEATTEN_PSY_A_CORE
Normal
no
Normal

## 2024-05-28 NOTE — BH INPATIENT PSYCHIATRY PROGRESS NOTE - NSBHATTESTATTENDBILLTIME2_PSY_A_CORE
I have reviewed and verified the documentation.

## 2024-05-28 NOTE — BH INPATIENT PSYCHIATRY PROGRESS NOTE - NSBHMSERELATED_PSY_A_CORE
Good
Fair
Good
Fair
Good
Fair
Good
Fair
Good
Good
Fair

## 2024-05-28 NOTE — BH INPATIENT PSYCHIATRY PROGRESS NOTE - NSBHMSEAFFRANGE_PSY_A_CORE
Constricted
Constricted
Full
Constricted
Full
Constricted
Full
Constricted
Constricted
Full
Constricted
Full
Full

## 2024-05-28 NOTE — BH INPATIENT PSYCHIATRY PROGRESS NOTE - NSBHCONTPROVIDER_PSY_ALL_CORE
Not applicable

## 2024-05-28 NOTE — BH INPATIENT PSYCHIATRY PROGRESS NOTE - NSBHATTESTATTENDBILLTIME_PSY_A_CORE
I independently performed the documented

## 2024-05-28 NOTE — BH INPATIENT PSYCHIATRY PROGRESS NOTE - NSBHMSEHYG_PSY_A_CORE
Good
Fair
Good
Poor
Good
Good
Fair
Fair
Good
Fair
Good
Poor
Fair
Good
Fair
Fair
Good
Poor
Fair
Fair
Good

## 2024-05-28 NOTE — BH INPATIENT PSYCHIATRY PROGRESS NOTE - NSBHMSEGROOM_PSY_A_CORE
Fair
Good
Fair
Good
Fair
Good
Fair
Fair
Good
Fair
Good
Good
Fair
Good
Fair
Good
Good

## 2024-05-28 NOTE — BH INPATIENT PSYCHIATRY PROGRESS NOTE - NSBHMSETHTCONTENT_PSY_A_CORE
Unremarkable
Hopelessness
Unremarkable

## 2024-05-28 NOTE — BH INPATIENT PSYCHIATRY PROGRESS NOTE - NSBHMSETHTPROC_PSY_A_CORE
Linear

## 2024-05-28 NOTE — BH INPATIENT PSYCHIATRY PROGRESS NOTE - NSBHMSEAFFQUAL_PSY_A_CORE
Euthymic
Euthymic
Depressed
Euthymic
Depressed
Euthymic
Euthymic
Depressed
Euthymic
Depressed
Depressed
Euthymic
Depressed
Euthymic
Euthymic
Depressed
Euthymic
Euthymic
Depressed
Euthymic
Depressed

## 2024-05-28 NOTE — BH INPATIENT PSYCHIATRY PROGRESS NOTE - NSTXSUICIDPROGRES_PSY_ALL_CORE
Improving
Met - goal discontinued
Improving
Met - goal discontinued
Improving
Met - goal discontinued

## 2024-05-28 NOTE — BH INPATIENT PSYCHIATRY PROGRESS NOTE - NSTXDCOPLKDATETRGT_PSY_ALL_CORE
10-May-2024
03-May-2024
03-May-2024
17-May-2024
17-May-2024
24-May-2024
10-May-2024
24-May-2024
03-May-2024
31-May-2024
10-May-2024
17-May-2024
03-May-2024
03-May-2024
10-May-2024
24-May-2024
17-May-2024
24-May-2024
03-May-2024
10-May-2024
10-May-2024

## 2024-05-28 NOTE — BH INPATIENT PSYCHIATRY PROGRESS NOTE - NSICDXBHSECONDARYDX_PSY_ALL_CORE
Post traumatic stress disorder (PTSD)   F43.10  

## 2024-05-28 NOTE — BH INPATIENT PSYCHIATRY PROGRESS NOTE - NSTXDEPRESGOAL_PSY_ALL_CORE
Will identify thoughts and self-talk that contribute to depression

## 2024-05-28 NOTE — BH INPATIENT PSYCHIATRY PROGRESS NOTE - NSBHMSEKNOWHOW_PSY_ALL_CORE
Current Events

## 2024-05-28 NOTE — BH INPATIENT PSYCHIATRY PROGRESS NOTE - NSTXSUICIDDATETRGT_PSY_ALL_CORE
04-May-2024
08-May-2024
01-May-2024
08-May-2024
29-May-2024
11-May-2024
18-May-2024
25-May-2024
18-May-2024
18-May-2024
29-May-2024
08-May-2024
18-May-2024
18-May-2024
16-May-2024
08-May-2024
25-May-2024
01-May-2024
29-May-2024
08-May-2024
01-May-2024
01-Jun-2024
11-May-2024

## 2024-05-28 NOTE — BH INPATIENT PSYCHIATRY PROGRESS NOTE - NSBHMSEIMPULSE_PSY_A_CORE
Normal
Impaired
Normal
Impaired
Impaired
Normal
Impaired
Normal
Impaired
Normal
Normal
Impaired
Normal
Normal
Impaired
Normal
Normal
Impaired
Impaired

## 2024-05-28 NOTE — BH INPATIENT PSYCHIATRY PROGRESS NOTE - NSBHATTESTBILLING_PSY_A_CORE
[id number]-Vuilsvywct OBS or IP - moderate complexity OR 35-49 mins
48984-Xzxkpnbtsg OBS or IP - moderate complexity OR 35-49 mins
48032-Yxyuepoeud OBS or IP - moderate complexity OR 35-49 mins
26959-Fvlsuisysy OBS or IP - moderate complexity OR 35-49 mins
29546-Fjdpfhdmaw OBS or IP - moderate complexity OR 35-49 mins
20722-Fjdgvdrojo OBS or IP - moderate complexity OR 35-49 mins
31528-Pfnpcntnym OBS or IP - moderate complexity OR 35-49 mins
81545-Tgakibfdzp OBS or IP - moderate complexity OR 35-49 mins
85553-Ywklqxyhcq OBS or IP - moderate complexity OR 35-49 mins
28723-Najmvndgbp OBS or IP - moderate complexity OR 35-49 mins
73976-Apxhnrjpnk OBS or IP - moderate complexity OR 35-49 mins
39076-Rbetygrvlk OBS or IP - moderate complexity OR 35-49 mins
59033-Ctetvwpnix OBS or IP - moderate complexity OR 35-49 mins
32891-Wuiexxhlnl OBS or IP - moderate complexity OR 35-49 mins
75363-Zgxtlcgwpe OBS or IP - moderate complexity OR 35-49 mins
15992-Axjrvrlpog OBS or IP - moderate complexity OR 35-49 mins
39219-Obkvilnbjf OBS or IP - moderate complexity OR 35-49 mins
11527-Mcdiwwitid OBS or IP - moderate complexity OR 35-49 mins
30202-Rdmtmdbtot OBS or IP - moderate complexity OR 35-49 mins
02286-Qdiitdkbce OBS or IP - moderate complexity OR 35-49 mins
87114-Pfnmbzdsvl OBS or IP - moderate complexity OR 35-49 mins
27339-Qwuioguctk OBS or IP - moderate complexity OR 35-49 mins
85463-Wfmcsspmyt OBS or IP - moderate complexity OR 35-49 mins

## 2024-05-28 NOTE — BH INPATIENT PSYCHIATRY PROGRESS NOTE - NSTXDEPRESDATEEST_PSY_ALL_CORE
26-Apr-2024

## 2024-05-28 NOTE — BH INPATIENT PSYCHIATRY PROGRESS NOTE - NSBHMSEJUDGE_PSY_A_CORE
Fair
Poor
Fair
Poor
Fair
Poor
Fair
Poor
Fair
Poor
Poor
Fair
Poor

## 2024-05-29 VITALS — RESPIRATION RATE: 18 BRPM | TEMPERATURE: 98 F

## 2024-05-29 RX ADMIN — DICLOFENAC SODIUM 4 GRAM(S): 30 GEL TOPICAL at 10:23

## 2024-05-29 RX ADMIN — BUPRENORPHINE AND NALOXONE 1 TABLET(S): 2; .5 TABLET SUBLINGUAL at 08:40

## 2024-05-29 RX ADMIN — Medication 650 MILLIGRAM(S): at 10:22

## 2024-05-29 RX ADMIN — ESCITALOPRAM OXALATE 20 MILLIGRAM(S): 10 TABLET, FILM COATED ORAL at 08:35

## 2024-05-29 RX ADMIN — Medication 10 MILLIGRAM(S): at 08:34

## 2024-05-29 RX ADMIN — GUANFACINE 1 MILLIGRAM(S): 3 TABLET, EXTENDED RELEASE ORAL at 08:34

## 2024-05-29 RX ADMIN — Medication 0.1 MILLIGRAM(S): at 08:35

## 2024-05-29 RX ADMIN — Medication 2 MILLIGRAM(S): at 11:04

## 2024-05-29 RX ADMIN — Medication 325 MILLIGRAM(S): at 08:34

## 2024-05-29 RX ADMIN — Medication 2 MILLIGRAM(S): at 07:35

## 2024-05-29 RX ADMIN — Medication 1 TABLET(S): at 08:34

## 2024-05-29 RX ADMIN — Medication 1 PATCH: at 08:40

## 2024-05-29 RX ADMIN — Medication 650 MILLIGRAM(S): at 10:52

## 2024-06-03 DIAGNOSIS — M25.562 PAIN IN LEFT KNEE: ICD-10-CM

## 2024-06-04 ENCOUNTER — APPOINTMENT (OUTPATIENT)
Dept: ORTHOPEDIC SURGERY | Facility: CLINIC | Age: 59
End: 2024-06-04
Payer: MEDICAID

## 2024-06-04 DIAGNOSIS — M70.62 TROCHANTERIC BURSITIS, LEFT HIP: ICD-10-CM

## 2024-06-04 DIAGNOSIS — M25.552 PAIN IN LEFT HIP: ICD-10-CM

## 2024-06-04 DIAGNOSIS — M17.12 UNILATERAL PRIMARY OSTEOARTHRITIS, LEFT KNEE: ICD-10-CM

## 2024-06-04 PROCEDURE — 73502 X-RAY EXAM HIP UNI 2-3 VIEWS: CPT | Mod: LT

## 2024-06-04 PROCEDURE — 99213 OFFICE O/P EST LOW 20 MIN: CPT | Mod: 25

## 2024-06-04 PROCEDURE — 73564 X-RAY EXAM KNEE 4 OR MORE: CPT | Mod: LT

## 2024-06-04 NOTE — HISTORY OF PRESENT ILLNESS
[de-identified] : 58-year-old male presents to the office for follow-up of left knee and hip pain.  The patient reports pain over the lateral aspect of his left hip especially when laying on his left side. Denies groin pain.   He states that his primary issue is his left knee currently.  States that the pain is severe and occurs daily, he states that he feels as though it is inhibiting his recovery from his right total knee.  States that his knee sometimes leonela or gives out on him, he uses a cane when ambulating.  Has been participating in physical therapy.  Take Suboxone Tylenol for pain.  Is currently on Duricef for chronic infection of the right knee.  Denies any recent injuries falls or trauma, numbness or tingling, sniffing and changes to his medical history since last visit.

## 2024-06-04 NOTE — PHYSICAL EXAM
[Wheelchair] : uses a wheelchair [de-identified] :  Left knee exam shows moderate effusion, ROM is  degrees, no instability, pain with Pam, medial and lateral joint line tenderness. There is a well-healed midline surgical incision without evidence of erythema drainage or discharge Left hip exam showed no groin pain with SLR, ROM is full without pain, YESY negative, FADIR negative. There is tenderness palpation of the left greater trochanter 5/5 motor strength in bilateral lower extremities. Sensory: Intact in bilateral lower extremities. DTRs: Biceps, brachioradialis, triceps, patellar, ankle and plantar 2+ and symmetric bilaterally. Pulses: dorsalis pedis, posterior tibial, femoral, popliteal, and radial 2+ and symmetric bilaterally. [de-identified] : 4 views of the left knee obtained the office today show no acute fracture or dislocation. 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  AP pelvis and 2 views of the left hip taken in office today show no acute fracture dislocations or mild degenerative changes noted.

## 2024-06-04 NOTE — REVIEW OF SYSTEMS
[Joint Stiffness] : joint stiffness [Joint Swelling] : joint swelling [Negative] : Heme/Lymph [FreeTextEntry9] : left knee, left hip

## 2024-06-04 NOTE — DISCUSSION/SUMMARY
[Medication Risks Reviewed] : Medication risks reviewed [Surgical risks reviewed] : Surgical risks reviewed [de-identified] : 58-year-old male presents to the office for follow-up of severe left knee osteoarthritis as well as mild OA of the left hip.  I do believe the patient's discomfort from his left hip is coming from greater trochanteric bursitis. Patients has minimal pain in the left at this time that responds to Tylenol. His main concern is his left knee.  We discussed surgical versus conservative treatment options, the patient would like to proceed with surgical intervention as soon as possible.  He does have a chronic infection of the right knee and has been on long-term oral Duricef antibiotic therapy.  Advised patient that he will have to stop his antibiotic therapy over the next 3 weeks.  He should follow-up in office for his right knee in 3 weeks time for repeat evaluation after his antibiotic holiday.  Once his right knee is reevaluated we can revisit surgical intervention for the left knee.  In the meantime he should continue with conservative therapy.  Continue with physical therapy.  He should focus on low impact activity and exercise.  Continue to take Tylenol and Suboxone as needed for his pain.  All questions addressed with the patient and his caregiver.  Both verbalized understanding agreement the plan.

## 2024-06-26 ENCOUNTER — APPOINTMENT (OUTPATIENT)
Dept: ORTHOPEDIC SURGERY | Facility: CLINIC | Age: 59
End: 2024-06-26
Payer: MEDICAID

## 2024-06-26 DIAGNOSIS — Z96.659 PRESENCE OF UNSPECIFIED ARTIFICIAL KNEE JOINT: ICD-10-CM

## 2024-06-26 PROCEDURE — 99213 OFFICE O/P EST LOW 20 MIN: CPT

## 2024-07-05 ENCOUNTER — NON-APPOINTMENT (OUTPATIENT)
Age: 59
End: 2024-07-05

## 2024-07-05 RX ORDER — POLYETHYLENE GLYCOL-3350 AND ELECTROLYTES WITH FLAVOR PACK 240; 5.84; 2.98; 6.72; 22.72 G/278.26G; G/278.26G; G/278.26G; G/278.26G; G/278.26G
240 POWDER, FOR SOLUTION ORAL
Qty: 1 | Refills: 0 | Status: ACTIVE | COMMUNITY
Start: 2024-07-05 | End: 1900-01-01

## 2024-07-15 ENCOUNTER — APPOINTMENT (OUTPATIENT)
Dept: ORTHOPEDIC SURGERY | Facility: CLINIC | Age: 59
End: 2024-07-15
Payer: MEDICAID

## 2024-07-15 VITALS
DIASTOLIC BLOOD PRESSURE: 71 MMHG | WEIGHT: 205 LBS | HEIGHT: 68 IN | SYSTOLIC BLOOD PRESSURE: 117 MMHG | BODY MASS INDEX: 31.07 KG/M2 | HEART RATE: 64 BPM

## 2024-07-15 DIAGNOSIS — M17.12 UNILATERAL PRIMARY OSTEOARTHRITIS, LEFT KNEE: ICD-10-CM

## 2024-07-15 PROCEDURE — 99215 OFFICE O/P EST HI 40 MIN: CPT

## 2024-08-03 NOTE — ED ADULT NURSE REASSESSMENT NOTE - ED CARDIAC RHYTHM
Patient is POD#7 s/p right Intramedullary Nail ORIF with Dr. Villalobos. Extensive conversation was had with patient's wife Rose Marie (HCP) at bedside regarding her 's care and the plan for discharge to a rehab in [place], [place]. Patient's wife currently in plans of arranging for transportation back to [place] via medical airplane. Advised that is highly recommended for patient NOT to use a commercial flight to return back to his home living area 2/2 increase risk of developing blood clots/pulmonary embolisms. Patient is very high risk for developing DVTs/PE in acute post-operative phase and taking a commercial flight is contraindicated at this time. Patient's wife understands the risk of DVT/PE development from taking a commercial flight and is opting to use her insurance to cover for medical airplane ride back to her hometown of [place], [place] where she prefers to have patient continue with his post-operative rehab care. It is highly recommended that if patient takes a flight back to [place] that he remain in a supine position and have bilateral Intermittent Pneumatic Compression stockings on during his flight to decrease his risk for DVTs and PEs.     Elroy Flores PA-C  Orthopedic Surgery  Pager #[phone] regular

## 2024-08-15 ENCOUNTER — APPOINTMENT (OUTPATIENT)
Dept: UROLOGY | Facility: CLINIC | Age: 59
End: 2024-08-15
Payer: MEDICAID

## 2024-08-15 VITALS
DIASTOLIC BLOOD PRESSURE: 76 MMHG | BODY MASS INDEX: 36.07 KG/M2 | WEIGHT: 238 LBS | SYSTOLIC BLOOD PRESSURE: 135 MMHG | HEART RATE: 65 BPM | HEIGHT: 68 IN

## 2024-08-15 DIAGNOSIS — Z82.49 FAMILY HISTORY OF ISCHEMIC HEART DISEASE AND OTHER DISEASES OF THE CIRCULATORY SYSTEM: ICD-10-CM

## 2024-08-15 DIAGNOSIS — R39.12 POOR URINARY STREAM: ICD-10-CM

## 2024-08-15 DIAGNOSIS — Z13.9 ENCOUNTER FOR SCREENING, UNSPECIFIED: ICD-10-CM

## 2024-08-15 DIAGNOSIS — F17.200 NICOTINE DEPENDENCE, UNSPECIFIED, UNCOMPLICATED: ICD-10-CM

## 2024-08-15 PROCEDURE — 99203 OFFICE O/P NEW LOW 30 MIN: CPT

## 2024-08-15 RX ORDER — TAMSULOSIN HYDROCHLORIDE 0.4 MG/1
0.4 CAPSULE ORAL
Qty: 30 | Refills: 1 | Status: ACTIVE | COMMUNITY
Start: 2024-08-15 | End: 1900-01-01

## 2024-08-15 RX ORDER — CLONIDINE HYDROCHLORIDE 0.1 MG/1
0.1 TABLET ORAL
Qty: 60 | Refills: 0 | Status: ACTIVE | COMMUNITY
Start: 2024-08-15

## 2024-08-15 RX ORDER — FAMOTIDINE 20 MG/1
20 TABLET, FILM COATED ORAL DAILY
Qty: 30 | Refills: 0 | Status: ACTIVE | COMMUNITY
Start: 2024-08-15

## 2024-08-15 RX ORDER — ESCITALOPRAM OXALATE 20 MG/1
20 TABLET ORAL DAILY
Qty: 30 | Refills: 0 | Status: ACTIVE | COMMUNITY
Start: 2024-08-15

## 2024-08-15 RX ORDER — QUETIAPINE FUMARATE 50 MG/1
50 TABLET ORAL
Qty: 30 | Refills: 0 | Status: ACTIVE | COMMUNITY
Start: 2024-08-15

## 2024-08-15 RX ORDER — ATORVASTATIN CALCIUM 40 MG/1
40 TABLET, FILM COATED ORAL
Qty: 90 | Refills: 3 | Status: ACTIVE | COMMUNITY
Start: 2024-08-15

## 2024-08-15 NOTE — HISTORY OF PRESENT ILLNESS
[FreeTextEntry1] : Patient is having a weak stream. It has been this wayy for about 4-5 years ago.  no dysuria.  no hematuria. voids frequently.  Has hesitancy no fevers or chills.  Has to strain to void.   He has not got erections.  This has been present for 4-5 months.  has a good libido.  He has good libido.

## 2024-08-15 NOTE — PHYSICAL EXAM
[General Appearance - Well Developed] : well developed [Edema] : no peripheral edema [] : no respiratory distress [Abdomen Tenderness] : non-tender [Normal Station and Gait] : the gait and station were normal for the patient's age [Skin Color & Pigmentation] : normal skin color and pigmentation [Oriented To Time, Place, And Person] : oriented to person, place, and time [Affect] : the affect was normal [Mood] : the mood was normal

## 2024-08-15 NOTE — LETTER BODY
[Dear  ___] : Dear  [unfilled], [Courtesy Letter:] : I had the pleasure of seeing your patient, [unfilled], in my office today. [Please see my note below.] : Please see my note below. [Sincerely,] : Sincerely, [FreeTextEntry3] : Ed  Ceasar Esparza MD [facility]  [facility] [place] [facility] [employer] [facility]

## 2024-08-20 ENCOUNTER — APPOINTMENT (OUTPATIENT)
Dept: ULTRASOUND IMAGING | Facility: CLINIC | Age: 59
End: 2024-08-20

## 2024-08-20 ENCOUNTER — APPOINTMENT (OUTPATIENT)
Dept: CT IMAGING | Facility: CLINIC | Age: 59
End: 2024-08-20

## 2024-08-20 ENCOUNTER — OUTPATIENT (OUTPATIENT)
Dept: OUTPATIENT SERVICES | Facility: HOSPITAL | Age: 59
LOS: 1 days | End: 2024-08-20
Payer: MEDICAID

## 2024-08-20 DIAGNOSIS — Z96.651 PRESENCE OF RIGHT ARTIFICIAL KNEE JOINT: Chronic | ICD-10-CM

## 2024-08-20 DIAGNOSIS — S83.512A SPRAIN OF ANTERIOR CRUCIATE LIGAMENT OF LEFT KNEE, INITIAL ENCOUNTER: Chronic | ICD-10-CM

## 2024-08-20 DIAGNOSIS — M17.12 UNILATERAL PRIMARY OSTEOARTHRITIS, LEFT KNEE: ICD-10-CM

## 2024-08-20 PROCEDURE — 93971 EXTREMITY STUDY: CPT | Mod: 26,LT

## 2024-08-20 PROCEDURE — 73700 CT LOWER EXTREMITY W/O DYE: CPT | Mod: 26,LT

## 2024-08-26 ENCOUNTER — OUTPATIENT (OUTPATIENT)
Dept: OUTPATIENT SERVICES | Facility: HOSPITAL | Age: 59
LOS: 1 days | End: 2024-08-26
Payer: COMMERCIAL

## 2024-08-26 VITALS
SYSTOLIC BLOOD PRESSURE: 140 MMHG | OXYGEN SATURATION: 96 % | WEIGHT: 244.49 LBS | HEART RATE: 66 BPM | HEIGHT: 68 IN | DIASTOLIC BLOOD PRESSURE: 80 MMHG | RESPIRATION RATE: 20 BRPM | TEMPERATURE: 97 F

## 2024-08-26 DIAGNOSIS — Z96.651 PRESENCE OF RIGHT ARTIFICIAL KNEE JOINT: Chronic | ICD-10-CM

## 2024-08-26 DIAGNOSIS — Z01.818 ENCOUNTER FOR OTHER PREPROCEDURAL EXAMINATION: ICD-10-CM

## 2024-08-26 DIAGNOSIS — M17.12 UNILATERAL PRIMARY OSTEOARTHRITIS, LEFT KNEE: ICD-10-CM

## 2024-08-26 DIAGNOSIS — Z13.89 ENCOUNTER FOR SCREENING FOR OTHER DISORDER: ICD-10-CM

## 2024-08-26 DIAGNOSIS — S83.512A SPRAIN OF ANTERIOR CRUCIATE LIGAMENT OF LEFT KNEE, INITIAL ENCOUNTER: Chronic | ICD-10-CM

## 2024-08-26 DIAGNOSIS — Z29.9 ENCOUNTER FOR PROPHYLACTIC MEASURES, UNSPECIFIED: ICD-10-CM

## 2024-08-26 LAB
A1C WITH ESTIMATED AVERAGE GLUCOSE RESULT: 7.8 % — HIGH (ref 4–5.6)
ALBUMIN SERPL ELPH-MCNC: 4.1 G/DL — SIGNIFICANT CHANGE UP (ref 3.3–5.2)
ALP SERPL-CCNC: 135 U/L — HIGH (ref 40–120)
ALT FLD-CCNC: 54 U/L — HIGH
ANION GAP SERPL CALC-SCNC: 10 MMOL/L — SIGNIFICANT CHANGE UP (ref 5–17)
APTT BLD: 30.4 SEC — SIGNIFICANT CHANGE UP (ref 24.5–35.6)
AST SERPL-CCNC: 45 U/L — HIGH
BASOPHILS # BLD AUTO: 0.04 K/UL — SIGNIFICANT CHANGE UP (ref 0–0.2)
BASOPHILS NFR BLD AUTO: 0.5 % — SIGNIFICANT CHANGE UP (ref 0–2)
BILIRUB SERPL-MCNC: 0.3 MG/DL — LOW (ref 0.4–2)
BLD GP AB SCN SERPL QL: SIGNIFICANT CHANGE UP
BUN SERPL-MCNC: 16.2 MG/DL — SIGNIFICANT CHANGE UP (ref 8–20)
CALCIUM SERPL-MCNC: 10.3 MG/DL — SIGNIFICANT CHANGE UP (ref 8.4–10.5)
CHLORIDE SERPL-SCNC: 98 MMOL/L — SIGNIFICANT CHANGE UP (ref 96–108)
CO2 SERPL-SCNC: 29 MMOL/L — SIGNIFICANT CHANGE UP (ref 22–29)
CREAT SERPL-MCNC: 0.79 MG/DL — SIGNIFICANT CHANGE UP (ref 0.5–1.3)
EGFR: 102 ML/MIN/1.73M2 — SIGNIFICANT CHANGE UP
EOSINOPHIL # BLD AUTO: 0.65 K/UL — HIGH (ref 0–0.5)
EOSINOPHIL NFR BLD AUTO: 8.9 % — HIGH (ref 0–6)
ESTIMATED AVERAGE GLUCOSE: 177 MG/DL — HIGH (ref 68–114)
GLUCOSE SERPL-MCNC: 166 MG/DL — HIGH (ref 70–99)
HCT VFR BLD CALC: 39.1 % — SIGNIFICANT CHANGE UP (ref 39–50)
HGB BLD-MCNC: 12.7 G/DL — LOW (ref 13–17)
IMM GRANULOCYTES NFR BLD AUTO: 0.3 % — SIGNIFICANT CHANGE UP (ref 0–0.9)
INR BLD: 1.05 RATIO — SIGNIFICANT CHANGE UP (ref 0.85–1.18)
LYMPHOCYTES # BLD AUTO: 2.01 K/UL — SIGNIFICANT CHANGE UP (ref 1–3.3)
LYMPHOCYTES # BLD AUTO: 27.6 % — SIGNIFICANT CHANGE UP (ref 13–44)
MCHC RBC-ENTMCNC: 30.1 PG — SIGNIFICANT CHANGE UP (ref 27–34)
MCHC RBC-ENTMCNC: 32.5 GM/DL — SIGNIFICANT CHANGE UP (ref 32–36)
MCV RBC AUTO: 92.7 FL — SIGNIFICANT CHANGE UP (ref 80–100)
MONOCYTES # BLD AUTO: 0.78 K/UL — SIGNIFICANT CHANGE UP (ref 0–0.9)
MONOCYTES NFR BLD AUTO: 10.7 % — SIGNIFICANT CHANGE UP (ref 2–14)
MRSA PCR RESULT.: SIGNIFICANT CHANGE UP
NEUTROPHILS # BLD AUTO: 3.79 K/UL — SIGNIFICANT CHANGE UP (ref 1.8–7.4)
NEUTROPHILS NFR BLD AUTO: 52 % — SIGNIFICANT CHANGE UP (ref 43–77)
PLATELET # BLD AUTO: 231 K/UL — SIGNIFICANT CHANGE UP (ref 150–400)
POTASSIUM SERPL-MCNC: 4.3 MMOL/L — SIGNIFICANT CHANGE UP (ref 3.5–5.3)
POTASSIUM SERPL-SCNC: 4.3 MMOL/L — SIGNIFICANT CHANGE UP (ref 3.5–5.3)
PROT SERPL-MCNC: 7.7 G/DL — SIGNIFICANT CHANGE UP (ref 6.6–8.7)
PROTHROM AB SERPL-ACNC: 11.6 SEC — SIGNIFICANT CHANGE UP (ref 9.5–13)
RBC # BLD: 4.22 M/UL — SIGNIFICANT CHANGE UP (ref 4.2–5.8)
RBC # FLD: 13 % — SIGNIFICANT CHANGE UP (ref 10.3–14.5)
S AUREUS DNA NOSE QL NAA+PROBE: SIGNIFICANT CHANGE UP
SODIUM SERPL-SCNC: 137 MMOL/L — SIGNIFICANT CHANGE UP (ref 135–145)
WBC # BLD: 7.29 K/UL — SIGNIFICANT CHANGE UP (ref 3.8–10.5)
WBC # FLD AUTO: 7.29 K/UL — SIGNIFICANT CHANGE UP (ref 3.8–10.5)

## 2024-08-26 PROCEDURE — 85025 COMPLETE CBC W/AUTO DIFF WBC: CPT

## 2024-08-26 PROCEDURE — 36415 COLL VENOUS BLD VENIPUNCTURE: CPT

## 2024-08-26 PROCEDURE — 87640 STAPH A DNA AMP PROBE: CPT

## 2024-08-26 PROCEDURE — 93005 ELECTROCARDIOGRAM TRACING: CPT

## 2024-08-26 PROCEDURE — G0463: CPT

## 2024-08-26 PROCEDURE — 80053 COMPREHEN METABOLIC PANEL: CPT

## 2024-08-26 PROCEDURE — 86900 BLOOD TYPING SEROLOGIC ABO: CPT

## 2024-08-26 PROCEDURE — 83036 HEMOGLOBIN GLYCOSYLATED A1C: CPT

## 2024-08-26 PROCEDURE — 86901 BLOOD TYPING SEROLOGIC RH(D): CPT

## 2024-08-26 PROCEDURE — 85610 PROTHROMBIN TIME: CPT

## 2024-08-26 PROCEDURE — 85730 THROMBOPLASTIN TIME PARTIAL: CPT

## 2024-08-26 PROCEDURE — 93010 ELECTROCARDIOGRAM REPORT: CPT

## 2024-08-26 PROCEDURE — 86850 RBC ANTIBODY SCREEN: CPT

## 2024-08-26 PROCEDURE — 87641 MR-STAPH DNA AMP PROBE: CPT

## 2024-08-26 RX ORDER — SODIUM CHLORIDE 9 MG/ML
3 INJECTION INTRAMUSCULAR; INTRAVENOUS; SUBCUTANEOUS EVERY 8 HOURS
Refills: 0 | Status: DISCONTINUED | OUTPATIENT
Start: 2024-09-13 | End: 2024-09-16

## 2024-08-26 NOTE — H&P PST ADULT - PROBLEM SELECTOR PLAN 4
preop assessment, HgA1C ordered, follow with PCP, medical clearance pending; blood glucose check ordered on day of surgery

## 2024-08-26 NOTE — H&P PST ADULT - HISTORY OF PRESENT ILLNESS
59 yo M PMH of HTN, MARIBEL (does not use CPAP), presents with c/o severe left knee osteoarthritis. States that his left knee pain is constant and is significantly limiting his ADLs. Patient states that his left knee occasionally leonela or gives out on him. Uses a cane when ambulating. Has been participating in physical therapy. Currently resides in [facility]. Takes Suboxone, he is currently in the process of weaning and has decreased his dose to 1 time a day from twice daily. Has been off of his antibiotics for over 6 weeks. Denies any recent injuries falls or trauma, numbness or tingling in the lower extremity. Preop assessment prior to left MARY THR w/Dr Canada scheduled for 9/13/2024, pending medical clearance 59 yo M PMH of HTN, MARIBEL (does not use CPAP), BPH, anxiety and depression, ETOH and recreational drug use (states he quit in 2024), presents with c/o severe left knee osteoarthritis. States that his left knee pain is constant and is significantly limiting his ADLs. Patient states that his left knee occasionally leonela or gives out on him. Currently resides in [facility] living [facility]. Takes Suboxone, states he is currently in the process of weaning. Reports Hx of left knee fracture in the past with arthroscopic repair. 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 Denies any recent fevers, chills, injuries, numbness or tingling in the lower extremities. 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 Preop assessment prior to left complex TKR with MARY antonio/Dr Canada scheduled for 2024, pending medical clearance 57 yo M PMH of HTN, MARIBEL (does not use CPAP), BPH, anxiety and depression, ETOH and recreational drug use (states last used in 2024), presents with c/o severe left knee osteoarthritis. States that his left knee pain is constant and is significantly limiting his ADLs. Patient states that his left knee occasionally leonela or gives out on him. Currently resides in [facility] living [facility]. Takes Suboxone, states he is currently in the process of weaning. Reports Hx of left knee fracture in the past with arthroscopic repair. 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 Denies any recent fevers, chills, injuries, numbness or tingling in the lower extremities. 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 Preop assessment prior to left complex TKR with MARY antonio/Dr Canada scheduled for 2024, pending medical clearance 59 yo M PMH of HTN, HLD, MARIBEL (does not use CPAP), BPH, anxiety and depression, ETOH and recreational drug use (states last used in 2024), presents with c/o severe left knee osteoarthritis. States that his left knee pain is constant and is significantly limiting his ADLs. Patient states that his left knee occasionally leonela or gives out on him. Currently resides in [facility] living [facility]. Takes Suboxone, states he is currently in the process of weaning. Reports Hx of left knee fracture in the past with arthroscopic repair. 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 Denies any recent fevers, chills, injuries, numbness or tingling in the lower extremities. 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 Preop assessment prior to left complex TKR with MARY Canada scheduled for 2024, pending medical clearance

## 2024-08-26 NOTE — H&P PST ADULT - NEUROLOGICAL
negative normal/cranial nerves II-XII intact/sensation intact cranial nerves II-XII intact/sensation intact/no spontaneous movement

## 2024-08-26 NOTE — H&P PST ADULT - CARDIOVASCULAR
regular rate and rhythm/S1 S2 present/no gallops/no rub/no murmur/no JVD/no pedal edema details… regular rate and rhythm/S1 S2 present/no gallops/no rub/no murmur/no JVD/no pedal edema/bradycardia

## 2024-08-26 NOTE — H&P PST ADULT - GASTROINTESTINAL
negative soft/nontender/nondistended/normal active bowel sounds/no guarding details… soft/nontender/normal active bowel sounds/no guarding/no rigidity/distended

## 2024-08-26 NOTE — H&P PST ADULT - PROBLEM SELECTOR PLAN 1
ort score 8, high risk for substance abuse, counseling provided ort score 8, high risk for substance abuse, counseling provided  urine drug screen and blood alcohol ordered on day of surgery

## 2024-08-26 NOTE — H&P PST ADULT - NSICDXPASTMEDICALHX_GEN_ALL_CORE_FT
PAST MEDICAL HISTORY:  Anxiety and depression     Broken internal joint prosthesis, other site, subsequent encounter     Diverticulitis     GERD (gastroesophageal reflux disease)     H/O bipolar disorder     History of drug abuse     History of ETOH abuse     HLD (hyperlipidemia)     HTN (hypertension)     MARIBEL (obstructive sleep apnea)     Osteoarthritis     Unilateral primary osteoarthritis, left knee      PAST MEDICAL HISTORY:  Anxiety and depression     Broken internal joint prosthesis, other site, subsequent encounter     Diabetes mellitus     Diverticulitis     GERD (gastroesophageal reflux disease)     H/O bipolar disorder     History of drug abuse     History of ETOH abuse     HLD (hyperlipidemia)     HTN (hypertension)     MARIBEL (obstructive sleep apnea)     Osteoarthritis     Unilateral primary osteoarthritis, left knee

## 2024-08-26 NOTE — H&P PST ADULT - ASSESSMENT
59 yo M PMH of HTN, MARIBEL (does not use CPAP), presents with c/o severe left knee osteoarthritis. States that his left knee pain is constant and is significantly limiting his ADLs. Patient states that his left knee occasionally leonela or gives out on him. Uses a cane when ambulating. Has been participating in physical therapy. Currently resides in [facility]. Takes Suboxone, he is currently in the process of weaning and has decreased his dose to 1 time a day from twice daily. Has been off of his antibiotics for over 6 weeks. Denies any recent injuries falls or trauma, numbness or tingling in the lower extremity. Preop assessment prior to left MARY THR w/Dr Canada scheduled for 2024, pending medical clearance    Patient was given information on joint class, joint book provided, ERP protocol reviewed with patient, MSSA/MRSA swabbed in PST, results pending     Patient was educated on preop preparation with written and verbal instructions. Pt was informed to obtain clearances >3 days before surgery. Pt will review medications with PCP. Pt was educated on NSAIDs, multivitamins and herbals that increase the risk of bleeding and need to be stopped 7 days before procedure. Pt verbalized understanding of the above.     OPIOID RISK TOOL    GABE EACH BOX THAT APPLIES AND ADD TOTALS AT THE END    FAMILY HISTORY OF SUBSTANCE ABUSE                 FEMALE         MALE                                                Alcohol                             [  ]1 pt          [  ]3pts                                               Illegal Durgs                     [  ]2 pts        [  ]3pts                                               Rx Drugs                           [  ]4 pts        [  ]4 pts    PERSONAL HISTORY OF SUBSTANCE ABUSE                                                                                          Alcohol                             [  ]3 pts       [ x ]3 pts                                               Illegal Drugs                     [  ]4 pts        [ x ]4 pts                                               Rx Drugs                           [  ]5 pts        [  ]5 pts    AGE BETWEEN 16-45 YEARS                                      [  ]1 pt         [  ]1 pt    HISTORY OF PREADOLESCENT   SEXUAL ABUSE                                                             [  ]3 pts        [  ]0pts    PSYCHOLOGICAL DISEASE                     ADD, OCD, Bipolar, Schizophrenia        [  ]2 pts         [  ]2 pts                      Depression                                               [  ]1 pt           [  x]1 pt           SCORING TOTAL   (add numbers and type here)              (  8  )                                     A score of 3 or lower indicated LOW risk for future opioid abuse  A score of 4 to 7 indicated moderate risk for future opioid abuse  A score of 8 or higher indicates a high risk for opioid abuse    CAPRINI VTE 2.0 SCORE [CLOT updated 2019]    AGE RELATED RISK FACTORS                                                       MOBILITY RELATED FACTORS  [x ] Age 41-60 years                                            (1 Point)                    [ ] Bed rest                                                        (1 Point)  [ ] Age: 61-74 years                                           (2 Points)                  [ ] Plaster cast                                                   (2 Points)  [ ] Age= 75 years                                              (3 Points)                    [ ] Bed bound for more than 72 hours                 (2 Points)    DISEASE RELATED RISK FACTORS                                               GENDER SPECIFIC FACTORS  [ ] Edema in the lower extremities                       (1 Point)              [ ] Pregnancy                                                     (1 Point)  [ ] Varicose veins                                               (1 Point)                     [ ] Post-partum < 6 weeks                                   (1 Point)             [x ] BMI > 25 Kg/m2                                            (1 Point)                     [ ] Hormonal therapy  or oral contraception          (1 Point)                 [ ] Sepsis (in the previous month)                        (1 Point)               [ ] History of pregnancy complications                 (1 point)  [ ] Pneumonia or serious lung disease                                               [ ] Unexplained or recurrent                     (1 Point)           (in the previous month)                               (1 Point)  [ ] Abnormal pulmonary function test                     (1 Point)                 SURGERY RELATED RISK FACTORS  [ ] Acute myocardial infarction                              (1 Point)               [ ]  Section                                             (1 Point)  [ ] Congestive heart failure (in the previous month)  (1 Point)      [ ] Minor surgery                                                  (1 Point)   [ ] Inflammatory bowel disease                             (1 Point)               [ ] Arthroscopic surgery                                        (2 Points)  [ ] Central venous access                                      (2 Points)                [ ] General surgery lasting more than 45 minutes (2 points)  [ ] Malignancy- Present or previous                   (2 Points)                [x ] Elective arthroplasty                                         (5 points)    [ ] Stroke (in the previous month)                          (5 Points)                                                                                                                                                           HEMATOLOGY RELATED FACTORS                                                 TRAUMA RELATED RISK FACTORS  [ ] Prior episodes of VTE                                     (3 Points)                [ ] Fracture of the hip, pelvis, or leg                       (5 Points)  [ ] Positive family history for VTE                         (3 Points)             [ ] Acute spinal cord injury (in the previous month)  (5 Points)  [ ] Prothrombin 53433 A                                     (3 Points)               [ ] Paralysis  (less than 1 month)                             (5 Points)  [ ] Factor V Leiden                                             (3 Points)                  [ ] Multiple Trauma within 1 month                        (5 Points)  [ ] Lupus anticoagulants                                     (3 Points)                                                           [ ] Anticardiolipin antibodies                               (3 Points)                                                       [ ] High homocysteine in the blood                      (3 Points)                                             [ ] Other congenital or acquired thrombophilia      (3 Points)                                                [ ] Heparin induced thrombocytopenia                  (3 Points)                                     Total Score [     7     ] 59 yo M PMH of HTN, MARIBEL (does not use CPAP), BPH, anxiety and depression, ETOH and recreational drug use (states he quit in 2024), presents with c/o severe left knee osteoarthritis. States that his left knee pain is constant and is significantly limiting his ADLs. Patient states that his left knee occasionally leonela or gives out on him. Currently resides in [facility] living [facility]. Takes Suboxone, states he is currently in the process of weaning. Reports Hx of left knee fracture in the past with arthroscopic repair. 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 Denies any recent fevers, chills, injuries, numbness or tingling in the lower extremities. 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 Preop assessment prior to left complex TKR with MARY antonio/Dr Canada scheduled for 2024, pending medical clearance    Patient was given information on joint class, joint book provided, ERP protocol reviewed with patient, MSSA/MRSA swabbed in PST, results pending     Patient was educated on preop preparation with written and verbal instructions. Pt was informed to obtain clearances >3 days before surgery. Pt was advised to follow his prescribing doctor for preop instructions on Suboxone. Pt was educated on NSAIDs, multivitamins and herbals that increase the risk of bleeding and need to be stopped 7 days before procedure. Pt verbalized understanding of the above.     OPIOID RISK TOOL    GABE EACH BOX THAT APPLIES AND ADD TOTALS AT THE END    FAMILY HISTORY OF SUBSTANCE ABUSE                 FEMALE         MALE                                                Alcohol                             [  ]1 pt          [  ]3pts                                               Illegal Drugs                     [  ]2 pts        [  ]3pts                                               Rx Drugs                           [  ]4 pts        [  ]4 pts    PERSONAL HISTORY OF SUBSTANCE ABUSE                                                                                          Alcohol                             [  ]3 pts       [ x ]3 pts                                               Illegal Drugs                     [  ]4 pts        [ x ]4 pts                                               Rx Drugs                           [  ]5 pts        [  ]5 pts    AGE BETWEEN 16-45 YEARS                                      [  ]1 pt         [  ]1 pt    HISTORY OF PREADOLESCENT   SEXUAL ABUSE                                                             [  ]3 pts        [  ]0pts    PSYCHOLOGICAL DISEASE                     ADD, OCD, Bipolar, Schizophrenia        [  ]2 pts         [  ]2 pts                      Depression                                               [  ]1 pt           [  x]1 pt           SCORING TOTAL   (add numbers and type here)              (  8  )                                     A score of 3 or lower indicated LOW risk for future opioid abuse  A score of 4 to 7 indicated moderate risk for future opioid abuse  A score of 8 or higher indicates a high risk for opioid abuse    CAPRINI VTE 2.0 SCORE [CLOT updated 2019]    AGE RELATED RISK FACTORS                                                       MOBILITY RELATED FACTORS  [x ] Age 41-60 years                                            (1 Point)                    [ ] Bed rest                                                        (1 Point)  [ ] Age: 61-74 years                                           (2 Points)                  [ ] Plaster cast                                                   (2 Points)  [ ] Age= 75 years                                              (3 Points)                    [ ] Bed bound for more than 72 hours                 (2 Points)    DISEASE RELATED RISK FACTORS                                               GENDER SPECIFIC FACTORS  [ ] Edema in the lower extremities                       (1 Point)              [ ] Pregnancy                                                     (1 Point)  [ ] Varicose veins                                               (1 Point)                     [ ] Post-partum < 6 weeks                                   (1 Point)             [x ] BMI > 25 Kg/m2                                            (1 Point)                     [ ] Hormonal therapy  or oral contraception          (1 Point)                 [ ] Sepsis (in the previous month)                        (1 Point)               [ ] History of pregnancy complications                 (1 point)  [ ] Pneumonia or serious lung disease                                               [ ] Unexplained or recurrent                     (1 Point)           (in the previous month)                               (1 Point)  [ ] Abnormal pulmonary function test                     (1 Point)                 SURGERY RELATED RISK FACTORS  [ ] Acute myocardial infarction                              (1 Point)               [ ]  Section                                             (1 Point)  [ ] Congestive heart failure (in the previous month)  (1 Point)      [ ] Minor surgery                                                  (1 Point)   [ ] Inflammatory bowel disease                             (1 Point)               [ ] Arthroscopic surgery                                        (2 Points)  [ ] Central venous access                                      (2 Points)                [ ] General surgery lasting more than 45 minutes (2 points)  [ ] Malignancy- Present or previous                   (2 Points)                [x ] Elective arthroplasty                                         (5 points)    [ ] Stroke (in the previous month)                          (5 Points)                                                                                                                                                           HEMATOLOGY RELATED FACTORS                                                 TRAUMA RELATED RISK FACTORS  [ ] Prior episodes of VTE                                     (3 Points)                [ ] Fracture of the hip, pelvis, or leg                       (5 Points)  [ ] Positive family history for VTE                         (3 Points)             [ ] Acute spinal cord injury (in the previous month)  (5 Points)  [ ] Prothrombin 53163 A                                     (3 Points)               [ ] Paralysis  (less than 1 month)                             (5 Points)  [ ] Factor V Leiden                                             (3 Points)                  [ ] Multiple Trauma within 1 month                        (5 Points)  [ ] Lupus anticoagulants                                     (3 Points)                                                           [ ] Anticardiolipin antibodies                               (3 Points)                                                       [ ] High homocysteine in the blood                      (3 Points)                                             [ ] Other congenital or acquired thrombophilia      (3 Points)                                                [ ] Heparin induced thrombocytopenia                  (3 Points)                                     Total Score [     7     ] 59 yo M PMH of HTN, MARIBEL (does not use CPAP), BPH, anxiety and depression, ETOH and recreational drug use (states last used in 2024), presents with c/o severe left knee osteoarthritis. States that his left knee pain is constant and is significantly limiting his ADLs. Patient states that his left knee occasionally leonela or gives out on him. Currently resides in [facility] living [facility]. Takes Suboxone, states he is currently in the process of weaning. Reports Hx of left knee fracture in the past with arthroscopic repair. 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 Denies any recent fevers, chills, injuries, numbness or tingling in the lower extremities. 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 Preop assessment prior to left complex TKR with MARY antonio/Dr Canada scheduled for 2024, pending medical clearance    Patient was given information on joint class, joint book provided, ERP protocol reviewed with patient, MSSA/MRSA swabbed in PST, results pending     Patient was educated on preop preparation with written and verbal instructions. Pt was informed to obtain clearances >3 days before surgery. Pt was advised to follow his prescribing doctor for preop instructions on Suboxone. Pt was educated on NSAIDs, multivitamins and herbals that increase the risk of bleeding and need to be stopped 7 days before procedure. Pt verbalized understanding of the above.     OPIOID RISK TOOL    GABE EACH BOX THAT APPLIES AND ADD TOTALS AT THE END    FAMILY HISTORY OF SUBSTANCE ABUSE                 FEMALE         MALE                                                Alcohol                             [  ]1 pt          [  ]3pts                                               Illegal Drugs                     [  ]2 pts        [  ]3pts                                               Rx Drugs                           [  ]4 pts        [  ]4 pts    PERSONAL HISTORY OF SUBSTANCE ABUSE                                                                                          Alcohol                             [  ]3 pts       [ x ]3 pts                                               Illegal Drugs                     [  ]4 pts        [ x ]4 pts                                               Rx Drugs                           [  ]5 pts        [  ]5 pts    AGE BETWEEN 16-45 YEARS                                      [  ]1 pt         [  ]1 pt    HISTORY OF PREADOLESCENT   SEXUAL ABUSE                                                             [  ]3 pts        [  ]0pts    PSYCHOLOGICAL DISEASE                     ADD, OCD, Bipolar, Schizophrenia        [  ]2 pts         [  ]2 pts                      Depression                                               [  ]1 pt           [  x]1 pt           SCORING TOTAL   (add numbers and type here)              (  8  )                                     A score of 3 or lower indicated LOW risk for future opioid abuse  A score of 4 to 7 indicated moderate risk for future opioid abuse  A score of 8 or higher indicates a high risk for opioid abuse    CAPRINI VTE 2.0 SCORE [CLOT updated 2019]    AGE RELATED RISK FACTORS                                                       MOBILITY RELATED FACTORS  [x ] Age 41-60 years                                            (1 Point)                    [ ] Bed rest                                                        (1 Point)  [ ] Age: 61-74 years                                           (2 Points)                  [ ] Plaster cast                                                   (2 Points)  [ ] Age= 75 years                                              (3 Points)                    [ ] Bed bound for more than 72 hours                 (2 Points)    DISEASE RELATED RISK FACTORS                                               GENDER SPECIFIC FACTORS  [ ] Edema in the lower extremities                       (1 Point)              [ ] Pregnancy                                                     (1 Point)  [ ] Varicose veins                                               (1 Point)                     [ ] Post-partum < 6 weeks                                   (1 Point)             [x ] BMI > 25 Kg/m2                                            (1 Point)                     [ ] Hormonal therapy  or oral contraception          (1 Point)                 [ ] Sepsis (in the previous month)                        (1 Point)               [ ] History of pregnancy complications                 (1 point)  [ ] Pneumonia or serious lung disease                                               [ ] Unexplained or recurrent                     (1 Point)           (in the previous month)                               (1 Point)  [ ] Abnormal pulmonary function test                     (1 Point)                 SURGERY RELATED RISK FACTORS  [ ] Acute myocardial infarction                              (1 Point)               [ ]  Section                                             (1 Point)  [ ] Congestive heart failure (in the previous month)  (1 Point)      [ ] Minor surgery                                                  (1 Point)   [ ] Inflammatory bowel disease                             (1 Point)               [ ] Arthroscopic surgery                                        (2 Points)  [ ] Central venous access                                      (2 Points)                [ ] General surgery lasting more than 45 minutes (2 points)  [ ] Malignancy- Present or previous                   (2 Points)                [x ] Elective arthroplasty                                         (5 points)    [ ] Stroke (in the previous month)                          (5 Points)                                                                                                                                                           HEMATOLOGY RELATED FACTORS                                                 TRAUMA RELATED RISK FACTORS  [ ] Prior episodes of VTE                                     (3 Points)                [ ] Fracture of the hip, pelvis, or leg                       (5 Points)  [ ] Positive family history for VTE                         (3 Points)             [ ] Acute spinal cord injury (in the previous month)  (5 Points)  [ ] Prothrombin 09055 A                                     (3 Points)               [ ] Paralysis  (less than 1 month)                             (5 Points)  [ ] Factor V Leiden                                             (3 Points)                  [ ] Multiple Trauma within 1 month                        (5 Points)  [ ] Lupus anticoagulants                                     (3 Points)                                                           [ ] Anticardiolipin antibodies                               (3 Points)                                                       [ ] High homocysteine in the blood                      (3 Points)                                             [ ] Other congenital or acquired thrombophilia      (3 Points)                                                [ ] Heparin induced thrombocytopenia                  (3 Points)                                     Total Score [     7     ] 57 yo M PMH of HTN, HLD, MARIBEL (does not use CPAP), BPH, anxiety and depression, ETOH and recreational drug use (states last used in 2024), presents with c/o severe left knee osteoarthritis. States that his left knee pain is constant and is significantly limiting his ADLs. Patient states that his left knee occasionally leonela or gives out on him. Currently resides in [facility] living [facility]. Takes Suboxone, states he is currently in the process of weaning. Reports Hx of left knee fracture in the past with arthroscopic repair. 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 Denies any recent fevers, chills, injuries, numbness or tingling in the lower extremities. 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 Preop assessment prior to left complex TKR with MARY antonio/Dr Canada scheduled for 2024, pending medical clearance    Patient was given information on joint class, joint book provided, ERP protocol reviewed with patient, MSSA/MRSA swabbed in PST, results pending     Patient was educated on preop preparation with written and verbal instructions. Pt was informed to obtain clearances >3 days before surgery. Pt was advised to follow his prescribing doctor for preop instructions on Suboxone. Pt was educated on NSAIDs, multivitamins and herbals that increase the risk of bleeding and need to be stopped 7 days before procedure. Pt verbalized understanding of the above.     OPIOID RISK TOOL    GABE EACH BOX THAT APPLIES AND ADD TOTALS AT THE END    FAMILY HISTORY OF SUBSTANCE ABUSE                 FEMALE         MALE                                                Alcohol                             [  ]1 pt          [  ]3pts                                               Illegal Drugs                     [  ]2 pts        [  ]3pts                                               Rx Drugs                           [  ]4 pts        [  ]4 pts    PERSONAL HISTORY OF SUBSTANCE ABUSE                                                                                          Alcohol                             [  ]3 pts       [ x ]3 pts                                               Illegal Drugs                     [  ]4 pts        [ x ]4 pts                                               Rx Drugs                           [  ]5 pts        [  ]5 pts    AGE BETWEEN 16-45 YEARS                                      [  ]1 pt         [  ]1 pt    HISTORY OF PREADOLESCENT   SEXUAL ABUSE                                                             [  ]3 pts        [  ]0pts    PSYCHOLOGICAL DISEASE                     ADD, OCD, Bipolar, Schizophrenia        [  ]2 pts         [  ]2 pts                      Depression                                               [  ]1 pt           [  x]1 pt           SCORING TOTAL   (add numbers and type here)              (  8  )                                     A score of 3 or lower indicated LOW risk for future opioid abuse  A score of 4 to 7 indicated moderate risk for future opioid abuse  A score of 8 or higher indicates a high risk for opioid abuse    CAPRINI VTE 2.0 SCORE [CLOT updated 2019]    AGE RELATED RISK FACTORS                                                       MOBILITY RELATED FACTORS  [x ] Age 41-60 years                                            (1 Point)                    [ ] Bed rest                                                        (1 Point)  [ ] Age: 61-74 years                                           (2 Points)                  [ ] Plaster cast                                                   (2 Points)  [ ] Age= 75 years                                              (3 Points)                    [ ] Bed bound for more than 72 hours                 (2 Points)    DISEASE RELATED RISK FACTORS                                               GENDER SPECIFIC FACTORS  [ ] Edema in the lower extremities                       (1 Point)              [ ] Pregnancy                                                     (1 Point)  [ ] Varicose veins                                               (1 Point)                     [ ] Post-partum < 6 weeks                                   (1 Point)             [x ] BMI > 25 Kg/m2                                            (1 Point)                     [ ] Hormonal therapy  or oral contraception          (1 Point)                 [ ] Sepsis (in the previous month)                        (1 Point)               [ ] History of pregnancy complications                 (1 point)  [ ] Pneumonia or serious lung disease                                               [ ] Unexplained or recurrent                     (1 Point)           (in the previous month)                               (1 Point)  [ ] Abnormal pulmonary function test                     (1 Point)                 SURGERY RELATED RISK FACTORS  [ ] Acute myocardial infarction                              (1 Point)               [ ]  Section                                             (1 Point)  [ ] Congestive heart failure (in the previous month)  (1 Point)      [ ] Minor surgery                                                  (1 Point)   [ ] Inflammatory bowel disease                             (1 Point)               [ ] Arthroscopic surgery                                        (2 Points)  [ ] Central venous access                                      (2 Points)                [ ] General surgery lasting more than 45 minutes (2 points)  [ ] Malignancy- Present or previous                   (2 Points)                [x ] Elective arthroplasty                                         (5 points)    [ ] Stroke (in the previous month)                          (5 Points)                                                                                                                                                           HEMATOLOGY RELATED FACTORS                                                 TRAUMA RELATED RISK FACTORS  [ ] Prior episodes of VTE                                     (3 Points)                [ ] Fracture of the hip, pelvis, or leg                       (5 Points)  [ ] Positive family history for VTE                         (3 Points)             [ ] Acute spinal cord injury (in the previous month)  (5 Points)  [ ] Prothrombin 69157 A                                     (3 Points)               [ ] Paralysis  (less than 1 month)                             (5 Points)  [ ] Factor V Leiden                                             (3 Points)                  [ ] Multiple Trauma within 1 month                        (5 Points)  [ ] Lupus anticoagulants                                     (3 Points)                                                           [ ] Anticardiolipin antibodies                               (3 Points)                                                       [ ] High homocysteine in the blood                      (3 Points)                                             [ ] Other congenital or acquired thrombophilia      (3 Points)                                                [ ] Heparin induced thrombocytopenia                  (3 Points)                                     Total Score [     7     ]

## 2024-09-12 ENCOUNTER — TRANSCRIPTION ENCOUNTER (OUTPATIENT)
Age: 59
End: 2024-09-12

## 2024-09-13 ENCOUNTER — INPATIENT (INPATIENT)
Facility: HOSPITAL | Age: 59
LOS: 2 days | Discharge: NURSING, REHAB OR LONG-TERM CARE | DRG: 470 | End: 2024-09-16
Attending: STUDENT IN AN ORGANIZED HEALTH CARE EDUCATION/TRAINING PROGRAM | Admitting: STUDENT IN AN ORGANIZED HEALTH CARE EDUCATION/TRAINING PROGRAM
Payer: COMMERCIAL

## 2024-09-13 ENCOUNTER — APPOINTMENT (OUTPATIENT)
Dept: ORTHOPEDIC SURGERY | Facility: HOSPITAL | Age: 59
End: 2024-09-13

## 2024-09-13 ENCOUNTER — TRANSCRIPTION ENCOUNTER (OUTPATIENT)
Age: 59
End: 2024-09-13

## 2024-09-13 VITALS
HEIGHT: 68 IN | SYSTOLIC BLOOD PRESSURE: 142 MMHG | OXYGEN SATURATION: 96 % | RESPIRATION RATE: 16 BRPM | TEMPERATURE: 97 F | DIASTOLIC BLOOD PRESSURE: 68 MMHG | HEART RATE: 75 BPM | WEIGHT: 244.49 LBS

## 2024-09-13 DIAGNOSIS — M17.12 UNILATERAL PRIMARY OSTEOARTHRITIS, LEFT KNEE: ICD-10-CM

## 2024-09-13 DIAGNOSIS — S83.512A SPRAIN OF ANTERIOR CRUCIATE LIGAMENT OF LEFT KNEE, INITIAL ENCOUNTER: Chronic | ICD-10-CM

## 2024-09-13 DIAGNOSIS — E11.9 TYPE 2 DIABETES MELLITUS WITHOUT COMPLICATIONS: ICD-10-CM

## 2024-09-13 DIAGNOSIS — Z96.651 PRESENCE OF RIGHT ARTIFICIAL KNEE JOINT: Chronic | ICD-10-CM

## 2024-09-13 LAB
AMPHET UR-MCNC: NEGATIVE — SIGNIFICANT CHANGE UP
BARBITURATES UR SCN-MCNC: NEGATIVE — SIGNIFICANT CHANGE UP
BENZODIAZ UR-MCNC: NEGATIVE — SIGNIFICANT CHANGE UP
COCAINE METAB.OTHER UR-MCNC: NEGATIVE — SIGNIFICANT CHANGE UP
ETHANOL SERPL-MCNC: <10 MG/DL — SIGNIFICANT CHANGE UP (ref 0–9)
FENTANYL UR QL SCN: NEGATIVE — SIGNIFICANT CHANGE UP
GLUCOSE BLDC GLUCOMTR-MCNC: 143 MG/DL — HIGH (ref 70–99)
GLUCOSE BLDC GLUCOMTR-MCNC: 163 MG/DL — HIGH (ref 70–99)
METHADONE UR-MCNC: NEGATIVE — SIGNIFICANT CHANGE UP
OPIATES UR-MCNC: NEGATIVE — SIGNIFICANT CHANGE UP
PCP SPEC-MCNC: SIGNIFICANT CHANGE UP
PCP UR-MCNC: NEGATIVE — SIGNIFICANT CHANGE UP
THC UR QL: NEGATIVE — SIGNIFICANT CHANGE UP

## 2024-09-13 PROCEDURE — 27447 TOTAL KNEE ARTHROPLASTY: CPT | Mod: AS,22,LT

## 2024-09-13 PROCEDURE — 27447 TOTAL KNEE ARTHROPLASTY: CPT | Mod: 22,LT

## 2024-09-13 PROCEDURE — 73560 X-RAY EXAM OF KNEE 1 OR 2: CPT | Mod: 26,LT

## 2024-09-13 PROCEDURE — 20985 CPTR-ASST DIR MS PX: CPT

## 2024-09-13 DEVICE — MAKO BONE PIN 4MM X 140MM: Type: IMPLANTABLE DEVICE | Site: LEFT | Status: FUNCTIONAL

## 2024-09-13 DEVICE — CEMENT PALACOS R: Type: IMPLANTABLE DEVICE | Site: LEFT | Status: FUNCTIONAL

## 2024-09-13 DEVICE — MAKO BONE PIN 4MM X 80MM: Type: IMPLANTABLE DEVICE | Site: LEFT | Status: FUNCTIONAL

## 2024-09-13 DEVICE — BASEPLATE TIB UNIV TRIATHLON SZ 5: Type: IMPLANTABLE DEVICE | Site: LEFT | Status: FUNCTIONAL

## 2024-09-13 DEVICE — INSERT TIB BEARING CS X3 SZ 5 10MM: Type: IMPLANTABLE DEVICE | Site: LEFT | Status: FUNCTIONAL

## 2024-09-13 DEVICE — IMP PATELLA ASYMMETRIC X3 35X10MM: Type: IMPLANTABLE DEVICE | Site: LEFT | Status: FUNCTIONAL

## 2024-09-13 DEVICE — ZIMMER FEMALE HEX SCREW MAGNETIC 2.5MM X 25MM: Type: IMPLANTABLE DEVICE | Site: LEFT | Status: FUNCTIONAL

## 2024-09-13 DEVICE — COMP FEM TRIATHLON CR SZ 4 LT: Type: IMPLANTABLE DEVICE | Site: LEFT | Status: FUNCTIONAL

## 2024-09-13 RX ORDER — APREPITANT 40 MG/1
40 CAPSULE ORAL ONCE
Refills: 0 | Status: COMPLETED | OUTPATIENT
Start: 2024-09-13 | End: 2024-09-13

## 2024-09-13 RX ORDER — BUSPIRONE HYDROCHLORIDE 30 MG/1
10 TABLET ORAL
Refills: 0 | Status: DISCONTINUED | OUTPATIENT
Start: 2024-09-13 | End: 2024-09-16

## 2024-09-13 RX ORDER — ACETAMINOPHEN 325 MG/1
975 TABLET ORAL ONCE
Refills: 0 | Status: COMPLETED | OUTPATIENT
Start: 2024-09-13 | End: 2024-09-13

## 2024-09-13 RX ORDER — OXYCODONE HYDROCHLORIDE 5 MG/1
5 TABLET ORAL
Refills: 0 | Status: DISCONTINUED | OUTPATIENT
Start: 2024-09-13 | End: 2024-09-16

## 2024-09-13 RX ORDER — KETOROLAC TROMETHAMINE 30 MG/ML
15 INJECTION, SOLUTION INTRAMUSCULAR EVERY 6 HOURS
Refills: 0 | Status: DISCONTINUED | OUTPATIENT
Start: 2024-09-13 | End: 2024-09-14

## 2024-09-13 RX ORDER — OXYCODONE HYDROCHLORIDE 5 MG/1
10 TABLET ORAL
Refills: 0 | Status: DISCONTINUED | OUTPATIENT
Start: 2024-09-13 | End: 2024-09-16

## 2024-09-13 RX ORDER — TAMSULOSIN HYDROCHLORIDE 0.4 MG/1
0.4 CAPSULE ORAL AT BEDTIME
Refills: 0 | Status: DISCONTINUED | OUTPATIENT
Start: 2024-09-13 | End: 2024-09-16

## 2024-09-13 RX ORDER — MAGNESIUM, ALUMINUM HYDROXIDE 200-225/5
30 SUSPENSION, ORAL (FINAL DOSE FORM) ORAL
Refills: 0 | Status: DISCONTINUED | OUTPATIENT
Start: 2024-09-13 | End: 2024-09-16

## 2024-09-13 RX ORDER — CEFAZOLIN SODIUM 2 G/100ML
2000 INJECTION, SOLUTION INTRAVENOUS
Refills: 0 | Status: COMPLETED | OUTPATIENT
Start: 2024-09-13 | End: 2024-09-14

## 2024-09-13 RX ORDER — ASPIRIN 81 MG
81 TABLET, DELAYED RELEASE (ENTERIC COATED) ORAL
Refills: 0 | Status: DISCONTINUED | OUTPATIENT
Start: 2024-09-13 | End: 2024-09-16

## 2024-09-13 RX ORDER — SODIUM CHLORIDE 9 MG/ML
500 INJECTION INTRAMUSCULAR; INTRAVENOUS; SUBCUTANEOUS ONCE
Refills: 0 | Status: COMPLETED | OUTPATIENT
Start: 2024-09-13 | End: 2024-09-13

## 2024-09-13 RX ORDER — CEFAZOLIN SODIUM 2 G/100ML
2000 INJECTION, SOLUTION INTRAVENOUS ONCE
Refills: 0 | Status: DISCONTINUED | OUTPATIENT
Start: 2024-09-13 | End: 2024-09-13

## 2024-09-13 RX ORDER — ONDANSETRON 2 MG/ML
4 INJECTION, SOLUTION INTRAMUSCULAR; INTRAVENOUS EVERY 6 HOURS
Refills: 0 | Status: DISCONTINUED | OUTPATIENT
Start: 2024-09-13 | End: 2024-09-16

## 2024-09-13 RX ORDER — SENNA 187 MG
2 TABLET ORAL AT BEDTIME
Refills: 0 | Status: DISCONTINUED | OUTPATIENT
Start: 2024-09-13 | End: 2024-09-16

## 2024-09-13 RX ORDER — HYDROMORPHONE HYDROCHLORIDE 2 MG/1
0.5 TABLET ORAL
Refills: 0 | Status: DISCONTINUED | OUTPATIENT
Start: 2024-09-13 | End: 2024-09-16

## 2024-09-13 RX ORDER — PANTOPRAZOLE SODIUM 40 MG
40 TABLET, DELAYED RELEASE (ENTERIC COATED) ORAL
Refills: 0 | Status: DISCONTINUED | OUTPATIENT
Start: 2024-09-13 | End: 2024-09-16

## 2024-09-13 RX ORDER — SODIUM CHLORIDE 9 MG/ML
1000 INJECTION INTRAMUSCULAR; INTRAVENOUS; SUBCUTANEOUS
Refills: 0 | Status: DISCONTINUED | OUTPATIENT
Start: 2024-09-13 | End: 2024-09-16

## 2024-09-13 RX ORDER — ACETAMINOPHEN 325 MG/1
1000 TABLET ORAL ONCE
Refills: 0 | Status: COMPLETED | OUTPATIENT
Start: 2024-09-13 | End: 2024-09-14

## 2024-09-13 RX ORDER — CELECOXIB 400 MG/1
200 CAPSULE ORAL EVERY 12 HOURS
Refills: 0 | Status: DISCONTINUED | OUTPATIENT
Start: 2024-09-15 | End: 2024-09-16

## 2024-09-13 RX ORDER — BUPRENORPHINE AND NALOXONE 8; 2 MG/1; MG/1
1 FILM BUCCAL; SUBLINGUAL
Refills: 0 | Status: DISCONTINUED | OUTPATIENT
Start: 2024-09-13 | End: 2024-09-13

## 2024-09-13 RX ORDER — ESCITALOPRAM OXALATE 10 MG/1
20 TABLET ORAL DAILY
Refills: 0 | Status: DISCONTINUED | OUTPATIENT
Start: 2024-09-13 | End: 2024-09-16

## 2024-09-13 RX ORDER — ONDANSETRON 2 MG/ML
4 INJECTION, SOLUTION INTRAMUSCULAR; INTRAVENOUS ONCE
Refills: 0 | Status: DISCONTINUED | OUTPATIENT
Start: 2024-09-13 | End: 2024-09-13

## 2024-09-13 RX ORDER — POVIDONE-IODINE 10 %
1 SOLUTION, NON-ORAL TOPICAL ONCE
Refills: 0 | Status: COMPLETED | OUTPATIENT
Start: 2024-09-13 | End: 2024-09-13

## 2024-09-13 RX ORDER — HYDROMORPHONE HYDROCHLORIDE 2 MG/1
4 TABLET ORAL
Refills: 0 | Status: DISCONTINUED | OUTPATIENT
Start: 2024-09-13 | End: 2024-09-16

## 2024-09-13 RX ORDER — KETOROLAC TROMETHAMINE 30 MG/ML
15 INJECTION, SOLUTION INTRAMUSCULAR ONCE
Refills: 0 | Status: DISCONTINUED | OUTPATIENT
Start: 2024-09-13 | End: 2024-09-13

## 2024-09-13 RX ORDER — CLONIDINE HCL 0.2 MG
0.1 TABLET ORAL
Refills: 0 | Status: DISCONTINUED | OUTPATIENT
Start: 2024-09-14 | End: 2024-09-16

## 2024-09-13 RX ORDER — FENTANYL CITRATE 50 UG/ML
50 INJECTION INTRAMUSCULAR; INTRAVENOUS
Refills: 0 | Status: DISCONTINUED | OUTPATIENT
Start: 2024-09-13 | End: 2024-09-13

## 2024-09-13 RX ORDER — ACETAMINOPHEN 325 MG/1
975 TABLET ORAL EVERY 8 HOURS
Refills: 0 | Status: DISCONTINUED | OUTPATIENT
Start: 2024-09-13 | End: 2024-09-16

## 2024-09-13 RX ADMIN — Medication 1 APPLICATION(S): at 10:05

## 2024-09-13 RX ADMIN — OXYCODONE HYDROCHLORIDE 5 MILLIGRAM(S): 5 TABLET ORAL at 22:11

## 2024-09-13 RX ADMIN — TAMSULOSIN HYDROCHLORIDE 0.4 MILLIGRAM(S): 0.4 CAPSULE ORAL at 21:12

## 2024-09-13 RX ADMIN — ACETAMINOPHEN 975 MILLIGRAM(S): 325 TABLET ORAL at 21:12

## 2024-09-13 RX ADMIN — FENTANYL CITRATE 50 MICROGRAM(S): 50 INJECTION INTRAMUSCULAR; INTRAVENOUS at 17:39

## 2024-09-13 RX ADMIN — Medication 2 TABLET(S): at 21:11

## 2024-09-13 RX ADMIN — HYDROMORPHONE HYDROCHLORIDE 0.5 MILLIGRAM(S): 2 TABLET ORAL at 18:11

## 2024-09-13 RX ADMIN — CEFAZOLIN SODIUM 2000 MILLIGRAM(S): 2 INJECTION, SOLUTION INTRAVENOUS at 21:11

## 2024-09-13 RX ADMIN — BUSPIRONE HYDROCHLORIDE 10 MILLIGRAM(S): 30 TABLET ORAL at 22:45

## 2024-09-13 RX ADMIN — SODIUM CHLORIDE 150 MILLILITER(S): 9 INJECTION INTRAMUSCULAR; INTRAVENOUS; SUBCUTANEOUS at 21:15

## 2024-09-13 RX ADMIN — ACETAMINOPHEN 975 MILLIGRAM(S): 325 TABLET ORAL at 10:26

## 2024-09-13 RX ADMIN — SODIUM CHLORIDE 500 MILLILITER(S): 9 INJECTION INTRAMUSCULAR; INTRAVENOUS; SUBCUTANEOUS at 18:20

## 2024-09-13 RX ADMIN — OXYCODONE HYDROCHLORIDE 10 MILLIGRAM(S): 5 TABLET ORAL at 22:11

## 2024-09-13 RX ADMIN — HYDROMORPHONE HYDROCHLORIDE 0.5 MILLIGRAM(S): 2 TABLET ORAL at 17:47

## 2024-09-13 RX ADMIN — HYDROMORPHONE HYDROCHLORIDE 0.5 MILLIGRAM(S): 2 TABLET ORAL at 18:30

## 2024-09-13 RX ADMIN — APREPITANT 40 MILLIGRAM(S): 40 CAPSULE ORAL at 10:26

## 2024-09-13 RX ADMIN — OXYCODONE HYDROCHLORIDE 10 MILLIGRAM(S): 5 TABLET ORAL at 21:11

## 2024-09-13 RX ADMIN — Medication 40 MILLIGRAM(S): at 21:11

## 2024-09-13 RX ADMIN — SODIUM CHLORIDE 3 MILLILITER(S): 9 INJECTION INTRAMUSCULAR; INTRAVENOUS; SUBCUTANEOUS at 22:06

## 2024-09-13 RX ADMIN — ACETAMINOPHEN 975 MILLIGRAM(S): 325 TABLET ORAL at 22:12

## 2024-09-13 RX ADMIN — OXYCODONE HYDROCHLORIDE 5 MILLIGRAM(S): 5 TABLET ORAL at 21:11

## 2024-09-13 NOTE — DISCHARGE NOTE PROVIDER - NSDCACTIVITY_GEN_ALL_CORE
Do not drive or operate machinery/Do not make important decisions/No heavy lifting/straining Do not drive or operate machinery/Do not make important decisions/No heavy lifting/straining/Follow Instructions Provided by your Surgical Team

## 2024-09-13 NOTE — DISCHARGE NOTE PROVIDER - HOSPITAL COURSE
The patient underwent a LEFT TOTAL KNEE REPLACEMENT on 9/13. The patient received antibiotics consistent with SCIP guidelines. The patient underwent the procedure and had no intra-operative complications. Post-operatively, the patient was seen by medicine and PT. The patient received ASPIRIN for DVTP. The patient received pain medications per orthopedic pain management pathway and the pain was appropriately controlled. The patient did not have any post-operative medical complications. The patient was discharged in stable condition.

## 2024-09-13 NOTE — PHYSICAL THERAPY INITIAL EVALUATION ADULT - ADDITIONAL COMMENTS
Pt reports living at [place] with 0 [address]ICA +elevator. Pt amb with rollator and has access to w/c if needed. Pt reports independent with functional mobility and ADLs. Pt has assist from facility for IADLs. Pt owns rollator.

## 2024-09-13 NOTE — DISCHARGE NOTE PROVIDER - NSDCFUADDINST_GEN_ALL_CORE_FT
The patient will be seen in the office between 2-3 weeks for wound check.   **Your first post-operative visit has been scheduled prior to your admission. PLEASE CONTACT OFFICE TO CONFIRM THE APPOINTMENT DATE.   **  The silver based dressing is to be removed 7 days from the date of surgery.   ** CONTACT THE OFFICE IF THE FOLLOWING DEVELOP:  - the dressing becomes soiled or saturated  - you develop a fever greater that 101F  - the wound becomes red or you develop blistering around the wound  * Patient may shower after post-op day #3.   * The patient will continue home PT consistent with  total knee replacement protocol. Transition to outpatient PT will occur at the time of the first office visit.   * The patient will practice knee extension exercises regularly to minimize hamstring contraction.   * The patient is FULL weight bearing.  * The patient will continue ASPIRIN for 6 weeks after surgery for blood clot prevention.  * While on aspirin, the patient will take daily omeprazole or other similar medication to protect the stomach from irritation.   * The patient will take OXYCODONE for pain control and adjust according to prescription and patient needs. The patient will also take TYLENOL 975 mg every 8 hours for pain. Contact the office if pain increases while taking prescribed pain medications or related concerns develop.  * Celebrex will be taken twice daily for 3 weeks for pain control and prevention of excessive bone growth. Additional prescription may be requested at your office follow-up visit.   * The patient will take Senna S while taking oxycodone to prevent narcotic associated constipation.  Additionally, increase water intake (drink at least 8 glasses of water daily) and try adding fiber to the diet by eating fruits, vegetables and foods that are rich in grains. If constipation is experienced, contact the medical/primary care provider to discuss further treatment options.  * To avoid injury at home:  - continue use of rolling walker until cleared by physical therapist  - have family or friend remove all throw rug or objects in hallways that may present a trip hazard.  - if you experience any dizziness or medical concerns, call your medical doctor or 911.  * The implant may activate metal detection devices.  The patient will be seen in the office between 2-3 weeks for wound check.   **Your first post-operative visit has been scheduled prior to your admission. PLEASE CONTACT OFFICE TO CONFIRM THE APPOINTMENT DATE.   **  The silver based dressing is to be removed 7 days from the date of surgery.   ** CONTACT THE OFFICE IF THE FOLLOWING DEVELOP:  - the dressing becomes soiled or saturated  - you develop a fever greater that 101F  - the wound becomes red or you develop blistering around the wound  * Patient may shower after post-op day #3.   * The patient will continue home PT consistent with  total knee replacement protocol. Transition to outpatient PT will occur at the time of the first office visit.   * The patient will practice knee extension exercises regularly to minimize hamstring contraction.   * The patient is FULL weight bearing.  * The patient will continue ASPIRIN for 6 weeks after surgery for blood clot prevention.  * While on aspirin, the patient will take daily omeprazole or other similar medication to protect the stomach from irritation.   * The patient will take OXYCODONE for pain control and adjust according to prescription and patient needs. The patient will also take TYLENOL 975 mg every 8 hours for pain. Contact the office if pain increases while taking prescribed pain medications or related concerns develop.  * Celebrex will be taken twice daily for 3 weeks for pain control and prevention of excessive bone growth. Additional prescription may be requested at your office follow-up visit.   * The patient will take Senna S while taking oxycodone to prevent narcotic associated constipation.  Additionally, increase water intake (drink at least 8 glasses of water daily) and try adding fiber to the diet by eating fruits, vegetables and foods that are rich in grains. If constipation is experienced, contact the medical/primary care provider to discuss further treatment options.  * To avoid injury at home:  - continue use of rolling walker until cleared by physical therapist  - have family or friend remove all throw rug or objects in hallways that may present a trip hazard.  - if you experience any dizziness or medical concerns, call your medical doctor or 911.  * The implant may activate metal detection devices.   You were found to have Hyperglycemia during your stay.  Your diabetes may need closer control and improvement.  Please follow up with your PMD/Endocrinologist upon discharge.  Please follow up [facility] Endocrinology upon discharge.   The patient will be seen in the office between 2-3 weeks for wound check.   **Your first post-operative visit has been scheduled prior to your admission. PLEASE CONTACT OFFICE TO CONFIRM THE APPOINTMENT DATE.   **  The silver based dressing is to be removed 7 days from the date of surgery.   ** CONTACT THE OFFICE IF THE FOLLOWING DEVELOP:  - the dressing becomes soiled or saturated  - you develop a fever greater that 101F  - the wound becomes red or you develop blistering around the wound  * Patient may shower after post-op day #3.   * The patient will continue home PT consistent with  total knee replacement protocol. Transition to outpatient PT will occur at the time of the first office visit.   * The patient will practice knee extension exercises regularly to minimize hamstring contraction.   * The patient is FULL weight bearing.  * The patient will continue ASPIRIN for 6 weeks after surgery for blood clot prevention.  * While on aspirin, the patient will take daily omeprazole or other similar medication to protect the stomach from irritation.   * The patient will take OXYCODONE for pain control and adjust according to prescription and patient needs. The patient will also take TYLENOL 975 mg every 8 hours for pain. Contact the office if pain increases while taking prescribed pain medications or related concerns develop.  * Celebrex will be taken twice daily for 3 weeks for pain control and prevention of excessive bone growth. Additional prescription may be requested at your office follow-up visit.   * The patient will take Senna S while taking oxycodone to prevent narcotic associated constipation.  Additionally, increase water intake (drink at least 8 glasses of water daily) and try adding fiber to the diet by eating fruits, vegetables and foods that are rich in grains. If constipation is experienced, contact the medical/primary care provider to discuss further treatment options.  * To avoid injury at home:  - continue use of rolling walker until cleared by physical therapist  - have family or friend remove all throw rug or objects in hallways that may present a trip hazard.  - if you experience any dizziness or medical concerns, call your medical doctor or 911.  * The implant may activate metal detection devices.   You were found to have Hyperglycemia during your stay.  Your diabetes may need closer control and improvement.  Please follow up with your PMD/Endocrinologist upon discharge.  Please follow up [facility] Endocrinology upon discharge.  Restart metformin upon discharge   You were found to have Hyperglycemia/Hypoglycemia during your stay.  Your diabetes may need closer control and improvement.  Please follow up with your PMD/Endocrinologist upon discharge.  Please follow up [facility] Endocrinology upon discharge.     The patient will be seen in the office between 2-3 weeks for wound check.   **Your first post-operative visit has been scheduled prior to your admission. PLEASE CONTACT OFFICE TO CONFIRM THE APPOINTMENT DATE.   **  The silver based dressing is to be removed 7 days from the date of surgery.   ** CONTACT THE OFFICE IF THE FOLLOWING DEVELOP:  - the dressing becomes soiled or saturated  - you develop a fever greater that 101F  - the wound becomes red or you develop blistering around the wound  * Patient may shower after post-op day #3.   * The patient will continue home PT consistent with  total knee replacement protocol. Transition to outpatient PT will occur at the time of the first office visit.   * The patient will practice knee extension exercises regularly to minimize hamstring contraction.   * The patient is FULL weight bearing.  * The patient will continue ASPIRIN for 6 weeks after surgery for blood clot prevention.  * While on aspirin, the patient will take daily omeprazole or other similar medication to protect the stomach from irritation.   * The patient will take OXYCODONE for pain control and adjust according to prescription and patient needs. The patient will also take TYLENOL 975 mg every 8 hours for pain. Contact the office if pain increases while taking prescribed pain medications or related concerns develop.  * Celebrex will be taken twice daily for 3 weeks for pain control and prevention of excessive bone growth. Additional prescription may be requested at your office follow-up visit.   * The patient will take Senna S while taking oxycodone to prevent narcotic associated constipation.  Additionally, increase water intake (drink at least 8 glasses of water daily) and try adding fiber to the diet by eating fruits, vegetables and foods that are rich in grains. If constipation is experienced, contact the medical/primary care provider to discuss further treatment options.  * To avoid injury at home:  - continue use of rolling walker until cleared by physical therapist  - have family or friend remove all throw rug or objects in hallways that may present a trip hazard.  - if you experience any dizziness or medical concerns, call your medical doctor or 911.  * The implant may activate metal detection devices.   You were found to have Hyperglycemia during your stay.  Your diabetes may need closer control and improvement.  Please follow up with your PMD/Endocrinologist upon discharge.  Please follow up [facility] Endocrinology upon discharge.  Restart metformin upon discharge   You were found to have Hyperglycemia/Hypoglycemia during your stay.  Your diabetes may need closer control and improvement.  Please follow up with your PMD/Endocrinologist upon discharge.  Please follow up [facility] Endocrinology upon discharge.     The patient will be seen in the office between 2-3 weeks for wound check.   **Your first post-operative visit has been scheduled prior to your admission. PLEASE CONTACT OFFICE TO CONFIRM THE APPOINTMENT DATE.   **  The silver based dressing is to be removed 7 days from the date of surgery.   ** CONTACT THE OFFICE IF THE FOLLOWING DEVELOP:  - the dressing becomes soiled or saturated  - you develop a fever greater that 101F  - the wound becomes red or you develop blistering around the wound  * Patient may shower after post-op day #3.   * The patient will continue home PT consistent with  total knee replacement protocol. Transition to outpatient PT will occur at the time of the first office visit.   * The patient will practice knee extension exercises regularly to minimize hamstring contraction.   * The patient is FULL weight bearing.  * The patient will continue ASPIRIN for 6 weeks after surgery for blood clot prevention.  * While on aspirin, the patient will take daily omeprazole or other similar medication to protect the stomach from irritation.   * The patient will take OXYCODONE for pain control and adjust according to prescription and patient needs. The patient will also take TYLENOL 975 mg every 8 hours for pain. Contact the office if pain increases while taking prescribed pain medications or related concerns develop.  * You will take Duricef 500mg twice a day for 10 days for infection prevention.   * Celebrex will be taken twice daily for 3 weeks for pain control and prevention of excessive bone growth. Additional prescription may be requested at your office follow-up visit.   * The patient will take Senna S while taking oxycodone to prevent narcotic associated constipation.  Additionally, increase water intake (drink at least 8 glasses of water daily) and try adding fiber to the diet by eating fruits, vegetables and foods that are rich in grains. If constipation is experienced, contact the medical/primary care provider to discuss further treatment options.  * To avoid injury at home:  - continue use of rolling walker until cleared by physical therapist  - have family or friend remove all throw rug or objects in hallways that may present a trip hazard.  - if you experience any dizziness or medical concerns, call your medical doctor or 911.  * The implant may activate metal detection devices.   You were found to have Hyperglycemia during your stay.  Your diabetes may need closer control and improvement.  Please follow up with your PMD/Endocrinologist upon discharge.  Please follow up [facility] Endocrinology upon discharge.  Restart metformin upon discharge

## 2024-09-13 NOTE — PHYSICAL THERAPY INITIAL EVALUATION ADULT - RANGE OF MOTION EXAMINATION, REHAB EVAL
except left knee 20 to 80 deg/bilateral upper extremity ROM was WFL (within functional limits)/bilateral lower extremity ROM was WFL (within functional limits)

## 2024-09-13 NOTE — DISCHARGE NOTE PROVIDER - CARE PROVIDER_API CALL
Pedro Canada  Orthopaedic Surgery  [address] [place] [id number]  Phone: [phone]  Fax: [phone]  Follow Up Time:

## 2024-09-13 NOTE — PHYSICAL THERAPY INITIAL EVALUATION ADULT - PERTINENT HX OF CURRENT PROBLEM, REHAB EVAL
57 yo M PMH of HTN, HLD, MARIBEL (does not use CPAP), BPH, anxiety and depression, ETOH and recreational drug use (states last used in 2024), presents with c/o severe left knee osteoarthritis. States that his left knee pain is constant and is significantly limiting his ADLs. Patient states that his left knee occasionally leonela or gives out on him. Currently resides in [facility] living [facility]. Takes Suboxone, states he is currently in the process of weaning. Reports Hx of left knee fracture in the past with arthroscopic repair. 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 Denies any recent fevers, chills, injuries, numbness or tingling in the lower extremities. 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

## 2024-09-13 NOTE — DISCHARGE NOTE PROVIDER - NSDCMRMEDTOKEN_GEN_ALL_CORE_FT
atorvastatin 40 mg oral tablet: 1 tab(s) orally once a day (at bedtime)  buprenorphine-naloxone 8 mg-2 mg sublingual tablet: 1 tab(s) sublingual 2 times a day  busPIRone 10 mg oral tablet: 1 tab(s) orally 2 times a day  cloNIDine 0.1 mg oral tablet: 1 tab(s) orally 2 times a day hold sbp&lt;110  escitalopram 20 mg oral tablet: 1 tab(s) orally once a day  Flomax 0.4 mg oral capsule: 1 cap(s) orally once a day (at bedtime)  guanFACINE 1 mg oral tablet: 1 tab(s) orally 2 times a day   atorvastatin 40 mg oral tablet: 1 tab(s) orally once a day (at bedtime)  buprenorphine-naloxone 8 mg-2 mg sublingual tablet: 1 tab(s) sublingual 2 times a day  busPIRone 10 mg oral tablet: 1 tab(s) orally 2 times a day  cloNIDine 0.1 mg oral tablet: 1 tab(s) orally 2 times a day hold sbp&lt;110  escitalopram 20 mg oral tablet: 1 tab(s) orally once a day  Flomax 0.4 mg oral capsule: 1 cap(s) orally once a day (at bedtime)  guanFACINE 1 mg oral tablet: 1 tab(s) orally 2 times a day  metFORMIN: 500 milligram(s) orally 2 times a day   acetaminophen 325 mg oral tablet: 3 tab(s) orally every 8 hours  aspirin 81 mg oral delayed release tablet: 1 tab(s) orally 2 times a day  atorvastatin 40 mg oral tablet: 1 tab(s) orally once a day (at bedtime)  buprenorphine-naloxone 8 mg-2 mg sublingual tablet: 1 tab(s) sublingual 2 times a day  busPIRone 10 mg oral tablet: 1 tab(s) orally 2 times a day  cefadroxil 500 mg oral capsule: 1 cap(s) orally 2 times a day  celecoxib 200 mg oral capsule: 1 cap(s) orally every 12 hours  cloNIDine 0.1 mg oral tablet: 1 tab(s) orally 2 times a day  escitalopram 20 mg oral tablet: 1 tab(s) orally once a day  guanFACINE 1 mg oral tablet: 1 tab(s) orally 2 times a day  metFORMIN: 500 milligram(s) orally 2 times a day  oxyCODONE 10 mg oral tablet: 1 tab(s) orally every 3 hours  oxyCODONE 5 mg oral tablet: 1 tab(s) orally every 3 hours  pantoprazole 40 mg oral delayed release tablet: 1 tab(s) orally once a day (before a meal)  QUEtiapine 25 mg oral tablet: 1 tab(s) orally once a day (at bedtime)  senna leaf extract oral tablet: 2 tab(s) orally once a day (at bedtime)  tamsulosin 0.4 mg oral capsule: 1 cap(s) orally once a day (at bedtime)

## 2024-09-13 NOTE — DISCHARGE NOTE PROVIDER - NSDCFUSCHEDAPPT_GEN_ALL_CORE_FT
Ceasar Esparza  [facility]  UROLOGY [address] [facility]  Scheduled Appointment: 09/17/2024    Isela Tanner  [facility]  GASTRO ARROYO [address]  Scheduled Appointment: 10/01/2024    Pedro Canada  [facility]  ORTHOSURG [address]  Scheduled Appointment: 10/02/2024    Pedro Canada  [facility]  ORTHOSURG [address]  Scheduled Appointment: 10/23/2024    Pedro Canada  [facility]  ORTHOSURG [address]  Scheduled Appointment: 12/04/2024

## 2024-09-14 ENCOUNTER — TRANSCRIPTION ENCOUNTER (OUTPATIENT)
Age: 59
End: 2024-09-14

## 2024-09-14 LAB
ANION GAP SERPL CALC-SCNC: 16 MMOL/L — SIGNIFICANT CHANGE UP (ref 5–17)
BUN SERPL-MCNC: 17.3 MG/DL — SIGNIFICANT CHANGE UP (ref 8–20)
CALCIUM SERPL-MCNC: 8.3 MG/DL — LOW (ref 8.4–10.5)
CHLORIDE SERPL-SCNC: 97 MMOL/L — SIGNIFICANT CHANGE UP (ref 96–108)
CO2 SERPL-SCNC: 22 MMOL/L — SIGNIFICANT CHANGE UP (ref 22–29)
CREAT SERPL-MCNC: 0.9 MG/DL — SIGNIFICANT CHANGE UP (ref 0.5–1.3)
EGFR: 98 ML/MIN/1.73M2 — SIGNIFICANT CHANGE UP
GLUCOSE BLDC GLUCOMTR-MCNC: 259 MG/DL — HIGH (ref 70–99)
GLUCOSE BLDC GLUCOMTR-MCNC: 291 MG/DL — HIGH (ref 70–99)
GLUCOSE SERPL-MCNC: 307 MG/DL — HIGH (ref 70–99)
HCT VFR BLD CALC: 34.9 % — LOW (ref 39–50)
HGB BLD-MCNC: 11.6 G/DL — LOW (ref 13–17)
MCHC RBC-ENTMCNC: 31.1 PG — SIGNIFICANT CHANGE UP (ref 27–34)
MCHC RBC-ENTMCNC: 33.2 GM/DL — SIGNIFICANT CHANGE UP (ref 32–36)
MCV RBC AUTO: 93.6 FL — SIGNIFICANT CHANGE UP (ref 80–100)
PLATELET # BLD AUTO: 210 K/UL — SIGNIFICANT CHANGE UP (ref 150–400)
POTASSIUM SERPL-MCNC: 4.4 MMOL/L — SIGNIFICANT CHANGE UP (ref 3.5–5.3)
POTASSIUM SERPL-SCNC: 4.4 MMOL/L — SIGNIFICANT CHANGE UP (ref 3.5–5.3)
RBC # BLD: 3.73 M/UL — LOW (ref 4.2–5.8)
RBC # FLD: 13 % — SIGNIFICANT CHANGE UP (ref 10.3–14.5)
SODIUM SERPL-SCNC: 135 MMOL/L — SIGNIFICANT CHANGE UP (ref 135–145)
WBC # BLD: 14.43 K/UL — HIGH (ref 3.8–10.5)
WBC # FLD AUTO: 14.43 K/UL — HIGH (ref 3.8–10.5)

## 2024-09-14 PROCEDURE — 99222 1ST HOSP IP/OBS MODERATE 55: CPT

## 2024-09-14 RX ORDER — DEXTROSE 15 G/33 G
12.5 GEL IN PACKET (GRAM) ORAL ONCE
Refills: 0 | Status: DISCONTINUED | OUTPATIENT
Start: 2024-09-14 | End: 2024-09-16

## 2024-09-14 RX ORDER — CEPHALEXIN 500 MG
500 CAPSULE ORAL
Refills: 0 | Status: DISCONTINUED | OUTPATIENT
Start: 2024-09-14 | End: 2024-09-16

## 2024-09-14 RX ORDER — DEXTROSE 15 G/33 G
25 GEL IN PACKET (GRAM) ORAL ONCE
Refills: 0 | Status: DISCONTINUED | OUTPATIENT
Start: 2024-09-14 | End: 2024-09-16

## 2024-09-14 RX ORDER — GLUCAGON INJECTION, SOLUTION 1 MG/.2ML
1 INJECTION, SOLUTION SUBCUTANEOUS ONCE
Refills: 0 | Status: DISCONTINUED | OUTPATIENT
Start: 2024-09-14 | End: 2024-09-16

## 2024-09-14 RX ORDER — DEXTROSE 15 G/33 G
15 GEL IN PACKET (GRAM) ORAL ONCE
Refills: 0 | Status: DISCONTINUED | OUTPATIENT
Start: 2024-09-14 | End: 2024-09-16

## 2024-09-14 RX ORDER — FLU VACCINE TS 2012-2013(5YR+) 45MCG/.5ML
0.5 VIAL (ML) INTRAMUSCULAR ONCE
Refills: 0 | Status: DISCONTINUED | OUTPATIENT
Start: 2024-09-14 | End: 2024-09-16

## 2024-09-14 RX ADMIN — TAMSULOSIN HYDROCHLORIDE 0.4 MILLIGRAM(S): 0.4 CAPSULE ORAL at 21:10

## 2024-09-14 RX ADMIN — Medication 40 MILLIGRAM(S): at 06:19

## 2024-09-14 RX ADMIN — Medication 30 MILLILITER(S): at 03:42

## 2024-09-14 RX ADMIN — OXYCODONE HYDROCHLORIDE 5 MILLIGRAM(S): 5 TABLET ORAL at 07:05

## 2024-09-14 RX ADMIN — SODIUM CHLORIDE 150 MILLILITER(S): 9 INJECTION INTRAMUSCULAR; INTRAVENOUS; SUBCUTANEOUS at 06:17

## 2024-09-14 RX ADMIN — SODIUM CHLORIDE 3 MILLILITER(S): 9 INJECTION INTRAMUSCULAR; INTRAVENOUS; SUBCUTANEOUS at 06:10

## 2024-09-14 RX ADMIN — Medication 81 MILLIGRAM(S): at 18:52

## 2024-09-14 RX ADMIN — OXYCODONE HYDROCHLORIDE 10 MILLIGRAM(S): 5 TABLET ORAL at 01:21

## 2024-09-14 RX ADMIN — ESCITALOPRAM OXALATE 20 MILLIGRAM(S): 10 TABLET ORAL at 12:58

## 2024-09-14 RX ADMIN — ACETAMINOPHEN 400 MILLIGRAM(S): 325 TABLET ORAL at 06:20

## 2024-09-14 RX ADMIN — OXYCODONE HYDROCHLORIDE 5 MILLIGRAM(S): 5 TABLET ORAL at 12:58

## 2024-09-14 RX ADMIN — OXYCODONE HYDROCHLORIDE 5 MILLIGRAM(S): 5 TABLET ORAL at 15:14

## 2024-09-14 RX ADMIN — OXYCODONE HYDROCHLORIDE 5 MILLIGRAM(S): 5 TABLET ORAL at 22:08

## 2024-09-14 RX ADMIN — KETOROLAC TROMETHAMINE 15 MILLIGRAM(S): 30 INJECTION, SOLUTION INTRAMUSCULAR at 00:22

## 2024-09-14 RX ADMIN — OXYCODONE HYDROCHLORIDE 5 MILLIGRAM(S): 5 TABLET ORAL at 18:52

## 2024-09-14 RX ADMIN — Medication 40 MILLIGRAM(S): at 21:11

## 2024-09-14 RX ADMIN — OXYCODONE HYDROCHLORIDE 10 MILLIGRAM(S): 5 TABLET ORAL at 04:44

## 2024-09-14 RX ADMIN — OXYCODONE HYDROCHLORIDE 5 MILLIGRAM(S): 5 TABLET ORAL at 04:45

## 2024-09-14 RX ADMIN — Medication 4 UNIT(S): at 23:03

## 2024-09-14 RX ADMIN — BUSPIRONE HYDROCHLORIDE 10 MILLIGRAM(S): 30 TABLET ORAL at 06:19

## 2024-09-14 RX ADMIN — Medication 0.1 MILLIGRAM(S): at 18:52

## 2024-09-14 RX ADMIN — OXYCODONE HYDROCHLORIDE 5 MILLIGRAM(S): 5 TABLET ORAL at 09:29

## 2024-09-14 RX ADMIN — KETOROLAC TROMETHAMINE 15 MILLIGRAM(S): 30 INJECTION, SOLUTION INTRAMUSCULAR at 01:22

## 2024-09-14 RX ADMIN — Medication 81 MILLIGRAM(S): at 06:19

## 2024-09-14 RX ADMIN — Medication 2 TABLET(S): at 21:10

## 2024-09-14 RX ADMIN — OXYCODONE HYDROCHLORIDE 10 MILLIGRAM(S): 5 TABLET ORAL at 22:08

## 2024-09-14 RX ADMIN — Medication 25 MILLIGRAM(S): at 21:09

## 2024-09-14 RX ADMIN — OXYCODONE HYDROCHLORIDE 5 MILLIGRAM(S): 5 TABLET ORAL at 21:12

## 2024-09-14 RX ADMIN — OXYCODONE HYDROCHLORIDE 10 MILLIGRAM(S): 5 TABLET ORAL at 03:42

## 2024-09-14 RX ADMIN — OXYCODONE HYDROCHLORIDE 10 MILLIGRAM(S): 5 TABLET ORAL at 21:12

## 2024-09-14 RX ADMIN — OXYCODONE HYDROCHLORIDE 5 MILLIGRAM(S): 5 TABLET ORAL at 01:21

## 2024-09-14 RX ADMIN — OXYCODONE HYDROCHLORIDE 5 MILLIGRAM(S): 5 TABLET ORAL at 06:19

## 2024-09-14 RX ADMIN — CEFAZOLIN SODIUM 2000 MILLIGRAM(S): 2 INJECTION, SOLUTION INTRAVENOUS at 06:20

## 2024-09-14 RX ADMIN — SODIUM CHLORIDE 3 MILLILITER(S): 9 INJECTION INTRAMUSCULAR; INTRAVENOUS; SUBCUTANEOUS at 14:00

## 2024-09-14 RX ADMIN — OXYCODONE HYDROCHLORIDE 10 MILLIGRAM(S): 5 TABLET ORAL at 00:21

## 2024-09-14 RX ADMIN — ACETAMINOPHEN 975 MILLIGRAM(S): 325 TABLET ORAL at 15:13

## 2024-09-14 RX ADMIN — OXYCODONE HYDROCHLORIDE 10 MILLIGRAM(S): 5 TABLET ORAL at 06:19

## 2024-09-14 RX ADMIN — OXYCODONE HYDROCHLORIDE 10 MILLIGRAM(S): 5 TABLET ORAL at 15:14

## 2024-09-14 RX ADMIN — ACETAMINOPHEN 975 MILLIGRAM(S): 325 TABLET ORAL at 22:07

## 2024-09-14 RX ADMIN — Medication 2: at 21:08

## 2024-09-14 RX ADMIN — KETOROLAC TROMETHAMINE 15 MILLIGRAM(S): 30 INJECTION, SOLUTION INTRAMUSCULAR at 12:58

## 2024-09-14 RX ADMIN — OXYCODONE HYDROCHLORIDE 5 MILLIGRAM(S): 5 TABLET ORAL at 03:42

## 2024-09-14 RX ADMIN — ACETAMINOPHEN 1000 MILLIGRAM(S): 325 TABLET ORAL at 07:05

## 2024-09-14 RX ADMIN — OXYCODONE HYDROCHLORIDE 10 MILLIGRAM(S): 5 TABLET ORAL at 12:57

## 2024-09-14 RX ADMIN — OXYCODONE HYDROCHLORIDE 10 MILLIGRAM(S): 5 TABLET ORAL at 18:52

## 2024-09-14 RX ADMIN — ACETAMINOPHEN 975 MILLIGRAM(S): 325 TABLET ORAL at 21:09

## 2024-09-14 RX ADMIN — KETOROLAC TROMETHAMINE 15 MILLIGRAM(S): 30 INJECTION, SOLUTION INTRAMUSCULAR at 06:19

## 2024-09-14 RX ADMIN — OXYCODONE HYDROCHLORIDE 10 MILLIGRAM(S): 5 TABLET ORAL at 09:29

## 2024-09-14 RX ADMIN — BUSPIRONE HYDROCHLORIDE 10 MILLIGRAM(S): 30 TABLET ORAL at 18:52

## 2024-09-14 RX ADMIN — Medication 500 MILLIGRAM(S): at 19:36

## 2024-09-14 RX ADMIN — OXYCODONE HYDROCHLORIDE 5 MILLIGRAM(S): 5 TABLET ORAL at 00:21

## 2024-09-14 RX ADMIN — KETOROLAC TROMETHAMINE 15 MILLIGRAM(S): 30 INJECTION, SOLUTION INTRAMUSCULAR at 07:05

## 2024-09-14 RX ADMIN — OXYCODONE HYDROCHLORIDE 10 MILLIGRAM(S): 5 TABLET ORAL at 07:05

## 2024-09-14 RX ADMIN — SODIUM CHLORIDE 3 MILLILITER(S): 9 INJECTION INTRAMUSCULAR; INTRAVENOUS; SUBCUTANEOUS at 21:10

## 2024-09-14 RX ADMIN — KETOROLAC TROMETHAMINE 15 MILLIGRAM(S): 30 INJECTION, SOLUTION INTRAMUSCULAR at 18:52

## 2024-09-14 NOTE — CONSULT NOTE ADULT - SUBJECTIVE AND OBJECTIVE BOX
Patient is a 58y old  Male who presents with a chief complaint of Left TKA (14 Sep 2024 09:12)      HPI:  57 yo M PMH of HTN, HLD, MARIBEL, BPH, anxiety and depression, ETOH and recreational drug use (states last used in 5/2024), presents with c/o severe left knee osteoarthritis. States that his left knee pain is constant and is significantly limiting his ADLs. Patient states that his left knee occasionally leonela or gives out on him. Currently resides in [facility] living [facility]. Takes Suboxone, currently in the process of weaning. Reports Hx of left knee fracture in the past with arthroscopic repair. 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 Denies any recent fevers, chills, injuries, numbness or tingling in the lower extremities.   Pt now s/p left total knee arthroplasty on 9/13.    Interval History:         PAST MEDICAL & SURGICAL HISTORY:  Osteoarthritis  HTN (hypertension)  HLD (hyperlipidemia)  Broken internal joint prosthesis, other site, subsequent encounter  MARIBEL (obstructive sleep apnea)  History of drug abuse  History of ETOH abuse  Anxiety and depression  H/O bipolar disorder  Diverticulitis  GERD (gastroesophageal reflux disease)  Unilateral primary osteoarthritis, left knee  Diabetes mellitus  ACL (anterior cruciate ligament) tear, left, initial encounter  H/O total knee replacement, right  2022      Social History:  Tobacco -   ETOH -   Illicit drug abuse -      FAMILY HISTORY:  FH: diverticulitis (Mother)  FH: prostate cancer (Father)  FH: CAD (coronary artery disease)  FH: diabetes mellitus (Sibling)        Allergies  shellfish. (Short breath; Anaphylaxis)      HOME MEDICATIONS :   atorvastatin 40 mg oral tablet: 1 tab(s) orally once a day (at bedtime) (13 Sep 2024 10:02)  buprenorphine-naloxone 8 mg-2 mg sublingual tablet: 1 tab(s) sublingual 2 times a day (13 Sep 2024 10:02)  cloNIDine 0.1 mg oral tablet: 1 tab(s) orally 2 times a day hold sbp&lt;110 (13 Sep 2024 10:02)  Flomax 0.4 mg oral capsule: 1 cap(s) orally once a day (at bedtime) (13 Sep 2024 10:02)      REVIEW OF SYSTEMS:    CONSTITUTIONAL: No weakness, fevers or chills  EYES/ENT: No visual changes;  No vertigo or throat pain   NECK: No pain or stiffness  RESPIRATORY: No cough, wheezing, hemoptysis; No shortness of breath  CARDIOVASCULAR: No chest pain or palpitations  GASTROINTESTINAL: No abdominal or epigastric pain. No nausea, vomiting, or hematemesis; No diarrhea or constipation. No melena or hematochezia.  GENITOURINARY: No dysuria, frequency or hematuria  NEUROLOGICAL: No numbness or weakness  Psych: No depression, anxiety  SKIN: No itching, rashes    MEDICATIONS  (STANDING):  acetaminophen     Tablet .. 975 milliGRAM(s) Oral every 8 hours  aspirin enteric coated 81 milliGRAM(s) Oral two times a day  atorvastatin 40 milliGRAM(s) Oral at bedtime  busPIRone 10 milliGRAM(s) Oral two times a day  cloNIDine 0.1 milliGRAM(s) Oral two times a day  escitalopram 20 milliGRAM(s) Oral daily  influenza   Vaccine 0.5 milliLiter(s) IntraMuscular once  ketorolac   Injectable 15 milliGRAM(s) IV Push every 6 hours  oxyCODONE    IR 5 milliGRAM(s) Oral every 3 hours  oxyCODONE    IR 10 milliGRAM(s) Oral every 3 hours  pantoprazole    Tablet 40 milliGRAM(s) Oral before breakfast  QUEtiapine 12.5 milliGRAM(s) Oral at bedtime  senna 2 Tablet(s) Oral at bedtime  sodium chloride 0.9% lock flush 3 milliLiter(s) IV Push every 8 hours  sodium chloride 0.9%. 1000 milliLiter(s) (150 mL/Hr) IV Continuous <Continuous>  tamsulosin 0.4 milliGRAM(s) Oral at bedtime    MEDICATIONS  (PRN):  aluminum hydroxide/magnesium hydroxide/simethicone Suspension 30 milliLiter(s) Oral four times a day PRN Indigestion  HYDROmorphone   Tablet 4 milliGRAM(s) Oral every 3 hours PRN Severe Pain (7 - 10)  HYDROmorphone  Injectable 0.5 milliGRAM(s) IV Push every 5 minutes PRN Moderate Pain (4 - 6)  magnesium hydroxide Suspension 30 milliLiter(s) Oral daily PRN Constipation  ondansetron Injectable 4 milliGRAM(s) IV Push every 6 hours PRN Nausea and/or Vomiting      Vital Signs Last 24 Hrs  T(C): 36.9 (14 Sep 2024 04:45), Max: 37.2 (14 Sep 2024 00:21)  T(F): 98.4 (14 Sep 2024 04:45), Max: 99 (14 Sep 2024 00:21)  HR: 93 (14 Sep 2024 04:45) (68 - 102)  BP: 107/63 (14 Sep 2024 04:45) (107/63 - 187/99)  BP(mean): 107 (13 Sep 2024 18:19) (99 - 117)  RR: 18 (14 Sep 2024 04:45) (14 - 18)  SpO2: 95% (14 Sep 2024 04:45) (95% - 98%)    Parameters below as of 14 Sep 2024 04:45  Patient On (Oxygen Delivery Method): room air        PHYSICAL EXAM:    CONSTITUTIONAL: Awake, alert and in no apparent distress  CARDIAC: Normal rate, regular rhythm.  Heart sounds S1, S2.  No murmurs, rubs or gallops   RESPIRATORY: Breath sounds clear and equal bilaterally. No wheezes, rhales or rhonchi   ABDOMINAL: Nontender to palpation. No rebound/ guarding   EXTREMITIES:   NEUROLOGICAL: Alert and oriented, no focal deficits, no motor or sensory deficits   SKIN: No rash, skin turgor       LABS:                        11.6   14.43 )-----------( 210      ( 14 Sep 2024 05:30 )             34.9     09-14    135  |  97  |  17.3  ----------------------------<  307<H>  4.4   |  22.0  |  0.90    Ca    8.3<L>      14 Sep 2024 05:30        Urinalysis Basic - ( 14 Sep 2024 05:30 )    Color: x / Appearance: x / SG: x / pH: x  Gluc: 307 mg/dL / Ketone: x  / Bili: x / Urobili: x   Blood: x / Protein: x / Nitrite: x   Leuk Esterase: x / RBC: x / WBC x   Sq Epi: x / Non Sq Epi: x / Bacteria: x       Patient is a 58y old  Male who presents with a chief complaint of Left TKA (14 Sep 2024 09:12)      HPI:  59 yo M PMH of HTN, HLD, MARIBEL, BPH, anxiety and depression, ETOH and recreational drug use (states last used in 5/2024), presents with c/o severe left knee osteoarthritis. States that his left knee pain is constant and is significantly limiting his ADLs. Patient states that his left knee occasionally leonela or gives out on him. Currently resides in [facility] living [facility]. Takes Suboxone, currently in the process of weaning. Reports Hx of left knee fracture in the past with arthroscopic repair. 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 Denies any recent fevers, chills, injuries, numbness or tingling in the lower extremities.   Pt now s/p left total knee arthroplasty on 9/13.    Interval History:   Reports no complaints       PAST MEDICAL & SURGICAL HISTORY:  Osteoarthritis  HTN (hypertension)  HLD (hyperlipidemia)  Broken internal joint prosthesis, other site, subsequent encounter  MARIBEL (obstructive sleep apnea)  History of drug abuse  History of ETOH abuse  Anxiety and depression  H/O bipolar disorder  Diverticulitis  GERD (gastroesophageal reflux disease)  Unilateral primary osteoarthritis, left knee  Diabetes mellitus  ACL (anterior cruciate ligament) tear, left, initial encounter  H/O total knee replacement, right  2022      Social History:  Tobacco - Uses E-cigarette   ETOH - Quit 5/2024   Illicit drug abuse -  Quit 5/2024     FAMILY HISTORY:  FH: diverticulitis (Mother)  FH: prostate cancer (Father)  FH: CAD (coronary artery disease)  FH: diabetes mellitus (Sibling)        Allergies  shellfish. (Short breath; Anaphylaxis)      HOME MEDICATIONS :   atorvastatin 40 mg oral tablet: 1 tab(s) orally once a day (at bedtime) (13 Sep 2024 10:02)  buprenorphine-naloxone 8 mg-2 mg sublingual tablet: 1 tab(s) sublingual 2 times a day (13 Sep 2024 10:02)  cloNIDine 0.1 mg oral tablet: 1 tab(s) orally 2 times a day hold sbp&lt;110 (13 Sep 2024 10:02)  Flomax 0.4 mg oral capsule: 1 cap(s) orally once a day (at bedtime) (13 Sep 2024 10:02)      REVIEW OF SYSTEMS:    CONSTITUTIONAL: No weakness, fevers or chills  EYES/ENT: No visual changes;  No vertigo or throat pain   NECK: No pain or stiffness  RESPIRATORY: No cough, wheezing, hemoptysis; No shortness of breath  CARDIOVASCULAR: No chest pain or palpitations  GASTROINTESTINAL: No abdominal or epigastric pain. No nausea, vomiting, or hematemesis; No diarrhea or constipation. No melena or hematochezia.  GENITOURINARY: No dysuria, frequency or hematuria  NEUROLOGICAL: No numbness or weakness  Psych: No depression, anxiety  SKIN: No itching, rashes    MEDICATIONS  (STANDING):  acetaminophen     Tablet .. 975 milliGRAM(s) Oral every 8 hours  aspirin enteric coated 81 milliGRAM(s) Oral two times a day  atorvastatin 40 milliGRAM(s) Oral at bedtime  busPIRone 10 milliGRAM(s) Oral two times a day  cloNIDine 0.1 milliGRAM(s) Oral two times a day  escitalopram 20 milliGRAM(s) Oral daily  influenza   Vaccine 0.5 milliLiter(s) IntraMuscular once  ketorolac   Injectable 15 milliGRAM(s) IV Push every 6 hours  oxyCODONE    IR 5 milliGRAM(s) Oral every 3 hours  oxyCODONE    IR 10 milliGRAM(s) Oral every 3 hours  pantoprazole    Tablet 40 milliGRAM(s) Oral before breakfast  QUEtiapine 12.5 milliGRAM(s) Oral at bedtime  senna 2 Tablet(s) Oral at bedtime  sodium chloride 0.9% lock flush 3 milliLiter(s) IV Push every 8 hours  sodium chloride 0.9%. 1000 milliLiter(s) (150 mL/Hr) IV Continuous <Continuous>  tamsulosin 0.4 milliGRAM(s) Oral at bedtime    MEDICATIONS  (PRN):  aluminum hydroxide/magnesium hydroxide/simethicone Suspension 30 milliLiter(s) Oral four times a day PRN Indigestion  HYDROmorphone   Tablet 4 milliGRAM(s) Oral every 3 hours PRN Severe Pain (7 - 10)  HYDROmorphone  Injectable 0.5 milliGRAM(s) IV Push every 5 minutes PRN Moderate Pain (4 - 6)  magnesium hydroxide Suspension 30 milliLiter(s) Oral daily PRN Constipation  ondansetron Injectable 4 milliGRAM(s) IV Push every 6 hours PRN Nausea and/or Vomiting      Vital Signs Last 24 Hrs  T(C): 36.9 (14 Sep 2024 04:45), Max: 37.2 (14 Sep 2024 00:21)  T(F): 98.4 (14 Sep 2024 04:45), Max: 99 (14 Sep 2024 00:21)  HR: 93 (14 Sep 2024 04:45) (68 - 102)  BP: 107/63 (14 Sep 2024 04:45) (107/63 - 187/99)  BP(mean): 107 (13 Sep 2024 18:19) (99 - 117)  RR: 18 (14 Sep 2024 04:45) (14 - 18)  SpO2: 95% (14 Sep 2024 04:45) (95% - 98%)    Parameters below as of 14 Sep 2024 04:45  Patient On (Oxygen Delivery Method): room air        PHYSICAL EXAM:    CONSTITUTIONAL: Awake, alert and in no apparent distress  CARDIAC: Normal rate, regular rhythm.  Heart sounds S1, S2.     RESPIRATORY: Breath sounds clear and equal bilaterally.  ABDOMINAL: Nontender to palpation. No rebound/ guarding   EXTREMITIES: L knee dressing c/d/i   NEUROLOGICAL: Alert and oriented, no focal deficits, no motor or sensory deficits       LABS:                        11.6   14.43 )-----------( 210      ( 14 Sep 2024 05:30 )             34.9     09-14    135  |  97  |  17.3  ----------------------------<  307<H>  4.4   |  22.0  |  0.90    Ca    8.3<L>      14 Sep 2024 05:30        Urinalysis Basic - ( 14 Sep 2024 05:30 )    Color: x / Appearance: x / SG: x / pH: x  Gluc: 307 mg/dL / Ketone: x  / Bili: x / Urobili: x   Blood: x / Protein: x / Nitrite: x   Leuk Esterase: x / RBC: x / WBC x   Sq Epi: x / Non Sq Epi: x / Bacteria: x

## 2024-09-14 NOTE — DISCHARGE NOTE NURSING/CASE MANAGEMENT/SOCIAL WORK - PATIENT PORTAL LINK FT
You can access the FollowMyHealth Patient Portal offered by [facility] by registering at the following website: [email or web address]. By joining [facility]’s FollowMyHealth portal, you will also be able to view your health information using other applications (apps) compatible with our system.

## 2024-09-14 NOTE — CONSULT NOTE ADULT - ASSESSMENT
59 yo M PMH of HTN, HLD, MARIBEL, BPH, anxiety and depression, ETOH and recreational drug use (states last used in 5/2024), presents with c/o severe left knee osteoarthritis. States that his left knee pain is constant and is significantly limiting his ADLs. Patient states that his left knee occasionally leonela or gives out on him. Currently resides in [facility] living [facility]. Takes Suboxone, currently in the process of weaning. Reports Hx of left knee fracture in the past with arthroscopic repair. 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 Denies any recent fevers, chills, injuries, numbness or tingling in the lower extremities.   Pt now s/p left total knee arthroplasty on 9/13.    Primary osteoarthritis, left knee  - s/p left total knee arthroplasty  - Post op ABX as per SCIP  - DVT ppx/Pain control as per Ortho  - PT/Mobilize   - Incentive spirometer    HTN   HLD      59 yo M PMH of HTN, HLD, MARIBEL, BPH, anxiety and depression, ETOH and recreational drug use (states last used in 5/2024), presents with c/o severe left knee osteoarthritis. States that his left knee pain is constant and is significantly limiting his ADLs. Patient states that his left knee occasionally leonela or gives out on him. Currently resides in [facility] living [facility]. Takes Suboxone, currently in the process of weaning. Reports Hx of left knee fracture in the past with arthroscopic repair. 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 Denies any recent fevers, chills, injuries, numbness or tingling in the lower extremities.   Pt now s/p left total knee arthroplasty on 9/13.    Primary osteoarthritis, left knee  - s/p left total knee arthroplasty  - Post op ABX as per SCIP  - DVT ppx/Pain control as per Ortho  - PT/Mobilize   - Incentive spirometer    HTN   HLD   - c/w Clonidine and Statin     Anxiety and Depression  - restart home meds     DVT ppx - per Ortho team

## 2024-09-14 NOTE — DISCHARGE NOTE NURSING/CASE MANAGEMENT/SOCIAL WORK - NSDCVIVACCINE_GEN_ALL_CORE_FT
Tdap; 29-Feb-2020 23:58; Lauren Delacruz (TASHIA); [employer]; [id number] (Exp. Date: 19-Mar-2022); IntraMuscular; Deltoid Right.; 0.5 milliLiter(s); VIS (VIS Published: 09-May-2013, VIS Presented: 29-Feb-2020);

## 2024-09-14 NOTE — PATIENT PROFILE ADULT - FALL HARM RISK - HARM RISK INTERVENTIONS
Assistance with ambulation/Assistance OOB with selected safe patient handling equipment/Communicate Risk of Fall with Harm to all staff/Discuss with provider need for PT consult/Monitor gait and stability/Provide patient with walking aids - walker, cane, crutches/Reinforce activity limits and safety measures with patient and family/Sit up slowly, dangle for a short time, stand at bedside before walking/Tailored Fall Risk Interventions/Use of alarms - bed, chair and/or voice tab/Visual Cue: Yellow wristband and red socks/Bed in lowest position, wheels locked, appropriate side rails in place/Call bell, personal items and telephone in reach/Instruct patient to call for assistance before getting out of bed or chair/Non-slip footwear when patient is out of bed/[place] to call system/Physically safe environment - no spills, clutter or unnecessary equipment/Purposeful Proactive Rounding/Room/bathroom lighting operational, light cord in reach

## 2024-09-15 LAB
GLUCOSE BLDC GLUCOMTR-MCNC: 159 MG/DL — HIGH (ref 70–99)
GLUCOSE BLDC GLUCOMTR-MCNC: 200 MG/DL — HIGH (ref 70–99)
GLUCOSE BLDC GLUCOMTR-MCNC: 261 MG/DL — HIGH (ref 70–99)
GLUCOSE BLDC GLUCOMTR-MCNC: 263 MG/DL — HIGH (ref 70–99)
GLUCOSE BLDC GLUCOMTR-MCNC: 299 MG/DL — HIGH (ref 70–99)
GLUCOSE BLDC GLUCOMTR-MCNC: 345 MG/DL — HIGH (ref 70–99)
GLUCOSE BLDC GLUCOMTR-MCNC: 349 MG/DL — HIGH (ref 70–99)

## 2024-09-15 PROCEDURE — 99233 SBSQ HOSP IP/OBS HIGH 50: CPT

## 2024-09-15 RX ORDER — HYDRALAZINE HCL 50 MG
10 TABLET ORAL ONCE
Refills: 0 | Status: COMPLETED | OUTPATIENT
Start: 2024-09-15 | End: 2024-09-15

## 2024-09-15 RX ORDER — METFORMIN HYDROCHLORIDE 850 MG/1
500 TABLET, FILM COATED ORAL
Qty: 0 | Refills: 0 | DISCHARGE

## 2024-09-15 RX ORDER — INSULIN GLARGINE 100 [IU]/ML
10 INJECTION, SOLUTION SUBCUTANEOUS AT BEDTIME
Refills: 0 | Status: DISCONTINUED | OUTPATIENT
Start: 2024-09-15 | End: 2024-09-16

## 2024-09-15 RX ADMIN — Medication 2 TABLET(S): at 21:16

## 2024-09-15 RX ADMIN — OXYCODONE HYDROCHLORIDE 10 MILLIGRAM(S): 5 TABLET ORAL at 09:39

## 2024-09-15 RX ADMIN — ACETAMINOPHEN 975 MILLIGRAM(S): 325 TABLET ORAL at 22:14

## 2024-09-15 RX ADMIN — OXYCODONE HYDROCHLORIDE 5 MILLIGRAM(S): 5 TABLET ORAL at 18:19

## 2024-09-15 RX ADMIN — OXYCODONE HYDROCHLORIDE 10 MILLIGRAM(S): 5 TABLET ORAL at 06:08

## 2024-09-15 RX ADMIN — ACETAMINOPHEN 975 MILLIGRAM(S): 325 TABLET ORAL at 21:14

## 2024-09-15 RX ADMIN — OXYCODONE HYDROCHLORIDE 5 MILLIGRAM(S): 5 TABLET ORAL at 06:07

## 2024-09-15 RX ADMIN — CELECOXIB 200 MILLIGRAM(S): 400 CAPSULE ORAL at 06:06

## 2024-09-15 RX ADMIN — OXYCODONE HYDROCHLORIDE 10 MILLIGRAM(S): 5 TABLET ORAL at 08:39

## 2024-09-15 RX ADMIN — OXYCODONE HYDROCHLORIDE 10 MILLIGRAM(S): 5 TABLET ORAL at 22:15

## 2024-09-15 RX ADMIN — OXYCODONE HYDROCHLORIDE 5 MILLIGRAM(S): 5 TABLET ORAL at 09:39

## 2024-09-15 RX ADMIN — ACETAMINOPHEN 975 MILLIGRAM(S): 325 TABLET ORAL at 07:04

## 2024-09-15 RX ADMIN — OXYCODONE HYDROCHLORIDE 5 MILLIGRAM(S): 5 TABLET ORAL at 15:48

## 2024-09-15 RX ADMIN — Medication 500 MILLIGRAM(S): at 06:04

## 2024-09-15 RX ADMIN — OXYCODONE HYDROCHLORIDE 5 MILLIGRAM(S): 5 TABLET ORAL at 07:04

## 2024-09-15 RX ADMIN — SODIUM CHLORIDE 3 MILLILITER(S): 9 INJECTION INTRAMUSCULAR; INTRAVENOUS; SUBCUTANEOUS at 21:17

## 2024-09-15 RX ADMIN — OXYCODONE HYDROCHLORIDE 10 MILLIGRAM(S): 5 TABLET ORAL at 11:47

## 2024-09-15 RX ADMIN — TAMSULOSIN HYDROCHLORIDE 0.4 MILLIGRAM(S): 0.4 CAPSULE ORAL at 21:15

## 2024-09-15 RX ADMIN — Medication 0.1 MILLIGRAM(S): at 18:19

## 2024-09-15 RX ADMIN — BUSPIRONE HYDROCHLORIDE 10 MILLIGRAM(S): 30 TABLET ORAL at 06:06

## 2024-09-15 RX ADMIN — SODIUM CHLORIDE 3 MILLILITER(S): 9 INJECTION INTRAMUSCULAR; INTRAVENOUS; SUBCUTANEOUS at 06:14

## 2024-09-15 RX ADMIN — Medication 25 MILLIGRAM(S): at 21:16

## 2024-09-15 RX ADMIN — CELECOXIB 200 MILLIGRAM(S): 400 CAPSULE ORAL at 18:20

## 2024-09-15 RX ADMIN — OXYCODONE HYDROCHLORIDE 5 MILLIGRAM(S): 5 TABLET ORAL at 22:15

## 2024-09-15 RX ADMIN — OXYCODONE HYDROCHLORIDE 10 MILLIGRAM(S): 5 TABLET ORAL at 18:19

## 2024-09-15 RX ADMIN — INSULIN GLARGINE 10 UNIT(S): 100 INJECTION, SOLUTION SUBCUTANEOUS at 21:18

## 2024-09-15 RX ADMIN — ACETAMINOPHEN 975 MILLIGRAM(S): 325 TABLET ORAL at 13:15

## 2024-09-15 RX ADMIN — BUSPIRONE HYDROCHLORIDE 10 MILLIGRAM(S): 30 TABLET ORAL at 18:20

## 2024-09-15 RX ADMIN — OXYCODONE HYDROCHLORIDE 5 MILLIGRAM(S): 5 TABLET ORAL at 00:10

## 2024-09-15 RX ADMIN — Medication 81 MILLIGRAM(S): at 06:05

## 2024-09-15 RX ADMIN — Medication 3 UNIT(S): at 18:36

## 2024-09-15 RX ADMIN — OXYCODONE HYDROCHLORIDE 10 MILLIGRAM(S): 5 TABLET ORAL at 12:47

## 2024-09-15 RX ADMIN — OXYCODONE HYDROCHLORIDE 5 MILLIGRAM(S): 5 TABLET ORAL at 12:47

## 2024-09-15 RX ADMIN — Medication 6: at 07:39

## 2024-09-15 RX ADMIN — Medication 8: at 11:47

## 2024-09-15 RX ADMIN — Medication 500 MILLIGRAM(S): at 18:20

## 2024-09-15 RX ADMIN — Medication 10 MILLIGRAM(S): at 20:45

## 2024-09-15 RX ADMIN — Medication 40 MILLIGRAM(S): at 06:06

## 2024-09-15 RX ADMIN — OXYCODONE HYDROCHLORIDE 10 MILLIGRAM(S): 5 TABLET ORAL at 21:15

## 2024-09-15 RX ADMIN — Medication 40 MILLIGRAM(S): at 21:15

## 2024-09-15 RX ADMIN — OXYCODONE HYDROCHLORIDE 10 MILLIGRAM(S): 5 TABLET ORAL at 00:10

## 2024-09-15 RX ADMIN — Medication 8: at 13:56

## 2024-09-15 RX ADMIN — OXYCODONE HYDROCHLORIDE 5 MILLIGRAM(S): 5 TABLET ORAL at 04:09

## 2024-09-15 RX ADMIN — OXYCODONE HYDROCHLORIDE 5 MILLIGRAM(S): 5 TABLET ORAL at 08:39

## 2024-09-15 RX ADMIN — OXYCODONE HYDROCHLORIDE 10 MILLIGRAM(S): 5 TABLET ORAL at 15:49

## 2024-09-15 RX ADMIN — ACETAMINOPHEN 975 MILLIGRAM(S): 325 TABLET ORAL at 06:04

## 2024-09-15 RX ADMIN — Medication 81 MILLIGRAM(S): at 18:20

## 2024-09-15 RX ADMIN — OXYCODONE HYDROCHLORIDE 10 MILLIGRAM(S): 5 TABLET ORAL at 07:04

## 2024-09-15 RX ADMIN — OXYCODONE HYDROCHLORIDE 5 MILLIGRAM(S): 5 TABLET ORAL at 21:15

## 2024-09-15 RX ADMIN — ESCITALOPRAM OXALATE 20 MILLIGRAM(S): 10 TABLET ORAL at 11:46

## 2024-09-15 RX ADMIN — OXYCODONE HYDROCHLORIDE 10 MILLIGRAM(S): 5 TABLET ORAL at 03:09

## 2024-09-15 RX ADMIN — OXYCODONE HYDROCHLORIDE 5 MILLIGRAM(S): 5 TABLET ORAL at 11:46

## 2024-09-15 RX ADMIN — Medication 0.1 MILLIGRAM(S): at 06:05

## 2024-09-15 RX ADMIN — OXYCODONE HYDROCHLORIDE 10 MILLIGRAM(S): 5 TABLET ORAL at 04:09

## 2024-09-15 RX ADMIN — Medication 500 MILLIGRAM(S): at 00:10

## 2024-09-15 RX ADMIN — OXYCODONE HYDROCHLORIDE 5 MILLIGRAM(S): 5 TABLET ORAL at 03:09

## 2024-09-15 RX ADMIN — Medication 500 MILLIGRAM(S): at 11:46

## 2024-09-15 RX ADMIN — CELECOXIB 200 MILLIGRAM(S): 400 CAPSULE ORAL at 07:04

## 2024-09-15 RX ADMIN — OXYCODONE HYDROCHLORIDE 5 MILLIGRAM(S): 5 TABLET ORAL at 01:03

## 2024-09-15 RX ADMIN — OXYCODONE HYDROCHLORIDE 10 MILLIGRAM(S): 5 TABLET ORAL at 01:02

## 2024-09-15 RX ADMIN — SODIUM CHLORIDE 3 MILLILITER(S): 9 INJECTION INTRAMUSCULAR; INTRAVENOUS; SUBCUTANEOUS at 13:40

## 2024-09-16 VITALS
RESPIRATION RATE: 17 BRPM | OXYGEN SATURATION: 95 % | SYSTOLIC BLOOD PRESSURE: 161 MMHG | TEMPERATURE: 98 F | DIASTOLIC BLOOD PRESSURE: 84 MMHG | HEART RATE: 87 BPM

## 2024-09-16 LAB
GLUCOSE BLDC GLUCOMTR-MCNC: 179 MG/DL — HIGH (ref 70–99)
GLUCOSE BLDC GLUCOMTR-MCNC: 186 MG/DL — HIGH (ref 70–99)
GLUCOSE BLDC GLUCOMTR-MCNC: 232 MG/DL — HIGH (ref 70–99)

## 2024-09-16 PROCEDURE — 85027 COMPLETE CBC AUTOMATED: CPT

## 2024-09-16 PROCEDURE — S2900: CPT

## 2024-09-16 PROCEDURE — C9399: CPT

## 2024-09-16 PROCEDURE — 36415 COLL VENOUS BLD VENIPUNCTURE: CPT

## 2024-09-16 PROCEDURE — C1713: CPT

## 2024-09-16 PROCEDURE — C1776: CPT

## 2024-09-16 PROCEDURE — 82962 GLUCOSE BLOOD TEST: CPT

## 2024-09-16 PROCEDURE — 73560 X-RAY EXAM OF KNEE 1 OR 2: CPT

## 2024-09-16 PROCEDURE — 80048 BASIC METABOLIC PNL TOTAL CA: CPT

## 2024-09-16 PROCEDURE — 80307 DRUG TEST PRSMV CHEM ANLYZR: CPT

## 2024-09-16 PROCEDURE — 99232 SBSQ HOSP IP/OBS MODERATE 35: CPT

## 2024-09-16 RX ORDER — OXYCODONE HYDROCHLORIDE 5 MG/1
1 TABLET ORAL
Qty: 0 | Refills: 0 | DISCHARGE
Start: 2024-09-16

## 2024-09-16 RX ORDER — PANTOPRAZOLE SODIUM 40 MG
1 TABLET, DELAYED RELEASE (ENTERIC COATED) ORAL
Qty: 0 | Refills: 0 | DISCHARGE
Start: 2024-09-16

## 2024-09-16 RX ORDER — TAMSULOSIN HYDROCHLORIDE 0.4 MG/1
1 CAPSULE ORAL
Refills: 0 | DISCHARGE

## 2024-09-16 RX ORDER — SENNA 187 MG
2 TABLET ORAL
Qty: 0 | Refills: 0 | DISCHARGE
Start: 2024-09-16

## 2024-09-16 RX ORDER — TAMSULOSIN HYDROCHLORIDE 0.4 MG/1
1 CAPSULE ORAL
Qty: 0 | Refills: 0 | DISCHARGE
Start: 2024-09-16

## 2024-09-16 RX ORDER — ESCITALOPRAM OXALATE 10 MG/1
1 TABLET ORAL
Qty: 0 | Refills: 0 | DISCHARGE
Start: 2024-09-16

## 2024-09-16 RX ORDER — ACETAMINOPHEN 325 MG/1
3 TABLET ORAL
Qty: 0 | Refills: 0 | DISCHARGE
Start: 2024-09-16

## 2024-09-16 RX ORDER — ASPIRIN 81 MG
1 TABLET, DELAYED RELEASE (ENTERIC COATED) ORAL
Qty: 0 | Refills: 0 | DISCHARGE
Start: 2024-09-16

## 2024-09-16 RX ORDER — CELECOXIB 400 MG/1
1 CAPSULE ORAL
Qty: 0 | Refills: 0 | DISCHARGE
Start: 2024-09-16

## 2024-09-16 RX ORDER — CLONIDINE HCL 0.2 MG
1 TABLET ORAL
Qty: 0 | Refills: 0 | DISCHARGE
Start: 2024-09-16

## 2024-09-16 RX ADMIN — OXYCODONE HYDROCHLORIDE 5 MILLIGRAM(S): 5 TABLET ORAL at 12:00

## 2024-09-16 RX ADMIN — OXYCODONE HYDROCHLORIDE 10 MILLIGRAM(S): 5 TABLET ORAL at 16:30

## 2024-09-16 RX ADMIN — OXYCODONE HYDROCHLORIDE 10 MILLIGRAM(S): 5 TABLET ORAL at 06:14

## 2024-09-16 RX ADMIN — CELECOXIB 200 MILLIGRAM(S): 400 CAPSULE ORAL at 06:12

## 2024-09-16 RX ADMIN — OXYCODONE HYDROCHLORIDE 10 MILLIGRAM(S): 5 TABLET ORAL at 01:11

## 2024-09-16 RX ADMIN — OXYCODONE HYDROCHLORIDE 5 MILLIGRAM(S): 5 TABLET ORAL at 09:03

## 2024-09-16 RX ADMIN — SODIUM CHLORIDE 3 MILLILITER(S): 9 INJECTION INTRAMUSCULAR; INTRAVENOUS; SUBCUTANEOUS at 06:39

## 2024-09-16 RX ADMIN — Medication 81 MILLIGRAM(S): at 16:40

## 2024-09-16 RX ADMIN — ACETAMINOPHEN 975 MILLIGRAM(S): 325 TABLET ORAL at 15:36

## 2024-09-16 RX ADMIN — OXYCODONE HYDROCHLORIDE 5 MILLIGRAM(S): 5 TABLET ORAL at 15:36

## 2024-09-16 RX ADMIN — ACETAMINOPHEN 975 MILLIGRAM(S): 325 TABLET ORAL at 16:30

## 2024-09-16 RX ADMIN — OXYCODONE HYDROCHLORIDE 5 MILLIGRAM(S): 5 TABLET ORAL at 04:07

## 2024-09-16 RX ADMIN — OXYCODONE HYDROCHLORIDE 10 MILLIGRAM(S): 5 TABLET ORAL at 04:07

## 2024-09-16 RX ADMIN — OXYCODONE HYDROCHLORIDE 5 MILLIGRAM(S): 5 TABLET ORAL at 00:11

## 2024-09-16 RX ADMIN — Medication 81 MILLIGRAM(S): at 06:14

## 2024-09-16 RX ADMIN — Medication 2: at 15:39

## 2024-09-16 RX ADMIN — CELECOXIB 200 MILLIGRAM(S): 400 CAPSULE ORAL at 07:12

## 2024-09-16 RX ADMIN — ESCITALOPRAM OXALATE 20 MILLIGRAM(S): 10 TABLET ORAL at 12:00

## 2024-09-16 RX ADMIN — OXYCODONE HYDROCHLORIDE 5 MILLIGRAM(S): 5 TABLET ORAL at 03:07

## 2024-09-16 RX ADMIN — CELECOXIB 200 MILLIGRAM(S): 400 CAPSULE ORAL at 17:40

## 2024-09-16 RX ADMIN — OXYCODONE HYDROCHLORIDE 10 MILLIGRAM(S): 5 TABLET ORAL at 18:15

## 2024-09-16 RX ADMIN — Medication 4: at 11:58

## 2024-09-16 RX ADMIN — Medication 40 MILLIGRAM(S): at 06:12

## 2024-09-16 RX ADMIN — Medication 3 UNIT(S): at 11:59

## 2024-09-16 RX ADMIN — OXYCODONE HYDROCHLORIDE 5 MILLIGRAM(S): 5 TABLET ORAL at 16:30

## 2024-09-16 RX ADMIN — Medication 0.1 MILLIGRAM(S): at 06:15

## 2024-09-16 RX ADMIN — OXYCODONE HYDROCHLORIDE 5 MILLIGRAM(S): 5 TABLET ORAL at 19:10

## 2024-09-16 RX ADMIN — Medication 500 MILLIGRAM(S): at 06:13

## 2024-09-16 RX ADMIN — ACETAMINOPHEN 975 MILLIGRAM(S): 325 TABLET ORAL at 06:12

## 2024-09-16 RX ADMIN — OXYCODONE HYDROCHLORIDE 5 MILLIGRAM(S): 5 TABLET ORAL at 07:14

## 2024-09-16 RX ADMIN — OXYCODONE HYDROCHLORIDE 10 MILLIGRAM(S): 5 TABLET ORAL at 03:07

## 2024-09-16 RX ADMIN — OXYCODONE HYDROCHLORIDE 5 MILLIGRAM(S): 5 TABLET ORAL at 13:00

## 2024-09-16 RX ADMIN — Medication 500 MILLIGRAM(S): at 11:59

## 2024-09-16 RX ADMIN — Medication 3 UNIT(S): at 08:08

## 2024-09-16 RX ADMIN — OXYCODONE HYDROCHLORIDE 10 MILLIGRAM(S): 5 TABLET ORAL at 19:10

## 2024-09-16 RX ADMIN — OXYCODONE HYDROCHLORIDE 10 MILLIGRAM(S): 5 TABLET ORAL at 10:00

## 2024-09-16 RX ADMIN — OXYCODONE HYDROCHLORIDE 10 MILLIGRAM(S): 5 TABLET ORAL at 15:36

## 2024-09-16 RX ADMIN — OXYCODONE HYDROCHLORIDE 5 MILLIGRAM(S): 5 TABLET ORAL at 01:11

## 2024-09-16 RX ADMIN — ACETAMINOPHEN 975 MILLIGRAM(S): 325 TABLET ORAL at 07:12

## 2024-09-16 RX ADMIN — OXYCODONE HYDROCHLORIDE 5 MILLIGRAM(S): 5 TABLET ORAL at 06:14

## 2024-09-16 RX ADMIN — Medication 500 MILLIGRAM(S): at 16:40

## 2024-09-16 RX ADMIN — OXYCODONE HYDROCHLORIDE 10 MILLIGRAM(S): 5 TABLET ORAL at 00:11

## 2024-09-16 RX ADMIN — BUSPIRONE HYDROCHLORIDE 10 MILLIGRAM(S): 30 TABLET ORAL at 06:13

## 2024-09-16 RX ADMIN — Medication 0.1 MILLIGRAM(S): at 16:40

## 2024-09-16 RX ADMIN — CELECOXIB 200 MILLIGRAM(S): 400 CAPSULE ORAL at 16:40

## 2024-09-16 RX ADMIN — OXYCODONE HYDROCHLORIDE 10 MILLIGRAM(S): 5 TABLET ORAL at 12:00

## 2024-09-16 RX ADMIN — OXYCODONE HYDROCHLORIDE 10 MILLIGRAM(S): 5 TABLET ORAL at 07:14

## 2024-09-16 RX ADMIN — OXYCODONE HYDROCHLORIDE 5 MILLIGRAM(S): 5 TABLET ORAL at 10:00

## 2024-09-16 RX ADMIN — Medication 3 UNIT(S): at 15:39

## 2024-09-16 RX ADMIN — OXYCODONE HYDROCHLORIDE 10 MILLIGRAM(S): 5 TABLET ORAL at 09:03

## 2024-09-16 RX ADMIN — OXYCODONE HYDROCHLORIDE 5 MILLIGRAM(S): 5 TABLET ORAL at 18:15

## 2024-09-16 RX ADMIN — Medication 500 MILLIGRAM(S): at 00:12

## 2024-09-16 RX ADMIN — Medication 2: at 08:08

## 2024-09-16 RX ADMIN — BUSPIRONE HYDROCHLORIDE 10 MILLIGRAM(S): 30 TABLET ORAL at 17:42

## 2024-09-16 RX ADMIN — OXYCODONE HYDROCHLORIDE 10 MILLIGRAM(S): 5 TABLET ORAL at 13:00

## 2024-09-16 RX ADMIN — SODIUM CHLORIDE 3 MILLILITER(S): 9 INJECTION INTRAMUSCULAR; INTRAVENOUS; SUBCUTANEOUS at 15:36

## 2024-09-16 NOTE — PROGRESS NOTE ADULT - ASSESSMENT
57 yo M PMH of HTN, HLD, MARIBEL, BPH, anxiety and depression, ETOH and recreational drug use (states last used in 5/2024), presents with c/o severe left knee osteoarthritis. States that his left knee pain is constant and is significantly limiting his ADLs. Patient states that his left knee occasionally leonela or gives out on him. Currently resides in [facility] living [facility]. Takes Suboxone, currently in the process of weaning. Reports Hx of left knee fracture in the past with arthroscopic repair. 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 Denies any recent fevers, chills, injuries, numbness or tingling in the lower extremities.   Pt now s/p left total knee arthroplasty on 9/13.    Primary osteoarthritis, left knee  - s/p left total knee arthroplasty  - Post op ABX as per SCIP  - DVT ppx/Pain control as per Ortho  - PT/Mobilize   - Incentive spirometer    Diabetes Mellitus   - A1C 7.8   - BG running high since last night   - Pt non compliant with diet   - Start Insulin 10 units at night and 3 units with meals for now and adjust   - Insulin sliding scale   - Monitor BG   - Diabetic diet   - Diabetic education consult     HTN   HLD   - c/w Clonidine and Statin     Anxiety and Depression  - c/w home meds     DVT ppx - per Ortho team   
57 yo M PMH of HTN, HLD, MARIBEL, BPH, anxiety and depression, ETOH and recreational drug use (states last used in 5/2024), presents with c/o severe left knee osteoarthritis. States that his left knee pain is constant and is significantly limiting his ADLs. Patient states that his left knee occasionally leonela or gives out on him. Currently resides in [facility] living [facility]. Takes Suboxone, currently in the process of weaning. Reports Hx of left knee fracture in the past with arthroscopic repair. 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 Denies any recent fevers, chills, injuries, numbness or tingling in the lower extremities.   Pt now s/p left total knee arthroplasty on 9/13.    Primary osteoarthritis, left knee  - s/p left total knee arthroplasty  - Post op ABX as per SCIP  - DVT ppx/Pain control as per Ortho  - PT/Mobilize   - Incentive spirometer    Diabetes Mellitus   - A1C 7.8   - BG improving   - Pt non compliant with diet   - c/w Insulin 10 units at night and 3 units with meals for now and adjust   - Insulin sliding scale   - Monitor BG   - Diabetic diet   - Diabetic education consult     HTN   HLD   - c/w Clonidine and Statin   - BP trends on the higher side, likely due to pain     Anxiety and Depression  - c/w home meds     DVT ppx - per Ortho team

## 2024-09-16 NOTE — PROGRESS NOTE ADULT - SUBJECTIVE AND OBJECTIVE BOX
MOSES ORANTESONEY  [id number]    History: 58y Male is status post left total knee arthroplasty on 9/13, POD#2. Patient is doing well and is comfortable. The patient's pain is controlled using the prescribed pain medications. The patient is participating in physical therapy. Denies nausea, vomiting, chest pain, shortness of breath, abdominal pain or fever. No new orthopedic complaints.    Glucose 263 @ 7:30 am                            11.6   14.43 )-----------( 210      ( 14 Sep 2024 05:30 )             34.9     09-14    135  |  97  |  17.3  ----------------------------<  307<H>  4.4   |  22.0  |  0.90    Ca    8.3<L>      14 Sep 2024 05:30        MEDICATIONS  (STANDING):  acetaminophen     Tablet .. 975 milliGRAM(s) Oral every 8 hours  aspirin enteric coated 81 milliGRAM(s) Oral two times a day  atorvastatin 40 milliGRAM(s) Oral at bedtime  busPIRone 10 milliGRAM(s) Oral two times a day  celecoxib 200 milliGRAM(s) Oral every 12 hours  cephalexin 500 milliGRAM(s) Oral four times a day  cloNIDine 0.1 milliGRAM(s) Oral two times a day  dextrose 5%. 1000 milliLiter(s) (100 mL/Hr) IV Continuous <Continuous>  dextrose 5%. 1000 milliLiter(s) (50 mL/Hr) IV Continuous <Continuous>  dextrose 50% Injectable 25 Gram(s) IV Push once  dextrose 50% Injectable 25 Gram(s) IV Push once  dextrose 50% Injectable 12.5 Gram(s) IV Push once  escitalopram 20 milliGRAM(s) Oral daily  glucagon  Injectable 1 milliGRAM(s) IntraMuscular once  influenza   Vaccine 0.5 milliLiter(s) IntraMuscular once  insulin lispro (ADMELOG) corrective regimen sliding scale   SubCutaneous at bedtime  insulin lispro (ADMELOG) corrective regimen sliding scale   SubCutaneous three times a day before meals  oxyCODONE    IR 10 milliGRAM(s) Oral every 3 hours  oxyCODONE    IR 5 milliGRAM(s) Oral every 3 hours  pantoprazole    Tablet 40 milliGRAM(s) Oral before breakfast  QUEtiapine 25 milliGRAM(s) Oral at bedtime  senna 2 Tablet(s) Oral at bedtime  sodium chloride 0.9% lock flush 3 milliLiter(s) IV Push every 8 hours  sodium chloride 0.9%. 1000 milliLiter(s) (150 mL/Hr) IV Continuous <Continuous>  tamsulosin 0.4 milliGRAM(s) Oral at bedtime    MEDICATIONS  (PRN):  aluminum hydroxide/magnesium hydroxide/simethicone Suspension 30 milliLiter(s) Oral four times a day PRN Indigestion  bisacodyl Suppository 10 milliGRAM(s) Rectal once PRN Constipation  dextrose Oral Gel 15 Gram(s) Oral once PRN Blood Glucose LESS THAN 70 milliGRAM(s)/deciliter  HYDROmorphone   Tablet 4 milliGRAM(s) Oral every 3 hours PRN Severe Pain (7 - 10)  HYDROmorphone  Injectable 0.5 milliGRAM(s) IV Push every 5 minutes PRN Moderate Pain (4 - 6)  magnesium hydroxide Suspension 30 milliLiter(s) Oral daily PRN Constipation  ondansetron Injectable 4 milliGRAM(s) IV Push every 6 hours PRN Nausea and/or Vomiting      Physical exam: The ace bandage was removed. Mepilex remains clean dry and intact. . No drainage or discharge. No erythema is noted. No blistering. No ecchymosis. The calf is supple nontender. Passive range of motion is acceptable to due postoperative pain. No calf tenderness. Sensation to light touch is grossly intact distally. Motor function distally is 5/5. Extensor hallucis longus and flexor hallucis longus are intact. No foot drop. 2+ dorsalis pedis pulse. Capillary refill is less than 2 seconds. No cyanosis.    Primary Orthopedic Assessment:  •	s/p LEFT total knee replacement    Secondary  Orthopedic Assessment(s):   •	    Secondary  Medical Assessment(s):   •	    Plan:   •	DVT prophylaxis as prescribed, including use of compression devices and ankle pumps  •	Continue physical therapy  •	Weightbearing as tolerated of the Left lower extremity with assistance of a walker  •	Incentive spirometry encouraged  •	Pain control as clinically indicated  •	Diabetic management per medical team / medical optimization for discharge planning Monday to [facility]   
[facility] Division of Hospital Medicine    Chief Complaint:  Left TKA    SUBJECTIVE / OVERNIGHT EVENTS:  BG improving   Pt reports his pain is not controlled today  BP running high likely due to this     Patient denies chest pain, SOB, abd pain, N/V, fever, chills, dysuria or any other complaints. All remainder ROS negative.     MEDICATIONS  (STANDING):  acetaminophen     Tablet .. 975 milliGRAM(s) Oral every 8 hours  aspirin enteric coated 81 milliGRAM(s) Oral two times a day  atorvastatin 40 milliGRAM(s) Oral at bedtime  busPIRone 10 milliGRAM(s) Oral two times a day  celecoxib 200 milliGRAM(s) Oral every 12 hours  cephalexin 500 milliGRAM(s) Oral four times a day  cloNIDine 0.1 milliGRAM(s) Oral two times a day  dextrose 5%. 1000 milliLiter(s) (100 mL/Hr) IV Continuous <Continuous>  dextrose 5%. 1000 milliLiter(s) (50 mL/Hr) IV Continuous <Continuous>  dextrose 50% Injectable 12.5 Gram(s) IV Push once  dextrose 50% Injectable 25 Gram(s) IV Push once  dextrose 50% Injectable 25 Gram(s) IV Push once  escitalopram 20 milliGRAM(s) Oral daily  glucagon  Injectable 1 milliGRAM(s) IntraMuscular once  influenza   Vaccine 0.5 milliLiter(s) IntraMuscular once  insulin glargine Injectable (LANTUS) 10 Unit(s) SubCutaneous at bedtime  insulin lispro (ADMELOG) corrective regimen sliding scale   SubCutaneous three times a day before meals  insulin lispro (ADMELOG) corrective regimen sliding scale   SubCutaneous at bedtime  insulin lispro Injectable (ADMELOG) 3 Unit(s) SubCutaneous three times a day before meals  oxyCODONE    IR 5 milliGRAM(s) Oral every 3 hours  oxyCODONE    IR 10 milliGRAM(s) Oral every 3 hours  pantoprazole    Tablet 40 milliGRAM(s) Oral before breakfast  QUEtiapine 25 milliGRAM(s) Oral at bedtime  senna 2 Tablet(s) Oral at bedtime  sodium chloride 0.9% lock flush 3 milliLiter(s) IV Push every 8 hours  sodium chloride 0.9%. 1000 milliLiter(s) (150 mL/Hr) IV Continuous <Continuous>  tamsulosin 0.4 milliGRAM(s) Oral at bedtime    MEDICATIONS  (PRN):  aluminum hydroxide/magnesium hydroxide/simethicone Suspension 30 milliLiter(s) Oral four times a day PRN Indigestion  bisacodyl Suppository 10 milliGRAM(s) Rectal once PRN Constipation  dextrose Oral Gel 15 Gram(s) Oral once PRN Blood Glucose LESS THAN 70 milliGRAM(s)/deciliter  HYDROmorphone   Tablet 4 milliGRAM(s) Oral every 3 hours PRN Severe Pain (7 - 10)  HYDROmorphone  Injectable 0.5 milliGRAM(s) IV Push every 5 minutes PRN Moderate Pain (4 - 6)  magnesium hydroxide Suspension 30 milliLiter(s) Oral daily PRN Constipation  ondansetron Injectable 4 milliGRAM(s) IV Push every 6 hours PRN Nausea and/or Vomiting        I&O's Summary    15 Sep 2024 07:01  -  16 Sep 2024 07:00  --------------------------------------------------------  IN: 0 mL / OUT: 500 mL / NET: -500 mL        PHYSICAL EXAM:  Vital Signs Last 24 Hrs  T(C): 36.7 (16 Sep 2024 08:20), Max: 36.7 (15 Sep 2024 20:00)  T(F): 98.1 (16 Sep 2024 08:20), Max: 98.1 (16 Sep 2024 08:20)  HR: 83 (16 Sep 2024 08:20) (69 - 83)  BP: 163/81 (16 Sep 2024 08:20) (156/71 - 187/97)  BP(mean): --  RR: 17 (16 Sep 2024 08:20) (16 - 18)  SpO2: 94% (16 Sep 2024 08:20) (93% - 96%)    Parameters below as of 16 Sep 2024 08:20  Patient On (Oxygen Delivery Method): room air          CONSTITUTIONAL: Awake, alert and in no apparent distress  CARDIAC: Normal rate, regular rhythm.  Heart sounds S1, S2.     RESPIRATORY: Breath sounds clear and equal bilaterally.  ABDOMINAL: Nontender to palpation. No rebound/ guarding   EXTREMITIES: L knee dressing c/d/i   NEUROLOGICAL: Alert and oriented, no focal deficits, no motor or sensory deficits         CAPILLARY BLOOD GLUCOSE      POCT Blood Glucose.: 179 mg/dL (16 Sep 2024 07:58)  POCT Blood Glucose.: 159 mg/dL (15 Sep 2024 20:48)  POCT Blood Glucose.: 200 mg/dL (15 Sep 2024 18:35)  POCT Blood Glucose.: 345 mg/dL (15 Sep 2024 13:14)  POCT Blood Glucose.: 349 mg/dL (15 Sep 2024 11:46)    
  History: Patient is status post left total knee arthroplasty on 9/13, POD # 0.   Patient is doing well and is comfortable.   PT pending  Denies nausea, vomiting, chest pain, shortness of breath, abdominal pain or fever. No new complaints.    Vital Signs Last 24 Hrs  T(C): 36.1 (13 Sep 2024 10:05), Max: 36.1 (13 Sep 2024 10:05)  T(F): 97 (13 Sep 2024 10:05), Max: 97 (13 Sep 2024 10:05)  HR: 75 (13 Sep 2024 10:05) (75 - 75)  BP: 142/68 (13 Sep 2024 10:05) (142/68 - 142/68)  BP(mean): --  RR: 16 (13 Sep 2024 10:05) (16 - 16)  SpO2: 96% (13 Sep 2024 10:05) (96% - 96%)    Parameters below as of 13 Sep 2024 10:05  Patient On (Oxygen Delivery Method): room air        Physical exam: The left knee dressing wrapped with ace, CDI. No drainage or discharge.  The calf is supple nontender.   Passive range of motion is acceptable to due postoperative pain.   No calf tenderness. Sensation to light touch is grossly intact distally.   Motor function distally is 5/5. Extensor hallucis longus and flexor hallucis longus are intact.   No foot drop. 2+ dorsalis pedis pulse. Capillary refill is less than 2 seconds. No cyanosis.    Primary Orthopedic Assessment:  • s/p LEFT total knee replacement    Plan:   • DVT prophylaxis as prescribed, including use of compression devices and ankle pumps  • Continue physical therapy  • Weightbearing as tolerated of the left lower extremity with assistance of a walker  • Incentive spirometry encouraged  • Pain control as clinically indicated  • Ancef per scip  • PM consult
MOSES HANKINS  [id number]    History: 58y Male is status post left total knee arthroplasty on 9/13, POD#3. Patient is doing well and is comfortable. The patient's pain is controlled using the prescribed pain medications. The patient is participating in physical therapy. Denies nausea, vomiting, chest pain, shortness of breath, abdominal pain or fever. No new orthopedic complaints.    Last glucose 159 9/15          Physical exam: The ace bandage was removed. Mepilex remains clean dry and intact. . No drainage or discharge. No erythema is noted. No blistering. No ecchymosis. The calf is supple nontender. Passive range of motion is acceptable to due postoperative pain. No calf tenderness. Sensation to light touch is grossly intact distally. Motor function distally is 5/5. Extensor hallucis longus and flexor hallucis longus are intact. No foot drop. 2+ dorsalis pedis pulse. Capillary refill is less than 2 seconds. No cyanosis.    Primary Orthopedic Assessment:  •	s/p LEFT total knee replacement        Plan:   •	DVT prophylaxis as prescribed, including use of compression devices and ankle pumps  •	Continue physical therapy  •	Weightbearing as tolerated of the Left lower extremity with assistance of a walker  •	Incentive spirometry encouraged  •	Pain control as clinically indicated  •	Diabetic management per medical team / medical optimization for discharge planning to [facility]   
[facility] Division of Hospital Medicine    Chief Complaint:  Left TKA    SUBJECTIVE / OVERNIGHT EVENTS:  BG running high   Pt reports he was recently started on Metformin   Pt also not compliant with diet, had coffee this morning with a lot of sugar   Per RN, pt has also been drinking a lot of soda     Patient denies chest pain, SOB, abd pain, N/V, fever, chills, dysuria or any other complaints. All remainder ROS negative.     MEDICATIONS  (STANDING):  acetaminophen     Tablet .. 975 milliGRAM(s) Oral every 8 hours  aspirin enteric coated 81 milliGRAM(s) Oral two times a day  atorvastatin 40 milliGRAM(s) Oral at bedtime  busPIRone 10 milliGRAM(s) Oral two times a day  celecoxib 200 milliGRAM(s) Oral every 12 hours  cephalexin 500 milliGRAM(s) Oral four times a day  cloNIDine 0.1 milliGRAM(s) Oral two times a day  dextrose 5%. 1000 milliLiter(s) (100 mL/Hr) IV Continuous <Continuous>  dextrose 5%. 1000 milliLiter(s) (50 mL/Hr) IV Continuous <Continuous>  dextrose 50% Injectable 12.5 Gram(s) IV Push once  dextrose 50% Injectable 25 Gram(s) IV Push once  dextrose 50% Injectable 25 Gram(s) IV Push once  escitalopram 20 milliGRAM(s) Oral daily  glucagon  Injectable 1 milliGRAM(s) IntraMuscular once  influenza   Vaccine 0.5 milliLiter(s) IntraMuscular once  insulin glargine Injectable (LANTUS) 10 Unit(s) SubCutaneous at bedtime  insulin lispro (ADMELOG) corrective regimen sliding scale   SubCutaneous three times a day before meals  insulin lispro (ADMELOG) corrective regimen sliding scale   SubCutaneous at bedtime  insulin lispro Injectable (ADMELOG) 3 Unit(s) SubCutaneous three times a day before meals  oxyCODONE    IR 5 milliGRAM(s) Oral every 3 hours  oxyCODONE    IR 10 milliGRAM(s) Oral every 3 hours  pantoprazole    Tablet 40 milliGRAM(s) Oral before breakfast  QUEtiapine 25 milliGRAM(s) Oral at bedtime  senna 2 Tablet(s) Oral at bedtime  sodium chloride 0.9% lock flush 3 milliLiter(s) IV Push every 8 hours  sodium chloride 0.9%. 1000 milliLiter(s) (150 mL/Hr) IV Continuous <Continuous>  tamsulosin 0.4 milliGRAM(s) Oral at bedtime    MEDICATIONS  (PRN):  aluminum hydroxide/magnesium hydroxide/simethicone Suspension 30 milliLiter(s) Oral four times a day PRN Indigestion  bisacodyl Suppository 10 milliGRAM(s) Rectal once PRN Constipation  dextrose Oral Gel 15 Gram(s) Oral once PRN Blood Glucose LESS THAN 70 milliGRAM(s)/deciliter  HYDROmorphone   Tablet 4 milliGRAM(s) Oral every 3 hours PRN Severe Pain (7 - 10)  HYDROmorphone  Injectable 0.5 milliGRAM(s) IV Push every 5 minutes PRN Moderate Pain (4 - 6)  magnesium hydroxide Suspension 30 milliLiter(s) Oral daily PRN Constipation  ondansetron Injectable 4 milliGRAM(s) IV Push every 6 hours PRN Nausea and/or Vomiting        I&O's Summary    14 Sep 2024 07:01  -  15 Sep 2024 07:00  --------------------------------------------------------  IN: 0 mL / OUT: 950 mL / NET: -950 mL    15 Sep 2024 07:01  -  15 Sep 2024 13:12  --------------------------------------------------------  IN: 0 mL / OUT: 500 mL / NET: -500 mL        PHYSICAL EXAM:  Vital Signs Last 24 Hrs  T(C): 36.5 (15 Sep 2024 08:29), Max: 36.9 (14 Sep 2024 15:36)  T(F): 97.7 (15 Sep 2024 08:29), Max: 98.4 (14 Sep 2024 15:36)  HR: 82 (15 Sep 2024 08:29) (63 - 82)  BP: 167/78 (15 Sep 2024 08:29) (137/66 - 176/80)  BP(mean): --  RR: 18 (15 Sep 2024 08:29) (16 - 18)  SpO2: 92% (15 Sep 2024 08:29) (92% - 95%)    Parameters below as of 15 Sep 2024 08:29  Patient On (Oxygen Delivery Method): room air        CONSTITUTIONAL: Awake, alert and in no apparent distress  CARDIAC: Normal rate, regular rhythm.  Heart sounds S1, S2.     RESPIRATORY: Breath sounds clear and equal bilaterally.  ABDOMINAL: Nontender to palpation. No rebound/ guarding   EXTREMITIES: L knee dressing c/d/i   NEUROLOGICAL: Alert and oriented, no focal deficits, no motor or sensory deficits       LABS:                        11.6   14.43 )-----------( 210      ( 14 Sep 2024 05:30 )             34.9     09-14    135  |  97  |  17.3  ----------------------------<  307<H>  4.4   |  22.0  |  0.90    Ca    8.3<L>      14 Sep 2024 05:30            Urinalysis Basic - ( 14 Sep 2024 05:30 )    Color: x / Appearance: x / SG: x / pH: x  Gluc: 307 mg/dL / Ketone: x  / Bili: x / Urobili: x   Blood: x / Protein: x / Nitrite: x   Leuk Esterase: x / RBC: x / WBC x   Sq Epi: x / Non Sq Epi: x / Bacteria: x        CAPILLARY BLOOD GLUCOSE      POCT Blood Glucose.: 349 mg/dL (15 Sep 2024 11:46)  POCT Blood Glucose.: 263 mg/dL (15 Sep 2024 07:30)  POCT Blood Glucose.: 261 mg/dL (15 Sep 2024 03:11)  POCT Blood Glucose.: 299 mg/dL (15 Sep 2024 00:12)  POCT Blood Glucose.: 259 mg/dL (14 Sep 2024 22:37)  POCT Blood Glucose.: 291 mg/dL (14 Sep 2024 21:05)    
MOSES ORANTESONEY  [id number]    History: 58y Male is status post left total knee arthroplasty on 9/13, POD#1.  Patient is doing well and is comfortable. The patient's pain is controlled using the prescribed pain medications. The patient is participating in physical therapy. Denies nausea, vomiting, chest pain, shortness of breath, abdominal pain or fever. No new orthopedic complaints.                          11.6   14.43 )-----------( 210      ( 14 Sep 2024 05:30 )             34.9     09-14    135  |  97  |  17.3  ----------------------------<  307<H>  4.4   |  22.0  |  0.90    Ca    8.3<L>      14 Sep 2024 05:30        MEDICATIONS  (STANDING):  acetaminophen     Tablet .. 975 milliGRAM(s) Oral every 8 hours  aspirin enteric coated 81 milliGRAM(s) Oral two times a day  atorvastatin 40 milliGRAM(s) Oral at bedtime  busPIRone 10 milliGRAM(s) Oral two times a day  cloNIDine 0.1 milliGRAM(s) Oral two times a day  escitalopram 20 milliGRAM(s) Oral daily  influenza   Vaccine 0.5 milliLiter(s) IntraMuscular once  ketorolac   Injectable 15 milliGRAM(s) IV Push every 6 hours  oxyCODONE    IR 5 milliGRAM(s) Oral every 3 hours  oxyCODONE    IR 10 milliGRAM(s) Oral every 3 hours  pantoprazole    Tablet 40 milliGRAM(s) Oral before breakfast  QUEtiapine 12.5 milliGRAM(s) Oral at bedtime  senna 2 Tablet(s) Oral at bedtime  sodium chloride 0.9% lock flush 3 milliLiter(s) IV Push every 8 hours  sodium chloride 0.9%. 1000 milliLiter(s) (150 mL/Hr) IV Continuous <Continuous>  tamsulosin 0.4 milliGRAM(s) Oral at bedtime    MEDICATIONS  (PRN):  aluminum hydroxide/magnesium hydroxide/simethicone Suspension 30 milliLiter(s) Oral four times a day PRN Indigestion  HYDROmorphone   Tablet 4 milliGRAM(s) Oral every 3 hours PRN Severe Pain (7 - 10)  HYDROmorphone  Injectable 0.5 milliGRAM(s) IV Push every 5 minutes PRN Moderate Pain (4 - 6)  magnesium hydroxide Suspension 30 milliLiter(s) Oral daily PRN Constipation  ondansetron Injectable 4 milliGRAM(s) IV Push every 6 hours PRN Nausea and/or Vomiting      Physical exam: The left knee surgical dressing is clean, dry and intact. No drainage or discharge. No erythema is noted. No blistering. No ecchymosis. The calf is supple nontender. Passive range of motion is acceptable to due postoperative pain. No calf tenderness. Sensation to light touch is grossly intact distally. Motor function distally is 5/5. Extensor hallucis longus and flexor hallucis longus are intact. No foot drop. 2+ dorsalis pedis pulse. Capillary refill is less than 2 seconds. No cyanosis.    Primary Orthopedic Assessment:  •	s/p LEFT total knee replacement    Secondary  Orthopedic Assessment(s):   •	    Secondary  Medical Assessment(s):   •	    Plan:   •	DVT prophylaxis as prescribed, including use of compression devices and ankle pumps  •	Continue physical therapy  •	Weightbearing as tolerated of the Left lower extremity with assistance of a walker  •	Incentive spirometry encouraged  •	Pain control as clinically indicated  •	Discharge planning – anticipated discharge is [facility]

## 2024-09-17 ENCOUNTER — APPOINTMENT (OUTPATIENT)
Dept: UROLOGY | Facility: CLINIC | Age: 59
End: 2024-09-17

## 2024-09-19 PROBLEM — E11.9 TYPE 2 DIABETES MELLITUS WITHOUT COMPLICATIONS: Chronic | Status: ACTIVE | Noted: 2024-08-26

## 2024-09-19 PROBLEM — M17.12 UNILATERAL PRIMARY OSTEOARTHRITIS, LEFT KNEE: Chronic | Status: ACTIVE | Noted: 2024-08-26

## 2024-09-19 LAB — GLUCOSE BLDC GLUCOMTR-MCNC: 126 MG/DL — HIGH (ref 70–99)

## 2024-09-23 NOTE — ED BEHAVIORAL HEALTH PROGRESS NOTE - NSBHMSETHTPROC_PSY_A_CORE
----- Message from Paradise PRUETT sent at 9/19/2024  2:44 PM EDT -----  Regarding: FW: nocturia    ----- Message -----  From: Ofelia Birmingham PA-C  Sent: 9/19/2024   2:39 PM EDT  To: [facility]  Subject: FW: nocturia                                     Previously saw this patient in [facility] office May 2024.  His PCP is reaching out for repeat appointment due to worsening nocturia.  Please schedule him with one of the APs at [facility] for nonurgent follow-up.  Thank you!  ----- Message -----  From: Tj Lara DO  Sent: 9/18/2024  10:26 AM EDT  To: Ofelia Birmingham PA-C  Subject: FW: nocturia                                       ----- Message -----  From: MOE Lewis  Sent: 9/18/2024   9:37 AM EDT  To: Tj Lara DO  Subject: RE: nocturia                                     Good Morning,     Please forward to Urology service line. I am no longer with Urology. Thank you.     MOE Arrieta  Interventional Radiology  ----- Message -----  From: Tj Lara DO  Sent: 9/18/2024   9:04 AM EDT  To: MOE Lewis  Subject: nocturia                                         Saw mutual patient today for the sleeping concerns he messaged you. His frequent night time awakenings are 2/2 nocturia. Discussed with patient on avoiding bladder irritants and limiting fluids after 6pm but patient continues to have symptoms. May need follow up with you.  
Spoke with pt and scheduled for 10-16 with Shantal at [facility].  
Linear

## 2024-09-27 ENCOUNTER — APPOINTMENT (OUTPATIENT)
Dept: GASTROENTEROLOGY | Facility: CLINIC | Age: 59
End: 2024-09-27

## 2024-10-01 ENCOUNTER — APPOINTMENT (OUTPATIENT)
Dept: GASTROENTEROLOGY | Facility: GI CENTER | Age: 59
End: 2024-10-01

## 2024-10-01 DIAGNOSIS — Z86.0100 PERSONAL HISTORY OF COLON POLYPS, UNSPECIFIED: ICD-10-CM

## 2024-10-01 NOTE — PHYSICAL EXAM
[Alert] : alert [Normal Voice/Communication] : normal voice/communication [Healthy Appearing] : healthy appearing [No Acute Distress] : no acute distress [Sclera] : the sclera and conjunctiva were normal [Hearing Threshold Finger Rub Not [place]] : hearing was normal [Normal Appearance] : the appearance of the neck was normal [No Respiratory Distress] : no respiratory distress [No Acc Muscle Use] : no accessory muscle use [Respiration, Rhythm And Depth] : normal respiratory rhythm and effort [Heart Rate And Rhythm] : heart rate was normal and rhythm regular [Abdomen Tenderness] : non-tender [No Masses] : no abdominal mass palpated [Abdomen Soft] : soft [Abnormal Walk] : normal gait [Normal Color / Pigmentation] : normal skin color and pigmentation [No Focal Deficits] : no focal deficits [Oriented To Time, Place, And Person] : oriented to person, place, and time

## 2024-10-01 NOTE — HISTORY OF PRESENT ILLNESS
[de-identified] : 1/2024- poor prep throughout. Repeat in 6-12 mo.   4 years ago. Hiatal hernia per pt, otherwise it was normal.  [FreeTextEntry1] : History of colon polyps. Doesn't have reports, but gets colonoscopies every 4 years. Last was over 4 years ago. -

## 2024-10-02 ENCOUNTER — APPOINTMENT (OUTPATIENT)
Dept: ORTHOPEDIC SURGERY | Facility: CLINIC | Age: 59
End: 2024-10-02
Payer: MEDICAID

## 2024-10-02 DIAGNOSIS — Z96.652 PRESENCE OF LEFT ARTIFICIAL KNEE JOINT: ICD-10-CM

## 2024-10-02 PROCEDURE — 99024 POSTOP FOLLOW-UP VISIT: CPT

## 2024-10-02 PROCEDURE — 73562 X-RAY EXAM OF KNEE 3: CPT | Mod: LT

## 2024-10-02 NOTE — HISTORY OF PRESENT ILLNESS
[Chills] : no chills [Constipation] : no constipation [Diarrhea] : no diarrhea [Dysuria] : no dysuria [Fever] : no fever [Nausea] : no nausea [Vomiting] : no vomiting [de-identified] :  Left total knee arthroplasty with Néstor, removal of hardware. [de-identified] : 59 year old male presents to office 3 weeks  status post left total knee arthroplasty with removal of hardware, date of surgery 9/16/24. Overall the patient is doing well. [facility]. States pain in knee is well controlled. Does report pain and soreness in thigh. Has been taking oxycodone for pain. Ambulating with the use of a walker. Has been taking ASA for DVT prophylaxis as directed. Has been participating in at home physical therapy. Denies any recent injuries, falls, trauma, incisional issues, erythema, drainage, discharge, chest pain, shortness of breath, swelling, calf tenderness, numbness/tingling. [de-identified] : left lower extremity: Incision well-healed without erythema drainage or discharge, knee range of motion 5-90 limited by pain, no instability varus valgus stress, negative anterior drawer, 5 out of 5 strength in foot plantar flexion and dorsiflexion, nontender palpation over the calf, no significant swelling noted. [de-identified] : 3 views of the left knee taken in office today show no acute fracture or dislocation, there is a left total knee arthroplasty in appropriate alignment with evidence loosening or hardware compromise. [de-identified] : 59-year-old male presents to the office for 3-week follow-up status post left total knee arthroplasty and hardware removal, date of surgery 9/13/2024.  Overall the patient is doing well.  I am happy with his progress.  I recommend he continue with physical therapy.  May continue to take oxycodone as needed for pain.  I reassured the patient that the pain in his thigh is likely due to the placement of the tourniquet and will resolve.  Patient may continue to weight-bear as tolerated.  He should continue his aspirin for DVT prophylaxis as directed.  Patient will follow-up in 3 weeks for repeat evaluation and imaging.  All questions addressed, patient in agreement.

## 2024-10-08 ENCOUNTER — APPOINTMENT (OUTPATIENT)
Dept: UROLOGY | Facility: CLINIC | Age: 59
End: 2024-10-08
Payer: MEDICAID

## 2024-10-08 VITALS — SYSTOLIC BLOOD PRESSURE: 113 MMHG | HEART RATE: 81 BPM | DIASTOLIC BLOOD PRESSURE: 68 MMHG

## 2024-10-08 PROCEDURE — 99213 OFFICE O/P EST LOW 20 MIN: CPT

## 2024-10-08 RX ORDER — ESCITALOPRAM OXALATE 20 MG/1
20 TABLET ORAL DAILY
Qty: 30 | Refills: 0 | Status: ACTIVE | COMMUNITY
Start: 2024-10-08

## 2024-10-08 RX ORDER — FERROUS SULFATE TAB EC 325 MG (65 MG FE EQUIVALENT) 325 (65 FE) MG
325 (65 FE) TABLET DELAYED RESPONSE ORAL DAILY
Qty: 30 | Refills: 0 | Status: ACTIVE | COMMUNITY
Start: 2024-10-08

## 2024-10-08 RX ORDER — METFORMIN HYDROCHLORIDE 500 MG/1
500 TABLET, COATED ORAL
Qty: 60 | Refills: 0 | Status: ACTIVE | COMMUNITY
Start: 2024-10-08

## 2024-10-22 ENCOUNTER — APPOINTMENT (OUTPATIENT)
Dept: UROLOGY | Facility: CLINIC | Age: 59
End: 2024-10-22
Payer: MEDICAID

## 2024-10-22 DIAGNOSIS — R39.12 POOR URINARY STREAM: ICD-10-CM

## 2024-10-22 DIAGNOSIS — E29.1 TESTICULAR HYPOFUNCTION: ICD-10-CM

## 2024-10-22 PROCEDURE — G2211 COMPLEX E/M VISIT ADD ON: CPT | Mod: NC

## 2024-10-22 PROCEDURE — 99213 OFFICE O/P EST LOW 20 MIN: CPT

## 2024-10-23 ENCOUNTER — APPOINTMENT (OUTPATIENT)
Dept: ORTHOPEDIC SURGERY | Facility: CLINIC | Age: 59
End: 2024-10-23

## 2024-10-23 ENCOUNTER — EMERGENCY (EMERGENCY)
Facility: HOSPITAL | Age: 59
LOS: 1 days | Discharge: HOME | End: 2024-10-23
Attending: STUDENT IN AN ORGANIZED HEALTH CARE EDUCATION/TRAINING PROGRAM
Payer: COMMERCIAL

## 2024-10-23 VITALS
OXYGEN SATURATION: 96 % | HEART RATE: 78 BPM | SYSTOLIC BLOOD PRESSURE: 150 MMHG | TEMPERATURE: 98 F | RESPIRATION RATE: 18 BRPM | DIASTOLIC BLOOD PRESSURE: 91 MMHG

## 2024-10-23 VITALS
RESPIRATION RATE: 17 BRPM | OXYGEN SATURATION: 95 % | HEART RATE: 85 BPM | HEIGHT: 68 IN | SYSTOLIC BLOOD PRESSURE: 187 MMHG | DIASTOLIC BLOOD PRESSURE: 83 MMHG | TEMPERATURE: 98 F

## 2024-10-23 DIAGNOSIS — S83.512A SPRAIN OF ANTERIOR CRUCIATE LIGAMENT OF LEFT KNEE, INITIAL ENCOUNTER: Chronic | ICD-10-CM

## 2024-10-23 DIAGNOSIS — Z96.651 PRESENCE OF RIGHT ARTIFICIAL KNEE JOINT: Chronic | ICD-10-CM

## 2024-10-23 PROCEDURE — 99284 EMERGENCY DEPT VISIT MOD MDM: CPT

## 2024-10-23 PROCEDURE — 99283 EMERGENCY DEPT VISIT LOW MDM: CPT | Mod: 25

## 2024-10-23 RX ORDER — BUPRENORPHINE HYDROCHLORIDE, NALOXONE HYDROCHLORIDE 12; 3 MG/1; MG/1
1 FILM, SOLUBLE BUCCAL; SUBLINGUAL ONCE
Refills: 0 | Status: DISCONTINUED | OUTPATIENT
Start: 2024-10-23 | End: 2024-10-23

## 2024-10-23 RX ADMIN — BUPRENORPHINE HYDROCHLORIDE, NALOXONE HYDROCHLORIDE 1 TABLET(S): 12; 3 FILM, SOLUBLE BUCCAL; SUBLINGUAL at 06:46

## 2024-10-23 NOTE — ED ADULT NURSE NOTE - ISOLATION TYPE:
-- DO NOT REPLY / DO NOT REPLY ALL --  -- Message is from the [facility]--    Patient is requesting a medication refill - medication is on active medication list    Patient is currently OUT of the requested medication.    Was Medication Pended?  No.     Rx Name and Dose:    traMADol (ULTRAM) 50 MG tablet 50 mg, Oral, DAILY         Duration: 30 days    Pharmacy  [id number] [address] [place] [address]    Patient confirmed the above pharmacy as correct?  Yes    Does this request need an existing or new prescription at a pharmacy to be sent to a new pharmacy location?   No    Caller Information       Type Contact Phone/Fax    08/02/2022 04:35 PM CDT Phone (Incoming) Montana Darrick (Self) [phone] (M)          Alternative phone number: None    Turnaround time given to caller:   \"Your doctor's office is closed. This message will be sent to our nurse. They will return your call as soon as they review your message. (Calls after 10 PM will be returned tomorrow morning.)\"   None

## 2024-10-23 NOTE — ED ADULT NURSE NOTE - OBJECTIVE STATEMENT
Patient AAOx4, presents to ED requesting Suboxone. Patient received from Nursing home, states he was on Suboxone before his knee surgery and would like to continue. Patient had altercation with nursing staff at the nursing home. Respirations even and unlabored on RA. NAD

## 2024-10-23 NOTE — ED ADULT NURSE NOTE - CHIEF COMPLAINT QUOTE
pt BIBPD from [facility], reports being in Suboxone withdrawals & hasn't taken it in weeks. pt states he was frustrated & threw a plaque at the wall, staff called PD. at time of triage, pt denies SI/HI & is calm and cooperative

## 2024-10-23 NOTE — ED ADULT TRIAGE NOTE - CHIEF COMPLAINT QUOTE
pt BIBPD from [facility], reports being in Suboxone withdrawals & hasn't taken it in weeks. pt states he was frustrated & threw a plaque at the wall, staff called PD. at time of triage, pt denies SI/HI & is calm and cooperative

## 2024-10-23 NOTE — ED PROVIDER NOTE - PATIENT PORTAL LINK FT
You can access the FollowMyHealth Patient Portal offered by [facility] by registering at the following website: [email or web address]. By joining [employer]’s FollowMyHealth portal, you will also be able to view your health information using other applications (apps) compatible with our system.

## 2024-10-23 NOTE — CHART NOTE - NSCHARTNOTEFT_GEN_A_CORE
Social Work Note:  Manuel completed and scanned into [employer] for insurance approval as ED stein clerk made ambulance arrangement slast night. Spoke to Taina @ [facility] and confirmed she can accept the patient back.  Clinicals forwarded to [facility] via [facility].

## 2024-10-23 NOTE — ED PROVIDER NOTE - OBJECTIVE STATEMENT
59y male w/ pmh of HTH, diverticulitis, multiple knee surgeries brought in by police from [facility] after an altercation this morning. patient state he used to be on Suboxone but was recently prescribed oxycodone for his knee surgery. He got into an altercation with the nurses because he told them he wanted to get off the oxycodone and switch back to the suboxone. he reportedly threw a piece of wood at a wall. patient reports restlessness and difficulty sleeping. no other complaints. spoke with [facility] staff at [facility] who were concerned about his behavior.

## 2024-10-23 NOTE — ED PROVIDER NOTE - NSFOLLOWUPINSTRUCTIONS_ED_ALL_ED_FT
Opioid Withdrawal  Opioids are powerful substances that relieve pain. Opioids include illegal drugs, such as heroin, as well as prescription pain medicines, such as codeine, morphine, hydrocodone, and fentanyl. Opioid withdrawal can happen if you have been taking opioids for a period of time and then suddenly stop.    Opioid withdrawal can be mild to severe, and it may include a variety of different symptoms. It is not usually life-threatening.    What are the causes?  This condition is caused by taking opioids for a long period of time, usually over several weeks (or even only 5 days), and then doing any of the following:  Stopping use completely.  Rapidly reducing their use, either by reducing the dose or the frequency of use.  Taking a medicine to block the effect of the opioid (opioid antagonist).  What increases the risk?  The following factors may make you more likely to develop this condition:  Taking opioids for a long period of time.  Taking opioids incorrectly.  Having a history of opioid, alcohol, or illicit drug use and withdrawal from those substances.  What are the signs or symptoms?  Symptoms of this condition can be physical or mental. Mild physical symptoms include:  Nausea.  Muscle aches or spasms.  Watery eyes and runny nose.  Widening of the dark centers of the eyes (dilated pupils).  Flushing and itching of the skin.  Moderate symptoms of this condition include:  Stomach cramps and diarrhea.  Vomiting.  Increased blood pressure and fast pulse.  Chills or sweating.  Twitching or shaking (tremors).  Mental symptoms include:  Depression.  Anxiety.  Restlessness and irritability.  Trouble sleeping.  Increased craving for drugs.  When symptoms start and how long they last depend on the kind of opioid you have been taking.  Short-acting opioids, such as heroin and oxycodone, work fast and then lose their effect quickly. Symptoms occur within hours of stopping or reducing the amount you take. The worst symptoms (peak withdrawal) occur in 24–48 hours. Symptoms should diminish over the next 3 to 5 days.  Long-acting opioids, such as methadone, work for a longer period of time. Symptoms can occur within 30 hours of stopping or reducing the amount you take, and they usually resolve over the next 10 days or so.  There are also medicines that block the effects of opioids (opioid antagonists), such as naltrexone or naloxone, and partial agonists such as buprenorphine. If you take one of these medicines, symptoms begin within minutes.    How is this diagnosed?  This condition is diagnosed based on:  Your symptoms.  Your medical history, including:  Your history of drug and alcohol use.  The medicines you have been taking.  A physical exam.  Other tests, such as an ECG to look at the rhythm of your heart or blood tests to check for problems.  Your health care provider may ask you to see a mental health professional or addiction specialist.    How is this treated?  A group of people participating in a support group or counseling session.  Treatment for this condition is usually provided by mental health professionals with training in substance use disorders (addiction specialists). Treatment may involve:  Counseling. This treatment is also called talk therapy. It is provided by substance use treatment counselors.  Support groups. Support groups are run by people who have quit using opioids. They provide emotional support, advice, and guidance.  Medicines. The medicines used may depend on the severity of your withdrawal. Options may include:  Medicines to help reduce certain withdrawal symptoms, such as nausea, vomiting, diarrhea, or stomach cramps.  An opioid or opioid-like medicine, such as methadone or buprenorphine, to replace the opioid that you have been taking. You may be asked to take less and less of this medicine over time to reduce or prevent withdrawal symptoms.  Other medicines that may decrease the effects of withdrawal.  Follow these instructions at home:  Take over-the-counter and prescription medicines only as told by your health care provider.  Always check with your health care provider before starting any new medicines.  Keep all follow-up visits. This is important. Follow-up visits include mental health and counseling visits.  Contact a health care provider if:  You are not able to take your medicines as told.  Your symptoms get worse.  You take an opioid after stopping use, or you take more of an opioid than you have been.  You have questions about how to take your medicines or you are unsure of when to take them.  Get help right away if:  You have a seizure.  You lose consciousness.  You cannot stop vomiting.  You are unable to drink or eat, and you become weak and dizzy.  You develop chest pain, shortness of breath, or fever.  You have serious thoughts about hurting yourself or others.  These symptoms may represent a serious problem that is an emergency. Do not wait to see if the symptoms will go away. Get medical help right away. Call your local emergency services (911 in the [place] Do not drive yourself to the hospital.    If you ever feel like you may hurt yourself or others, or have thoughts about taking your own life, get help right away. Go to your nearest emergency department or:  Call your local emergency services (911 in the [place]  Call a suicide crisis helpline, such as the National Suicide Prevention Lifeline at 1-524.597.1559 or 265 in the [place] This is open 24 hours a day in the [place]  If you’re a :  Call 988 and press 1. This is open 24 hours a day.  Text the Veterans Crisis Line at 742605.  Summary  Opioid withdrawal is a group of symptoms that can occur if you have been taking opioids for a period of time and suddenly stop.  Symptoms range from mild to severe, but opioid withdrawal is usually not life-threatening.  Common symptoms include anxiety, tremors, chills, body aches, nausea, vomiting, and diarrhea. Symptoms may last 5–10 days or so depending on the type of opioids that were involved.  Treatment may include counseling, support groups, and medicines.  This information is not intended to replace advice given to you by your health care provider. Make sure you discuss any questions you have with your health care provider.

## 2024-10-23 NOTE — ED ADULT NURSE NOTE - NSICDXPASTMEDICALHX_GEN_ALL_CORE_FT
PAST MEDICAL HISTORY:  Anxiety and depression     Broken internal joint prosthesis, other site, subsequent encounter     Diabetes mellitus     Diverticulitis     GERD (gastroesophageal reflux disease)     H/O bipolar disorder     History of drug abuse     History of ETOH abuse     HLD (hyperlipidemia)     HTN (hypertension)     MARIBEL (obstructive sleep apnea)     Osteoarthritis     Unilateral primary osteoarthritis, left knee

## 2024-10-23 NOTE — ED PROVIDER NOTE - ATTENDING CONTRIBUTION TO CARE
59y male w/ pmh of HTH, diverticulitis, multiple knee surgeries brought in by police from [facility] after a altercation with staff after stating to the nursing staff he wanted to get off the oxycodone and switch back to the suboxone for pain control. He states the reply he received was inappropriate and made him very upset.  He reportedly threw a piece of wood at a wall. Police were then called. He has no other acute complaints. We spoke with the nurse caring for the [facility] at [facility] and confirmed the story and their concern about his aggressive behavior.    Gen: Alert, NAD  Head: NC, AT, EOMI, normal lids/conjunctiva  ENT: normal hearing  Neck: +supple  Pulm: normal resp effort  Mskel: no gross deformity/ edema/erythema/cyanosis  Skin: no rash  Neuro: AAOx3, Pleasant, interactive    I, David Read, personally saw the patient with the resident, and completed the key components of the history and physical exam. I then discussed the management plan with the resident.

## 2024-10-25 NOTE — ED PROVIDER NOTE - CHIEF COMPLAINT
Phoned patient with new recommendations. Verbalized understanding. Labs slip to be faxed to [facility] once signed.    The patient is a 57y Male complaining of knee pain/injury.

## 2024-11-05 NOTE — BH INPATIENT PSYCHIATRY PROGRESS NOTE - NSBHATTESTTYPEVISIT_PSY_A_CORE
Date of Service:  11/04/2024    Mr. Sadler is a 38-year-old right-handed white male, who is referred by Faye Palacio for a chief complaint of:  Peripheral neuropathy.  Intractable pain.  Headaches.  Numbness in arms and legs.    The patient is able to work until 2 years ago.  He describes having excess pain which prevented him from working subsequently.  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    Since that time, the patient has had difficulty finding work and describes having difficulty with numbness in his hands and feet, but moreover pain syndrome, which he describes the pain located \"all over.\"  The patient complains of neck pain, low back pain, midthoracic pain.  He also complains of joint pains, joints involved include the ankles, the knees, and wrists.    The patient states at times his hands are so swollen he cannot close his hands.  The patient particularly complains of having inability to feel objects in his hands.  He describes if he cannot feel it is too hot he can burn his hands.  Particularly he describes having pain in his legs at night.  He states he pulls a heavy blanket on the legs and this improves the pain.  Specifically, he uses a heavy electric blanket to keep his feet warm, they are always cold.  At times, his feet feel burning hot.  In addition, his hands may feel burning hot as well.  \"I hold ice to make my hands feel better.\"    The patient describes he is able to walk well.  He describes that he cannot maintain his balance unless he keeps his eyes open .  He trips on his own feet, does not know where they are so careful to keep eyes open always.  Particularly notes difficulty standing, at times it is very problematic.  He notes no bowel or bladder difficulty.    The patient's family history is positive for multiple members of family with high-arched foot, but no one in family has  had neuropathy.  He has  had a small fiber neuropathy which is consistent with \"blank dependent neuropathy.\"  The patient has had mild glucose intolerance, but otherwise no other abnormality.  He notes no weakness in his hands or arms.  However, he describes that he does not feel as strong as he was.  He can move for sure the way he used to, of course I can barely do normal things, \"all I can do is mow the lawn.\"  The patient has had negative rheumatoid factor, BAKARI, sed rate, RPR, Lyme, lupus anticoagulant,  peptide antibody.  His glycohemoglobin is in 5.5 to 5.7 range.  The patient describes having chronic headaches.  The headaches are present 24/7.  He describes the headaches involve the entire cranium, \"crushing in the temples.\"  He takes 2 Tylenol a day and 2 ibuprofen a day.  He is on Qulipta and Botox for his headaches / for breakthrough.    In the last 9 days, he has had multiple migraines 2-3 times a week.  The intensity of headaches on a scale of 1-10, headaches are probably 3-7 averaging 5 and he describes he gets the migraines 2-3 times a week, they are \"horrible.\"    PAST MEDICAL PROBLEMS:  Hypertension, Tourette's, SOPHIE, ADD, depression, fractures, migraines, sleep apnea.    FAMILY HISTORY:  Mother, hypertension.  Father diabetes, MI, atrial fibrillation.  Diabetes, heart disease hypertension.    SURGERIES:  Right shoulder surgery .    TOBACCO:  None.    ALCOHOL:  Occasional.    SOCIAL HISTORY:  Employed.    REVIEW OF SYSTEMS:  Chills, fever, photophobia, double vision, shortness of breath, rapid heartbeat, nausea, diarrhea, frequent urination, leg swelling in arms and legs, headaches, numbness in hands and feet, speech problems, balance problems, difficulty sleeping, anxiety, cold intolerance, otherwise negative 13 systems.    PHYSICAL EXAMINATION:  GENERAL:  Tics in neck and shoulders.  Hypesthesia, pin stocking-glove.  REFLEXES:  1 triceps, brachioradialis.  2 biceps bilaterally.  1 quads.  Trace ankle jerks.  SENSORY:  Pin  hypesthetic in stocking glove distribution.  Vibration intact.  Position intact.  COORDINATION:  Heel-to-shin, finger-to-nose done well.  GAIT:  Walks well, full stride, turns easily.  Tandem well.  Romberg normal.  SPEECH:  Normal reception and expression.  No dysarthria or dysphasia.  MENTAL STATUS:  Pleasant, appropriate, oriented to person, place, and time.  Mood and affect normal.  HEENT:  TMJs are unremarkable.  High-arched foot with hammertoes bilaterally.  CRANIAL NERVES:  2:  Pupils are equal, round, and reactive to light.  3, 4, 6:  EOMs intact.  5, 7:  Facial muscles are symmetric.  8:  Hearing intact.  9, 10:  Pharynx is negative.  11:  Sternocleidomastoid 5/5.  12:  Tongue protrudes midline.    CARDIOVASCULAR:  S1, S2 normal.  No S3, S4, or murmur.  Rhythm is regular.  No carotid bruits.  PULMONARY:  Lungs are clear to auscultation.  ABDOMEN:  Benign.  No rigidity.  No organomegaly.  EXTREMITIES:  No edema.  MOTOR:  5/5 in upper and lower extremities.    IMPRESSION:  EMG was done, does show evidence for involving neuropathy possible inherited,  or CIDP .  Lumbar puncture.  Check protein.    Screen for alternative causes.  This has already been done in part previously , but this will be repeated.    Chronic daily headaches, currently on Qulipta and on Botox without remission.  Could be a rebound in part .    Tourette's.    Glucose intolerance.    Charcot-Luz-Tooth panel will be recommended.    Diagnosis demyelinating peripheral neuropathy  Intractable daily migraine    Dictated By:  Babatunde Kowalski Jr, MD  Signing Provider:  MD KOFI Alejandro Jr/AQT  D:  11/04/2024 11:13:41 AM  T:  11/05/2024 08:47:20 AM  Job:  [id number]        On-site Attending supervising KELSEY (99XXX codes)

## 2024-11-06 NOTE — PROCEDURE NOTE - NSPROCSEDATIONNOT_GEN_ALL_CORE
Cardiology Progress Note    Mohamud Banegas Patient Status:  Inpatient    1940 MRN [id number]   Location [facility] 3NE-A Attending Ryan Ruiz, DO   Hosp Day # 3 PCP Santana Brown MD     Impression:  Syncope with significant orthostatic hypotension   Previous history of syncope in  with normal workup including  echo and monitor which showed SVT but no bradycardia or pauses   PNA  Moderate aortic stenosis   Hyponatremia: improved  COPD  KI  PAC's     Plan:  Repeat echo shows moderate aortic stenosis. However, this is based on Vmax of 3.0. His murmur is soft and Echo was done in the febrile state which may have falsely increased his gradients. Would recommend repeating in 3-6 months   2 week ZIO AT on DC today   Etiology appears more so related to hypovolemia given orthostatic hypotension, recent fever and poor PO intake. Orthostatic numbers now improved   Needs home O2 eval prior to DC. Desaturation to 87% yesterday while walking with me    Stop ARB. GOAL BP <150/90  Tele       Subjective:  The patient denies any chest pain or dyspnea at this time.    Objective:  /73 (BP Location: Left arm)   Pulse 91   Temp 97.8 °F (36.6 °C) (Oral)   Resp 18   Ht 5' 6\" (1.676 m)   Wt 130 lb (59 kg)   SpO2 94%   BMI 20.98 kg/m²   Temp (24hrs), Av.9 °F (36.6 °C), Min:97.6 °F (36.4 °C), Max:98.3 °F (36.8 °C)      Intake/Output Summary (Last 24 hours) at 2024 0838  Last data filed at 2024 1800  Gross per 24 hour   Intake 1307 ml   Output --   Net 1307 ml     Wt Readings from Last 3 Encounters:   24 130 lb (59 kg)   10/11/24 132 lb (59.9 kg)   24 133 lb (60.3 kg)       General: Awake and alert; in no acute distress  Cardiac: 2/6 systolic murmur   Lungs: Clear to auscultation bilaterally; no accessory muscle use  Abdomen: Soft, non-tender; bowel sounds are normoactive  Extremities: No clubbing/cyanosis; moves all 4 extremities normally    Current Facility-Administered Medications 
  Medication Dose Route Frequency    amoxicillin clavulanate (Augmentin) 875-125 MG per tab 875 mg  875 mg Oral Q12H    benzocaine-menthol (Cepacol) lozenge 1 lozenge  1 lozenge Oral PRN    lidocaine-menthol 4-1 % patch 1 patch  1 patch Transdermal Daily    cyclobenzaprine (Flexeril) tab 5 mg  5 mg Oral TID PRN    influenza virus trivalent high dose PF (Fluzone HD) 0.5 mL injection (ages >/= 65 years) 0.5 mL  0.5 mL Intramuscular Prior to discharge    enoxaparin (Lovenox) 40 MG/0.4ML SUBQ injection 40 mg  40 mg Subcutaneous Daily    acetaminophen (Tylenol Extra Strength) tab 500 mg  500 mg Oral Q4H PRN    albuterol (Ventolin HFA) 108 (90 Base) MCG/ACT inhaler 2 puff  2 puff Inhalation Q6H PRN    fluticasone-salmeterol (Advair Diskus) 250-50 MCG/ACT inhaler 1 puff  1 puff Inhalation BID    umeclidinium bromide (Incruse Ellipta) 62.5 MCG/ACT inhaler 1 puff  1 puff Inhalation Daily    montelukast (Singulair) tab 10 mg  10 mg Oral Daily    pantoprazole (Protonix) DR tab 40 mg  40 mg Oral Daily    rosuvastatin (Crestor) tab 2.5 mg  2.5 mg Oral Nightly    LORazepam (Ativan) tab 0.5 mg  0.5 mg Oral Nightly PRN    Or    LORazepam (Ativan) tab 1 mg  1 mg Oral Nightly PRN    cetirizine (ZyrTEC) tab 5 mg  5 mg Oral Nightly       Laboratory Data:       Lab Results   Component Value Date    INR 0.99 06/20/2021    INR 1.0 05/20/2015    INR 1.05 03/26/2011     Lab Results   Component Value Date     11/06/2024    K 4.3 11/06/2024    K 4.3 11/06/2024    CL 98 11/06/2024    CO2 28.0 11/06/2024    BUN 14 11/06/2024    CREATSERUM 0.90 11/06/2024     11/06/2024    CA 9.2 11/06/2024    MG 1.8 11/06/2024       Telemetry: No malignant tachyarrhythmias or bradyarrhythmias      Thank you for allowing our practice to participate in the care of your patient. Please do not hesitate to contact me if you have any questions.    Ashley Dang MD      
No
No

## 2024-11-12 ENCOUNTER — APPOINTMENT (OUTPATIENT)
Dept: UROLOGY | Facility: CLINIC | Age: 59
End: 2024-11-12
Payer: MEDICAID

## 2024-11-12 VITALS
HEART RATE: 75 BPM | BODY MASS INDEX: 35.61 KG/M2 | WEIGHT: 235 LBS | HEIGHT: 68 IN | DIASTOLIC BLOOD PRESSURE: 69 MMHG | SYSTOLIC BLOOD PRESSURE: 112 MMHG

## 2024-11-12 DIAGNOSIS — E29.1 TESTICULAR HYPOFUNCTION: ICD-10-CM

## 2024-11-12 PROCEDURE — 99214 OFFICE O/P EST MOD 30 MIN: CPT

## 2024-11-12 RX ORDER — ANASTROZOLE TABLETS 1 MG/1
1 TABLET ORAL DAILY
Qty: 30 | Refills: 0 | Status: ACTIVE | COMMUNITY
Start: 2024-11-12 | End: 1900-01-01

## 2024-12-04 ENCOUNTER — APPOINTMENT (OUTPATIENT)
Dept: ORTHOPEDIC SURGERY | Facility: CLINIC | Age: 59
End: 2024-12-04
Payer: MEDICAID

## 2024-12-04 DIAGNOSIS — Z96.652 AFTERCARE FOLLOWING JOINT REPLACEMENT SURGERY: ICD-10-CM

## 2024-12-04 DIAGNOSIS — Z96.652 PRESENCE OF LEFT ARTIFICIAL KNEE JOINT: ICD-10-CM

## 2024-12-04 DIAGNOSIS — Z47.1 AFTERCARE FOLLOWING JOINT REPLACEMENT SURGERY: ICD-10-CM

## 2024-12-04 PROCEDURE — 73562 X-RAY EXAM OF KNEE 3: CPT | Mod: LT

## 2024-12-04 PROCEDURE — 99024 POSTOP FOLLOW-UP VISIT: CPT

## 2024-12-07 NOTE — ED ADULT NURSE REASSESSMENT NOTE - NSFALLCONCLUSION_ED_ALL_ED
Universal Safety Interventions
PAST SURGICAL HISTORY:  No significant past surgical history     
Universal Safety Interventions

## 2024-12-09 RX ORDER — CELECOXIB 200 MG/1
200 CAPSULE ORAL TWICE DAILY
Qty: 60 | Refills: 1 | Status: ACTIVE | COMMUNITY
Start: 2024-12-09 | End: 1900-01-01

## 2024-12-23 DIAGNOSIS — Z96.651 PRESENCE OF RIGHT ARTIFICIAL KNEE JOINT: ICD-10-CM

## 2024-12-31 ENCOUNTER — APPOINTMENT (OUTPATIENT)
Dept: UROLOGY | Facility: CLINIC | Age: 59
End: 2024-12-31
Payer: MEDICAID

## 2024-12-31 DIAGNOSIS — E29.1 TESTICULAR HYPOFUNCTION: ICD-10-CM

## 2024-12-31 PROCEDURE — 99214 OFFICE O/P EST MOD 30 MIN: CPT

## 2024-12-31 RX ORDER — TRAZODONE HYDROCHLORIDE 150 MG/1
150 TABLET ORAL
Qty: 30 | Refills: 0 | Status: ACTIVE | COMMUNITY
Start: 2024-12-31

## 2024-12-31 RX ORDER — TESTOSTERONE CYPIONATE 200 MG/ML
200 INJECTION, SOLUTION INTRAMUSCULAR
Qty: 2 | Refills: 0 | Status: ACTIVE | COMMUNITY
Start: 2024-12-31 | End: 1900-01-01

## 2025-01-02 ENCOUNTER — NON-APPOINTMENT (OUTPATIENT)
Age: 60
End: 2025-01-02

## 2025-01-07 ENCOUNTER — APPOINTMENT (OUTPATIENT)
Dept: UROLOGY | Facility: CLINIC | Age: 60
End: 2025-01-07

## 2025-01-07 RX ORDER — TESTOSTERONE CYPIONATE 200 MG/ML
200 VIAL (ML) INTRAMUSCULAR
Refills: 0 | Status: COMPLETED | OUTPATIENT
Start: 2025-01-07

## 2025-01-07 RX ORDER — TESTOSTERONE CYPIONATE 200 MG/ML
200 INJECTION, SOLUTION INTRAMUSCULAR
Qty: 2 | Refills: 0 | Status: ACTIVE | COMMUNITY
Start: 2025-01-07

## 2025-01-07 RX ADMIN — TESTOSTERONE CYPIONATE 0 MG/ML: 200 INJECTION, SOLUTION INTRAMUSCULAR at 00:00

## 2025-01-14 NOTE — ED STATDOCS - PRO INTERPRETER NEED 2
Infusion Nursing Note:  Lana Tee Agustina Kim RN  
English

## 2025-01-23 NOTE — PATIENT PROFILE ADULT - NSFALLSECTIONLABEL_GEN_A_CORE
Patient : Lo Sinha Age: 22 year old Sex: female   MRN: [id number] Encounter Date: 1/23/2025    History     Chief Complaint   Patient presents with    Medical Screening Exam       Patient is a 22-year-old female with no significant past medical history who presents to the ED with alcohol abuse and requesting detox.    History of Present Illness  The patient presents for evaluation of alcohol use disorder.    The patient reports daily alcohol consumption since 2021, with a notable increase in intake following the death of a friend in 2023. Over the past six months, she has been consuming 7-9 shooters of alcohol daily. She occasionally uses marijuana and vapes nicotine but denies the use of other substances. Her alcohol consumption occurs both before and after work, with the longest period of abstinence being a few hours. She experiences anxiety, tremors, and difficulty understanding others when not under the influence of alcohol. She denies any history of alcohol withdrawal or seizures. She occasionally experiences auditory hallucinations when not drinking for several hours. The patient expresses interest in inpatient detoxification. She reports experiencing chills, body shakes, and an unsteady gait during periods of abstinence. She denies any recent illnesses, fevers, or chest pain. She reports experiencing epigastric burning and tachycardia when attempting to sleep without alcohol. The patient does not believe she is pregnant, does not use birth control, and is sexually active with regular use of protection. Her last alcohol consumption was approximately one hour ago (around 1300 hours).    SOCIAL HISTORY  Admits to daily alcohol consumption since 2021, with increased usage starting in 2023. Occasionally uses marijuana and vapes nicotine.        Past/Family/Social History     No Known Allergies    Current Facility-Administered Medications   Medication    sodium chloride 0.9 % injection 10 mL    sodium chloride 0.9  % injection 2 mL     No current outpatient medications on file.       History reviewed. No pertinent past medical history.    History reviewed. No pertinent surgical history.    History reviewed. No pertinent family history.    Social History     Tobacco Use    Smoking status: Never    Smokeless tobacco: Never   Vaping Use    Vaping status: Every Day    Substances: Nicotine   Substance Use Topics    Alcohol use: Yes     Alcohol/week: 42.0 standard drinks of alcohol     Types: 42 Shots of liquor per week     Comment: Daily    Drug use: Yes     Types: Marijuana          Review of Systems   Review of Symptoms     Review of Systems   Constitutional:  Negative for chills and fever.   HENT:  Negative for rhinorrhea and sore throat.    Respiratory:  Negative for cough.    Cardiovascular:  Negative for chest pain.   Gastrointestinal:  Negative for abdominal pain, diarrhea, nausea and vomiting.   Genitourinary:  Negative for dysuria, hematuria and pelvic pain.   Neurological:  Positive for tremors. Negative for weakness, numbness and headaches.   Psychiatric/Behavioral:  Positive for behavioral problems. The patient is nervous/anxious.           Physical Exam   Physical Exam     ED Triage Vitals [01/23/25 1433]   ED Triage Vitals Group      Temp 97.5 °F (36.4 °C)      Heart Rate (!) 136      Resp 18      BP (!) 158/106      SpO2 98 %      EtCO2 mmHg       Height 4' 10\" (1.473 m)      Weight 122 lb (55.3 kg)      Weight Scale Used ED Stated      BMI (Calculated) 25.5      IBW/kg (Calculated) 40.9       Physical Exam    Physical Exam  Vitals and nursing note reviewed.   Constitutional:       General: She is not in acute distress.     Appearance: She is not ill-appearing.   HENT:      Head: Normocephalic and atraumatic.      Mouth/Throat:      Mouth: Mucous membranes are dry.      Pharynx: Oropharynx is clear. No oropharyngeal exudate or posterior oropharyngeal erythema.   Eyes:      Conjunctiva/sclera: Conjunctivae normal.       Pupils: Pupils are equal, round, and reactive to light.   Cardiovascular:      Rate and Rhythm: Regular rhythm. Tachycardia present.      Pulses: Normal pulses.      Heart sounds: Normal heart sounds.   Pulmonary:      Effort: Pulmonary effort is normal.      Breath sounds: Normal breath sounds.   Abdominal:      General: There is no distension.      Palpations: Abdomen is soft.      Tenderness: There is no abdominal tenderness. There is no guarding.   Skin:     General: Skin is warm and dry.      Capillary Refill: Capillary refill takes less than 2 seconds.   Neurological:      General: No focal deficit present.      Mental Status: She is alert and oriented to person, place, and time.      Comments: Hand tremors, anxiety   Psychiatric:         Mood and Affect: Mood normal.         Behavior: Behavior normal.                Procedures   ED Procedures     Procedures       Lab Results   ED Lab     Results for orders placed or performed during the hospital encounter of 01/23/25   Comprehensive Metabolic Panel    Specimen: Blood, Venous   Result Value Ref Range    Fasting Status      Sodium 137 135 - 145 mmol/L    Potassium 3.0 (L) 3.4 - 5.1 mmol/L    Chloride 99 97 - 110 mmol/L    Carbon Dioxide 20 (L) 21 - 32 mmol/L    Anion Gap 21 (H) 7 - 19 mmol/L    Glucose 109 (H) 70 - 99 mg/dL    BUN 7 6 - 20 mg/dL    Creatinine 0.59 0.51 - 0.95 mg/dL    Glomerular Filtration Rate >90 >=60    BUN/Cr 12 7 - 25    Calcium 9.4 8.4 - 10.2 mg/dL    Bilirubin, Total 0.5 0.2 - 1.0 mg/dL    GOT/AST 25 <=37 Units/L    GPT/ALT 42 <64 Units/L    Alkaline Phosphatase 74 45 - 117 Units/L    Albumin 4.1 3.4 - 5.0 g/dL    Protein, Total 8.5 (H) 6.4 - 8.2 g/dL    Globulin 4.4 (H) 2.0 - 4.0 g/dL    A/G Ratio 0.9 (L) 1.0 - 2.4   Magnesium    Specimen: Blood, Venous   Result Value Ref Range    Magnesium 1.6 (L) 1.7 - 2.4 mg/dL   Thyroid Stimulating Hormone Reflex    Specimen: Blood, Venous   Result Value Ref Range    TSH 1.684 0.350 - 5.000  mcUnits/mL   Acetaminophen Level    Specimen: Blood, Venous   Result Value Ref Range    Acetaminophen <2 (L) 10 - 30 mcg/mL   Salicylate Level    Specimen: Blood, Venous   Result Value Ref Range    Salicylate <3.0 <=30.0 mg/dL   Drug Abuse Screen, Urine    Specimen: Urine clean catch   Result Value Ref Range    Amphetamines, Urine Negative Negative    Barbiturates, Urine Negative Negative    Benzodiazepines, Urine Negative Negative    Cocaine/ Metabolite, Urine Negative Negative    Opiates, Urine Negative Negative    Phencyclidine, Urine Negative Negative    Cannabinoids, Urine Positive (A) Negative    Fentanyl, Urine Screen Negative Negative   Alcohol    Specimen: Blood, Venous   Result Value Ref Range    Alcohol 69 (H) <3 mg/dL   Pregnancy Test, Urine    Specimen: Urine   Result Value Ref Range    Pregnancy, Urine Negative Negative   CBC with Automated Differential (performable only)    Specimen: Blood, Venous   Result Value Ref Range    WBC 10.2 4.2 - 11.0 K/mcL    RBC 4.08 4.00 - 5.20 mil/mcL    HGB 12.8 12.0 - 15.5 g/dL    HCT 37.1 36.0 - 46.5 %    MCV 90.9 78.0 - 100.0 fl    MCH 31.4 26.0 - 34.0 pg    MCHC 34.5 32.0 - 36.5 g/dL    RDW-CV 14.1 11.0 - 15.0 %    RDW-SD 46.2 39.0 - 50.0 fL     140 - 450 K/mcL    NRBC 0 <=0 /100 WBC    Neutrophil, Percent 78 %    Lymphocytes, Percent 16 %    Mono, Percent 4 %    Eosinophils, Percent 0 %    Basophils, Percent 1 %    Immature Granulocytes 1 %    Absolute Neutrophils 7.9 (H) 1.8 - 7.7 K/mcL    Absolute Lymphocytes 1.7 1.0 - 4.8 K/mcL    Absolute Monocytes 0.4 0.3 - 0.9 K/mcL    Absolute Eosinophils  0.0 0.0 - 0.5 K/mcL    Absolute Basophils 0.1 0.0 - 0.3 K/mcL    Absolute Immature Granulocytes 0.1 0.0 - 0.2 K/mcL   D Dimer, Quantitative    Specimen: Blood, Venous   Result Value Ref Range    D Dimer, Quantitative 0.21 <0.57 mg/L (FEU)          EKG     Sinus tachycardia    Radiology Results   ED Radiology Results     Imaging Results              XR CHEST AP OR PA  (Final result)  Result time 01/23/25 21:06:37      Final result                   Impression:    IMPRESSION:  1.  Negative single-view chest.    Electronically Signed by: Yasir Tran MD  Signed on: 1/23/2025 9:06 PM  Created on Workstation ID: [id number]  Signed on Workstation ID: [id number]               Narrative:    XR CHEST AP OR PA    HISTORY: tachycardia    COMPARISON: None.    FINDINGS:    Normal cardiomediastinal silhouette. Osseous structures reveal no acute  abnormality. Upper abdomen and soft tissues are unremarkable. Lungs are  clear. Pulmonary vasculature appears normal. No pleural effusion or  pneumothorax.                                         ED Medications   ED Medications     ED Medication Orders (From admission, onward)      Ordered Start     Status Ordering Provider    01/23/25 2037 01/23/25 2038  metoPROLOL (LOPRESSOR) injection 2.5 mg  ONCE         Last MAR action: Given TONY BROWN    01/23/25 2016 01/23/25 2010  sodium chloride 0.9% infusion  ONCE         Last MAR action: Completed TONY BROWN    01/23/25 1928 01/23/25 1929  adenosine (ADENOCARD) injection 6 mg  ONCE         Last MAR action: Given TONY BROWN    01/23/25 1812 01/23/25 1813  LORazepam (ATIVAN) injection 3 mg  ONCE         Last MAR action: Given TONY BROWN    01/23/25 1633 01/23/25 1634  LORazepam (ATIVAN) tablet 2 mg  ONCE         Last MAR action: Given TONY BROWN    01/23/25 1633 01/23/25 1634  sodium chloride (NORMAL SALINE) 0.9 % bolus 1,000 mL  ONCE         Last MAR action: Completed TONY BROWN    01/23/25 1539 01/23/25 1540  potassium CHLORIDE (KLOR-CON) packet 40 mEq  ONCE         Last MAR action: Given TONY BROWN    01/23/25 1539 01/23/25 1540  magnesium sulfate 1 g in dextrose 5% 100 mL IVPB premix  ONCE         Last MAR action: Completed TONY BROWN    01/23/25 1512 01/23/25 1513  sodium  chloride (NORMAL SALINE) 0.9 % bolus 1,000 mL  ONCE         Last MAR action: Completed TONY BROWN    01/23/25 1512 01/23/25 1512  LORazepam (ATIVAN) tablet 2 mg  ONCE         Last MAR action: Given TONY BROWN               ED Course     Vitals:    01/23/25 2000 01/23/25 2031 01/23/25 2105 01/23/25 2135   BP: (!) 117/93 135/60 129/81 130/73   BP Location:       Patient Position:       Pulse: (!) 156 (!) 145 (!) 124 (!) 123   Resp: (!) 37 (!) 36 (!) 29 (!) 34   Temp:       TempSrc:       SpO2: 99%  98% 99%   Weight:       Height:       LMP: 01/05/2025            Results            MDM     Amount and/or Complexity of Data Reviewed  Clinical lab tests: reviewed  Tests in the medicine section of CPT®: reviewed                    Assessment & Plan  Initial Assessment: Patient presents with a history of daily alcohol consumption since 2022, with increased usage starting in 2023. Reports significant anxiety, shakes, and difficulty functioning without alcohol. Experiences auditory hallucinations when not drinking for a few hours.    ED Course:  - Medical clearance evaluation to rule out underlying conditions.  - Medication provided to alleviate withdrawal symptoms.  - Behavioral health consultation to discuss treatment options for alcohol use disorder and associated anxiety.  - Given a total of 4mg oral ativan  - Labs with dehydration, hypokalemia and hypomagnesemia, otherwise grossly normal  - Remains anxious and tachycardic, given 3mg IV ativan.  - Received a total of 2L of NS  - D-dimer negative  - CXR normal  - Remains tachycardic despite fluids and ativan, given adenosine to see if there is an underlying a fib/flutter - showed sinus tachycardia    Final Assessment: Patient evaluated for alcohol use disorder with associated anxiety and withdrawal symptoms. Medical clearance performed, and medication administered for withdrawal. Behavioral health consultation arranged for further  treatment.    Patient not appropriate for outpatient detox, will require hospitalization for tachycardia and severe withdrawal symptoms.    Clinical Impression:  - Alcohol use disorder  - Anxiety    Disposition:  - Admitted    MDM Components Evaluation:  - Number of Differential Diagnoses or Management Options: Alcohol use disorder, Anxiety  - Amount and Complexity of Data Reviewed: Medical clearance evaluation, Behavioral health consultation  - Risk of Complication and Morbidity or Mortality: High risk due to potential severe withdrawal symptoms and associated anxiety.      Disposition       Clinical Impression and Diagnosis  11:19 PM 01/23/25     Diagnosis:   1. Alcohol withdrawal syndrome with complication  (CMD)    2. Tachycardia                   Admit 1/23/2025  8:38 PM  Telemetry Bed?: Yes  Admitting Physician: ITZEL CATHERINE [[id number]]  Is this a telephone or verbal order?: This is a telephone order from the admitting physician             Jaskaran Law MD  01/23/25 4154     . IV lasix

## 2025-02-03 ENCOUNTER — APPOINTMENT (OUTPATIENT)
Dept: UROLOGY | Facility: CLINIC | Age: 60
End: 2025-02-03
Payer: MEDICAID

## 2025-02-03 VITALS — HEART RATE: 65 BPM | DIASTOLIC BLOOD PRESSURE: 80 MMHG | SYSTOLIC BLOOD PRESSURE: 127 MMHG

## 2025-02-03 DIAGNOSIS — F17.290 NICOTINE DEPENDENCE, OTHER TOBACCO PRODUCT, UNCOMPLICATED: ICD-10-CM

## 2025-02-03 DIAGNOSIS — Z56.0 UNEMPLOYMENT, UNSPECIFIED: ICD-10-CM

## 2025-02-03 DIAGNOSIS — E29.1 TESTICULAR HYPOFUNCTION: ICD-10-CM

## 2025-02-03 PROCEDURE — 99213 OFFICE O/P EST LOW 20 MIN: CPT

## 2025-02-03 SDOH — ECONOMIC STABILITY - INCOME SECURITY: UNEMPLOYMENT, UNSPECIFIED: Z56.0

## 2025-02-24 RX ORDER — NEEDLES, SAFETY 22GX1 1/2"
23G X 1" NEEDLE, DISPOSABLE MISCELLANEOUS
Qty: 6 | Refills: 0 | Status: ACTIVE | COMMUNITY
Start: 2025-02-24 | End: 1900-01-01

## 2025-04-24 NOTE — ED ADULT NURSE NOTE - NSHOSCREENINGQ1_ED_ALL_ED
Time Huddle Called Overhead: 0691     Responding Provider: TAMERA Sy     ED RN: n/a     SEVERE SEPSIS CRITERIA - NOT MET     Infection Source: NONE     SIRS: HR, RR, WBC     Organ Dysfunction: LA 2.9     LA: 2.9     Time Huddle Completed: n/a        No

## 2025-05-05 ENCOUNTER — APPOINTMENT (OUTPATIENT)
Dept: UROLOGY | Facility: CLINIC | Age: 60
End: 2025-05-05

## 2025-05-05 ENCOUNTER — RX RENEWAL (OUTPATIENT)
Age: 60
End: 2025-05-05

## 2025-05-05 RX ORDER — SYRINGE WITH NEEDLE, 1 ML 25GX5/8"
23G X 1" SYRINGE, EMPTY DISPOSABLE MISCELLANEOUS
Qty: 2 | Refills: 6 | Status: ACTIVE | COMMUNITY
Start: 2025-05-05 | End: 1900-01-01

## 2025-05-08 NOTE — ED ADULT NURSE NOTE - CHIEF COMPLAINT QUOTE
FAMILY UPDATED found on street c/o drinking and taking heroin cocaine and crack since yesterday. pt states " Im going to kill myself by taking as many drugs as I can get". pt c/o LL back pain. respirations even unlabored. MD called to bedside

## 2025-05-08 NOTE — PHYSICAL THERAPY INITIAL EVALUATION ADULT - LEVEL OF INDEPENDENCE, REHAB EVAL
X-ray results pending.  We will call you when x-ray results are received.  Take diclofenac as prescribed with food.  Do not take OTC NSAIDs while taking this medication.  Take cyclobenzaprine at night.  Do not drive while taking this medication as it may cause drowsiness.  May continue to use heating pad.  Follow-up with PCP as needed.  Go to the ED for any acute or worsening symptoms  
independent

## 2025-05-15 ENCOUNTER — NON-APPOINTMENT (OUTPATIENT)
Age: 60
End: 2025-05-15

## 2025-06-04 ENCOUNTER — APPOINTMENT (OUTPATIENT)
Dept: ORTHOPEDIC SURGERY | Facility: CLINIC | Age: 60
End: 2025-06-04

## 2025-06-09 NOTE — ED PROVIDER NOTE - NSDCPRINTRESULTS_ED_ALL_ED
Detailed message left for patient's sister/power of , Aracelis King at [phone].  Updated sister at patient's physical therapy orders have been canceled as they are unable to contact anyone.  Also wanted to discuss updated recommendations regarding medications of various administration avenues as patient no longer wants to take routine oral medications.  Requested return call back.    Per clinical pharmacy team, options were discussed such as the selegiline patch (Emsam)which is indicated to treat major depressive disorder.  Other treatment options include, Abilify Maintena/Asimtufii (aripiprazole), although typically reserved for treatment-resistant depression, and usually used as adjunctive therapy with a different antidepressant and RisperDal Consta (risperidone), although off-label and typically reserved for treatment-resistant depression, and usually used as adjunctive therapy with a different antidepressant  
Patient requests all Lab, Cardiology, and Radiology Results on their Discharge Instructions

## 2025-06-23 ENCOUNTER — NON-APPOINTMENT (OUTPATIENT)
Age: 60
End: 2025-06-23

## 2025-06-23 ENCOUNTER — APPOINTMENT (OUTPATIENT)
Dept: UROLOGY | Facility: CLINIC | Age: 60
End: 2025-06-23
Payer: MEDICAID

## 2025-06-23 VITALS
WEIGHT: 230 LBS | HEART RATE: 75 BPM | DIASTOLIC BLOOD PRESSURE: 72 MMHG | SYSTOLIC BLOOD PRESSURE: 132 MMHG | BODY MASS INDEX: 34.86 KG/M2 | HEIGHT: 68 IN

## 2025-06-23 PROCEDURE — 99213 OFFICE O/P EST LOW 20 MIN: CPT

## 2025-06-23 RX ORDER — LOSARTAN POTASSIUM 50 MG/1
50 TABLET, FILM COATED ORAL
Refills: 0 | Status: ACTIVE | COMMUNITY
Start: 2025-06-23

## 2025-06-23 RX ORDER — NEEDLES, SAFETY 22GX1 1/2"
23G X 1" NEEDLE, DISPOSABLE MISCELLANEOUS
Qty: 6 | Refills: 3 | Status: ACTIVE | COMMUNITY
Start: 2025-06-23 | End: 1900-01-01

## 2025-07-09 NOTE — ED ADULT TRIAGE NOTE - NSWEIGHTCALCTOOLDRUG_GEN_A_CORE
Patient states he doesn't want to receive the injections from pain management, he cancelled them. He also cancelled his appointment with Dr. Winston on 7/14/2025. Stated he would reschedule if \"things get worse\".    
 used

## 2025-07-17 ENCOUNTER — APPOINTMENT (OUTPATIENT)
Dept: BARIATRICS | Facility: CLINIC | Age: 60
End: 2025-07-17

## 2025-07-17 ENCOUNTER — APPOINTMENT (OUTPATIENT)
Dept: SURGERY | Facility: CLINIC | Age: 60
End: 2025-07-17
Payer: MEDICAID

## 2025-07-17 VITALS
DIASTOLIC BLOOD PRESSURE: 69 MMHG | BODY MASS INDEX: 40.17 KG/M2 | HEART RATE: 74 BPM | HEIGHT: 65.5 IN | SYSTOLIC BLOOD PRESSURE: 150 MMHG | OXYGEN SATURATION: 96 % | WEIGHT: 244 LBS | TEMPERATURE: 98.4 F | RESPIRATION RATE: 16 BRPM

## 2025-07-17 PROBLEM — Z01.818 PRE-OP EXAM: Status: ACTIVE | Noted: 2025-07-17

## 2025-07-17 PROBLEM — E66.01 MORBID OBESITY: Status: ACTIVE | Noted: 2025-07-17

## 2025-07-17 PROCEDURE — 99204 OFFICE O/P NEW MOD 45 MIN: CPT

## 2025-07-22 ENCOUNTER — APPOINTMENT (OUTPATIENT)
Dept: ULTRASOUND IMAGING | Facility: CLINIC | Age: 60
End: 2025-07-22

## 2025-07-22 ENCOUNTER — OUTPATIENT (OUTPATIENT)
Dept: OUTPATIENT SERVICES | Facility: HOSPITAL | Age: 60
LOS: 1 days | End: 2025-07-22
Payer: MEDICAID

## 2025-07-22 DIAGNOSIS — S83.512A SPRAIN OF ANTERIOR CRUCIATE LIGAMENT OF LEFT KNEE, INITIAL ENCOUNTER: Chronic | ICD-10-CM

## 2025-07-22 DIAGNOSIS — E66.01 MORBID (SEVERE) OBESITY DUE TO EXCESS CALORIES: ICD-10-CM

## 2025-07-22 DIAGNOSIS — Z01.818 ENCOUNTER FOR OTHER PREPROCEDURAL EXAMINATION: ICD-10-CM

## 2025-07-22 DIAGNOSIS — Z96.651 PRESENCE OF RIGHT ARTIFICIAL KNEE JOINT: Chronic | ICD-10-CM

## 2025-07-22 PROCEDURE — 76705 ECHO EXAM OF ABDOMEN: CPT | Mod: 26

## 2025-07-28 ENCOUNTER — OUTPATIENT (OUTPATIENT)
Dept: OUTPATIENT SERVICES | Facility: HOSPITAL | Age: 60
LOS: 1 days | End: 2025-07-28

## 2025-07-28 DIAGNOSIS — S83.512A SPRAIN OF ANTERIOR CRUCIATE LIGAMENT OF LEFT KNEE, INITIAL ENCOUNTER: Chronic | ICD-10-CM

## 2025-07-28 DIAGNOSIS — Z96.651 PRESENCE OF RIGHT ARTIFICIAL KNEE JOINT: Chronic | ICD-10-CM

## 2025-07-28 DIAGNOSIS — G47.33 OBSTRUCTIVE SLEEP APNEA (ADULT) (PEDIATRIC): ICD-10-CM

## 2025-08-05 ENCOUNTER — APPOINTMENT (OUTPATIENT)
Dept: CARDIOLOGY | Facility: CLINIC | Age: 60
End: 2025-08-05

## 2025-08-05 DIAGNOSIS — Z01.810 ENCOUNTER FOR PREPROCEDURAL CARDIOVASCULAR EXAMINATION: ICD-10-CM

## 2025-08-11 ENCOUNTER — APPOINTMENT (OUTPATIENT)
Dept: BARIATRICS | Facility: CLINIC | Age: 60
End: 2025-08-11

## 2025-08-13 ENCOUNTER — APPOINTMENT (OUTPATIENT)
Dept: PSYCHIATRY | Facility: CLINIC | Age: 60
End: 2025-08-13

## 2025-08-25 ENCOUNTER — APPOINTMENT (OUTPATIENT)
Dept: BARIATRICS | Facility: CLINIC | Age: 60
End: 2025-08-25

## 2025-08-26 ENCOUNTER — APPOINTMENT (OUTPATIENT)
Dept: GASTROENTEROLOGY | Facility: CLINIC | Age: 60
End: 2025-08-26

## 2025-09-15 ENCOUNTER — APPOINTMENT (OUTPATIENT)
Dept: BARIATRICS | Facility: CLINIC | Age: 60
End: 2025-09-15

## (undated) DEVICE — SOL IRR POUR H2O 1000ML

## (undated) DEVICE — ELCTR AQUAMANTYS BIPOLAR SEALER 6.0

## (undated) DEVICE — SUT DERMABOND PRINEO 22CM

## (undated) DEVICE — ELCTR STRYKER NEPTUNE SMOKE EVACUATION PENCIL (GREEN)

## (undated) DEVICE — SUT STRATAFIX SPIRAL MONOCRYL PLUS 3-0 30CM PS-2 UNDYED

## (undated) DEVICE — PACK TOTAL KNEE

## (undated) DEVICE — DRAPE 3/4 SHEET 52X76"

## (undated) DEVICE — WRAP COMPRESSION CALF MED

## (undated) DEVICE — LIGASURE MARYLAND 37CM

## (undated) DEVICE — DRSG WEBRIL 6"

## (undated) DEVICE — SUT FIBERWIRE #2 38"

## (undated) DEVICE — DRAPE C ARM UNIVERSAL

## (undated) DEVICE — BLANKET WARMER UPPER ADULT

## (undated) DEVICE — MAKO CHECKPOINT KIT FEMORAL / TIBIAL

## (undated) DEVICE — SUT STRATAFIX SPIRAL PDS PLUS 1 35X35CM CTX VIOLET

## (undated) DEVICE — ZIMMER BLADE MRI OSTEOTOME 8MM X 3"

## (undated) DEVICE — DRSG PREVENA PEEL & PLACE KIT 20CM

## (undated) DEVICE — ELCTR GROUNDING PAD ADULT COVIDIEN

## (undated) DEVICE — SUT VICRYL 2-0 27" CT-2 UNDYED

## (undated) DEVICE — SUT MONOCRYL 2-0 27" SH UNDYED

## (undated) DEVICE — HOOD FLYTE STRYKER SURGICOOL W PEELAWAY

## (undated) DEVICE — DRSG PREVENA PLUS SYSTEM

## (undated) DEVICE — SUT ETHIBOND 5 4-30" CCS

## (undated) DEVICE — SUT DERMABOND 0.7ML

## (undated) DEVICE — GOWN XL W TOWEL

## (undated) DEVICE — MAKO DRAPE KIT

## (undated) DEVICE — GLV 8 PROTEXIS ORTHO (CREAM)

## (undated) DEVICE — GLV 8 ESTEEM BLUE

## (undated) DEVICE — SUT MONOCRYL 4-0 27" PS-2 UNDYED

## (undated) DEVICE — ELCTR ROCKER SWITCH PENCIL BLUE 10FT

## (undated) DEVICE — TAPE SILK 3"

## (undated) DEVICE — DRSG MEPILEX 10 X 25CM (4 X 10") POST OP AG SILVER

## (undated) DEVICE — DRSG DERMABOND PRINEO 60CM

## (undated) DEVICE — ZIMMER BLUE CURETTE

## (undated) DEVICE — MAKO BLADE STANDARD

## (undated) DEVICE — SUT VICRYL 0 18" CT-1 UNDYED (POP-OFF)

## (undated) DEVICE — WARMING BLANKET UPPER ADULT

## (undated) DEVICE — GLV 7.5 PROTEXIS

## (undated) DEVICE — DRAPE TOWEL BLUE 17" X 24"

## (undated) DEVICE — SUT MONOCRYL 3-0 27" PS-2 UNDYED

## (undated) DEVICE — SUT VICRYL 0 27" CT-2 UNDYED

## (undated) DEVICE — SUT VICRYL 0 27" CP-1 UNDYED

## (undated) DEVICE — DRSG ACE BANDAGE 6"

## (undated) DEVICE — PACK GENERAL LAPAROSCOPY

## (undated) DEVICE — DRAPE 1/2 SHEET 40X57"

## (undated) DEVICE — SUT VICRYL PLUS 0 27" CT-2 UNDYED

## (undated) DEVICE — GLV 8 PROTEXIS ORTHO (BROWN)

## (undated) DEVICE — SOL IRR POUR NS 0.9% 1000ML

## (undated) DEVICE — VALVE ENDO SURESEAL II 0-5FR

## (undated) DEVICE — PACK MINOR WITH LAP

## (undated) DEVICE — VENODYNE/SCD SLEEVE CALF MEDIUM

## (undated) DEVICE — SAW BLADE STRYKER SAGITTAL DUAL CUT 75X18X1.27MM

## (undated) DEVICE — SOL IRR BAG NS 0.9% 3000ML

## (undated) DEVICE — DRAPE EXTREMITY 87X106X128"

## (undated) DEVICE — ZIMMER MIXING BOWL

## (undated) DEVICE — Device

## (undated) DEVICE — ENDOCATCH 10MM SPECIMEN POUCH

## (undated) DEVICE — DRSG STERISTRIPS 0.5 X 4"

## (undated) DEVICE — NDL HYPO SAFE 20G X 1.5" (YELLOW)

## (undated) DEVICE — ZIMMER PULSAVAC PLUS FAN KIT

## (undated) DEVICE — DRAPE XL SHEET 77X98"

## (undated) DEVICE — SAW BLADE ZIMMER RECIPROCATING DOUBLE SIDED 12.5X96X1.19MM

## (undated) DEVICE — MAKO VIZADISC KNEE TRACKING KIT

## (undated) DEVICE — MIDAS REX LEGEND METAL CUTTER 3.0MM X 18.3MM

## (undated) DEVICE — PACK EXTREMITY SOUTHSIDE

## (undated) DEVICE — SOL INJ NS 0.9% 100ML

## (undated) DEVICE — DRAPE STICKY U BLUE 60 X 84"

## (undated) DEVICE — HOOD T7 NON-PEELAWAY

## (undated) DEVICE — STAPLER COVIDIEN ENDO GIA STANDARD HANDLE

## (undated) DEVICE — GLV 7.5 PROTEXIS (WHITE)

## (undated) DEVICE — DRAPE SPLIT SHEETS 77X108"

## (undated) DEVICE — DRAPE U LONG 47X70"

## (undated) DEVICE — SOL BETADINE POUCH 0.75OZ STERILE

## (undated) DEVICE — NDL SPINAL 18G X 3.5" (PINK)

## (undated) DEVICE — MAKO BLADE NARROW

## (undated) DEVICE — SYR LUER LOK 50CC

## (undated) DEVICE — DRAIN RESERVOIR FOR JACKSON PRATT 100CC CARDINAL

## (undated) DEVICE — GLV 8 PROTEXIS (BLUE)

## (undated) DEVICE — FOLEY TRAY 16FR 5CC LF UMETER CLOSED

## (undated) DEVICE — DRAPE ISOLATION W IOBAN & POUCH

## (undated) DEVICE — GLV 8 PROTEXIS

## (undated) DEVICE — DRAPE IOBAN 33" X 23"

## (undated) DEVICE — WOUND IRR SURGIPHOR

## (undated) DEVICE — GLV 6 PROTEXIS (BLUE)

## (undated) DEVICE — HOOD T5 PEELAWAY

## (undated) DEVICE — DRAIN PENROSE .25" X 18"

## (undated) DEVICE — VISITEC 4X4

## (undated) DEVICE — DRILL BIT CONMED LINVATEC

## (undated) DEVICE — SYR LUER LOK 20CC

## (undated) DEVICE — DRSG DERMABOND PRINEO 22CM

## (undated) DEVICE — SLING ARM CHIEFTAIN MESH LARGE